# Patient Record
Sex: FEMALE | Race: WHITE | Employment: OTHER | URBAN - NONMETROPOLITAN AREA
[De-identification: names, ages, dates, MRNs, and addresses within clinical notes are randomized per-mention and may not be internally consistent; named-entity substitution may affect disease eponyms.]

---

## 2017-05-20 ENCOUNTER — OFFICE VISIT (OUTPATIENT)
Dept: PRIMARY CARE CLINIC | Age: 82
End: 2017-05-20
Payer: MEDICARE

## 2017-05-20 VITALS
OXYGEN SATURATION: 98 % | TEMPERATURE: 98.2 F | DIASTOLIC BLOOD PRESSURE: 74 MMHG | BODY MASS INDEX: 27.46 KG/M2 | HEART RATE: 74 BPM | SYSTOLIC BLOOD PRESSURE: 126 MMHG | HEIGHT: 65 IN | WEIGHT: 164.8 LBS

## 2017-05-20 DIAGNOSIS — M79.10 MYALGIA: ICD-10-CM

## 2017-05-20 DIAGNOSIS — M54.32 LEFT SIDED SCIATICA: Primary | ICD-10-CM

## 2017-05-20 PROCEDURE — 4040F PNEUMOC VAC/ADMIN/RCVD: CPT | Performed by: FAMILY MEDICINE

## 2017-05-20 PROCEDURE — 1036F TOBACCO NON-USER: CPT | Performed by: FAMILY MEDICINE

## 2017-05-20 PROCEDURE — 1090F PRES/ABSN URINE INCON ASSESS: CPT | Performed by: FAMILY MEDICINE

## 2017-05-20 PROCEDURE — G8427 DOCREV CUR MEDS BY ELIG CLIN: HCPCS | Performed by: FAMILY MEDICINE

## 2017-05-20 PROCEDURE — G8420 CALC BMI NORM PARAMETERS: HCPCS | Performed by: FAMILY MEDICINE

## 2017-05-20 PROCEDURE — 99214 OFFICE O/P EST MOD 30 MIN: CPT | Performed by: FAMILY MEDICINE

## 2017-05-20 PROCEDURE — 1123F ACP DISCUSS/DSCN MKR DOCD: CPT | Performed by: FAMILY MEDICINE

## 2017-05-20 PROCEDURE — 20552 NJX 1/MLT TRIGGER POINT 1/2: CPT | Performed by: FAMILY MEDICINE

## 2017-05-20 PROCEDURE — 96372 THER/PROPH/DIAG INJ SC/IM: CPT | Performed by: FAMILY MEDICINE

## 2017-05-20 PROCEDURE — G8399 PT W/DXA RESULTS DOCUMENT: HCPCS | Performed by: FAMILY MEDICINE

## 2017-05-20 RX ORDER — TRIAMCINOLONE ACETONIDE 40 MG/ML
40 INJECTION, SUSPENSION INTRA-ARTICULAR; INTRAMUSCULAR ONCE
Status: COMPLETED | OUTPATIENT
Start: 2017-05-20 | End: 2017-05-20

## 2017-05-20 RX ORDER — TIZANIDINE 4 MG/1
4 TABLET ORAL EVERY 8 HOURS PRN
Qty: 30 TABLET | Refills: 0 | Status: SHIPPED | OUTPATIENT
Start: 2017-05-20 | End: 2017-08-22 | Stop reason: ALTCHOICE

## 2017-05-20 RX ORDER — TRAMADOL HYDROCHLORIDE 50 MG/1
50 TABLET ORAL EVERY 6 HOURS PRN
Qty: 15 TABLET | Refills: 0 | Status: SHIPPED | OUTPATIENT
Start: 2017-05-20 | End: 2017-06-15 | Stop reason: SDUPTHER

## 2017-05-20 RX ADMIN — TRIAMCINOLONE ACETONIDE 40 MG: 40 INJECTION, SUSPENSION INTRA-ARTICULAR; INTRAMUSCULAR at 16:12

## 2017-05-20 ASSESSMENT — ENCOUNTER SYMPTOMS
VOMITING: 0
ABDOMINAL PAIN: 0
TROUBLE SWALLOWING: 0
EYE DISCHARGE: 0
WHEEZING: 0
CONSTIPATION: 0
COUGH: 0
RHINORRHEA: 0
SINUS PRESSURE: 0
NAUSEA: 0
DIARRHEA: 0
EYE REDNESS: 0
SORE THROAT: 0
SHORTNESS OF BREATH: 0

## 2017-05-22 ENCOUNTER — EVALUATION (OUTPATIENT)
Dept: PHYSICAL THERAPY | Age: 82
End: 2017-05-22
Payer: MEDICARE

## 2017-05-22 DIAGNOSIS — M70.62 TROCHANTERIC BURSITIS OF LEFT HIP: Primary | ICD-10-CM

## 2017-05-22 PROCEDURE — G8979 MOBILITY GOAL STATUS: HCPCS | Performed by: PHYSICAL THERAPIST

## 2017-05-22 PROCEDURE — 97110 THERAPEUTIC EXERCISES: CPT | Performed by: PHYSICAL THERAPIST

## 2017-05-22 PROCEDURE — 97162 PT EVAL MOD COMPLEX 30 MIN: CPT | Performed by: PHYSICAL THERAPIST

## 2017-05-22 PROCEDURE — G8978 MOBILITY CURRENT STATUS: HCPCS | Performed by: PHYSICAL THERAPIST

## 2017-05-22 ASSESSMENT — PAIN DESCRIPTION - PAIN TYPE: TYPE: ACUTE PAIN

## 2017-05-22 ASSESSMENT — PAIN SCALES - GENERAL: PAINLEVEL_OUTOF10: 10

## 2017-05-22 ASSESSMENT — PAIN DESCRIPTION - LOCATION: LOCATION: BUTTOCKS;LEG

## 2017-05-22 ASSESSMENT — PAIN DESCRIPTION - PROGRESSION: CLINICAL_PROGRESSION: GRADUALLY WORSENING

## 2017-05-22 ASSESSMENT — PAIN DESCRIPTION - ONSET: ONSET: PROGRESSIVE

## 2017-05-22 ASSESSMENT — PAIN DESCRIPTION - ORIENTATION: ORIENTATION: LEFT

## 2017-05-22 ASSESSMENT — PAIN DESCRIPTION - DESCRIPTORS: DESCRIPTORS: SHARP

## 2017-05-22 ASSESSMENT — PAIN DESCRIPTION - FREQUENCY: FREQUENCY: INTERMITTENT

## 2017-05-24 ENCOUNTER — TREATMENT (OUTPATIENT)
Dept: PHYSICAL THERAPY | Age: 82
End: 2017-05-24
Payer: MEDICARE

## 2017-05-24 DIAGNOSIS — M70.62 TROCHANTERIC BURSITIS OF LEFT HIP: Primary | ICD-10-CM

## 2017-05-24 PROCEDURE — G0283 ELEC STIM OTHER THAN WOUND: HCPCS | Performed by: PHYSICAL THERAPIST

## 2017-05-24 PROCEDURE — G8979 MOBILITY GOAL STATUS: HCPCS | Performed by: PHYSICAL THERAPIST

## 2017-05-24 PROCEDURE — 97110 THERAPEUTIC EXERCISES: CPT | Performed by: PHYSICAL THERAPIST

## 2017-05-24 PROCEDURE — G8978 MOBILITY CURRENT STATUS: HCPCS | Performed by: PHYSICAL THERAPIST

## 2017-05-26 ENCOUNTER — TREATMENT (OUTPATIENT)
Dept: PHYSICAL THERAPY | Age: 82
End: 2017-05-26

## 2017-05-26 ENCOUNTER — HOSPITAL ENCOUNTER (EMERGENCY)
Age: 82
Discharge: HOME | End: 2017-05-26
Attending: EMERGENCY MEDICINE
Payer: MEDICARE

## 2017-05-26 ENCOUNTER — APPOINTMENT (OUTPATIENT)
Dept: GENERAL RADIOLOGY | Age: 82
End: 2017-05-26
Payer: MEDICARE

## 2017-05-26 VITALS
BODY MASS INDEX: 27.32 KG/M2 | TEMPERATURE: 98.2 F | HEART RATE: 70 BPM | WEIGHT: 164 LBS | HEIGHT: 65 IN | DIASTOLIC BLOOD PRESSURE: 78 MMHG | SYSTOLIC BLOOD PRESSURE: 168 MMHG | OXYGEN SATURATION: 95 % | RESPIRATION RATE: 14 BRPM

## 2017-05-26 DIAGNOSIS — M70.62 TROCHANTERIC BURSITIS OF LEFT HIP: Primary | ICD-10-CM

## 2017-05-26 DIAGNOSIS — M16.12 ARTHRITIS OF LEFT HIP: Primary | ICD-10-CM

## 2017-05-26 PROCEDURE — 73502 X-RAY EXAM HIP UNI 2-3 VIEWS: CPT | Performed by: RADIOLOGY

## 2017-05-26 PROCEDURE — 73502 X-RAY EXAM HIP UNI 2-3 VIEWS: CPT

## 2017-05-26 PROCEDURE — 99283 EMERGENCY DEPT VISIT LOW MDM: CPT

## 2017-05-26 RX ORDER — METHYLPREDNISOLONE 4 MG/1
TABLET ORAL
Qty: 1 KIT | Refills: 0 | Status: SHIPPED | OUTPATIENT
Start: 2017-05-26 | End: 2017-06-15

## 2017-05-26 ASSESSMENT — PAIN DESCRIPTION - ONSET: ONSET: ON-GOING

## 2017-05-26 ASSESSMENT — PAIN DESCRIPTION - PAIN TYPE: TYPE: ACUTE PAIN

## 2017-05-26 ASSESSMENT — PAIN DESCRIPTION - DIRECTION: RADIATING_TOWARDS: KNEE

## 2017-05-26 ASSESSMENT — PAIN DESCRIPTION - LOCATION: LOCATION: HIP

## 2017-05-26 ASSESSMENT — PAIN DESCRIPTION - DESCRIPTORS: DESCRIPTORS: DULL

## 2017-05-26 ASSESSMENT — PAIN DESCRIPTION - FREQUENCY: FREQUENCY: CONTINUOUS

## 2017-05-26 ASSESSMENT — PAIN SCALES - GENERAL: PAINLEVEL_OUTOF10: 6

## 2017-05-26 ASSESSMENT — PAIN DESCRIPTION - PROGRESSION: CLINICAL_PROGRESSION: GRADUALLY WORSENING

## 2017-05-26 ASSESSMENT — PAIN DESCRIPTION - ORIENTATION: ORIENTATION: LEFT

## 2017-05-30 ENCOUNTER — OFFICE VISIT (OUTPATIENT)
Dept: ORTHOPEDIC SURGERY | Age: 82
End: 2017-05-30
Payer: MEDICARE

## 2017-05-30 VITALS
BODY MASS INDEX: 27.32 KG/M2 | WEIGHT: 164 LBS | HEIGHT: 65 IN | HEART RATE: 74 BPM | DIASTOLIC BLOOD PRESSURE: 80 MMHG | SYSTOLIC BLOOD PRESSURE: 144 MMHG

## 2017-05-30 DIAGNOSIS — M54.40 ACUTE BILATERAL LOW BACK PAIN WITH SCIATICA, SCIATICA LATERALITY UNSPECIFIED: Primary | ICD-10-CM

## 2017-05-30 DIAGNOSIS — M70.62 TROCHANTERIC BURSITIS OF LEFT HIP: ICD-10-CM

## 2017-05-30 PROCEDURE — G8399 PT W/DXA RESULTS DOCUMENT: HCPCS | Performed by: PHYSICIAN ASSISTANT

## 2017-05-30 PROCEDURE — 99202 OFFICE O/P NEW SF 15 MIN: CPT | Performed by: PHYSICIAN ASSISTANT

## 2017-05-30 PROCEDURE — G8427 DOCREV CUR MEDS BY ELIG CLIN: HCPCS | Performed by: PHYSICIAN ASSISTANT

## 2017-05-30 PROCEDURE — 1123F ACP DISCUSS/DSCN MKR DOCD: CPT | Performed by: PHYSICIAN ASSISTANT

## 2017-05-30 PROCEDURE — G8420 CALC BMI NORM PARAMETERS: HCPCS | Performed by: PHYSICIAN ASSISTANT

## 2017-05-30 PROCEDURE — 1090F PRES/ABSN URINE INCON ASSESS: CPT | Performed by: PHYSICIAN ASSISTANT

## 2017-05-30 PROCEDURE — 4040F PNEUMOC VAC/ADMIN/RCVD: CPT | Performed by: PHYSICIAN ASSISTANT

## 2017-05-30 PROCEDURE — 1036F TOBACCO NON-USER: CPT | Performed by: PHYSICIAN ASSISTANT

## 2017-05-30 PROCEDURE — 20610 DRAIN/INJ JOINT/BURSA W/O US: CPT | Performed by: PHYSICIAN ASSISTANT

## 2017-05-30 RX ORDER — BUPIVACAINE HYDROCHLORIDE 5 MG/ML
4 INJECTION, SOLUTION PERINEURAL ONCE
Status: COMPLETED | OUTPATIENT
Start: 2017-05-30 | End: 2017-05-30

## 2017-05-30 RX ORDER — TRIAMCINOLONE ACETONIDE 40 MG/ML
80 INJECTION, SUSPENSION INTRA-ARTICULAR; INTRAMUSCULAR ONCE
Status: COMPLETED | OUTPATIENT
Start: 2017-05-30 | End: 2017-05-30

## 2017-05-30 RX ADMIN — TRIAMCINOLONE ACETONIDE 80 MG: 40 INJECTION, SUSPENSION INTRA-ARTICULAR; INTRAMUSCULAR at 12:51

## 2017-05-30 RX ADMIN — BUPIVACAINE HYDROCHLORIDE 20 MG: 5 INJECTION, SOLUTION PERINEURAL at 12:51

## 2017-06-07 ENCOUNTER — OFFICE VISIT (OUTPATIENT)
Dept: FAMILY MEDICINE CLINIC | Age: 82
End: 2017-06-07
Payer: MEDICARE

## 2017-06-07 VITALS
WEIGHT: 160.6 LBS | TEMPERATURE: 99 F | HEIGHT: 65 IN | SYSTOLIC BLOOD PRESSURE: 120 MMHG | OXYGEN SATURATION: 98 % | BODY MASS INDEX: 26.76 KG/M2 | HEART RATE: 76 BPM | DIASTOLIC BLOOD PRESSURE: 78 MMHG

## 2017-06-07 DIAGNOSIS — M81.0 OSTEOPOROSIS: ICD-10-CM

## 2017-06-07 DIAGNOSIS — R73.01 IMPAIRED FASTING GLUCOSE: ICD-10-CM

## 2017-06-07 DIAGNOSIS — M70.62 TROCHANTERIC BURSITIS OF LEFT HIP: Primary | ICD-10-CM

## 2017-06-07 DIAGNOSIS — Z13.6 SCREENING FOR CARDIOVASCULAR CONDITION: ICD-10-CM

## 2017-06-07 DIAGNOSIS — Z13.1 SCREENING FOR DIABETES MELLITUS: ICD-10-CM

## 2017-06-07 PROCEDURE — G8420 CALC BMI NORM PARAMETERS: HCPCS | Performed by: FAMILY MEDICINE

## 2017-06-07 PROCEDURE — 4005F PHARM THX FOR OP RXD: CPT | Performed by: FAMILY MEDICINE

## 2017-06-07 PROCEDURE — G8399 PT W/DXA RESULTS DOCUMENT: HCPCS | Performed by: FAMILY MEDICINE

## 2017-06-07 PROCEDURE — 1090F PRES/ABSN URINE INCON ASSESS: CPT | Performed by: FAMILY MEDICINE

## 2017-06-07 PROCEDURE — G0009 ADMIN PNEUMOCOCCAL VACCINE: HCPCS | Performed by: FAMILY MEDICINE

## 2017-06-07 PROCEDURE — 90670 PCV13 VACCINE IM: CPT | Performed by: FAMILY MEDICINE

## 2017-06-07 PROCEDURE — 1123F ACP DISCUSS/DSCN MKR DOCD: CPT | Performed by: FAMILY MEDICINE

## 2017-06-07 PROCEDURE — G8427 DOCREV CUR MEDS BY ELIG CLIN: HCPCS | Performed by: FAMILY MEDICINE

## 2017-06-07 PROCEDURE — 1036F TOBACCO NON-USER: CPT | Performed by: FAMILY MEDICINE

## 2017-06-07 PROCEDURE — 99214 OFFICE O/P EST MOD 30 MIN: CPT | Performed by: FAMILY MEDICINE

## 2017-06-07 PROCEDURE — 4040F PNEUMOC VAC/ADMIN/RCVD: CPT | Performed by: FAMILY MEDICINE

## 2017-06-07 RX ORDER — VITAMIN E 200 UNIT
1 CAPSULE ORAL DAILY
Status: ON HOLD | COMMUNITY
End: 2022-04-29 | Stop reason: HOSPADM

## 2017-06-07 RX ORDER — M-VIT,TX,IRON,MINS/CALC/FOLIC 27MG-0.4MG
1 TABLET ORAL DAILY
Status: ON HOLD | COMMUNITY
End: 2021-09-08 | Stop reason: HOSPADM

## 2017-06-07 RX ORDER — LORATADINE 10 MG/1
10 TABLET ORAL DAILY
Qty: 30 TABLET | Refills: 3 | Status: ON HOLD
Start: 2017-06-07 | End: 2017-07-20 | Stop reason: ALTCHOICE

## 2017-06-07 RX ORDER — FLUTICASONE PROPIONATE 50 MCG
1 SPRAY, SUSPENSION (ML) NASAL DAILY PRN
Status: ON HOLD | COMMUNITY
End: 2017-09-19

## 2017-06-07 ASSESSMENT — ENCOUNTER SYMPTOMS
BACK PAIN: 1
ROS SKIN COMMENTS: SEES DERM
CHEST TIGHTNESS: 0
DIARRHEA: 0
SINUS PRESSURE: 0
COUGH: 0
ABDOMINAL PAIN: 0
SHORTNESS OF BREATH: 0
WHEEZING: 0
CONSTIPATION: 0

## 2017-06-09 ENCOUNTER — EVALUATION (OUTPATIENT)
Dept: PHYSICAL THERAPY | Age: 82
End: 2017-06-09
Payer: MEDICARE

## 2017-06-09 DIAGNOSIS — M70.62 TROCHANTERIC BURSITIS OF LEFT HIP: Primary | ICD-10-CM

## 2017-06-09 PROCEDURE — G0283 ELEC STIM OTHER THAN WOUND: HCPCS | Performed by: PHYSICAL THERAPIST

## 2017-06-09 PROCEDURE — 97110 THERAPEUTIC EXERCISES: CPT | Performed by: PHYSICAL THERAPIST

## 2017-06-12 ENCOUNTER — TREATMENT (OUTPATIENT)
Dept: PHYSICAL THERAPY | Age: 82
End: 2017-06-12
Payer: MEDICARE

## 2017-06-12 DIAGNOSIS — M70.62 TROCHANTERIC BURSITIS OF LEFT HIP: Primary | ICD-10-CM

## 2017-06-12 PROCEDURE — 97110 THERAPEUTIC EXERCISES: CPT | Performed by: PHYSICAL THERAPIST

## 2017-06-12 PROCEDURE — G0283 ELEC STIM OTHER THAN WOUND: HCPCS | Performed by: PHYSICAL THERAPIST

## 2017-06-14 ENCOUNTER — TREATMENT (OUTPATIENT)
Dept: PHYSICAL THERAPY | Age: 82
End: 2017-06-14
Payer: MEDICARE

## 2017-06-14 DIAGNOSIS — M70.62 TROCHANTERIC BURSITIS OF LEFT HIP: Primary | ICD-10-CM

## 2017-06-14 PROCEDURE — G0283 ELEC STIM OTHER THAN WOUND: HCPCS | Performed by: PHYSICAL THERAPIST

## 2017-06-14 PROCEDURE — 97110 THERAPEUTIC EXERCISES: CPT | Performed by: PHYSICAL THERAPIST

## 2017-06-15 ENCOUNTER — OFFICE VISIT (OUTPATIENT)
Dept: FAMILY MEDICINE CLINIC | Age: 82
End: 2017-06-15
Payer: MEDICARE

## 2017-06-15 VITALS
OXYGEN SATURATION: 97 % | HEART RATE: 70 BPM | TEMPERATURE: 98.5 F | BODY MASS INDEX: 27.09 KG/M2 | WEIGHT: 162.8 LBS | DIASTOLIC BLOOD PRESSURE: 82 MMHG | SYSTOLIC BLOOD PRESSURE: 128 MMHG

## 2017-06-15 DIAGNOSIS — K59.03 DRUG-INDUCED CONSTIPATION: ICD-10-CM

## 2017-06-15 DIAGNOSIS — M70.62 TROCHANTERIC BURSITIS OF LEFT HIP: Primary | ICD-10-CM

## 2017-06-15 PROCEDURE — 99214 OFFICE O/P EST MOD 30 MIN: CPT | Performed by: FAMILY MEDICINE

## 2017-06-15 PROCEDURE — 1090F PRES/ABSN URINE INCON ASSESS: CPT | Performed by: FAMILY MEDICINE

## 2017-06-15 PROCEDURE — 1036F TOBACCO NON-USER: CPT | Performed by: FAMILY MEDICINE

## 2017-06-15 PROCEDURE — 4040F PNEUMOC VAC/ADMIN/RCVD: CPT | Performed by: FAMILY MEDICINE

## 2017-06-15 PROCEDURE — G8399 PT W/DXA RESULTS DOCUMENT: HCPCS | Performed by: FAMILY MEDICINE

## 2017-06-15 PROCEDURE — 1123F ACP DISCUSS/DSCN MKR DOCD: CPT | Performed by: FAMILY MEDICINE

## 2017-06-15 PROCEDURE — G8419 CALC BMI OUT NRM PARAM NOF/U: HCPCS | Performed by: FAMILY MEDICINE

## 2017-06-15 PROCEDURE — G8427 DOCREV CUR MEDS BY ELIG CLIN: HCPCS | Performed by: FAMILY MEDICINE

## 2017-06-15 RX ORDER — SENNA PLUS 8.6 MG/1
1 TABLET ORAL 2 TIMES DAILY PRN
Qty: 30 TABLET | Refills: 0 | Status: SHIPPED | OUTPATIENT
Start: 2017-06-15 | End: 2018-06-15

## 2017-06-15 RX ORDER — TRAMADOL HYDROCHLORIDE 50 MG/1
50 TABLET ORAL EVERY 8 HOURS PRN
Qty: 60 TABLET | Refills: 0 | Status: ON HOLD | OUTPATIENT
Start: 2017-06-15 | End: 2017-07-20 | Stop reason: HOSPADM

## 2017-06-15 RX ORDER — POLYETHYLENE GLYCOL 3350 17 G/17G
17 POWDER, FOR SOLUTION ORAL DAILY
Qty: 510 G | Refills: 0 | Status: ON HOLD
Start: 2017-06-15 | End: 2017-07-20 | Stop reason: HOSPADM

## 2017-06-15 ASSESSMENT — ENCOUNTER SYMPTOMS
CONSTIPATION: 1
ROS SKIN COMMENTS: SEES DERM
CHEST TIGHTNESS: 0
DIARRHEA: 0
COUGH: 0
ABDOMINAL PAIN: 0
SHORTNESS OF BREATH: 0
WHEEZING: 0
BACK PAIN: 1

## 2017-06-16 ENCOUNTER — TREATMENT (OUTPATIENT)
Dept: PHYSICAL THERAPY | Age: 82
End: 2017-06-16
Payer: MEDICARE

## 2017-06-16 DIAGNOSIS — M70.62 TROCHANTERIC BURSITIS OF LEFT HIP: Primary | ICD-10-CM

## 2017-06-16 PROCEDURE — G0283 ELEC STIM OTHER THAN WOUND: HCPCS | Performed by: PHYSICAL THERAPIST

## 2017-06-16 PROCEDURE — 97110 THERAPEUTIC EXERCISES: CPT | Performed by: PHYSICAL THERAPIST

## 2017-06-20 ENCOUNTER — TREATMENT (OUTPATIENT)
Dept: PHYSICAL THERAPY | Age: 82
End: 2017-06-20
Payer: MEDICARE

## 2017-06-20 DIAGNOSIS — M70.62 TROCHANTERIC BURSITIS OF LEFT HIP: Primary | ICD-10-CM

## 2017-06-20 PROCEDURE — G0283 ELEC STIM OTHER THAN WOUND: HCPCS | Performed by: PHYSICAL THERAPIST

## 2017-06-20 PROCEDURE — 97110 THERAPEUTIC EXERCISES: CPT | Performed by: PHYSICAL THERAPIST

## 2017-06-22 ENCOUNTER — TREATMENT (OUTPATIENT)
Dept: PHYSICAL THERAPY | Age: 82
End: 2017-06-22
Payer: MEDICARE

## 2017-06-22 DIAGNOSIS — M70.62 TROCHANTERIC BURSITIS OF LEFT HIP: Primary | ICD-10-CM

## 2017-06-22 PROCEDURE — G0283 ELEC STIM OTHER THAN WOUND: HCPCS | Performed by: PHYSICAL THERAPIST

## 2017-06-22 PROCEDURE — 97110 THERAPEUTIC EXERCISES: CPT | Performed by: PHYSICAL THERAPIST

## 2017-06-27 ENCOUNTER — OFFICE VISIT (OUTPATIENT)
Dept: ORTHOPEDIC SURGERY | Age: 82
End: 2017-06-27
Payer: MEDICARE

## 2017-06-27 VITALS
WEIGHT: 164 LBS | BODY MASS INDEX: 27.32 KG/M2 | SYSTOLIC BLOOD PRESSURE: 142 MMHG | HEART RATE: 72 BPM | HEIGHT: 65 IN | DIASTOLIC BLOOD PRESSURE: 71 MMHG

## 2017-06-27 DIAGNOSIS — M54.10 RADICULAR LOW BACK PAIN: Primary | ICD-10-CM

## 2017-06-27 PROCEDURE — 4040F PNEUMOC VAC/ADMIN/RCVD: CPT | Performed by: PHYSICIAN ASSISTANT

## 2017-06-27 PROCEDURE — 99212 OFFICE O/P EST SF 10 MIN: CPT | Performed by: PHYSICIAN ASSISTANT

## 2017-06-27 PROCEDURE — 1036F TOBACCO NON-USER: CPT | Performed by: PHYSICIAN ASSISTANT

## 2017-06-27 PROCEDURE — G8399 PT W/DXA RESULTS DOCUMENT: HCPCS | Performed by: PHYSICIAN ASSISTANT

## 2017-06-27 PROCEDURE — 1090F PRES/ABSN URINE INCON ASSESS: CPT | Performed by: PHYSICIAN ASSISTANT

## 2017-06-27 PROCEDURE — G8427 DOCREV CUR MEDS BY ELIG CLIN: HCPCS | Performed by: PHYSICIAN ASSISTANT

## 2017-06-27 PROCEDURE — 1123F ACP DISCUSS/DSCN MKR DOCD: CPT | Performed by: PHYSICIAN ASSISTANT

## 2017-06-27 PROCEDURE — G8419 CALC BMI OUT NRM PARAM NOF/U: HCPCS | Performed by: PHYSICIAN ASSISTANT

## 2017-06-27 RX ORDER — TIZANIDINE HYDROCHLORIDE 4 MG/1
4 CAPSULE, GELATIN COATED ORAL EVERY 6 HOURS PRN
Qty: 120 CAPSULE | Refills: 0 | Status: SHIPPED | OUTPATIENT
Start: 2017-06-27 | End: 2017-06-27 | Stop reason: CLARIF

## 2017-06-27 RX ORDER — TIZANIDINE HYDROCHLORIDE 4 MG/1
4 CAPSULE, GELATIN COATED ORAL EVERY 6 HOURS PRN
Qty: 30 CAPSULE | Refills: 0 | Status: ON HOLD | OUTPATIENT
Start: 2017-06-27 | End: 2017-07-20 | Stop reason: HOSPADM

## 2017-07-10 ENCOUNTER — TELEPHONE (OUTPATIENT)
Dept: ORTHOPEDIC SURGERY | Age: 82
End: 2017-07-10

## 2017-07-10 DIAGNOSIS — M70.72 HIP BURSITIS, LEFT: Primary | ICD-10-CM

## 2017-07-11 RX ORDER — TIZANIDINE HYDROCHLORIDE 4 MG/1
4 CAPSULE, GELATIN COATED ORAL EVERY 6 HOURS PRN
Qty: 30 CAPSULE | Refills: 0 | Status: ON HOLD | OUTPATIENT
Start: 2017-07-11 | End: 2017-07-20 | Stop reason: HOSPADM

## 2017-07-15 ENCOUNTER — APPOINTMENT (OUTPATIENT)
Dept: CT IMAGING | Age: 82
End: 2017-07-15
Payer: MEDICARE

## 2017-07-15 ENCOUNTER — APPOINTMENT (OUTPATIENT)
Dept: GENERAL RADIOLOGY | Age: 82
End: 2017-07-15
Payer: MEDICARE

## 2017-07-15 ENCOUNTER — HOSPITAL ENCOUNTER (INPATIENT)
Age: 82
LOS: 5 days | Discharge: HOME | DRG: 443 | End: 2017-07-20
Attending: INTERNAL MEDICINE | Admitting: INTERNAL MEDICINE
Payer: MEDICARE

## 2017-07-15 ENCOUNTER — HOSPITAL ENCOUNTER (EMERGENCY)
Age: 82
Discharge: ANOTHER HOSPITAL | End: 2017-07-15
Attending: EMERGENCY MEDICINE
Payer: MEDICARE

## 2017-07-15 ENCOUNTER — APPOINTMENT (OUTPATIENT)
Dept: ULTRASOUND IMAGING | Age: 82
DRG: 443 | End: 2017-07-15
Attending: INTERNAL MEDICINE
Payer: MEDICARE

## 2017-07-15 VITALS
WEIGHT: 160 LBS | DIASTOLIC BLOOD PRESSURE: 75 MMHG | SYSTOLIC BLOOD PRESSURE: 159 MMHG | TEMPERATURE: 98.8 F | BODY MASS INDEX: 26.63 KG/M2 | OXYGEN SATURATION: 97 % | RESPIRATION RATE: 16 BRPM | HEART RATE: 64 BPM

## 2017-07-15 DIAGNOSIS — R10.84 GENERALIZED ABDOMINAL PAIN: Primary | ICD-10-CM

## 2017-07-15 DIAGNOSIS — I48.91 NEW ONSET ATRIAL FIBRILLATION (HCC): ICD-10-CM

## 2017-07-15 LAB
-: ABNORMAL
ABSOLUTE EOS #: 0.2 K/UL (ref 0–0.4)
ABSOLUTE LYMPH #: 0.9 K/UL (ref 1–4.8)
ABSOLUTE MONO #: 0.8 K/UL (ref 0.1–1.2)
ALBUMIN SERPL-MCNC: 3.8 G/DL (ref 3.5–5.2)
ALBUMIN/GLOBULIN RATIO: 1.2 (ref 1–2.5)
ALP BLD-CCNC: 161 U/L (ref 35–104)
ALT SERPL-CCNC: 1002 U/L (ref 5–33)
AMORPHOUS: ABNORMAL
AMYLASE: 44 U/L (ref 28–100)
ANION GAP SERPL CALCULATED.3IONS-SCNC: 17 MMOL/L (ref 9–17)
AST SERPL-CCNC: 512 U/L
BACTERIA: ABNORMAL
BASOPHILS # BLD: 1 %
BASOPHILS ABSOLUTE: 0.1 K/UL (ref 0–0.2)
BILIRUB SERPL-MCNC: 1.06 MG/DL (ref 0.3–1.2)
BILIRUBIN DIRECT: 0.34 MG/DL
BILIRUBIN URINE: NEGATIVE
BILIRUBIN, INDIRECT: 0.72 MG/DL (ref 0–1)
BUN BLDV-MCNC: 9 MG/DL (ref 8–23)
BUN/CREAT BLD: 14 (ref 9–20)
CALCIUM SERPL-MCNC: 9.6 MG/DL (ref 8.6–10.4)
CASTS UA: ABNORMAL /LPF (ref 0–2)
CHLORIDE BLD-SCNC: 97 MMOL/L (ref 98–107)
CK MB: 2.4 NG/ML
CO2: 25 MMOL/L (ref 20–31)
COLOR: ABNORMAL
COMMENT UA: ABNORMAL
CREAT SERPL-MCNC: 0.64 MG/DL (ref 0.5–0.9)
CRYSTALS, UA: ABNORMAL /HPF
DIFFERENTIAL TYPE: ABNORMAL
EKG ATRIAL RATE: 142 BPM
EKG ATRIAL RATE: 67 BPM
EKG P AXIS: 38 DEGREES
EKG P-R INTERVAL: 158 MS
EKG P-R INTERVAL: 202 MS
EKG Q-T INTERVAL: 316 MS
EKG Q-T INTERVAL: 432 MS
EKG QRS DURATION: 74 MS
EKG QRS DURATION: 74 MS
EKG QTC CALCULATION (BAZETT): 456 MS
EKG QTC CALCULATION (BAZETT): 486 MS
EKG R AXIS: 23 DEGREES
EKG R AXIS: 24 DEGREES
EKG T AXIS: -41 DEGREES
EKG T AXIS: 36 DEGREES
EKG VENTRICULAR RATE: 142 BPM
EKG VENTRICULAR RATE: 67 BPM
EOSINOPHILS RELATIVE PERCENT: 2 %
EPITHELIAL CELLS UA: ABNORMAL /HPF (ref 0–5)
GFR AFRICAN AMERICAN: >60 ML/MIN
GFR NON-AFRICAN AMERICAN: >60 ML/MIN
GFR SERPL CREATININE-BSD FRML MDRD: ABNORMAL ML/MIN/{1.73_M2}
GFR SERPL CREATININE-BSD FRML MDRD: ABNORMAL ML/MIN/{1.73_M2}
GLOBULIN: 3.3 G/DL (ref 1.5–3.8)
GLUCOSE BLD-MCNC: 109 MG/DL (ref 70–99)
GLUCOSE URINE: NEGATIVE
HCT VFR BLD CALC: 43.9 % (ref 36–46)
HEMOGLOBIN: 14.5 G/DL (ref 12–16)
INR BLD: 1.1
KETONES, URINE: ABNORMAL
LEUKOCYTE ESTERASE, URINE: ABNORMAL
LIPASE: 30 U/L (ref 13–60)
LYMPHOCYTES # BLD: 12 %
MCH RBC QN AUTO: 28.6 PG (ref 26–34)
MCHC RBC AUTO-ENTMCNC: 33 G/DL (ref 31–37)
MCV RBC AUTO: 86.8 FL (ref 80–100)
MONOCYTES # BLD: 11 %
MUCUS: ABNORMAL
MYOGLOBIN: 63 NG/ML (ref 25–58)
NITRITE, URINE: NEGATIVE
OTHER OBSERVATIONS UA: ABNORMAL
PARTIAL THROMBOPLASTIN TIME: 29.6 SEC (ref 27–35)
PDW BLD-RTO: 15.3 % (ref 11–14.5)
PH UA: 6.5 (ref 5–6)
PLATELET # BLD: 150 K/UL (ref 140–450)
PLATELET ESTIMATE: ABNORMAL
PMV BLD AUTO: 7.9 FL (ref 6–12)
POTASSIUM SERPL-SCNC: 3.8 MMOL/L (ref 3.7–5.3)
PROTEIN UA: NEGATIVE
PROTHROMBIN TIME: 12 SEC (ref 9.4–11.3)
RBC # BLD: 5.06 M/UL (ref 4–5.2)
RBC # BLD: ABNORMAL 10*6/UL
RBC UA: ABNORMAL /HPF (ref 0–4)
RENAL EPITHELIAL, UA: ABNORMAL /HPF
SEG NEUTROPHILS: 74 %
SEGMENTED NEUTROPHILS ABSOLUTE COUNT: 5.3 K/UL (ref 1.8–7.7)
SODIUM BLD-SCNC: 139 MMOL/L (ref 135–144)
SPECIFIC GRAVITY UA: 1 (ref 1.01–1.02)
TOTAL CK: 62 U/L (ref 26–192)
TOTAL PROTEIN: 7.1 G/DL (ref 6.4–8.3)
TRICHOMONAS: ABNORMAL
TROPONIN INTERP: NORMAL
TROPONIN T: < 0.01 NG/ML
TROPONIN T: <0.03 NG/ML
TURBIDITY: ABNORMAL
URINE HGB: ABNORMAL
UROBILINOGEN, URINE: NORMAL
WBC # BLD: 7.1 K/UL (ref 3.5–11)
WBC # BLD: ABNORMAL 10*3/UL
WBC UA: ABNORMAL /HPF (ref 0–4)
YEAST: ABNORMAL

## 2017-07-15 PROCEDURE — 6360000002 HC RX W HCPCS: Performed by: EMERGENCY MEDICINE

## 2017-07-15 PROCEDURE — 80048 BASIC METABOLIC PNL TOTAL CA: CPT

## 2017-07-15 PROCEDURE — 2500000003 HC RX 250 WO HCPCS: Performed by: EMERGENCY MEDICINE

## 2017-07-15 PROCEDURE — 93005 ELECTROCARDIOGRAM TRACING: CPT

## 2017-07-15 PROCEDURE — 81001 URINALYSIS AUTO W/SCOPE: CPT

## 2017-07-15 PROCEDURE — 85610 PROTHROMBIN TIME: CPT

## 2017-07-15 PROCEDURE — 71020 XR CHEST STANDARD TWO VW: CPT | Performed by: RADIOLOGY

## 2017-07-15 PROCEDURE — 36415 COLL VENOUS BLD VENIPUNCTURE: CPT

## 2017-07-15 PROCEDURE — 99285 EMERGENCY DEPT VISIT HI MDM: CPT

## 2017-07-15 PROCEDURE — 83690 ASSAY OF LIPASE: CPT

## 2017-07-15 PROCEDURE — 96365 THER/PROPH/DIAG IV INF INIT: CPT

## 2017-07-15 PROCEDURE — 82150 ASSAY OF AMYLASE: CPT

## 2017-07-15 PROCEDURE — 74177 CT ABD & PELVIS W/CONTRAST: CPT

## 2017-07-15 PROCEDURE — 84484 ASSAY OF TROPONIN QUANT: CPT

## 2017-07-15 PROCEDURE — 2580000003 HC RX 258: Performed by: INTERNAL MEDICINE

## 2017-07-15 PROCEDURE — 96376 TX/PRO/DX INJ SAME DRUG ADON: CPT

## 2017-07-15 PROCEDURE — 85730 THROMBOPLASTIN TIME PARTIAL: CPT

## 2017-07-15 PROCEDURE — 74177 CT ABD & PELVIS W/CONTRAST: CPT | Performed by: RADIOLOGY

## 2017-07-15 PROCEDURE — 2580000003 HC RX 258: Performed by: EMERGENCY MEDICINE

## 2017-07-15 PROCEDURE — 71020 XR CHEST STANDARD TWO VW: CPT

## 2017-07-15 PROCEDURE — 96366 THER/PROPH/DIAG IV INF ADDON: CPT

## 2017-07-15 PROCEDURE — 82550 ASSAY OF CK (CPK): CPT

## 2017-07-15 PROCEDURE — 6360000002 HC RX W HCPCS

## 2017-07-15 PROCEDURE — 76700 US EXAM ABDOM COMPLETE: CPT

## 2017-07-15 PROCEDURE — 6360000004 HC RX CONTRAST MEDICATION: Performed by: EMERGENCY MEDICINE

## 2017-07-15 PROCEDURE — 85025 COMPLETE CBC W/AUTO DIFF WBC: CPT

## 2017-07-15 PROCEDURE — 1200000003 HC TELEMETRY R&B

## 2017-07-15 PROCEDURE — 82553 CREATINE MB FRACTION: CPT

## 2017-07-15 PROCEDURE — 99223 1ST HOSP IP/OBS HIGH 75: CPT | Performed by: INTERNAL MEDICINE

## 2017-07-15 PROCEDURE — 83874 ASSAY OF MYOGLOBIN: CPT

## 2017-07-15 PROCEDURE — 96375 TX/PRO/DX INJ NEW DRUG ADDON: CPT

## 2017-07-15 PROCEDURE — 80076 HEPATIC FUNCTION PANEL: CPT

## 2017-07-15 PROCEDURE — 6360000002 HC RX W HCPCS: Performed by: INTERNAL MEDICINE

## 2017-07-15 RX ORDER — POTASSIUM CHLORIDE 20 MEQ/1
40 TABLET, EXTENDED RELEASE ORAL PRN
Status: DISCONTINUED | OUTPATIENT
Start: 2017-07-15 | End: 2017-07-20 | Stop reason: HOSPADM

## 2017-07-15 RX ORDER — 0.9 % SODIUM CHLORIDE 0.9 %
1000 INTRAVENOUS SOLUTION INTRAVENOUS ONCE
Status: COMPLETED | OUTPATIENT
Start: 2017-07-15 | End: 2017-07-15

## 2017-07-15 RX ORDER — POTASSIUM CHLORIDE 20MEQ/15ML
40 LIQUID (ML) ORAL PRN
Status: DISCONTINUED | OUTPATIENT
Start: 2017-07-15 | End: 2017-07-20 | Stop reason: HOSPADM

## 2017-07-15 RX ORDER — ONDANSETRON 2 MG/ML
INJECTION INTRAMUSCULAR; INTRAVENOUS
Status: COMPLETED
Start: 2017-07-15 | End: 2017-07-15

## 2017-07-15 RX ORDER — MORPHINE SULFATE 4 MG/ML
4 INJECTION, SOLUTION INTRAMUSCULAR; INTRAVENOUS ONCE
Status: COMPLETED | OUTPATIENT
Start: 2017-07-15 | End: 2017-07-15

## 2017-07-15 RX ORDER — SODIUM CHLORIDE 0.9 % (FLUSH) 0.9 %
10 SYRINGE (ML) INJECTION EVERY 12 HOURS SCHEDULED
Status: DISCONTINUED | OUTPATIENT
Start: 2017-07-15 | End: 2017-07-20 | Stop reason: HOSPADM

## 2017-07-15 RX ORDER — POTASSIUM CHLORIDE 7.45 MG/ML
10 INJECTION INTRAVENOUS PRN
Status: DISCONTINUED | OUTPATIENT
Start: 2017-07-15 | End: 2017-07-20 | Stop reason: HOSPADM

## 2017-07-15 RX ORDER — DILTIAZEM HYDROCHLORIDE 5 MG/ML
10 INJECTION INTRAVENOUS ONCE
Status: COMPLETED | OUTPATIENT
Start: 2017-07-15 | End: 2017-07-15

## 2017-07-15 RX ORDER — ACETAMINOPHEN 325 MG/1
650 TABLET ORAL EVERY 4 HOURS PRN
Status: DISCONTINUED | OUTPATIENT
Start: 2017-07-15 | End: 2017-07-17 | Stop reason: ALTCHOICE

## 2017-07-15 RX ORDER — SODIUM CHLORIDE 0.9 % (FLUSH) 0.9 %
10 SYRINGE (ML) INJECTION PRN
Status: DISCONTINUED | OUTPATIENT
Start: 2017-07-15 | End: 2017-07-20 | Stop reason: HOSPADM

## 2017-07-15 RX ORDER — SODIUM CHLORIDE 9 MG/ML
INJECTION, SOLUTION INTRAVENOUS CONTINUOUS
Status: DISCONTINUED | OUTPATIENT
Start: 2017-07-15 | End: 2017-07-20 | Stop reason: HOSPADM

## 2017-07-15 RX ORDER — PANTOPRAZOLE SODIUM 40 MG/1
40 TABLET, DELAYED RELEASE ORAL
Status: DISCONTINUED | OUTPATIENT
Start: 2017-07-16 | End: 2017-07-20 | Stop reason: HOSPADM

## 2017-07-15 RX ORDER — ONDANSETRON 2 MG/ML
4 INJECTION INTRAMUSCULAR; INTRAVENOUS ONCE
Status: COMPLETED | OUTPATIENT
Start: 2017-07-15 | End: 2017-07-15

## 2017-07-15 RX ORDER — ONDANSETRON 2 MG/ML
4 INJECTION INTRAMUSCULAR; INTRAVENOUS EVERY 6 HOURS PRN
Status: DISCONTINUED | OUTPATIENT
Start: 2017-07-15 | End: 2017-07-20 | Stop reason: HOSPADM

## 2017-07-15 RX ADMIN — HYDROMORPHONE HYDROCHLORIDE 1 MG: 1 INJECTION, SOLUTION INTRAMUSCULAR; INTRAVENOUS; SUBCUTANEOUS at 19:37

## 2017-07-15 RX ADMIN — ONDANSETRON 4 MG: 2 INJECTION INTRAMUSCULAR; INTRAVENOUS at 13:48

## 2017-07-15 RX ADMIN — SODIUM CHLORIDE: 9 INJECTION, SOLUTION INTRAVENOUS at 17:06

## 2017-07-15 RX ADMIN — HYDROMORPHONE HYDROCHLORIDE 0.5 MG: 1 INJECTION, SOLUTION INTRAMUSCULAR; INTRAVENOUS; SUBCUTANEOUS at 11:14

## 2017-07-15 RX ADMIN — HYDROMORPHONE HYDROCHLORIDE 0.5 MG: 1 INJECTION, SOLUTION INTRAMUSCULAR; INTRAVENOUS; SUBCUTANEOUS at 13:49

## 2017-07-15 RX ADMIN — DILTIAZEM HYDROCHLORIDE 10 MG: 5 INJECTION INTRAVENOUS at 12:05

## 2017-07-15 RX ADMIN — Medication 10 ML: at 19:37

## 2017-07-15 RX ADMIN — SODIUM CHLORIDE 1000 ML: 9 INJECTION, SOLUTION INTRAVENOUS at 10:34

## 2017-07-15 RX ADMIN — MORPHINE SULFATE 4 MG: 4 INJECTION, SOLUTION INTRAMUSCULAR; INTRAVENOUS at 17:06

## 2017-07-15 RX ADMIN — HYDROMORPHONE HYDROCHLORIDE 0.5 MG: 1 INJECTION, SOLUTION INTRAMUSCULAR; INTRAVENOUS; SUBCUTANEOUS at 10:34

## 2017-07-15 RX ADMIN — ONDANSETRON 4 MG: 2 INJECTION INTRAMUSCULAR; INTRAVENOUS at 10:33

## 2017-07-15 RX ADMIN — DILTIAZEM HYDROCHLORIDE 5 MG/HR: 5 INJECTION INTRAVENOUS at 12:12

## 2017-07-15 RX ADMIN — ONDANSETRON 4 MG: 2 INJECTION INTRAMUSCULAR; INTRAVENOUS at 18:02

## 2017-07-15 RX ADMIN — IOVERSOL 130 ML: 741 INJECTION INTRA-ARTERIAL; INTRAVENOUS at 12:24

## 2017-07-15 RX ADMIN — ONDANSETRON 4 MG: 2 INJECTION INTRAMUSCULAR; INTRAVENOUS at 12:50

## 2017-07-15 ASSESSMENT — PAIN SCALES - GENERAL
PAINLEVEL_OUTOF10: 10
PAINLEVEL_OUTOF10: 2
PAINLEVEL_OUTOF10: 6
PAINLEVEL_OUTOF10: 10
PAINLEVEL_OUTOF10: 10
PAINLEVEL_OUTOF10: 8
PAINLEVEL_OUTOF10: 7
PAINLEVEL_OUTOF10: 8
PAINLEVEL_OUTOF10: 7
PAINLEVEL_OUTOF10: 10
PAINLEVEL_OUTOF10: 6

## 2017-07-15 ASSESSMENT — PAIN DESCRIPTION - LOCATION
LOCATION: ABDOMEN

## 2017-07-15 ASSESSMENT — PAIN DESCRIPTION - PAIN TYPE: TYPE: ACUTE PAIN

## 2017-07-15 NOTE — ED PROVIDER NOTES
[address] [facility] ED  [place] [address]  Phone: [phone]    Pt Name: August Chowdhury  MRN: [id number]  Rafalgfbarry 1934  Date of evaluation: 7/15/2017      CHIEF COMPLAINT       Chief Complaint   Patient presents with    Abdominal Pain    Emesis         HISTORY OF PRESENT ILLNESS     August Chowdhury is a 80 y.o. female who presents abdominal pain and vomiting. Symptoms worseneds yesterday after supper. There's been some nonspecific abdominal discomfort for the past 2 weeks. She had multiple episodes of vomiting. She has diffuse abdominal pain is an 8 out of a 10 at this time. She's had 2 bowel movements this morning which were normal.  No GI bleed. She has had a previous hysterectomy and cholecystectomy and appendectomy. Her vital signs are normal on admission with the exception of a pressure of 189/82. No chest complaints. Has a history of hypertension hyperlipidemia of MI. She's never been a smoker. No history of diabetes. Brought in by her relative. No medications for her symptoms. REVIEW OF SYSTEMS         REVIEW OF SYSTEMS    Constitutional:  Denies fever, chills, or weakness   Eyes:  Denies vision changes  HEENT:  Denies sore throat or neck pain   Respiratory:  Denies cough or shortness of breath   Cardiovascular:  Denies chest pain  GI:  As above  Musculoskeletal:  Denies back pain   Skin:  No rash on exposed surfaces   Neurologic:  Normal baseline mentation. No new deficits. Lymphatic:   No nodes or infection  Psychiatric:  No psychosis. Alert and interacting normally. Other ROS negative except as noted above. PAST MEDICAL HISTORY    has a past medical history of Actinic keratosis; Cervical prolapse; Diverticulosis; Endocervical polyp; Heel spur; Hip pain; Hyperlipidemia; Hypertension; Nasal septal deviation; Osteoporosis; Stress incontinence; Syncope; and Vaginal prolapse.     SURGICAL HISTORY      has a past surgical history that includes Appendectomy; Colonoscopy (01/26/2000); Cholecystectomy (11/1976); and HYSTERECTOMY VAGINAL (1964). CURRENT MEDICATIONS       Previous Medications    ASPIRIN 81 MG TABLET    Take 81 mg by mouth daily    CALCIUM CARBONATE-VITAMIN D (CALCIUM + D PO)    Take  by mouth daily. DOCUSATE CALCIUM (STOOL SOFTENER PO)    Take by mouth    FLUTICASONE (FLONASE) 50 MCG/ACT NASAL SPRAY    1 spray by Nasal route daily as needed for Rhinitis    HANDICAP PLACARD MISC    by Does not apply route Expires 6/2022    LORATADINE (CLARITIN) 10 MG TABLET    Take 1 tablet by mouth daily    MEGARED OMEGA-3 KRILL  MG CAPS    Take by mouth    MULTIPLE VITAMINS-MINERALS (ICAPS AREDS 2) CAPS    Take 2 tablets by mouth    MULTIPLE VITAMINS-MINERALS (THERAPEUTIC MULTIVITAMIN-MINERALS) TABLET    Take 1 tablet by mouth daily    POLYETHYLENE GLYCOL (MIRALAX) POWDER    Take 17 g by mouth daily    RALOXIFENE (EVISTA) 60 MG TABLET    Take 60 mg by mouth daily. SENNA (SENOKOT) 8.6 MG TABLET    Take 1 tablet by mouth 2 times daily as needed for Constipation (use sparingly)    TIZANIDINE (ZANAFLEX) 4 MG CAPSULE    Take 1 capsule by mouth every 6 hours as needed for Muscle spasms    TIZANIDINE (ZANAFLEX) 4 MG CAPSULE    Take 1 capsule by mouth every 6 hours as needed for Muscle spasms    TIZANIDINE (ZANAFLEX) 4 MG TABLET    Take 1 tablet by mouth every 8 hours as needed (muscle spasms)    TRAMADOL (ULTRAM) 50 MG TABLET    Take 1 tablet by mouth every 8 hours as needed for Pain       ALLERGIES     has No Known Allergies. FAMILY HISTORY     indicated that the status of her mother is unknown. She indicated that the status of her father is unknown.    family history includes Colon Cancer in her father; Osteoporosis in her mother. SOCIAL HISTORY      reports that she has never smoked. She has never used smokeless tobacco. She reports that she does not drink alcohol or use illicit drugs. PHYSICAL EXAM     INITIAL VITALS:  weight is 160 lb (72.6 kg).  Her tympanic temperature is 98.8 °F (37.1 °C). Her blood pressure is 132/72 and her pulse is 134. Her respiration is 12 and oxygen saturation is 93%. Constitutional: The patient is alert and well-developed, vital signs as noted. Psychiatric: Oriented to time, place and person, affect is appropriate for age. Eyes: Pupils equal and reactive to light. EOMI. Ears, nose, and throat: Oropharynx clear  Neck: No masses, trachea midline, and no thyromegaly. Chest: Without deformities, chest wall symmetrical, there is no tenderness with palpation. Respiratory: Clear to auscultation, full aeration throughout all fields. Cardiovascular: No murmurs, heart sounds normal, no thrills. Gastrointestinal: No masses, no hepatosplenomegaly, bowel sounds positive. Diffuse moderate tenderness. Jean's and McBurney's are equivocal.  Back exam is normal.  Skin: No rashes or lesions on exposed surfaces, good skin turgor. Neurological: Deep tendon reflexes 2+, sensation intact bilaterally, no focal neurological findings. Extremities: Good range of motion, no edema. There is dehydrated with dry mucous membranes. DIFFERENTIAL DIAGNOSIS/ MEDICAL DECISION MAKING:     Patient had some pain control with Dilaudid and Zofran. Nausea is resolved. After the patient was in the department for 30-45 minutes she suddenly developed atrial fibrillation in the 120s to 130s. It persisted so she is given Cardizem with control of her heart rate. EKG showed no evidence of ST elevation but no old EKGs to compare with. She returned in normal sinus rhythm on the administration of Cardizem as above    I spoke with the hospitalist and he feels that the patient needs a higher level of care due to liver enzyme elevation following cholecystectomy in addition to development of new onset atrial fibrillation. I spoke with the patient and relatives and they recommend [address] [facility]  [place].     I spoke with the hospitalist and they accepted the (L) 1.0 - 4.8 k/uL    Absolute Mono # 0.80 0.1 - 1.2 k/uL    Absolute Eos # 0.20 0.0 - 0.4 k/uL    Basophils # 0.10 0.0 - 0.2 k/uL   Hepatic Function Panel   Result Value Ref Range    Alb 3.8 3.5 - 5.2 g/dL    Alkaline Phosphatase 161 (H) 35 - 104 U/L    ALT 1002 (H) 5 - 33 U/L     (H) <32 U/L    Total Bilirubin 1.06 0.3 - 1.2 mg/dL    Bilirubin, Direct 0.34 (H) <0.31 mg/dL    Bilirubin, Indirect 0.72 0.00 - 1.00 mg/dL    Total Protein 7.1 6.4 - 8.3 g/dL    Globulin 3.3 1.5 - 3.8 g/dL    Albumin/Globulin Ratio 1.2 1.0 - 2.5   Lipase   Result Value Ref Range    Lipase 30 13 - 60 U/L   CK   Result Value Ref Range    Total CK 62 26 - 192 U/L   CK MB   Result Value Ref Range    CK-MB 2.4 <5.4 ng/mL   Myoglobin, Serum   Result Value Ref Range    Myoglobin 63 (H) 25 - 58 ng/mL   Troponin   Result Value Ref Range    Troponin T <0.03 <0.03 ng/mL    Troponin Interp         Protime-INR   Result Value Ref Range    Protime 12.0 (H) 9.4 - 11.3 sec    INR 1.1    APTT   Result Value Ref Range    PTT 29.6 27.0 - 35.0 sec   EKG 12 Lead   Result Value Ref Range    Ventricular Rate 142 BPM    Atrial Rate 142 BPM    P-R Interval 202 ms    QRS Duration 74 ms    Q-T Interval 316 ms    QTc Calculation (Bazett) 486 ms    P Axis 38 degrees    R Axis 24 degrees    T Axis -41 degrees       ABNORMAL LABS:  Labs Reviewed   BASIC METABOLIC PANEL - Abnormal; Notable for the following:        Result Value    Glucose 109 (*)     Chloride 97 (*)     All other components within normal limits   CBC WITH AUTO DIFFERENTIAL - Abnormal; Notable for the following:     RDW 15.3 (*)     Absolute Lymph # 0.90 (*)     All other components within normal limits   HEPATIC FUNCTION PANEL - Abnormal; Notable for the following:     Alkaline Phosphatase 161 (*)     ALT 1002 (*)      (*)     Bilirubin, Direct 0.34 (*)     All other components within normal limits   MYOGLOBIN, SERUM - Abnormal; Notable for the following:     Myoglobin 63 (*)     All other

## 2017-07-16 LAB
ALBUMIN SERPL-MCNC: 3.4 G/DL (ref 3.5–5.1)
ALP BLD-CCNC: 254 U/L (ref 38–126)
ALT SERPL-CCNC: 1000 U/L (ref 11–66)
ANION GAP SERPL CALCULATED.3IONS-SCNC: 14 MEQ/L (ref 8–16)
AST SERPL-CCNC: 422 U/L (ref 5–40)
BILIRUB SERPL-MCNC: 0.7 MG/DL (ref 0.3–1.2)
BUN BLDV-MCNC: 10 MG/DL (ref 7–22)
CALCIUM SERPL-MCNC: 9 MG/DL (ref 8.5–10.5)
CHLORIDE BLD-SCNC: 106 MEQ/L (ref 98–111)
CO2: 26 MEQ/L (ref 23–33)
CREAT SERPL-MCNC: 0.7 MG/DL (ref 0.4–1.2)
EKG ATRIAL RATE: 58 BPM
EKG P AXIS: 29 DEGREES
EKG P-R INTERVAL: 168 MS
EKG Q-T INTERVAL: 454 MS
EKG QRS DURATION: 82 MS
EKG QTC CALCULATION (BAZETT): 445 MS
EKG R AXIS: 55 DEGREES
EKG T AXIS: 46 DEGREES
EKG VENTRICULAR RATE: 58 BPM
FERRITIN: 211 NG/ML (ref 10–291)
GFR SERPL CREATININE-BSD FRML MDRD: 80 ML/MIN/1.73M2
GLUCOSE BLD-MCNC: 87 MG/DL (ref 70–108)
HAV IGM SER IA-ACNC: NEGATIVE
HCT VFR BLD CALC: 41 % (ref 37–47)
HEMOGLOBIN: 13.8 GM/DL (ref 12–16)
HEPATITIS B CORE IGM ANTIBODY: NEGATIVE
HEPATITIS B SURFACE ANTIGEN: NEGATIVE
HEPATITIS C ANTIBODY: NEGATIVE
MCH RBC QN AUTO: 29.6 PG (ref 27–31)
MCHC RBC AUTO-ENTMCNC: 33.7 GM/DL (ref 33–37)
MCV RBC AUTO: 87.9 FL (ref 81–99)
PDW BLD-RTO: 15.5 % (ref 11.5–14.5)
PLATELET # BLD: 158 THOU/MM3 (ref 130–400)
PMV BLD AUTO: 7.8 MCM (ref 7.4–10.4)
POTASSIUM SERPL-SCNC: 4.2 MEQ/L (ref 3.5–5.2)
RBC # BLD: 4.67 MILL/MM3 (ref 4.2–5.4)
SODIUM BLD-SCNC: 146 MEQ/L (ref 135–145)
TOTAL PROTEIN: 6.3 G/DL (ref 6.1–8)
WBC # BLD: 7.3 THOU/MM3 (ref 4.8–10.8)

## 2017-07-16 PROCEDURE — 6360000002 HC RX W HCPCS

## 2017-07-16 PROCEDURE — 99152 MOD SED SAME PHYS/QHP 5/>YRS: CPT | Performed by: INTERNAL MEDICINE

## 2017-07-16 PROCEDURE — 6360000002 HC RX W HCPCS: Performed by: INTERNAL MEDICINE

## 2017-07-16 PROCEDURE — 36415 COLL VENOUS BLD VENIPUNCTURE: CPT

## 2017-07-16 PROCEDURE — 2580000003 HC RX 258: Performed by: INTERNAL MEDICINE

## 2017-07-16 PROCEDURE — 80074 ACUTE HEPATITIS PANEL: CPT

## 2017-07-16 PROCEDURE — 3609012400 HC EGD TRANSORAL BIOPSY SINGLE/MULTIPLE: Performed by: INTERNAL MEDICINE

## 2017-07-16 PROCEDURE — 99152 MOD SED SAME PHYS/QHP 5/>YRS: CPT

## 2017-07-16 PROCEDURE — 85027 COMPLETE CBC AUTOMATED: CPT

## 2017-07-16 PROCEDURE — 99232 SBSQ HOSP IP/OBS MODERATE 35: CPT | Performed by: INTERNAL MEDICINE

## 2017-07-16 PROCEDURE — 83516 IMMUNOASSAY NONANTIBODY: CPT

## 2017-07-16 PROCEDURE — 6370000000 HC RX 637 (ALT 250 FOR IP): Performed by: INTERNAL MEDICINE

## 2017-07-16 PROCEDURE — 1200000003 HC TELEMETRY R&B

## 2017-07-16 PROCEDURE — 80053 COMPREHEN METABOLIC PANEL: CPT

## 2017-07-16 PROCEDURE — 82728 ASSAY OF FERRITIN: CPT

## 2017-07-16 PROCEDURE — 3609012500 HC EGD DILATION BALLOON: Performed by: INTERNAL MEDICINE

## 2017-07-16 PROCEDURE — 7100000000 HC PACU RECOVERY - FIRST 15 MIN: Performed by: INTERNAL MEDICINE

## 2017-07-16 PROCEDURE — 82105 ALPHA-FETOPROTEIN SERUM: CPT

## 2017-07-16 PROCEDURE — 0DB68ZX EXCISION OF STOMACH, VIA NATURAL OR ARTIFICIAL OPENING ENDOSCOPIC, DIAGNOSTIC: ICD-10-PCS | Performed by: INTERNAL MEDICINE

## 2017-07-16 PROCEDURE — 7100000001 HC PACU RECOVERY - ADDTL 15 MIN: Performed by: INTERNAL MEDICINE

## 2017-07-16 PROCEDURE — 86038 ANTINUCLEAR ANTIBODIES: CPT

## 2017-07-16 PROCEDURE — 0D758ZZ DILATION OF ESOPHAGUS, VIA NATURAL OR ARTIFICIAL OPENING ENDOSCOPIC: ICD-10-PCS | Performed by: INTERNAL MEDICINE

## 2017-07-16 PROCEDURE — 88305 TISSUE EXAM BY PATHOLOGIST: CPT

## 2017-07-16 RX ORDER — MIDAZOLAM HYDROCHLORIDE 1 MG/ML
INJECTION INTRAMUSCULAR; INTRAVENOUS PRN
Status: DISCONTINUED | OUTPATIENT
Start: 2017-07-16 | End: 2017-07-16 | Stop reason: HOSPADM

## 2017-07-16 RX ORDER — FENTANYL CITRATE 50 UG/ML
INJECTION, SOLUTION INTRAMUSCULAR; INTRAVENOUS PRN
Status: DISCONTINUED | OUTPATIENT
Start: 2017-07-16 | End: 2017-07-16 | Stop reason: HOSPADM

## 2017-07-16 RX ADMIN — ENOXAPARIN SODIUM 40 MG: 40 INJECTION SUBCUTANEOUS at 20:07

## 2017-07-16 RX ADMIN — ONDANSETRON 4 MG: 2 INJECTION INTRAMUSCULAR; INTRAVENOUS at 11:24

## 2017-07-16 RX ADMIN — SODIUM CHLORIDE: 9 INJECTION, SOLUTION INTRAVENOUS at 22:16

## 2017-07-16 RX ADMIN — SODIUM CHLORIDE: 9 INJECTION, SOLUTION INTRAVENOUS at 03:12

## 2017-07-16 RX ADMIN — HYDROMORPHONE HYDROCHLORIDE 1 MG: 1 INJECTION, SOLUTION INTRAMUSCULAR; INTRAVENOUS; SUBCUTANEOUS at 08:25

## 2017-07-16 RX ADMIN — ONDANSETRON 4 MG: 2 INJECTION INTRAMUSCULAR; INTRAVENOUS at 03:09

## 2017-07-16 RX ADMIN — PANTOPRAZOLE SODIUM 40 MG: 40 TABLET, DELAYED RELEASE ORAL at 17:33

## 2017-07-16 RX ADMIN — HYDROMORPHONE HYDROCHLORIDE 1 MG: 1 INJECTION, SOLUTION INTRAMUSCULAR; INTRAVENOUS; SUBCUTANEOUS at 03:10

## 2017-07-16 RX ADMIN — SODIUM CHLORIDE: 9 INJECTION, SOLUTION INTRAVENOUS at 13:08

## 2017-07-16 RX ADMIN — HYDROMORPHONE HYDROCHLORIDE 1 MG: 1 INJECTION, SOLUTION INTRAMUSCULAR; INTRAVENOUS; SUBCUTANEOUS at 12:27

## 2017-07-16 ASSESSMENT — PAIN SCALES - GENERAL
PAINLEVEL_OUTOF10: 5
PAINLEVEL_OUTOF10: 0
PAINLEVEL_OUTOF10: 8
PAINLEVEL_OUTOF10: 6
PAINLEVEL_OUTOF10: 8
PAINLEVEL_OUTOF10: 0
PAINLEVEL_OUTOF10: 0
PAINLEVEL_OUTOF10: 10
PAINLEVEL_OUTOF10: 10
PAINLEVEL_OUTOF10: 6
PAINLEVEL_OUTOF10: 2

## 2017-07-16 ASSESSMENT — PAIN DESCRIPTION - PAIN TYPE: TYPE: ACUTE PAIN

## 2017-07-16 ASSESSMENT — PAIN DESCRIPTION - LOCATION: LOCATION: ABDOMEN

## 2017-07-16 ASSESSMENT — PAIN - FUNCTIONAL ASSESSMENT: PAIN_FUNCTIONAL_ASSESSMENT: 0-10

## 2017-07-17 ENCOUNTER — APPOINTMENT (OUTPATIENT)
Dept: NON INVASIVE DIAGNOSTICS | Age: 82
DRG: 443 | End: 2017-07-17
Attending: INTERNAL MEDICINE
Payer: MEDICARE

## 2017-07-17 LAB
AFP-TUMOR MARKER: 4.3 UG/L
AMMONIA: 40 UMOL/L (ref 11–60)
EKG ATRIAL RATE: 136 BPM
EKG ATRIAL RATE: 340 BPM
EKG Q-T INTERVAL: 284 MS
EKG Q-T INTERVAL: 324 MS
EKG QRS DURATION: 82 MS
EKG QRS DURATION: 82 MS
EKG QTC CALCULATION (BAZETT): 422 MS
EKG QTC CALCULATION (BAZETT): 432 MS
EKG R AXIS: 48 DEGREES
EKG R AXIS: 62 DEGREES
EKG T AXIS: -28 DEGREES
EKG T AXIS: 8 DEGREES
EKG VENTRICULAR RATE: 102 BPM
EKG VENTRICULAR RATE: 139 BPM
HEMOCCULT STL QL: NEGATIVE
LV EF: 60 %
LVEF MODALITY: NORMAL

## 2017-07-17 PROCEDURE — 82140 ASSAY OF AMMONIA: CPT

## 2017-07-17 PROCEDURE — 97110 THERAPEUTIC EXERCISES: CPT

## 2017-07-17 PROCEDURE — 93017 CV STRESS TEST TRACING ONLY: CPT | Performed by: INTERNAL MEDICINE

## 2017-07-17 PROCEDURE — 97162 PT EVAL MOD COMPLEX 30 MIN: CPT

## 2017-07-17 PROCEDURE — G8987 SELF CARE CURRENT STATUS: HCPCS

## 2017-07-17 PROCEDURE — 93306 TTE W/DOPPLER COMPLETE: CPT

## 2017-07-17 PROCEDURE — 93005 ELECTROCARDIOGRAM TRACING: CPT | Performed by: INTERNAL MEDICINE

## 2017-07-17 PROCEDURE — 99233 SBSQ HOSP IP/OBS HIGH 50: CPT | Performed by: INTERNAL MEDICINE

## 2017-07-17 PROCEDURE — A9500 TC99M SESTAMIBI: HCPCS | Performed by: INTERNAL MEDICINE

## 2017-07-17 PROCEDURE — 6370000000 HC RX 637 (ALT 250 FOR IP): Performed by: INTERNAL MEDICINE

## 2017-07-17 PROCEDURE — 6360000002 HC RX W HCPCS: Performed by: INTERNAL MEDICINE

## 2017-07-17 PROCEDURE — 36415 COLL VENOUS BLD VENIPUNCTURE: CPT

## 2017-07-17 PROCEDURE — 1200000003 HC TELEMETRY R&B

## 2017-07-17 PROCEDURE — G8988 SELF CARE GOAL STATUS: HCPCS

## 2017-07-17 PROCEDURE — 82272 OCCULT BLD FECES 1-3 TESTS: CPT

## 2017-07-17 PROCEDURE — 6360000002 HC RX W HCPCS

## 2017-07-17 PROCEDURE — 99222 1ST HOSP IP/OBS MODERATE 55: CPT | Performed by: INTERNAL MEDICINE

## 2017-07-17 PROCEDURE — 2580000003 HC RX 258: Performed by: INTERNAL MEDICINE

## 2017-07-17 PROCEDURE — 93017 CV STRESS TEST TRACING ONLY: CPT

## 2017-07-17 PROCEDURE — 2500000003 HC RX 250 WO HCPCS: Performed by: INTERNAL MEDICINE

## 2017-07-17 PROCEDURE — 97166 OT EVAL MOD COMPLEX 45 MIN: CPT

## 2017-07-17 PROCEDURE — 3430000000 HC RX DIAGNOSTIC RADIOPHARMACEUTICAL: Performed by: INTERNAL MEDICINE

## 2017-07-17 PROCEDURE — 78452 HT MUSCLE IMAGE SPECT MULT: CPT

## 2017-07-17 RX ORDER — ACETAMINOPHEN 160 MG/5ML
650 SOLUTION ORAL EVERY 4 HOURS PRN
Status: DISCONTINUED | OUTPATIENT
Start: 2017-07-17 | End: 2017-07-18

## 2017-07-17 RX ORDER — DILTIAZEM HYDROCHLORIDE 5 MG/ML
5 INJECTION INTRAVENOUS ONCE
Status: COMPLETED | OUTPATIENT
Start: 2017-07-17 | End: 2017-07-17

## 2017-07-17 RX ORDER — AMLODIPINE BESYLATE 2.5 MG/1
2.5 TABLET ORAL DAILY
Status: DISCONTINUED | OUTPATIENT
Start: 2017-07-17 | End: 2017-07-18

## 2017-07-17 RX ADMIN — Medication 34.6 MILLICURIE: at 08:50

## 2017-07-17 RX ADMIN — DILTIAZEM HYDROCHLORIDE 5 MG: 5 INJECTION INTRAVENOUS at 02:24

## 2017-07-17 RX ADMIN — PANTOPRAZOLE SODIUM 40 MG: 40 TABLET, DELAYED RELEASE ORAL at 17:10

## 2017-07-17 RX ADMIN — HYDROMORPHONE HYDROCHLORIDE 1 MG: 1 INJECTION, SOLUTION INTRAMUSCULAR; INTRAVENOUS; SUBCUTANEOUS at 16:21

## 2017-07-17 RX ADMIN — ENOXAPARIN SODIUM 70 MG: 80 INJECTION SUBCUTANEOUS at 17:10

## 2017-07-17 RX ADMIN — ONDANSETRON 4 MG: 2 INJECTION INTRAMUSCULAR; INTRAVENOUS at 07:03

## 2017-07-17 RX ADMIN — AMLODIPINE BESYLATE 2.5 MG: 2.5 TABLET ORAL at 17:59

## 2017-07-17 RX ADMIN — ACETAMINOPHEN 650 MG: 325 TABLET ORAL at 02:00

## 2017-07-17 RX ADMIN — Medication 10.5 MILLICURIE: at 07:18

## 2017-07-17 RX ADMIN — MAGNESIUM HYDROXIDE 30 ML: 400 SUSPENSION ORAL at 13:34

## 2017-07-17 RX ADMIN — HYDROMORPHONE HYDROCHLORIDE 1 MG: 1 INJECTION, SOLUTION INTRAMUSCULAR; INTRAVENOUS; SUBCUTANEOUS at 06:50

## 2017-07-17 RX ADMIN — DILTIAZEM HYDROCHLORIDE 5 MG/HR: 5 INJECTION INTRAVENOUS at 02:43

## 2017-07-17 RX ADMIN — ACETAMINOPHEN 650 MG: 650 SOLUTION ORAL at 19:39

## 2017-07-17 RX ADMIN — ONDANSETRON 4 MG: 2 INJECTION INTRAMUSCULAR; INTRAVENOUS at 17:41

## 2017-07-17 ASSESSMENT — PAIN DESCRIPTION - DESCRIPTORS
DESCRIPTORS: HEADACHE
DESCRIPTORS: DULL
DESCRIPTORS: ACHING;DULL
DESCRIPTORS: ACHING
DESCRIPTORS: HEADACHE
DESCRIPTORS: HEADACHE

## 2017-07-17 ASSESSMENT — PAIN DESCRIPTION - PAIN TYPE
TYPE: ACUTE PAIN

## 2017-07-17 ASSESSMENT — PAIN SCALES - GENERAL
PAINLEVEL_OUTOF10: 2
PAINLEVEL_OUTOF10: 9
PAINLEVEL_OUTOF10: 3
PAINLEVEL_OUTOF10: 9
PAINLEVEL_OUTOF10: 6
PAINLEVEL_OUTOF10: 7
PAINLEVEL_OUTOF10: 8
PAINLEVEL_OUTOF10: 5
PAINLEVEL_OUTOF10: 8
PAINLEVEL_OUTOF10: 5

## 2017-07-17 ASSESSMENT — PAIN DESCRIPTION - PROGRESSION
CLINICAL_PROGRESSION: NOT CHANGED
CLINICAL_PROGRESSION: GRADUALLY IMPROVING
CLINICAL_PROGRESSION: NOT CHANGED

## 2017-07-17 ASSESSMENT — PAIN DESCRIPTION - ONSET
ONSET: OTHER (COMMENT)
ONSET: GRADUAL

## 2017-07-17 ASSESSMENT — PAIN DESCRIPTION - ORIENTATION: ORIENTATION: UPPER

## 2017-07-17 ASSESSMENT — PAIN DESCRIPTION - LOCATION
LOCATION: HEAD
LOCATION: ABDOMEN
LOCATION: HEAD
LOCATION: ABDOMEN
LOCATION: HEAD
LOCATION: ABDOMEN

## 2017-07-17 ASSESSMENT — PAIN DESCRIPTION - FREQUENCY
FREQUENCY: CONTINUOUS
FREQUENCY: INTERMITTENT
FREQUENCY: INTERMITTENT
FREQUENCY: CONTINUOUS

## 2017-07-18 ENCOUNTER — APPOINTMENT (OUTPATIENT)
Dept: ULTRASOUND IMAGING | Age: 82
DRG: 443 | End: 2017-07-18
Attending: INTERNAL MEDICINE
Payer: MEDICARE

## 2017-07-18 LAB — ANA SCREEN: NORMAL

## 2017-07-18 PROCEDURE — 2580000003 HC RX 258: Performed by: INTERNAL MEDICINE

## 2017-07-18 PROCEDURE — 6370000000 HC RX 637 (ALT 250 FOR IP): Performed by: INTERNAL MEDICINE

## 2017-07-18 PROCEDURE — 99232 SBSQ HOSP IP/OBS MODERATE 35: CPT | Performed by: INTERNAL MEDICINE

## 2017-07-18 PROCEDURE — 6360000002 HC RX W HCPCS: Performed by: INTERNAL MEDICINE

## 2017-07-18 PROCEDURE — 93017 CV STRESS TEST TRACING ONLY: CPT | Performed by: INTERNAL MEDICINE

## 2017-07-18 PROCEDURE — 6370000000 HC RX 637 (ALT 250 FOR IP): Performed by: NURSE PRACTITIONER

## 2017-07-18 PROCEDURE — 1200000003 HC TELEMETRY R&B

## 2017-07-18 PROCEDURE — 76705 ECHO EXAM OF ABDOMEN: CPT

## 2017-07-18 PROCEDURE — 93005 ELECTROCARDIOGRAM TRACING: CPT

## 2017-07-18 PROCEDURE — 99232 SBSQ HOSP IP/OBS MODERATE 35: CPT | Performed by: NURSE PRACTITIONER

## 2017-07-18 PROCEDURE — 97110 THERAPEUTIC EXERCISES: CPT

## 2017-07-18 RX ORDER — ASPIRIN 81 MG/1
81 TABLET, CHEWABLE ORAL DAILY
Status: DISCONTINUED | OUTPATIENT
Start: 2017-07-18 | End: 2017-07-20 | Stop reason: HOSPADM

## 2017-07-18 RX ORDER — ATORVASTATIN CALCIUM 40 MG/1
40 TABLET, FILM COATED ORAL NIGHTLY
Qty: 30 TABLET | Refills: 3 | Status: SHIPPED | OUTPATIENT
Start: 2017-07-18 | End: 2017-07-20 | Stop reason: HOSPADM

## 2017-07-18 RX ORDER — ATORVASTATIN CALCIUM 40 MG/1
40 TABLET, FILM COATED ORAL NIGHTLY
Status: DISCONTINUED | OUTPATIENT
Start: 2017-07-18 | End: 2017-07-18

## 2017-07-18 RX ADMIN — PANTOPRAZOLE SODIUM 40 MG: 40 TABLET, DELAYED RELEASE ORAL at 18:30

## 2017-07-18 RX ADMIN — SODIUM CHLORIDE: 9 INJECTION, SOLUTION INTRAVENOUS at 20:41

## 2017-07-18 RX ADMIN — ENOXAPARIN SODIUM 70 MG: 80 INJECTION SUBCUTANEOUS at 03:22

## 2017-07-18 RX ADMIN — METOPROLOL TARTRATE 25 MG: 25 TABLET ORAL at 10:27

## 2017-07-18 RX ADMIN — SODIUM CHLORIDE: 9 INJECTION, SOLUTION INTRAVENOUS at 01:14

## 2017-07-18 RX ADMIN — ASPIRIN 81 MG: 81 TABLET, CHEWABLE ORAL at 18:30

## 2017-07-18 RX ADMIN — PANTOPRAZOLE SODIUM 40 MG: 40 TABLET, DELAYED RELEASE ORAL at 03:23

## 2017-07-18 RX ADMIN — ACETAMINOPHEN 650 MG: 650 SOLUTION ORAL at 13:10

## 2017-07-18 RX ADMIN — ENOXAPARIN SODIUM 70 MG: 80 INJECTION SUBCUTANEOUS at 18:30

## 2017-07-18 RX ADMIN — METOPROLOL TARTRATE 25 MG: 25 TABLET ORAL at 20:41

## 2017-07-18 RX ADMIN — ACETAMINOPHEN 650 MG: 650 SOLUTION ORAL at 07:07

## 2017-07-18 ASSESSMENT — PAIN SCALES - GENERAL
PAINLEVEL_OUTOF10: 5
PAINLEVEL_OUTOF10: 5
PAINLEVEL_OUTOF10: 0
PAINLEVEL_OUTOF10: 2

## 2017-07-18 ASSESSMENT — PAIN DESCRIPTION - PAIN TYPE: TYPE: ACUTE PAIN

## 2017-07-18 ASSESSMENT — PAIN DESCRIPTION - LOCATION: LOCATION: ABDOMEN

## 2017-07-19 ENCOUNTER — APPOINTMENT (OUTPATIENT)
Dept: GENERAL RADIOLOGY | Age: 82
DRG: 443 | End: 2017-07-19
Attending: INTERNAL MEDICINE
Payer: MEDICARE

## 2017-07-19 ENCOUNTER — APPOINTMENT (OUTPATIENT)
Dept: MRI IMAGING | Age: 82
DRG: 443 | End: 2017-07-19
Attending: INTERNAL MEDICINE
Payer: MEDICARE

## 2017-07-19 LAB
ALBUMIN SERPL-MCNC: 3.2 G/DL (ref 3.5–5.1)
ALP BLD-CCNC: 203 U/L (ref 38–126)
ALT SERPL-CCNC: 620 U/L (ref 11–66)
AST SERPL-CCNC: 224 U/L (ref 5–40)
BILIRUB SERPL-MCNC: 0.6 MG/DL (ref 0.3–1.2)
BILIRUBIN DIRECT: < 0.2 MG/DL (ref 0–0.3)
EKG ATRIAL RATE: 66 BPM
EKG P AXIS: 50 DEGREES
EKG P-R INTERVAL: 142 MS
EKG Q-T INTERVAL: 448 MS
EKG QRS DURATION: 80 MS
EKG QTC CALCULATION (BAZETT): 469 MS
EKG R AXIS: 55 DEGREES
EKG T AXIS: 53 DEGREES
EKG VENTRICULAR RATE: 66 BPM
F-ACTIN AB IGG: 13 UNITS (ref 0–19)
HCT VFR BLD CALC: 41.3 % (ref 37–47)
HEMOGLOBIN: 13.8 GM/DL (ref 12–16)
LIPASE: 96.4 U/L (ref 5.6–51.3)
MCH RBC QN AUTO: 29.1 PG (ref 27–31)
MCHC RBC AUTO-ENTMCNC: 33.4 GM/DL (ref 33–37)
MCV RBC AUTO: 87.1 FL (ref 81–99)
MITOCHONDRIAL ANTIBODY: 3.1 UNITS (ref 0–20)
PDW BLD-RTO: 15.6 % (ref 11.5–14.5)
PLATELET # BLD: 161 THOU/MM3 (ref 130–400)
PMV BLD AUTO: 8 MCM (ref 7.4–10.4)
RBC # BLD: 4.75 MILL/MM3 (ref 4.2–5.4)
TOTAL PROTEIN: 6 G/DL (ref 6.1–8)
WBC # BLD: 5.6 THOU/MM3 (ref 4.8–10.8)

## 2017-07-19 PROCEDURE — 6370000000 HC RX 637 (ALT 250 FOR IP): Performed by: NURSE PRACTITIONER

## 2017-07-19 PROCEDURE — 6370000000 HC RX 637 (ALT 250 FOR IP): Performed by: INTERNAL MEDICINE

## 2017-07-19 PROCEDURE — 6360000002 HC RX W HCPCS: Performed by: INTERNAL MEDICINE

## 2017-07-19 PROCEDURE — 36415 COLL VENOUS BLD VENIPUNCTURE: CPT

## 2017-07-19 PROCEDURE — 74183 MRI ABD W/O CNTR FLWD CNTR: CPT

## 2017-07-19 PROCEDURE — 74246 X-RAY XM UPR GI TRC 2CNTRST: CPT

## 2017-07-19 PROCEDURE — 97110 THERAPEUTIC EXERCISES: CPT

## 2017-07-19 PROCEDURE — 83690 ASSAY OF LIPASE: CPT

## 2017-07-19 PROCEDURE — 1200000003 HC TELEMETRY R&B

## 2017-07-19 PROCEDURE — 99232 SBSQ HOSP IP/OBS MODERATE 35: CPT | Performed by: INTERNAL MEDICINE

## 2017-07-19 PROCEDURE — 2580000003 HC RX 258: Performed by: INTERNAL MEDICINE

## 2017-07-19 PROCEDURE — 85027 COMPLETE CBC AUTOMATED: CPT

## 2017-07-19 PROCEDURE — 93005 ELECTROCARDIOGRAM TRACING: CPT

## 2017-07-19 PROCEDURE — 80076 HEPATIC FUNCTION PANEL: CPT

## 2017-07-19 PROCEDURE — 97116 GAIT TRAINING THERAPY: CPT

## 2017-07-19 PROCEDURE — 6360000004 HC RX CONTRAST MEDICATION: Performed by: INTERNAL MEDICINE

## 2017-07-19 PROCEDURE — A9579 GAD-BASE MR CONTRAST NOS,1ML: HCPCS | Performed by: INTERNAL MEDICINE

## 2017-07-19 RX ADMIN — Medication 10 ML: at 10:25

## 2017-07-19 RX ADMIN — PANTOPRAZOLE SODIUM 40 MG: 40 TABLET, DELAYED RELEASE ORAL at 16:03

## 2017-07-19 RX ADMIN — SODIUM CHLORIDE: 9 INJECTION, SOLUTION INTRAVENOUS at 06:33

## 2017-07-19 RX ADMIN — ENOXAPARIN SODIUM 70 MG: 80 INJECTION SUBCUTANEOUS at 16:04

## 2017-07-19 RX ADMIN — METOPROLOL TARTRATE 25 MG: 25 TABLET ORAL at 19:57

## 2017-07-19 RX ADMIN — SODIUM CHLORIDE: 9 INJECTION, SOLUTION INTRAVENOUS at 19:57

## 2017-07-19 RX ADMIN — ENOXAPARIN SODIUM 70 MG: 80 INJECTION SUBCUTANEOUS at 04:01

## 2017-07-19 RX ADMIN — Medication 280 ML: at 09:53

## 2017-07-19 RX ADMIN — PANTOPRAZOLE SODIUM 40 MG: 40 TABLET, DELAYED RELEASE ORAL at 04:01

## 2017-07-19 RX ADMIN — METOPROLOL TARTRATE 25 MG: 25 TABLET ORAL at 10:24

## 2017-07-19 RX ADMIN — GADOVERSETAMIDE 15 ML: 0.5 INJECTION, SOLUTION INTRAVENOUS at 13:06

## 2017-07-19 RX ADMIN — ASPIRIN 81 MG: 81 TABLET, CHEWABLE ORAL at 10:25

## 2017-07-19 ASSESSMENT — PAIN SCALES - GENERAL
PAINLEVEL_OUTOF10: 5
PAINLEVEL_OUTOF10: 0

## 2017-07-20 ENCOUNTER — ANESTHESIA EVENT (OUTPATIENT)
Dept: ENDOSCOPY | Age: 82
DRG: 443 | End: 2017-07-20
Payer: MEDICARE

## 2017-07-20 ENCOUNTER — ANESTHESIA (OUTPATIENT)
Dept: ENDOSCOPY | Age: 82
DRG: 443 | End: 2017-07-20
Payer: MEDICARE

## 2017-07-20 VITALS
WEIGHT: 158.2 LBS | DIASTOLIC BLOOD PRESSURE: 69 MMHG | OXYGEN SATURATION: 95 % | HEART RATE: 53 BPM | BODY MASS INDEX: 26.36 KG/M2 | SYSTOLIC BLOOD PRESSURE: 159 MMHG | RESPIRATION RATE: 17 BRPM | HEIGHT: 65 IN | TEMPERATURE: 98.1 F

## 2017-07-20 VITALS — DIASTOLIC BLOOD PRESSURE: 82 MMHG | SYSTOLIC BLOOD PRESSURE: 167 MMHG | OXYGEN SATURATION: 95 %

## 2017-07-20 LAB
EKG ATRIAL RATE: 138 BPM
EKG Q-T INTERVAL: 302 MS
EKG QRS DURATION: 82 MS
EKG QTC CALCULATION (BAZETT): 423 MS
EKG R AXIS: 55 DEGREES
EKG VENTRICULAR RATE: 118 BPM

## 2017-07-20 PROCEDURE — 0DJ08ZZ INSPECTION OF UPPER INTESTINAL TRACT, VIA NATURAL OR ARTIFICIAL OPENING ENDOSCOPIC: ICD-10-PCS | Performed by: INTERNAL MEDICINE

## 2017-07-20 PROCEDURE — 6370000000 HC RX 637 (ALT 250 FOR IP): Performed by: NURSE PRACTITIONER

## 2017-07-20 PROCEDURE — 7100000000 HC PACU RECOVERY - FIRST 15 MIN: Performed by: INTERNAL MEDICINE

## 2017-07-20 PROCEDURE — 2580000003 HC RX 258: Performed by: NURSE ANESTHETIST, CERTIFIED REGISTERED

## 2017-07-20 PROCEDURE — 6360000002 HC RX W HCPCS: Performed by: NURSE ANESTHETIST, CERTIFIED REGISTERED

## 2017-07-20 PROCEDURE — 6370000000 HC RX 637 (ALT 250 FOR IP): Performed by: INTERNAL MEDICINE

## 2017-07-20 PROCEDURE — 2500000003 HC RX 250 WO HCPCS: Performed by: NURSE ANESTHETIST, CERTIFIED REGISTERED

## 2017-07-20 PROCEDURE — 2580000003 HC RX 258: Performed by: INTERNAL MEDICINE

## 2017-07-20 PROCEDURE — 7100000001 HC PACU RECOVERY - ADDTL 15 MIN: Performed by: INTERNAL MEDICINE

## 2017-07-20 PROCEDURE — 3700000000 HC ANESTHESIA ATTENDED CARE: Performed by: INTERNAL MEDICINE

## 2017-07-20 PROCEDURE — 3609018700 HC ENDOSCOPIC ULTRASOUND: Performed by: INTERNAL MEDICINE

## 2017-07-20 PROCEDURE — 99239 HOSP IP/OBS DSCHRG MGMT >30: CPT | Performed by: INTERNAL MEDICINE

## 2017-07-20 PROCEDURE — 3700000001 HC ADD 15 MINUTES (ANESTHESIA): Performed by: INTERNAL MEDICINE

## 2017-07-20 RX ORDER — PROPOFOL 10 MG/ML
INJECTION, EMULSION INTRAVENOUS PRN
Status: DISCONTINUED | OUTPATIENT
Start: 2017-07-20 | End: 2017-07-20 | Stop reason: SDUPTHER

## 2017-07-20 RX ORDER — LIDOCAINE HYDROCHLORIDE 20 MG/ML
INJECTION, SOLUTION EPIDURAL; INFILTRATION; INTRACAUDAL; PERINEURAL PRN
Status: DISCONTINUED | OUTPATIENT
Start: 2017-07-20 | End: 2017-07-20 | Stop reason: SDUPTHER

## 2017-07-20 RX ORDER — PANTOPRAZOLE SODIUM 40 MG/1
40 TABLET, DELAYED RELEASE ORAL
Qty: 30 TABLET | Refills: 3 | Status: SHIPPED | OUTPATIENT
Start: 2017-07-20 | End: 2017-07-20

## 2017-07-20 RX ORDER — PANTOPRAZOLE SODIUM 40 MG/1
40 TABLET, DELAYED RELEASE ORAL
Qty: 30 TABLET | Refills: 3 | Status: SHIPPED | OUTPATIENT
Start: 2017-07-20 | End: 2017-11-13 | Stop reason: SDUPTHER

## 2017-07-20 RX ORDER — SODIUM CHLORIDE 450 MG/100ML
INJECTION, SOLUTION INTRAVENOUS CONTINUOUS PRN
Status: DISCONTINUED | OUTPATIENT
Start: 2017-07-20 | End: 2017-07-20 | Stop reason: SDUPTHER

## 2017-07-20 RX ORDER — LORATADINE 10 MG/1
10 TABLET ORAL DAILY PRN
COMMUNITY
End: 2017-10-19

## 2017-07-20 RX ADMIN — PANTOPRAZOLE SODIUM 40 MG: 40 TABLET, DELAYED RELEASE ORAL at 04:11

## 2017-07-20 RX ADMIN — LIDOCAINE HYDROCHLORIDE 40 MG: 20 INJECTION, SOLUTION EPIDURAL; INFILTRATION; INTRACAUDAL; PERINEURAL at 12:34

## 2017-07-20 RX ADMIN — PROPOFOL 110 MG: 10 INJECTION, EMULSION INTRAVENOUS at 12:35

## 2017-07-20 RX ADMIN — METOPROLOL TARTRATE 25 MG: 25 TABLET ORAL at 09:40

## 2017-07-20 RX ADMIN — SODIUM CHLORIDE: 4.5 INJECTION, SOLUTION INTRAVENOUS at 12:28

## 2017-07-20 RX ADMIN — SODIUM CHLORIDE: 9 INJECTION, SOLUTION INTRAVENOUS at 05:25

## 2017-07-20 ASSESSMENT — PAIN SCALES - GENERAL
PAINLEVEL_OUTOF10: 0

## 2017-07-28 ENCOUNTER — OFFICE VISIT (OUTPATIENT)
Dept: FAMILY MEDICINE CLINIC | Age: 82
End: 2017-07-28
Payer: MEDICARE

## 2017-07-28 ENCOUNTER — OFFICE VISIT (OUTPATIENT)
Dept: ORTHOPEDIC SURGERY | Age: 82
End: 2017-07-28
Payer: MEDICARE

## 2017-07-28 VITALS
TEMPERATURE: 98.1 F | OXYGEN SATURATION: 92 % | HEIGHT: 65 IN | HEART RATE: 62 BPM | BODY MASS INDEX: 25.59 KG/M2 | WEIGHT: 153.6 LBS | DIASTOLIC BLOOD PRESSURE: 68 MMHG | SYSTOLIC BLOOD PRESSURE: 110 MMHG

## 2017-07-28 VITALS
BODY MASS INDEX: 27.32 KG/M2 | HEIGHT: 65 IN | WEIGHT: 164 LBS | HEART RATE: 65 BPM | DIASTOLIC BLOOD PRESSURE: 80 MMHG | SYSTOLIC BLOOD PRESSURE: 103 MMHG

## 2017-07-28 DIAGNOSIS — I48.91 ATRIAL FIBRILLATION, UNSPECIFIED TYPE (HCC): ICD-10-CM

## 2017-07-28 DIAGNOSIS — R10.13 EPIGASTRIC PAIN: Primary | ICD-10-CM

## 2017-07-28 DIAGNOSIS — M54.50 LUMBAR SPINE PAIN: ICD-10-CM

## 2017-07-28 DIAGNOSIS — M70.62 TROCHANTERIC BURSITIS OF LEFT HIP: ICD-10-CM

## 2017-07-28 DIAGNOSIS — M16.12 ARTHRITIS OF LEFT HIP: ICD-10-CM

## 2017-07-28 DIAGNOSIS — M25.552 LEFT HIP PAIN: Primary | ICD-10-CM

## 2017-07-28 DIAGNOSIS — M54.16 LUMBAR RADICULAR PAIN: ICD-10-CM

## 2017-07-28 DIAGNOSIS — M51.36 DDD (DEGENERATIVE DISC DISEASE), LUMBAR: ICD-10-CM

## 2017-07-28 PROCEDURE — G8399 PT W/DXA RESULTS DOCUMENT: HCPCS | Performed by: NURSE PRACTITIONER

## 2017-07-28 PROCEDURE — G8427 DOCREV CUR MEDS BY ELIG CLIN: HCPCS | Performed by: NURSE PRACTITIONER

## 2017-07-28 PROCEDURE — 1036F TOBACCO NON-USER: CPT | Performed by: ORTHOPAEDIC SURGERY

## 2017-07-28 PROCEDURE — 99203 OFFICE O/P NEW LOW 30 MIN: CPT | Performed by: ORTHOPAEDIC SURGERY

## 2017-07-28 PROCEDURE — G8419 CALC BMI OUT NRM PARAM NOF/U: HCPCS | Performed by: ORTHOPAEDIC SURGERY

## 2017-07-28 PROCEDURE — 1036F TOBACCO NON-USER: CPT | Performed by: NURSE PRACTITIONER

## 2017-07-28 PROCEDURE — 1123F ACP DISCUSS/DSCN MKR DOCD: CPT | Performed by: NURSE PRACTITIONER

## 2017-07-28 PROCEDURE — G8399 PT W/DXA RESULTS DOCUMENT: HCPCS | Performed by: ORTHOPAEDIC SURGERY

## 2017-07-28 PROCEDURE — 1090F PRES/ABSN URINE INCON ASSESS: CPT | Performed by: NURSE PRACTITIONER

## 2017-07-28 PROCEDURE — 4040F PNEUMOC VAC/ADMIN/RCVD: CPT | Performed by: ORTHOPAEDIC SURGERY

## 2017-07-28 PROCEDURE — G8427 DOCREV CUR MEDS BY ELIG CLIN: HCPCS | Performed by: ORTHOPAEDIC SURGERY

## 2017-07-28 PROCEDURE — 1090F PRES/ABSN URINE INCON ASSESS: CPT | Performed by: ORTHOPAEDIC SURGERY

## 2017-07-28 PROCEDURE — 1123F ACP DISCUSS/DSCN MKR DOCD: CPT | Performed by: ORTHOPAEDIC SURGERY

## 2017-07-28 PROCEDURE — G8419 CALC BMI OUT NRM PARAM NOF/U: HCPCS | Performed by: NURSE PRACTITIONER

## 2017-07-28 PROCEDURE — 1111F DSCHRG MED/CURRENT MED MERGE: CPT | Performed by: NURSE PRACTITIONER

## 2017-07-28 PROCEDURE — 99213 OFFICE O/P EST LOW 20 MIN: CPT | Performed by: NURSE PRACTITIONER

## 2017-07-28 PROCEDURE — 1111F DSCHRG MED/CURRENT MED MERGE: CPT | Performed by: ORTHOPAEDIC SURGERY

## 2017-07-28 PROCEDURE — 4040F PNEUMOC VAC/ADMIN/RCVD: CPT | Performed by: NURSE PRACTITIONER

## 2017-07-28 ASSESSMENT — ENCOUNTER SYMPTOMS
NAUSEA: 0
SHORTNESS OF BREATH: 0
WHEEZING: 0
ABDOMINAL PAIN: 1
DIARRHEA: 1
BLOOD IN STOOL: 0
VOMITING: 0
CONSTIPATION: 0

## 2017-08-03 PROBLEM — M16.12 ARTHRITIS OF LEFT HIP: Status: ACTIVE | Noted: 2017-08-03

## 2017-08-03 PROBLEM — M54.16 LUMBAR RADICULAR PAIN: Status: ACTIVE | Noted: 2017-08-03

## 2017-08-03 PROBLEM — M54.50 LUMBAR SPINE PAIN: Status: ACTIVE | Noted: 2017-08-03

## 2017-08-03 PROBLEM — M25.552 LEFT HIP PAIN: Status: ACTIVE | Noted: 2017-08-03

## 2017-08-03 ASSESSMENT — ENCOUNTER SYMPTOMS: BACK PAIN: 1

## 2017-08-04 ENCOUNTER — OFFICE VISIT (OUTPATIENT)
Dept: ORTHOPEDIC SURGERY | Age: 82
End: 2017-08-04
Payer: MEDICARE

## 2017-08-04 VITALS
BODY MASS INDEX: 27.32 KG/M2 | SYSTOLIC BLOOD PRESSURE: 112 MMHG | DIASTOLIC BLOOD PRESSURE: 58 MMHG | HEIGHT: 65 IN | WEIGHT: 164 LBS | HEART RATE: 60 BPM

## 2017-08-04 DIAGNOSIS — M54.16 LUMBAR RADICULAR PAIN: ICD-10-CM

## 2017-08-04 DIAGNOSIS — M70.62 TROCHANTERIC BURSITIS OF LEFT HIP: Primary | ICD-10-CM

## 2017-08-04 DIAGNOSIS — M16.12 ARTHRITIS OF LEFT HIP: ICD-10-CM

## 2017-08-04 DIAGNOSIS — M54.50 LUMBAR SPINE PAIN: ICD-10-CM

## 2017-08-04 DIAGNOSIS — M25.552 LEFT HIP PAIN: ICD-10-CM

## 2017-08-04 PROCEDURE — 4040F PNEUMOC VAC/ADMIN/RCVD: CPT | Performed by: ORTHOPAEDIC SURGERY

## 2017-08-04 PROCEDURE — G8427 DOCREV CUR MEDS BY ELIG CLIN: HCPCS | Performed by: ORTHOPAEDIC SURGERY

## 2017-08-04 PROCEDURE — G8399 PT W/DXA RESULTS DOCUMENT: HCPCS | Performed by: ORTHOPAEDIC SURGERY

## 2017-08-04 PROCEDURE — 1111F DSCHRG MED/CURRENT MED MERGE: CPT | Performed by: ORTHOPAEDIC SURGERY

## 2017-08-04 PROCEDURE — 1090F PRES/ABSN URINE INCON ASSESS: CPT | Performed by: ORTHOPAEDIC SURGERY

## 2017-08-04 PROCEDURE — 1123F ACP DISCUSS/DSCN MKR DOCD: CPT | Performed by: ORTHOPAEDIC SURGERY

## 2017-08-04 PROCEDURE — G8419 CALC BMI OUT NRM PARAM NOF/U: HCPCS | Performed by: ORTHOPAEDIC SURGERY

## 2017-08-04 PROCEDURE — 99213 OFFICE O/P EST LOW 20 MIN: CPT | Performed by: ORTHOPAEDIC SURGERY

## 2017-08-04 PROCEDURE — 1036F TOBACCO NON-USER: CPT | Performed by: ORTHOPAEDIC SURGERY

## 2017-08-14 ENCOUNTER — HOSPITAL ENCOUNTER (OUTPATIENT)
Age: 82
Discharge: HOME | End: 2017-08-14
Payer: MEDICARE

## 2017-08-14 LAB
ALP BLD-CCNC: 117 U/L (ref 35–104)
ALT SERPL-CCNC: 45 U/L (ref 5–33)
AST SERPL-CCNC: 33 U/L

## 2017-08-14 PROCEDURE — 84075 ASSAY ALKALINE PHOSPHATASE: CPT

## 2017-08-14 PROCEDURE — 84450 TRANSFERASE (AST) (SGOT): CPT

## 2017-08-14 PROCEDURE — 36415 COLL VENOUS BLD VENIPUNCTURE: CPT

## 2017-08-14 PROCEDURE — 84460 ALANINE AMINO (ALT) (SGPT): CPT

## 2017-08-22 ENCOUNTER — OFFICE VISIT (OUTPATIENT)
Dept: CARDIOLOGY CLINIC | Age: 82
End: 2017-08-22
Payer: MEDICARE

## 2017-08-22 ENCOUNTER — OFFICE VISIT (OUTPATIENT)
Dept: ORTHOPEDIC SURGERY | Age: 82
End: 2017-08-22
Payer: MEDICARE

## 2017-08-22 VITALS
HEART RATE: 72 BPM | DIASTOLIC BLOOD PRESSURE: 60 MMHG | HEIGHT: 65 IN | WEIGHT: 164 LBS | SYSTOLIC BLOOD PRESSURE: 106 MMHG | BODY MASS INDEX: 27.32 KG/M2

## 2017-08-22 VITALS
HEIGHT: 65 IN | SYSTOLIC BLOOD PRESSURE: 124 MMHG | BODY MASS INDEX: 28.49 KG/M2 | WEIGHT: 171 LBS | DIASTOLIC BLOOD PRESSURE: 56 MMHG | HEART RATE: 68 BPM

## 2017-08-22 DIAGNOSIS — R00.2 HEART PALPITATIONS: Primary | ICD-10-CM

## 2017-08-22 DIAGNOSIS — M16.12 ARTHRITIS OF LEFT HIP: Primary | ICD-10-CM

## 2017-08-22 PROCEDURE — G8399 PT W/DXA RESULTS DOCUMENT: HCPCS | Performed by: PHYSICIAN ASSISTANT

## 2017-08-22 PROCEDURE — 1036F TOBACCO NON-USER: CPT | Performed by: INTERNAL MEDICINE

## 2017-08-22 PROCEDURE — G8419 CALC BMI OUT NRM PARAM NOF/U: HCPCS | Performed by: PHYSICIAN ASSISTANT

## 2017-08-22 PROCEDURE — 4040F PNEUMOC VAC/ADMIN/RCVD: CPT | Performed by: PHYSICIAN ASSISTANT

## 2017-08-22 PROCEDURE — 99213 OFFICE O/P EST LOW 20 MIN: CPT | Performed by: PHYSICIAN ASSISTANT

## 2017-08-22 PROCEDURE — G8399 PT W/DXA RESULTS DOCUMENT: HCPCS | Performed by: INTERNAL MEDICINE

## 2017-08-22 PROCEDURE — G8427 DOCREV CUR MEDS BY ELIG CLIN: HCPCS | Performed by: INTERNAL MEDICINE

## 2017-08-22 PROCEDURE — 1036F TOBACCO NON-USER: CPT | Performed by: PHYSICIAN ASSISTANT

## 2017-08-22 PROCEDURE — 1123F ACP DISCUSS/DSCN MKR DOCD: CPT | Performed by: PHYSICIAN ASSISTANT

## 2017-08-22 PROCEDURE — 4040F PNEUMOC VAC/ADMIN/RCVD: CPT | Performed by: INTERNAL MEDICINE

## 2017-08-22 PROCEDURE — G8419 CALC BMI OUT NRM PARAM NOF/U: HCPCS | Performed by: INTERNAL MEDICINE

## 2017-08-22 PROCEDURE — 1090F PRES/ABSN URINE INCON ASSESS: CPT | Performed by: INTERNAL MEDICINE

## 2017-08-22 PROCEDURE — 1123F ACP DISCUSS/DSCN MKR DOCD: CPT | Performed by: INTERNAL MEDICINE

## 2017-08-22 PROCEDURE — 93000 ELECTROCARDIOGRAM COMPLETE: CPT | Performed by: INTERNAL MEDICINE

## 2017-08-22 PROCEDURE — G8427 DOCREV CUR MEDS BY ELIG CLIN: HCPCS | Performed by: PHYSICIAN ASSISTANT

## 2017-08-22 PROCEDURE — 99214 OFFICE O/P EST MOD 30 MIN: CPT | Performed by: INTERNAL MEDICINE

## 2017-08-22 PROCEDURE — 1090F PRES/ABSN URINE INCON ASSESS: CPT | Performed by: PHYSICIAN ASSISTANT

## 2017-08-24 ENCOUNTER — HOSPITAL ENCOUNTER (OUTPATIENT)
Dept: LAB | Age: 82
Discharge: HOME | End: 2017-08-24
Payer: MEDICARE

## 2017-08-24 ENCOUNTER — OFFICE VISIT (OUTPATIENT)
Dept: FAMILY MEDICINE CLINIC | Age: 82
End: 2017-08-24
Payer: MEDICARE

## 2017-08-24 VITALS
TEMPERATURE: 99 F | HEIGHT: 65 IN | HEART RATE: 75 BPM | BODY MASS INDEX: 25.66 KG/M2 | OXYGEN SATURATION: 98 % | DIASTOLIC BLOOD PRESSURE: 58 MMHG | WEIGHT: 154 LBS | SYSTOLIC BLOOD PRESSURE: 100 MMHG

## 2017-08-24 DIAGNOSIS — Z01.818 PREOPERATIVE CLEARANCE: ICD-10-CM

## 2017-08-24 DIAGNOSIS — I48.0 PAROXYSMAL ATRIAL FIBRILLATION (HCC): ICD-10-CM

## 2017-08-24 DIAGNOSIS — M25.552 LEFT HIP PAIN: ICD-10-CM

## 2017-08-24 DIAGNOSIS — R79.89 ELEVATED LFTS: ICD-10-CM

## 2017-08-24 DIAGNOSIS — R79.89 ELEVATED LFTS: Primary | ICD-10-CM

## 2017-08-24 DIAGNOSIS — B37.0 THRUSH: ICD-10-CM

## 2017-08-24 LAB
ABSOLUTE EOS #: 0 K/UL (ref 0–0.4)
ABSOLUTE LYMPH #: 1 K/UL (ref 1–4.8)
ABSOLUTE MONO #: 0.6 K/UL (ref 0.1–1.2)
ALBUMIN SERPL-MCNC: 4.2 G/DL (ref 3.5–5.2)
ALBUMIN/GLOBULIN RATIO: 1.4 (ref 1–2.5)
ALP BLD-CCNC: 130 U/L (ref 35–104)
ALT SERPL-CCNC: 33 U/L (ref 5–33)
ANION GAP SERPL CALCULATED.3IONS-SCNC: 10 MMOL/L (ref 9–17)
AST SERPL-CCNC: 28 U/L
BASOPHILS # BLD: 1 %
BASOPHILS ABSOLUTE: 0 K/UL (ref 0–0.2)
BILIRUB SERPL-MCNC: 0.67 MG/DL (ref 0.3–1.2)
BUN BLDV-MCNC: 16 MG/DL (ref 8–23)
BUN/CREAT BLD: 18 (ref 9–20)
CALCIUM SERPL-MCNC: 9.9 MG/DL (ref 8.6–10.4)
CHLORIDE BLD-SCNC: 101 MMOL/L (ref 98–107)
CO2: 31 MMOL/L (ref 20–31)
CREAT SERPL-MCNC: 0.91 MG/DL (ref 0.5–0.9)
DIFFERENTIAL TYPE: ABNORMAL
EOSINOPHILS RELATIVE PERCENT: 1 %
GFR AFRICAN AMERICAN: >60 ML/MIN
GFR NON-AFRICAN AMERICAN: 59 ML/MIN
GFR SERPL CREATININE-BSD FRML MDRD: ABNORMAL ML/MIN/{1.73_M2}
GFR SERPL CREATININE-BSD FRML MDRD: ABNORMAL ML/MIN/{1.73_M2}
GLUCOSE BLD-MCNC: 116 MG/DL (ref 70–99)
HCT VFR BLD CALC: 42.8 % (ref 36–46)
HEMOGLOBIN: 13.9 G/DL (ref 12–16)
LYMPHOCYTES # BLD: 18 %
MCH RBC QN AUTO: 28.7 PG (ref 26–34)
MCHC RBC AUTO-ENTMCNC: 32.5 G/DL (ref 31–37)
MCV RBC AUTO: 88.3 FL (ref 80–100)
MONOCYTES # BLD: 10 %
PDW BLD-RTO: 15.5 % (ref 11–14.5)
PLATELET # BLD: 163 K/UL (ref 140–450)
PLATELET ESTIMATE: ABNORMAL
PMV BLD AUTO: 7.6 FL (ref 6–12)
POTASSIUM SERPL-SCNC: 4.2 MMOL/L (ref 3.7–5.3)
RBC # BLD: 4.85 M/UL (ref 4–5.2)
RBC # BLD: ABNORMAL 10*6/UL
SEG NEUTROPHILS: 70 %
SEGMENTED NEUTROPHILS ABSOLUTE COUNT: 4.2 K/UL (ref 1.8–7.7)
SODIUM BLD-SCNC: 142 MMOL/L (ref 135–144)
TOTAL PROTEIN: 7.1 G/DL (ref 6.4–8.3)
WBC # BLD: 5.9 K/UL (ref 3.5–11)
WBC # BLD: ABNORMAL 10*3/UL

## 2017-08-24 PROCEDURE — 1036F TOBACCO NON-USER: CPT | Performed by: FAMILY MEDICINE

## 2017-08-24 PROCEDURE — 36415 COLL VENOUS BLD VENIPUNCTURE: CPT

## 2017-08-24 PROCEDURE — 99214 OFFICE O/P EST MOD 30 MIN: CPT | Performed by: FAMILY MEDICINE

## 2017-08-24 PROCEDURE — 4040F PNEUMOC VAC/ADMIN/RCVD: CPT | Performed by: FAMILY MEDICINE

## 2017-08-24 PROCEDURE — 1123F ACP DISCUSS/DSCN MKR DOCD: CPT | Performed by: FAMILY MEDICINE

## 2017-08-24 PROCEDURE — G8399 PT W/DXA RESULTS DOCUMENT: HCPCS | Performed by: FAMILY MEDICINE

## 2017-08-24 PROCEDURE — 1090F PRES/ABSN URINE INCON ASSESS: CPT | Performed by: FAMILY MEDICINE

## 2017-08-24 PROCEDURE — G8427 DOCREV CUR MEDS BY ELIG CLIN: HCPCS | Performed by: FAMILY MEDICINE

## 2017-08-24 PROCEDURE — 85025 COMPLETE CBC W/AUTO DIFF WBC: CPT

## 2017-08-24 PROCEDURE — G8419 CALC BMI OUT NRM PARAM NOF/U: HCPCS | Performed by: FAMILY MEDICINE

## 2017-08-24 PROCEDURE — 80053 COMPREHEN METABOLIC PANEL: CPT

## 2017-08-24 RX ORDER — FLUCONAZOLE 150 MG/1
150 TABLET ORAL DAILY
Qty: 3 TABLET | Refills: 0 | Status: ON HOLD | OUTPATIENT
Start: 2017-08-24 | End: 2017-09-19 | Stop reason: ALTCHOICE

## 2017-08-24 ASSESSMENT — ENCOUNTER SYMPTOMS
CHEST TIGHTNESS: 0
BACK PAIN: 0
SHORTNESS OF BREATH: 0
DIARRHEA: 0
WHEEZING: 0
COUGH: 0
ABDOMINAL PAIN: 0
CONSTIPATION: 0

## 2017-08-28 DIAGNOSIS — Z22.322 MRSA (METHICILLIN RESISTANT STAPHYLOCOCCUS AUREUS) CARRIER: ICD-10-CM

## 2017-08-28 DIAGNOSIS — Z79.01 LONG TERM (CURRENT) USE OF ANTICOAGULANTS: ICD-10-CM

## 2017-08-28 DIAGNOSIS — Z01.818 PRE-OP TESTING: Primary | ICD-10-CM

## 2017-08-31 ENCOUNTER — TELEPHONE (OUTPATIENT)
Dept: ORTHOPEDIC SURGERY | Age: 82
End: 2017-08-31

## 2017-08-31 ENCOUNTER — HOSPITAL ENCOUNTER (OUTPATIENT)
Dept: OCCUPATIONAL THERAPY | Age: 82
Setting detail: THERAPIES SERIES
Discharge: HOME | End: 2017-08-31
Payer: MEDICARE

## 2017-08-31 ENCOUNTER — HOSPITAL ENCOUNTER (OUTPATIENT)
Dept: LAB | Age: 82
Discharge: HOME | End: 2017-08-31
Payer: MEDICARE

## 2017-08-31 ENCOUNTER — NURSE ONLY (OUTPATIENT)
Dept: ORTHOPEDIC SURGERY | Age: 82
End: 2017-08-31

## 2017-08-31 DIAGNOSIS — Z22.322 MRSA (METHICILLIN RESISTANT STAPHYLOCOCCUS AUREUS) CARRIER: ICD-10-CM

## 2017-08-31 DIAGNOSIS — M16.12 PRIMARY OSTEOARTHRITIS OF LEFT HIP: Primary | ICD-10-CM

## 2017-08-31 DIAGNOSIS — Z01.818 PRE-OP TESTING: ICD-10-CM

## 2017-08-31 LAB
-: ABNORMAL
AMORPHOUS: ABNORMAL
BACTERIA: ABNORMAL
BILIRUBIN URINE: NEGATIVE
CASTS UA: ABNORMAL /LPF (ref 0–2)
COLOR: ABNORMAL
COMMENT UA: ABNORMAL
CRYSTALS, UA: ABNORMAL /HPF
EPITHELIAL CELLS UA: ABNORMAL /HPF (ref 0–5)
GLUCOSE URINE: NEGATIVE
KETONES, URINE: ABNORMAL
LEUKOCYTE ESTERASE, URINE: ABNORMAL
MUCUS: ABNORMAL
NITRITE, URINE: NEGATIVE
OTHER OBSERVATIONS UA: ABNORMAL
PH UA: 5 (ref 5–6)
PROTEIN UA: ABNORMAL
RBC UA: ABNORMAL /HPF (ref 0–4)
RENAL EPITHELIAL, UA: ABNORMAL /HPF
SPECIFIC GRAVITY UA: 1.03 (ref 1.01–1.02)
TRICHOMONAS: ABNORMAL
TURBIDITY: ABNORMAL
URINE HGB: NEGATIVE
UROBILINOGEN, URINE: NORMAL
WBC UA: ABNORMAL /HPF (ref 0–4)
YEAST: ABNORMAL

## 2017-08-31 PROCEDURE — 81001 URINALYSIS AUTO W/SCOPE: CPT

## 2017-08-31 PROCEDURE — 87641 MR-STAPH DNA AMP PROBE: CPT

## 2017-09-01 LAB
MRSA, DNA, NASAL: NORMAL
SPECIMEN DESCRIPTION: NORMAL

## 2017-09-18 ENCOUNTER — HOSPITAL ENCOUNTER (OUTPATIENT)
Dept: LAB | Age: 82
Setting detail: SPECIMEN
Discharge: HOME | DRG: 470 | End: 2017-09-18
Payer: MEDICARE

## 2017-09-18 ENCOUNTER — ANESTHESIA EVENT (OUTPATIENT)
Dept: OPERATING ROOM | Age: 82
DRG: 470 | End: 2017-09-18
Payer: MEDICARE

## 2017-09-18 DIAGNOSIS — Z01.818 PRE-OP TESTING: ICD-10-CM

## 2017-09-18 DIAGNOSIS — Z79.01 LONG TERM (CURRENT) USE OF ANTICOAGULANTS: ICD-10-CM

## 2017-09-18 LAB
ABO/RH: NORMAL
ABSOLUTE EOS #: 0.1 K/UL (ref 0–0.4)
ABSOLUTE LYMPH #: 1.2 K/UL (ref 1–4.8)
ABSOLUTE MONO #: 0.6 K/UL (ref 0.1–1.2)
ANTIBODY SCREEN: NEGATIVE
ARM BAND NUMBER: NORMAL
BASOPHILS # BLD: 2 % (ref 0–1)
BASOPHILS ABSOLUTE: 0.1 K/UL (ref 0–0.2)
DIFFERENTIAL TYPE: ABNORMAL
EOSINOPHILS RELATIVE PERCENT: 2 % (ref 1–7)
EXPIRATION DATE: NORMAL
HCT VFR BLD CALC: 42.5 % (ref 36–46)
HEMOGLOBIN: 13.9 G/DL (ref 12–16)
INR BLD: 1
LYMPHOCYTES # BLD: 26 % (ref 16–46)
MCH RBC QN AUTO: 28.7 PG (ref 26–34)
MCHC RBC AUTO-ENTMCNC: 32.6 G/DL (ref 31–37)
MCV RBC AUTO: 88 FL (ref 80–100)
MONOCYTES # BLD: 12 % (ref 4–11)
PARTIAL THROMBOPLASTIN TIME: 26.3 SEC (ref 27–35)
PDW BLD-RTO: 15.2 % (ref 11–14.5)
PLATELET # BLD: 186 K/UL (ref 140–450)
PLATELET ESTIMATE: ABNORMAL
PMV BLD AUTO: 7.1 FL (ref 6–12)
PROTHROMBIN TIME: 10.7 SEC (ref 9.4–11.3)
RBC # BLD: 4.83 M/UL (ref 4–5.2)
RBC # BLD: ABNORMAL 10*6/UL
SEG NEUTROPHILS: 58 % (ref 43–77)
SEGMENTED NEUTROPHILS ABSOLUTE COUNT: 2.9 K/UL (ref 1.8–7.7)
WBC # BLD: 4.9 K/UL (ref 3.5–11)
WBC # BLD: ABNORMAL 10*3/UL

## 2017-09-18 PROCEDURE — 85730 THROMBOPLASTIN TIME PARTIAL: CPT

## 2017-09-18 PROCEDURE — 36415 COLL VENOUS BLD VENIPUNCTURE: CPT

## 2017-09-18 PROCEDURE — 86900 BLOOD TYPING SEROLOGIC ABO: CPT

## 2017-09-18 PROCEDURE — 85610 PROTHROMBIN TIME: CPT

## 2017-09-18 PROCEDURE — 86901 BLOOD TYPING SEROLOGIC RH(D): CPT

## 2017-09-18 PROCEDURE — 85025 COMPLETE CBC W/AUTO DIFF WBC: CPT

## 2017-09-18 PROCEDURE — 86850 RBC ANTIBODY SCREEN: CPT

## 2017-09-19 ENCOUNTER — ANESTHESIA (OUTPATIENT)
Dept: OPERATING ROOM | Age: 82
DRG: 470 | End: 2017-09-19
Payer: MEDICARE

## 2017-09-19 ENCOUNTER — APPOINTMENT (OUTPATIENT)
Dept: GENERAL RADIOLOGY | Age: 82
DRG: 470 | End: 2017-09-19
Attending: ORTHOPAEDIC SURGERY
Payer: MEDICARE

## 2017-09-19 ENCOUNTER — HOSPITAL ENCOUNTER (INPATIENT)
Age: 82
LOS: 3 days | Discharge: NURSING, REHAB OR LONG-TERM CARE | DRG: 470 | End: 2017-09-22
Attending: ORTHOPAEDIC SURGERY | Admitting: ORTHOPAEDIC SURGERY
Payer: MEDICARE

## 2017-09-19 VITALS
DIASTOLIC BLOOD PRESSURE: 87 MMHG | SYSTOLIC BLOOD PRESSURE: 176 MMHG | OXYGEN SATURATION: 98 % | RESPIRATION RATE: 22 BRPM | TEMPERATURE: 83.8 F

## 2017-09-19 PROBLEM — Z91.89 AT HIGH RISK FOR PAIN FROM PROCEDURE: Status: ACTIVE | Noted: 2017-09-19

## 2017-09-19 PROCEDURE — 6360000002 HC RX W HCPCS: Performed by: ORTHOPAEDIC SURGERY

## 2017-09-19 PROCEDURE — 2580000003 HC RX 258: Performed by: NURSE PRACTITIONER

## 2017-09-19 PROCEDURE — A6258 TRANSPARENT FILM >16<=48 IN: HCPCS | Performed by: ORTHOPAEDIC SURGERY

## 2017-09-19 PROCEDURE — C1713 ANCHOR/SCREW BN/BN,TIS/BN: HCPCS | Performed by: ORTHOPAEDIC SURGERY

## 2017-09-19 PROCEDURE — 27130 TOTAL HIP ARTHROPLASTY: CPT | Performed by: PHYSICIAN ASSISTANT

## 2017-09-19 PROCEDURE — 6360000002 HC RX W HCPCS: Performed by: NURSE ANESTHETIST, CERTIFIED REGISTERED

## 2017-09-19 PROCEDURE — A6454 SELF-ADHER BAND W>=3" <5"/YD: HCPCS | Performed by: ORTHOPAEDIC SURGERY

## 2017-09-19 PROCEDURE — 6370000000 HC RX 637 (ALT 250 FOR IP): Performed by: INTERNAL MEDICINE

## 2017-09-19 PROCEDURE — 2500000003 HC RX 250 WO HCPCS: Performed by: ORTHOPAEDIC SURGERY

## 2017-09-19 PROCEDURE — 3700000001 HC ADD 15 MINUTES (ANESTHESIA): Performed by: ORTHOPAEDIC SURGERY

## 2017-09-19 PROCEDURE — 7100000000 HC PACU RECOVERY - FIRST 15 MIN: Performed by: ORTHOPAEDIC SURGERY

## 2017-09-19 PROCEDURE — 94760 N-INVAS EAR/PLS OXIMETRY 1: CPT

## 2017-09-19 PROCEDURE — 3600000004 HC SURGERY LEVEL 4 BASE: Performed by: ORTHOPAEDIC SURGERY

## 2017-09-19 PROCEDURE — 7100000001 HC PACU RECOVERY - ADDTL 15 MIN: Performed by: ORTHOPAEDIC SURGERY

## 2017-09-19 PROCEDURE — 27130 TOTAL HIP ARTHROPLASTY: CPT | Performed by: ORTHOPAEDIC SURGERY

## 2017-09-19 PROCEDURE — 93005 ELECTROCARDIOGRAM TRACING: CPT

## 2017-09-19 PROCEDURE — 6370000000 HC RX 637 (ALT 250 FOR IP): Performed by: ORTHOPAEDIC SURGERY

## 2017-09-19 PROCEDURE — 3600000014 HC SURGERY LEVEL 4 ADDTL 15MIN: Performed by: ORTHOPAEDIC SURGERY

## 2017-09-19 PROCEDURE — 6370000000 HC RX 637 (ALT 250 FOR IP): Performed by: NURSE PRACTITIONER

## 2017-09-19 PROCEDURE — 94660 CPAP INITIATION&MGMT: CPT

## 2017-09-19 PROCEDURE — 3700000000 HC ANESTHESIA ATTENDED CARE: Performed by: ORTHOPAEDIC SURGERY

## 2017-09-19 PROCEDURE — 99223 1ST HOSP IP/OBS HIGH 75: CPT | Performed by: INTERNAL MEDICINE

## 2017-09-19 PROCEDURE — 2500000003 HC RX 250 WO HCPCS: Performed by: NURSE ANESTHETIST, CERTIFIED REGISTERED

## 2017-09-19 PROCEDURE — 2720000010 HC SURG SUPPLY STERILE: Performed by: ORTHOPAEDIC SURGERY

## 2017-09-19 PROCEDURE — 6360000002 HC RX W HCPCS: Performed by: NURSE PRACTITIONER

## 2017-09-19 PROCEDURE — 94640 AIRWAY INHALATION TREATMENT: CPT

## 2017-09-19 PROCEDURE — 0SRB0JA REPLACEMENT OF LEFT HIP JOINT WITH SYNTHETIC SUBSTITUTE, UNCEMENTED, OPEN APPROACH: ICD-10-PCS | Performed by: ORTHOPAEDIC SURGERY

## 2017-09-19 PROCEDURE — C1776 JOINT DEVICE (IMPLANTABLE): HCPCS | Performed by: ORTHOPAEDIC SURGERY

## 2017-09-19 PROCEDURE — 01214 ANES OPEN PX TOT HIP ARTHRP: CPT | Performed by: NURSE ANESTHETIST, CERTIFIED REGISTERED

## 2017-09-19 PROCEDURE — 94150 VITAL CAPACITY TEST: CPT

## 2017-09-19 PROCEDURE — 73502 X-RAY EXAM HIP UNI 2-3 VIEWS: CPT

## 2017-09-19 PROCEDURE — 1200000000 HC SEMI PRIVATE

## 2017-09-19 DEVICE — STEM FEM SZ 13 L146MM 133DEG DST HIP PPS STD OFFSET TYP 1: Type: IMPLANTABLE DEVICE | Site: HIP | Status: FUNCTIONAL

## 2017-09-19 DEVICE — COCR FEM HD 40MM TYPE 1 STD: Type: IMPLANTABLE DEVICE | Site: HIP | Status: FUNCTIONAL

## 2017-09-19 DEVICE — IMPLANTABLE DEVICE
Type: IMPLANTABLE DEVICE | Site: HIP | Status: FUNCTIONAL
Brand: RINGLOC® HIP SYSTEM

## 2017-09-19 DEVICE — IMPLANTABLE DEVICE: Type: IMPLANTABLE DEVICE | Site: HIP | Status: FUNCTIONAL

## 2017-09-19 DEVICE — SCREW BNE L20MM DIA6.5MM CANC ACET TI ALLY ST LO PROF MOD: Type: IMPLANTABLE DEVICE | Site: HIP | Status: FUNCTIONAL

## 2017-09-19 DEVICE — IMPLANTABLE DEVICE: Type: IMPLANTABLE DEVICE | Status: FUNCTIONAL

## 2017-09-19 DEVICE — SHELL ACET OD54MM ID24MM PPS LIMIT H FIN RINGLOC +: Type: IMPLANTABLE DEVICE | Site: HIP | Status: FUNCTIONAL

## 2017-09-19 RX ORDER — CETIRIZINE HYDROCHLORIDE 5 MG/1
5 TABLET ORAL DAILY PRN
Status: DISCONTINUED | OUTPATIENT
Start: 2017-09-19 | End: 2017-09-22 | Stop reason: HOSPADM

## 2017-09-19 RX ORDER — GLYCOPYRROLATE 0.2 MG/ML
INJECTION INTRAMUSCULAR; INTRAVENOUS PRN
Status: DISCONTINUED | OUTPATIENT
Start: 2017-09-19 | End: 2017-09-19 | Stop reason: SDUPTHER

## 2017-09-19 RX ORDER — DOCUSATE SODIUM 100 MG/1
100 CAPSULE, LIQUID FILLED ORAL DAILY PRN
Status: DISCONTINUED | OUTPATIENT
Start: 2017-09-19 | End: 2017-09-22 | Stop reason: HOSPADM

## 2017-09-19 RX ORDER — ONDANSETRON 2 MG/ML
4 INJECTION INTRAMUSCULAR; INTRAVENOUS EVERY 6 HOURS PRN
Status: DISCONTINUED | OUTPATIENT
Start: 2017-09-19 | End: 2017-09-22 | Stop reason: HOSPADM

## 2017-09-19 RX ORDER — FENTANYL CITRATE 50 UG/ML
INJECTION, SOLUTION INTRAMUSCULAR; INTRAVENOUS PRN
Status: DISCONTINUED | OUTPATIENT
Start: 2017-09-19 | End: 2017-09-19 | Stop reason: SDUPTHER

## 2017-09-19 RX ORDER — TRANEXAMIC ACID 100 MG/ML
INJECTION, SOLUTION INTRAVENOUS PRN
Status: DISCONTINUED | OUTPATIENT
Start: 2017-09-19 | End: 2017-09-19 | Stop reason: HOSPADM

## 2017-09-19 RX ORDER — HYDROCODONE BITARTRATE AND ACETAMINOPHEN 5; 325 MG/1; MG/1
1-2 TABLET ORAL EVERY 4 HOURS PRN
Qty: 80 TABLET | Refills: 0 | Status: SHIPPED | OUTPATIENT
Start: 2017-09-19 | End: 2017-09-29

## 2017-09-19 RX ORDER — LIDOCAINE HYDROCHLORIDE 40 MG/ML
INJECTION, SOLUTION RETROBULBAR; TOPICAL PRN
Status: DISCONTINUED | OUTPATIENT
Start: 2017-09-19 | End: 2017-09-19 | Stop reason: SDUPTHER

## 2017-09-19 RX ORDER — OXYCODONE HYDROCHLORIDE 5 MG/1
5 TABLET ORAL PRN
Status: DISCONTINUED | OUTPATIENT
Start: 2017-09-19 | End: 2017-09-19 | Stop reason: HOSPADM

## 2017-09-19 RX ORDER — HYDROCODONE BITARTRATE AND ACETAMINOPHEN 5; 325 MG/1; MG/1
1 TABLET ORAL EVERY 4 HOURS PRN
Status: DISCONTINUED | OUTPATIENT
Start: 2017-09-19 | End: 2017-09-22 | Stop reason: HOSPADM

## 2017-09-19 RX ORDER — FENTANYL CITRATE 50 UG/ML
25 INJECTION, SOLUTION INTRAMUSCULAR; INTRAVENOUS EVERY 5 MIN PRN
Status: COMPLETED | OUTPATIENT
Start: 2017-09-19 | End: 2017-09-19

## 2017-09-19 RX ORDER — DEXAMETHASONE SODIUM PHOSPHATE 4 MG/ML
INJECTION, SOLUTION INTRA-ARTICULAR; INTRALESIONAL; INTRAMUSCULAR; INTRAVENOUS; SOFT TISSUE PRN
Status: DISCONTINUED | OUTPATIENT
Start: 2017-09-19 | End: 2017-09-19 | Stop reason: SDUPTHER

## 2017-09-19 RX ORDER — HYDROCODONE BITARTRATE AND ACETAMINOPHEN 5; 325 MG/1; MG/1
2 TABLET ORAL EVERY 4 HOURS PRN
Status: DISCONTINUED | OUTPATIENT
Start: 2017-09-19 | End: 2017-09-22 | Stop reason: HOSPADM

## 2017-09-19 RX ORDER — TRANEXAMIC ACID 100 MG/ML
INJECTION, SOLUTION INTRAVENOUS PRN
Status: DISCONTINUED | OUTPATIENT
Start: 2017-09-19 | End: 2017-09-19 | Stop reason: SDUPTHER

## 2017-09-19 RX ORDER — LORATADINE 10 MG/1
10 TABLET ORAL DAILY PRN
Status: DISCONTINUED | OUTPATIENT
Start: 2017-09-19 | End: 2017-09-19 | Stop reason: CLARIF

## 2017-09-19 RX ORDER — ONDANSETRON 2 MG/ML
4 INJECTION INTRAMUSCULAR; INTRAVENOUS PRN
Status: DISCONTINUED | OUTPATIENT
Start: 2017-09-19 | End: 2017-09-19 | Stop reason: HOSPADM

## 2017-09-19 RX ORDER — IPRATROPIUM BROMIDE AND ALBUTEROL SULFATE 2.5; .5 MG/3ML; MG/3ML
1 SOLUTION RESPIRATORY (INHALATION) ONCE
Status: COMPLETED | OUTPATIENT
Start: 2017-09-19 | End: 2017-09-19

## 2017-09-19 RX ORDER — NEOSTIGMINE METHYLSULFATE 1 MG/ML
INJECTION, SOLUTION INTRAVENOUS PRN
Status: DISCONTINUED | OUTPATIENT
Start: 2017-09-19 | End: 2017-09-19 | Stop reason: SDUPTHER

## 2017-09-19 RX ORDER — DIPHENHYDRAMINE HYDROCHLORIDE 50 MG/ML
12.5 INJECTION INTRAMUSCULAR; INTRAVENOUS
Status: DISCONTINUED | OUTPATIENT
Start: 2017-09-19 | End: 2017-09-19 | Stop reason: HOSPADM

## 2017-09-19 RX ORDER — CYCLOBENZAPRINE HCL 10 MG
10 TABLET ORAL 3 TIMES DAILY PRN
Status: DISCONTINUED | OUTPATIENT
Start: 2017-09-19 | End: 2017-09-22 | Stop reason: HOSPADM

## 2017-09-19 RX ORDER — LORAZEPAM 2 MG/ML
0.5 INJECTION INTRAMUSCULAR EVERY 10 MIN PRN
Status: DISCONTINUED | OUTPATIENT
Start: 2017-09-19 | End: 2017-09-19 | Stop reason: HOSPADM

## 2017-09-19 RX ORDER — KETOROLAC TROMETHAMINE 30 MG/ML
15 INJECTION, SOLUTION INTRAMUSCULAR; INTRAVENOUS EVERY 6 HOURS
Status: COMPLETED | OUTPATIENT
Start: 2017-09-19 | End: 2017-09-21

## 2017-09-19 RX ORDER — FENTANYL CITRATE 50 UG/ML
50 INJECTION, SOLUTION INTRAMUSCULAR; INTRAVENOUS EVERY 5 MIN PRN
Status: DISCONTINUED | OUTPATIENT
Start: 2017-09-19 | End: 2017-09-19 | Stop reason: HOSPADM

## 2017-09-19 RX ORDER — SODIUM CHLORIDE FOR INHALATION 0.9 %
3 VIAL, NEBULIZER (ML) INHALATION
Status: DISCONTINUED | OUTPATIENT
Start: 2017-09-19 | End: 2017-09-19

## 2017-09-19 RX ORDER — ALBUTEROL SULFATE 2.5 MG/3ML
2.5 SOLUTION RESPIRATORY (INHALATION)
Status: DISCONTINUED | OUTPATIENT
Start: 2017-09-19 | End: 2017-09-21

## 2017-09-19 RX ORDER — ACETAMINOPHEN 10 MG/ML
INJECTION, SOLUTION INTRAVENOUS PRN
Status: DISCONTINUED | OUTPATIENT
Start: 2017-09-19 | End: 2017-09-19 | Stop reason: SDUPTHER

## 2017-09-19 RX ORDER — LACTULOSE 10 G/15ML
20 SOLUTION ORAL 3 TIMES DAILY
Status: DISCONTINUED | OUTPATIENT
Start: 2017-09-19 | End: 2017-09-22 | Stop reason: HOSPADM

## 2017-09-19 RX ORDER — MULTIVITAMIN WITH FOLIC ACID 400 MCG
1 TABLET ORAL DAILY
Status: DISCONTINUED | OUTPATIENT
Start: 2017-09-19 | End: 2017-09-22 | Stop reason: HOSPADM

## 2017-09-19 RX ORDER — OXYCODONE HYDROCHLORIDE 5 MG/1
10 TABLET ORAL PRN
Status: DISCONTINUED | OUTPATIENT
Start: 2017-09-19 | End: 2017-09-19 | Stop reason: HOSPADM

## 2017-09-19 RX ORDER — MORPHINE SULFATE 2 MG/ML
2 INJECTION, SOLUTION INTRAMUSCULAR; INTRAVENOUS
Status: DISCONTINUED | OUTPATIENT
Start: 2017-09-19 | End: 2017-09-19

## 2017-09-19 RX ORDER — SODIUM CHLORIDE 9 MG/ML
INJECTION, SOLUTION INTRAVENOUS CONTINUOUS
Status: DISCONTINUED | OUTPATIENT
Start: 2017-09-19 | End: 2017-09-20

## 2017-09-19 RX ORDER — ROCURONIUM BROMIDE 10 MG/ML
INJECTION, SOLUTION INTRAVENOUS PRN
Status: DISCONTINUED | OUTPATIENT
Start: 2017-09-19 | End: 2017-09-19 | Stop reason: SDUPTHER

## 2017-09-19 RX ORDER — ALBUTEROL SULFATE 2.5 MG/3ML
2.5 SOLUTION RESPIRATORY (INHALATION)
Status: DISCONTINUED | OUTPATIENT
Start: 2017-09-19 | End: 2017-09-19 | Stop reason: SDUPTHER

## 2017-09-19 RX ORDER — MORPHINE SULFATE 2 MG/ML
2 INJECTION, SOLUTION INTRAMUSCULAR; INTRAVENOUS EVERY 5 MIN PRN
Status: DISCONTINUED | OUTPATIENT
Start: 2017-09-19 | End: 2017-09-19 | Stop reason: HOSPADM

## 2017-09-19 RX ORDER — SODIUM CHLORIDE, SODIUM LACTATE, POTASSIUM CHLORIDE, CALCIUM CHLORIDE 600; 310; 30; 20 MG/100ML; MG/100ML; MG/100ML; MG/100ML
INJECTION, SOLUTION INTRAVENOUS CONTINUOUS
Status: DISCONTINUED | OUTPATIENT
Start: 2017-09-19 | End: 2017-09-19

## 2017-09-19 RX ORDER — ONDANSETRON 2 MG/ML
INJECTION INTRAMUSCULAR; INTRAVENOUS PRN
Status: DISCONTINUED | OUTPATIENT
Start: 2017-09-19 | End: 2017-09-19 | Stop reason: SDUPTHER

## 2017-09-19 RX ORDER — PROPOFOL 10 MG/ML
INJECTION, EMULSION INTRAVENOUS PRN
Status: DISCONTINUED | OUTPATIENT
Start: 2017-09-19 | End: 2017-09-19 | Stop reason: SDUPTHER

## 2017-09-19 RX ORDER — PANTOPRAZOLE SODIUM 40 MG/1
40 TABLET, DELAYED RELEASE ORAL
Status: DISCONTINUED | OUTPATIENT
Start: 2017-09-19 | End: 2017-09-20

## 2017-09-19 RX ADMIN — ROCURONIUM BROMIDE 40 MG: 10 INJECTION INTRAVENOUS at 09:26

## 2017-09-19 RX ADMIN — FENTANYL CITRATE 25 MCG: 50 INJECTION INTRAMUSCULAR; INTRAVENOUS at 11:35

## 2017-09-19 RX ADMIN — PROPOFOL 130 MG: 10 INJECTION, EMULSION INTRAVENOUS at 09:26

## 2017-09-19 RX ADMIN — FENTANYL CITRATE 25 MCG: 50 INJECTION INTRAMUSCULAR; INTRAVENOUS at 11:20

## 2017-09-19 RX ADMIN — SODIUM CHLORIDE: 9 INJECTION, SOLUTION INTRAVENOUS at 12:54

## 2017-09-19 RX ADMIN — ACETAMINOPHEN 1000 MG: 10 INJECTION, SOLUTION INTRAVENOUS at 10:26

## 2017-09-19 RX ADMIN — Medication 2 G: at 09:41

## 2017-09-19 RX ADMIN — FENTANYL CITRATE 25 MCG: 50 INJECTION, SOLUTION INTRAMUSCULAR; INTRAVENOUS at 09:54

## 2017-09-19 RX ADMIN — FENTANYL CITRATE 25 MCG: 50 INJECTION INTRAMUSCULAR; INTRAVENOUS at 12:10

## 2017-09-19 RX ADMIN — LIDOCAINE HYDROCHLORIDE 80 MG: 40 INJECTION, SOLUTION RETROBULBAR; TOPICAL at 09:26

## 2017-09-19 RX ADMIN — CHLORASEPTIC 5 SPRAY: 1.5 LIQUID ORAL at 16:15

## 2017-09-19 RX ADMIN — SODIUM CHLORIDE: 9 INJECTION, SOLUTION INTRAVENOUS at 21:53

## 2017-09-19 RX ADMIN — FENTANYL CITRATE 25 MCG: 50 INJECTION INTRAMUSCULAR; INTRAVENOUS at 11:55

## 2017-09-19 RX ADMIN — ONDANSETRON 4 MG: 2 INJECTION INTRAMUSCULAR; INTRAVENOUS at 10:25

## 2017-09-19 RX ADMIN — HYDROCODONE BITARTRATE AND ACETAMINOPHEN 1 TABLET: 5; 325 TABLET ORAL at 14:06

## 2017-09-19 RX ADMIN — TRANEXAMIC ACID 1000 MG: 100 INJECTION, SOLUTION INTRAVENOUS at 09:42

## 2017-09-19 RX ADMIN — NEOSTIGMINE METHYLSULFATE 1 MG: 1 INJECTION, SOLUTION INTRAVENOUS at 10:43

## 2017-09-19 RX ADMIN — FENTANYL CITRATE 50 MCG: 50 INJECTION, SOLUTION INTRAMUSCULAR; INTRAVENOUS at 09:22

## 2017-09-19 RX ADMIN — GLYCOPYRROLATE 0.4 MG: 0.2 INJECTION, SOLUTION INTRAMUSCULAR; INTRAVENOUS at 10:37

## 2017-09-19 RX ADMIN — Medication 2 G: at 18:12

## 2017-09-19 RX ADMIN — LACTULOSE 20 G: 20 SOLUTION ORAL at 21:52

## 2017-09-19 RX ADMIN — IPRATROPIUM BROMIDE AND ALBUTEROL SULFATE 1 AMPULE: .5; 3 SOLUTION RESPIRATORY (INHALATION) at 11:05

## 2017-09-19 RX ADMIN — KETOROLAC TROMETHAMINE 15 MG: 30 INJECTION, SOLUTION INTRAMUSCULAR at 21:47

## 2017-09-19 RX ADMIN — THERA TABS 1 TABLET: TAB at 16:15

## 2017-09-19 RX ADMIN — GLYCOPYRROLATE 0.2 MG: 0.2 INJECTION, SOLUTION INTRAMUSCULAR; INTRAVENOUS at 10:43

## 2017-09-19 RX ADMIN — ROCURONIUM BROMIDE 10 MG: 10 INJECTION INTRAVENOUS at 09:44

## 2017-09-19 RX ADMIN — SODIUM CHLORIDE, POTASSIUM CHLORIDE, SODIUM LACTATE AND CALCIUM CHLORIDE: 600; 310; 30; 20 INJECTION, SOLUTION INTRAVENOUS at 09:09

## 2017-09-19 RX ADMIN — LACTULOSE 20 G: 20 SOLUTION ORAL at 16:15

## 2017-09-19 RX ADMIN — SODIUM CHLORIDE, POTASSIUM CHLORIDE, SODIUM LACTATE AND CALCIUM CHLORIDE: 600; 310; 30; 20 INJECTION, SOLUTION INTRAVENOUS at 11:30

## 2017-09-19 RX ADMIN — NEOSTIGMINE METHYLSULFATE 2 MG: 1 INJECTION, SOLUTION INTRAVENOUS at 10:37

## 2017-09-19 RX ADMIN — KETOROLAC TROMETHAMINE 15 MG: 30 INJECTION, SOLUTION INTRAMUSCULAR at 16:14

## 2017-09-19 RX ADMIN — DEXAMETHASONE SODIUM PHOSPHATE 8 MG: 4 INJECTION, SOLUTION INTRAMUSCULAR; INTRAVENOUS at 10:50

## 2017-09-19 RX ADMIN — FENTANYL CITRATE 25 MCG: 50 INJECTION, SOLUTION INTRAMUSCULAR; INTRAVENOUS at 10:11

## 2017-09-19 ASSESSMENT — PULMONARY FUNCTION TESTS
PIF_VALUE: 14
PIF_VALUE: 3
PIF_VALUE: 15
PIF_VALUE: 2
PIF_VALUE: 18
PIF_VALUE: 16
PIF_VALUE: 3
PIF_VALUE: 14
PIF_VALUE: 15
PIF_VALUE: 14
PIF_VALUE: 20
PIF_VALUE: 16
PIF_VALUE: 13
PIF_VALUE: 19
PIF_VALUE: 13
PIF_VALUE: 3
PIF_VALUE: 2
PIF_VALUE: 20
PIF_VALUE: 14
PIF_VALUE: 20
PIF_VALUE: 19
PIF_VALUE: 20
PIF_VALUE: 1
PIF_VALUE: 14
PIF_VALUE: 21
PIF_VALUE: 20
PIF_VALUE: 20
PIF_VALUE: 14
PIF_VALUE: 15
PIF_VALUE: 14
PIF_VALUE: 14
PIF_VALUE: 19
PIF_VALUE: 0
PIF_VALUE: 20
PIF_VALUE: 14
PIF_VALUE: 20
PIF_VALUE: 17
PIF_VALUE: 20
PIF_VALUE: 11
PIF_VALUE: 19
PIF_VALUE: 19
PIF_VALUE: 13
PIF_VALUE: 13
PIF_VALUE: 19
PIF_VALUE: 19
PIF_VALUE: 14
PIF_VALUE: 19
PIF_VALUE: 20
PIF_VALUE: 18
PIF_VALUE: 15
PIF_VALUE: 19
PIF_VALUE: 18
PIF_VALUE: 19
PIF_VALUE: 14
PIF_VALUE: 20
PIF_VALUE: 18
PIF_VALUE: 14
PIF_VALUE: 20
PIF_VALUE: 14
PIF_VALUE: 20
PIF_VALUE: 2
PIF_VALUE: 1
PIF_VALUE: 23
PIF_VALUE: 18
PIF_VALUE: 18
PIF_VALUE: 19
PIF_VALUE: 14
PIF_VALUE: 14
PIF_VALUE: 3
PIF_VALUE: 20
PIF_VALUE: 14
PIF_VALUE: 19
PIF_VALUE: 3
PIF_VALUE: 20
PIF_VALUE: 15
PIF_VALUE: 20
PIF_VALUE: 3
PIF_VALUE: 21
PIF_VALUE: 20
PIF_VALUE: 19
PIF_VALUE: 19
PIF_VALUE: 6
PIF_VALUE: 20
PIF_VALUE: 16

## 2017-09-19 ASSESSMENT — PAIN DESCRIPTION - ORIENTATION
ORIENTATION: LEFT

## 2017-09-19 ASSESSMENT — PAIN DESCRIPTION - DESCRIPTORS
DESCRIPTORS: ACHING

## 2017-09-19 ASSESSMENT — PAIN SCALES - GENERAL
PAINLEVEL_OUTOF10: 10
PAINLEVEL_OUTOF10: 5
PAINLEVEL_OUTOF10: 4
PAINLEVEL_OUTOF10: 6
PAINLEVEL_OUTOF10: 5
PAINLEVEL_OUTOF10: 5
PAINLEVEL_OUTOF10: 10
PAINLEVEL_OUTOF10: 7
PAINLEVEL_OUTOF10: 0
PAINLEVEL_OUTOF10: 2

## 2017-09-19 ASSESSMENT — PAIN DESCRIPTION - LOCATION
LOCATION: HIP
LOCATION: HIP
LOCATION_2: THROAT
LOCATION_2: THROAT
LOCATION: HIP
LOCATION_2: THROAT
LOCATION: HIP

## 2017-09-19 ASSESSMENT — PAIN DESCRIPTION - PAIN TYPE
TYPE: SURGICAL PAIN

## 2017-09-19 ASSESSMENT — PAIN - FUNCTIONAL ASSESSMENT: PAIN_FUNCTIONAL_ASSESSMENT: 0-10

## 2017-09-19 ASSESSMENT — PAIN DESCRIPTION - FREQUENCY: FREQUENCY: INTERMITTENT

## 2017-09-20 LAB
ABSOLUTE EOS #: 0 K/UL (ref 0–0.4)
ABSOLUTE LYMPH #: 1 K/UL (ref 1–4.8)
ABSOLUTE MONO #: 1 K/UL (ref 0.1–1.2)
ANION GAP SERPL CALCULATED.3IONS-SCNC: 9 MMOL/L (ref 9–17)
BASOPHILS # BLD: 1 % (ref 0–1)
BASOPHILS ABSOLUTE: 0 K/UL (ref 0–0.2)
BUN BLDV-MCNC: 12 MG/DL (ref 8–23)
BUN/CREAT BLD: 13 (ref 9–20)
CALCIUM SERPL-MCNC: 8.8 MG/DL (ref 8.6–10.4)
CHLORIDE BLD-SCNC: 105 MMOL/L (ref 98–107)
CO2: 28 MMOL/L (ref 20–31)
CREAT SERPL-MCNC: 0.89 MG/DL (ref 0.5–0.9)
DIFFERENTIAL TYPE: ABNORMAL
EOSINOPHILS RELATIVE PERCENT: 0 % (ref 1–7)
GFR AFRICAN AMERICAN: >60 ML/MIN
GFR NON-AFRICAN AMERICAN: >60 ML/MIN
GFR SERPL CREATININE-BSD FRML MDRD: ABNORMAL ML/MIN/{1.73_M2}
GFR SERPL CREATININE-BSD FRML MDRD: ABNORMAL ML/MIN/{1.73_M2}
GLUCOSE BLD-MCNC: 116 MG/DL (ref 70–99)
HCT VFR BLD CALC: 35.3 % (ref 36–46)
HEMOGLOBIN: 11.6 G/DL (ref 12–16)
LYMPHOCYTES # BLD: 14 % (ref 16–46)
MCH RBC QN AUTO: 28.6 PG (ref 26–34)
MCHC RBC AUTO-ENTMCNC: 32.7 G/DL (ref 31–37)
MCV RBC AUTO: 87.4 FL (ref 80–100)
MONOCYTES # BLD: 13 % (ref 4–11)
PDW BLD-RTO: 14.9 % (ref 11–14.5)
PLATELET # BLD: 163 K/UL (ref 140–450)
PLATELET ESTIMATE: ABNORMAL
PMV BLD AUTO: 8.1 FL (ref 6–12)
POTASSIUM SERPL-SCNC: 4.4 MMOL/L (ref 3.7–5.3)
RBC # BLD: 4.04 M/UL (ref 4–5.2)
RBC # BLD: ABNORMAL 10*6/UL
SEG NEUTROPHILS: 72 % (ref 43–77)
SEGMENTED NEUTROPHILS ABSOLUTE COUNT: 5.5 K/UL (ref 1.8–7.7)
SODIUM BLD-SCNC: 142 MMOL/L (ref 135–144)
WBC # BLD: 7.6 K/UL (ref 3.5–11)
WBC # BLD: ABNORMAL 10*3/UL

## 2017-09-20 PROCEDURE — 36415 COLL VENOUS BLD VENIPUNCTURE: CPT

## 2017-09-20 PROCEDURE — G8978 MOBILITY CURRENT STATUS: HCPCS | Performed by: PHYSICAL THERAPIST

## 2017-09-20 PROCEDURE — 85025 COMPLETE CBC W/AUTO DIFF WBC: CPT

## 2017-09-20 PROCEDURE — G8988 SELF CARE GOAL STATUS: HCPCS | Performed by: OCCUPATIONAL THERAPIST

## 2017-09-20 PROCEDURE — 6360000002 HC RX W HCPCS: Performed by: NURSE PRACTITIONER

## 2017-09-20 PROCEDURE — 6370000000 HC RX 637 (ALT 250 FOR IP): Performed by: INTERNAL MEDICINE

## 2017-09-20 PROCEDURE — 6370000000 HC RX 637 (ALT 250 FOR IP): Performed by: NURSE PRACTITIONER

## 2017-09-20 PROCEDURE — 80048 BASIC METABOLIC PNL TOTAL CA: CPT

## 2017-09-20 PROCEDURE — G8987 SELF CARE CURRENT STATUS: HCPCS | Performed by: OCCUPATIONAL THERAPIST

## 2017-09-20 PROCEDURE — 99232 SBSQ HOSP IP/OBS MODERATE 35: CPT | Performed by: INTERNAL MEDICINE

## 2017-09-20 PROCEDURE — 1200000000 HC SEMI PRIVATE

## 2017-09-20 PROCEDURE — 94760 N-INVAS EAR/PLS OXIMETRY 1: CPT

## 2017-09-20 PROCEDURE — 97116 GAIT TRAINING THERAPY: CPT | Performed by: PHYSICAL THERAPY ASSISTANT

## 2017-09-20 PROCEDURE — 97161 PT EVAL LOW COMPLEX 20 MIN: CPT | Performed by: PHYSICAL THERAPIST

## 2017-09-20 PROCEDURE — G8979 MOBILITY GOAL STATUS: HCPCS | Performed by: PHYSICAL THERAPIST

## 2017-09-20 PROCEDURE — 97110 THERAPEUTIC EXERCISES: CPT | Performed by: PHYSICAL THERAPY ASSISTANT

## 2017-09-20 PROCEDURE — 97166 OT EVAL MOD COMPLEX 45 MIN: CPT | Performed by: OCCUPATIONAL THERAPIST

## 2017-09-20 PROCEDURE — 97116 GAIT TRAINING THERAPY: CPT | Performed by: PHYSICAL THERAPIST

## 2017-09-20 RX ORDER — PANTOPRAZOLE SODIUM 40 MG/1
40 TABLET, DELAYED RELEASE ORAL
Status: DISCONTINUED | OUTPATIENT
Start: 2017-09-21 | End: 2017-09-22 | Stop reason: HOSPADM

## 2017-09-20 RX ADMIN — METOPROLOL TARTRATE 12.5 MG: 25 TABLET ORAL at 10:06

## 2017-09-20 RX ADMIN — LACTULOSE 20 G: 20 SOLUTION ORAL at 09:18

## 2017-09-20 RX ADMIN — KETOROLAC TROMETHAMINE 15 MG: 30 INJECTION, SOLUTION INTRAMUSCULAR at 20:47

## 2017-09-20 RX ADMIN — THERA TABS 1 TABLET: TAB at 09:18

## 2017-09-20 RX ADMIN — KETOROLAC TROMETHAMINE 15 MG: 30 INJECTION, SOLUTION INTRAMUSCULAR at 09:18

## 2017-09-20 RX ADMIN — Medication 2 G: at 01:03

## 2017-09-20 RX ADMIN — PANTOPRAZOLE SODIUM 40 MG: 40 TABLET, DELAYED RELEASE ORAL at 05:56

## 2017-09-20 RX ADMIN — KETOROLAC TROMETHAMINE 15 MG: 30 INJECTION, SOLUTION INTRAMUSCULAR at 03:19

## 2017-09-20 RX ADMIN — LACTULOSE 20 G: 20 SOLUTION ORAL at 14:20

## 2017-09-20 RX ADMIN — ENOXAPARIN SODIUM 40 MG: 40 INJECTION SUBCUTANEOUS at 03:20

## 2017-09-20 RX ADMIN — HYDROCODONE BITARTRATE AND ACETAMINOPHEN 1 TABLET: 5; 325 TABLET ORAL at 10:10

## 2017-09-20 RX ADMIN — HYDROCODONE BITARTRATE AND ACETAMINOPHEN 1 TABLET: 5; 325 TABLET ORAL at 14:20

## 2017-09-20 RX ADMIN — KETOROLAC TROMETHAMINE 15 MG: 30 INJECTION, SOLUTION INTRAMUSCULAR at 15:15

## 2017-09-20 ASSESSMENT — PAIN DESCRIPTION - PAIN TYPE
TYPE: SURGICAL PAIN

## 2017-09-20 ASSESSMENT — PAIN DESCRIPTION - ORIENTATION
ORIENTATION: LEFT

## 2017-09-20 ASSESSMENT — PAIN SCALES - GENERAL
PAINLEVEL_OUTOF10: 0
PAINLEVEL_OUTOF10: 2
PAINLEVEL_OUTOF10: 5
PAINLEVEL_OUTOF10: 0
PAINLEVEL_OUTOF10: 4
PAINLEVEL_OUTOF10: 6
PAINLEVEL_OUTOF10: 4
PAINLEVEL_OUTOF10: 4
PAINLEVEL_OUTOF10: 0
PAINLEVEL_OUTOF10: 0
PAINLEVEL_OUTOF10: 5
PAINLEVEL_OUTOF10: 0

## 2017-09-20 ASSESSMENT — PAIN DESCRIPTION - DESCRIPTORS
DESCRIPTORS: ACHING

## 2017-09-20 ASSESSMENT — PAIN DESCRIPTION - FREQUENCY
FREQUENCY: CONTINUOUS
FREQUENCY: INTERMITTENT

## 2017-09-20 ASSESSMENT — PAIN DESCRIPTION - LOCATION
LOCATION: HIP

## 2017-09-21 PROBLEM — I48.0 PAROXYSMAL ATRIAL FIBRILLATION WITH RAPID VENTRICULAR RESPONSE (HCC): Status: ACTIVE | Noted: 2017-09-21

## 2017-09-21 LAB
ABSOLUTE EOS #: 0.1 K/UL (ref 0–0.4)
ABSOLUTE LYMPH #: 0.9 K/UL (ref 1–4.8)
ABSOLUTE MONO #: 0.7 K/UL (ref 0.1–1.2)
ANION GAP SERPL CALCULATED.3IONS-SCNC: 11 MMOL/L (ref 9–17)
BASOPHILS # BLD: 1 % (ref 0–1)
BASOPHILS ABSOLUTE: 0.1 K/UL (ref 0–0.2)
BUN BLDV-MCNC: 10 MG/DL (ref 8–23)
BUN/CREAT BLD: 14 (ref 9–20)
CALCIUM SERPL-MCNC: 8.6 MG/DL (ref 8.6–10.4)
CHLORIDE BLD-SCNC: 104 MMOL/L (ref 98–107)
CO2: 28 MMOL/L (ref 20–31)
CREAT SERPL-MCNC: 0.73 MG/DL (ref 0.5–0.9)
DIFFERENTIAL TYPE: ABNORMAL
EOSINOPHILS RELATIVE PERCENT: 2 % (ref 1–7)
GFR AFRICAN AMERICAN: >60 ML/MIN
GFR NON-AFRICAN AMERICAN: >60 ML/MIN
GFR SERPL CREATININE-BSD FRML MDRD: ABNORMAL ML/MIN/{1.73_M2}
GFR SERPL CREATININE-BSD FRML MDRD: ABNORMAL ML/MIN/{1.73_M2}
GLUCOSE BLD-MCNC: 104 MG/DL (ref 70–99)
HCT VFR BLD CALC: 34.1 % (ref 36–46)
HEMOGLOBIN: 11.2 G/DL (ref 12–16)
LYMPHOCYTES # BLD: 15 % (ref 16–46)
MCH RBC QN AUTO: 28.7 PG (ref 26–34)
MCHC RBC AUTO-ENTMCNC: 32.8 G/DL (ref 31–37)
MCV RBC AUTO: 87.4 FL (ref 80–100)
MONOCYTES # BLD: 12 % (ref 4–11)
PDW BLD-RTO: 15.1 % (ref 11–14.5)
PLATELET # BLD: 148 K/UL (ref 140–450)
PLATELET ESTIMATE: ABNORMAL
PMV BLD AUTO: 8.1 FL (ref 6–12)
POTASSIUM SERPL-SCNC: 3.9 MMOL/L (ref 3.7–5.3)
RBC # BLD: 3.9 M/UL (ref 4–5.2)
RBC # BLD: ABNORMAL 10*6/UL
SEG NEUTROPHILS: 70 % (ref 43–77)
SEGMENTED NEUTROPHILS ABSOLUTE COUNT: 4.1 K/UL (ref 1.8–7.7)
SODIUM BLD-SCNC: 143 MMOL/L (ref 135–144)
WBC # BLD: 5.9 K/UL (ref 3.5–11)
WBC # BLD: ABNORMAL 10*3/UL

## 2017-09-21 PROCEDURE — 97116 GAIT TRAINING THERAPY: CPT

## 2017-09-21 PROCEDURE — 97110 THERAPEUTIC EXERCISES: CPT

## 2017-09-21 PROCEDURE — 36415 COLL VENOUS BLD VENIPUNCTURE: CPT

## 2017-09-21 PROCEDURE — 99232 SBSQ HOSP IP/OBS MODERATE 35: CPT | Performed by: INTERNAL MEDICINE

## 2017-09-21 PROCEDURE — 6360000002 HC RX W HCPCS: Performed by: NURSE PRACTITIONER

## 2017-09-21 PROCEDURE — 2500000003 HC RX 250 WO HCPCS

## 2017-09-21 PROCEDURE — 93005 ELECTROCARDIOGRAM TRACING: CPT

## 2017-09-21 PROCEDURE — 85025 COMPLETE CBC W/AUTO DIFF WBC: CPT

## 2017-09-21 PROCEDURE — 97530 THERAPEUTIC ACTIVITIES: CPT | Performed by: OCCUPATIONAL THERAPY ASSISTANT

## 2017-09-21 PROCEDURE — 6370000000 HC RX 637 (ALT 250 FOR IP): Performed by: NURSE PRACTITIONER

## 2017-09-21 PROCEDURE — 80048 BASIC METABOLIC PNL TOTAL CA: CPT

## 2017-09-21 PROCEDURE — 94760 N-INVAS EAR/PLS OXIMETRY 1: CPT

## 2017-09-21 PROCEDURE — 1200000000 HC SEMI PRIVATE

## 2017-09-21 PROCEDURE — 6370000000 HC RX 637 (ALT 250 FOR IP): Performed by: INTERNAL MEDICINE

## 2017-09-21 PROCEDURE — 99222 1ST HOSP IP/OBS MODERATE 55: CPT | Performed by: INTERNAL MEDICINE

## 2017-09-21 RX ORDER — LEVALBUTEROL 1.25 MG/.5ML
1.25 SOLUTION, CONCENTRATE RESPIRATORY (INHALATION) EVERY 4 HOURS PRN
Status: DISCONTINUED | OUTPATIENT
Start: 2017-09-21 | End: 2017-09-22 | Stop reason: HOSPADM

## 2017-09-21 RX ORDER — METOPROLOL TARTRATE 5 MG/5ML
INJECTION INTRAVENOUS
Status: COMPLETED
Start: 2017-09-21 | End: 2017-09-21

## 2017-09-21 RX ORDER — METOPROLOL TARTRATE 5 MG/5ML
5 INJECTION INTRAVENOUS ONCE
Status: COMPLETED | OUTPATIENT
Start: 2017-09-21 | End: 2017-09-21

## 2017-09-21 RX ORDER — DILTIAZEM HYDROCHLORIDE 5 MG/ML
10 INJECTION INTRAVENOUS ONCE
Status: DISCONTINUED | OUTPATIENT
Start: 2017-09-21 | End: 2017-09-21

## 2017-09-21 RX ORDER — SODIUM CHLORIDE FOR INHALATION 0.9 %
3 VIAL, NEBULIZER (ML) INHALATION EVERY 4 HOURS PRN
Status: DISCONTINUED | OUTPATIENT
Start: 2017-09-21 | End: 2017-09-22 | Stop reason: HOSPADM

## 2017-09-21 RX ADMIN — METOPROLOL TARTRATE 12.5 MG: 25 TABLET ORAL at 19:58

## 2017-09-21 RX ADMIN — METOPROLOL TARTRATE 5 MG: 5 INJECTION INTRAVENOUS at 00:50

## 2017-09-21 RX ADMIN — METOPROLOL TARTRATE 12.5 MG: 25 TABLET ORAL at 08:20

## 2017-09-21 RX ADMIN — KETOROLAC TROMETHAMINE 15 MG: 30 INJECTION, SOLUTION INTRAMUSCULAR at 03:26

## 2017-09-21 RX ADMIN — METOPROLOL TARTRATE 5 MG: 5 INJECTION INTRAVENOUS at 08:25

## 2017-09-21 RX ADMIN — ENOXAPARIN SODIUM 40 MG: 40 INJECTION SUBCUTANEOUS at 08:24

## 2017-09-21 RX ADMIN — KETOROLAC TROMETHAMINE 15 MG: 30 INJECTION, SOLUTION INTRAMUSCULAR at 10:25

## 2017-09-21 RX ADMIN — HYDROCODONE BITARTRATE AND ACETAMINOPHEN 1 TABLET: 5; 325 TABLET ORAL at 19:57

## 2017-09-21 RX ADMIN — THERA TABS 1 TABLET: TAB at 08:24

## 2017-09-21 RX ADMIN — LACTULOSE 20 G: 20 SOLUTION ORAL at 19:58

## 2017-09-21 RX ADMIN — HYDROCODONE BITARTRATE AND ACETAMINOPHEN 1 TABLET: 5; 325 TABLET ORAL at 01:01

## 2017-09-21 RX ADMIN — HYDROCODONE BITARTRATE AND ACETAMINOPHEN 2 TABLET: 5; 325 TABLET ORAL at 08:36

## 2017-09-21 RX ADMIN — PANTOPRAZOLE SODIUM 40 MG: 40 TABLET, DELAYED RELEASE ORAL at 06:28

## 2017-09-21 RX ADMIN — LACTULOSE 20 G: 20 SOLUTION ORAL at 08:24

## 2017-09-21 ASSESSMENT — PAIN SCALES - GENERAL
PAINLEVEL_OUTOF10: 7
PAINLEVEL_OUTOF10: 4
PAINLEVEL_OUTOF10: 6
PAINLEVEL_OUTOF10: 4
PAINLEVEL_OUTOF10: 2
PAINLEVEL_OUTOF10: 0
PAINLEVEL_OUTOF10: 2
PAINLEVEL_OUTOF10: 6
PAINLEVEL_OUTOF10: 0
PAINLEVEL_OUTOF10: 0

## 2017-09-21 ASSESSMENT — PAIN DESCRIPTION - ORIENTATION
ORIENTATION: LEFT

## 2017-09-21 ASSESSMENT — PAIN DESCRIPTION - LOCATION
LOCATION: HIP

## 2017-09-21 ASSESSMENT — PAIN DESCRIPTION - PAIN TYPE
TYPE: SURGICAL PAIN
TYPE: CHRONIC PAIN;SURGICAL PAIN
TYPE: CHRONIC PAIN

## 2017-09-21 ASSESSMENT — PAIN DESCRIPTION - DESCRIPTORS
DESCRIPTORS: CONSTANT
DESCRIPTORS: ACHING

## 2017-09-21 ASSESSMENT — PAIN DESCRIPTION - FREQUENCY
FREQUENCY: CONTINUOUS
FREQUENCY: CONTINUOUS

## 2017-09-22 ENCOUNTER — APPOINTMENT (OUTPATIENT)
Dept: GENERAL RADIOLOGY | Age: 82
DRG: 470 | End: 2017-09-22
Attending: ORTHOPAEDIC SURGERY
Payer: MEDICARE

## 2017-09-22 VITALS
SYSTOLIC BLOOD PRESSURE: 107 MMHG | HEART RATE: 55 BPM | RESPIRATION RATE: 16 BRPM | HEIGHT: 65 IN | BODY MASS INDEX: 26.67 KG/M2 | TEMPERATURE: 97.5 F | OXYGEN SATURATION: 96 % | WEIGHT: 160.1 LBS | DIASTOLIC BLOOD PRESSURE: 49 MMHG

## 2017-09-22 LAB
ABSOLUTE EOS #: 0.1 K/UL (ref 0–0.4)
ABSOLUTE LYMPH #: 1.1 K/UL (ref 1–4.8)
ABSOLUTE MONO #: 0.6 K/UL (ref 0.1–1.2)
ALBUMIN SERPL-MCNC: 3.3 G/DL (ref 3.5–5.2)
ALBUMIN/GLOBULIN RATIO: 1.4 (ref 1–2.5)
ALP BLD-CCNC: 116 U/L (ref 35–104)
ALT SERPL-CCNC: 43 U/L (ref 5–33)
ANION GAP SERPL CALCULATED.3IONS-SCNC: 10 MMOL/L (ref 9–17)
AST SERPL-CCNC: 47 U/L
BASOPHILS # BLD: 1 % (ref 0–1)
BASOPHILS ABSOLUTE: 0.1 K/UL (ref 0–0.2)
BILIRUB SERPL-MCNC: 0.62 MG/DL (ref 0.3–1.2)
BILIRUBIN DIRECT: 0.18 MG/DL
BILIRUBIN, INDIRECT: 0.44 MG/DL (ref 0–1)
BUN BLDV-MCNC: 9 MG/DL (ref 8–23)
BUN/CREAT BLD: 13 (ref 9–20)
CALCIUM SERPL-MCNC: 8.7 MG/DL (ref 8.6–10.4)
CHLORIDE BLD-SCNC: 102 MMOL/L (ref 98–107)
CO2: 28 MMOL/L (ref 20–31)
CREAT SERPL-MCNC: 0.71 MG/DL (ref 0.5–0.9)
DIFFERENTIAL TYPE: ABNORMAL
EKG ATRIAL RATE: 326 BPM
EKG ATRIAL RATE: 52 BPM
EKG ATRIAL RATE: 57 BPM
EKG P AXIS: 36 DEGREES
EKG P AXIS: 42 DEGREES
EKG P-R INTERVAL: 160 MS
EKG P-R INTERVAL: 166 MS
EKG Q-T INTERVAL: 318 MS
EKG Q-T INTERVAL: 466 MS
EKG Q-T INTERVAL: 474 MS
EKG QRS DURATION: 74 MS
EKG QRS DURATION: 80 MS
EKG QRS DURATION: 82 MS
EKG QTC CALCULATION (BAZETT): 440 MS
EKG QTC CALCULATION (BAZETT): 451 MS
EKG QTC CALCULATION (BAZETT): 453 MS
EKG R AXIS: 53 DEGREES
EKG R AXIS: 55 DEGREES
EKG R AXIS: 64 DEGREES
EKG T AXIS: -38 DEGREES
EKG T AXIS: 44 DEGREES
EKG T AXIS: 56 DEGREES
EKG VENTRICULAR RATE: 121 BPM
EKG VENTRICULAR RATE: 52 BPM
EKG VENTRICULAR RATE: 57 BPM
EOSINOPHILS RELATIVE PERCENT: 2 % (ref 1–7)
GFR AFRICAN AMERICAN: >60 ML/MIN
GFR NON-AFRICAN AMERICAN: >60 ML/MIN
GFR SERPL CREATININE-BSD FRML MDRD: ABNORMAL ML/MIN/{1.73_M2}
GFR SERPL CREATININE-BSD FRML MDRD: ABNORMAL ML/MIN/{1.73_M2}
GLOBULIN: 2.3 G/DL (ref 1.5–3.8)
GLUCOSE BLD-MCNC: 106 MG/DL (ref 70–99)
HCT VFR BLD CALC: 34.6 % (ref 36–46)
HEMOGLOBIN: 11.2 G/DL (ref 12–16)
LYMPHOCYTES # BLD: 18 % (ref 16–46)
MAGNESIUM: 2.1 MG/DL (ref 1.6–2.6)
MCH RBC QN AUTO: 28.4 PG (ref 26–34)
MCHC RBC AUTO-ENTMCNC: 32.5 G/DL (ref 31–37)
MCV RBC AUTO: 87.4 FL (ref 80–100)
MONOCYTES # BLD: 11 % (ref 4–11)
PDW BLD-RTO: 14.8 % (ref 11–14.5)
PLATELET # BLD: 149 K/UL (ref 140–450)
PLATELET ESTIMATE: ABNORMAL
PMV BLD AUTO: 8 FL (ref 6–12)
POTASSIUM SERPL-SCNC: 3.9 MMOL/L (ref 3.7–5.3)
RBC # BLD: 3.96 M/UL (ref 4–5.2)
RBC # BLD: ABNORMAL 10*6/UL
SEG NEUTROPHILS: 68 % (ref 43–77)
SEGMENTED NEUTROPHILS ABSOLUTE COUNT: 4 K/UL (ref 1.8–7.7)
SODIUM BLD-SCNC: 140 MMOL/L (ref 135–144)
TOTAL PROTEIN: 5.6 G/DL (ref 6.4–8.3)
WBC # BLD: 5.8 K/UL (ref 3.5–11)
WBC # BLD: ABNORMAL 10*3/UL

## 2017-09-22 PROCEDURE — 6370000000 HC RX 637 (ALT 250 FOR IP): Performed by: NURSE PRACTITIONER

## 2017-09-22 PROCEDURE — 85025 COMPLETE CBC W/AUTO DIFF WBC: CPT

## 2017-09-22 PROCEDURE — 6370000000 HC RX 637 (ALT 250 FOR IP): Performed by: INTERNAL MEDICINE

## 2017-09-22 PROCEDURE — 6360000002 HC RX W HCPCS: Performed by: INTERNAL MEDICINE

## 2017-09-22 PROCEDURE — 36415 COLL VENOUS BLD VENIPUNCTURE: CPT

## 2017-09-22 PROCEDURE — 97116 GAIT TRAINING THERAPY: CPT | Performed by: PHYSICAL THERAPY ASSISTANT

## 2017-09-22 PROCEDURE — 97110 THERAPEUTIC EXERCISES: CPT | Performed by: PHYSICAL THERAPY ASSISTANT

## 2017-09-22 PROCEDURE — 2500000003 HC RX 250 WO HCPCS: Performed by: NURSE PRACTITIONER

## 2017-09-22 PROCEDURE — 73552 X-RAY EXAM OF FEMUR 2/>: CPT

## 2017-09-22 PROCEDURE — 80048 BASIC METABOLIC PNL TOTAL CA: CPT

## 2017-09-22 PROCEDURE — 83735 ASSAY OF MAGNESIUM: CPT

## 2017-09-22 PROCEDURE — 99232 SBSQ HOSP IP/OBS MODERATE 35: CPT | Performed by: INTERNAL MEDICINE

## 2017-09-22 PROCEDURE — 80076 HEPATIC FUNCTION PANEL: CPT

## 2017-09-22 RX ORDER — LACTULOSE 10 G/15ML
20 SOLUTION ORAL 3 TIMES DAILY PRN
Qty: 1 BOTTLE | Refills: 0
Start: 2017-09-22 | End: 2017-10-19 | Stop reason: ALTCHOICE

## 2017-09-22 RX ORDER — METOPROLOL TARTRATE 5 MG/5ML
2.5 INJECTION INTRAVENOUS ONCE
Status: COMPLETED | OUTPATIENT
Start: 2017-09-22 | End: 2017-09-22

## 2017-09-22 RX ORDER — WARFARIN SODIUM 5 MG/1
5 TABLET ORAL
Status: DISCONTINUED | OUTPATIENT
Start: 2017-09-22 | End: 2017-09-22 | Stop reason: HOSPADM

## 2017-09-22 RX ORDER — WARFARIN SODIUM 5 MG/1
TABLET ORAL
Qty: 30 TABLET | Refills: 3
Start: 2017-09-22 | End: 2017-10-19

## 2017-09-22 RX ADMIN — ENOXAPARIN SODIUM 40 MG: 40 INJECTION SUBCUTANEOUS at 08:16

## 2017-09-22 RX ADMIN — HYDROCODONE BITARTRATE AND ACETAMINOPHEN 1 TABLET: 5; 325 TABLET ORAL at 15:14

## 2017-09-22 RX ADMIN — HYDROCODONE BITARTRATE AND ACETAMINOPHEN 1 TABLET: 5; 325 TABLET ORAL at 06:15

## 2017-09-22 RX ADMIN — METOPROLOL TARTRATE 2.5 MG: 5 INJECTION INTRAVENOUS at 01:18

## 2017-09-22 RX ADMIN — HYDROCODONE BITARTRATE AND ACETAMINOPHEN 1 TABLET: 5; 325 TABLET ORAL at 10:36

## 2017-09-22 RX ADMIN — METOPROLOL TARTRATE 12.5 MG: 25 TABLET ORAL at 08:16

## 2017-09-22 RX ADMIN — PANTOPRAZOLE SODIUM 40 MG: 40 TABLET, DELAYED RELEASE ORAL at 06:15

## 2017-09-22 RX ADMIN — THERA TABS 1 TABLET: TAB at 08:16

## 2017-09-22 RX ADMIN — LACTULOSE 20 G: 20 SOLUTION ORAL at 08:16

## 2017-09-22 RX ADMIN — CYCLOBENZAPRINE HYDROCHLORIDE 10 MG: 10 TABLET, FILM COATED ORAL at 08:21

## 2017-09-22 ASSESSMENT — PAIN SCALES - GENERAL
PAINLEVEL_OUTOF10: 6
PAINLEVEL_OUTOF10: 5
PAINLEVEL_OUTOF10: 5
PAINLEVEL_OUTOF10: 6
PAINLEVEL_OUTOF10: 0

## 2017-09-22 ASSESSMENT — PAIN DESCRIPTION - PAIN TYPE
TYPE: SURGICAL PAIN

## 2017-09-22 ASSESSMENT — PAIN DESCRIPTION - ORIENTATION
ORIENTATION: LEFT

## 2017-09-22 ASSESSMENT — PAIN DESCRIPTION - LOCATION
LOCATION: HIP

## 2017-09-22 ASSESSMENT — PAIN DESCRIPTION - DESCRIPTORS: DESCRIPTORS: BURNING

## 2017-10-19 ENCOUNTER — HOSPITAL ENCOUNTER (OUTPATIENT)
Dept: PHARMACY | Age: 82
Setting detail: THERAPIES SERIES
Discharge: HOME | End: 2017-10-19
Payer: MEDICARE

## 2017-10-19 ENCOUNTER — OFFICE VISIT (OUTPATIENT)
Dept: FAMILY MEDICINE CLINIC | Age: 82
End: 2017-10-19
Payer: MEDICARE

## 2017-10-19 VITALS
DIASTOLIC BLOOD PRESSURE: 68 MMHG | SYSTOLIC BLOOD PRESSURE: 110 MMHG | HEART RATE: 69 BPM | BODY MASS INDEX: 26.06 KG/M2 | OXYGEN SATURATION: 98 % | HEIGHT: 65 IN | TEMPERATURE: 97.7 F | WEIGHT: 156.4 LBS

## 2017-10-19 DIAGNOSIS — I48.0 PAROXYSMAL ATRIAL FIBRILLATION (HCC): ICD-10-CM

## 2017-10-19 DIAGNOSIS — R53.83 FATIGUE, UNSPECIFIED TYPE: Primary | ICD-10-CM

## 2017-10-19 DIAGNOSIS — Z79.01 ANTICOAGULATION MONITORING BY PHARMACIST: ICD-10-CM

## 2017-10-19 DIAGNOSIS — L65.9 HAIR LOSS: ICD-10-CM

## 2017-10-19 LAB — INR BLD: 1.2

## 2017-10-19 PROCEDURE — 99211 OFF/OP EST MAY X REQ PHY/QHP: CPT

## 2017-10-19 PROCEDURE — 1090F PRES/ABSN URINE INCON ASSESS: CPT | Performed by: FAMILY MEDICINE

## 2017-10-19 PROCEDURE — 4040F PNEUMOC VAC/ADMIN/RCVD: CPT | Performed by: FAMILY MEDICINE

## 2017-10-19 PROCEDURE — 85610 PROTHROMBIN TIME: CPT

## 2017-10-19 PROCEDURE — G8399 PT W/DXA RESULTS DOCUMENT: HCPCS | Performed by: FAMILY MEDICINE

## 2017-10-19 PROCEDURE — G8417 CALC BMI ABV UP PARAM F/U: HCPCS | Performed by: FAMILY MEDICINE

## 2017-10-19 PROCEDURE — 36416 COLLJ CAPILLARY BLOOD SPEC: CPT

## 2017-10-19 PROCEDURE — G8427 DOCREV CUR MEDS BY ELIG CLIN: HCPCS | Performed by: FAMILY MEDICINE

## 2017-10-19 PROCEDURE — 1036F TOBACCO NON-USER: CPT | Performed by: FAMILY MEDICINE

## 2017-10-19 PROCEDURE — 1111F DSCHRG MED/CURRENT MED MERGE: CPT | Performed by: FAMILY MEDICINE

## 2017-10-19 PROCEDURE — G0008 ADMIN INFLUENZA VIRUS VAC: HCPCS | Performed by: FAMILY MEDICINE

## 2017-10-19 PROCEDURE — 99214 OFFICE O/P EST MOD 30 MIN: CPT | Performed by: FAMILY MEDICINE

## 2017-10-19 PROCEDURE — 1123F ACP DISCUSS/DSCN MKR DOCD: CPT | Performed by: FAMILY MEDICINE

## 2017-10-19 PROCEDURE — G8484 FLU IMMUNIZE NO ADMIN: HCPCS | Performed by: FAMILY MEDICINE

## 2017-10-19 PROCEDURE — 90662 IIV NO PRSV INCREASED AG IM: CPT | Performed by: FAMILY MEDICINE

## 2017-10-19 RX ORDER — WARFARIN SODIUM 2.5 MG/1
2.5 TABLET ORAL DAILY
COMMUNITY
End: 2017-10-31 | Stop reason: SDUPTHER

## 2017-10-19 RX ORDER — HYDROCODONE BITARTRATE AND ACETAMINOPHEN 5; 325 MG/1; MG/1
1 TABLET ORAL EVERY 8 HOURS PRN
COMMUNITY
End: 2017-12-08 | Stop reason: SDUPTHER

## 2017-10-19 ASSESSMENT — ENCOUNTER SYMPTOMS
CHEST TIGHTNESS: 0
SHORTNESS OF BREATH: 0
COUGH: 0
WHEEZING: 0
COLOR CHANGE: 0

## 2017-10-19 ASSESSMENT — PATIENT HEALTH QUESTIONNAIRE - PHQ9
SUM OF ALL RESPONSES TO PHQ QUESTIONS 1-9: 0
2. FEELING DOWN, DEPRESSED OR HOPELESS: 0
1. LITTLE INTEREST OR PLEASURE IN DOING THINGS: 0
SUM OF ALL RESPONSES TO PHQ9 QUESTIONS 1 & 2: 0

## 2017-10-19 NOTE — PROGRESS NOTES
Post-Discharge Transitional Care Management Services      Cameron Craven   YOB: 1934    Date of Office Visit:  10/19/2017  Date of Hospital Admission: 9/19/17  Date of Hospital Discharge: 9/22/17  [facility] Risk Score [risk of hospital readmission >=10  medium risk (chance of readmission ~ 12%) >14  high risk (chance of readmission ~18%)]:Risk Score: 12.25    Care management risk score Rising risk (score 2-5) and Complex Care (Scores >=6): No Risk Score On File       Patient Active Problem List   Diagnosis    Trochanteric bursitis of left hip    Drug-induced constipation    Atrial fibrillation (HCC)    Bradycardia    Esophageal stricture    Gastritis without bleeding    Lumbar radicular pain    Lumbar spine pain    Left hip pain    Arthritis of left hip    At high risk for pain from procedure    Paroxysmal atrial fibrillation with rapid ventricular response ([address]    Anticoagulation monitoring by pharmacist       Allergies   Allergen Reactions    Statins Other (See Comments)     Causes severe leg cramps     Tizanidine Hcl Other (See Comments)     Having issues with her liver- elevated her enzyme level    Tramadol        Medications listed as ordered at the time of discharge from hospital   [facility] Medication Instructions SOLIS:    Printed on:10/19/17 1229   Medication Information                      Docusate Calcium (STOOL SOFTENER PO)  Take 1 capsule by mouth daily as needed (constipation)              Handicap Placard MISC  by Does not apply route Expires 6/2022             HYDROcodone-acetaminophen (NORCO) 5-325 MG per tablet  Take 1 tablet by mouth every 8 hours as needed for Pain .              MEGARED OMEGA-3 KRILL  MG CAPS  Take by mouth daily              metoprolol tartrate (LOPRESSOR) 25 MG tablet  Take 0.5 tablets by mouth 2 times daily             Multiple Vitamins-Minerals (ICAPS AREDS 2) CAPS  Take 2 tablets by mouth daily              Multiple Vitamins-Minerals (THERAPEUTIC MULTIVITAMIN-MINERALS) tablet  Take 1 tablet by mouth daily             pantoprazole (PROTONIX) 40 MG tablet  Take 1 tablet by mouth 2 times daily (before meals)             senna (SENOKOT) 8.6 MG tablet  Take 1 tablet by mouth 2 times daily as needed for Constipation (use sparingly)             warfarin (COUMADIN) 2.5 MG tablet  Take 2.5 mg by mouth daily 1.25 mg on Monday and Friday, 2.5 mg all other days. Medications marked \"taking\" at this time  Outpatient Prescriptions Marked as Taking for the 10/19/17 encounter (Office Visit) with Oswaldo Davidson MD   Medication Sig Dispense Refill    HYDROcodone-acetaminophen (NORCO) 5-325 MG per tablet Take 1 tablet by mouth every 8 hours as needed for Pain .  metoprolol tartrate (LOPRESSOR) 25 MG tablet Take 0.5 tablets by mouth 2 times daily 60 tablet 0    [DISCONTINUED] warfarin (COUMADIN) 5 MG tablet Start 5mg daily on 09/25/2017  INR 09/26/2017 30 tablet 3    pantoprazole (PROTONIX) 40 MG tablet Take 1 tablet by mouth 2 times daily (before meals) 30 tablet 3    Handicap Placard MISC by Does not apply route Expires 6/2022 1 each 0    MEGARED OMEGA-3 KRILL  MG CAPS Take by mouth daily       Multiple Vitamins-Minerals (THERAPEUTIC MULTIVITAMIN-MINERALS) tablet Take 1 tablet by mouth daily      Multiple Vitamins-Minerals (ICAPS AREDS 2) CAPS Take 2 tablets by mouth daily       Docusate Calcium (STOOL SOFTENER PO) Take 1 capsule by mouth daily as needed (constipation)           Medications patient taking as of now reconciled against medications ordered at time of hospital discharge     Vitals:    10/19/17 0904   BP: 110/68   Pulse: 69   Temp: 97.7 °F (36.5 °C)   TempSrc: Tympanic   SpO2: 98%   Weight: 156 lb 6.4 oz (70.9 kg)   Height: 5' 5\" (1.651 m)     Body mass index is 26.03 kg/m².      Wt Readings from Last 3 Encounters:   10/19/17 156 lb 6.4 oz (70.9 kg)   09/22/17 160 lb 1.6 oz (72.6 kg)   08/24/17 TSH With Reflex Ft4; Future    Paroxysmal atrial fibrillation (HCC)  Stable; she had afib with RVR while hospitalized, but that has stablized. She is following with the coumadin clinic and she will need to see cardiology. She wants to see someone here (rather than in [facility] [place]) so she was given a referral. I also encouraged her to continue her metoprolol for now to help keep her heart rate controlled. -     Boston Valencia MD, Card[facility] [place]    Hair loss  New; this is likely caused by the stress of being hospitalized but it could be related to her thyroid as well so I ordered a TSH.     Other orders  -     INFLUENZA, HIGH DOSE, 65 YRS +, IM, PF, PREFILL SYR, 0.5ML (FLUZONE HD)          Medical Decision Making: moderate complexity

## 2017-10-20 ENCOUNTER — TELEPHONE (OUTPATIENT)
Dept: ORTHOPEDIC SURGERY | Age: 82
End: 2017-10-20

## 2017-10-20 DIAGNOSIS — M16.12 PRIMARY OSTEOARTHRITIS OF LEFT HIP: Primary | ICD-10-CM

## 2017-10-20 NOTE — TELEPHONE ENCOUNTER
Patient has been getting home health physical therapy and patient would like to start doing therapy outside of the home. Can an order be sent for her to have therapy done here at [facility]. She is being discharged from in home therapy next week.

## 2017-10-24 ENCOUNTER — HOSPITAL ENCOUNTER (OUTPATIENT)
Dept: PHARMACY | Age: 82
Setting detail: THERAPIES SERIES
Discharge: HOME | End: 2017-10-24
Payer: MEDICARE

## 2017-10-24 ENCOUNTER — HOSPITAL ENCOUNTER (OUTPATIENT)
Dept: LAB | Age: 82
Setting detail: SPECIMEN
Discharge: HOME | End: 2017-10-24
Payer: MEDICARE

## 2017-10-24 DIAGNOSIS — I48.91 ATRIAL FIBRILLATION, UNSPECIFIED TYPE (HCC): ICD-10-CM

## 2017-10-24 DIAGNOSIS — Z79.01 ANTICOAGULATION MONITORING BY PHARMACIST: ICD-10-CM

## 2017-10-24 DIAGNOSIS — R53.83 FATIGUE, UNSPECIFIED TYPE: ICD-10-CM

## 2017-10-24 LAB
ABSOLUTE EOS #: 0.2 K/UL (ref 0–0.4)
ABSOLUTE IMMATURE GRANULOCYTE: ABNORMAL K/UL (ref 0–0.3)
ABSOLUTE LYMPH #: 1.6 K/UL (ref 1–4.8)
ABSOLUTE MONO #: 0.6 K/UL (ref 0.1–1.2)
BASOPHILS # BLD: 1 % (ref 0–1)
BASOPHILS ABSOLUTE: 0.1 K/UL (ref 0–0.2)
DIFFERENTIAL TYPE: ABNORMAL
EOSINOPHILS RELATIVE PERCENT: 3 % (ref 1–7)
HCT VFR BLD CALC: 42.9 % (ref 36–46)
HEMOGLOBIN: 14.1 G/DL (ref 12–16)
IMMATURE GRANULOCYTES: ABNORMAL %
INR BLD: 1
LYMPHOCYTES # BLD: 23 % (ref 16–46)
MCH RBC QN AUTO: 27.6 PG (ref 26–34)
MCHC RBC AUTO-ENTMCNC: 32.9 G/DL (ref 31–37)
MCV RBC AUTO: 83.8 FL (ref 80–100)
MONOCYTES # BLD: 9 % (ref 4–11)
PDW BLD-RTO: 15 % (ref 11–14.5)
PLATELET # BLD: 210 K/UL (ref 140–450)
PLATELET ESTIMATE: ABNORMAL
PMV BLD AUTO: 7 FL (ref 6–12)
RBC # BLD: 5.12 M/UL (ref 4–5.2)
RBC # BLD: ABNORMAL 10*6/UL
SEG NEUTROPHILS: 64 % (ref 43–77)
SEGMENTED NEUTROPHILS ABSOLUTE COUNT: 4.5 K/UL (ref 1.8–7.7)
TSH SERPL DL<=0.05 MIU/L-ACNC: 2.52 MIU/L (ref 0.3–5)
WBC # BLD: 7 K/UL (ref 3.5–11)
WBC # BLD: ABNORMAL 10*3/UL

## 2017-10-24 PROCEDURE — 99211 OFF/OP EST MAY X REQ PHY/QHP: CPT

## 2017-10-24 PROCEDURE — 36415 COLL VENOUS BLD VENIPUNCTURE: CPT

## 2017-10-24 PROCEDURE — 85610 PROTHROMBIN TIME: CPT

## 2017-10-24 PROCEDURE — 84443 ASSAY THYROID STIM HORMONE: CPT

## 2017-10-24 PROCEDURE — 85025 COMPLETE CBC W/AUTO DIFF WBC: CPT

## 2017-10-24 PROCEDURE — 36416 COLLJ CAPILLARY BLOOD SPEC: CPT

## 2017-10-30 ENCOUNTER — HOSPITAL ENCOUNTER (OUTPATIENT)
Dept: PHYSICAL THERAPY | Age: 82
Setting detail: THERAPIES SERIES
Discharge: HOME | End: 2017-10-30
Payer: MEDICARE

## 2017-10-30 ENCOUNTER — HOSPITAL ENCOUNTER (OUTPATIENT)
Dept: PHARMACY | Age: 82
Setting detail: THERAPIES SERIES
Discharge: HOME | End: 2017-10-30
Payer: MEDICARE

## 2017-10-30 DIAGNOSIS — I48.91 ATRIAL FIBRILLATION, UNSPECIFIED TYPE (HCC): ICD-10-CM

## 2017-10-30 DIAGNOSIS — Z79.01 ANTICOAGULATION MONITORING BY PHARMACIST: ICD-10-CM

## 2017-10-30 LAB — INR BLD: 1.4

## 2017-10-30 PROCEDURE — 97161 PT EVAL LOW COMPLEX 20 MIN: CPT | Performed by: PHYSICAL THERAPIST

## 2017-10-30 PROCEDURE — G8979 MOBILITY GOAL STATUS: HCPCS | Performed by: PHYSICAL THERAPIST

## 2017-10-30 PROCEDURE — 97110 THERAPEUTIC EXERCISES: CPT | Performed by: PHYSICAL THERAPIST

## 2017-10-30 PROCEDURE — G8978 MOBILITY CURRENT STATUS: HCPCS | Performed by: PHYSICAL THERAPIST

## 2017-10-30 PROCEDURE — 99211 OFF/OP EST MAY X REQ PHY/QHP: CPT

## 2017-10-30 PROCEDURE — 85610 PROTHROMBIN TIME: CPT

## 2017-10-30 PROCEDURE — 36416 COLLJ CAPILLARY BLOOD SPEC: CPT

## 2017-10-30 ASSESSMENT — PAIN DESCRIPTION - FREQUENCY: FREQUENCY: CONTINUOUS

## 2017-10-30 ASSESSMENT — PAIN DESCRIPTION - PAIN TYPE: TYPE: CHRONIC PAIN

## 2017-10-30 ASSESSMENT — PAIN SCALES - GENERAL: PAINLEVEL_OUTOF10: 8

## 2017-10-30 ASSESSMENT — PAIN DESCRIPTION - LOCATION: LOCATION: HIP

## 2017-10-30 ASSESSMENT — PAIN DESCRIPTION - ONSET: ONSET: ON-GOING

## 2017-10-30 ASSESSMENT — PAIN DESCRIPTION - PROGRESSION: CLINICAL_PROGRESSION: GRADUALLY IMPROVING

## 2017-10-30 ASSESSMENT — PAIN DESCRIPTION - DESCRIPTORS: DESCRIPTORS: ACHING

## 2017-10-30 ASSESSMENT — PAIN DESCRIPTION - ORIENTATION: ORIENTATION: LEFT

## 2017-10-30 NOTE — PROGRESS NOTES
ANTICOAGULATION SERVICE    Date of Clinic Visit:  10/30/2017    Ramon Min is a 80 y.o. female who presents to clinic today for anticoagulation monitoring and adjustment. Recent INR Results:  Internal QC passed  Lab Results   Component Value Date    INR 1.4 10/30/2017    INR 1 10/24/2017       Current Warfarin Dosage:  Dosing Plan  As of 10/30/2017    TTR:   0.0 % (1 d)   Full instructions:   10/31: 5 mg; Otherwise 2.5 mg on Tue, Thu, Sat; 5 mg all other days               Assessment/Plan:    Modify warfarin dose as noted above: INR remains low after dose increase last week but has increased and I believe will continue to increase. I am going to increase weekly dose by 20%. Recheck one week. Next Clinic Appointment:  Return date  As of 10/30/2017    TTR:   0.0 % (1 d)   Next INR check:   11/6/2017             Please call [facility] at [phone] with any questions. Thanks!   NIKOLAI Shin [facility]  Anticoagulation Service Pharmacist  10/30/2017 10:51 AM

## 2017-10-30 NOTE — PLAN OF CARE
[facility]    [x] [place]  Phone: [phone]  Fax: [phone]      [] [place]  Phone: [phone]  Fax: [phone]        To: Referring Practitioner: Candace Terrell      Patient: Khadra Uribe  : 1934   MRN: [id number]  Evaluation Date: 10/30/2017      Diagnosis Information:  · Diagnosis: M16.12 OA L hip, s/p L NANETTE   · Treatment Diagnosis: L hip OA, s/p NANETTE     Physical Therapy Certification Form  Dear Ramya Feldman  The following patient has been evaluated for physical therapy services and for therapy to continue, Medicare requires monthly physician review of the treatment plan. Please review the attached evaluation and/or summary of the patient's plan of care, and verify that you agree therapy should continue by signing the attached document and sending it back to our office. Plan of Care/Treatment to date:  [x] Therapeutic Exercise    [] Modalities:  [] Therapeutic Activity     [] Ultrasound  [] Electrical Stimulation  [x] Gait Training      [] Cervical Traction [] Lumbar Traction  [x] Neuromuscular Re-education    [] Cold/hotpack [] Iontophoresis   [x] Instruction in HEP     Other:  [] Manual Therapy      []             [] Aquatic Therapy      []           ? Goals:       Long term goals  Time Frame for Long term goals : 4-6 weeks  Long term goal 1: Pain 2/10 to allow basic ADL  Long term goal 2: Gait with st cane 200 ft, for distance from parking lots  Long term goal 3: Ascend, descend 1 flight of steps in reciprocal fashion  Long term goal 4: Resume driving    SISSYAYBNPTANYA/KAOPMADDISONAT: - 17  # Days per week: [] 1 day # Weeks: [] 1 week [] 5 weeks     [x] 2 days?    [] 2 weeks [] 6 weeks     [] 3 days   [] 3 weeks [] 7 weeks     [] 4 days   [x] 4 weeks [] 8 weeks    Rehab Potential: [] Excellent [x] Good [] Fair  [] Poor     Electronically signed by:  Sandhya Joseph, PT       If you have any questions or concerns, please don't hesitate to call.  Thank you for your referral.      Physician Signature:________________________________Date:__________________  By signing above, therapists plan is approved by physician

## 2017-10-30 NOTE — FLOWSHEET NOTE
Physical Therapy Daily Treatment Note    Date:  10/30/2017    Patient Name:  Sarah Olguin    :  1934  MRN: [id number]  Restrictions/Precautions:     Medical/Treatment Diagnosis Information:   · Diagnosis: M16.12 OA L hip, s/p L NANETTE  · Treatment Diagnosis: L hip OA, s/p NANETTE- by Dr Honey Gill  Insurance/Certification information:  PT Insurance Information: Medicare  Physician Information:  Referring Practitioner: Sally Colin of care signed (Y/N):  n  Visit# / total visits: 1 /10  Pain level: 8/10     G-Code (if applicable):      Date G-Code Applied:  10/30/17  PT G-Codes  Functional Assessment Tool Used: LEFS  Score: 20/80 = 25%, or 75% disability  Functional Limitation: Mobility: Walking and moving around  Mobility: Walking and Moving Around Current Status (): At least 60 percent but less than 80 percent impaired, limited or restricted  Mobility: Walking and Moving Around Goal Status ():  At least 1 percent but less than 20 percent impaired, limited or restricted    Time In:11:00   Time Out:11;50    Progress Note: [x]  Yes  []  No  Next due by: Visit #10  , or 17    Subjective:   See eval    Objective: See eval  Observation:   Test measurements:      Exercises: there ex for strength, balance control  Exercise/Equipment Resistance/Repetitions Other comments   NUSTEP     Counter ex 10x TR/HR, abd, ext, flex, HS, march   Squat Matrix 10x, 3 position    Lunges 10x Front/ lat at bar   Sit to stand  2\" booster        Bridge 10x    B hip abd/ add 10x red                         Gait with cane          [x] Provided verbal/tactile cueing for activities related to strengthening, flexibility, endurance, ROM. (18485)  [] Provided verbal/tactile cueing for activities related to improving balance, coordination, kinesthetic sense, posture, motor skill, proprioception. (19346)    Therapeutic Activities:     [] Therapeutic activities, direct (one-on-one) patient contact (use of dynamic activities to improve functional performance). (90116)    Gait:   [] Provided training and instruction to the patient for ambulation re-education. (09100)    Self-Care/ADL's  [] Self-care/home management training and compensatory training, meal preparation, safety procedures, and instructions in use of assistive technology devices/adaptive equipment, direct one-on-one contact. (38694)    Home Exercise Program:     [] Reviewed/Progressed HEP activities related to strengthening, flexibility, endurance, ROM. (79952)  [] Reviewed/Progressed HEP activities related to improving balance, coordination, kinesthetic sense, posture, motor skill, proprioception.  (55671)    Manual Treatments:    [] Provided manual therapy to mobilize soft tissue/joints for the purpose of modulating pain, promoting relaxation,  increasing ROM, reducing/eliminating soft tissue swelling/inflammation/restriction, improving soft tissue extensibility. (22464)    Service Based Modalities:      Timed Code Treatment Minutes:    There ex 25'    Total Treatment Minutes:   25'    Treatment/Activity Tolerance:  [x] Patient tolerated treatment well [] Patient limited by fatique  [] Patient limited by pain  [] Patient limited by other medical complications  [] Other:     Prognosis: [x] Good [] Fair  [] Poor    Patient Requires Follow-up: [x] Yes  [] No      Goals:       Long term goals  Time Frame for Long term goals : 4-6 weeks  Long term goal 1: Pain 2/10 to allow basic ADL  Long term goal 2: Gait with st cane 200 ft, for distance from parking lots  Long term goal 3: Ascend, descend 1 flight of steps in reciprocal fashion  Long term goal 4: Resume driving          Plan:   [] Continue per plan of care [] Alter current plan (see comments)  [x] Plan of care initiated [] Hold pending MD visit [] Discharge  Plan for Next Session:  Counter ex HEP    Electronically signed by:  Olena Woodruff

## 2017-10-31 ENCOUNTER — TELEPHONE (OUTPATIENT)
Dept: FAMILY MEDICINE CLINIC | Age: 82
End: 2017-10-31

## 2017-10-31 RX ORDER — WARFARIN SODIUM 2.5 MG/1
2.5 TABLET ORAL DAILY
Qty: 30 TABLET | Refills: 5 | Status: SHIPPED | OUTPATIENT
Start: 2017-10-31 | End: 2017-11-21 | Stop reason: SDUPTHER

## 2017-11-02 ENCOUNTER — HOSPITAL ENCOUNTER (OUTPATIENT)
Dept: PHYSICAL THERAPY | Age: 82
Setting detail: THERAPIES SERIES
Discharge: HOME | End: 2017-11-02
Payer: MEDICARE

## 2017-11-02 PROCEDURE — 97110 THERAPEUTIC EXERCISES: CPT | Performed by: PHYSICAL THERAPY ASSISTANT

## 2017-11-02 NOTE — FLOWSHEET NOTE
Physical Therapy Daily Treatment Note    Date:  2017    Patient Name:  August Chowdhury    :  1934  MRN: [id number]    Restrictions/Precautions:       Medical/Treatment Diagnosis Information:   · Diagnosis: M16.12 OA L hip, s/p L NANETTE  · Treatment Diagnosis: L hip OA, s/p NANETTE- by Dr Caleb Gilliam    Insurance/Certification information:  PT Insurance Information: Medicare    Physician Information:  Referring Practitioner: Akhil Vazquez of care signed (Y/N):  n    Visit# / total visits:  2/10    Pain level: 5/10     G-Code (if applicable):      Date G-Code Applied:  10/30/17  PT G-Codes  Functional Assessment Tool Used: LEFS  Score: 20/80 = 25%, or 75% disability  Functional Limitation: Mobility: Walking and moving around  Mobility: Walking and Moving Around Current Status (): At least 60 percent but less than 80 percent impaired, limited or restricted  Mobility: Walking and Moving Around Goal Status (): At least 1 percent but less than 20 percent impaired, limited or restricted    Time In:3:08  Time Out:3:55    Progress Note: []  Yes  [x]  No  Next due by: Visit #10  , or 17    Subjective:   Pt. Relates compliance with current HEP ([facility]). Pt. States pain this date rated 5/10 through LE. Notes increased pain and discomfort since last session. Objective: Reviewed HEP. ROB complete per flow chart to facilitate strength and stability to allow ease with daily activities, ambulation and stability. Initiated, added and progress  Gait trained with single point cane to improve ease of motion and independence. Verbal cuing for progression and technique with exercises. Discussed continuing use of WW as primary AD but will gradually progress to single point cane. Understanding noted.      Observation:   Test measurements:      Exercises:   Exercise/Equipment Resistance/Repetitions Other comments   NUSTEP     *Counter ex 10x TR/HR, abd, ext, flex, HS, march   *Squat Matrix 10x, 3 position    Lunges 10x Front/ lat weeks  Long term goal 1: Pain 2/10 to allow basic ADL  Long term goal 2: Gait with st cane 200 ft, for distance from parking lots  Long term goal 3: Ascend, descend 1 flight of steps in reciprocal fashion  Long term goal 4: Resume driving    Plan:   [x] Continue per plan of care [] Alter current plan (see comments)  [] Plan of care initiated [] Hold pending MD visit [] Discharge    Plan for Next Session:  Monitor tolerance to session and progress as able. Electronically signed by:   Jacob Bryant

## 2017-11-06 ENCOUNTER — HOSPITAL ENCOUNTER (OUTPATIENT)
Dept: PHARMACY | Age: 82
Setting detail: THERAPIES SERIES
Discharge: HOME | End: 2017-11-06
Payer: MEDICARE

## 2017-11-06 ENCOUNTER — TELEPHONE (OUTPATIENT)
Dept: FAMILY MEDICINE CLINIC | Age: 82
End: 2017-11-06
Payer: MEDICARE

## 2017-11-06 ENCOUNTER — HOSPITAL ENCOUNTER (OUTPATIENT)
Dept: PHYSICAL THERAPY | Age: 82
Setting detail: THERAPIES SERIES
Discharge: HOME | End: 2017-11-06
Payer: MEDICARE

## 2017-11-06 DIAGNOSIS — M62.81 MUSCLE WEAKNESS (GENERALIZED): ICD-10-CM

## 2017-11-06 DIAGNOSIS — Z96.642 PRESENCE OF LEFT ARTIFICIAL HIP JOINT: Primary | ICD-10-CM

## 2017-11-06 DIAGNOSIS — Z79.01 ANTICOAGULATION MONITORING BY PHARMACIST: ICD-10-CM

## 2017-11-06 DIAGNOSIS — R26.89 OTHER ABNORMALITIES OF GAIT AND MOBILITY: ICD-10-CM

## 2017-11-06 DIAGNOSIS — I48.91 ATRIAL FIBRILLATION, UNSPECIFIED TYPE (HCC): ICD-10-CM

## 2017-11-06 DIAGNOSIS — G89.18 PAIN ASSOCIATED WITH SURGICAL PROCEDURE: ICD-10-CM

## 2017-11-06 LAB — INR BLD: 1.4

## 2017-11-06 PROCEDURE — 36416 COLLJ CAPILLARY BLOOD SPEC: CPT

## 2017-11-06 PROCEDURE — 97110 THERAPEUTIC EXERCISES: CPT | Performed by: PHYSICAL THERAPY ASSISTANT

## 2017-11-06 PROCEDURE — G0180 MD CERTIFICATION HHA PATIENT: HCPCS | Performed by: FAMILY MEDICINE

## 2017-11-06 PROCEDURE — 85610 PROTHROMBIN TIME: CPT

## 2017-11-06 PROCEDURE — 99211 OFF/OP EST MAY X REQ PHY/QHP: CPT

## 2017-11-06 NOTE — TELEPHONE ENCOUNTER
Home health care plan:  10/12/2017. Certification Period 10/12/17 to 12/10/17. Approximate physician time spent reviewing the following is 15 minutes: Activities to coordinate service, documentation, medical decision-making and review of reports, treatment plans, and test results. Medications, allergies and associating diagnoses also reviewed.

## 2017-11-06 NOTE — FLOWSHEET NOTE
ext, flex, HS, march   *Squat Matrix 10x, 3 position    Lunges 10x Front/ lat at bar   Sit to stand 10x2 4\" / 2'' booster NO UE   Advanced gait 2x ea Initiated F, L, R   NBOS EO,  WBOS EC 3x10'' ea Initiated Foam        Bridge 10x    B hip abd/ add 20x red Seated                        Gait with cane 5'         [x] Provided verbal/tactile cueing for activities related to strengthening, flexibility, endurance, ROM. (39653)  [] Provided verbal/tactile cueing for activities related to improving balance, coordination, kinesthetic sense, posture, motor skill, proprioception. (63475)    Therapeutic Activities:     [] Therapeutic activities, direct (one-on-one) patient contact (use of dynamic activities to improve functional performance). (66206)    Gait:   [] Provided training and instruction to the patient for ambulation re-education. (94417)    Self-Care/ADL's  [] Self-care/home management training and compensatory training, meal preparation, safety procedures, and instructions in use of assistive technology devices/adaptive equipment, direct one-on-one contact. (68469)    Home Exercise Program:   Reviewed this date, understanding noted. [x] Reviewed/Progressed HEP activities related to strengthening, flexibility, endurance, ROM. (19526)  [] Reviewed/Progressed HEP activities related to improving balance, coordination, kinesthetic sense, posture, motor skill, proprioception.  (53452)    Manual Treatments:    [] Provided manual therapy to mobilize soft tissue/joints for the purpose of modulating pain, promoting relaxation,  increasing ROM, reducing/eliminating soft tissue swelling/inflammation/restriction, improving soft tissue extensibility.  (60905)    Service Based Modalities:      Timed Code Treatment Minutes:   45' ROB    Total Treatment Minutes:   45'    Treatment/Activity Tolerance:  [x] Patient tolerated treatment well [] Patient limited by fatique  [] Patient limited by pain  [] Patient limited by other medical complications  [] Other:     Prognosis: [x] Good [] Fair  [] Poor    Patient Requires Follow-up: [x] Yes  [] No      Goals:   Long term goals  Time Frame for Long term goals : 4-6 weeks  Long term goal 1: Pain 2/10 to allow basic ADL (1-2/10 at present and worst)  Long term goal 2: Gait with st cane 200 ft, for distance from parking lots (Initiated and progress)  Long term goal 3: Ascend, descend 1 flight of steps in reciprocal fashion (Initiated steps this date)  Long term goal 4: Resume driving (Ongoing)    Plan:   [x] Continue per plan of care [] Alter current plan (see comments)  [] Plan of care initiated [] Hold pending MD visit [] Discharge    Plan for Next Session:  Monitor tolerance to session and progress as able. Electronically signed by:   Patel Kemp

## 2017-11-08 ENCOUNTER — APPOINTMENT (OUTPATIENT)
Dept: PHYSICAL THERAPY | Age: 82
End: 2017-11-08
Payer: MEDICARE

## 2017-11-09 ENCOUNTER — OFFICE VISIT (OUTPATIENT)
Dept: CARDIOLOGY | Age: 82
End: 2017-11-09
Payer: MEDICARE

## 2017-11-09 ENCOUNTER — APPOINTMENT (OUTPATIENT)
Dept: PHYSICAL THERAPY | Age: 82
End: 2017-11-09
Payer: MEDICARE

## 2017-11-09 ENCOUNTER — HOSPITAL ENCOUNTER (OUTPATIENT)
Dept: PHYSICAL THERAPY | Age: 82
Setting detail: THERAPIES SERIES
Discharge: HOME | End: 2017-11-09
Payer: MEDICARE

## 2017-11-09 VITALS
DIASTOLIC BLOOD PRESSURE: 58 MMHG | BODY MASS INDEX: 25.49 KG/M2 | HEART RATE: 72 BPM | WEIGHT: 153 LBS | SYSTOLIC BLOOD PRESSURE: 106 MMHG | HEIGHT: 65 IN

## 2017-11-09 DIAGNOSIS — I48.91 ATRIAL FIBRILLATION, UNSPECIFIED TYPE (HCC): Primary | ICD-10-CM

## 2017-11-09 PROCEDURE — 97110 THERAPEUTIC EXERCISES: CPT | Performed by: PHYSICAL THERAPIST

## 2017-11-09 PROCEDURE — 93000 ELECTROCARDIOGRAM COMPLETE: CPT | Performed by: INTERNAL MEDICINE

## 2017-11-09 PROCEDURE — 99214 OFFICE O/P EST MOD 30 MIN: CPT | Performed by: INTERNAL MEDICINE

## 2017-11-09 NOTE — PROGRESS NOTES
Today's Date: 11/9/2017  Patient Name: Anna Burgos  Patient's age: 80 y.o., 1934          The patient is a 80 y.o.  female is in the office for cardiac evaluation, he had an episode of atrial fibrillation in August after hip surgery. Past Medical History:   has a past medical history of Actinic keratosis; Arthritis; CAD (coronary artery disease); Cervical prolapse; Diverticulosis; Endocervical polyp; Heel spur; Hip pain; Hyperlipidemia; Hypertension; Nasal septal deviation; Osteoporosis; Stress incontinence; Syncope; and Vaginal prolapse. Past Surgical History:   has a past surgical history that includes Appendectomy; Colonoscopy (01/26/2000); Cholecystectomy (11/1976); HYSTERECTOMY VAGINAL (1964); Upper gastrointestinal endoscopy (07/16/2017); egd transoral biopsy single/multiple (Left, 7/16/2017); Upper gastrointestinal endoscopy (Left, 7/16/2017); Endoscopic ultrasonography, GI (Left, 7/20/2017); eye surgery; and Total hip arthroplasty (Left, 9/19/2017). Home Medications:    Prior to Admission medications    Medication Sig Start Date End Date Taking? Authorizing Provider   warfarin (COUMADIN) 2.5 MG tablet Take 1 tablet by mouth daily 1.25 mg on Monday and Friday, 2.5 mg all other days. 10/31/17   Nika Veras MD   HYDROcodone-acetaminophen (NORCO) 5-325 MG per tablet Take 1 tablet by mouth every 8 hours as needed for Pain .     Historical Provider, MD   metoprolol tartrate (LOPRESSOR) 25 MG tablet Take 0.5 tablets by mouth 2 times daily 9/22/17   [facility]   pantoprazole (PROTONIX) 40 MG tablet Take 1 tablet by mouth 2 times daily (before meals) 7/20/17   Elpidio Gutierrez MD   [facility] Does not apply route Expires 6/2022 6/15/17   Nika Veras MD   senna (SENOKOT) 8.6 MG tablet Take 1 tablet by mouth 2 times daily as needed for Constipation (use sparingly) 6/15/17 6/15/18  Nika Veras MD   MEGARED OMEGA-3 KRILL  MG CAPS Take by mitral regurgitation.  Karla Favorite was mild tricuspid regurgitation. Labs:     CBC: No results for input(s): WBC, HGB, HCT, PLT in the last 72 hours. BMP: No results for input(s): NA, K, CO2, BUN, CREATININE, LABGLOM, GLUCOSE in the last 72 hours. PT/INR:   Recent Labs      11/06/17   1213   INR  1.4     FASTING LIPID PANEL:  Lab Results   Component Value Date    HDL 68 06/07/2017    TRIG 188 06/07/2017     LIVER PROFILE:No results for input(s): AST, ALT, LABALBU in the last 72 hours.     IMPRESSION:    PAF  MILD MR/TR/AI  A.C ON COUMADIN  Patient Active Problem List   Diagnosis    Trochanteric bursitis of left hip    Drug-induced constipation    Atrial fibrillation (HCC)    Bradycardia    Esophageal stricture    Gastritis without bleeding    Lumbar radicular pain    Lumbar spine pain    Left hip pain    Arthritis of left hip    At high risk for pain from procedure    Paroxysmal atrial fibrillation with rapid ventricular response ([address]    Anticoagulation monitoring by pharmacist       RECOMMENDATIONS:  INR GOAL 2-3  CONTINUE METOPROLOL 12.5 BID  REGULAR EXERCISE  CONTINUE CURRENT TREATMENT    F/U 6  MONTHS            Monet Lee 0977 Cardiology Consult           [phone]

## 2017-11-13 ENCOUNTER — HOSPITAL ENCOUNTER (OUTPATIENT)
Dept: PHARMACY | Age: 82
Setting detail: THERAPIES SERIES
Discharge: HOME | End: 2017-11-13
Payer: MEDICARE

## 2017-11-13 ENCOUNTER — HOSPITAL ENCOUNTER (OUTPATIENT)
Dept: PHARMACY | Age: 82
Discharge: HOME | End: 2017-11-13

## 2017-11-13 ENCOUNTER — HOSPITAL ENCOUNTER (OUTPATIENT)
Dept: PHYSICAL THERAPY | Age: 82
Setting detail: THERAPIES SERIES
Discharge: HOME | End: 2017-11-13
Payer: MEDICARE

## 2017-11-13 DIAGNOSIS — Z79.01 ANTICOAGULATION MONITORING BY PHARMACIST: ICD-10-CM

## 2017-11-13 DIAGNOSIS — I48.91 ATRIAL FIBRILLATION, UNSPECIFIED TYPE (HCC): ICD-10-CM

## 2017-11-13 LAB — INR BLD: 2.3

## 2017-11-13 PROCEDURE — 36416 COLLJ CAPILLARY BLOOD SPEC: CPT

## 2017-11-13 PROCEDURE — 97110 THERAPEUTIC EXERCISES: CPT | Performed by: PHYSICAL THERAPIST

## 2017-11-13 PROCEDURE — 85610 PROTHROMBIN TIME: CPT

## 2017-11-13 PROCEDURE — 99211 OFF/OP EST MAY X REQ PHY/QHP: CPT

## 2017-11-13 RX ORDER — PANTOPRAZOLE SODIUM 40 MG/1
TABLET, DELAYED RELEASE ORAL
Qty: 30 TABLET | Refills: 5 | Status: SHIPPED | OUTPATIENT
Start: 2017-11-13 | End: 2018-05-10 | Stop reason: SDUPTHER

## 2017-11-13 NOTE — PROGRESS NOTES
ANTICOAGULATION SERVICE    Date of Clinic Visit:  11/13/2017    Moraima Mantilla is a 80 y.o. female who presents to clinic today for anticoagulation monitoring and adjustment. Recent INR Results:  Internal QC passed  Lab Results   Component Value Date    INR 2.3 11/13/2017    INR 1.4 11/06/2017       Current Warfarin Dosage:  *  ANTICOAGULATION SERVICE    Date of Clinic Visit:  11/13/2017    Moraima Mantilla is a 80 y.o. female who presents to clinic today for anticoagulation monitoring and adjustment. Recent INR Results:  Internal QC passed  Lab Results   Component Value Date    INR 2.3 11/13/2017    INR 1.4 11/06/2017       Current Warfarin Dosage:  Dosing Plan  As of 11/13/2017    TTR:   14.4 % (2.1 wk)   Full instructions:   5 mg every day               Assessment/Plan:     Continue current regimen as INR remains stable. Dose  Change moved patient nicely into range will re-check 1 week. Return date  As of 11/13/2017    TTR:   14.4 % (2.1 wk)   Next INR check:   11/21/2017             Please call [facility] at [phone] with any questions. Thanks! Jemal Velázquez [facility]  Anticoagulation Service Pharmacist  11/13/2017 11:13 AM      Assessment/Plan:    Continue current regimen as INR remains stable. OR   Modify warfarin dose as noted above:     Next Clinic Appointment:  Return date  As of 11/13/2017    TTR:   14.4 % (2.1 wk)   Next INR check:   11/21/2017             Please call [facility] at [phone] with any questions. Thanks!   Jemal Velázquez [facility]  Anticoagulation Service Pharmacist  11/13/2017 11:13 AM

## 2017-11-13 NOTE — FLOWSHEET NOTE
Physical Therapy Daily Treatment Note    Date:  2017    Patient Name:  Kristen Lombardi    :  1934  MRN: [id number]    Restrictions/Precautions:       Medical/Treatment Diagnosis Information:   · Diagnosis: M16.12 OA L hip, s/p L NANETTE  · Treatment Diagnosis: L hip OA, s/p NANETTE- by Dr Ethel Bonilla     Insurance/Certification information:  PT Insurance Information: Medicare    Physician Information:  Referring Practitioner: Danie Olivier of care signed (Y/N):  YES    Visit# / total visits:  5/10    Pain level: 4/10     G-Code (if applicable):      Date G-Code Applied:  10/30/17  PT G-Codes  Functional Assessment Tool Used: LEFS  Score: 20/80 = 25%, or 75% disability  Functional Limitation: Mobility: Walking and moving around  Mobility: Walking and Moving Around Current Status (): At least 60 percent but less than 80 percent impaired, limited or restricted  Mobility: Walking and Moving Around Goal Status (): At least 1 percent but less than 20 percent impaired, limited or restricted    Time In: 10:00  Time Out:10:55    Progress Note: []  Yes  [x]  No  Next due by: Visit #10  , or 17    Subjective: Hard to get up out of a chair without using the arms to assist     Objective:   Observation:   Now 7 weeks post-op  (+) Trendelenburg without AD  Test measurements:    Loss of balance with multi-direction lunge, primarily when L LE stationary.   Weak and painful active L hip abduction  Gait with cane does control the Trandelenberg deviation, and fair balance    Exercises:   Exercise/Equipment Resistance/Repetitions Other comments             NUSTEP 5' L4, S10         *Counter ex 20x TR/HR, abd, ext, flex, HS, march   *Squat Matrix 10x, 9 position Shld, narrow, wide   Lunges 10x Front/ lat , post at bar   Sit to stand 10x2  2'' booster NO UE   Advanced gait 2x ea Initiated F, L, R, retro   NBOS EO,  WBOS EC 3x10'' ea  Foam   Step up 4\" 10x Front/ side/ back   Bridge 10x, x2 Narrow & wide stance   B hip abd/ add 20x red Seated, supine        SLR   10x x2              Gait with cane 7'         [x] Provided verbal/tactile cueing for activities related to strengthening, flexibility, endurance, ROM. (44493)  [] Provided verbal/tactile cueing for activities related to improving balance, coordination, kinesthetic sense, posture, motor skill, proprioception. (65418)    Therapeutic Activities:     [] Therapeutic activities, direct (one-on-one) patient contact (use of dynamic activities to improve functional performance). (87465)    Gait:   [] Provided training and instruction to the patient for ambulation re-education. (62031)    Self-Care/ADL's  [] Self-care/home management training and compensatory training, meal preparation, safety procedures, and instructions in use of assistive technology devices/adaptive equipment, direct one-on-one contact. (01920)    Home Exercise Program:   Reviewed this date, understanding noted. [x] Reviewed/Progressed HEP activities related to strengthening, flexibility, endurance, ROM. (75946)  [] Reviewed/Progressed HEP activities related to improving balance, coordination, kinesthetic sense, posture, motor skill, proprioception.  (19643)    Manual Treatments:    [] Provided manual therapy to mobilize soft tissue/joints for the purpose of modulating pain, promoting relaxation,  increasing ROM, reducing/eliminating soft tissue swelling/inflammation/restriction, improving soft tissue extensibility.  (34821)    Service Based Modalities:      Timed Code Treatment Minutes:   48' ROB    Total Treatment Minutes:   48'    Treatment/Activity Tolerance:  [x] Patient tolerated treatment well [] Patient limited by fatique  [] Patient limited by pain  [] Patient limited by other medical complications  [] Other:     Prognosis: [x] Good [] Fair  [] Poor    Patient Requires Follow-up: [x] Yes  [] No      Goals:   Long term goals  Time Frame for Long term goals : 4-6 weeks  Long term goal 1: Pain 2/10 to allow basic ADL (1-2/10 at present and worst)  Long term goal 2: Gait with st cane 200 ft, for distance from parking lots (Initiated and progress)  Long term goal 3: Ascend, descend 1 flight of steps in reciprocal fashion (Initiated steps this date)  Long term goal 4: Resume driving (Ongoing)    Plan:   [x] Continue per plan of care [] Alter current plan (see comments)  [] Plan of care initiated [] Hold pending MD visit [] Discharge    Plan for Next Session:  Monitor tolerance to session and progress as able.      Electronically signed by:  Rajesh Carmen

## 2017-11-15 ENCOUNTER — HOSPITAL ENCOUNTER (OUTPATIENT)
Dept: PHYSICAL THERAPY | Age: 82
Setting detail: THERAPIES SERIES
Discharge: HOME | End: 2017-11-15
Payer: MEDICARE

## 2017-11-15 PROCEDURE — 97110 THERAPEUTIC EXERCISES: CPT | Performed by: PHYSICAL THERAPY ASSISTANT

## 2017-11-15 NOTE — FLOWSHEET NOTE
Physical Therapy Daily Treatment Note    Date:  11/15/2017    Patient Name:  Yazmin Maddox    :  1934  MRN: [id number]    Restrictions/Precautions:       Medical/Treatment Diagnosis Information:   · Diagnosis: M16.12 OA L hip, s/p L NANETTE  · Treatment Diagnosis: L hip OA, s/p NANETTE- by Dr Kalie Barahona 71    Insurance/Certification information:  PT Insurance Information: Medicare    Physician Information:  Referring Practitioner: Norma Barragan of care signed (Y/N):  YES    Visit# / total visits:  6/10    Pain level: 4/10     G-Code (if applicable):      Date G-Code Applied:  10/30/17  PT G-Codes  Functional Assessment Tool Used: LEFS  Score: 20/80 = 25%, or 75% disability  Functional Limitation: Mobility: Walking and moving around  Mobility: Walking and Moving Around Current Status (): At least 60 percent but less than 80 percent impaired, limited or restricted  Mobility: Walking and Moving Around Goal Status (): At least 1 percent but less than 20 percent impaired, limited or restricted    Time In: 9:32  Time Out:1031    Progress Note: []  Yes  [x]  No  Next due by: Visit #10  , or 17    Subjective: Pt.states pain this date through low back and hip rated 8/10. Notes taking medication prior to session. Compliance with current HEP noted. Notes increased pain since last session. Objective:   Observation:   Now 7 weeks post-op  (+) Trendelenburg without AD  Test measurements:    Loss of balance with multi-direction lunge, primarily when L LE stationary.   Weak and painful active L hip abduction  Gait with cane does control the Trandelenberg deviation, and fair balance    Exercises:   Exercise/Equipment Resistance/Repetitions Other comments             NUSTEP 5' L4, S10         *Counter ex 20x TR/HR, abd, ext, flex, HS, march   *Squat Matrix 5x, 9 position Shld, narrow, wide (NO UE)   Lunges 10x Front/ lat , post at bar   Sit to stand 10x2  2'' booster NO UE   Advanced gait 2x ea  F, L, R, retro NBOS EO,   WBOS EC 3x10'' ea  Foam   Step up 4\" 10x Front/ side/ back   Bridge 10x, x2 Narrow & wide stance   B hip abd/ add 20x red Seated, supine        SLR   10x x2              Gait with cane 7'         [x] Provided verbal/tactile cueing for activities related to strengthening, flexibility, endurance, ROM. (48369)  [] Provided verbal/tactile cueing for activities related to improving balance, coordination, kinesthetic sense, posture, motor skill, proprioception. (21455)    Therapeutic Activities:     [] Therapeutic activities, direct (one-on-one) patient contact (use of dynamic activities to improve functional performance). (39864)    Gait:   [] Provided training and instruction to the patient for ambulation re-education. (36224)    Self-Care/ADL's  [] Self-care/home management training and compensatory training, meal preparation, safety procedures, and instructions in use of assistive technology devices/adaptive equipment, direct one-on-one contact. (94310)    Home Exercise Program:   Reviewed this date, understanding noted. [x] Reviewed/Progressed HEP activities related to strengthening, flexibility, endurance, ROM. (21102)  [] Reviewed/Progressed HEP activities related to improving balance, coordination, kinesthetic sense, posture, motor skill, proprioception.  (87234)    Manual Treatments:    [] Provided manual therapy to mobilize soft tissue/joints for the purpose of modulating pain, promoting relaxation,  increasing ROM, reducing/eliminating soft tissue swelling/inflammation/restriction, improving soft tissue extensibility.  (10810)    Service Based Modalities:      Timed Code Treatment Minutes:   61' ROB    Total Treatment Minutes:   61'    Treatment/Activity Tolerance:  [x] Patient tolerated treatment well [] Patient limited by fatique  [] Patient limited by pain  [] Patient limited by other medical complications  [] Other:     Prognosis: [x] Good [] Fair  [] Poor    Patient Requires Follow-up: [x]

## 2017-11-16 NOTE — PROGRESS NOTES
I have reviewed and agree to the content of the note written by the PTA.   Electronically signed by Debi Barahona PT [id number]

## 2017-11-21 ENCOUNTER — HOSPITAL ENCOUNTER (OUTPATIENT)
Dept: PHYSICAL THERAPY | Age: 82
Setting detail: THERAPIES SERIES
Discharge: HOME | End: 2017-11-21
Payer: MEDICARE

## 2017-11-21 ENCOUNTER — HOSPITAL ENCOUNTER (OUTPATIENT)
Dept: PHARMACY | Age: 82
Setting detail: THERAPIES SERIES
Discharge: HOME | End: 2017-11-21
Payer: MEDICARE

## 2017-11-21 ENCOUNTER — OFFICE VISIT (OUTPATIENT)
Dept: ORTHOPEDIC SURGERY | Age: 82
End: 2017-11-21

## 2017-11-21 VITALS
BODY MASS INDEX: 25.49 KG/M2 | SYSTOLIC BLOOD PRESSURE: 134 MMHG | HEIGHT: 65 IN | DIASTOLIC BLOOD PRESSURE: 62 MMHG | WEIGHT: 153 LBS | HEART RATE: 74 BPM

## 2017-11-21 DIAGNOSIS — M16.12 PRIMARY OSTEOARTHRITIS OF LEFT HIP: Primary | ICD-10-CM

## 2017-11-21 DIAGNOSIS — I48.91 ATRIAL FIBRILLATION, UNSPECIFIED TYPE (HCC): ICD-10-CM

## 2017-11-21 DIAGNOSIS — Z79.01 ANTICOAGULATION MONITORING BY PHARMACIST: ICD-10-CM

## 2017-11-21 LAB — INR BLD: 2.8

## 2017-11-21 PROCEDURE — 36416 COLLJ CAPILLARY BLOOD SPEC: CPT

## 2017-11-21 PROCEDURE — 99211 OFF/OP EST MAY X REQ PHY/QHP: CPT

## 2017-11-21 PROCEDURE — 99024 POSTOP FOLLOW-UP VISIT: CPT | Performed by: PHYSICIAN ASSISTANT

## 2017-11-21 PROCEDURE — 97110 THERAPEUTIC EXERCISES: CPT | Performed by: PHYSICAL THERAPY ASSISTANT

## 2017-11-21 PROCEDURE — 85610 PROTHROMBIN TIME: CPT

## 2017-11-21 RX ORDER — WARFARIN SODIUM 2.5 MG/1
TABLET ORAL
Qty: 60 TABLET | Refills: 3 | Status: SHIPPED | OUTPATIENT
Start: 2017-11-21 | End: 2018-04-03 | Stop reason: SDUPTHER

## 2017-11-21 NOTE — FLOWSHEET NOTE
Physical Therapy Daily Treatment Note    Date:  2017    Patient Name:  Lanny Gowers    :  1934  MRN: [id number]    Restrictions/Precautions:       Medical/Treatment Diagnosis Information:   · Diagnosis: M16.12 OA L hip, s/p L NANETTE  · Treatment Diagnosis: L hip OA, s/p NANETTE- by Dr Milton Lawrence     Insurance/Certification information:  PT Insurance Information: Medicare    Physician Information:  Referring Practitioner: Rebekah Merazs of care signed (Y/N):  YES    Visit# / total visits:  7/10    Pain level: 2/10     G-Code (if applicable):      Date G-Code Applied:  10/30/17  PT G-Codes  Functional Assessment Tool Used: LEFS  Score: 20/80 = 25%, or 75% disability  Functional Limitation: Mobility: Walking and moving around  Mobility: Walking and Moving Around Current Status (): At least 60 percent but less than 80 percent impaired, limited or restricted  Mobility: Walking and Moving Around Goal Status (): At least 1 percent but less than 20 percent impaired, limited or restricted    Time In: 5552  Time Out:1127    Progress Note: []  Yes  [x]  No  Next due by: Visit #10  , or 17    Subjective: Pt. Relates seeing physician earlier in day with no new c/o noted. Pleased with progress. Pain this date rated 2/10, at worst rated 5/10, primarily with HEP. Continues to use 4 WW at home due to convenience vs need. Objective:   Observation:   Now 7 weeks post-op  (+) Trendelenburg without AD  Test measurements:    Loss of balance with multi-direction lunge, primarily when L LE stationary, improved from previously.  .  Weak and painful active L hip abduction  Gait with cane does control the Trandelenberg deviation, and fair balance    Exercises:   Exercise/Equipment Resistance/Repetitions Other comments             NUSTEP 5' L5, S10 Advanced        *Counter ex 20x TR/HR, abd, ext, flex, HS, march   *Squat Matrix 5x, 9 position Shld, narrow, wide (NO UE)   Lunges 10x Front/ lat , post at bar   Sit to other medical complications  [] Other:     Prognosis: [x] Good [] Fair  [] Poor    Patient Requires Follow-up: [x] Yes  [] No      Goals:   Long term goals  Time Frame for Long term goals : 4-6 weeks  Long term goal 1: Pain 2/10 to allow basic ADL (8/10 through hip and back)  Long term goal 2: Gait with st cane 200 ft, for distance from parking lots (Ongoing)  Long term goal 3: Ascend, descend 1 flight of steps in reciprocal fashion (Ongoing)  Long term goal 4: Resume driving (Ongoing)    Plan:   [x] Continue per plan of care [] Alter current plan (see comments)  [] Plan of care initiated [] Hold pending MD visit [] Discharge    Plan for Next Session:  Monitor tolerance to session and progress as able. Electronically signed by:   Iván Castaneda

## 2017-11-28 ENCOUNTER — HOSPITAL ENCOUNTER (OUTPATIENT)
Dept: PHYSICAL THERAPY | Age: 82
Setting detail: THERAPIES SERIES
Discharge: HOME | End: 2017-11-28
Payer: MEDICARE

## 2017-11-28 PROCEDURE — 97110 THERAPEUTIC EXERCISES: CPT | Performed by: PHYSICAL THERAPY ASSISTANT

## 2017-11-28 NOTE — FLOWSHEET NOTE
tissue extensibility. (01378)    Service Based Modalities:      Timed Code Treatment Minutes:  62' ROB    Total Treatment Minutes:   62'    Treatment/Activity Tolerance:  [x] Patient tolerated treatment well [] Patient limited by fatique  [] Patient limited by pain  [] Patient limited by other medical complications  [] Other:     Prognosis: [x] Good [] Fair  [] Poor    Patient Requires Follow-up: [x] Yes  [] No      Goals:   Long term goals  Time Frame for Long term goals : 4-6 weeks  Long term goal 1: Pain 2/10 to allow basic ADL (4/10 through hip and back)  Long term goal 2: Gait with st cane 200 ft, for distance from parking lots (Able to perform but with fatigue.)  Long term goal 3: Ascend, descend 1 flight of steps in reciprocal fashion (Unable to perform at this time, using single step strategy)  Long term goal 4: Resume driving (Has resumed driving without issue)    Plan:   [x] Continue per plan of care [] Alter current plan (see comments)  [] Plan of care initiated [] Hold pending MD visit [] Discharge    Plan for Next Session:  Monitor tolerance to session and progress as able. Electronically signed by:   Yulissa Monroy

## 2017-12-01 ENCOUNTER — HOSPITAL ENCOUNTER (OUTPATIENT)
Dept: PHYSICAL THERAPY | Age: 82
Setting detail: THERAPIES SERIES
Discharge: HOME | End: 2017-12-01
Payer: MEDICARE

## 2017-12-01 PROCEDURE — 97110 THERAPEUTIC EXERCISES: CPT

## 2017-12-01 PROCEDURE — 97150 GROUP THERAPEUTIC PROCEDURES: CPT

## 2017-12-01 NOTE — FLOWSHEET NOTE
Physical Therapy Daily Treatment Note    Date:  2017    Patient Name:  Lanny Gowers    :  1934  MRN: [id number]    Restrictions/Precautions:       Medical/Treatment Diagnosis Information:   · Diagnosis: M16.12 OA L hip, s/p L NANETTE  · Treatment Diagnosis: L hip OA, s/p NANETTE- by Dr Milton Lawrence 59    Insurance/Certification information:  PT Insurance Information: Medicare    Physician Information:  Referring Practitioner: Rebekah Merazs of care signed (Y/N):  YES    Visit# / total visits:  9/10    Pain level: 5/10     G-Code (if applicable):      Date G-Code Applied:  10/30/17  PT G-Codes  Functional Assessment Tool Used: LEFS  Score:  = 25%, or 75% disability  Functional Limitation: Mobility: Walking and moving around  Mobility: Walking and Moving Around Current Status (): At least 60 percent but less than 80 percent impaired, limited or restricted  Mobility: Walking and Moving Around Goal Status (): At least 1 percent but less than 20 percent impaired, limited or restricted    Time In: 11:10 Time Out: 11:58  11:10-11:35 Group   Progress Note: []  Yes  [x]  No  Next due by: Visit #10  , or 17    Subjective: Pt. States pain this date rated 5/10 this date. Notes somedays having pain and other days not. Patient noting using RW primaily in the house. Cane when she goes out to appts. Verbalizes understanding and ongoing use of HEP. Objective: ROB complete per flow chart to facilitate strength, motion and stability to allow ease with daily activities, ambulation. Verbal cuing for progression, technique and order of exercises. Difficulty with weight bearing in left LE remains, trendelenburg gait without Ad. Patient has poor balance this date with EC feet together.       Observation:   (+) Trendelenburg without AD  Test measurements:    LEFS: 36/64 = 56%, 44% disabled (IE:  = 25%, 75% disabled)  Loss of balance with multi-direction lunge, primarily when L LE stationary, improved from previously. .  Weak and painful active L hip abduction  Gait with cane does control the Trandelenberg deviation, and fair balance    Exercises:   Exercise/Equipment Resistance/Repetitions Other comments             NUSTEP 5' L5, S10         *Counter ex 20x TR/HR, abd, ext, flex, HS, march   *Squat Matrix 5x, 9 position Shld, narrow, wide (NO UE)   Lunges 10x Front/ lat , post at bar   Sit to stand 10x2 NO booster NO UE   Advanced gait   F, L, R, retro   NBOS EO,   WBOS EC 3x10''   Foam   Tandem 1x30''         Step up 6\" 10x Front/ side/ back (advanced)   Bridge readd Narrow & wide stance   B hip abd/ add 20x red Seated, supine         SLR   readd With QS             Gait with no AD  X 1 lap         [x] Provided verbal/tactile cueing for activities related to strengthening, flexibility, endurance, ROM. (43172)  [] Provided verbal/tactile cueing for activities related to improving balance, coordination, kinesthetic sense, posture, motor skill, proprioception. (71321)    Therapeutic Activities:     [] Therapeutic activities, direct (one-on-one) patient contact (use of dynamic activities to improve functional performance). (22289)    Gait:   [] Provided training and instruction to the patient for ambulation re-education. (65545)    Self-Care/ADL's  [] Self-care/home management training and compensatory training, meal preparation, safety procedures, and instructions in use of assistive technology devices/adaptive equipment, direct one-on-one contact. (93487)    Home Exercise Program:   Reviewed this date, understanding noted.    [x] Reviewed/Progressed HEP activities related to strengthening, flexibility, endurance, ROM. (54455)  [] Reviewed/Progressed HEP activities related to improving balance, coordination, kinesthetic sense, posture, motor skill, proprioception.  (87783)    Manual Treatments:    [] Provided manual therapy to mobilize soft tissue/joints for the purpose of modulating pain, promoting relaxation, increasing ROM, reducing/eliminating soft tissue swelling/inflammation/restriction, improving soft tissue extensibility. (84644)    Service Based Modalities:      Timed Code Treatment Minutes:  25' Group, 23'Ther-ex    Total Treatment Minutes:   50'    Treatment/Activity Tolerance:  [x] Patient tolerated treatment well [] Patient limited by fatique  [] Patient limited by pain  [] Patient limited by other medical complications  [] Other:     Prognosis: [x] Good [] Fair  [] Poor    Patient Requires Follow-up: [x] Yes  [] No      Goals:   Long term goals  Time Frame for Long term goals : 4-6 weeks  Long term goal 1: Pain 2/10 to allow basic ADL (4/10 through hip and back)  Long term goal 2: Gait with st cane 200 ft, for distance from parking lots (Able to perform but with fatigue.)  Long term goal 3: Ascend, descend 1 flight of steps in reciprocal fashion (Unable to perform at this time, using single step strategy)  Long term goal 4: Resume driving (Has resumed driving without issue)    Plan:   [x] Continue per plan of care [] Alter current plan (see comments)  [] Plan of care initiated [] Hold pending MD visit [] Discharge    Plan for Next Session:  Monitor tolerance to session and progress as able.      Electronically signed by:  Roseann Navarro

## 2017-12-05 ENCOUNTER — HOSPITAL ENCOUNTER (OUTPATIENT)
Dept: PHYSICAL THERAPY | Age: 82
Setting detail: THERAPIES SERIES
Discharge: HOME | End: 2017-12-05
Payer: MEDICARE

## 2017-12-05 PROCEDURE — G8979 MOBILITY GOAL STATUS: HCPCS

## 2017-12-05 PROCEDURE — 97110 THERAPEUTIC EXERCISES: CPT

## 2017-12-05 PROCEDURE — G8978 MOBILITY CURRENT STATUS: HCPCS

## 2017-12-05 NOTE — FLOWSHEET NOTE
39%(IE: 20/80 = 25%, 75% disabled)  Loss of balance with multi-direction lunge, primarily when L LE stationary, improved from previously. .  Weak and painful active L hip abduction and Left hip flexion   Gait with cane does control the Trandelenberg deviation, and fair balance    Exercises:   Exercise/Equipment Resistance/Repetitions Other comments             NUSTEP 5' L5, S10         *Counter ex 20x TR/HR, abd, ext, flex, HS, march   *Squat Matrix 10x, 9 position Shld, narrow, wide (NO UE-Advanced)   Lunges 10x Front/ lat , post at bar   Sit to stand 10x2 NO booster NO UE   Advanced gait   F, L, R, retro   NBOS EO,   WBOS EC 3x10''   Foam   Tandem 1x30''         Step up 6\" 15x Front/ side/ back (advanced)   Bridge readd Narrow & wide stance   B hip abd/ add 20x red Seated, supine         SLR   readd With QS             Gait with no AD           [x] Provided verbal/tactile cueing for activities related to strengthening, flexibility, endurance, ROM. (23813)  [] Provided verbal/tactile cueing for activities related to improving balance, coordination, kinesthetic sense, posture, motor skill, proprioception. (12018)    Therapeutic Activities:     [] Therapeutic activities, direct (one-on-one) patient contact (use of dynamic activities to improve functional performance). (39528)    Gait:   [] Provided training and instruction to the patient for ambulation re-education. (40454)    Self-Care/ADL's  [] Self-care/home management training and compensatory training, meal preparation, safety procedures, and instructions in use of assistive technology devices/adaptive equipment, direct one-on-one contact. (67593)    Home Exercise Program:   Reviewed this date, understanding noted. [x] Reviewed/Progressed HEP activities related to strengthening, flexibility, endurance, ROM. (89708)  [] Reviewed/Progressed HEP activities related to improving balance, coordination, kinesthetic sense, posture, motor skill, proprioception. (56411)    Manual Treatments:    [] Provided manual therapy to mobilize soft tissue/joints for the purpose of modulating pain, promoting relaxation,  increasing ROM, reducing/eliminating soft tissue swelling/inflammation/restriction, improving soft tissue extensibility. (00950)    Service Based Modalities:      Timed Code Treatment Minutes:  48' There-ex   Total Treatment Minutes:   48'    Treatment/Activity Tolerance:  [x] Patient tolerated treatment well [] Patient limited by fatique  [] Patient limited by pain  [] Patient limited by other medical complications  [] Other:     Prognosis: [x] Good [] Fair  [] Poor    Patient Requires Follow-up: [x] Yes  [] No      Goals:   Long term goals  Time Frame for Long term goals : 4-6 weeks  Long term goal 1: Pain 2/10 to allow basic ADL (not met- 4/10 through hip and back)  Long term goal 2: Gait with st cane 200 ft, for distance from parking lots (Able to perform but with fatigue.)  Long term goal 3: Ascend, descend 1 flight of steps in reciprocal fashion (not met Unable to perform at this time, using single step strategy)  Long term goal 4: Resume driving (met-Has resumed driving without issue)  Long Term goal 5 Added 12/5/17--Improve hip strength to 4+/5    Plan:   [x] Continue per plan of care [] Alter current plan (see comments)  [] Plan of care initiated [] Hold pending MD visit [] Discharge    Plan for Next Session:  Monitor tolerance to session and progress hip strength as able.      Electronically signed by:  Chinedu Jon

## 2017-12-05 NOTE — PROGRESS NOTES
Physical Therapy    [facility]  Rehabilitation and Sports Medicine    [x] [place]  Phone: [phone]  Fax: [phone]      [] [place]  Phone: [phone]  Fax: [phone]    Physical Therapy Progress/Discharge Note  Date: 2017        Patient Name:  Logan Tam    :  1934  MRN: [id number]  Restrictions/Precautions:      Medical/Treatment Diagnosis Information:  ·   Diagnosis: M16.12 OA L hip, s/p L NANETTE  · Treatment Diagnosis: L hip OA, s/p NANETTE- by Dr Jayce Gonzalez 17  ·   Insurance/Certification information:    PT Insurance Information: Medicare  Physician Information:     Referring Practitioner: Jessica Last of care signed (Y/N): y  Visit# / total visits:  10/10, 0 2nd POC  Pain level: 4/10     G-Code (if applicable):      Date G-Code Applied:  2017  PT G-Codes  Functional Assessment Tool Used: LEFS  Score: 49/80=39% Disability  Functional Limitation: Mobility: Walking and moving around  Mobility: Walking and Moving Around Current Status (): At least 20 percent but less than 40 percent impaired, limited or restricted  Mobility: Walking and Moving Around Goal Status (): At least 1 percent but less than 20 percent impaired, limited or restricted     Time Period for Report: 10/30/17-17  Cancels/No-shows to date:  0    Plan of Care/Treatment to date:  [x] Therapeutic Exercise    [x] Modalities:  [x] Therapeutic Activity     [] Ultrasound  [] Electrical Stimulation  [x] Gait Training      [] Cervical Traction    [] Lumbar Traction  [x] Neuromuscular Re-education  [] Cold/hotpack [] Iontophoresis  [x] Instruction in HEP      Other:  [] Manual Therapy       []    [] Aquatic Therapy       []                    ? Subjective:       Pt. States pain this date rated 4/10 this date. Notes somedays having pain and other days not. Patient noting using RW occassonal in the house. Cane when she goes out to appts. Verbalizes understanding and ongoing use of HEP. Patient reporting 50-75% overall improvement since starting therapy.          Objective:         ·   (+) Trendelenburg without AD  · Test measurements:  Left hip flexion 4/5, Left hip Abd 4/5, Left hip Add 4+5, Left knee 4+/5  · LEFS: 49/80 = 39%(IE: 20/80 = 25%, 75% disabled)  · Loss of balance with multi-direction lunge, primarily when L LE stationary, improved from previously. .  · Weak and painful active L hip abduction and Left hip flexion   · Gait with cane does control the Trandelenberg deviation, and fair balance                                                     Assessment:  Patient progressing toward all goals, patient noting 50-75% overall improvement. Patient has improved by 29 points on the LEFS demonstrating improved overall function with ADL's. Continues to have moderate hip weakness with groin pain on the left. Plan:  Plan to Continue PT services for hip and LE strengthening with gait and balance activities. Goals:    Long term goals  Time Frame for Long term goals : 4-6 weeks  Long term goal 1: Pain 2/10 to allow basic ADL (not met- 4/10 through hip and back)  Long term goal 2: Gait with st cane 200 ft, for distance from parking lots (Able to perform but with fatigue.)  Long term goal 3: Ascend, descend 1 flight of steps in reciprocal fashion (not met Unable to perform at this time, using single step strategy)  Long term goal 4: Resume driving (met-Has resumed driving without issue)  Long Term goal 5 Added 12/5/17--Improve hip strength to 4+/5              Current Frequency/Duration:  # Days per week: [] 1 day # Weeks: [] 1 week [x] 4 weeks      [x] 2 days?    [] 2 weeks [] 5 weeks      [] 3 days   [] 3 weeks [] 6 weeks     Rehab Potential: [] Excellent [x] Good [] Fair  [] Poor     Goal Status:  [] Achieved [x] Partially Achieved  [] Not Achieved     Patient Status: [] Continue per initial plan of Care     [] Patient now discharged     [x] Additional visits requested, Please re-certify for additional visits:      Requested frequency/duration:  2X/week for 4 weeks    Electronically signed by:  Harsha Cuevas PT    If you have any questions or concerns, please don't hesitate to call.   Thank you for your referral.    Physician Signature:________________________________Date:__________________  By signing above, therapists plan is approved by physician

## 2017-12-08 ENCOUNTER — HOSPITAL ENCOUNTER (OUTPATIENT)
Dept: PHARMACY | Age: 82
Setting detail: THERAPIES SERIES
Discharge: HOME | End: 2017-12-08
Payer: MEDICARE

## 2017-12-08 ENCOUNTER — HOSPITAL ENCOUNTER (OUTPATIENT)
Dept: PHYSICAL THERAPY | Age: 82
Setting detail: THERAPIES SERIES
Discharge: HOME | End: 2017-12-08
Payer: MEDICARE

## 2017-12-08 ENCOUNTER — OFFICE VISIT (OUTPATIENT)
Dept: FAMILY MEDICINE CLINIC | Age: 82
End: 2017-12-08
Payer: MEDICARE

## 2017-12-08 VITALS
TEMPERATURE: 99.1 F | SYSTOLIC BLOOD PRESSURE: 108 MMHG | OXYGEN SATURATION: 96 % | HEART RATE: 86 BPM | WEIGHT: 157.2 LBS | HEIGHT: 65 IN | BODY MASS INDEX: 26.19 KG/M2 | DIASTOLIC BLOOD PRESSURE: 62 MMHG

## 2017-12-08 DIAGNOSIS — Z79.01 ANTICOAGULATION MONITORING BY PHARMACIST: ICD-10-CM

## 2017-12-08 DIAGNOSIS — Z12.39 ENCOUNTER FOR SCREENING BREAST EXAMINATION: ICD-10-CM

## 2017-12-08 DIAGNOSIS — M16.12 ARTHRITIS OF LEFT HIP: Primary | ICD-10-CM

## 2017-12-08 DIAGNOSIS — I87.2 VENOUS STASIS DERMATITIS OF BOTH LOWER EXTREMITIES: ICD-10-CM

## 2017-12-08 DIAGNOSIS — I48.91 ATRIAL FIBRILLATION, UNSPECIFIED TYPE (HCC): ICD-10-CM

## 2017-12-08 LAB — INR BLD: 2.6

## 2017-12-08 PROCEDURE — 97110 THERAPEUTIC EXERCISES: CPT

## 2017-12-08 PROCEDURE — 1090F PRES/ABSN URINE INCON ASSESS: CPT | Performed by: FAMILY MEDICINE

## 2017-12-08 PROCEDURE — G8427 DOCREV CUR MEDS BY ELIG CLIN: HCPCS | Performed by: FAMILY MEDICINE

## 2017-12-08 PROCEDURE — 4040F PNEUMOC VAC/ADMIN/RCVD: CPT | Performed by: FAMILY MEDICINE

## 2017-12-08 PROCEDURE — 99214 OFFICE O/P EST MOD 30 MIN: CPT | Performed by: FAMILY MEDICINE

## 2017-12-08 PROCEDURE — 85610 PROTHROMBIN TIME: CPT

## 2017-12-08 PROCEDURE — 36416 COLLJ CAPILLARY BLOOD SPEC: CPT

## 2017-12-08 PROCEDURE — 1036F TOBACCO NON-USER: CPT | Performed by: FAMILY MEDICINE

## 2017-12-08 PROCEDURE — G8417 CALC BMI ABV UP PARAM F/U: HCPCS | Performed by: FAMILY MEDICINE

## 2017-12-08 PROCEDURE — 99211 OFF/OP EST MAY X REQ PHY/QHP: CPT

## 2017-12-08 PROCEDURE — G8484 FLU IMMUNIZE NO ADMIN: HCPCS | Performed by: FAMILY MEDICINE

## 2017-12-08 PROCEDURE — 1123F ACP DISCUSS/DSCN MKR DOCD: CPT | Performed by: FAMILY MEDICINE

## 2017-12-08 PROCEDURE — G8399 PT W/DXA RESULTS DOCUMENT: HCPCS | Performed by: FAMILY MEDICINE

## 2017-12-08 RX ORDER — HYDROCODONE BITARTRATE AND ACETAMINOPHEN 5; 325 MG/1; MG/1
1 TABLET ORAL EVERY 8 HOURS PRN
Qty: 30 TABLET | Refills: 0 | Status: SHIPPED | OUTPATIENT
Start: 2017-12-08 | End: 2018-06-08

## 2017-12-08 ASSESSMENT — ENCOUNTER SYMPTOMS
CHEST TIGHTNESS: 0
WHEEZING: 0
SHORTNESS OF BREATH: 0
STRIDOR: 0

## 2017-12-08 NOTE — PROGRESS NOTES
ANTICOAGULATION SERVICE    Date of Clinic Visit:  12/8/2017    Ayanna Dumont is a 80 y.o. female who presents to clinic today for anticoagulation monitoring and adjustment. Recent INR Results:  Internal QC passed  Lab Results   Component Value Date    INR 2.6 12/08/2017    INR 2.8 11/21/2017       Current Warfarin Dosage:  Dosing Plan  As of 12/8/2017    TTR:   68.3 % (1.3 mo)   Full instructions:   5 mg every day               Assessment/Plan:    Continue current regimen as INR remains stable. Recheck 3 weeks. Next Clinic Appointment:  Return date  As of 12/8/2017    TTR:   68.3 % (1.3 mo)   Next INR check:   12/27/2017             Please call [facility] at [phone] with any questions. Thanks!   Cyrus Farah [facility]  Anticoagulation Service Pharmacist  12/8/2017 11:11 AM

## 2017-12-08 NOTE — PROGRESS NOTES
[address] [facility]  Dept: [phone]  Dept Fax: [phone]  Loc: [phone]    Beverley Olsen is a 80 y.o. female who presents today for her medical conditions/complaints as noted below. Beverley Olsen is c/o of   Chief Complaint   Patient presents with    Medication Refill     pain medicine    Other     lower legs are red    Other     incision check       HPI:     HPI Here today for a follow up of her hip replacement, and concerns about redness in her legs. Hip pain: the pain is on the left side. She was previously taking 2 norco a day and now she is only taking one occasionally. She is sleeping well. She has not had any more episodes of afib. She is frustrated with how slowly she is recovering but overall she is making progress. Discoloration of lower legs: Her lower legs are red. She has noticed that for the past couple of months. She is not having any pain in her legs, the swelling is about her normal and she is not having any fevers. She feels well overall. She has not been using her compression stockings for the past month. Past Medical History:   Diagnosis Date    Actinic keratosis     history of    Arthritis     CAD (coronary artery disease)     history of afib    Cervical prolapse     history of    Diverticulosis     Endocervical polyp     history of    Heel spur     Hip pain     Hyperlipidemia     Hypertension     Nasal septal deviation     Osteoporosis     Stress incontinence     history of    Syncope     Vaginal prolapse     history of          Social History   Substance Use Topics    Smoking status: Never Smoker    Smokeless tobacco: Never Used    Alcohol use No      Current Outpatient Prescriptions   Medication Sig Dispense Refill    HYDROcodone-acetaminophen (NORCO) 5-325 MG per tablet Take 1 tablet by mouth every 8 hours as needed for Pain .  30 tablet 0    warfarin (COUMADIN) 2.5 MG distress. Eyes: Conjunctivae and EOM are normal. Pupils are equal, round, and reactive to light. Neck: Normal range of motion. Neck supple. No thyromegaly present. Cardiovascular: Normal rate, regular rhythm, normal heart sounds and intact distal pulses. No murmur heard. Pulmonary/Chest: Effort normal and breath sounds normal. No respiratory distress. She has no wheezes. Musculoskeletal: She exhibits no edema. Lymphadenopathy:     She has no cervical adenopathy. Neurological: She is alert and oriented to person, place, and time. Skin: Skin is warm and dry. No rash noted. There is erythema. Psychiatric: She has a normal mood and affect. Her behavior is normal. Judgment and thought content normal.   Nursing note and vitals reviewed. /62   Pulse 86   Temp 99.1 °F (37.3 °C) (Tympanic)   Ht 5' 5\" (1.651 m)   Wt 157 lb 3.2 oz (71.3 kg)   SpO2 96%   BMI 26.16 kg/m²     Assessment:      1. Arthritis of left hip     2. Venous stasis dermatitis of both lower extremities     3. Encounter for screening breast examination  JULIO DIGITAL SCREEN W OR WO CAD BILATERAL             Plan:       Left hip replacement: improving; her ROM is improving and she is happy iwht how her pain is improving, she just wishes she was recovering faster. I refilled her norco for another 30 tabs to help her with her pain during the recovery process. Stasis dermatitis: new; I recommended compression stockings to help prevent worsening of the symptoms but she does not like them. She was reassured that she does not have cellulitis. Health Maintenance reviewed - mammogram ordered, patient to schedule appointment. Return in about 6 months (around 6/8/2018) for [address]     Orders Placed This Encounter   Procedures    JULIO DIGITAL SCREEN W OR WO CAD BILATERAL     Standing Status:   Future     Standing Expiration Date:   2/8/2019     Orders Placed This Encounter   Medications    HYDROcodone-acetaminophen (Karole Dakins)

## 2017-12-08 NOTE — LETTER
[address] [place] [address]  Phone: [phone]  Fax: [phone]    Juan Alberto Corrigan MD        December 8, 2017    [place] [address]      To whom it may concern:   Jeny Arriaza had a total hip replacement on the left side so she has an artificial hip. If you have any questions or concerns, please don't hesitate to call.     Sincerely,        Juan Alberto Corrigan MD

## 2017-12-08 NOTE — FLOWSHEET NOTE
control the Trandelenberg deviation, and fair balance    Exercises:   Exercise/Equipment Resistance/Repetitions Other comments             NUSTEP *8' L5, S10         *Counter ex *20x TR/HR, abd, ext, flex, HS, march   *Squat Matrix 10x, 9 position Shld, narrow, wide (NO UE-Advanced)   Lunges 12x Front/ lat , post at bar   Sit to stand 10x2 NO booster NO UE   Advanced gait   F, L, R, retro   NBOS EO,   WBOS EC 3x10''   Foam   Tandem Walking  x4 at bar          Step up 6\" 15x (4\"Left Backward) Front/ side/ back (advanced)   Bridge 15x Narrow & wide stance   B hip abd/ add *20x red Seated, supine    Sideyling hip Abd  x10 B     SLR   readd With QS             Gait with no AD           [x] Provided verbal/tactile cueing for activities related to strengthening, flexibility, endurance, ROM. (12734)  [] Provided verbal/tactile cueing for activities related to improving balance, coordination, kinesthetic sense, posture, motor skill, proprioception. (91081)    Therapeutic Activities:     [] Therapeutic activities, direct (one-on-one) patient contact (use of dynamic activities to improve functional performance). (27037)    Gait:   [] Provided training and instruction to the patient for ambulation re-education. (11229)    Self-Care/ADL's  [] Self-care/home management training and compensatory training, meal preparation, safety procedures, and instructions in use of assistive technology devices/adaptive equipment, direct one-on-one contact. (13265)    Home Exercise Program:   Reviewed this date, understanding noted.    [x] Reviewed/Progressed HEP activities related to strengthening, flexibility, endurance, ROM. (26954)  [] Reviewed/Progressed HEP activities related to improving balance, coordination, kinesthetic sense, posture, motor skill, proprioception.  (26127)    Manual Treatments:    [] Provided manual therapy to mobilize soft tissue/joints for the purpose of modulating pain, promoting relaxation,  increasing ROM,

## 2017-12-13 ENCOUNTER — HOSPITAL ENCOUNTER (OUTPATIENT)
Dept: PHYSICAL THERAPY | Age: 82
Setting detail: THERAPIES SERIES
Discharge: HOME | End: 2017-12-13
Payer: MEDICARE

## 2017-12-13 PROCEDURE — 97110 THERAPEUTIC EXERCISES: CPT | Performed by: PHYSICAL THERAPY ASSISTANT

## 2017-12-13 NOTE — FLOWSHEET NOTE
Physical Therapy Daily Treatment Note    Date:  2017    Patient Name:  August Chowdhury    :  1934  MRN: [id number]    Restrictions/Precautions:       Medical/Treatment Diagnosis Information:   · Diagnosis: M16.12 OA L hip, s/p L NANETTE  · Treatment Diagnosis: L hip OA, s/p NANETTE- by Dr Caleb Gilliam 2/66/10    Insurance/Certification information:  PT Insurance Information: Medicare    Physician Information:  Referring Practitioner: Akhil Vazquez of care signed (Y/N):  YES    Visit# / total visits:  2nd POC (12 total)    Pain level: 4/10     G-Code (if applicable):      Date G-Code Applied:  07  PT G-Codes  Functional Assessment Tool Used: LEFS  Score: 49/80 = 39% Disabilty  Functional Limitation: Mobility: Walking and moving around  Mobility: Walking and Moving Around Current Status (): At least 20 percent but less than 40 percent impaired, limited or restricted  Mobility: Walking and Moving Around Goal Status (): At least 1 percent but less than 20 percent impaired, limited or restricted    Time In: 2:00  Time Out: 3:00    Progress Note: []  Yes  [x]  No  Next due by: Visit #10  , or 18    Subjective: Pt. States pain this date rated 4/10 this date attending with SC. Compliance with current HEP. Objective: ROB complete per flow chart to facilitate strength, motion and stability to allow ease with daily activities, ambulation. Verbal cuing for progression, technique and order of exercises. Difficulty with weight bearing in left LE remains, trendelenburg gait without Ad. Difficulty with dynamic balance and gait remains.      Observation:   (+) Trendelenburg without AD  Test measurements:     Exercises:   Exercise/Equipment Resistance/Repetitions Other comments             NUSTEP *8' L5, S10         *Counter ex 15x2# TR/HR, abd, ext, flex, HS, march   *Squat Matrix 10x, 9 position Shld, narrow, wide (NO UE)   Lunges 12x Front/ lat , post at bar   Sit to stand 10x2 NO booster NO UE   Advanced gait   F, L, R, retro   NBOS EO,   WBOS EC 3x10''   Foam   Tandem Walking  x4 at bar     Tandem stance 3x15'' Initiated        Step up 6\" 15x  Front/ side/ back (Advanced)   Bridge 15x Narrow & wide stance   B hip abd/ add 20x GR Seated, supine    Sideyling hip Abd  x10 B     SLR   15x With QS             Gait with no AD           [x] Provided verbal/tactile cueing for activities related to strengthening, flexibility, endurance, ROM. (66624)  [] Provided verbal/tactile cueing for activities related to improving balance, coordination, kinesthetic sense, posture, motor skill, proprioception. (17773)    Therapeutic Activities:     [] Therapeutic activities, direct (one-on-one) patient contact (use of dynamic activities to improve functional performance). (58369)    Gait:   [] Provided training and instruction to the patient for ambulation re-education. (73569)    Self-Care/ADL's  [] Self-care/home management training and compensatory training, meal preparation, safety procedures, and instructions in use of assistive technology devices/adaptive equipment, direct one-on-one contact. (52683)    Home Exercise Program:   Reviewed this date, understanding noted. [x] Reviewed/Progressed HEP activities related to strengthening, flexibility, endurance, ROM. (71062)  [] Reviewed/Progressed HEP activities related to improving balance, coordination, kinesthetic sense, posture, motor skill, proprioception.  (70169)    Manual Treatments:    [] Provided manual therapy to mobilize soft tissue/joints for the purpose of modulating pain, promoting relaxation,  increasing ROM, reducing/eliminating soft tissue swelling/inflammation/restriction, improving soft tissue extensibility.  (44715)    Service Based Modalities:      Timed Code Treatment Minutes: 61' There-ex     Total Treatment Minutes:   61'    Treatment/Activity Tolerance:  [x] Patient tolerated treatment well [] Patient limited by fatique  [] Patient limited by pain  [] Patient

## 2017-12-19 ENCOUNTER — APPOINTMENT (OUTPATIENT)
Dept: PHYSICAL THERAPY | Age: 82
End: 2017-12-19
Payer: MEDICARE

## 2017-12-21 ENCOUNTER — HOSPITAL ENCOUNTER (OUTPATIENT)
Dept: MAMMOGRAPHY | Age: 82
Discharge: HOME | End: 2017-12-21
Payer: MEDICARE

## 2017-12-21 ENCOUNTER — HOSPITAL ENCOUNTER (OUTPATIENT)
Dept: PHYSICAL THERAPY | Age: 82
Setting detail: THERAPIES SERIES
Discharge: HOME | End: 2017-12-21
Payer: MEDICARE

## 2017-12-21 DIAGNOSIS — Z12.39 ENCOUNTER FOR SCREENING BREAST EXAMINATION: ICD-10-CM

## 2017-12-21 PROCEDURE — 97110 THERAPEUTIC EXERCISES: CPT

## 2017-12-21 PROCEDURE — 77063 BREAST TOMOSYNTHESIS BI: CPT

## 2017-12-21 NOTE — FLOWSHEET NOTE
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, s/p L NANETTE  · Treatment Diagnosis: L hip OA, s/p NANETTE- by Dr Shubham Perez     Insurance/Certification information:  PT Insurance Information: Medicare    Physician Information:  Referring Practitioner: Emily Welsh of [facility] signed (Y/N):  YES    Visit# / total visits: 3/8 2nd POC (13 total)    Pain level: 5/10     G-Code (if applicable):      Date G-Code Applied:  07  PT G-Codes  Functional Assessment Tool Used: LEFS  Score: 49/80 = 39% Disabilty  Functional Limitation: Mobility: Walking and moving around  Mobility: Walking and Moving Around Current Status (): At least 20 percent but less than 40 percent impaired, limited or restricted  Mobility: Walking and Moving Around Goal Status (): At least 1 percent but less than 20 percent impaired, limited or restricted    Time In: 328  Time Out: 415    Progress Note: []  Yes  [x]  No  Next due by: Visit #10  , or 18    Subjective: Pt. States pain this date rated 5/10 this date attending a  luncheon. Objective: ROB complete per flow chart to facilitate strength, motion and stability to allow ease with daily activities, ambulation. Verbal cuing for progression, technique and order of exercises. Difficulty with weight bearing in left LE remains, trendelenburg gait without Ad. Difficulty with dynamic balance and gait remains. Moderate difficulty with EC on Foam with feet together.        Observation:   (+) Trendelenburg without AD  Test measurements:     Exercises:   Exercise/Equipment Resistance/Repetitions Other comments             NUSTEP *8' L5, S10         *Counter ex 15x2# TR/HR, abd, ext, flex, HS, march   *Squat Matrix 10x, 9 position Shld, narrow, wide (NO UE)   Lunges 12x Front/ lat , post at bar   Sit to

## 2017-12-27 ENCOUNTER — HOSPITAL ENCOUNTER (OUTPATIENT)
Dept: PHARMACY | Age: 82
Setting detail: THERAPIES SERIES
Discharge: HOME | End: 2017-12-27
Payer: MEDICARE

## 2017-12-27 ENCOUNTER — HOSPITAL ENCOUNTER (OUTPATIENT)
Dept: PHYSICAL THERAPY | Age: 82
Setting detail: THERAPIES SERIES
Discharge: HOME | End: 2017-12-27
Payer: MEDICARE

## 2017-12-27 DIAGNOSIS — Z79.01 ANTICOAGULATION MONITORING BY PHARMACIST: ICD-10-CM

## 2017-12-27 DIAGNOSIS — I48.91 ATRIAL FIBRILLATION, UNSPECIFIED TYPE (HCC): ICD-10-CM

## 2017-12-27 LAB — INR BLD: 2.8

## 2017-12-27 PROCEDURE — 97110 THERAPEUTIC EXERCISES: CPT | Performed by: PHYSICAL THERAPY ASSISTANT

## 2017-12-27 PROCEDURE — 99211 OFF/OP EST MAY X REQ PHY/QHP: CPT

## 2017-12-27 PROCEDURE — 85610 PROTHROMBIN TIME: CPT

## 2017-12-27 PROCEDURE — 36416 COLLJ CAPILLARY BLOOD SPEC: CPT

## 2017-12-27 NOTE — PROGRESS NOTES
ANTICOAGULATION SERVICE    Date of Clinic Visit:  12/27/2017    Logan Tam is a 80 y.o. female who presents to clinic today for anticoagulation monitoring and adjustment. Recent INR Results:  Internal QC passed  Lab Results   Component Value Date    INR 2.8 12/27/2017    INR 2.6 12/08/2017       Current Warfarin Dosage:  Dosing Plan  As of 12/27/2017    TTR:   78.2 % (2 mo)   Full instructions:   5 mg every day               Assessment/Plan:    Continue current regimen as INR remains stable. Next Clinic Appointment:  Return date  As of 12/27/2017    TTR:   78.2 % (2 mo)   Next INR check:   1/24/2018             Please call [facility] at [phone] with any questions. Thanks!   Charly Ratliff, [address] [facility]  Anticoagulation Service Pharmacist  12/27/2017 10:58 AM

## 2018-01-03 ENCOUNTER — HOSPITAL ENCOUNTER (OUTPATIENT)
Dept: PHYSICAL THERAPY | Age: 83
Setting detail: THERAPIES SERIES
Discharge: HOME | End: 2018-01-03
Payer: MEDICARE

## 2018-01-03 PROCEDURE — 97110 THERAPEUTIC EXERCISES: CPT

## 2018-01-03 PROCEDURE — G8980 MOBILITY D/C STATUS: HCPCS

## 2018-01-03 PROCEDURE — G8979 MOBILITY GOAL STATUS: HCPCS

## 2018-01-03 NOTE — FLOWSHEET NOTE
*Counter ex 15x2# TR/HR, abd, ext, flex, HS, march   *Squat Matrix 10x, 9 position Shld, narrow, wide (NO UE)   Lunges  Front/ lat , post at bar   Sit to stand  NO booster NO UE   Advanced gait   F, L, R, retro   NBOS EO,   WBOS EC   Foam   Tandem Walking  x4 at bar     Tandem stance     Side Stepping  x4 at bar     Step up  Front/ side/ back    Bridge  Narrow & wide stance   B hip abd/ add 20x GR ball 5\" x 20  Seated, supine    Sideyling hip Abd      SLR    With QS                  Hip hike 6''x10 ea Initiated   Bowling  initiated   Gait with no AD           [x] Provided verbal/tactile cueing for activities related to strengthening, flexibility, endurance, ROM. (85994)  [] Provided verbal/tactile cueing for activities related to improving balance, coordination, kinesthetic sense, posture, motor skill, proprioception. (27855)    Therapeutic Activities:     [] Therapeutic activities, direct (one-on-one) patient contact (use of dynamic activities to improve functional performance). (65577)    Gait:   [] Provided training and instruction to the patient for ambulation re-education. (37987)    Self-Care/ADL's  [] Self-care/home management training and compensatory training, meal preparation, safety procedures, and instructions in use of assistive technology devices/adaptive equipment, direct one-on-one contact. (22183)    Home Exercise Program:   Reviewed this date, understanding noted. [x] Reviewed/Progressed HEP activities related to strengthening, flexibility, endurance, ROM. (33293)  [] Reviewed/Progressed HEP activities related to improving balance, coordination, kinesthetic sense, posture, motor skill, proprioception.  (40380)    Manual Treatments:    [] Provided manual therapy to mobilize soft tissue/joints for the purpose of modulating pain, promoting relaxation,  increasing ROM, reducing/eliminating soft tissue swelling/inflammation/restriction, improving soft tissue extensibility.  (11749)    Service Based

## 2018-01-03 NOTE — DISCHARGE SUMMARY
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, s/p L NANETTE  · Treatment Diagnosis: L hip OA, s/p NANETTE- by Dr Bishop Sanders      Insurance/Certification information:  PT Insurance Information: Medicare     Physician Information:  Referring Practitioner: [address] [place] [address] signed (Y/N):  YES     Visit# / total visits:  2nd POC (15 total)     Pain level:      5/10       G-Code (if applicable):      Date G-Code Applied:  1/3/2018  PT G-Codes  Functional Assessment Tool Used: LEFS   Score: 56/80=30%  Functional Limitation: Mobility: Walking and moving around  Mobility: Walking and Moving Around Goal Status (): At least 1 percent but less than 20 percent impaired, limited or restricted  Mobility: Walking and Moving Around Discharge Status (): At least 20 percent but less than 40 percent impaired, limited or restricted     Time Period for Report: 10/30/17-1/3/18  Cancels/No-shows to date:  1    Plan of Care/Treatment to date:  [x] Therapeutic Exercise    [x] Modalities:  [x] Therapeutic Activity     [] Ultrasound  [] Electrical Stimulation  [x] Gait Training      [] Cervical Traction    [] Lumbar Traction  [x] Neuromuscular Re-education  [] Cold/hotpack [] Iontophoresis  [x] Instruction in HEP      Other:  [x] Manual Therapy       []    [] Aquatic Therapy       []                    ? Subjective:    Pt. States pain this date rated 5/10 through knee and leg. Presents with min trendelenburg gait this date and no Ad.   Patient noting 50% overall improved since starting therapy.             Objective:     ·   (+) Trendelenburg without Ad, weakness

## 2018-01-05 ENCOUNTER — APPOINTMENT (OUTPATIENT)
Dept: PHYSICAL THERAPY | Age: 83
End: 2018-01-05
Payer: MEDICARE

## 2018-01-24 ENCOUNTER — HOSPITAL ENCOUNTER (OUTPATIENT)
Dept: PHARMACY | Age: 83
Setting detail: THERAPIES SERIES
Discharge: HOME | End: 2018-01-24
Payer: MEDICARE

## 2018-01-24 DIAGNOSIS — I48.91 ATRIAL FIBRILLATION, UNSPECIFIED TYPE (HCC): ICD-10-CM

## 2018-01-24 DIAGNOSIS — Z79.01 ANTICOAGULATION MONITORING BY PHARMACIST: ICD-10-CM

## 2018-01-24 LAB — INR BLD: 2.9

## 2018-01-24 PROCEDURE — 85610 PROTHROMBIN TIME: CPT

## 2018-01-24 PROCEDURE — 99211 OFF/OP EST MAY X REQ PHY/QHP: CPT

## 2018-01-24 PROCEDURE — 36416 COLLJ CAPILLARY BLOOD SPEC: CPT

## 2018-01-30 ENCOUNTER — OFFICE VISIT (OUTPATIENT)
Dept: OBGYN | Age: 83
End: 2018-01-30
Payer: MEDICARE

## 2018-01-30 VITALS
HEIGHT: 65 IN | WEIGHT: 160.6 LBS | DIASTOLIC BLOOD PRESSURE: 88 MMHG | SYSTOLIC BLOOD PRESSURE: 146 MMHG | HEART RATE: 82 BPM | BODY MASS INDEX: 26.76 KG/M2

## 2018-01-30 DIAGNOSIS — N89.8 VAGINAL EROSION SECONDARY TO PESSARY USE, INITIAL ENCOUNTER (HCC): Primary | ICD-10-CM

## 2018-01-30 DIAGNOSIS — T83.89XA VAGINAL EROSION SECONDARY TO PESSARY USE, INITIAL ENCOUNTER (HCC): Primary | ICD-10-CM

## 2018-01-30 PROCEDURE — 1036F TOBACCO NON-USER: CPT | Performed by: OBSTETRICS & GYNECOLOGY

## 2018-01-30 PROCEDURE — G8427 DOCREV CUR MEDS BY ELIG CLIN: HCPCS | Performed by: OBSTETRICS & GYNECOLOGY

## 2018-01-30 PROCEDURE — 99213 OFFICE O/P EST LOW 20 MIN: CPT | Performed by: OBSTETRICS & GYNECOLOGY

## 2018-01-30 PROCEDURE — 4040F PNEUMOC VAC/ADMIN/RCVD: CPT | Performed by: OBSTETRICS & GYNECOLOGY

## 2018-01-30 PROCEDURE — 1123F ACP DISCUSS/DSCN MKR DOCD: CPT | Performed by: OBSTETRICS & GYNECOLOGY

## 2018-01-30 PROCEDURE — G8484 FLU IMMUNIZE NO ADMIN: HCPCS | Performed by: OBSTETRICS & GYNECOLOGY

## 2018-01-30 PROCEDURE — G8417 CALC BMI ABV UP PARAM F/U: HCPCS | Performed by: OBSTETRICS & GYNECOLOGY

## 2018-01-30 PROCEDURE — 1090F PRES/ABSN URINE INCON ASSESS: CPT | Performed by: OBSTETRICS & GYNECOLOGY

## 2018-01-30 PROCEDURE — G8399 PT W/DXA RESULTS DOCUMENT: HCPCS | Performed by: OBSTETRICS & GYNECOLOGY

## 2018-02-02 ENCOUNTER — OFFICE VISIT (OUTPATIENT)
Dept: OBGYN | Age: 83
End: 2018-02-02
Payer: MEDICARE

## 2018-02-02 VITALS
RESPIRATION RATE: 16 BRPM | DIASTOLIC BLOOD PRESSURE: 74 MMHG | BODY MASS INDEX: 26.66 KG/M2 | HEART RATE: 80 BPM | WEIGHT: 160 LBS | SYSTOLIC BLOOD PRESSURE: 120 MMHG | HEIGHT: 65 IN

## 2018-02-02 DIAGNOSIS — N89.8 VAGINAL EROSION SECONDARY TO PESSARY USE, SUBSEQUENT ENCOUNTER: Primary | ICD-10-CM

## 2018-02-02 DIAGNOSIS — T83.89XD VAGINAL EROSION SECONDARY TO PESSARY USE, SUBSEQUENT ENCOUNTER: Primary | ICD-10-CM

## 2018-02-02 PROCEDURE — G8417 CALC BMI ABV UP PARAM F/U: HCPCS | Performed by: OBSTETRICS & GYNECOLOGY

## 2018-02-02 PROCEDURE — G8427 DOCREV CUR MEDS BY ELIG CLIN: HCPCS | Performed by: OBSTETRICS & GYNECOLOGY

## 2018-02-02 PROCEDURE — 99211 OFF/OP EST MAY X REQ PHY/QHP: CPT | Performed by: OBSTETRICS & GYNECOLOGY

## 2018-02-02 PROCEDURE — 4040F PNEUMOC VAC/ADMIN/RCVD: CPT | Performed by: OBSTETRICS & GYNECOLOGY

## 2018-02-02 NOTE — PROGRESS NOTES
Subjective:      Patient ID: Babita Denton is a 80 y.o. female. HPI  Here in F/U to vaginal mucosa erosion due to pessary. Denies problems or further bleeding. Has tolerated the pessary being out but stats urine is harder to control  Review of Systems    Objective:   Physical Exam    Assessment:    Erosion beginning to heal.      Plan:      Would leave pessary out another week. F/U in a few weeks or sooner for more bleeding.

## 2018-02-21 ENCOUNTER — HOSPITAL ENCOUNTER (OUTPATIENT)
Dept: PHARMACY | Age: 83
Setting detail: THERAPIES SERIES
Discharge: HOME | End: 2018-02-21
Payer: MEDICARE

## 2018-02-21 DIAGNOSIS — I48.91 ATRIAL FIBRILLATION, UNSPECIFIED TYPE (HCC): ICD-10-CM

## 2018-02-21 DIAGNOSIS — Z79.01 ANTICOAGULATION MONITORING BY PHARMACIST: ICD-10-CM

## 2018-02-21 LAB — INR BLD: 2.4

## 2018-02-21 PROCEDURE — 85610 PROTHROMBIN TIME: CPT

## 2018-02-21 PROCEDURE — 99211 OFF/OP EST MAY X REQ PHY/QHP: CPT

## 2018-02-21 PROCEDURE — 36416 COLLJ CAPILLARY BLOOD SPEC: CPT

## 2018-02-28 ENCOUNTER — OFFICE VISIT (OUTPATIENT)
Dept: OBGYN | Age: 83
End: 2018-02-28
Payer: MEDICARE

## 2018-02-28 VITALS
BODY MASS INDEX: 26.49 KG/M2 | DIASTOLIC BLOOD PRESSURE: 74 MMHG | HEIGHT: 65 IN | HEART RATE: 64 BPM | SYSTOLIC BLOOD PRESSURE: 122 MMHG | RESPIRATION RATE: 16 BRPM | WEIGHT: 159 LBS

## 2018-02-28 DIAGNOSIS — N81.89 PELVIC RELAXATION DISORDER: Primary | ICD-10-CM

## 2018-02-28 PROCEDURE — G8484 FLU IMMUNIZE NO ADMIN: HCPCS | Performed by: OBSTETRICS & GYNECOLOGY

## 2018-02-28 PROCEDURE — 1090F PRES/ABSN URINE INCON ASSESS: CPT | Performed by: OBSTETRICS & GYNECOLOGY

## 2018-02-28 PROCEDURE — G8399 PT W/DXA RESULTS DOCUMENT: HCPCS | Performed by: OBSTETRICS & GYNECOLOGY

## 2018-02-28 PROCEDURE — G8427 DOCREV CUR MEDS BY ELIG CLIN: HCPCS | Performed by: OBSTETRICS & GYNECOLOGY

## 2018-02-28 PROCEDURE — 1036F TOBACCO NON-USER: CPT | Performed by: OBSTETRICS & GYNECOLOGY

## 2018-02-28 PROCEDURE — 4040F PNEUMOC VAC/ADMIN/RCVD: CPT | Performed by: OBSTETRICS & GYNECOLOGY

## 2018-02-28 PROCEDURE — 99213 OFFICE O/P EST LOW 20 MIN: CPT | Performed by: OBSTETRICS & GYNECOLOGY

## 2018-02-28 PROCEDURE — G8417 CALC BMI ABV UP PARAM F/U: HCPCS | Performed by: OBSTETRICS & GYNECOLOGY

## 2018-02-28 PROCEDURE — 1123F ACP DISCUSS/DSCN MKR DOCD: CPT | Performed by: OBSTETRICS & GYNECOLOGY

## 2018-03-21 ENCOUNTER — HOSPITAL ENCOUNTER (OUTPATIENT)
Dept: PHARMACY | Age: 83
Setting detail: THERAPIES SERIES
Discharge: HOME | End: 2018-03-21
Payer: MEDICARE

## 2018-03-21 DIAGNOSIS — I48.91 ATRIAL FIBRILLATION, UNSPECIFIED TYPE (HCC): ICD-10-CM

## 2018-03-21 DIAGNOSIS — Z79.01 ANTICOAGULATION MONITORING BY PHARMACIST: ICD-10-CM

## 2018-03-21 LAB — INR BLD: 1.9

## 2018-03-21 PROCEDURE — 85610 PROTHROMBIN TIME: CPT

## 2018-03-21 PROCEDURE — 99211 OFF/OP EST MAY X REQ PHY/QHP: CPT

## 2018-03-21 PROCEDURE — 36416 COLLJ CAPILLARY BLOOD SPEC: CPT

## 2018-04-03 RX ORDER — WARFARIN SODIUM 2.5 MG/1
TABLET ORAL
Qty: 60 TABLET | Refills: 3 | Status: SHIPPED | OUTPATIENT
Start: 2018-04-03 | End: 2019-07-29 | Stop reason: SDUPTHER

## 2018-04-18 ENCOUNTER — HOSPITAL ENCOUNTER (OUTPATIENT)
Dept: PHARMACY | Age: 83
Setting detail: THERAPIES SERIES
Discharge: HOME | End: 2018-04-18
Payer: MEDICARE

## 2018-04-18 DIAGNOSIS — I48.91 ATRIAL FIBRILLATION, UNSPECIFIED TYPE (HCC): ICD-10-CM

## 2018-04-18 DIAGNOSIS — Z79.01 ANTICOAGULATION MONITORING BY PHARMACIST: ICD-10-CM

## 2018-04-18 LAB — INR BLD: 2.1

## 2018-04-18 PROCEDURE — 99211 OFF/OP EST MAY X REQ PHY/QHP: CPT

## 2018-04-18 PROCEDURE — 36416 COLLJ CAPILLARY BLOOD SPEC: CPT

## 2018-04-18 PROCEDURE — 85610 PROTHROMBIN TIME: CPT

## 2018-05-03 ENCOUNTER — OFFICE VISIT (OUTPATIENT)
Dept: CARDIOLOGY | Age: 83
End: 2018-05-03
Payer: MEDICARE

## 2018-05-03 VITALS
WEIGHT: 163.6 LBS | HEART RATE: 62 BPM | HEIGHT: 65 IN | BODY MASS INDEX: 27.26 KG/M2 | DIASTOLIC BLOOD PRESSURE: 68 MMHG | SYSTOLIC BLOOD PRESSURE: 130 MMHG

## 2018-05-03 DIAGNOSIS — I48.0 PAF (PAROXYSMAL ATRIAL FIBRILLATION) (HCC): Primary | ICD-10-CM

## 2018-05-03 PROCEDURE — 99213 OFFICE O/P EST LOW 20 MIN: CPT | Performed by: INTERNAL MEDICINE

## 2018-05-03 PROCEDURE — 93000 ELECTROCARDIOGRAM COMPLETE: CPT | Performed by: INTERNAL MEDICINE

## 2018-05-03 ASSESSMENT — ENCOUNTER SYMPTOMS
ABDOMINAL DISTENTION: 0
SHORTNESS OF BREATH: 0
ABDOMINAL PAIN: 0
CHEST TIGHTNESS: 0

## 2018-05-10 RX ORDER — PANTOPRAZOLE SODIUM 40 MG/1
TABLET, DELAYED RELEASE ORAL
Qty: 90 TABLET | Refills: 3 | Status: SHIPPED | OUTPATIENT
Start: 2018-05-10 | End: 2018-06-08

## 2018-05-16 ENCOUNTER — HOSPITAL ENCOUNTER (OUTPATIENT)
Dept: PHARMACY | Age: 83
Setting detail: THERAPIES SERIES
Discharge: HOME | End: 2018-05-16
Payer: MEDICARE

## 2018-05-16 ENCOUNTER — HOSPITAL ENCOUNTER (OUTPATIENT)
Dept: LAB | Age: 83
Setting detail: SPECIMEN
Discharge: HOME | End: 2018-05-16
Payer: MEDICARE

## 2018-05-16 DIAGNOSIS — I48.91 ATRIAL FIBRILLATION, UNSPECIFIED TYPE (HCC): ICD-10-CM

## 2018-05-16 DIAGNOSIS — Z79.01 ANTICOAGULATION MONITORING BY PHARMACIST: ICD-10-CM

## 2018-05-16 LAB
ANION GAP SERPL CALCULATED.3IONS-SCNC: 12 MMOL/L (ref 9–17)
BUN BLDV-MCNC: 17 MG/DL (ref 8–23)
BUN/CREAT BLD: 15 (ref 9–20)
CALCIUM SERPL-MCNC: 9.3 MG/DL (ref 8.6–10.4)
CHLORIDE BLD-SCNC: 98 MMOL/L (ref 98–107)
CO2: 29 MMOL/L (ref 20–31)
CREAT SERPL-MCNC: 1.17 MG/DL (ref 0.5–0.9)
GFR AFRICAN AMERICAN: 54 ML/MIN
GFR NON-AFRICAN AMERICAN: 44 ML/MIN
GFR SERPL CREATININE-BSD FRML MDRD: ABNORMAL ML/MIN/{1.73_M2}
GFR SERPL CREATININE-BSD FRML MDRD: ABNORMAL ML/MIN/{1.73_M2}
GLUCOSE BLD-MCNC: 93 MG/DL (ref 70–99)
HCT VFR BLD CALC: 44.1 % (ref 36–46)
HEMOGLOBIN: 14.7 G/DL (ref 12–16)
INR BLD: 2.6
INR BLD: 2.8
MCH RBC QN AUTO: 27.6 PG (ref 26–34)
MCHC RBC AUTO-ENTMCNC: 33.3 G/DL (ref 31–37)
MCV RBC AUTO: 82.7 FL (ref 80–100)
NRBC AUTOMATED: ABNORMAL PER 100 WBC
PARTIAL THROMBOPLASTIN TIME: 39.2 SEC (ref 27–35)
PDW BLD-RTO: 15.8 % (ref 11–14.5)
PLATELET # BLD: 154 K/UL (ref 140–450)
PMV BLD AUTO: 7.6 FL (ref 6–12)
POTASSIUM SERPL-SCNC: 4.3 MMOL/L (ref 3.7–5.3)
PROTHROMBIN TIME: 27.5 SEC (ref 9.4–11.3)
RBC # BLD: 5.34 M/UL (ref 4–5.2)
SODIUM BLD-SCNC: 139 MMOL/L (ref 135–144)
WBC # BLD: 5.6 K/UL (ref 3.5–11)

## 2018-05-16 PROCEDURE — 36416 COLLJ CAPILLARY BLOOD SPEC: CPT

## 2018-05-16 PROCEDURE — 85610 PROTHROMBIN TIME: CPT

## 2018-05-16 PROCEDURE — 85027 COMPLETE CBC AUTOMATED: CPT

## 2018-05-16 PROCEDURE — 36415 COLL VENOUS BLD VENIPUNCTURE: CPT

## 2018-05-16 PROCEDURE — 99211 OFF/OP EST MAY X REQ PHY/QHP: CPT

## 2018-05-16 PROCEDURE — 80048 BASIC METABOLIC PNL TOTAL CA: CPT

## 2018-05-16 PROCEDURE — 85730 THROMBOPLASTIN TIME PARTIAL: CPT

## 2018-06-01 ENCOUNTER — ANESTHESIA (OUTPATIENT)
Dept: OPERATING ROOM | Age: 83
End: 2018-06-01
Payer: MEDICARE

## 2018-06-01 ENCOUNTER — ANESTHESIA EVENT (OUTPATIENT)
Dept: OPERATING ROOM | Age: 83
End: 2018-06-01
Payer: MEDICARE

## 2018-06-01 ENCOUNTER — HOSPITAL ENCOUNTER (OUTPATIENT)
Age: 83
Setting detail: OUTPATIENT SURGERY
Discharge: HOME | End: 2018-06-01
Attending: SPECIALIST | Admitting: SPECIALIST
Payer: MEDICARE

## 2018-06-01 VITALS
WEIGHT: 158.2 LBS | RESPIRATION RATE: 16 BRPM | DIASTOLIC BLOOD PRESSURE: 71 MMHG | TEMPERATURE: 98 F | SYSTOLIC BLOOD PRESSURE: 112 MMHG | HEIGHT: 65 IN | BODY MASS INDEX: 26.36 KG/M2 | HEART RATE: 99 BPM | OXYGEN SATURATION: 96 %

## 2018-06-01 VITALS
RESPIRATION RATE: 13 BRPM | OXYGEN SATURATION: 92 % | SYSTOLIC BLOOD PRESSURE: 122 MMHG | DIASTOLIC BLOOD PRESSURE: 55 MMHG

## 2018-06-01 PROCEDURE — 3700000000 HC ANESTHESIA ATTENDED CARE: Performed by: SPECIALIST

## 2018-06-01 PROCEDURE — 2500000003 HC RX 250 WO HCPCS: Performed by: SPECIALIST

## 2018-06-01 PROCEDURE — 3600000012 HC SURGERY LEVEL 2 ADDTL 15MIN: Performed by: SPECIALIST

## 2018-06-01 PROCEDURE — 7100000010 HC PHASE II RECOVERY - FIRST 15 MIN: Performed by: SPECIALIST

## 2018-06-01 PROCEDURE — 6360000002 HC RX W HCPCS: Performed by: NURSE ANESTHETIST, CERTIFIED REGISTERED

## 2018-06-01 PROCEDURE — 2580000003 HC RX 258: Performed by: SPECIALIST

## 2018-06-01 PROCEDURE — 7100000011 HC PHASE II RECOVERY - ADDTL 15 MIN: Performed by: SPECIALIST

## 2018-06-01 PROCEDURE — 3600000002 HC SURGERY LEVEL 2 BASE: Performed by: SPECIALIST

## 2018-06-01 PROCEDURE — 3700000001 HC ADD 15 MINUTES (ANESTHESIA): Performed by: SPECIALIST

## 2018-06-01 PROCEDURE — 2500000003 HC RX 250 WO HCPCS: Performed by: NURSE ANESTHETIST, CERTIFIED REGISTERED

## 2018-06-01 RX ORDER — CEFAZOLIN SODIUM 1 G/3ML
INJECTION, POWDER, FOR SOLUTION INTRAMUSCULAR; INTRAVENOUS PRN
Status: DISCONTINUED | OUTPATIENT
Start: 2018-06-01 | End: 2018-06-01 | Stop reason: SDUPTHER

## 2018-06-01 RX ORDER — METOPROLOL TARTRATE 5 MG/5ML
INJECTION INTRAVENOUS PRN
Status: DISCONTINUED | OUTPATIENT
Start: 2018-06-01 | End: 2018-06-01 | Stop reason: SDUPTHER

## 2018-06-01 RX ORDER — LIDOCAINE HYDROCHLORIDE AND EPINEPHRINE 10; 10 MG/ML; UG/ML
INJECTION, SOLUTION INFILTRATION; PERINEURAL PRN
Status: DISCONTINUED | OUTPATIENT
Start: 2018-06-01 | End: 2018-06-01 | Stop reason: HOSPADM

## 2018-06-01 RX ORDER — ESMOLOL HYDROCHLORIDE 10 MG/ML
INJECTION INTRAVENOUS PRN
Status: DISCONTINUED | OUTPATIENT
Start: 2018-06-01 | End: 2018-06-01 | Stop reason: SDUPTHER

## 2018-06-01 RX ORDER — FENTANYL CITRATE 50 UG/ML
INJECTION, SOLUTION INTRAMUSCULAR; INTRAVENOUS PRN
Status: DISCONTINUED | OUTPATIENT
Start: 2018-06-01 | End: 2018-06-01 | Stop reason: SDUPTHER

## 2018-06-01 RX ORDER — SODIUM CHLORIDE 9 MG/ML
INJECTION, SOLUTION INTRAVENOUS CONTINUOUS
Status: DISCONTINUED | OUTPATIENT
Start: 2018-06-01 | End: 2018-06-01 | Stop reason: HOSPADM

## 2018-06-01 RX ORDER — CEFAZOLIN SODIUM 1 G/50ML
1 INJECTION, SOLUTION INTRAVENOUS
Status: DISCONTINUED | OUTPATIENT
Start: 2018-06-01 | End: 2018-06-01 | Stop reason: HOSPADM

## 2018-06-01 RX ORDER — CEFADROXIL 500 MG/1
500 CAPSULE ORAL 2 TIMES DAILY
COMMUNITY
End: 2018-06-08 | Stop reason: ALTCHOICE

## 2018-06-01 RX ORDER — PROPOFOL 10 MG/ML
INJECTION, EMULSION INTRAVENOUS PRN
Status: DISCONTINUED | OUTPATIENT
Start: 2018-06-01 | End: 2018-06-01 | Stop reason: SDUPTHER

## 2018-06-01 RX ADMIN — METOPROLOL TARTRATE 2.5 MG: 5 INJECTION, SOLUTION INTRAVENOUS at 10:48

## 2018-06-01 RX ADMIN — CEFAZOLIN 1000 MG: 1 INJECTION, POWDER, FOR SOLUTION INTRAMUSCULAR; INTRAVENOUS; PARENTERAL at 10:18

## 2018-06-01 RX ADMIN — PROPOFOL 10 MG: 10 INJECTION, EMULSION INTRAVENOUS at 10:46

## 2018-06-01 RX ADMIN — FENTANYL CITRATE 25 MCG: 50 INJECTION INTRAMUSCULAR; INTRAVENOUS at 10:40

## 2018-06-01 RX ADMIN — ESMOLOL HYDROCHLORIDE 20 MG: 10 INJECTION, SOLUTION INTRAVENOUS at 10:41

## 2018-06-01 RX ADMIN — PROPOFOL 20 MG: 10 INJECTION, EMULSION INTRAVENOUS at 10:40

## 2018-06-01 RX ADMIN — FENTANYL CITRATE 25 MCG: 50 INJECTION INTRAMUSCULAR; INTRAVENOUS at 10:46

## 2018-06-01 RX ADMIN — ESMOLOL HYDROCHLORIDE 20 MG: 10 INJECTION, SOLUTION INTRAVENOUS at 10:36

## 2018-06-01 RX ADMIN — METOPROLOL TARTRATE 2.5 MG: 5 INJECTION, SOLUTION INTRAVENOUS at 10:44

## 2018-06-01 RX ADMIN — PROPOFOL 40 MG: 10 INJECTION, EMULSION INTRAVENOUS at 10:16

## 2018-06-01 RX ADMIN — PROPOFOL 10 MG: 10 INJECTION, EMULSION INTRAVENOUS at 10:27

## 2018-06-01 RX ADMIN — PROPOFOL 40 MG: 10 INJECTION, EMULSION INTRAVENOUS at 10:21

## 2018-06-01 RX ADMIN — FENTANYL CITRATE 50 MCG: 50 INJECTION INTRAMUSCULAR; INTRAVENOUS at 10:14

## 2018-06-01 RX ADMIN — PROPOFOL 10 MG: 10 INJECTION, EMULSION INTRAVENOUS at 10:32

## 2018-06-01 RX ADMIN — SODIUM CHLORIDE: 9 INJECTION, SOLUTION INTRAVENOUS at 10:13

## 2018-06-01 ASSESSMENT — PULMONARY FUNCTION TESTS
PIF_VALUE: 0

## 2018-06-01 ASSESSMENT — PAIN DESCRIPTION - DESCRIPTORS: DESCRIPTORS: SORE

## 2018-06-01 ASSESSMENT — PAIN SCALES - GENERAL: PAINLEVEL_OUTOF10: 0

## 2018-06-01 ASSESSMENT — PAIN - FUNCTIONAL ASSESSMENT: PAIN_FUNCTIONAL_ASSESSMENT: 0-10

## 2018-06-08 ENCOUNTER — OFFICE VISIT (OUTPATIENT)
Dept: FAMILY MEDICINE CLINIC | Age: 83
End: 2018-06-08
Payer: MEDICARE

## 2018-06-08 VITALS
SYSTOLIC BLOOD PRESSURE: 138 MMHG | TEMPERATURE: 97.3 F | BODY MASS INDEX: 26.99 KG/M2 | WEIGHT: 162 LBS | DIASTOLIC BLOOD PRESSURE: 72 MMHG | HEART RATE: 57 BPM | HEIGHT: 65 IN | OXYGEN SATURATION: 95 %

## 2018-06-08 DIAGNOSIS — Z00.00 ROUTINE GENERAL MEDICAL EXAMINATION AT A HEALTH CARE FACILITY: Primary | ICD-10-CM

## 2018-06-08 PROBLEM — Z91.89 AT HIGH RISK FOR PAIN FROM PROCEDURE: Status: RESOLVED | Noted: 2017-09-19 | Resolved: 2018-06-08

## 2018-06-08 PROCEDURE — 4040F PNEUMOC VAC/ADMIN/RCVD: CPT | Performed by: FAMILY MEDICINE

## 2018-06-08 PROCEDURE — G0438 PPPS, INITIAL VISIT: HCPCS | Performed by: FAMILY MEDICINE

## 2018-06-08 PROCEDURE — G0009 ADMIN PNEUMOCOCCAL VACCINE: HCPCS | Performed by: FAMILY MEDICINE

## 2018-06-08 PROCEDURE — 90732 PPSV23 VACC 2 YRS+ SUBQ/IM: CPT | Performed by: FAMILY MEDICINE

## 2018-06-08 ASSESSMENT — ENCOUNTER SYMPTOMS
WHEEZING: 0
BLURRED VISION: 0
CONSTIPATION: 0
ABDOMINAL PAIN: 0
SPUTUM PRODUCTION: 0
SHORTNESS OF BREATH: 0
BACK PAIN: 0
COUGH: 0

## 2018-06-08 ASSESSMENT — ANXIETY QUESTIONNAIRES: GAD7 TOTAL SCORE: 0

## 2018-06-08 ASSESSMENT — PATIENT HEALTH QUESTIONNAIRE - PHQ9: SUM OF ALL RESPONSES TO PHQ QUESTIONS 1-9: 0

## 2018-06-08 ASSESSMENT — LIFESTYLE VARIABLES: HOW OFTEN DO YOU HAVE A DRINK CONTAINING ALCOHOL: 0

## 2018-06-13 ENCOUNTER — HOSPITAL ENCOUNTER (OUTPATIENT)
Dept: PHARMACY | Age: 83
Setting detail: THERAPIES SERIES
Discharge: HOME | End: 2018-06-13
Payer: MEDICARE

## 2018-06-13 DIAGNOSIS — Z79.01 ANTICOAGULATION MONITORING BY PHARMACIST: ICD-10-CM

## 2018-06-13 LAB — INR BLD: 2.3

## 2018-06-13 PROCEDURE — 99211 OFF/OP EST MAY X REQ PHY/QHP: CPT

## 2018-06-13 PROCEDURE — 36416 COLLJ CAPILLARY BLOOD SPEC: CPT

## 2018-06-13 PROCEDURE — 85610 PROTHROMBIN TIME: CPT

## 2018-07-05 DIAGNOSIS — M79.652 LEFT THIGH PAIN: Primary | ICD-10-CM

## 2018-07-10 ENCOUNTER — HOSPITAL ENCOUNTER (OUTPATIENT)
Dept: GENERAL RADIOLOGY | Age: 83
Discharge: HOME | End: 2018-07-12
Payer: MEDICARE

## 2018-07-10 ENCOUNTER — OFFICE VISIT (OUTPATIENT)
Dept: ORTHOPEDIC SURGERY | Age: 83
End: 2018-07-10
Payer: MEDICARE

## 2018-07-10 ENCOUNTER — HOSPITAL ENCOUNTER (OUTPATIENT)
Dept: PHARMACY | Age: 83
Setting detail: THERAPIES SERIES
Discharge: HOME | End: 2018-07-10
Payer: MEDICARE

## 2018-07-10 VITALS
WEIGHT: 162 LBS | SYSTOLIC BLOOD PRESSURE: 140 MMHG | BODY MASS INDEX: 26.99 KG/M2 | HEIGHT: 65 IN | HEART RATE: 58 BPM | DIASTOLIC BLOOD PRESSURE: 76 MMHG

## 2018-07-10 DIAGNOSIS — Z79.01 ANTICOAGULATION MONITORING BY PHARMACIST: ICD-10-CM

## 2018-07-10 DIAGNOSIS — M70.62 TROCHANTERIC BURSITIS OF LEFT HIP: ICD-10-CM

## 2018-07-10 DIAGNOSIS — M25.552 LEFT HIP PAIN: Primary | ICD-10-CM

## 2018-07-10 DIAGNOSIS — M79.652 LEFT THIGH PAIN: ICD-10-CM

## 2018-07-10 LAB — INR BLD: 2.2

## 2018-07-10 PROCEDURE — 1090F PRES/ABSN URINE INCON ASSESS: CPT | Performed by: PHYSICIAN ASSISTANT

## 2018-07-10 PROCEDURE — 85610 PROTHROMBIN TIME: CPT

## 2018-07-10 PROCEDURE — G8427 DOCREV CUR MEDS BY ELIG CLIN: HCPCS | Performed by: PHYSICIAN ASSISTANT

## 2018-07-10 PROCEDURE — 36416 COLLJ CAPILLARY BLOOD SPEC: CPT

## 2018-07-10 PROCEDURE — G8399 PT W/DXA RESULTS DOCUMENT: HCPCS | Performed by: PHYSICIAN ASSISTANT

## 2018-07-10 PROCEDURE — 4040F PNEUMOC VAC/ADMIN/RCVD: CPT | Performed by: PHYSICIAN ASSISTANT

## 2018-07-10 PROCEDURE — 1036F TOBACCO NON-USER: CPT | Performed by: PHYSICIAN ASSISTANT

## 2018-07-10 PROCEDURE — 99212 OFFICE O/P EST SF 10 MIN: CPT | Performed by: PHYSICIAN ASSISTANT

## 2018-07-10 PROCEDURE — 1123F ACP DISCUSS/DSCN MKR DOCD: CPT | Performed by: PHYSICIAN ASSISTANT

## 2018-07-10 PROCEDURE — 99211 OFF/OP EST MAY X REQ PHY/QHP: CPT

## 2018-07-10 PROCEDURE — 73552 X-RAY EXAM OF FEMUR 2/>: CPT

## 2018-07-10 PROCEDURE — G8417 CALC BMI ABV UP PARAM F/U: HCPCS | Performed by: PHYSICIAN ASSISTANT

## 2018-07-19 ENCOUNTER — HOSPITAL ENCOUNTER (OUTPATIENT)
Dept: CT IMAGING | Age: 83
Discharge: HOME | End: 2018-07-21
Payer: MEDICARE

## 2018-07-19 DIAGNOSIS — M70.62 TROCHANTERIC BURSITIS OF LEFT HIP: ICD-10-CM

## 2018-07-19 DIAGNOSIS — M25.552 LEFT HIP PAIN: ICD-10-CM

## 2018-07-19 PROCEDURE — 73700 CT LOWER EXTREMITY W/O DYE: CPT

## 2018-07-24 ENCOUNTER — TELEPHONE (OUTPATIENT)
Dept: ORTHOPEDIC SURGERY | Age: 83
End: 2018-07-24

## 2018-07-24 NOTE — TELEPHONE ENCOUNTER
Called patient with CT results of left hip. Dr Rabia Macias recommends a exostectomy of the left hip. Patient sated that hip wasn't bothering her as bad at this time. She will consider and call back if wanting surgery scheduled.

## 2018-08-07 ENCOUNTER — HOSPITAL ENCOUNTER (OUTPATIENT)
Dept: PHARMACY | Age: 83
Setting detail: THERAPIES SERIES
Discharge: HOME | End: 2018-08-07
Payer: MEDICARE

## 2018-08-07 ENCOUNTER — OFFICE VISIT (OUTPATIENT)
Dept: ORTHOPEDIC SURGERY | Age: 83
End: 2018-08-07
Payer: MEDICARE

## 2018-08-07 VITALS
HEIGHT: 65 IN | BODY MASS INDEX: 26.99 KG/M2 | WEIGHT: 162 LBS | HEART RATE: 80 BPM | SYSTOLIC BLOOD PRESSURE: 128 MMHG | DIASTOLIC BLOOD PRESSURE: 64 MMHG

## 2018-08-07 DIAGNOSIS — I48.0 PAROXYSMAL ATRIAL FIBRILLATION WITH RAPID VENTRICULAR RESPONSE (HCC): ICD-10-CM

## 2018-08-07 DIAGNOSIS — M16.12 ARTHRITIS OF LEFT HIP: Primary | ICD-10-CM

## 2018-08-07 DIAGNOSIS — Z79.01 ANTICOAGULATION MONITORING BY PHARMACIST: ICD-10-CM

## 2018-08-07 LAB — INR BLD: 2.6

## 2018-08-07 PROCEDURE — G8417 CALC BMI ABV UP PARAM F/U: HCPCS | Performed by: PHYSICIAN ASSISTANT

## 2018-08-07 PROCEDURE — 1101F PT FALLS ASSESS-DOCD LE1/YR: CPT | Performed by: PHYSICIAN ASSISTANT

## 2018-08-07 PROCEDURE — G8399 PT W/DXA RESULTS DOCUMENT: HCPCS | Performed by: PHYSICIAN ASSISTANT

## 2018-08-07 PROCEDURE — 99212 OFFICE O/P EST SF 10 MIN: CPT | Performed by: PHYSICIAN ASSISTANT

## 2018-08-07 PROCEDURE — 4040F PNEUMOC VAC/ADMIN/RCVD: CPT | Performed by: PHYSICIAN ASSISTANT

## 2018-08-07 PROCEDURE — 99211 OFF/OP EST MAY X REQ PHY/QHP: CPT

## 2018-08-07 PROCEDURE — 1090F PRES/ABSN URINE INCON ASSESS: CPT | Performed by: PHYSICIAN ASSISTANT

## 2018-08-07 PROCEDURE — 1036F TOBACCO NON-USER: CPT | Performed by: PHYSICIAN ASSISTANT

## 2018-08-07 PROCEDURE — 36416 COLLJ CAPILLARY BLOOD SPEC: CPT

## 2018-08-07 PROCEDURE — 85610 PROTHROMBIN TIME: CPT

## 2018-08-07 PROCEDURE — G8427 DOCREV CUR MEDS BY ELIG CLIN: HCPCS | Performed by: PHYSICIAN ASSISTANT

## 2018-08-07 PROCEDURE — 1123F ACP DISCUSS/DSCN MKR DOCD: CPT | Performed by: PHYSICIAN ASSISTANT

## 2018-08-14 ENCOUNTER — HOSPITAL ENCOUNTER (OUTPATIENT)
Dept: PHYSICAL THERAPY | Age: 83
Setting detail: THERAPIES SERIES
Discharge: HOME | End: 2018-08-14
Payer: MEDICARE

## 2018-08-14 PROCEDURE — 97110 THERAPEUTIC EXERCISES: CPT | Performed by: PHYSICAL THERAPIST

## 2018-08-14 PROCEDURE — G8978 MOBILITY CURRENT STATUS: HCPCS | Performed by: PHYSICAL THERAPIST

## 2018-08-14 PROCEDURE — G8979 MOBILITY GOAL STATUS: HCPCS | Performed by: PHYSICAL THERAPIST

## 2018-08-14 PROCEDURE — 97161 PT EVAL LOW COMPLEX 20 MIN: CPT | Performed by: PHYSICAL THERAPIST

## 2018-08-14 ASSESSMENT — PAIN DESCRIPTION - ORIENTATION: ORIENTATION: LEFT

## 2018-08-14 ASSESSMENT — PAIN DESCRIPTION - FREQUENCY: FREQUENCY: INTERMITTENT

## 2018-08-14 ASSESSMENT — PAIN DESCRIPTION - PROGRESSION: CLINICAL_PROGRESSION: NOT CHANGED

## 2018-08-14 ASSESSMENT — PAIN DESCRIPTION - PAIN TYPE: TYPE: CHRONIC PAIN

## 2018-08-14 ASSESSMENT — PAIN DESCRIPTION - ONSET: ONSET: GRADUAL

## 2018-08-14 ASSESSMENT — PAIN SCALES - GENERAL: PAINLEVEL_OUTOF10: 8

## 2018-08-14 ASSESSMENT — PAIN DESCRIPTION - LOCATION: LOCATION: HIP;LEG

## 2018-08-14 ASSESSMENT — PAIN DESCRIPTION - DESCRIPTORS: DESCRIPTORS: DISCOMFORT

## 2018-08-14 NOTE — PROGRESS NOTES
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 Tends to move around themedial, ant, lat, post thigh. Never past the knee  Pain Screening  Patient Currently in Pain: Yes  Pain Assessment  Pain Assessment: 0-10  Pain Level: 8  Pain Type: Chronic pain  Pain Location: Hip;Leg  Pain Orientation: Left  Pain Radiating Towards: L thigh, buttock  Pain Descriptors: Discomfort  Pain Frequency: Intermittent  Pain Onset: Gradual  Clinical Progression: Not changed  Effect of Pain on Daily Activities: Pain primarily when walking.   Patient's Stated Pain Goal: 2  Vital Signs  Patient Currently in Pain: Yes    Orientation  Orientation  Overall Orientation Status: Within Normal Limits    Social/Functional History  Social/Functional History  Lives With: Spouse  Type of Home: House  Home Layout: Multi-level  Home Access: Stairs to enter with rails  Entrance Stairs - Number of Steps: 2  Bathroom Accessibility: Accessible  Home Equipment: [address] Help From: Family  ADL Assistance: Independent  Homemaking Assistance: Independent  Ambulation Assistance: Independent  Transfer Assistance: Independent  Active : Yes  Mode of Transportation: Car  Occupation: Retired  Objective     Observation/Palpation  Posture: Fair  Palpation: Tissue defect/ atrophy of gluts along incision  Observation: Favors L hip with gait    PROM LLE (degrees)  L Hip Flexion 0-125: 90  L Hip Extension 0-10: 0  L Hip ABduction 0-45: 40  L Hip ADduction 0-10: 0    Strength LLE  L Hip Flexion: 4-/5  L Hip Extension: 3+/5 (+pain)  L Hip ABduction: 3+/5 (+pain)  L Hip ADduction: 4+/5  L Hip Internal Rotation: 4-/5  L Hip External Rotation: 3+/5 (+pain)     Additional Measures  Special Tests: L hip + Trandelenberg and painful           Ambulation  Ambulation?: Yes  Ambulation 1  Surface: level tile  Device: Forearm Crutches  Assistance: Modified Independent  Quality of Gait: Severe L glut medius lurch and painful. Comments: Gait deviation and pain reduced when correctly using a cane  Balance  Single Leg Stance L Le (+pain)           Assessment   Conditions Requiring Skilled Therapeutic Intervention  Body structures, Functions, Activity limitations: Decreased ROM; Decreased strength;Decreased balance  Assessment: L glut max/ medius/ minimus pain and weakness s/p NANETTE  Treatment Diagnosis: Pain left hip  Prognosis: Good  Decision Making: Low Complexity  REQUIRES PT FOLLOW UP: Yes  Activity Tolerance  Activity Tolerance: Patient Tolerated treatment well         Plan   Plan  Times per week: 2  Current Treatment Recommendations: Strengthening, ROM, Patient/Caregiver Education & Training, Modalities    G-Code  PT G-Codes  Functional Assessment Tool Used: LEFS- modified  Score: 42/64 = 66%, or 34% disability  Functional Limitation: Mobility: Walking and moving around  Mobility: Walking and Moving Around Current Status (): At least 20 percent but less than 40 percent impaired, limited or restricted  Mobility: Walking and Moving Around Goal Status ():  At least 1 percent but less than 20 percent impaired, limited or restricted              Goals  Short term goals  Time Frame for Short term goals: 1 week  Short term goal 1: Revise HEP  Long term goals  Time Frame for Long term goals : 4 week  Long term goal 1: Gait with a cane at all times to reduce tensile force on grt troch anchored nuscle  Long term goal 2: Pain 2-3/10 when walking  Long term goal 3: Walk with a cane 30-40 min for shopping  Long term goal 4: LEFS - modified at 52/64       Therapy Time   Individual Concurrent Group Co-treatment   Time In 1100         Time Out 1150         Minutes                  Walker Wilder, [place]

## 2018-08-14 NOTE — PLAN OF CARE
[facility]    [x] [place]  Phone: [phone]  Fax: [phone]      [] [place]  Phone: [phone]  Fax: [phone]        To: Referring Practitioner: Durand Curling      Patient: Heidi Turner  : 1934   MRN: [id number]  Evaluation Date: 2018      Diagnosis Information:  · Diagnosis: M16.12 arthritis L hip   · Treatment Diagnosis: Pain left hip     Physical Therapy Certification Form  Dear Fabricio Ibarra  The following patient has been evaluated for physical therapy services and for therapy to continue, Medicare requires monthly physician review of the treatment plan. Please review the attached evaluation and/or summary of the patient's plan of care, and verify that you agree therapy should continue by signing the attached document and sending it back to our office. Plan of Care/Treatment to date:  [x] Therapeutic Exercise    [] Modalities:  [] Therapeutic Activity     [x] Ultrasound  [x] Electrical Stimulation  [x] Gait Training      [] Cervical Traction [] Lumbar Traction  [] Neuromuscular Re-education    [] Cold/hotpack [] Iontophoresis   [x] Instruction in HEP     Other:  [] Manual Therapy      []             [] Aquatic Therapy      []           ? Goals:  Short term goals  Time Frame for Short term goals: 1 week  Short term goal 1: Revise HEP    Long term goals  Time Frame for Long term goals : 4 week  Long term goal 1: Gait with a cane at all times to reduce tensile force on grt troch anchored nuscle  Long term goal 2: Pain 2-3/10 when walking  Long term goal 3: Walk with a cane 30-40 min for shopping  Long term goal 4: LEFS - modified at 52/64    Frequency/Duration:18 - 18  # Days per week: [] 1 day # Weeks: [] 1 week [] 5 weeks     [x] 2 days?    [] 2 weeks [] 6 weeks     [] 3 days   [] 3 weeks [] 7 weeks     [] 4 days   [x] 4 weeks [] 8 weeks    Rehab Potential: [] Excellent [x] Good [] Fair  [] Poor     Electronically signed by: Henok Giron PT       If you have any questions or concerns, please don't hesitate to call.   Thank you for your referral.      Physician Signature:________________________________Date:__________________  By signing above, therapists plan is approved by physician

## 2018-08-14 NOTE — FLOWSHEET NOTE
Physical Therapy Daily Treatment Note    Date:  2018    Patient Name:  Monica Quevedo    :  1934  MRN: [id number]  Restrictions/Precautions:   L NANETTE 2017  Medical/Treatment Diagnosis Information:   · Diagnosis: M16.12 arthritis L hip  · Treatment Diagnosis: Pain left hip  Insurance/Certification information:  PT Insurance Information: Medicare  Physician Information:  Referring Practitioner: Moses Ureña of care signed (Y/N):  n  Visit# / total visits:   Pain level: 7/10     G-Code (if applicable):      Date G-Code Applied:  18  PT G-Codes  Functional Assessment Tool Used: LEFS- modified  Score: 42/64 = 66%, or 34% disability  Functional Limitation: Mobility: Walking and moving around  Mobility: Walking and Moving Around Current Status (): At least 20 percent but less than 40 percent impaired, limited or restricted  Mobility: Walking and Moving Around Goal Status ():  At least 1 percent but less than 20 percent impaired, limited or restricted    Time In: 11:00  Time Out:11;50    Progress Note: [x]  Yes  []  No  Next due by: Visit #8, or 18      Subjective:   See eval    Objective: See eval  Observation:   Test measurements:      Exercises: there ex for L hip strength, balance  Exercise/Equipment Resistance/Repetitions Other comments   NUSTEP     Counter ex     Squat matrix 10x , 3 position Progress to 9 position   Hip hike  L foot on 2\" step, lift R   4-way t-band     Lunges     B hip abd/ add  red   L 1 leg bridge                          Cane at all times          [x] Provided verbal/tactile cueing for activities related to strengthening, flexibility, endurance, ROM. (90212)  [] Provided verbal/tactile cueing for activities related to improving balance, coordination, kinesthetic sense, posture, motor skill, proprioception. (40433)    Therapeutic Activities:     [] Therapeutic activities, direct (one-on-one) patient contact (use of dynamic activities to improve functional

## 2018-08-20 ENCOUNTER — HOSPITAL ENCOUNTER (OUTPATIENT)
Dept: PHYSICAL THERAPY | Age: 83
Setting detail: THERAPIES SERIES
Discharge: HOME | End: 2018-08-20
Payer: MEDICARE

## 2018-08-20 PROCEDURE — 97110 THERAPEUTIC EXERCISES: CPT | Performed by: PHYSICAL THERAPY ASSISTANT

## 2018-08-22 ENCOUNTER — APPOINTMENT (OUTPATIENT)
Dept: PHYSICAL THERAPY | Age: 83
End: 2018-08-22
Payer: MEDICARE

## 2018-08-24 ENCOUNTER — HOSPITAL ENCOUNTER (OUTPATIENT)
Dept: PHYSICAL THERAPY | Age: 83
Setting detail: THERAPIES SERIES
Discharge: HOME | End: 2018-08-24
Payer: MEDICARE

## 2018-08-24 PROCEDURE — 97110 THERAPEUTIC EXERCISES: CPT | Performed by: PHYSICAL THERAPY ASSISTANT

## 2018-08-24 NOTE — FLOWSHEET NOTE
Physical Therapy Daily Treatment Note    Date:  2018    Patient Name:  Car Noble    :  1934  MRN: [id number]  Restrictions/Precautions:   L NANETTE 2017  Medical/Treatment Diagnosis Information:   · Diagnosis: M16.12 arthritis L hip  · Treatment Diagnosis: Pain left hip  Insurance/Certification information:  PT Insurance Information: Medicare  Physician Information:  Referring Practitioner: Vito Castro of care signed (Y/N):  Y  Visit# / total visits: 3/8  Pain level: 0/10 in hip    8/10 knee     G-Code (if applicable):      Date G-Code Applied:  18  PT G-Codes  Functional Assessment Tool Used: LEFS- modified  Score: 42/64 = 66%, or 34% disability  Functional Limitation: Mobility: Walking and moving around  Mobility: Walking and Moving Around Current Status (): At least 20 percent but less than 40 percent impaired, limited or restricted  Mobility: Walking and Moving Around Goal Status (): At least 1 percent but less than 20 percent impaired, limited or restricted    Time In: 1005  Time Out: 1035  Progress Note: []  Yes  [x]  No  Next due by: Visit #8, or 18      Subjective:   Pain this date rated 8/10 in medial left knee. Can increase with prolonged weight bearing. Objective: ROB complete per flow chart to facilitate strength, motion, stability to allow ease with daily activities and ambulation. Held [id number] exercises this date. Pt. Instructed to perform HEP with left LE only to avoid pain with weight bearing, understanding voiced.      Observation:     Kinesiotape with medial pull on left knee to improve alignment; hoping to decrease medial knee pain  Test measurements:      Exercises:   Exercise/Equipment Resistance/Repetitions Other comments   NUSTEP 6' S9, L4   Counter ex 15x Left only HR,TR,HS cur, march   3-way hip 10x Left only    Squat matrix 10x , 3 position Progress to 9 position   Hip hike 10x L foot on 2\" step, lift R   4-way t-band     Lunges 10x  Bilateral F,L   B

## 2018-08-27 ENCOUNTER — HOSPITAL ENCOUNTER (OUTPATIENT)
Dept: PHYSICAL THERAPY | Age: 83
Setting detail: THERAPIES SERIES
Discharge: HOME | End: 2018-08-27
Payer: MEDICARE

## 2018-08-27 PROCEDURE — 97110 THERAPEUTIC EXERCISES: CPT

## 2018-08-27 NOTE — FLOWSHEET NOTE
by pain  [] Patient limited by other medical complications  [] Other:     Prognosis: [x] Good [] Fair  [] Poor    Patient Requires Follow-up: [x] Yes  [] No      Goals:  Short term goals  Time Frame for Short term goals: 1 week  Short term goal 1: Revise HEP    Long term goals  Time Frame for Long term goals : 4 week  Long term goal 1: Gait with a cane at all times to reduce tensile force on grt troch anchored nuscle  Long term goal 2: Pain 2-3/10 when walking  Long term goal 3: Walk with a cane 30-40 min for shopping  Long term goal 4: LEFS - modified at 52/64      Plan:   [x] Continue per plan of care [] Alter current plan (see comments)  [] Plan of care initiated [] Hold pending MD visit [] Discharge    Plan for Next Session: Kinesio taping to L knee.      Electronically signed by:  Beatriz Chavez PTA

## 2018-08-29 NOTE — PROGRESS NOTES
I have reviewed and agree to the content of the note written by the PTA.   Electronically signed by Katlyn Bell PT [id number]

## 2018-08-31 ENCOUNTER — HOSPITAL ENCOUNTER (OUTPATIENT)
Dept: PHYSICAL THERAPY | Age: 83
Setting detail: THERAPIES SERIES
Discharge: HOME | End: 2018-08-31
Payer: MEDICARE

## 2018-08-31 PROCEDURE — 97110 THERAPEUTIC EXERCISES: CPT | Performed by: PHYSICAL THERAPY ASSISTANT

## 2018-08-31 NOTE — FLOWSHEET NOTE
B hip abd/ add 20x ea GR   1 leg bridge 15x ea Bilateral    Step ups x10 F,L 4\"    SLR x10 ea    Heel slides x15         Cane at all times          [x] Provided verbal/tactile cueing for activities related to strengthening, flexibility, endurance, ROM. (57420)  [] Provided verbal/tactile cueing for activities related to improving balance, coordination, kinesthetic sense, posture, motor skill, proprioception. (90859)    Therapeutic Activities:     [] Therapeutic activities, direct (one-on-one) patient contact (use of dynamic activities to improve functional performance). (74355)    Gait:   [] Provided training and instruction to the patient for ambulation re-education. (07268)    Self-Care/ADL's  [] Self-care/home management training and compensatory training, meal preparation, safety procedures, and instructions in use of assistive technology devices/adaptive equipment, direct one-on-one contact. (63041)    Home Exercise Program: Continue current HEP. [x] Reviewed/Progressed HEP activities related to strengthening, flexibility, endurance, ROM. (47518)  [] Reviewed/Progressed HEP activities related to improving balance, coordination, kinesthetic sense, posture, motor skill, proprioception.  (29761)    Manual Treatments:    [] Provided manual therapy to mobilize soft tissue/joints for the purpose of modulating pain, promoting relaxation,  increasing ROM, reducing/eliminating soft tissue swelling/inflammation/restriction, improving soft tissue extensibility.  (58370)    Service Based Modalities:      Timed Code Treatment Minutes:  45' ROB    Total Treatment Minutes:   45'    Treatment/Activity Tolerance:  [x] Patient tolerated treatment well [] Patient limited by fatique  [] Patient limited by pain  [] Patient limited by other medical complications  [] Other:     Prognosis: [x] Good [] Fair  [] Poor    Patient Requires Follow-up: [x] Yes  [] No      Goals:  Short term goals  Time Frame for Short term goals: 1

## 2018-09-04 ENCOUNTER — APPOINTMENT (OUTPATIENT)
Dept: PHYSICAL THERAPY | Age: 83
End: 2018-09-04
Payer: MEDICARE

## 2018-09-04 NOTE — PROGRESS NOTES
I have reviewed and agree to the content of the note written by the PTA.   Electronically signed by Towanda Cooks PT [id number]

## 2018-09-05 ENCOUNTER — HOSPITAL ENCOUNTER (OUTPATIENT)
Dept: PHARMACY | Age: 83
Setting detail: THERAPIES SERIES
Discharge: HOME | End: 2018-09-05
Payer: MEDICARE

## 2018-09-05 ENCOUNTER — HOSPITAL ENCOUNTER (OUTPATIENT)
Dept: PHYSICAL THERAPY | Age: 83
Setting detail: THERAPIES SERIES
Discharge: HOME | End: 2018-09-05
Payer: MEDICARE

## 2018-09-05 DIAGNOSIS — I48.0 PAROXYSMAL ATRIAL FIBRILLATION WITH RAPID VENTRICULAR RESPONSE (HCC): ICD-10-CM

## 2018-09-05 DIAGNOSIS — Z79.01 ANTICOAGULATION MONITORING BY PHARMACIST: ICD-10-CM

## 2018-09-05 LAB — INR BLD: 2.4

## 2018-09-05 PROCEDURE — 99211 OFF/OP EST MAY X REQ PHY/QHP: CPT

## 2018-09-05 PROCEDURE — 97110 THERAPEUTIC EXERCISES: CPT | Performed by: PHYSICAL THERAPY ASSISTANT

## 2018-09-05 PROCEDURE — 85610 PROTHROMBIN TIME: CPT

## 2018-09-05 PROCEDURE — 36416 COLLJ CAPILLARY BLOOD SPEC: CPT

## 2018-09-05 NOTE — FLOWSHEET NOTE
10x Bilateral Bilateral F,L   B hip abd/ add 20x ea GR   1 leg bridge 15x ea Bilateral    Step ups x10 F,L 4\"    SLR x20ea Advanced   Heel slides          Cane at all times          [x] Provided verbal/tactile cueing for activities related to strengthening, flexibility, endurance, ROM. (79668)  [] Provided verbal/tactile cueing for activities related to improving balance, coordination, kinesthetic sense, posture, motor skill, proprioception. (89322)    Therapeutic Activities:     [] Therapeutic activities, direct (one-on-one) patient contact (use of dynamic activities to improve functional performance). (05154)    Gait:   [] Provided training and instruction to the patient for ambulation re-education. (70234)    Self-Care/ADL's  [] Self-care/home management training and compensatory training, meal preparation, safety procedures, and instructions in use of assistive technology devices/adaptive equipment, direct one-on-one contact. (95560)    Home Exercise Program: Continue current HEP. [x] Reviewed/Progressed HEP activities related to strengthening, flexibility, endurance, ROM. (67302)  [] Reviewed/Progressed HEP activities related to improving balance, coordination, kinesthetic sense, posture, motor skill, proprioception.  (50418)    Manual Treatments:    [] Provided manual therapy to mobilize soft tissue/joints for the purpose of modulating pain, promoting relaxation,  increasing ROM, reducing/eliminating soft tissue swelling/inflammation/restriction, improving soft tissue extensibility.  (79752)    Service Based Modalities:      Timed Code Treatment Minutes:46 ' ROB    Total Treatment Minutes:  55'    Treatment/Activity Tolerance:  [x] Patient tolerated treatment well [] Patient limited by fatique  [] Patient limited by pain  [] Patient limited by other medical complications  [] Other:     Prognosis: [x] Good [] Fair  [] Poor    Patient Requires Follow-up: [x] Yes  [] No      Goals:  Short term goals  Time Frame

## 2018-09-05 NOTE — PROGRESS NOTES
ANTICOAGULATION SERVICE    Date of Clinic Visit:  9/5/2018    Keri Bone is a 80 y.o. female who presents to clinic today for anticoagulation monitoring and adjustment. Recent INR Results:  Internal QC passed  Lab Results   Component Value Date    INR 2.4 09/05/2018    INR 2.6 08/07/2018       Current Warfarin Dosage:  Dosing Plan  As of 9/5/2018    TTR:   89.5 % (10.4 mo)   Full warfarin instructions:   5 mg every day                 Assessment/Plan:    Continue current regimen as INR remains stable. Next Clinic Appointment:  Return date  As of 9/5/2018    TTR:   89.5 % (10.4 mo)   Next INR check:   10/3/2018             Please call [facility] at [phone] with any questions. Thanks!   Callum Gant, [address] [facility]  Anticoagulation Service Pharmacist  9/5/2018 11:27 AM

## 2018-09-06 NOTE — PROGRESS NOTES
I have reviewed and agree to the content of the note written by the PTA.   Electronically signed by Owen Asencio PT [id number]

## 2018-09-07 ENCOUNTER — HOSPITAL ENCOUNTER (OUTPATIENT)
Dept: PHYSICAL THERAPY | Age: 83
Setting detail: THERAPIES SERIES
Discharge: HOME | End: 2018-09-07
Payer: MEDICARE

## 2018-09-07 PROCEDURE — 97110 THERAPEUTIC EXERCISES: CPT | Performed by: PHYSICAL THERAPIST

## 2018-09-07 NOTE — FLOWSHEET NOTE
Physical Therapy Daily Treatment Note    Date:  2018    Patient Name:  Kelli Stapleton    :  1934  MRN: [id number]  Restrictions/Precautions:   L NANETTE 2017  Medical/Treatment Diagnosis Information:   · Diagnosis: M16.12 arthritis L hip  · Treatment Diagnosis: Pain left hip  Insurance/Certification information:  PT Insurance Information: Medicare  Physician Information:  Referring Practitioner: Chana Lundberg of care signed (Y/N):  Y  Visit# / total visits:   Pain level: 2/10 in quad     G-Code (if applicable):      Date G-Code Applied:  18  PT G-Codes  Functional Assessment Tool Used: LEFS- modified  Score: 48/64 = 75%, or 25% disability  Functional Limitation: Mobility: Walking and moving around  Mobility: Walking and Moving Around Current Status (): At least 20 percent but less than 40 percent impaired, limited or restricted  Mobility: Walking and Moving Around Goal Status (): At least 1 percent but less than 20 percent impaired, limited or restricted    Time In:8:59  Time Out:9:50     Progress Note: [x]  Yes  []  No  Next due by: Visit #8, or 10/6/18      Subjective:Walking is good with the cane, as it controls the hip/thigh pain  Objective: ROB complete per flow chart to facilitate strength, motion and stability to allow normalized gait, and ease with daily activities. Observation: Rpts with SP cane. Pt knows to use cane at all times. Continues to have difficulty with trendelenberg gait without AD   Test measurements:    Vertical wt bearing thru the L hip , causes the L thigh pain   L hip flex 4/5, ext 4+/5, abd 4/5 , IR 4/5 no pain with resistance.   L hip ext rot 3+/5 +pain with resistance  L quad 5/5 no pain with resistance    Exercises:   Exercise/Equipment Resistance/Repetitions Other comments   NUSTEP 8' S8, L4   Counter ex 20x Bilateral HR,TR,HS cur, march   3-way hip 20x Bilateral    Squat matrix 10x , 9 position    Hip hike 20x, x2 L foot on 2\" step, lift R   4-way

## 2018-09-07 NOTE — PROGRESS NOTES
Physical Therapy  [facility]  Rehabilitation and Sports Medicine    [x] [place]  Phone: [phone]  Fax: [phone]      [] [place]  Phone: [phone]  Fax: [phone]    Physical Therapy Progress Note  Date: 2018        Patient Name:  Dayne Vogel    :  1934  MRN: [id number]  Restrictions/Precautions: L NANETTE 2017      Medical/Treatment Diagnosis Information:  ·   Diagnosis: M16.12 arthritis L hip  · Treatment Diagnosis: Pain left hip  ·    Insurance/Certification information:     Medicare  Physician Information:    Olga Cody of care signed (Y/N): y  Visit# / total visits:7  Pain level: 2/10     G-Code (if applicable):      Date G-Code Applied:  2018    PT G-Codes  Functional Assessment Tool Used: LEFS- modified  Score: 48/64 = 75%, or 25% disability  Functional Limitation: Mobility: Walking and moving around  Mobility: Walking and Moving Around Current Status (): At least 20 percent but less than 40 percent impaired, limited or restricted  Mobility: Walking and Moving Around Goal Status (): At least 1 percent but less than 20 percent impaired, limited or restricted    Time Period for Report:   Cancels/No-shows to date:      Plan of Care/Treatment to date:  [x] Therapeutic Exercise    [] Modalities:  [] Therapeutic Activity     [] Ultrasound  [x] Electrical Stimulation  [x] Gait Training      [] Cervical Traction    [] Lumbar Traction  [] Neuromuscular Re-education  [] Cold/hotpack [] Iontophoresis  [x] Instruction in HEP      Other:  [x] Manual Therapy       []    [] Aquatic Therapy       []                    ? Subjective:    :Walking is good with the cane, as it controls the hip/thigh pain         Objective:  ·   Observation: Rpts with SP cane. Pt knows to use cane at all times.       · Continues to have difficulty with trendelenberg gait without AD   · Test measurements:    · Vertical wt bearing thru the L hip , causes the L thigh pain   · L hip flex

## 2018-09-10 ENCOUNTER — HOSPITAL ENCOUNTER (OUTPATIENT)
Dept: PHYSICAL THERAPY | Age: 83
Setting detail: THERAPIES SERIES
Discharge: HOME | End: 2018-09-10
Payer: MEDICARE

## 2018-09-10 PROCEDURE — 97110 THERAPEUTIC EXERCISES: CPT | Performed by: PHYSICAL THERAPY ASSISTANT

## 2018-09-10 NOTE — FLOWSHEET NOTE
[x] Provided verbal/tactile cueing for activities related to strengthening, flexibility, endurance, ROM. (45595)  [] Provided verbal/tactile cueing for activities related to improving balance, coordination, kinesthetic sense, posture, motor skill, proprioception. (09373)    Therapeutic Activities:     [] Therapeutic activities, direct (one-on-one) patient contact (use of dynamic activities to improve functional performance). (18279)    Gait:   [] Provided training and instruction to the patient for ambulation re-education. (81366)    Self-Care/ADL's  [] Self-care/home management training and compensatory training, meal preparation, safety procedures, and instructions in use of assistive technology devices/adaptive equipment, direct one-on-one contact. (10113)    Home Exercise Program: Continue current HEP. [x] Reviewed/Progressed HEP activities related to strengthening, flexibility, endurance, ROM. (37224)  [] Reviewed/Progressed HEP activities related to improving balance, coordination, kinesthetic sense, posture, motor skill, proprioception.  (08849)    Manual Treatments:    [] Provided manual therapy to mobilize soft tissue/joints for the purpose of modulating pain, promoting relaxation,  increasing ROM, reducing/eliminating soft tissue swelling/inflammation/restriction, improving soft tissue extensibility.  (97789)    Service Based Modalities:      Timed Code Treatment Minutes:48 ' ROB    Total Treatment Minutes:  50'    Treatment/Activity Tolerance:  [x] Patient tolerated treatment well [] Patient limited by fatique  [] Patient limited by pain  [] Patient limited by other medical complications  [] Other:     Prognosis: [x] Good [] Fair  [] Poor    Patient Requires Follow-up: [x] Yes  [] No    Goals:  Short term goals  Time Frame for Short term goals: 1 week  Short term goal 1: Revise HEP-met    Long term goals  Time Frame for Long term goals : 4 week  Long term goal 1: Gait with a cane at all times to

## 2018-09-14 ENCOUNTER — HOSPITAL ENCOUNTER (OUTPATIENT)
Dept: PHYSICAL THERAPY | Age: 83
Setting detail: THERAPIES SERIES
Discharge: HOME | End: 2018-09-14
Payer: MEDICARE

## 2018-09-14 PROCEDURE — 97110 THERAPEUTIC EXERCISES: CPT | Performed by: PHYSICAL THERAPY ASSISTANT

## 2018-09-19 ENCOUNTER — HOSPITAL ENCOUNTER (OUTPATIENT)
Dept: PHYSICAL THERAPY | Age: 83
Setting detail: THERAPIES SERIES
Discharge: HOME | End: 2018-09-19
Payer: MEDICARE

## 2018-09-19 PROCEDURE — 97110 THERAPEUTIC EXERCISES: CPT | Performed by: PHYSICAL THERAPY ASSISTANT

## 2018-09-19 NOTE — FLOWSHEET NOTE
step, lift R   4-way t-band     Lunges 10x Bilateral Bilateral F,L   B hip abd/ add 20x ea GR   1 leg bridge 15x ea Bilateral    Step ups x15 F,L 4\" (ADV)    SLR x10ea 2# Advanced   Sl hip abd, clams 10x ea    Heel slides          Sit to stand 5x/5x With  2'' step under left LE. Cane at all times          [x] Provided verbal/tactile cueing for activities related to strengthening, flexibility, endurance, ROM. (90003)  [] Provided verbal/tactile cueing for activities related to improving balance, coordination, kinesthetic sense, posture, motor skill, proprioception. (43256)    Therapeutic Activities:     [] Therapeutic activities, direct (one-on-one) patient contact (use of dynamic activities to improve functional performance). (19267)    Gait:   [] Provided training and instruction to the patient for ambulation re-education. (97559)    Self-Care/ADL's  [] Self-care/home management training and compensatory training, meal preparation, safety procedures, and instructions in use of assistive technology devices/adaptive equipment, direct one-on-one contact. (88304)    Home Exercise Program: Continue current HEP. [x] Reviewed/Progressed HEP activities related to strengthening, flexibility, endurance, ROM. (79086)  [] Reviewed/Progressed HEP activities related to improving balance, coordination, kinesthetic sense, posture, motor skill, proprioception.  (04678)    Manual Treatments:    [] Provided manual therapy to mobilize soft tissue/joints for the purpose of modulating pain, promoting relaxation,  increasing ROM, reducing/eliminating soft tissue swelling/inflammation/restriction, improving soft tissue extensibility.  (59723)    Service Based Modalities:      Timed Code Treatment Minutes:  37' ROB    Total Treatment Minutes:  37'    Treatment/Activity Tolerance:  [x] Patient tolerated treatment well [] Patient limited by fatique  [] Patient limited by pain  [] Patient limited by other medical complications  []

## 2018-09-20 NOTE — PROGRESS NOTES
I have reviewed and agree to the content of the note written by the PTA.   Electronically signed by Owen Asencio PT [id number]

## 2018-09-21 ENCOUNTER — HOSPITAL ENCOUNTER (OUTPATIENT)
Dept: PHYSICAL THERAPY | Age: 83
Setting detail: THERAPIES SERIES
Discharge: HOME | End: 2018-09-21
Payer: MEDICARE

## 2018-09-21 PROCEDURE — 97110 THERAPEUTIC EXERCISES: CPT | Performed by: PHYSICAL THERAPY ASSISTANT

## 2018-09-24 ENCOUNTER — HOSPITAL ENCOUNTER (OUTPATIENT)
Dept: PHYSICAL THERAPY | Age: 83
Setting detail: THERAPIES SERIES
Discharge: HOME | End: 2018-09-24
Payer: MEDICARE

## 2018-09-24 PROCEDURE — 97110 THERAPEUTIC EXERCISES: CPT | Performed by: PHYSICAL THERAPIST

## 2018-09-24 NOTE — PROGRESS NOTES
I have reviewed and agree to the content of the note written by the PTA.   Electronically signed by Yusra London PT [id number]

## 2018-09-26 ENCOUNTER — APPOINTMENT (OUTPATIENT)
Dept: PHYSICAL THERAPY | Age: 83
End: 2018-09-26
Payer: MEDICARE

## 2018-09-28 ENCOUNTER — HOSPITAL ENCOUNTER (OUTPATIENT)
Dept: PHYSICAL THERAPY | Age: 83
Setting detail: THERAPIES SERIES
Discharge: HOME | End: 2018-09-28
Payer: MEDICARE

## 2018-09-28 PROCEDURE — 97110 THERAPEUTIC EXERCISES: CPT | Performed by: PHYSICAL THERAPY ASSISTANT

## 2018-09-28 NOTE — FLOWSHEET NOTE
Short term goals: 1 week  Short term goal 1: Revise HEP-met    Long term goals  Time Frame for Long term goals : 4 week  Long term goal 1: Gait with a cane at all times to reduce tensile force on grt troch anchored muscle  Long term goal 2: Pain 2-3/10 when walking  Long term goal 3: Walk with a cane 30-40 min for shopping   Long term goal 4: LEFS     Plan:   [x] Continue per plan of care [] Alter current plan (see comments)  [] Plan of care initiated [] Hold pending MD visit [] Discharge    Plan for Next Session:Continue to advance hip strength, stability. Progress as able. Electronically signed by:   Mitchel Lennon PTA

## 2018-10-01 ENCOUNTER — HOSPITAL ENCOUNTER (OUTPATIENT)
Dept: PHYSICAL THERAPY | Age: 83
Setting detail: THERAPIES SERIES
Discharge: HOME | End: 2018-10-01
Payer: MEDICARE

## 2018-10-01 PROCEDURE — 97110 THERAPEUTIC EXERCISES: CPT | Performed by: PHYSICAL THERAPY ASSISTANT

## 2018-10-03 ENCOUNTER — HOSPITAL ENCOUNTER (OUTPATIENT)
Dept: PHYSICAL THERAPY | Age: 83
Setting detail: THERAPIES SERIES
Discharge: HOME | End: 2018-10-03
Payer: MEDICARE

## 2018-10-03 ENCOUNTER — HOSPITAL ENCOUNTER (OUTPATIENT)
Dept: PHARMACY | Age: 83
Setting detail: THERAPIES SERIES
Discharge: HOME | End: 2018-10-03
Payer: MEDICARE

## 2018-10-03 DIAGNOSIS — Z79.01 ANTICOAGULATION MONITORING BY PHARMACIST: ICD-10-CM

## 2018-10-03 DIAGNOSIS — I48.0 PAROXYSMAL ATRIAL FIBRILLATION WITH RAPID VENTRICULAR RESPONSE (HCC): ICD-10-CM

## 2018-10-03 LAB — INR BLD: 3.1

## 2018-10-03 PROCEDURE — 36416 COLLJ CAPILLARY BLOOD SPEC: CPT

## 2018-10-03 PROCEDURE — 97110 THERAPEUTIC EXERCISES: CPT | Performed by: PHYSICAL THERAPY ASSISTANT

## 2018-10-03 PROCEDURE — 99211 OFF/OP EST MAY X REQ PHY/QHP: CPT

## 2018-10-03 PROCEDURE — 85610 PROTHROMBIN TIME: CPT

## 2018-10-03 NOTE — FLOWSHEET NOTE
Physical Therapy Daily Treatment Note    Date:  10/3/2018    Patient Name:  Tommy Fischer    :  1934  MRN: [id number]  Restrictions/Precautions:   L NANETTE 2017  Medical/Treatment Diagnosis Information:   · Diagnosis: M16.12 arthritis L hip  · Treatment Diagnosis: Pain left hip  Insurance/Certification information:  PT Insurance Information: Medicare  Physician Information:  Referring Practitioner: Richard Stringer of care signed (Y/N):  Y  Visit# / total visits:  2nd POC 13 total  Pain level: 3/10 globally. G-Code (if applicable):      Date G-Code Applied:  18  PT G-Codes  Functional Assessment Tool Used: LEFS- modified  Score: 48/64 = 75%, or 25% disability  Functional Limitation: Mobility: Walking and moving around  Mobility: Walking and Moving Around Current Status (): At least 20 percent but less than 40 percent impaired, limited or restricted  Mobility: Walking and Moving Around Goal Status (): At least 1 percent but less than 20 percent impaired, limited or restricted    Time In: 2:00  Time Out:2:46     Progress Note: []  Yes  [x]  No  Next due by: Visit #8, or 10/6/18      Subjective: Pain increased this date. Rated 4/10 through quad, increased walking earlier noted. Quad pain continues to be 1° c/o. Objective: ROB complete per flow chart to facilitate strength, motion and stability to allow normalized gait, and ease with daily activities. Reviewed HEP and encouraged patient to continue regular use. Verbal cuing for progression and technique with exercises. Difficulty with hip weakness remains, trendelenburg gait. Difficulty with dynamic balance remains. Observation:  Encouraged patient to return to surgeon to discuss ongoing quad pain.      Test measurements:    LEFS: 58/64 =90% or 10% disabled  (IE:48/64 = 75% or 25% disabled)    Exercises:   Exercise/Equipment Resistance/Repetitions Other comments   NUSTEP 8' S8, L4   Counter ex 3#x10 HR,TR,HS  (ADV)   3-way hip 3#x10    Squat matrix 10x , 9 position    Hip hike 20x, L foot on 2\" step, lift R   4-way t-band     Lunges 10x Bilateral Bilateral F,L   B hip abd/ add 20x ea, sit & supine GR   1 leg bridge 10x x2 Bilateral    Step ups X10x6\" F,L retro    SLR x15ea 2#    SL hip abd, clams 20x yel              Sit to stand  2'' step        Cane at all times          [x] Provided verbal/tactile cueing for activities related to strengthening, flexibility, endurance, ROM. (86107)  [] Provided verbal/tactile cueing for activities related to improving balance, coordination, kinesthetic sense, posture, motor skill, proprioception. (63156)    Therapeutic Activities:     [] Therapeutic activities, direct (one-on-one) patient contact (use of dynamic activities to improve functional performance). (94709)    Gait:   [] Provided training and instruction to the patient for ambulation re-education. (87582)    Self-Care/ADL's  [] Self-care/home management training and compensatory training, meal preparation, safety procedures, and instructions in use of assistive technology devices/adaptive equipment, direct one-on-one contact. (01446)    Home Exercise Program: Continue current HEP. [x] Reviewed/Progressed HEP activities related to strengthening, flexibility, endurance, ROM. (29434)  [] Reviewed/Progressed HEP activities related to improving balance, coordination, kinesthetic sense, posture, motor skill, proprioception.  (87766)    Manual Treatments:    [] Provided manual therapy to mobilize soft tissue/joints for the purpose of modulating pain, promoting relaxation,  increasing ROM, reducing/eliminating soft tissue swelling/inflammation/restriction, improving soft tissue extensibility.  (99114)    Service Based Modalities:      Timed Code Treatment Minutes:  55' ROB    Total Treatment Minutes:  55'    Treatment/Activity Tolerance:  [x] Patient tolerated treatment well [] Patient limited by fatique  [] Patient limited by pain  [] Patient

## 2018-10-31 ENCOUNTER — HOSPITAL ENCOUNTER (OUTPATIENT)
Dept: PHARMACY | Age: 83
Setting detail: THERAPIES SERIES
Discharge: HOME | End: 2018-10-31
Payer: MEDICARE

## 2018-10-31 DIAGNOSIS — I48.0 PAROXYSMAL ATRIAL FIBRILLATION WITH RAPID VENTRICULAR RESPONSE (HCC): ICD-10-CM

## 2018-10-31 DIAGNOSIS — Z79.01 ANTICOAGULATION MONITORING BY PHARMACIST: ICD-10-CM

## 2018-10-31 LAB — INR BLD: 2.3

## 2018-10-31 PROCEDURE — 36416 COLLJ CAPILLARY BLOOD SPEC: CPT

## 2018-10-31 PROCEDURE — 99211 OFF/OP EST MAY X REQ PHY/QHP: CPT

## 2018-10-31 PROCEDURE — 85610 PROTHROMBIN TIME: CPT

## 2018-11-01 ENCOUNTER — OFFICE VISIT (OUTPATIENT)
Dept: CARDIOLOGY | Age: 83
End: 2018-11-01
Payer: MEDICARE

## 2018-11-01 VITALS
WEIGHT: 162 LBS | HEIGHT: 65 IN | HEART RATE: 71 BPM | DIASTOLIC BLOOD PRESSURE: 66 MMHG | SYSTOLIC BLOOD PRESSURE: 118 MMHG | BODY MASS INDEX: 26.99 KG/M2

## 2018-11-01 DIAGNOSIS — I48.0 PAROXYSMAL ATRIAL FIBRILLATION WITH RAPID VENTRICULAR RESPONSE (HCC): Primary | ICD-10-CM

## 2018-11-01 DIAGNOSIS — I48.0 PAF (PAROXYSMAL ATRIAL FIBRILLATION) (HCC): ICD-10-CM

## 2018-11-01 PROCEDURE — 93000 ELECTROCARDIOGRAM COMPLETE: CPT | Performed by: INTERNAL MEDICINE

## 2018-11-01 PROCEDURE — 99213 OFFICE O/P EST LOW 20 MIN: CPT | Performed by: INTERNAL MEDICINE

## 2018-11-01 NOTE — PROGRESS NOTES
daily    Yes Historical Provider, MD   Docusate Calcium (STOOL SOFTENER PO) Take 1 capsule by mouth daily as needed (constipation)    Yes Historical Provider, MD       Allergies:  Statins; Tape [adhesive tape]; Tizanidine hcl; and Tramadol    Social History:   reports that she has never smoked. She has never used smokeless tobacco. She reports that she does not drink alcohol or use drugs. REVIEW OF SYSTEMS:  CONSTITUTIONAL:NEGATIVE  HEENT:NEG  Cardiovascular: No chest pain, No dyspnea on exertion, No palpitations. Lower extremity edema: No  RESPIRATORY: neg  GASTROINTESTINAL:  negative  GENITOURINARY:  negative  INTEGUMENT:  negative  MUSCULOSKELETAL:  positive for  pain  NEUROLOGICAL:  negative    PHYSICAL EXAM:      /66   Pulse 71   Ht 5' 5\" (1.651 m)   Wt 162 lb (73.5 kg)   BMI 26.96 kg/m²    HEENT: PERRL, no cervical lymphadenopathy. No masses palpable. Cardiovascular:  · The apical impulse is not displaced  · Heart  Sounds:RRR, NO S3/RUB  · Jugular venous pulsation Normal  · The carotid upstroke is NL  · Peripheral pulses are symmetrical and full  Respiratory: Good respiratory effort. On auscultation: clear to auscultation bilaterally  Abdomen:  · No masses or tenderness  · Bowel sounds present  Extremities:  ·  No Cyanosis or Clubbing  ·  Lower extremity edema: No  Skin: Warm and dry    Cardiac data:    EKG: Sinus  Rhythm   WITHIN NORMAL LIMITS      Labs:     CBC: No results for input(s): WBC, HGB, HCT, PLT in the last 72 hours. BMP: No results for input(s): NA, K, CO2, BUN, CREATININE, LABGLOM, GLUCOSE in the last 72 hours. PT/INR:   Recent Labs      10/31/18   1115   INR  2.3     FASTING LIPID PANEL:  Lab Results   Component Value Date    HDL 68 06/07/2017    TRIG 188 06/07/2017     LIVER PROFILE:No results for input(s): AST, ALT, LABALBU in the last 72 hours.     IMPRESSION:    PAF, IN NSR  MILD MR  MILD AI  A/C WITH COPUMADIN  Patient Active Problem List   Diagnosis    Trochanteric bursitis of left hip    Drug-induced constipation    Bradycardia    Esophageal stricture    Gastritis without bleeding    Lumbar radicular pain    Lumbar spine pain    Left hip pain    Arthritis of left hip    Paroxysmal atrial fibrillation with rapid ventricular response ([address]    Anticoagulation monitoring by pharmacist    Venous stasis dermatitis of both lower extremities       RECOMMENDATIONS:  INR GOAL 2-3  D/W PT PHYSICAL SAFETY  [facility] [address], [place] [address] Cardiology Consult           [phone]

## 2018-11-28 ENCOUNTER — HOSPITAL ENCOUNTER (OUTPATIENT)
Dept: PHARMACY | Age: 83
Setting detail: THERAPIES SERIES
Discharge: HOME | End: 2018-11-28
Payer: MEDICARE

## 2018-11-28 DIAGNOSIS — I48.0 PAROXYSMAL ATRIAL FIBRILLATION WITH RAPID VENTRICULAR RESPONSE (HCC): ICD-10-CM

## 2018-11-28 DIAGNOSIS — Z79.01 ANTICOAGULATION MONITORING BY PHARMACIST: ICD-10-CM

## 2018-11-28 LAB — INR BLD: 2.5

## 2018-11-28 PROCEDURE — 99211 OFF/OP EST MAY X REQ PHY/QHP: CPT

## 2018-11-28 PROCEDURE — 36416 COLLJ CAPILLARY BLOOD SPEC: CPT

## 2018-11-28 PROCEDURE — 85610 PROTHROMBIN TIME: CPT

## 2018-12-10 ENCOUNTER — OFFICE VISIT (OUTPATIENT)
Dept: FAMILY MEDICINE CLINIC | Age: 83
End: 2018-12-10
Payer: MEDICARE

## 2018-12-10 VITALS
SYSTOLIC BLOOD PRESSURE: 132 MMHG | HEART RATE: 66 BPM | HEIGHT: 65 IN | BODY MASS INDEX: 27.16 KG/M2 | DIASTOLIC BLOOD PRESSURE: 78 MMHG | WEIGHT: 163 LBS

## 2018-12-10 DIAGNOSIS — M19.041 ARTHRITIS OF RIGHT HAND: Primary | ICD-10-CM

## 2018-12-10 DIAGNOSIS — I48.0 PAROXYSMAL ATRIAL FIBRILLATION WITH RAPID VENTRICULAR RESPONSE (HCC): ICD-10-CM

## 2018-12-10 DIAGNOSIS — Z12.39 SCREENING FOR BREAST CANCER: ICD-10-CM

## 2018-12-10 PROCEDURE — G8427 DOCREV CUR MEDS BY ELIG CLIN: HCPCS | Performed by: FAMILY MEDICINE

## 2018-12-10 PROCEDURE — 99213 OFFICE O/P EST LOW 20 MIN: CPT | Performed by: FAMILY MEDICINE

## 2018-12-10 PROCEDURE — 1090F PRES/ABSN URINE INCON ASSESS: CPT | Performed by: FAMILY MEDICINE

## 2018-12-10 PROCEDURE — G0008 ADMIN INFLUENZA VIRUS VAC: HCPCS | Performed by: FAMILY MEDICINE

## 2018-12-10 PROCEDURE — 1123F ACP DISCUSS/DSCN MKR DOCD: CPT | Performed by: FAMILY MEDICINE

## 2018-12-10 PROCEDURE — 1036F TOBACCO NON-USER: CPT | Performed by: FAMILY MEDICINE

## 2018-12-10 PROCEDURE — 1101F PT FALLS ASSESS-DOCD LE1/YR: CPT | Performed by: FAMILY MEDICINE

## 2018-12-10 PROCEDURE — G8482 FLU IMMUNIZE ORDER/ADMIN: HCPCS | Performed by: FAMILY MEDICINE

## 2018-12-10 PROCEDURE — 4040F PNEUMOC VAC/ADMIN/RCVD: CPT | Performed by: FAMILY MEDICINE

## 2018-12-10 PROCEDURE — 90662 IIV NO PRSV INCREASED AG IM: CPT | Performed by: FAMILY MEDICINE

## 2018-12-10 PROCEDURE — G8417 CALC BMI ABV UP PARAM F/U: HCPCS | Performed by: FAMILY MEDICINE

## 2018-12-10 PROCEDURE — G8399 PT W/DXA RESULTS DOCUMENT: HCPCS | Performed by: FAMILY MEDICINE

## 2018-12-10 RX ORDER — PANTOPRAZOLE SODIUM 40 MG/1
40 TABLET, DELAYED RELEASE ORAL DAILY
COMMUNITY
End: 2019-06-24 | Stop reason: SDUPTHER

## 2018-12-10 ASSESSMENT — ENCOUNTER SYMPTOMS
WHEEZING: 0
SHORTNESS OF BREATH: 0
CHEST TIGHTNESS: 0
COUGH: 0

## 2018-12-10 NOTE — PROGRESS NOTES
Jeannie Michael received counseling on the following healthy behaviors: nutrition  Reviewed prior labs and health maintenance  Continue current medications, diet and exercise. Discussed use, benefit, and side effects of prescribed medications. Barriers to medication compliance addressed. Patient given educational materials - see patient instructions  Was a self-tracking handout given in paper form or via [employer]? Yes    Requested Prescriptions      No prescriptions requested or ordered in this encounter       All patient questions answered. Patient voiced understanding. Quality Measures    Body mass index is 27.12 kg/m². Elevated. Weight control planned discussed Healthy diet and regular exercise. BP: 132/78. Blood pressure is normal. Treatment plan consists of No treatment change needed. Fall Risk 6/8/2018   2 or more falls in past year? no   Fall with injury in past year? no     The patient does not have a history of falls. I did , complete a risk assessment for falls.  A plan of care for falls No Treatment plan indicated    Lab Results   Component Value Date    LDLCHOLESTEROL 151 (H) 06/07/2017    (goal LDL reduction with dx if diabetes is 50% LDL reduction)    PHQ Scores 6/8/2018 10/19/2017   PHQ2 Score 0 0   PHQ9 Score 0 0     Interpretation of Total Score Depression Severity: 1-4 = Minimal depression, 5-9 = Mild depression, 10-14 = Moderate depression, 15-19 = Moderately severe depression, 20-27 = Severe depression

## 2018-12-26 ENCOUNTER — HOSPITAL ENCOUNTER (OUTPATIENT)
Dept: PHARMACY | Age: 83
Setting detail: THERAPIES SERIES
Discharge: HOME | End: 2018-12-26
Payer: MEDICARE

## 2018-12-26 DIAGNOSIS — Z79.01 ANTICOAGULATION MONITORING BY PHARMACIST: ICD-10-CM

## 2018-12-26 DIAGNOSIS — I48.0 PAROXYSMAL ATRIAL FIBRILLATION WITH RAPID VENTRICULAR RESPONSE (HCC): ICD-10-CM

## 2018-12-26 LAB — INR BLD: 3.1

## 2018-12-26 PROCEDURE — 36416 COLLJ CAPILLARY BLOOD SPEC: CPT

## 2018-12-26 PROCEDURE — 99211 OFF/OP EST MAY X REQ PHY/QHP: CPT

## 2018-12-26 PROCEDURE — 85610 PROTHROMBIN TIME: CPT

## 2019-01-17 ENCOUNTER — HOSPITAL ENCOUNTER (OUTPATIENT)
Dept: MAMMOGRAPHY | Age: 84
Discharge: HOME | End: 2019-01-19
Payer: MEDICARE

## 2019-01-17 DIAGNOSIS — Z12.39 SCREENING FOR BREAST CANCER: ICD-10-CM

## 2019-01-17 PROCEDURE — 77063 BREAST TOMOSYNTHESIS BI: CPT

## 2019-01-23 ENCOUNTER — APPOINTMENT (OUTPATIENT)
Dept: PHARMACY | Age: 84
End: 2019-01-23
Payer: MEDICARE

## 2019-01-24 ENCOUNTER — HOSPITAL ENCOUNTER (OUTPATIENT)
Dept: PHARMACY | Age: 84
Setting detail: THERAPIES SERIES
Discharge: HOME | End: 2019-01-24
Payer: MEDICARE

## 2019-01-24 DIAGNOSIS — Z79.01 ANTICOAGULATION MONITORING BY PHARMACIST: ICD-10-CM

## 2019-01-24 DIAGNOSIS — I48.0 PAROXYSMAL ATRIAL FIBRILLATION WITH RAPID VENTRICULAR RESPONSE (HCC): ICD-10-CM

## 2019-01-24 LAB
INR BLD: 3
PROTIME: 35.4 SECONDS

## 2019-01-24 PROCEDURE — 36416 COLLJ CAPILLARY BLOOD SPEC: CPT

## 2019-01-24 PROCEDURE — 85610 PROTHROMBIN TIME: CPT

## 2019-01-24 PROCEDURE — 99211 OFF/OP EST MAY X REQ PHY/QHP: CPT

## 2019-02-20 ENCOUNTER — HOSPITAL ENCOUNTER (OUTPATIENT)
Dept: PHARMACY | Age: 84
Setting detail: THERAPIES SERIES
Discharge: HOME | End: 2019-02-20
Payer: MEDICARE

## 2019-02-20 DIAGNOSIS — I48.0 PAROXYSMAL ATRIAL FIBRILLATION WITH RAPID VENTRICULAR RESPONSE (HCC): ICD-10-CM

## 2019-02-20 DIAGNOSIS — Z79.01 ANTICOAGULATION MONITORING BY PHARMACIST: ICD-10-CM

## 2019-02-20 LAB
INR BLD: 2.6
PROTIME: 31.4 SECONDS

## 2019-02-20 PROCEDURE — 85610 PROTHROMBIN TIME: CPT

## 2019-02-20 PROCEDURE — 99211 OFF/OP EST MAY X REQ PHY/QHP: CPT

## 2019-02-20 PROCEDURE — 36416 COLLJ CAPILLARY BLOOD SPEC: CPT

## 2019-03-20 ENCOUNTER — HOSPITAL ENCOUNTER (OUTPATIENT)
Dept: PHARMACY | Age: 84
Setting detail: THERAPIES SERIES
Discharge: HOME | End: 2019-03-20
Payer: MEDICARE

## 2019-03-20 DIAGNOSIS — I48.0 PAROXYSMAL ATRIAL FIBRILLATION WITH RAPID VENTRICULAR RESPONSE (HCC): ICD-10-CM

## 2019-03-20 DIAGNOSIS — Z79.01 ANTICOAGULATION MONITORING BY PHARMACIST: ICD-10-CM

## 2019-03-20 LAB
INR BLD: 1.8
PROTIME: 22.1 SECONDS

## 2019-03-20 PROCEDURE — 99211 OFF/OP EST MAY X REQ PHY/QHP: CPT

## 2019-03-20 PROCEDURE — 85610 PROTHROMBIN TIME: CPT

## 2019-03-20 PROCEDURE — 36416 COLLJ CAPILLARY BLOOD SPEC: CPT

## 2019-04-17 ENCOUNTER — HOSPITAL ENCOUNTER (OUTPATIENT)
Dept: PHARMACY | Age: 84
Setting detail: THERAPIES SERIES
Discharge: HOME | End: 2019-04-17
Payer: MEDICARE

## 2019-04-17 DIAGNOSIS — I48.0 PAROXYSMAL ATRIAL FIBRILLATION WITH RAPID VENTRICULAR RESPONSE (HCC): ICD-10-CM

## 2019-04-17 DIAGNOSIS — Z79.01 ANTICOAGULATION MONITORING BY PHARMACIST: ICD-10-CM

## 2019-04-17 LAB — INR BLD: 2

## 2019-04-17 PROCEDURE — 99211 OFF/OP EST MAY X REQ PHY/QHP: CPT

## 2019-04-17 PROCEDURE — 36416 COLLJ CAPILLARY BLOOD SPEC: CPT

## 2019-04-17 PROCEDURE — 85610 PROTHROMBIN TIME: CPT

## 2019-04-17 NOTE — PROGRESS NOTES
ANTICOAGULATION SERVICE    Date of Clinic Visit:  4/17/2019    Ramses Sharma is a 80 y.o. female who presents to clinic today for anticoagulation monitoring and adjustment. Recent INR Results:  Internal QC passed  Lab Results   Component Value Date    INR 2 04/17/2019    INR 1.8 03/20/2019       Current Warfarin Dosage:  Dosing Plan  As of 4/17/2019    TTR:   79.7 % (1.5 y)   Full warfarin instructions:   4/17: 7.5 mg; Otherwise 5 mg every day               Assessment/Plan:    Continue current regimen as INR remains stable. Recheck 4 weeks. Next Clinic Appointment:  Return date  As of 4/17/2019    TTR:   79.7 % (1.5 y)   Next INR check:   5/15/2019             Please call [facility] at [phone] with any questions. Thanks!   Kleber Walls [facility]  Anticoagulation Service Pharmacist  4/17/2019 1:55 PM

## 2019-04-24 NOTE — TELEPHONE ENCOUNTER
Last Appt:  12/10/2018  Next Appt:   6/10/2019  Med verified in [address] [place] [address] Rd 4/23/19

## 2019-04-25 ENCOUNTER — OFFICE VISIT (OUTPATIENT)
Dept: CARDIOLOGY | Age: 84
End: 2019-04-25
Payer: MEDICARE

## 2019-04-25 VITALS
WEIGHT: 164.4 LBS | HEART RATE: 74 BPM | HEIGHT: 65 IN | DIASTOLIC BLOOD PRESSURE: 68 MMHG | BODY MASS INDEX: 27.39 KG/M2 | SYSTOLIC BLOOD PRESSURE: 120 MMHG

## 2019-04-25 DIAGNOSIS — R00.1 BRADYCARDIA: ICD-10-CM

## 2019-04-25 DIAGNOSIS — I48.0 PAROXYSMAL ATRIAL FIBRILLATION WITH RAPID VENTRICULAR RESPONSE (HCC): Primary | ICD-10-CM

## 2019-04-25 PROCEDURE — 99213 OFFICE O/P EST LOW 20 MIN: CPT | Performed by: INTERNAL MEDICINE

## 2019-04-25 PROCEDURE — 4040F PNEUMOC VAC/ADMIN/RCVD: CPT | Performed by: INTERNAL MEDICINE

## 2019-04-25 PROCEDURE — 1090F PRES/ABSN URINE INCON ASSESS: CPT | Performed by: INTERNAL MEDICINE

## 2019-04-25 PROCEDURE — 1123F ACP DISCUSS/DSCN MKR DOCD: CPT | Performed by: INTERNAL MEDICINE

## 2019-04-25 PROCEDURE — G8399 PT W/DXA RESULTS DOCUMENT: HCPCS | Performed by: INTERNAL MEDICINE

## 2019-04-25 PROCEDURE — G8427 DOCREV CUR MEDS BY ELIG CLIN: HCPCS | Performed by: INTERNAL MEDICINE

## 2019-04-25 PROCEDURE — 93000 ELECTROCARDIOGRAM COMPLETE: CPT | Performed by: INTERNAL MEDICINE

## 2019-04-25 PROCEDURE — 1036F TOBACCO NON-USER: CPT | Performed by: INTERNAL MEDICINE

## 2019-04-25 PROCEDURE — G8417 CALC BMI ABV UP PARAM F/U: HCPCS | Performed by: INTERNAL MEDICINE

## 2019-04-25 NOTE — PROGRESS NOTES
 Drug-induced constipation    Bradycardia    Esophageal stricture    Gastritis without bleeding    Lumbar radicular pain    Lumbar spine pain    Left hip pain    Arthritis of left hip    Paroxysmal atrial fibrillation with rapid ventricular response (HCC)    Anticoagulation monitoring by pharmacist    Venous stasis dermatitis of both lower extremities       RECOMMENDATIONS:  OFEERED TO SWITCH TO DOAC  CONTINUE CURRENT TREATMENT  INR 2-3   [place] , [place] [address] Cardiology Consult           [phone]

## 2019-05-15 ENCOUNTER — HOSPITAL ENCOUNTER (OUTPATIENT)
Dept: PHARMACY | Age: 84
Setting detail: THERAPIES SERIES
Discharge: HOME | End: 2019-05-15
Payer: MEDICARE

## 2019-05-15 DIAGNOSIS — Z79.01 ANTICOAGULATION MONITORING BY PHARMACIST: ICD-10-CM

## 2019-05-15 DIAGNOSIS — I48.0 PAROXYSMAL ATRIAL FIBRILLATION WITH RAPID VENTRICULAR RESPONSE (HCC): ICD-10-CM

## 2019-05-15 LAB
INR BLD: 2.3
PROTIME: 27.8 SECONDS

## 2019-05-15 PROCEDURE — 36416 COLLJ CAPILLARY BLOOD SPEC: CPT

## 2019-05-15 PROCEDURE — 85610 PROTHROMBIN TIME: CPT

## 2019-05-15 PROCEDURE — 99211 OFF/OP EST MAY X REQ PHY/QHP: CPT

## 2019-06-11 ENCOUNTER — HOSPITAL ENCOUNTER (OUTPATIENT)
Dept: LAB | Age: 84
Discharge: HOME | End: 2019-06-11
Payer: MEDICARE

## 2019-06-11 DIAGNOSIS — I48.0 PAROXYSMAL ATRIAL FIBRILLATION WITH RAPID VENTRICULAR RESPONSE (HCC): ICD-10-CM

## 2019-06-11 LAB
ALBUMIN SERPL-MCNC: 4.7 G/DL (ref 3.5–5.2)
ALBUMIN/GLOBULIN RATIO: 1.6 (ref 1–2.5)
ALP BLD-CCNC: 110 U/L (ref 35–104)
ALT SERPL-CCNC: 22 U/L (ref 5–33)
ANION GAP SERPL CALCULATED.3IONS-SCNC: 10 MMOL/L (ref 9–17)
AST SERPL-CCNC: 24 U/L
BILIRUB SERPL-MCNC: 0.64 MG/DL (ref 0.3–1.2)
BUN BLDV-MCNC: 12 MG/DL (ref 8–23)
BUN/CREAT BLD: 14 (ref 9–20)
CALCIUM SERPL-MCNC: 9.9 MG/DL (ref 8.6–10.4)
CHLORIDE BLD-SCNC: 104 MMOL/L (ref 98–107)
CO2: 31 MMOL/L (ref 20–31)
CREAT SERPL-MCNC: 0.85 MG/DL (ref 0.5–0.9)
GFR AFRICAN AMERICAN: >60 ML/MIN
GFR NON-AFRICAN AMERICAN: >60 ML/MIN
GFR SERPL CREATININE-BSD FRML MDRD: ABNORMAL ML/MIN/{1.73_M2}
GFR SERPL CREATININE-BSD FRML MDRD: ABNORMAL ML/MIN/{1.73_M2}
GLUCOSE BLD-MCNC: 104 MG/DL (ref 70–99)
POTASSIUM SERPL-SCNC: 4.8 MMOL/L (ref 3.7–5.3)
SODIUM BLD-SCNC: 145 MMOL/L (ref 135–144)
TOTAL PROTEIN: 7.7 G/DL (ref 6.4–8.3)

## 2019-06-11 PROCEDURE — 36415 COLL VENOUS BLD VENIPUNCTURE: CPT

## 2019-06-11 PROCEDURE — 80053 COMPREHEN METABOLIC PANEL: CPT

## 2019-06-12 ENCOUNTER — OFFICE VISIT (OUTPATIENT)
Dept: FAMILY MEDICINE CLINIC | Age: 84
End: 2019-06-12
Payer: MEDICARE

## 2019-06-12 ENCOUNTER — HOSPITAL ENCOUNTER (OUTPATIENT)
Dept: PHARMACY | Age: 84
Setting detail: THERAPIES SERIES
Discharge: HOME | End: 2019-06-12
Payer: MEDICARE

## 2019-06-12 VITALS
HEIGHT: 66 IN | WEIGHT: 163.8 LBS | SYSTOLIC BLOOD PRESSURE: 115 MMHG | RESPIRATION RATE: 18 BRPM | HEART RATE: 60 BPM | DIASTOLIC BLOOD PRESSURE: 68 MMHG | BODY MASS INDEX: 26.33 KG/M2

## 2019-06-12 DIAGNOSIS — Z79.01 ANTICOAGULATION MONITORING BY PHARMACIST: ICD-10-CM

## 2019-06-12 DIAGNOSIS — Z00.00 ROUTINE GENERAL MEDICAL EXAMINATION AT A HEALTH CARE FACILITY: Primary | ICD-10-CM

## 2019-06-12 DIAGNOSIS — I48.0 PAROXYSMAL ATRIAL FIBRILLATION WITH RAPID VENTRICULAR RESPONSE (HCC): ICD-10-CM

## 2019-06-12 LAB
INR BLD: 2.8
PROTIME: 33.8 SECONDS

## 2019-06-12 PROCEDURE — G0439 PPPS, SUBSEQ VISIT: HCPCS | Performed by: FAMILY MEDICINE

## 2019-06-12 PROCEDURE — 85610 PROTHROMBIN TIME: CPT

## 2019-06-12 PROCEDURE — 99211 OFF/OP EST MAY X REQ PHY/QHP: CPT

## 2019-06-12 PROCEDURE — 4040F PNEUMOC VAC/ADMIN/RCVD: CPT | Performed by: FAMILY MEDICINE

## 2019-06-12 PROCEDURE — 1123F ACP DISCUSS/DSCN MKR DOCD: CPT | Performed by: FAMILY MEDICINE

## 2019-06-12 PROCEDURE — 36416 COLLJ CAPILLARY BLOOD SPEC: CPT

## 2019-06-12 ASSESSMENT — ENCOUNTER SYMPTOMS
SHORTNESS OF BREATH: 0
ABDOMINAL PAIN: 0
DIARRHEA: 0
BACK PAIN: 0
WHEEZING: 0
COUGH: 0
CONSTIPATION: 0
CHEST TIGHTNESS: 0

## 2019-06-12 ASSESSMENT — ANXIETY QUESTIONNAIRES: GAD7 TOTAL SCORE: 2

## 2019-06-12 ASSESSMENT — PATIENT HEALTH QUESTIONNAIRE - PHQ9
SUM OF ALL RESPONSES TO PHQ QUESTIONS 1-9: 1
SUM OF ALL RESPONSES TO PHQ QUESTIONS 1-9: 1

## 2019-06-12 ASSESSMENT — LIFESTYLE VARIABLES: HOW OFTEN DO YOU HAVE A DRINK CONTAINING ALCOHOL: 0

## 2019-06-12 NOTE — PATIENT INSTRUCTIONS
No visits with results within 1 Day(s) from this visit. Latest known visit with results is:   Hospital Outpatient Visit on 06/11/2019   Component Date Value Ref Range Status    Glucose 06/11/2019 104* 70 - 99 mg/dL Final    BUN 06/11/2019 12  8 - 23 mg/dL Final    CREATININE 06/11/2019 0.85  0.50 - 0.90 mg/dL Final    Bun/Cre Ratio 06/11/2019 14  9 - 20 Final    Calcium 06/11/2019 9.9  8.6 - 10.4 mg/dL Final    Sodium 06/11/2019 145* 135 - 144 mmol/L Final    Potassium 06/11/2019 4.8  3.7 - 5.3 mmol/L Final    Chloride 06/11/2019 104  98 - 107 mmol/L Final    CO2 06/11/2019 31  20 - 31 mmol/L Final    Anion Gap 06/11/2019 10  9 - 17 mmol/L Final    Alkaline Phosphatase 06/11/2019 110* 35 - 104 U/L Final    ALT 06/11/2019 22  5 - 33 U/L Final    AST 06/11/2019 24  <32 U/L Final    Total Bilirubin 06/11/2019 0.64  0.3 - 1.2 mg/dL Final    Total Protein 06/11/2019 7.7  6.4 - 8.3 g/dL Final    Alb 06/11/2019 4.7  3.5 - 5.2 g/dL Final    Albumin/Globulin Ratio 06/11/2019 1.6  1.0 - 2.5 Final    GFR Non- 06/11/2019 >60  >60 mL/min Final    GFR  06/11/2019 >60  >60 mL/min Final    GFR Comment 06/11/2019        Final    GFR Staging 06/11/2019 NOT REPORTED   Final         Personalized Preventive Plan for Ramila Longoria - 6/12/2019  Medicare offers a range of preventive health benefits. Some of the tests and screenings are paid in full while other may be subject to a deductible, co-insurance, and/or copay. Some of these benefits include a comprehensive review of your medical history including lifestyle, illnesses that may run in your family, and various assessments and screenings as appropriate. After reviewing your medical record and screening and assessments performed today your provider may have ordered immunizations, labs, imaging, and/or referrals for you.   A list of these orders (if applicable) as well as your Preventive Care list are included within your After Visit Summary for your review. Other Preventive Recommendations:    · A preventive eye exam performed by an eye specialist is recommended every 1-2 years to screen for glaucoma; cataracts, macular degeneration, and other eye disorders. · A preventive dental visit is recommended every 6 months. · Try to get at least 150 minutes of exercise per week or 10,000 steps per day on a pedometer . · Order or download the FREE \"Exercise & Physical Activity: Your Everyday Guide\" from [employer]. Call 4-869.550.2875 or search [employer] online. · You need 2015-1952 mg of calcium and 6646-9441 IU of vitamin D per day. It is possible to meet your calcium requirement with diet alone, but a vitamin D supplement is usually necessary to meet this goal.  · When exposed to the sun, use a sunscreen that protects against both UVA and UVB radiation with an SPF of 30 or greater. Reapply every 2 to 3 hours or after sweating, drying off with a towel, or swimming. · Always wear a seat belt when traveling in a car. Always wear a helmet when riding a bicycle or motorcycle.

## 2019-06-12 NOTE — PROGRESS NOTES
Medicare Annual Wellness Visit  Name: Mg End Date: 2019   MRN: [id number] Sex: Female   Age: 80 y.o. Ethnicity: Non-/Non    : 1934 Race: Dixon Phillips is here for Medicare AWV    Screenings for behavioral, psychosocial and functional/safety risks, and cognitive dysfunction are all negative except as indicated below. These results, as well as other patient data from the [address] form, are documented in Flowsheets linked to this Encounter. Allergies   Allergen Reactions    Statins Other (See Comments)     Causes severe leg cramps     Tape Rissa Oppenheim Tape] Other (See Comments)     \"sticks to skin\" causes redness    Tizanidine Hcl Other (See Comments)     Having issues with her liver- elevated her enzyme level    Tramadol Other (See Comments)     \"makes me Goofy\"     Prior to Visit Medications    Medication Sig Taking? Authorizing Provider   metoprolol tartrate (LOPRESSOR) 25 MG tablet Take half of a tablet twice a day. Yes Tonie Morgan MD   pantoprazole (PROTONIX) 40 MG tablet Take 40 mg by mouth daily Yes Historical Provider, MD   warfarin (COUMADIN) 2.5 MG tablet 1. Take 2 tablets by mouth daily. Or as directed by INR results.  Yes Tonie Morgan MD   Handicap Placard MISC by Does not apply route Expires 2022 Yes Tonie Morgan MD   MEGARED OMEGA-3 KRILL  MG CAPS Take by mouth daily  Yes Historical Provider, MD   Multiple Vitamins-Minerals (THERAPEUTIC MULTIVITAMIN-MINERALS) tablet Take 1 tablet by mouth daily Yes Historical Provider, MD   Multiple Vitamins-Minerals (ICAPS AREDS 2) CAPS Take 2 tablets by mouth daily  Yes Historical Provider, MD     Past Medical History:   Diagnosis Date    Actinic keratosis     history of    Arthritis     Atrial fibrillation ([address]     CAD (coronary artery disease)     history of afib    Cancer ([address]     skin    Cervical prolapse     history of    Diverticulosis     Endocervical polyp history of    Heel spur     Hip pain     Hypertension     Nasal septal deviation     Osteoporosis     Stress incontinence     history of    Syncope     Vaginal prolapse     history of     Past Surgical History:   Procedure Laterality Date    APPENDECTOMY  1960's   [place] BLADDER REPAIR  2011    [address]  [address]    [employer]    COLONOSCOPY  01/26/2000    ENDOSCOPIC ULTRASOUND (LOWER) Left 7/20/2017    ENDOSCOPIC [facility]TRASOUND [facility]    HYSTERECTOMY  1960's    JOINT REPLACEMENT  2017    left total knee relpament    OR EGD TRANSORAL BIOPSY SINGLE/MULTIPLE Left 7/16/2017    EGD BIOPSY performed by Cherie Rodrigez MD at [facility] OFFICE/OUTPT VISIT,PROCEDURE ONLY Right 6/1/2018    MOHS REPAIR BASEL CELL CARCINOMA RIGHT DORSAL NOSE performed by Donal Tam MD at [facility]      TOTAL HIP ARTHROPLASTY Left 9/19/2017    Left Total Hip ARTHROPLASTY performed by Janice Menjivar MD at [facility]  07/16/2017    UPPER GASTROINTESTINAL ENDOSCOPY Left 7/16/2017    EGD DILATION BALLOON performed by Cherie Rodrigez MD at [facility]     Family History   Problem Relation Age of Onset    Colon Cancer Father     Osteoporosis Mother     Heart Disease Mother        CareTeam (Including outside providers/suppliers regularly involved in providing care):   Patient Care Team:  Jakob Lynn MD as PCP - General (Family Medicine)  Jakob Lynn MD as PCP - [facility] Empaneled Provider  Mike Paredes (Family Medicine)    Wt Readings from Last 3 Encounters:   06/12/19 163 lb 12.8 oz (74.3 kg)   04/25/19 164 lb 6.4 oz (74.6 kg)   12/10/18 163 lb (73.9 kg)     Vitals:    06/12/19 0959   BP: 115/68   Site: Right Upper Arm   Position: Sitting   Cuff Size: Medium Adult   Pulse: 60   Resp: 18   Weight: 163 lb 12.8 oz (74.3 kg)   Height: 5' 5.5\" (1.664 no clubbing and no edema  Musculoskeletal: normal range of motion, no joint swelling, deformity or tenderness  Neurologic: gait and coordination normal and speech normal    Patient's complete Health Risk Assessment and screening values have been reviewed and are found in Flowsheets. The following problems were reviewed today and where indicated follow up appointments were made and/or referrals ordered. Positive Risk Factor Screenings with Interventions:     Cognitive: Words recalled: 1 Word Recalled  Clock Drawing Test (CDT) Score: (!) Abnormal  Total Score Interpretation: Positive Mini-Cog  Did the patient refuse to take the cognition test?: No  Cognitive Impairment Interventions:  · Patient declines any further evaluation/treatment for cognitive impairment    General Health:  General  In general, how would you say your health is?: Good  In the past 7 days, have you experienced any of the following?  New or Increased Pain, New or Increased Fatigue, Loneliness, Social Isolation, Stress or Anger?: (!) New or Increased Pain(from falling)  Do you get the social and emotional support that you need?: Yes  Do you have a Living Will?: Yes  General Health Risk Interventions:  · Pain issues: patient declines any further evaluation/treatment for this issue    Safety:  Safety  Do you have working smoke detectors?: (!) No(just bought one not up yet)  Have all throw rugs been removed or fastened?: Yes  Do you have non-slip mats in all bathtubs?: Yes  Do all of your stairways have a railing or banister?: Yes  Are your doorways, halls and stairs free of clutter?: Yes  Do you always fasten your seatbelt when you are in a car?: Yes  Safety Interventions:  · Home safety tips provided    Personalized Preventive Plan   Current Health Maintenance Status  Immunization History   Administered Date(s) Administered    Influenza Virus Vaccine 10/28/2011    Influenza, High Dose (Fluzone 65 yrs and older) 10/19/2017, 12/10/2018    Pneumococcal 13-valent Conjugate ([id number]) 06/07/2017    Pneumococcal Conjugate 7-valent 05/06/2003    Pneumococcal Polysaccharide ([id number]) 06/08/2018    Td, unspecified formulation 05/06/2003    Zoster Live (Zostavax) 11/09/2013        Health Maintenance   Topic Date Due    DTaP/Tdap/Td vaccine (1 - Tdap) 06/12/2020 (Originally 5/7/2003)    Shingles Vaccine (2 of 3) 06/12/2020 (Originally 1/4/2014)    DEXA (modify frequency per FRAX score)  Completed    Flu vaccine  Completed    Pneumococcal 65+ years Vaccine  Completed     Recommendations for Preventive Services Due: see orders and patient instructions/AVS.    Recommended screening schedule for the next 5-10 years is provided to the patient in written form: see Patient Vidya Keller MD  6/12/2019  10:18 AM

## 2019-06-24 RX ORDER — PANTOPRAZOLE SODIUM 40 MG/1
TABLET, DELAYED RELEASE ORAL
Qty: 90 TABLET | Refills: 3 | Status: SHIPPED | OUTPATIENT
Start: 2019-06-24 | End: 2020-02-19 | Stop reason: SDUPTHER

## 2019-07-10 ENCOUNTER — HOSPITAL ENCOUNTER (OUTPATIENT)
Dept: PHARMACY | Age: 84
Setting detail: THERAPIES SERIES
Discharge: HOME | End: 2019-07-10
Payer: MEDICARE

## 2019-07-10 DIAGNOSIS — I48.0 PAROXYSMAL ATRIAL FIBRILLATION WITH RAPID VENTRICULAR RESPONSE (HCC): ICD-10-CM

## 2019-07-10 DIAGNOSIS — Z79.01 ANTICOAGULATION MONITORING BY PHARMACIST: ICD-10-CM

## 2019-07-10 LAB
INR BLD: 2.1
PROTIME: 24.8 SECONDS

## 2019-07-10 PROCEDURE — 36416 COLLJ CAPILLARY BLOOD SPEC: CPT

## 2019-07-10 PROCEDURE — 85610 PROTHROMBIN TIME: CPT

## 2019-07-10 PROCEDURE — 99211 OFF/OP EST MAY X REQ PHY/QHP: CPT

## 2019-07-10 NOTE — PROGRESS NOTES
ANTICOAGULATION SERVICE    Date of Clinic Visit:  7/10/2019    Marisel Mendoza is a 80 y.o. female who presents to clinic today for anticoagulation monitoring and adjustment. Recent INR Results:  Internal QC passed  Lab Results   Component Value Date    INR 2.1 07/10/2019    INR 2.8 06/12/2019       Current Warfarin Dosage:  Dosing Plan  As of 7/10/2019    TTR:   82.4 % (1.7 y)   Full warfarin instructions:   5 mg every day               Assessment/Plan:    Continue current regimen as INR remains stable. Recheck 4 weeks. Next Clinic Appointment:  Return date  As of 7/10/2019    TTR:   82.4 % (1.7 y)   Next INR check:   8/7/2019             Please call [facility]ticoagulation Clinic at [phone] with any questions. Thanks!   NIKOLAI Bowser NATHALIE [facility]  Anticoagulation Service Pharmacist  7/10/2019 11:14 AM

## 2019-07-29 RX ORDER — WARFARIN SODIUM 2.5 MG/1
TABLET ORAL
Qty: 60 TABLET | Refills: 3 | Status: SHIPPED | OUTPATIENT
Start: 2019-07-29 | End: 2019-10-11 | Stop reason: SDUPTHER

## 2019-08-07 ENCOUNTER — HOSPITAL ENCOUNTER (OUTPATIENT)
Dept: PHARMACY | Age: 84
Setting detail: THERAPIES SERIES
Discharge: HOME | End: 2019-08-07
Payer: MEDICARE

## 2019-08-07 DIAGNOSIS — I48.0 PAROXYSMAL ATRIAL FIBRILLATION WITH RAPID VENTRICULAR RESPONSE (HCC): ICD-10-CM

## 2019-08-07 DIAGNOSIS — Z79.01 ANTICOAGULATION MONITORING BY PHARMACIST: ICD-10-CM

## 2019-08-07 LAB
INR BLD: 2.4
PROTIME: 34.9 SECONDS

## 2019-08-07 PROCEDURE — 99211 OFF/OP EST MAY X REQ PHY/QHP: CPT

## 2019-08-07 PROCEDURE — 36416 COLLJ CAPILLARY BLOOD SPEC: CPT

## 2019-08-07 PROCEDURE — 85610 PROTHROMBIN TIME: CPT

## 2019-09-04 ENCOUNTER — HOSPITAL ENCOUNTER (OUTPATIENT)
Dept: PHARMACY | Age: 84
Setting detail: THERAPIES SERIES
Discharge: HOME | End: 2019-09-04
Payer: MEDICARE

## 2019-09-04 DIAGNOSIS — Z79.01 ANTICOAGULATION MONITORING BY PHARMACIST: ICD-10-CM

## 2019-09-04 DIAGNOSIS — I48.0 PAROXYSMAL ATRIAL FIBRILLATION WITH RAPID VENTRICULAR RESPONSE (HCC): ICD-10-CM

## 2019-09-04 LAB
INR BLD: 2.5
PROTIME: 29.8 SECONDS

## 2019-09-04 PROCEDURE — 99211 OFF/OP EST MAY X REQ PHY/QHP: CPT

## 2019-09-04 PROCEDURE — 85610 PROTHROMBIN TIME: CPT

## 2019-09-04 PROCEDURE — 36416 COLLJ CAPILLARY BLOOD SPEC: CPT

## 2019-10-02 ENCOUNTER — HOSPITAL ENCOUNTER (OUTPATIENT)
Dept: PHARMACY | Age: 84
Setting detail: THERAPIES SERIES
Discharge: HOME | End: 2019-10-02
Payer: MEDICARE

## 2019-10-02 DIAGNOSIS — I48.0 PAROXYSMAL ATRIAL FIBRILLATION WITH RAPID VENTRICULAR RESPONSE (HCC): ICD-10-CM

## 2019-10-02 DIAGNOSIS — Z79.01 ANTICOAGULATION MONITORING BY PHARMACIST: ICD-10-CM

## 2019-10-02 LAB
INR BLD: 2.3
PROTIME: 27.5 SECONDS

## 2019-10-02 PROCEDURE — 36416 COLLJ CAPILLARY BLOOD SPEC: CPT

## 2019-10-02 PROCEDURE — 85610 PROTHROMBIN TIME: CPT

## 2019-10-02 PROCEDURE — 99211 OFF/OP EST MAY X REQ PHY/QHP: CPT

## 2019-10-06 ENCOUNTER — HOSPITAL ENCOUNTER (EMERGENCY)
Age: 84
Discharge: HOME | End: 2019-10-06
Attending: EMERGENCY MEDICINE
Payer: MEDICARE

## 2019-10-06 ENCOUNTER — APPOINTMENT (OUTPATIENT)
Dept: CT IMAGING | Age: 84
End: 2019-10-06
Payer: MEDICARE

## 2019-10-06 VITALS
TEMPERATURE: 97.4 F | SYSTOLIC BLOOD PRESSURE: 106 MMHG | HEART RATE: 69 BPM | RESPIRATION RATE: 14 BRPM | BODY MASS INDEX: 27.32 KG/M2 | OXYGEN SATURATION: 95 % | DIASTOLIC BLOOD PRESSURE: 56 MMHG | HEIGHT: 65 IN | WEIGHT: 164 LBS

## 2019-10-06 DIAGNOSIS — R00.2 PALPITATIONS: Primary | ICD-10-CM

## 2019-10-06 LAB
ABSOLUTE EOS #: 0.1 K/UL (ref 0–0.4)
ABSOLUTE IMMATURE GRANULOCYTE: ABNORMAL K/UL (ref 0–0.3)
ABSOLUTE LYMPH #: 1.2 K/UL (ref 1–4.8)
ABSOLUTE MONO #: 0.5 K/UL (ref 0.1–1.2)
ANION GAP SERPL CALCULATED.3IONS-SCNC: 13 MMOL/L (ref 9–17)
BASOPHILS # BLD: 1 % (ref 0–1)
BASOPHILS ABSOLUTE: 0.1 K/UL (ref 0–0.2)
BUN BLDV-MCNC: 16 MG/DL (ref 8–23)
BUN/CREAT BLD: 15 (ref 9–20)
CALCIUM SERPL-MCNC: 9.5 MG/DL (ref 8.6–10.4)
CHLORIDE BLD-SCNC: 99 MMOL/L (ref 98–107)
CO2: 27 MMOL/L (ref 20–31)
CREAT SERPL-MCNC: 1.04 MG/DL (ref 0.5–0.9)
DIFFERENTIAL TYPE: ABNORMAL
EKG ATRIAL RATE: 64 BPM
EKG ATRIAL RATE: 70 BPM
EKG P AXIS: 28 DEGREES
EKG P AXIS: 43 DEGREES
EKG P-R INTERVAL: 152 MS
EKG P-R INTERVAL: 166 MS
EKG Q-T INTERVAL: 414 MS
EKG Q-T INTERVAL: 418 MS
EKG QRS DURATION: 76 MS
EKG QRS DURATION: 82 MS
EKG QTC CALCULATION (BAZETT): 427 MS
EKG QTC CALCULATION (BAZETT): 451 MS
EKG R AXIS: 47 DEGREES
EKG R AXIS: 77 DEGREES
EKG T AXIS: 38 DEGREES
EKG T AXIS: 57 DEGREES
EKG VENTRICULAR RATE: 64 BPM
EKG VENTRICULAR RATE: 70 BPM
EOSINOPHILS RELATIVE PERCENT: 2 % (ref 1–7)
GFR AFRICAN AMERICAN: >60 ML/MIN
GFR NON-AFRICAN AMERICAN: 50 ML/MIN
GFR SERPL CREATININE-BSD FRML MDRD: ABNORMAL ML/MIN/{1.73_M2}
GFR SERPL CREATININE-BSD FRML MDRD: ABNORMAL ML/MIN/{1.73_M2}
GLUCOSE BLD-MCNC: 107 MG/DL (ref 70–99)
HCT VFR BLD CALC: 46.2 % (ref 36–46)
HEMOGLOBIN: 15.4 G/DL (ref 12–16)
IMMATURE GRANULOCYTES: ABNORMAL %
INR BLD: 2.6
LYMPHOCYTES # BLD: 23 % (ref 16–46)
MCH RBC QN AUTO: 28.3 PG (ref 26–34)
MCHC RBC AUTO-ENTMCNC: 33.4 G/DL (ref 31–37)
MCV RBC AUTO: 84.7 FL (ref 80–100)
MONOCYTES # BLD: 10 % (ref 4–11)
NRBC AUTOMATED: ABNORMAL PER 100 WBC
PARTIAL THROMBOPLASTIN TIME: 43.6 SEC (ref 27–35)
PDW BLD-RTO: 16 % (ref 11–14.5)
PLATELET # BLD: 143 K/UL (ref 140–450)
PLATELET ESTIMATE: ABNORMAL
PMV BLD AUTO: 7.4 FL (ref 6–12)
POTASSIUM SERPL-SCNC: 4.3 MMOL/L (ref 3.7–5.3)
PROTHROMBIN TIME: 25.8 SEC (ref 9.4–11.3)
RBC # BLD: 5.46 M/UL (ref 4–5.2)
RBC # BLD: ABNORMAL 10*6/UL
SEG NEUTROPHILS: 64 % (ref 43–77)
SEGMENTED NEUTROPHILS ABSOLUTE COUNT: 3.5 K/UL (ref 1.8–7.7)
SODIUM BLD-SCNC: 139 MMOL/L (ref 135–144)
TROPONIN INTERP: ABNORMAL
TROPONIN INTERP: NORMAL
TROPONIN T: ABNORMAL NG/ML
TROPONIN T: NORMAL NG/ML
TROPONIN, HIGH SENSITIVITY: 12 NG/L (ref 0–14)
TROPONIN, HIGH SENSITIVITY: 15 NG/L (ref 0–14)
WBC # BLD: 5.3 K/UL (ref 3.5–11)
WBC # BLD: ABNORMAL 10*3/UL

## 2019-10-06 PROCEDURE — 36415 COLL VENOUS BLD VENIPUNCTURE: CPT

## 2019-10-06 PROCEDURE — 70450 CT HEAD/BRAIN W/O DYE: CPT

## 2019-10-06 PROCEDURE — 93005 ELECTROCARDIOGRAM TRACING: CPT | Performed by: EMERGENCY MEDICINE

## 2019-10-06 PROCEDURE — 84484 ASSAY OF TROPONIN QUANT: CPT

## 2019-10-06 PROCEDURE — 80048 BASIC METABOLIC PNL TOTAL CA: CPT

## 2019-10-06 PROCEDURE — 99284 EMERGENCY DEPT VISIT MOD MDM: CPT

## 2019-10-06 PROCEDURE — 85730 THROMBOPLASTIN TIME PARTIAL: CPT

## 2019-10-06 PROCEDURE — 85025 COMPLETE CBC W/AUTO DIFF WBC: CPT

## 2019-10-06 PROCEDURE — 85610 PROTHROMBIN TIME: CPT

## 2019-10-11 RX ORDER — WARFARIN SODIUM 2.5 MG/1
TABLET ORAL
Qty: 60 TABLET | Refills: 3 | Status: SHIPPED | OUTPATIENT
Start: 2019-10-11 | End: 2019-12-11 | Stop reason: SDUPTHER

## 2019-10-31 ENCOUNTER — HOSPITAL ENCOUNTER (OUTPATIENT)
Dept: PHARMACY | Age: 84
Setting detail: THERAPIES SERIES
Discharge: HOME | End: 2019-10-31
Payer: MEDICARE

## 2019-10-31 ENCOUNTER — OFFICE VISIT (OUTPATIENT)
Dept: CARDIOLOGY | Age: 84
End: 2019-10-31
Payer: MEDICARE

## 2019-10-31 VITALS
DIASTOLIC BLOOD PRESSURE: 68 MMHG | HEIGHT: 65 IN | WEIGHT: 168 LBS | SYSTOLIC BLOOD PRESSURE: 116 MMHG | BODY MASS INDEX: 27.99 KG/M2 | HEART RATE: 72 BPM

## 2019-10-31 DIAGNOSIS — Z79.01 ANTICOAGULATION MONITORING BY PHARMACIST: ICD-10-CM

## 2019-10-31 DIAGNOSIS — I48.0 PAROXYSMAL ATRIAL FIBRILLATION WITH RAPID VENTRICULAR RESPONSE (HCC): ICD-10-CM

## 2019-10-31 DIAGNOSIS — I48.0 PAROXYSMAL ATRIAL FIBRILLATION WITH RAPID VENTRICULAR RESPONSE (HCC): Primary | ICD-10-CM

## 2019-10-31 LAB
INR BLD: 2.5
PROTHROMBIN TIME: 30.3

## 2019-10-31 PROCEDURE — 99214 OFFICE O/P EST MOD 30 MIN: CPT | Performed by: INTERNAL MEDICINE

## 2019-10-31 PROCEDURE — 85610 PROTHROMBIN TIME: CPT

## 2019-10-31 PROCEDURE — G8399 PT W/DXA RESULTS DOCUMENT: HCPCS | Performed by: INTERNAL MEDICINE

## 2019-10-31 PROCEDURE — 1123F ACP DISCUSS/DSCN MKR DOCD: CPT | Performed by: INTERNAL MEDICINE

## 2019-10-31 PROCEDURE — G8484 FLU IMMUNIZE NO ADMIN: HCPCS | Performed by: INTERNAL MEDICINE

## 2019-10-31 PROCEDURE — 99211 OFF/OP EST MAY X REQ PHY/QHP: CPT

## 2019-10-31 PROCEDURE — 36416 COLLJ CAPILLARY BLOOD SPEC: CPT

## 2019-10-31 PROCEDURE — 1090F PRES/ABSN URINE INCON ASSESS: CPT | Performed by: INTERNAL MEDICINE

## 2019-10-31 PROCEDURE — G8427 DOCREV CUR MEDS BY ELIG CLIN: HCPCS | Performed by: INTERNAL MEDICINE

## 2019-10-31 PROCEDURE — 1036F TOBACCO NON-USER: CPT | Performed by: INTERNAL MEDICINE

## 2019-10-31 PROCEDURE — G8417 CALC BMI ABV UP PARAM F/U: HCPCS | Performed by: INTERNAL MEDICINE

## 2019-10-31 PROCEDURE — 4040F PNEUMOC VAC/ADMIN/RCVD: CPT | Performed by: INTERNAL MEDICINE

## 2019-11-27 ENCOUNTER — HOSPITAL ENCOUNTER (OUTPATIENT)
Dept: PHARMACY | Age: 84
Setting detail: THERAPIES SERIES
Discharge: HOME | End: 2019-11-27
Payer: MEDICARE

## 2019-11-27 DIAGNOSIS — I48.0 PAROXYSMAL ATRIAL FIBRILLATION WITH RAPID VENTRICULAR RESPONSE (HCC): ICD-10-CM

## 2019-11-27 DIAGNOSIS — Z79.01 ANTICOAGULATION MONITORING BY PHARMACIST: ICD-10-CM

## 2019-11-27 LAB
INR BLD: 3
PROTIME: 36 SECONDS

## 2019-11-27 PROCEDURE — 85610 PROTHROMBIN TIME: CPT

## 2019-11-27 PROCEDURE — 99211 OFF/OP EST MAY X REQ PHY/QHP: CPT

## 2019-11-27 PROCEDURE — 36416 COLLJ CAPILLARY BLOOD SPEC: CPT

## 2019-12-11 RX ORDER — WARFARIN SODIUM 2.5 MG/1
TABLET ORAL
Qty: 60 TABLET | Refills: 3 | Status: SHIPPED | OUTPATIENT
Start: 2019-12-11 | End: 2020-02-20 | Stop reason: SDUPTHER

## 2019-12-27 ENCOUNTER — HOSPITAL ENCOUNTER (OUTPATIENT)
Dept: PHARMACY | Age: 84
Setting detail: THERAPIES SERIES
Discharge: HOME | End: 2019-12-27
Payer: MEDICARE

## 2019-12-27 DIAGNOSIS — Z79.01 ANTICOAGULATION MONITORING BY PHARMACIST: ICD-10-CM

## 2019-12-27 DIAGNOSIS — I48.0 PAROXYSMAL ATRIAL FIBRILLATION WITH RAPID VENTRICULAR RESPONSE (HCC): ICD-10-CM

## 2019-12-27 LAB
INR BLD: 2.6
PROTIME: 31.7 SECONDS

## 2019-12-27 PROCEDURE — 99211 OFF/OP EST MAY X REQ PHY/QHP: CPT

## 2019-12-27 PROCEDURE — 85610 PROTHROMBIN TIME: CPT

## 2019-12-27 PROCEDURE — 36416 COLLJ CAPILLARY BLOOD SPEC: CPT

## 2020-01-13 ENCOUNTER — OFFICE VISIT (OUTPATIENT)
Dept: FAMILY MEDICINE CLINIC | Age: 85
End: 2020-01-13
Payer: MEDICARE

## 2020-01-13 ENCOUNTER — HOSPITAL ENCOUNTER (OUTPATIENT)
Dept: LAB | Age: 85
Discharge: HOME | End: 2020-01-13
Payer: MEDICARE

## 2020-01-13 VITALS
HEIGHT: 65 IN | SYSTOLIC BLOOD PRESSURE: 136 MMHG | BODY MASS INDEX: 28.16 KG/M2 | DIASTOLIC BLOOD PRESSURE: 82 MMHG | WEIGHT: 169 LBS | OXYGEN SATURATION: 95 % | HEART RATE: 62 BPM

## 2020-01-13 PROBLEM — M19.041 ARTHRITIS OF RIGHT HAND: Status: ACTIVE | Noted: 2020-01-13

## 2020-01-13 LAB
ALBUMIN SERPL-MCNC: 4.5 G/DL (ref 3.5–5.2)
ALBUMIN/GLOBULIN RATIO: 1.7 (ref 1–2.5)
ALP BLD-CCNC: 85 U/L (ref 35–104)
ALT SERPL-CCNC: 21 U/L (ref 5–33)
ANION GAP SERPL CALCULATED.3IONS-SCNC: 12 MMOL/L (ref 9–17)
AST SERPL-CCNC: 26 U/L
BILIRUB SERPL-MCNC: 0.55 MG/DL (ref 0.3–1.2)
BUN BLDV-MCNC: 13 MG/DL (ref 8–23)
BUN/CREAT BLD: 16 (ref 9–20)
CALCIUM SERPL-MCNC: 9.5 MG/DL (ref 8.6–10.4)
CHLORIDE BLD-SCNC: 97 MMOL/L (ref 98–107)
CHOLESTEROL/HDL RATIO: 4.2
CHOLESTEROL: 262 MG/DL
CO2: 27 MMOL/L (ref 20–31)
CREAT SERPL-MCNC: 0.83 MG/DL (ref 0.5–0.9)
GFR AFRICAN AMERICAN: >60 ML/MIN
GFR NON-AFRICAN AMERICAN: >60 ML/MIN
GFR SERPL CREATININE-BSD FRML MDRD: ABNORMAL ML/MIN/{1.73_M2}
GFR SERPL CREATININE-BSD FRML MDRD: ABNORMAL ML/MIN/{1.73_M2}
GLUCOSE BLD-MCNC: 94 MG/DL (ref 70–99)
HDLC SERPL-MCNC: 62 MG/DL
LDL CHOLESTEROL: 169 MG/DL (ref 0–130)
POTASSIUM SERPL-SCNC: 4 MMOL/L (ref 3.7–5.3)
SODIUM BLD-SCNC: 136 MMOL/L (ref 135–144)
TOTAL PROTEIN: 7.2 G/DL (ref 6.4–8.3)
TRIGL SERPL-MCNC: 154 MG/DL
VLDLC SERPL CALC-MCNC: ABNORMAL MG/DL (ref 1–30)

## 2020-01-13 PROCEDURE — G8427 DOCREV CUR MEDS BY ELIG CLIN: HCPCS | Performed by: FAMILY MEDICINE

## 2020-01-13 PROCEDURE — 80053 COMPREHEN METABOLIC PANEL: CPT

## 2020-01-13 PROCEDURE — 80061 LIPID PANEL: CPT

## 2020-01-13 PROCEDURE — G8417 CALC BMI ABV UP PARAM F/U: HCPCS | Performed by: FAMILY MEDICINE

## 2020-01-13 PROCEDURE — 99213 OFFICE O/P EST LOW 20 MIN: CPT | Performed by: FAMILY MEDICINE

## 2020-01-13 PROCEDURE — G8399 PT W/DXA RESULTS DOCUMENT: HCPCS | Performed by: FAMILY MEDICINE

## 2020-01-13 PROCEDURE — 1090F PRES/ABSN URINE INCON ASSESS: CPT | Performed by: FAMILY MEDICINE

## 2020-01-13 PROCEDURE — 1036F TOBACCO NON-USER: CPT | Performed by: FAMILY MEDICINE

## 2020-01-13 PROCEDURE — 36415 COLL VENOUS BLD VENIPUNCTURE: CPT

## 2020-01-13 PROCEDURE — G8482 FLU IMMUNIZE ORDER/ADMIN: HCPCS | Performed by: FAMILY MEDICINE

## 2020-01-13 PROCEDURE — 90653 IIV ADJUVANT VACCINE IM: CPT | Performed by: FAMILY MEDICINE

## 2020-01-13 PROCEDURE — 4040F PNEUMOC VAC/ADMIN/RCVD: CPT | Performed by: FAMILY MEDICINE

## 2020-01-13 PROCEDURE — 1123F ACP DISCUSS/DSCN MKR DOCD: CPT | Performed by: FAMILY MEDICINE

## 2020-01-13 ASSESSMENT — ENCOUNTER SYMPTOMS
CHEST TIGHTNESS: 0
SHORTNESS OF BREATH: 0
WHEEZING: 0
COUGH: 0

## 2020-01-13 NOTE — PROGRESS NOTES
[address]  [place] [address]  Dept: [phone]  Dept Fax: [phone]  Loc: [phone]    Viridiana Flores is a 80 y.o. female who presents today for her medical conditions/complaints as noted below. Viridiana Flores is c/o of   Chief Complaint   Patient presents with    Arthritis     6m f/u- still trying to bowl-  just affects the right hand       HPI:     HPI Here today for a follow up of her arthritis. Arthritis: stable; she still gets pain in her right hand worsening in the right middle PIP joint. She finds it hard to bowl because of the pain. She also is feeling unsteady when walking. She occasionally gets pain in her knees but generally she does not have pain. She feels like the left leg is worse with balance since having her hip surgery. She has not fallen. She is using her cane all of the time recently. No recent falls. She has been doing okay with regards to her afib. She occasionally gets palpitations but they go away with a little bit of rest.       Past Medical History:   Diagnosis Date    Actinic keratosis     history of    Arthritis     Atrial fibrillation (HCC)     CAD (coronary artery disease)     history of afib    Cancer (HCC)     skin    Cervical prolapse     history of    Diverticulosis     Endocervical polyp     history of    Heel spur     Hip pain     Hypertension     Nasal septal deviation     Osteoporosis     Stress incontinence     history of    Syncope     Vaginal prolapse     history of          Social History     Tobacco Use    Smoking status: Never Smoker    Smokeless tobacco: Never Used   Substance Use Topics    Alcohol use: No     Current Outpatient Medications   Medication Sig Dispense Refill    zoster recombinant adjuvanted vaccine (SHINGRIX) 50 MCG/0.5ML SUSR injection Inject 0.5 mLs into the muscle once for 1 dose 0.5 mL 1    warfarin (COUMADIN) 2.5 MG tablet 1.  Take 2 tablets by mouth well-developed. Eyes:      Conjunctiva/sclera: Conjunctivae normal.   Neck:      Musculoskeletal: Normal range of motion and neck supple. Thyroid: No thyromegaly. Cardiovascular:      Rate and Rhythm: Normal rate and regular rhythm. Heart sounds: Normal heart sounds. No murmur. Pulmonary:      Effort: Pulmonary effort is normal. No respiratory distress. Breath sounds: Normal breath sounds. No wheezing. Musculoskeletal:        Hands:    Lymphadenopathy:      Cervical: No cervical adenopathy. Skin:     General: Skin is warm and dry. Findings: No erythema or rash. Neurological:      Mental Status: She is alert and oriented to person, place, and time. Gait: Gait abnormal (she is unsteady). /82 (Site: Right Upper Arm, Position: Sitting, Cuff Size: Medium Adult)   Pulse 62   Ht 5' 5\" (1.651 m)   Wt 169 lb (76.7 kg)   SpO2 95%   BMI 28.12 kg/m²     Assessment:       Diagnosis Orders   1. Arthritis of right hand     2. Gait instability     3. At high risk for falls     4. Screening cholesterol level  Lipid Panel    Comprehensive Metabolic Panel   5. Routine general medical examination at a health care facility  Comprehensive Metabolic Panel   6.  Encounter for screening mammogram for malignant neoplasm of breast  [facility]  [address] [facility] Outpatient Visit on 01/13/2020   Component Date Value Ref Range Status    Cholesterol 01/13/2020 262* <200 mg/dL Final    HDL 01/13/2020 62  >40 mg/dL Final    LDL Cholesterol 01/13/2020 169* 0 - 130 mg/dL Final    Chol/HDL Ratio 01/13/2020 4.2  <5 Final    Triglycerides 01/13/2020 154* <150 mg/dL Final    VLDL 01/13/2020 NOT REPORTED* 1 - 30 mg/dL Final    Glucose 01/13/2020 94  70 - 99 mg/dL Final    BUN 01/13/2020 13  8 - 23 mg/dL Final    CREATININE 01/13/2020 0.83  0.50 - 0.90 mg/dL Final    Bun/Cre Ratio 01/13/2020 16  9 - 20 Final    Calcium 01/13/2020 9.5  8.6 - 10.4 mg/dL Final    Sodium 01/13/2020 136  135 - 144 mmol/L Final    Potassium 01/13/2020 4.0  3.7 - 5.3 mmol/L Final    Chloride 01/13/2020 97* 98 - 107 mmol/L Final    CO2 01/13/2020 27  20 - 31 mmol/L Final    Anion Gap 01/13/2020 12  9 - 17 mmol/L Final    Alkaline Phosphatase 01/13/2020 85  35 - 104 U/L Final    ALT 01/13/2020 21  5 - 33 U/L Final    AST 01/13/2020 26  <32 U/L Final    Total Bilirubin 01/13/2020 0.55  0.3 - 1.2 mg/dL Final    Total Protein 01/13/2020 7.2  6.4 - 8.3 g/dL Final    Alb 01/13/2020 4.5  3.5 - 5.2 g/dL Final    Albumin/Globulin Ratio 01/13/2020 1.7  1.0 - 2.5 Final    GFR Non- 01/13/2020 >60  >60 mL/min Final    GFR  01/13/2020 >60  >60 mL/min Final    GFR Comment 01/13/2020        Final    GFR Staging 01/13/2020 NOT REPORTED   Final              Plan:        Arthritis of right hand: stable; she continues to have pain. I offered her ortho for injections but for now she is not interested. Gait abnormality: worsening; she is requiring her cane all of the time now. I recommended that she consider doing some PT to help with balance training. She is going to think about it. On the basis of positive falls risk screening, assessment and plan is as follows: home safety tips provided. Health Maintenance reviewed - mammogram ordered, patient to schedule appointment, Flu shot given, labs ordered for next year and shingrix script given. Return in about 5 months (around 6/13/2020) for [address]     Orders Placed This Encounter   Procedures    JULIO DIGITAL SCREEN W OR WO CAD BILATERAL     Standing Status:   Future     Standing Expiration Date:   3/13/2021    INFLUENZA, TRIV, INACTIVATED, SUBUNIT, ADJUVANTED, 65 YRS AND OLDER, IM, PREFILL SYR, 0.5ML (FLUAD TRIV)    Lipid Panel     Standing Status:   Future     Standing Expiration Date:   1/13/2022     Order Specific Question:   Is Patient Fasting?/# of Hours     Answer:   non fasting per Dr Quesada Pall Metabolic Panel     Standing Status:   Future     Standing Expiration Date:   1/13/2022     Orders Placed This Encounter   Medications    zoster recombinant adjuvanted vaccine [facility]) 50 MCG/0.5ML SUSR injection     Sig: Inject 0.5 mLs into the muscle once for 1 dose     Dispense:  0.5 mL     Refill:  1       Patientgiven educational materials - see patient instructions. Discussed use, benefit,and side effects of prescribed medications. All patient questions answered. Ptvoiced understanding. Reviewed health maintenance. Instructed to continue currentmedications, diet and exercise. Patient agreed with treatment plan. Follow up asdirected.      Electronically signed by Kirill Reeder MD on 1/13/2020 at 5:40 PM

## 2020-01-27 ENCOUNTER — HOSPITAL ENCOUNTER (OUTPATIENT)
Dept: PHARMACY | Age: 85
Setting detail: THERAPIES SERIES
Discharge: HOME | End: 2020-01-27
Payer: MEDICARE

## 2020-01-27 LAB
INR BLD: 2.6
PROTIME: 31.3 SECONDS

## 2020-01-27 PROCEDURE — 99211 OFF/OP EST MAY X REQ PHY/QHP: CPT

## 2020-01-27 PROCEDURE — 85610 PROTHROMBIN TIME: CPT

## 2020-01-27 PROCEDURE — 36416 COLLJ CAPILLARY BLOOD SPEC: CPT

## 2020-01-27 NOTE — PROGRESS NOTES
ANTICOAGULATION SERVICE    Date of Clinic Visit:  1/27/2020    Elsi Loyola is a 80 y.o. female who presents to clinic today for anticoagulation monitoring and adjustment. Recent INR Results:  Internal QC passed  Lab Results   Component Value Date    INR 2.6 01/27/2020    INR 2.6 12/27/2019       Current Warfarin Dosage:  Dosing Plan  As of 1/27/2020    TTR:   86.7 % (2.2 y)   Full warfarin instructions:   5 mg every day               Assessment/Plan:    Continue current regimen as INR remains stable. Next Clinic Appointment:  Return date  As of 1/27/2020    TTR:   86.7 % (2.2 y)   Next INR check:   2/20/2020             Please call [facility] Anticoagulation Clinic at [phone] with any questions. Thanks!   Jason Reid [facility]  Anticoagulation Service Pharmacist  1/27/2020 2:06 PM

## 2020-02-20 ENCOUNTER — HOSPITAL ENCOUNTER (OUTPATIENT)
Dept: PHARMACY | Age: 85
Setting detail: THERAPIES SERIES
Discharge: HOME | End: 2020-02-20
Payer: MEDICARE

## 2020-02-20 LAB
INR BLD: 2.7
PROTIME: 31.9 SECONDS

## 2020-02-20 PROCEDURE — 85610 PROTHROMBIN TIME: CPT

## 2020-02-20 PROCEDURE — 36416 COLLJ CAPILLARY BLOOD SPEC: CPT

## 2020-02-20 PROCEDURE — 99211 OFF/OP EST MAY X REQ PHY/QHP: CPT

## 2020-02-20 RX ORDER — WARFARIN SODIUM 2.5 MG/1
TABLET ORAL
Qty: 60 TABLET | Refills: 3 | Status: SHIPPED | OUTPATIENT
Start: 2020-02-20 | End: 2020-06-17 | Stop reason: SDUPTHER

## 2020-02-20 RX ORDER — PANTOPRAZOLE SODIUM 40 MG/1
TABLET, DELAYED RELEASE ORAL
Qty: 90 TABLET | Refills: 3 | Status: SHIPPED | OUTPATIENT
Start: 2020-02-20 | End: 2021-02-26

## 2020-02-20 NOTE — PROGRESS NOTES
ANTICOAGULATION SERVICE    Date of Clinic Visit:  2/20/2020    Elsi Loyola is a 80 y.o. female who presents to clinic today for anticoagulation monitoring and adjustment. Recent INR Results:  Internal QC passed  Lab Results   Component Value Date    INR 2.7 02/20/2020    INR 2.6 01/27/2020       Current Warfarin Dosage:  Dosing Plan  As of 2/20/2020    TTR:   87.1 % (2.3 y)   Full warfarin instructions:   5 mg every day               Assessment/Plan:    Continue current regimen as INR remains stable. Recheck 4 weeks. Next Clinic Appointment:  Return date  As of 2/20/2020    TTR:   87.1 % (2.3 y)   Next INR check:   3/18/2020             Please call [facility] Anticoagulation Clinic at [phone] with any questions. Thanks!   Melanie Serrano [facility]  Anticoagulation Service Pharmacist  2/20/2020 1:53 PM

## 2020-04-15 ENCOUNTER — HOSPITAL ENCOUNTER (OUTPATIENT)
Dept: LAB | Age: 85
Discharge: HOME | End: 2020-04-15
Payer: MEDICARE

## 2020-04-15 ENCOUNTER — HOSPITAL ENCOUNTER (OUTPATIENT)
Dept: PHARMACY | Age: 85
Setting detail: THERAPIES SERIES
Discharge: HOME | End: 2020-04-15
Payer: MEDICARE

## 2020-04-15 LAB
INR BLD: 1.9
PROTHROMBIN TIME: 21.6 SEC (ref 11.5–14.2)

## 2020-04-15 PROCEDURE — 36415 COLL VENOUS BLD VENIPUNCTURE: CPT

## 2020-04-15 PROCEDURE — 85610 PROTHROMBIN TIME: CPT

## 2020-04-15 PROCEDURE — 99211 OFF/OP EST MAY X REQ PHY/QHP: CPT

## 2020-04-15 NOTE — PROGRESS NOTES
ANTICOAGULATION SERVICE    Date of Clinic Visit:  4/15/2020    Sherlyn Hoang is a 80 y.o. female who presents to clinic today for anticoagulation monitoring and adjustment. Recent INR Results:  Internal QC passed  Lab Results   Component Value Date    INR 1.9 04/15/2020    INR 2.7 02/20/2020       Current Warfarin Dosage:  Dosing Plan  As of 4/15/2020    TTR:   87.1 % (2.5 y)   Full warfarin instructions:   5 mg every day               Assessment/Plan:    Continue current regimen as INR remains stable. Next Clinic Appointment:  Return date  As of 4/15/2020    TTR:   87.1 % (2.5 y)   Next INR check:   5/27/2020             Please call [facility] Anticoagulation Clinic at [phone] with any questions. Thanks!   NIKOLAI Lewis [facility]  Anticoagulation Service Pharmacist  4/15/2020 12:08 PM  CLINICAL PHARMACY CONSULT: MED RECONCILIATION/REVIEW ADDENDUM    For Pharmacy Admin Tracking Only    PHSO: Yes  Total # of Interventions Recommended: 0  Total Interventions Accepted: 0  Time Spent (min): [address] 56, Michael 219

## 2020-05-27 ENCOUNTER — HOSPITAL ENCOUNTER (OUTPATIENT)
Dept: PHARMACY | Age: 85
Setting detail: THERAPIES SERIES
Discharge: HOME | End: 2020-05-27
Payer: MEDICARE

## 2020-05-27 LAB
INR BLD: 2.7
PROTIME: 32 SECONDS

## 2020-05-27 PROCEDURE — 36416 COLLJ CAPILLARY BLOOD SPEC: CPT

## 2020-05-27 PROCEDURE — 85610 PROTHROMBIN TIME: CPT

## 2020-05-27 PROCEDURE — 99211 OFF/OP EST MAY X REQ PHY/QHP: CPT

## 2020-06-17 ENCOUNTER — OFFICE VISIT (OUTPATIENT)
Dept: FAMILY MEDICINE CLINIC | Age: 85
End: 2020-06-17
Payer: MEDICARE

## 2020-06-17 VITALS
TEMPERATURE: 97.4 F | WEIGHT: 169 LBS | RESPIRATION RATE: 18 BRPM | HEIGHT: 65 IN | SYSTOLIC BLOOD PRESSURE: 115 MMHG | HEART RATE: 71 BPM | OXYGEN SATURATION: 96 % | BODY MASS INDEX: 28.16 KG/M2 | DIASTOLIC BLOOD PRESSURE: 68 MMHG

## 2020-06-17 PROCEDURE — 1123F ACP DISCUSS/DSCN MKR DOCD: CPT | Performed by: FAMILY MEDICINE

## 2020-06-17 PROCEDURE — G0439 PPPS, SUBSEQ VISIT: HCPCS | Performed by: FAMILY MEDICINE

## 2020-06-17 PROCEDURE — 4040F PNEUMOC VAC/ADMIN/RCVD: CPT | Performed by: FAMILY MEDICINE

## 2020-06-17 RX ORDER — WARFARIN SODIUM 2.5 MG/1
TABLET ORAL
Qty: 60 TABLET | Refills: 3 | Status: SHIPPED | OUTPATIENT
Start: 2020-06-17 | End: 2020-08-21 | Stop reason: SDUPTHER

## 2020-06-17 SDOH — ECONOMIC STABILITY: TRANSPORTATION INSECURITY
IN THE PAST 12 MONTHS, HAS LACK OF TRANSPORTATION KEPT YOU FROM MEETINGS, WORK, OR FROM GETTING THINGS NEEDED FOR DAILY LIVING?: NO

## 2020-06-17 SDOH — ECONOMIC STABILITY: INCOME INSECURITY: HOW HARD IS IT FOR YOU TO PAY FOR THE VERY BASICS LIKE FOOD, HOUSING, MEDICAL CARE, AND HEATING?: NOT HARD AT ALL

## 2020-06-17 SDOH — ECONOMIC STABILITY: FOOD INSECURITY: WITHIN THE PAST 12 MONTHS, YOU WORRIED THAT YOUR FOOD WOULD RUN OUT BEFORE YOU GOT MONEY TO BUY MORE.: NEVER TRUE

## 2020-06-17 SDOH — ECONOMIC STABILITY: TRANSPORTATION INSECURITY
IN THE PAST 12 MONTHS, HAS THE LACK OF TRANSPORTATION KEPT YOU FROM MEDICAL APPOINTMENTS OR FROM GETTING MEDICATIONS?: NO

## 2020-06-17 SDOH — ECONOMIC STABILITY: FOOD INSECURITY: WITHIN THE PAST 12 MONTHS, THE FOOD YOU BOUGHT JUST DIDN'T LAST AND YOU DIDN'T HAVE MONEY TO GET MORE.: NEVER TRUE

## 2020-06-17 ASSESSMENT — PATIENT HEALTH QUESTIONNAIRE - PHQ9
SUM OF ALL RESPONSES TO PHQ QUESTIONS 1-9: 1
SUM OF ALL RESPONSES TO PHQ9 QUESTIONS 1 & 2: 1
SUM OF ALL RESPONSES TO PHQ QUESTIONS 1-9: 1
1. LITTLE INTEREST OR PLEASURE IN DOING THINGS: 0
2. FEELING DOWN, DEPRESSED OR HOPELESS: 1

## 2020-06-17 ASSESSMENT — ENCOUNTER SYMPTOMS
TROUBLE SWALLOWING: 1
DIARRHEA: 0
ABDOMINAL PAIN: 0
CHEST TIGHTNESS: 0
COLOR CHANGE: 0
NAUSEA: 0
SHORTNESS OF BREATH: 0
WHEEZING: 0
CONSTIPATION: 0
COUGH: 0

## 2020-06-17 NOTE — PROGRESS NOTES
and osteoarthritic changes noted in both hands  Neurologic: gait and coordination normal and speech normal    Patient's complete Health Risk Assessment and screening values have been reviewed and are found in Flowsheets. The following problems were reviewed today and where indicated follow up appointments were made and/or referrals ordered. Positive Risk Factor Screenings with Interventions:     General Health:  General  In general, how would you say your health is?: Very Good  In the past 7 days, have you experienced any of the following?  New or Increased Pain, New or Increased Fatigue, Loneliness, Social Isolation, Stress or Anger?: (!) New or Increased Fatigue  Do you get the social and emotional support that you need?: Yes  Do you have a Living Will?: Yes  General Health Risk Interventions:  · Stress: patient declines any further evaluation/treatment for this issue, just found out that she has to put her cat to sleep    Safety:  Safety  Do you have working smoke detectors?: (!) No  Have all throw rugs been removed or fastened?: Yes  Do you have non-slip mats or surfaces in all bathtubs/showers?: Yes  Do all of your stairways have a railing or banister?: Yes  Are your doorways, halls and stairs free of clutter?: Yes  Do you always fasten your seatbelt when you are in a car?: Yes  Safety Interventions:  · Home safety tips provided    Personalized Preventive Plan   Current Health Maintenance Status  Immunization History   Administered Date(s) Administered    Influenza Virus Vaccine 10/28/2011    Influenza, High Dose (Fluzone 65 yrs and older) 10/19/2017, 12/10/2018    Influenza, Triv, inactivated, subunit, adjuvanted, IM (Fluad 65 yrs and older) 01/13/2020    Pneumococcal Conjugate 13-valent ([id number]) 06/07/2017    Pneumococcal Conjugate 7-valent (Prevnar7) 05/06/2003    Pneumococcal Polysaccharide ([id number]) 06/08/2018    Td, unspecified formulation 05/06/2003    Zoster Live (Zostavax) 11/09/2013 Health Maintenance   Topic Date Due    DTaP/Tdap/Td vaccine (1 - Tdap) 09/01/1953    Shingles Vaccine (2 of 3) 01/04/2014    Annual Wellness Visit (AWV)  06/12/2019    DEXA (modify frequency per FRAX score)  Completed    Flu vaccine  Completed    Pneumococcal 65+ years Vaccine  Completed    Hepatitis A vaccine  Aged Out    Hepatitis B vaccine  Aged Out    Hib vaccine  Aged Out    Meningococcal (ACWY) vaccine  Aged Out     Recommendations for [employer] Due: see orders and patient instructions/AVS.    Recommended screening schedule for the next 5-10 years is provided to the patient in written form: see Patient Yves Foss was seen today for medicare awv. Diagnoses and all orders for this visit:    Routine general medical examination at a health care facility    Paroxysmal atrial fibrillation with rapid ventricular response ([address]  Stable; she occasionally gets short runs of rvr that only last for a minute. When they start she typically rests for about 20 minutes and they resolve. Other orders  -     warfarin (COUMADIN) 2.5 MG tablet; 1. Take 2 tablets by mouth daily. Or as directed by INR results. Return for Medicare Annual Wellness Visit in 1 year.     Anupama Queevdo MD  6/17/2020  10:02 AM

## 2020-07-09 ENCOUNTER — OFFICE VISIT (OUTPATIENT)
Dept: CARDIOLOGY | Age: 85
End: 2020-07-09
Payer: MEDICARE

## 2020-07-09 ENCOUNTER — HOSPITAL ENCOUNTER (OUTPATIENT)
Dept: PHARMACY | Age: 85
Setting detail: THERAPIES SERIES
Discharge: HOME | End: 2020-07-09
Payer: MEDICARE

## 2020-07-09 VITALS
WEIGHT: 165 LBS | SYSTOLIC BLOOD PRESSURE: 118 MMHG | BODY MASS INDEX: 27.49 KG/M2 | HEIGHT: 65 IN | DIASTOLIC BLOOD PRESSURE: 64 MMHG | HEART RATE: 75 BPM

## 2020-07-09 LAB
INR BLD: 2
PROTIME: 24.2 SECONDS

## 2020-07-09 PROCEDURE — 93010 ELECTROCARDIOGRAM REPORT: CPT | Performed by: INTERNAL MEDICINE

## 2020-07-09 PROCEDURE — G8427 DOCREV CUR MEDS BY ELIG CLIN: HCPCS | Performed by: INTERNAL MEDICINE

## 2020-07-09 PROCEDURE — G8399 PT W/DXA RESULTS DOCUMENT: HCPCS | Performed by: INTERNAL MEDICINE

## 2020-07-09 PROCEDURE — 4040F PNEUMOC VAC/ADMIN/RCVD: CPT | Performed by: INTERNAL MEDICINE

## 2020-07-09 PROCEDURE — G8417 CALC BMI ABV UP PARAM F/U: HCPCS | Performed by: INTERNAL MEDICINE

## 2020-07-09 PROCEDURE — 99213 OFFICE O/P EST LOW 20 MIN: CPT | Performed by: INTERNAL MEDICINE

## 2020-07-09 PROCEDURE — 1123F ACP DISCUSS/DSCN MKR DOCD: CPT | Performed by: INTERNAL MEDICINE

## 2020-07-09 PROCEDURE — 93005 ELECTROCARDIOGRAM TRACING: CPT | Performed by: INTERNAL MEDICINE

## 2020-07-09 PROCEDURE — 85610 PROTHROMBIN TIME: CPT

## 2020-07-09 PROCEDURE — 99211 OFF/OP EST MAY X REQ PHY/QHP: CPT

## 2020-07-09 PROCEDURE — 36416 COLLJ CAPILLARY BLOOD SPEC: CPT

## 2020-07-09 PROCEDURE — 1036F TOBACCO NON-USER: CPT | Performed by: INTERNAL MEDICINE

## 2020-07-09 PROCEDURE — 1090F PRES/ABSN URINE INCON ASSESS: CPT | Performed by: INTERNAL MEDICINE

## 2020-07-09 NOTE — PROGRESS NOTES
ANTICOAGULATION SERVICE    Date of Clinic Visit:  7/9/2020    Adiel Love is a 80 y.o. female who presents to clinic today for anticoagulation monitoring and adjustment. Temp = 97.6    Recent INR Results:  Internal QC passed  Lab Results   Component Value Date    INR 2 07/09/2020    INR 2.7 05/27/2020       Current Warfarin Dosage:  Dosing Plan  As of 7/9/2020    TTR:   87.7 % (2.7 y)   Full warfarin instructions:   5 mg every day               Assessment/Plan:    Continue current regimen as INR remains stable. Recheck 6 weeks. Next Clinic Appointment:  Return date  As of 7/9/2020    TTR:   87.7 % (2.7 y)   Next INR check:   8/20/2020             Please call [facility] Anticoagulation Clinic at [phone] with any questions. Thanks! NIKOLAI Lagos [facility]  Anticoagulation Service Pharmacist  7/9/2020 1:46 PM     CLINICAL PHARMACY CONSULT: MED RECONCILIATION/REVIEW ADDENDUM    For Pharmacy Admin Tracking Only    PHSO: Yes  Total # of Interventions Recommended: 0    - Maintenance Safety Lab Monitoring #: 1  Recommended intervention potential cost savings:   Accepted intervention potential cost savings:    Total Interventions Accepted: 0  Time Spent (min): 15    Natalya Blanchard, [address] [place] [address]

## 2020-07-09 NOTE — PROGRESS NOTES
Today's Date: 7/9/2020  Patient Name: Niyah Greenberg  Patient's age: 80 y.o., 1934          The patient is a 80 y.o.  female is in the office for f/u. She is doing quite well from cardiac standpoint. She still gets occasional atrial fibrillation episodes that occurs about once per month and last for few hours and usually resolve by itself. Never lasted a day or so. There is no associated chest pain dizziness near syncope or syncope. Patient has been doing well cardiac wise, no new cardiac complaints. She denies angina, PND/Orthopnea. Past Medical History:   has a past medical history of Actinic keratosis, Arthritis, Atrial fibrillation ([facility] CAD (coronary artery disease), Cancer ([facility] Cervical prolapse, Diverticulosis, Endocervical polyp, Heel spur, Hip pain, Hypertension, Nasal septal deviation, Osteoporosis, Stress incontinence, Syncope, and Vaginal prolapse. Past Surgical History:   has a past surgical history that includes Colonoscopy (01/26/2000); Cholecystectomy (11/1976); Upper gastrointestinal endoscopy (07/16/2017); pr egd transoral biopsy single/multiple (Left, 7/16/2017); Upper gastrointestinal endoscopy (Left, 7/16/2017); Endoscopic ultrasonography, GI (Left, 7/20/2017); eye surgery; Total hip arthroplasty (Left, 9/19/2017); bladder repair (2011); Hysterectomy (1960's); Appendectomy (1960's); joint replacement (2017); skin biopsy; and pr office/outpt visit,procedure only (Right, 6/1/2018). Home Medications:    Prior to Admission medications    Medication Sig Start Date End Date Taking? Authorizing Provider   warfarin (COUMADIN) 2.5 MG tablet 1. Take 2 tablets by mouth daily. Or as directed by INR results.  6/17/20  Yes Jolly Salgado MD   pantoprazole (PROTONIX) 40 MG tablet TAKE 1 TABLET EVERY DAY 2/20/20  Yes Jolly Salgado MD   metoprolol tartrate (LOPRESSOR) 25 MG tablet TAKE HALF OF A TABLET TWICE A DAY. 2/20/20  Yes Jolly Salgado MD   Handicap Placard MISC by Does not apply route Expires 6/2022 6/15/17  Yes Jordy Reynolds MD   MEGARED OMEGA-3 KRILL  MG CAPS Take by mouth daily    Yes Historical Provider, MD   Multiple Vitamins-Minerals (THERAPEUTIC MULTIVITAMIN-MINERALS) tablet Take 1 tablet by mouth daily   Yes Historical Provider, MD   Multiple Vitamins-Minerals (ICAPS AREDS 2) CAPS Take 2 tablets by mouth daily    Yes Historical Provider, MD       Allergies:  Statins; Tape [adhesive tape]; Tizanidine hcl; and Tramadol    Social History:   reports that she has never smoked. She has never used smokeless tobacco. She reports that she does not drink alcohol or use drugs. REVIEW OF SYSTEMS:  CONSTITUTIONAL:NEGATIVE  HEENT:NEG  Cardiovascular: No chest pain, No dyspnea on exertion, No palpitations. Lower extremity edema: No  RESPIRATORY: neg  GASTROINTESTINAL:  negative  GENITOURINARY:  negative  INTEGUMENT:  negative  MUSCULOSKELETAL:  positive for  pain  NEUROLOGICAL:  negative    PHYSICAL EXAM:      /64   Pulse 75   Ht 5' 5\" (1.651 m)   Wt 165 lb (74.8 kg)   BMI 27.46 kg/m²    HEENT: PERRL, no cervical lymphadenopathy. No masses palpable. Cardiovascular:  · The apical impulse is not displaced  · Heart  Sounds:RRR, NO S3/RUB  · Jugular venous pulsation Normal  · The carotid upstroke is normal  · Peripheral pulses are symmetrical and full  Respiratory: Good respiratory effort. On auscultation: clear to auscultation bilaterally  Abdomen:  · No masses or tenderness  · Bowel sounds present  Extremities:  ·  No Cyanosis or Clubbing  ·  Lower extremity edema: No  Skin: Warm and dry    Cardiac data:      Labs:   PT/INR:   Recent Labs     07/09/20  1343   PROTIME 24.2   INR 2     FASTING LIPID PANEL:  Lab Results   Component Value Date    HDL 62 01/13/2020    TRIG 154 01/13/2020     Echocardiogram 7/2017:  Normal left ventricle size and systolic function. Ejection fraction was   estimated at 60%.  There were no regional left ventricular wall motion   abnormalities and wall thickness was within normal limits. There was mild aortic regurgitation. There was mild mitral regurgitation. There was mild tricuspid regurgitation. Nuclear stress test 7/2017:   [place] EKG stress test is not suggestive for ischemia. This Nuclear Medicine study was negative for ischemia. Assessment:    PAF remains in normal sinus rhythm  MILD MR  MILD AI  A/C WITH COPUMADIN  Preserved LV systolic function  No ischemia or infarction on nuclear stress test 2017. Patient Active Problem List   Diagnosis    Trochanteric bursitis of left hip    Drug-induced constipation    Bradycardia    Esophageal stricture    Gastritis without bleeding    Lumbar radicular pain    Lumbar spine pain    Left hip pain    Arthritis of left hip    Paroxysmal atrial fibrillation with rapid ventricular response (HCC)    Anticoagulation monitoring by pharmacist    Venous stasis dermatitis of both lower extremities    Arthritis of right hand       Recommendations:  1-Continue current medical treatment with BB and anticoagulation  2-INR has been therapeutic today is 2.0  3-Diet, exercise and moderate physical activity  4-F/U in 6 months.       Electronically signed by Macario Barajas MD on 7/9/2020 at 2:46 PM      [facility]  [phone]

## 2020-08-19 ENCOUNTER — HOSPITAL ENCOUNTER (OUTPATIENT)
Dept: PHARMACY | Age: 85
Setting detail: THERAPIES SERIES
Discharge: HOME | End: 2020-08-19
Payer: MEDICARE

## 2020-08-19 LAB
INR BLD: 2
PROTIME: 23.6 SECONDS

## 2020-08-19 PROCEDURE — 85610 PROTHROMBIN TIME: CPT

## 2020-08-19 PROCEDURE — 36416 COLLJ CAPILLARY BLOOD SPEC: CPT

## 2020-08-19 PROCEDURE — 99211 OFF/OP EST MAY X REQ PHY/QHP: CPT

## 2020-08-19 NOTE — PROGRESS NOTES
ANTICOAGULATION SERVICE    Date of Clinic Visit:  8/19/2020    Beatriz Nickerson is a 80 y.o. female who presents to clinic today for anticoagulation monitoring and adjustment. Temp = 97.8    Recent INR Results:  Internal QC passed  Lab Results   Component Value Date    INR 2 08/19/2020    INR 2 07/09/2020       Current Warfarin Dosage:  Dosing Plan  As of 8/19/2020    TTR:   88.2 % (2.8 y)   Full warfarin instructions:   8/19: 7.5 mg; Otherwise 5 mg every day               Assessment/Plan:    Continue current regimen as INR remains stable. Recheck 4 weeks. Next Clinic Appointment:  Return date  As of 8/19/2020    TTR:   88.2 % (2.8 y)   Next INR check:   9/16/2020             Please call [facility] Anticoagulation Clinic at [phone] with any questions. Thanks! NIKOLAI Iglesias [facility]  Anticoagulation Service Pharmacist  8/19/2020 9:24 AM     CLINICAL PHARMACY CONSULT: MED RECONCILIATION/REVIEW ADDENDUM    For Pharmacy Admin Tracking Only    PHSO: Yes  Total # of Interventions Recommended: 0    - Maintenance Safety Lab Monitoring #: 1  Recommended intervention potential cost savings:   Accepted intervention potential cost savings:    Total Interventions Accepted: 0  Time Spent (min): 15    Dipesh Medina, [address] [place] [address]

## 2020-08-21 RX ORDER — WARFARIN SODIUM 2.5 MG/1
TABLET ORAL
Qty: 180 TABLET | Refills: 3 | Status: SHIPPED | OUTPATIENT
Start: 2020-08-21 | End: 2021-08-26 | Stop reason: SDUPTHER

## 2020-08-21 NOTE — TELEPHONE ENCOUNTER
Wants coumadin refilled and wants 90 days supply. I changed quantity. Last Appt:  6/17/2020  Next Appt:   12/17/2020  Med verified in Epic

## 2020-09-16 ENCOUNTER — HOSPITAL ENCOUNTER (OUTPATIENT)
Dept: PHARMACY | Age: 85
Setting detail: THERAPIES SERIES
Discharge: HOME | End: 2020-09-16
Payer: MEDICARE

## 2020-09-16 LAB
INR BLD: 2.9
PROTIME: 34.9 SECONDS

## 2020-09-16 PROCEDURE — 36416 COLLJ CAPILLARY BLOOD SPEC: CPT

## 2020-09-16 PROCEDURE — 85610 PROTHROMBIN TIME: CPT

## 2020-09-16 PROCEDURE — 99211 OFF/OP EST MAY X REQ PHY/QHP: CPT

## 2020-09-16 NOTE — PROGRESS NOTES
ANTICOAGULATION SERVICE    Date of Clinic Visit:  9/16/2020    Jigar Hermosillo is a 80 y.o. female who presents to clinic today for anticoagulation monitoring and adjustment. Temp = 95.8    Recent INR Results:  Internal QC passed  Lab Results   Component Value Date    INR 2.9 09/16/2020    INR 2 08/19/2020       Current Warfarin Dosage:  Dosing Plan  As of 9/16/2020    TTR:   88.5 % (2.9 y)   Full warfarin instructions:   5 mg every day               Assessment/Plan:    Continue current regimen as INR remains stable. Next Clinic Appointment:  Return date  As of 9/16/2020    TTR:   88.5 % (2.9 y)   Next INR check:   10/28/2020             Please call [facility] Anticoagulation Clinic at [phone] with any questions. Thanks! Cata Lyman [facility]  Anticoagulation Service Pharmacist  9/16/2020 9:48 AM     CLINICAL PHARMACY CONSULT: MED RECONCILIATION/REVIEW ADDENDUM    For Pharmacy Admin Tracking Only    PHSO: Yes  Total # of Interventions Recommended: 0    - Maintenance Safety Lab Monitoring #: 1  Recommended intervention potential cost savings:   Accepted intervention potential cost savings:    Total Interventions Accepted: 0  Time Spent (min): 15    Cata Lyman, [address] [place] [address]

## 2020-10-01 ENCOUNTER — HOSPITAL ENCOUNTER (OUTPATIENT)
Dept: MAMMOGRAPHY | Age: 85
Discharge: HOME | End: 2020-10-03
Payer: MEDICARE

## 2020-10-01 PROCEDURE — 77063 BREAST TOMOSYNTHESIS BI: CPT

## 2020-10-02 ENCOUNTER — TELEPHONE (OUTPATIENT)
Dept: FAMILY MEDICINE CLINIC | Age: 85
End: 2020-10-02

## 2020-10-02 NOTE — TELEPHONE ENCOUNTER
----- Message from Marquis Maru MD sent at 10/2/2020  8:25 AM EDT -----  Please let her know that she needs more views of her right breast

## 2020-10-07 ENCOUNTER — TELEPHONE (OUTPATIENT)
Dept: FAMILY MEDICINE CLINIC | Age: 85
End: 2020-10-07

## 2020-10-07 ENCOUNTER — HOSPITAL ENCOUNTER (OUTPATIENT)
Dept: MAMMOGRAPHY | Age: 85
Discharge: HOME | End: 2020-10-09
Payer: MEDICARE

## 2020-10-07 ENCOUNTER — HOSPITAL ENCOUNTER (OUTPATIENT)
Dept: ULTRASOUND IMAGING | Age: 85
Discharge: HOME | End: 2020-10-09
Payer: MEDICARE

## 2020-10-07 PROCEDURE — 76642 ULTRASOUND BREAST LIMITED: CPT

## 2020-10-07 PROCEDURE — G0279 TOMOSYNTHESIS, MAMMO: HCPCS

## 2020-10-07 NOTE — TELEPHONE ENCOUNTER
----- Message from Jeffy Conte MD sent at 10/7/2020 11:51 AM EDT -----  Please let her know that her mammogram and ultrasound showed a small mass, but it looks like it is benign.  The radiologist just recommended a follow up ultrasound in 6 months

## 2020-10-15 ENCOUNTER — OFFICE VISIT (OUTPATIENT)
Dept: CARDIOLOGY | Age: 85
End: 2020-10-15
Payer: MEDICARE

## 2020-10-15 VITALS
BODY MASS INDEX: 27.49 KG/M2 | SYSTOLIC BLOOD PRESSURE: 106 MMHG | HEIGHT: 65 IN | WEIGHT: 165 LBS | HEART RATE: 77 BPM | DIASTOLIC BLOOD PRESSURE: 67 MMHG

## 2020-10-15 PROCEDURE — 1036F TOBACCO NON-USER: CPT | Performed by: INTERNAL MEDICINE

## 2020-10-15 PROCEDURE — G8427 DOCREV CUR MEDS BY ELIG CLIN: HCPCS | Performed by: INTERNAL MEDICINE

## 2020-10-15 PROCEDURE — 99213 OFFICE O/P EST LOW 20 MIN: CPT | Performed by: INTERNAL MEDICINE

## 2020-10-15 PROCEDURE — G8417 CALC BMI ABV UP PARAM F/U: HCPCS | Performed by: INTERNAL MEDICINE

## 2020-10-15 PROCEDURE — 99214 OFFICE O/P EST MOD 30 MIN: CPT | Performed by: INTERNAL MEDICINE

## 2020-10-15 PROCEDURE — 93005 ELECTROCARDIOGRAM TRACING: CPT | Performed by: INTERNAL MEDICINE

## 2020-10-15 PROCEDURE — 93010 ELECTROCARDIOGRAM REPORT: CPT | Performed by: INTERNAL MEDICINE

## 2020-10-15 PROCEDURE — 1090F PRES/ABSN URINE INCON ASSESS: CPT | Performed by: INTERNAL MEDICINE

## 2020-10-15 PROCEDURE — 4040F PNEUMOC VAC/ADMIN/RCVD: CPT | Performed by: INTERNAL MEDICINE

## 2020-10-15 PROCEDURE — G8484 FLU IMMUNIZE NO ADMIN: HCPCS | Performed by: INTERNAL MEDICINE

## 2020-10-15 PROCEDURE — 1123F ACP DISCUSS/DSCN MKR DOCD: CPT | Performed by: INTERNAL MEDICINE

## 2020-10-15 RX ORDER — PROPAFENONE HYDROCHLORIDE 150 MG/1
150 TABLET, FILM COATED ORAL 2 TIMES DAILY
Qty: 90 TABLET | Refills: 3 | Status: SHIPPED | OUTPATIENT
Start: 2020-10-15 | End: 2021-04-06 | Stop reason: SDUPTHER

## 2020-10-15 NOTE — PATIENT INSTRUCTIONS
1. Take 300 mg of propafenone with 25 mg of metoprolol once, then  2.  Start 150 mg propafenone every 12 hours from next day

## 2020-10-15 NOTE — PROGRESS NOTES
Today's Date: 10/15/2020  Patient Name: Arthea Ahumada  Patient's age: 80 y.o., 1934      HPI:   The patient is a 80 y.o.  female is in the office for work in appointment. She is back in atrial fibrillation. She reports that she had last episode 1 week ago. Yesterday morning she noticed that her heart was irregular. She is feeling fatigued and tired. EKG confirmed that she is in atrial fibrillation. Heart rate is ranging 70-90 on examination today. No chest pain or discomfort. Past Medical History:   has a past medical history of Actinic keratosis, Arthritis, Atrial fibrillation ([address] CAD (coronary artery disease), Cancer ([address] Cervical prolapse, Diverticulosis, Endocervical polyp, Heel spur, Hip pain, Hypertension, Nasal septal deviation, Osteoporosis, Stress incontinence, Syncope, and Vaginal prolapse. Past Surgical History:   has a past surgical history that includes Colonoscopy (01/26/2000); Cholecystectomy (11/1976); Upper gastrointestinal endoscopy (07/16/2017); pr egd transoral biopsy single/multiple (Left, 7/16/2017); Upper gastrointestinal endoscopy (Left, 7/16/2017); Endoscopic ultrasonography, GI (Left, 7/20/2017); eye surgery; Total hip arthroplasty (Left, 9/19/2017); bladder repair (2011); Hysterectomy (1960's); Appendectomy (1960's); joint replacement (2017); skin biopsy; and pr office/outpt visit,procedure only (Right, 6/1/2018). Home Medications:    Prior to Admission medications    Medication Sig Start Date End Date Taking? Authorizing Provider   warfarin (COUMADIN) 2.5 MG tablet 1. Take 2 tablets by mouth daily. Or as directed by INR results.  8/21/20  Yes Aditi Faustin MD   pantoprazole (PROTONIX) 40 MG tablet TAKE 1 TABLET EVERY DAY 2/20/20  Yes Aditi Faustin MD   metoprolol tartrate (LOPRESSOR) 25 MG tablet TAKE HALF OF A TABLET TWICE A DAY. 2/20/20  Yes Aditi Faustin MD   Handicap Placard MISC by Does not apply route Expires 6/2022 6/15/17  Yes MD MARNIE Pierson OMEGA-3 KRILL  MG CAPS Take by mouth daily    Yes Historical Provider, MD   Multiple Vitamins-Minerals (THERAPEUTIC MULTIVITAMIN-MINERALS) tablet Take 1 tablet by mouth daily   Yes Historical Provider, MD   Multiple Vitamins-Minerals (ICAPS AREDS 2) CAPS Take 2 tablets by mouth daily    Yes Historical Provider, MD       Allergies:  Statins; Tape [adhesive tape]; Tizanidine hcl; and Tramadol    Social History:   reports that she has never smoked. She has never used smokeless tobacco. She reports that she does not drink alcohol or use drugs. REVIEW OF SYSTEMS:  CONSTITUTIONAL:NEGATIVE  HEENT:NEG  Cardiovascular: No chest pain, No dyspnea on exertion, No palpitations. Lower extremity edema: No  RESPIRATORY: neg  GASTROINTESTINAL:  negative  GENITOURINARY:  negative  INTEGUMENT:  negative  MUSCULOSKELETAL:  positive for  pain  NEUROLOGICAL:  negative    PHYSICAL EXAM:      /67   Pulse 77   Ht 5' 5\" (1.651 m)   Wt 165 lb (74.8 kg)   BMI 27.46 kg/m²    HEENT: PERRL, no cervical lymphadenopathy. No masses palpable. Cardiovascular:  · The apical impulse is not displaced  · Heart  Sounds: Irregularly irregular. No murmur or gallop  · Jugular venous pulsation Normal  · The carotid upstroke is normal  · Peripheral pulses are symmetrical and full  Respiratory: Good respiratory effort. On auscultation: clear to auscultation bilaterally  Abdomen:  · No masses or tenderness  · Bowel sounds present  Extremities:  ·  No Cyanosis or Clubbing  ·  Lower extremity edema: No  Skin: Warm and dry    Cardiac data:      ECG 10/15/2020 atrial fibrillation with RVR, nonspecific ST-T abnormality    Labs:   PT/INR:   No results for input(s): PROTIME, INR in the last 72 hours. FASTING LIPID PANEL:  Lab Results   Component Value Date    HDL 62 01/13/2020    TRIG 154 01/13/2020     Echocardiogram 7/2017:  Normal left ventricle size and systolic function.  Ejection fraction was

## 2020-10-16 ENCOUNTER — TELEPHONE (OUTPATIENT)
Dept: CARDIOLOGY | Age: 85
End: 2020-10-16

## 2020-10-19 NOTE — TELEPHONE ENCOUNTER
LM notifying pt of orders per Dr Donaldson Drafts to continue Rhythmol 150mg BID and to call office with any questions.

## 2020-10-24 ENCOUNTER — OFFICE VISIT (OUTPATIENT)
Dept: PRIMARY CARE CLINIC | Age: 85
End: 2020-10-24
Payer: MEDICARE

## 2020-10-24 VITALS
TEMPERATURE: 97.1 F | SYSTOLIC BLOOD PRESSURE: 132 MMHG | WEIGHT: 165 LBS | HEIGHT: 65 IN | BODY MASS INDEX: 27.49 KG/M2 | HEART RATE: 63 BPM | DIASTOLIC BLOOD PRESSURE: 78 MMHG | OXYGEN SATURATION: 97 % | RESPIRATION RATE: 16 BRPM

## 2020-10-24 PROCEDURE — 1123F ACP DISCUSS/DSCN MKR DOCD: CPT | Performed by: FAMILY MEDICINE

## 2020-10-24 PROCEDURE — 99214 OFFICE O/P EST MOD 30 MIN: CPT | Performed by: FAMILY MEDICINE

## 2020-10-24 PROCEDURE — G8484 FLU IMMUNIZE NO ADMIN: HCPCS | Performed by: FAMILY MEDICINE

## 2020-10-24 PROCEDURE — G8427 DOCREV CUR MEDS BY ELIG CLIN: HCPCS | Performed by: FAMILY MEDICINE

## 2020-10-24 PROCEDURE — 1090F PRES/ABSN URINE INCON ASSESS: CPT | Performed by: FAMILY MEDICINE

## 2020-10-24 PROCEDURE — 1036F TOBACCO NON-USER: CPT | Performed by: FAMILY MEDICINE

## 2020-10-24 PROCEDURE — 4040F PNEUMOC VAC/ADMIN/RCVD: CPT | Performed by: FAMILY MEDICINE

## 2020-10-24 PROCEDURE — G8417 CALC BMI ABV UP PARAM F/U: HCPCS | Performed by: FAMILY MEDICINE

## 2020-10-24 PROCEDURE — 99212 OFFICE O/P EST SF 10 MIN: CPT

## 2020-10-24 ASSESSMENT — ENCOUNTER SYMPTOMS
CHEST TIGHTNESS: 0
COUGH: 0
SHORTNESS OF BREATH: 0
WHEEZING: 0

## 2020-10-24 NOTE — PROGRESS NOTES
[address] [place] [address]  Dept: [phone]  Dept Fax: [phone]  Loc: [phone]    Kinza Portillo is a 80 y.o. female who presents today for her medical conditions/complaints as noted below. Kinza Portillo is c/o of   Chief Complaint   Patient presents with    Breast Pain     Left breast pain & edema s/p falling on it Monday, also hit head -LOC, & left knee. HPI:     HPI Here today for breast pain. She fell on 10/19 and her foot just stopped moving and she fell forward and hit the floor. She has pain in her left wrist and she hit her head. No LOC. She also hit her knee and her her left breast. She scrapped her knee and that bled a lot, but it is healing. Her left breast has been hurting really bad because it is very bruised and it is swollen. She has not had any fevers. She has not had any dizziness or chest pain. She has not had any palpitations. To help the breast she has been icing it and she is trying to keep it supported. She can't lay in bed due to the pain so I recommended she try laying with a pillow beside her.        Past Medical History:   Diagnosis Date    Actinic keratosis     history of    Arthritis     Atrial fibrillation ([address]     CAD (coronary artery disease)     history of afib    Cancer (HCC)     skin    Cervical prolapse     history of    Diverticulosis     Endocervical polyp     history of    Heel spur     Hip pain     Hypertension     Nasal septal deviation     Osteoporosis     Stress incontinence     history of    Syncope     Vaginal prolapse     history of          Social History     Tobacco Use    Smoking status: Never Smoker    Smokeless tobacco: Never Used   Substance Use Topics    Alcohol use: No     Current Outpatient Medications   Medication Sig Dispense Refill    propafenone (RYTHMOL) 150 MG tablet Take 1 tablet by mouth 2 times daily Take 300 mg of propafenone with 25 mg of metoprolol once and start 150 mg propafenone q 12 hours from next day 90 tablet 3    warfarin (COUMADIN) 2.5 MG tablet 1. Take 2 tablets by mouth daily. Or as directed by INR results. 180 tablet 3    pantoprazole (PROTONIX) 40 MG tablet TAKE 1 TABLET EVERY DAY 90 tablet 3    metoprolol tartrate (LOPRESSOR) 25 MG tablet TAKE HALF OF A TABLET TWICE A DAY. 90 tablet 2    Handicap Placard MISC by Does not apply route Expires 6/2022 1 each 0    MEGARED OMEGA-3 KRILL  MG CAPS Take by mouth daily       Multiple Vitamins-Minerals (THERAPEUTIC MULTIVITAMIN-MINERALS) tablet Take 1 tablet by mouth daily      Multiple Vitamins-Minerals (ICAPS AREDS 2) CAPS Take 2 tablets by mouth daily        No current facility-administered medications for this visit. Facility-Administered Medications Ordered in Other Visits   Medication Dose Route Frequency Provider Last Rate Last Dose    betamethasone acetate-betamethasone sodium phosphate (CELESTONE) injection 12 mg  12 mg Intramuscular Once Juan Luu MD              Allergies   Allergen Reactions    Statins Other (See Comments)     Causes severe leg cramps     Tape [employer] Tape] Other (See Comments)     \"sticks to skin\" causes redness    Tizanidine Hcl Other (See Comments)     Having issues with her liver- elevated her enzyme level    Tramadol Other (See Comments)     \"makes me Goofy\"       Subjective:     Review of Systems   Constitutional: Negative for activity change, appetite change, chills, fatigue and fever. Eyes: Negative for visual disturbance. Respiratory: Negative for cough, chest tightness, shortness of breath and wheezing. Cardiovascular: Negative for chest pain, palpitations and leg swelling. Genitourinary: Negative for difficulty urinating. Left breast pain and swelling   Musculoskeletal: Positive for arthralgias (left knee) and gait problem.    Neurological: Negative for dizziness, syncope, weakness and light-headedness. Objective:      Physical Exam  Vitals signs and nursing note reviewed. Constitutional:       General: She is not in acute distress. Appearance: She is well-developed. Eyes:      Conjunctiva/sclera: Conjunctivae normal.   Neck:      Musculoskeletal: Normal range of motion and neck supple. Thyroid: No thyromegaly. Cardiovascular:      Rate and Rhythm: Normal rate and regular rhythm. Heart sounds: Normal heart sounds. No murmur. Pulmonary:      Effort: Pulmonary effort is normal. No respiratory distress. Breath sounds: Normal breath sounds. No wheezing. Chest:      Breasts:         Right: Normal.         Left: Swelling, mass (from hematoma), skin change (significant bruising) and tenderness present. No bleeding, inverted nipple or nipple discharge. Lymphadenopathy:      Cervical: No cervical adenopathy. Skin:     General: Skin is warm and dry. Findings: No erythema or rash. Neurological:      Mental Status: She is alert and oriented to person, place, and time. /78 (Site: Right Upper Arm, Position: Sitting, Cuff Size: Medium Adult)   Pulse 63   Temp 97.1 °F (36.2 °C) (Tympanic)   Resp 16   Ht 5' 5\" (1.651 m)   Wt 165 lb (74.8 kg)   SpO2 97%   BMI 27.46 kg/m²     Assessment:       Diagnosis Orders   1. Posttraumatic hematoma of left breast, initial encounter  [address] LEFT   2. Unspecified lump in the left breast, lower inner quadrant   US BREAST COMPLETE LEFT             Plan:        Hematoma of breast: new; I ordered an ultrasound to evaluate to see if her hematoma needs drained or if it looks like it is getting infected. If it needs drained I will refer her to surgery asap. Return if symptoms worsen or fail to improve.     Orders Placed This Encounter   Procedures    US BREAST COMPLETE LEFT     Standing Status:   Future     Standing Expiration Date:   10/24/2021     Scheduling Instructions:      I want

## 2020-10-26 ENCOUNTER — HOSPITAL ENCOUNTER (OUTPATIENT)
Dept: PHARMACY | Age: 85
Setting detail: THERAPIES SERIES
Discharge: HOME | End: 2020-10-26
Payer: MEDICARE

## 2020-10-26 ENCOUNTER — HOSPITAL ENCOUNTER (OUTPATIENT)
Dept: ULTRASOUND IMAGING | Age: 85
Discharge: HOME | End: 2020-10-28
Payer: MEDICARE

## 2020-10-26 LAB
INR BLD: 2.6
PROTIME: 31.4 SECONDS

## 2020-10-26 PROCEDURE — 99211 OFF/OP EST MAY X REQ PHY/QHP: CPT

## 2020-10-26 PROCEDURE — 76642 ULTRASOUND BREAST LIMITED: CPT

## 2020-10-26 PROCEDURE — 85610 PROTHROMBIN TIME: CPT

## 2020-10-26 PROCEDURE — 36416 COLLJ CAPILLARY BLOOD SPEC: CPT

## 2020-10-26 NOTE — PROGRESS NOTES
ANTICOAGULATION SERVICE    Date of Clinic Visit:  10/26/2020    Isaias Lopez is a 80 y.o. female who presents to clinic today for anticoagulation monitoring and adjustment. Temp 94.2  Recent INR Results:  Internal QC passed  Lab Results   Component Value Date    INR 2.6 10/26/2020    INR 2.9 09/16/2020       Current Warfarin Dosage:  Dosing Plan  As of 10/26/2020    TTR:   88.9 % (3 y)   Full warfarin instructions:   5 mg every day               Assessment/Plan:    Continue current regimen as INR remains stable. Next Clinic Appointment:  Return date  As of 10/26/2020    TTR:   88.9 % (3 y)   Next INR check:   12/9/2020             Please call [facility] Anticoagulation Clinic at [phone] with any questions. Thanks!   Geetha Christy, [facility]  Anticoagulation Service Pharmacist  10/26/2020 3:47 PM   CLINICAL PHARMACY CONSULT: MED RECONCILIATION/REVIEW ADDENDUM    For Pharmacy Admin Tracking Only    PHSO: Yes  Total # of Interventions Recommended: 0  Total Interventions Accepted: 0  Time Spent (min): Michael Zamarripa 219

## 2020-11-21 ENCOUNTER — OFFICE VISIT (OUTPATIENT)
Dept: PRIMARY CARE CLINIC | Age: 85
End: 2020-11-21
Payer: MEDICARE

## 2020-11-21 VITALS
HEART RATE: 65 BPM | WEIGHT: 168 LBS | DIASTOLIC BLOOD PRESSURE: 74 MMHG | OXYGEN SATURATION: 98 % | TEMPERATURE: 98.4 F | SYSTOLIC BLOOD PRESSURE: 132 MMHG | BODY MASS INDEX: 27.96 KG/M2

## 2020-11-21 PROCEDURE — G8484 FLU IMMUNIZE NO ADMIN: HCPCS | Performed by: FAMILY MEDICINE

## 2020-11-21 PROCEDURE — 4040F PNEUMOC VAC/ADMIN/RCVD: CPT | Performed by: FAMILY MEDICINE

## 2020-11-21 PROCEDURE — G8417 CALC BMI ABV UP PARAM F/U: HCPCS | Performed by: FAMILY MEDICINE

## 2020-11-21 PROCEDURE — 1090F PRES/ABSN URINE INCON ASSESS: CPT | Performed by: FAMILY MEDICINE

## 2020-11-21 PROCEDURE — G8427 DOCREV CUR MEDS BY ELIG CLIN: HCPCS | Performed by: FAMILY MEDICINE

## 2020-11-21 PROCEDURE — 99212 OFFICE O/P EST SF 10 MIN: CPT

## 2020-11-21 PROCEDURE — 99213 OFFICE O/P EST LOW 20 MIN: CPT | Performed by: FAMILY MEDICINE

## 2020-11-21 PROCEDURE — 1123F ACP DISCUSS/DSCN MKR DOCD: CPT | Performed by: FAMILY MEDICINE

## 2020-11-21 PROCEDURE — 1036F TOBACCO NON-USER: CPT | Performed by: FAMILY MEDICINE

## 2020-11-21 ASSESSMENT — ENCOUNTER SYMPTOMS
EYES NEGATIVE: 1
RESPIRATORY NEGATIVE: 1
GASTROINTESTINAL NEGATIVE: 1
ALLERGIC/IMMUNOLOGIC NEGATIVE: 1

## 2020-11-21 NOTE — PROGRESS NOTES
2020     Cherylene Loveless (:  1934) is a 80 y.o. female, here for evaluation of the following medical concerns:    HPI   Acute urgent care visit for right shoulder pain coming on since Monday (6 days). No recalled initiating event or activity. Right hand dominant. No recalled issues with the right shoulder in the past.     Pain between the shoulder and neck by description. Nothing perceived beyond the arm. No numbness/tingling or weakness to report. No pain with neck movement. Neck doesn't seem painful. No cough or sob to report.       Past Medical History:   Diagnosis Date    Actinic keratosis     history of    Arthritis     Atrial fibrillation ([address]     CAD (coronary artery disease)     history of afib    Cancer ([address]     skin    Cervical prolapse     history of    Diverticulosis     Endocervical polyp     history of    Heel spur     Hip pain     Hypertension     Nasal septal deviation     Osteoporosis     Stress incontinence     history of    Syncope     Vaginal prolapse     history of     Past Surgical History:   Procedure Laterality Date    APPENDECTOMY     Donnel Boothe BLADDER REPAIR      [address]    [facility]    COLONOSCOPY  2000    ENDOSCOPIC ULTRASOUND (LOWER) Left 2017    ENDOSCOPIC ULTRASOUND performed by Sarah Quijano MD at [facility] [address] cataract    HYSTERECTOMY      JOINT REPLACEMENT  2017    left total knee relpament    [place] EGD TRANSORAL BIOPSY SINGLE/MULTIPLE Left 2017    EGD BIOPSY performed by Sarah Quijano MD at [address] [place] [address] OFFICE/OUTPT VISIT,PROCEDURE ONLY Right 2018    MOHS REPAIR BASEL CELL CARCINOMA RIGHT DORSAL NOSE performed by Marla Marcus MD at [address] [place] [address] HIP ARTHROPLASTY Left 2017    Left Total Hip ARTHROPLASTY performed by Jeremiah Grande MD at [place] [address] 07/16/2017    UPPER GASTROINTESTINAL ENDOSCOPY Left 7/16/2017    EGD DILATION BALLOON performed by Niki Nguyen MD at [facility]       Review of Systems   Constitutional: Negative. HENT: Negative. Eyes: Negative. Respiratory: Negative. Cardiovascular: Negative. Gastrointestinal: Negative. Endocrine: Negative. Genitourinary: Negative. Musculoskeletal: Positive for arthralgias (right shoulder) and myalgias. Skin: Negative. Allergic/Immunologic: Negative. Neurological: Negative. Hematological: Negative. Psychiatric/Behavioral: Negative. Prior to Visit Medications    Medication Sig Taking? Authorizing Provider   propafenone (RYTHMOL) 150 MG tablet Take 1 tablet by mouth 2 times daily Take 300 mg of propafenone with 25 mg of metoprolol once and start 150 mg propafenone q 12 hours from next day Yes Lita Chi MD   warfarin (COUMADIN) 2.5 MG tablet 1. Take 2 tablets by mouth daily. Or as directed by INR results. Yes Brenda Qureshi MD   pantoprazole (PROTONIX) 40 MG tablet TAKE 1 TABLET EVERY DAY Yes Brenda Qureshi MD   metoprolol tartrate (LOPRESSOR) 25 MG tablet TAKE HALF OF A TABLET TWICE A DAY.  Yes Brenda Qureshi MD   Handicap Placard MISC by Does not apply route Expires 6/2022 Yes Brenda Qureshi MD   MEGARED OMEGA-3 KRILL  MG CAPS Take by mouth daily  Yes Historical Provider, MD   Multiple Vitamins-Minerals (THERAPEUTIC MULTIVITAMIN-MINERALS) tablet Take 1 tablet by mouth daily Yes Clau Moyer MD   Multiple Vitamins-Minerals (ICAPS AREDS 2) CAPS Take 2 tablets by mouth daily  Yes Historical Provider, MD        Social History     Tobacco Use    Smoking status: Never Smoker    Smokeless tobacco: Never Used   Substance Use Topics    Alcohol use: No        Vitals:    11/21/20 1416   BP: 132/74   Site: Right Upper Arm   Position: Sitting   Cuff Size: Large Adult   Pulse: 65   Temp: 98.4 °F (36.9 °C)   TempSrc: Tympanic   SpO2: 98%   Weight:

## 2020-12-09 ENCOUNTER — HOSPITAL ENCOUNTER (OUTPATIENT)
Dept: PHARMACY | Age: 85
Setting detail: THERAPIES SERIES
Discharge: HOME | End: 2020-12-09
Payer: MEDICARE

## 2020-12-09 LAB
INR BLD: 2.5
PROTIME: 30.5 SECONDS

## 2020-12-09 PROCEDURE — 85610 PROTHROMBIN TIME: CPT

## 2020-12-09 PROCEDURE — 99211 OFF/OP EST MAY X REQ PHY/QHP: CPT

## 2020-12-09 PROCEDURE — 36416 COLLJ CAPILLARY BLOOD SPEC: CPT

## 2020-12-17 ENCOUNTER — HOSPITAL ENCOUNTER (OUTPATIENT)
Dept: NON INVASIVE DIAGNOSTICS | Age: 85
Discharge: HOME | End: 2020-12-17
Payer: MEDICARE

## 2020-12-17 ENCOUNTER — HOSPITAL ENCOUNTER (OUTPATIENT)
Age: 85
Setting detail: SPECIMEN
Discharge: HOME | End: 2020-12-17
Payer: MEDICARE

## 2020-12-17 ENCOUNTER — HOSPITAL ENCOUNTER (OUTPATIENT)
Dept: LAB | Age: 85
Discharge: HOME | End: 2020-12-17
Payer: MEDICARE

## 2020-12-17 ENCOUNTER — OFFICE VISIT (OUTPATIENT)
Dept: FAMILY MEDICINE CLINIC | Age: 85
End: 2020-12-17
Payer: MEDICARE

## 2020-12-17 VITALS
DIASTOLIC BLOOD PRESSURE: 68 MMHG | HEART RATE: 61 BPM | TEMPERATURE: 97.8 F | SYSTOLIC BLOOD PRESSURE: 130 MMHG | HEIGHT: 65 IN | WEIGHT: 164 LBS | BODY MASS INDEX: 27.32 KG/M2 | OXYGEN SATURATION: 98 %

## 2020-12-17 LAB
-: ABNORMAL
AMORPHOUS: ABNORMAL
ANION GAP SERPL CALCULATED.3IONS-SCNC: 7 MMOL/L (ref 9–17)
BACTERIA: ABNORMAL
BILIRUBIN URINE: NEGATIVE
BUN BLDV-MCNC: 17 MG/DL (ref 8–23)
BUN/CREAT BLD: 18 (ref 9–20)
CALCIUM SERPL-MCNC: 9.7 MG/DL (ref 8.6–10.4)
CASTS UA: ABNORMAL /LPF (ref 0–2)
CHLORIDE BLD-SCNC: 100 MMOL/L (ref 98–107)
CO2: 31 MMOL/L (ref 20–31)
COLOR: ABNORMAL
COMMENT UA: ABNORMAL
CREAT SERPL-MCNC: 0.95 MG/DL (ref 0.5–0.9)
CRYSTALS, UA: ABNORMAL /HPF
EKG ATRIAL RATE: 64 BPM
EKG P AXIS: 52 DEGREES
EKG P-R INTERVAL: 182 MS
EKG Q-T INTERVAL: 440 MS
EKG QRS DURATION: 86 MS
EKG QTC CALCULATION (BAZETT): 453 MS
EKG R AXIS: 72 DEGREES
EKG T AXIS: 40 DEGREES
EKG VENTRICULAR RATE: 64 BPM
EPITHELIAL CELLS UA: ABNORMAL /HPF (ref 0–5)
GFR AFRICAN AMERICAN: >60 ML/MIN
GFR NON-AFRICAN AMERICAN: 56 ML/MIN
GFR SERPL CREATININE-BSD FRML MDRD: ABNORMAL ML/MIN/{1.73_M2}
GFR SERPL CREATININE-BSD FRML MDRD: ABNORMAL ML/MIN/{1.73_M2}
GLUCOSE BLD-MCNC: 101 MG/DL (ref 70–99)
GLUCOSE URINE: NEGATIVE
HCT VFR BLD CALC: 44.3 % (ref 36.3–47.1)
HEMOGLOBIN: 13.8 G/DL (ref 11.9–15.1)
INR BLD: 2.1
KETONES, URINE: NEGATIVE
LEUKOCYTE ESTERASE, URINE: ABNORMAL
MCH RBC QN AUTO: 27.1 PG (ref 25.2–33.5)
MCHC RBC AUTO-ENTMCNC: 31.2 G/DL (ref 25.2–33.5)
MCV RBC AUTO: 87 FL (ref 82.6–102.9)
MUCUS: ABNORMAL
NITRITE, URINE: NEGATIVE
NRBC AUTOMATED: 0 PER 100 WBC
OTHER OBSERVATIONS UA: ABNORMAL
PARTIAL THROMBOPLASTIN TIME: 37.8 SEC (ref 23.9–33.8)
PDW BLD-RTO: 15.1 % (ref 11.8–14.4)
PH UA: 6.5 (ref 5–6)
PLATELET # BLD: ABNORMAL K/UL (ref 138–453)
PLATELET, FLUORESCENCE: 122 K/UL (ref 138–453)
PLATELET, IMMATURE FRACTION: 3.2 % (ref 1.1–10.3)
PMV BLD AUTO: ABNORMAL FL (ref 8.1–13.5)
POTASSIUM SERPL-SCNC: 5.2 MMOL/L (ref 3.7–5.3)
PROTEIN UA: NEGATIVE
PROTHROMBIN TIME: 22.7 SEC (ref 11.5–14.2)
RBC # BLD: 5.09 M/UL (ref 3.95–5.11)
RBC UA: ABNORMAL /HPF (ref 0–4)
RENAL EPITHELIAL, UA: ABNORMAL /HPF
SODIUM BLD-SCNC: 138 MMOL/L (ref 135–144)
SPECIFIC GRAVITY UA: 1.01 (ref 1.01–1.02)
TRICHOMONAS: ABNORMAL
TURBIDITY: ABNORMAL
URINE HGB: ABNORMAL
UROBILINOGEN, URINE: NORMAL
WBC # BLD: 5.6 K/UL (ref 3.5–11.3)
WBC UA: ABNORMAL /HPF (ref 0–4)
YEAST: ABNORMAL

## 2020-12-17 PROCEDURE — 85610 PROTHROMBIN TIME: CPT

## 2020-12-17 PROCEDURE — G0008 ADMIN INFLUENZA VIRUS VAC: HCPCS | Performed by: FAMILY MEDICINE

## 2020-12-17 PROCEDURE — 81001 URINALYSIS AUTO W/SCOPE: CPT

## 2020-12-17 PROCEDURE — 85055 RETICULATED PLATELET ASSAY: CPT

## 2020-12-17 PROCEDURE — 4040F PNEUMOC VAC/ADMIN/RCVD: CPT | Performed by: FAMILY MEDICINE

## 2020-12-17 PROCEDURE — 99212 OFFICE O/P EST SF 10 MIN: CPT | Performed by: FAMILY MEDICINE

## 2020-12-17 PROCEDURE — G8484 FLU IMMUNIZE NO ADMIN: HCPCS | Performed by: FAMILY MEDICINE

## 2020-12-17 PROCEDURE — 1090F PRES/ABSN URINE INCON ASSESS: CPT | Performed by: FAMILY MEDICINE

## 2020-12-17 PROCEDURE — G8427 DOCREV CUR MEDS BY ELIG CLIN: HCPCS | Performed by: FAMILY MEDICINE

## 2020-12-17 PROCEDURE — 85730 THROMBOPLASTIN TIME PARTIAL: CPT

## 2020-12-17 PROCEDURE — 80048 BASIC METABOLIC PNL TOTAL CA: CPT

## 2020-12-17 PROCEDURE — G8417 CALC BMI ABV UP PARAM F/U: HCPCS | Performed by: FAMILY MEDICINE

## 2020-12-17 PROCEDURE — 1036F TOBACCO NON-USER: CPT | Performed by: FAMILY MEDICINE

## 2020-12-17 PROCEDURE — 85027 COMPLETE CBC AUTOMATED: CPT

## 2020-12-17 PROCEDURE — 99214 OFFICE O/P EST MOD 30 MIN: CPT | Performed by: FAMILY MEDICINE

## 2020-12-17 PROCEDURE — 87086 URINE CULTURE/COLONY COUNT: CPT

## 2020-12-17 PROCEDURE — 93005 ELECTROCARDIOGRAM TRACING: CPT | Performed by: PHYSICIAN ASSISTANT

## 2020-12-17 PROCEDURE — 36415 COLL VENOUS BLD VENIPUNCTURE: CPT

## 2020-12-17 PROCEDURE — 1123F ACP DISCUSS/DSCN MKR DOCD: CPT | Performed by: FAMILY MEDICINE

## 2020-12-17 ASSESSMENT — ENCOUNTER SYMPTOMS
CHEST TIGHTNESS: 0
ABDOMINAL PAIN: 0
SHORTNESS OF BREATH: 0
BACK PAIN: 0
COUGH: 0
CONSTIPATION: 0
DIARRHEA: 0
WHEEZING: 0

## 2020-12-17 NOTE — PROGRESS NOTES
Have you had an allergic reaction to the flu (influenza) shot? no  Are you allergic to eggs or any component of the flu vaccine? no  Do you have a history of Guillain-Hancock Syndrome (GBS), which is paralysis after receiving the flu vaccine? no  Are you feeling well today? yes  Flu vaccine given as ordered. Patient tolerated it well. No questions re: VIS information.

## 2020-12-17 NOTE — PROGRESS NOTES
[address]  [address] [address]  Dept: [phone]  Dept Fax: [phone]  Loc: [phone]    Eduardo Reeder is a 80 y.o. female who presents today for her medical conditions/complaints as noted below. Eduardo Reeder is c/o of   Chief Complaint   Patient presents with    6 Month Follow-Up     hand pain    Vaginal Bleeding     noticed this week; light bleeding, no pain       HPI:     Here today for a follow up of her hand pain and vaginal bleeding    Hand pain: stable; she has been doing well recently. Her arthritis is not bothering her much. Her right shoulder is better as well. Vaginal Bleeding  The patient's primary symptoms include vaginal bleeding. This is a new problem. The current episode started in the past 7 days (1 week). The problem occurs intermittently. The problem has been unchanged. The patient is experiencing no pain. She is not pregnant. Pertinent negatives include no abdominal pain, anorexia, back pain, chills, constipation, diarrhea, discolored urine, dysuria, fever, flank pain, frequency, hematuria or painful intercourse. The vaginal discharge was normal. The vaginal bleeding is spotting. She has not been passing clots. She has not been passing tissue. Nothing aggravates the symptoms. She has tried nothing for the symptoms. The treatment provided no relief. She is not sexually active.  She is postmenopausal. (Hysterectomy, pessary in place)         Past Medical History:   Diagnosis Date    Actinic keratosis     history of    Arthritis     Atrial fibrillation ([facility] [address]     CAD (coronary artery disease)     history of afib    Cancer (HCC)     skin    Cervical prolapse     history of    Diverticulosis     Endocervical polyp     history of    Heel spur     Hip pain     Hypertension     Nasal septal deviation     Osteoporosis     Stress incontinence     history of  Syncope     Vaginal prolapse     history of      Past Surgical History:   Procedure Laterality Date    APPENDECTOMY  1960's   Connye Sole BLADDER REPAIR  2011    [address]  29/2248    [facility]    COLONOSCOPY  01/26/2000    ENDOSCOPIC ULTRASOUND (LOWER) Left 07/20/2017    [facility]    JOINT REPLACEMENT  2017    left total knee relpament    PARTIAL HYSTERECTOMY  1960's    [place] EGD TRANSORAL BIOPSY SINGLE/MULTIPLE Left 07/16/2017    EGD BIOPSY performed by Bernardo Muñiz MD at [facility] OFFICE/OUTPT VISIT,PROCEDURE ONLY Right 06/01/2018    MOHS REPAIR BASEL CELL CARCINOMA RIGHT DORSAL NOSE performed by Katy Patel MD at [address] Left 09/19/2017    Left Total Hip ARTHROPLASTY performed by Sonia Diamond MD at [address]  07/16/2017    UPPER GASTROINTESTINAL ENDOSCOPY Left 07/16/2017    EGD DILATION BALLOON performed by Bernardo Muñiz MD at [facility]         Social History     Tobacco Use    Smoking status: Never Smoker    Smokeless tobacco: Never Used   Substance Use Topics    Alcohol use: No     Current Outpatient Medications   Medication Sig Dispense Refill    propafenone (RYTHMOL) 150 MG tablet Take 1 tablet by mouth 2 times daily Take 300 mg of propafenone with 25 mg of metoprolol once and start 150 mg propafenone q 12 hours from next day 90 tablet 3    warfarin (COUMADIN) 2.5 MG tablet 1. Take 2 tablets by mouth daily. Or as directed by INR results. 180 tablet 3    pantoprazole (PROTONIX) 40 MG tablet TAKE 1 TABLET EVERY DAY 90 tablet 3    metoprolol tartrate (LOPRESSOR) 25 MG tablet TAKE HALF OF A TABLET TWICE A DAY.  90 tablet 2    Handicap Placard MISC by Does not apply route Expires 6/2022 1 each 0    MEGARED OMEGA-3 KRILL  MG CAPS Take by mouth daily  Multiple Vitamins-Minerals (THERAPEUTIC MULTIVITAMIN-MINERALS) tablet Take 1 tablet by mouth daily      Multiple Vitamins-Minerals (ICAPS AREDS 2) CAPS Take 2 tablets by mouth daily        No current facility-administered medications for this visit. Facility-Administered Medications Ordered in Other Visits   Medication Dose Route Frequency Provider Last Rate Last Admin    betamethasone acetate-betamethasone sodium phosphate (CELESTONE) injection 12 mg  12 mg Intramuscular Once Adolfo Cox MD              Allergies   Allergen Reactions    Statins Other (See Comments)     Causes severe leg cramps     Tape Carl Lord Tape] Other (See Comments)     \"sticks to skin\" causes redness    Tizanidine Hcl Other (See Comments)     Having issues with her liver- elevated her enzyme level    Tramadol Other (See Comments)     \"makes me Goofy\"       Subjective:     Review of Systems   Constitutional: Negative for activity change, appetite change, chills, fatigue and fever. Eyes: Negative for visual disturbance. Respiratory: Negative for cough, chest tightness, shortness of breath and wheezing. Cardiovascular: Negative for chest pain, palpitations and leg swelling. Gastrointestinal: Negative for abdominal pain, anorexia, constipation and diarrhea. Genitourinary: Positive for vaginal bleeding. Negative for difficulty urinating, dysuria, flank pain, frequency and hematuria. Musculoskeletal: Negative for back pain. Neurological: Negative for dizziness, syncope, weakness and light-headedness. Objective:      Physical Exam  Vitals signs and nursing note reviewed. Constitutional:       General: She is not in acute distress. Appearance: She is well-developed. Eyes:      Conjunctiva/sclera: Conjunctivae normal.   Neck:      Musculoskeletal: Normal range of motion and neck supple. Thyroid: No thyromegaly. Cardiovascular:      Rate and Rhythm: Normal rate and regular rhythm.

## 2020-12-19 LAB
CULTURE: NORMAL
Lab: NORMAL
SPECIMEN DESCRIPTION: NORMAL

## 2020-12-21 ENCOUNTER — HOSPITAL ENCOUNTER (OUTPATIENT)
Dept: ULTRASOUND IMAGING | Age: 85
Discharge: HOME | End: 2020-12-23
Payer: MEDICARE

## 2020-12-21 PROCEDURE — 76642 ULTRASOUND BREAST LIMITED: CPT

## 2020-12-30 NOTE — PROGRESS NOTES
NPO after midnight except sip of water with heart/BP meds  Follow all instructions given by surgeon including medications to hold. Pt states she was instructed by her physician to hold Coumadin 5 days preop and resume per Dr. Alden Carranza instructions. Bring insurance card and photo ID  Shower the night before or morning of procedure with liquid antibacterial soap  Wear comfortable clothing  Do not bring jewelry or valuables  Bring list of medications with dosage and how often taken if not reviewed   needed at discharge at least 25years old  Call PAT at [phone] for questions    Instructed to call surgery center at [phone] upon arrival to speak with  before entering building. Covid screen due  at [address] [place] [address] 6 to 7 days before procedure. Pt plans to have completed on 1/4/2020 at [address] [place] [address] outpatient lab. In preparation for their surgical procedure above patient was screened for Obstructive Sleep Apnea (DOMI) using the STOP-Bang Questionnaire by the Pre-Admission Testing department. This is a pre-surgical screening tool for patient safety and serves as a recommendation, this WILL NOT cause cancellation of surgery. STOP-Bang Questionnaire  * Do you currently see a pulmonologist?  No         1. Do you snore loudly (able to be heard in the next room)? No    2. Do you often feel tired or sleepy during the daytime? No       3. Has anyone ever told you that you stop breathing during your sleep? No    4. Do you have or are you being treated for high blood pressure? Yes      5. BMI more than 35? BMI (Calculated): 27.3        No    6. Age over 48 years? 80 y.o. Yes    7. Neck Circumference greater than 17 inches for male or 16 inches for female? Measured           (visits only)            Not Applicable    8. Gender Male?                  No      TOTAL SCORE: 2    DOMI - Low Risk : Yes to 0 - 2 questions DOMI - Intermediate Risk : Yes to 3 - 4 questions  DOMI - High Risk : Yes to 5 - 8 questions    Adapted from:   STOP Questionnaire: A Tool to Screen Patients for Obstructive Sleep Apnea   Mose Sandifer, F.R.C.PKaiserC., RACIEL Knox.B.B.S., Boby Wilson M.D., Keron Emanuel. Michael Lisa, Ph.D., RACIEL Llanos.B.B.S., Marianna Guerra M.Sc., Lexi Taylor M.D., Sathish Walden. WILFREDO Perez.C.    Anesthesiology 2008; 497:059-35 Copyright 2008, the [facility] [address] of Anesthesiologists, [facility].   ----------------------------------------------------------------------------------------------------------------

## 2021-01-04 ENCOUNTER — HOSPITAL ENCOUNTER (OUTPATIENT)
Age: 86
Discharge: HOME | End: 2021-01-04
Payer: MEDICARE

## 2021-01-04 PROCEDURE — U0003 INFECTIOUS AGENT DETECTION BY NUCLEIC ACID (DNA OR RNA); SEVERE ACUTE RESPIRATORY SYNDROME CORONAVIRUS 2 (SARS-COV-2) (CORONAVIRUS DISEASE [COVID-19]), AMPLIFIED PROBE TECHNIQUE, MAKING USE OF HIGH THROUGHPUT TECHNOLOGIES AS DESCRIBED BY CMS-2020-01-R: HCPCS

## 2021-01-05 LAB
PERFORMING LAB: NORMAL
REPORT: NORMAL
SARS-COV-2: NOT DETECTED

## 2021-01-08 ENCOUNTER — ANESTHESIA (OUTPATIENT)
Dept: OPERATING ROOM | Age: 86
End: 2021-01-08
Payer: MEDICARE

## 2021-01-08 ENCOUNTER — ANESTHESIA EVENT (OUTPATIENT)
Dept: OPERATING ROOM | Age: 86
End: 2021-01-08
Payer: MEDICARE

## 2021-01-08 ENCOUNTER — HOSPITAL ENCOUNTER (OUTPATIENT)
Age: 86
Setting detail: OUTPATIENT SURGERY
Discharge: HOME | End: 2021-01-08
Attending: SPECIALIST | Admitting: SPECIALIST
Payer: MEDICARE

## 2021-01-08 VITALS
RESPIRATION RATE: 21 BRPM | SYSTOLIC BLOOD PRESSURE: 146 MMHG | DIASTOLIC BLOOD PRESSURE: 83 MMHG | OXYGEN SATURATION: 94 %

## 2021-01-08 VITALS
SYSTOLIC BLOOD PRESSURE: 128 MMHG | HEART RATE: 92 BPM | DIASTOLIC BLOOD PRESSURE: 82 MMHG | WEIGHT: 160.8 LBS | RESPIRATION RATE: 15 BRPM | HEIGHT: 65 IN | OXYGEN SATURATION: 94 % | TEMPERATURE: 96.9 F | BODY MASS INDEX: 26.79 KG/M2

## 2021-01-08 LAB — POC INR: 1 (ref 0.8–1.2)

## 2021-01-08 PROCEDURE — 2500000003 HC RX 250 WO HCPCS: Performed by: NURSE ANESTHETIST, CERTIFIED REGISTERED

## 2021-01-08 PROCEDURE — 2580000003 HC RX 258: Performed by: NURSE ANESTHETIST, CERTIFIED REGISTERED

## 2021-01-08 PROCEDURE — 6360000002 HC RX W HCPCS: Performed by: NURSE ANESTHETIST, CERTIFIED REGISTERED

## 2021-01-08 PROCEDURE — 7100000010 HC PHASE II RECOVERY - FIRST 15 MIN: Performed by: SPECIALIST

## 2021-01-08 PROCEDURE — 3700000000 HC ANESTHESIA ATTENDED CARE: Performed by: SPECIALIST

## 2021-01-08 PROCEDURE — 2500000003 HC RX 250 WO HCPCS: Performed by: SPECIALIST

## 2021-01-08 PROCEDURE — 3600000002 HC SURGERY LEVEL 2 BASE: Performed by: SPECIALIST

## 2021-01-08 PROCEDURE — 6370000000 HC RX 637 (ALT 250 FOR IP): Performed by: SPECIALIST

## 2021-01-08 PROCEDURE — 2709999900 HC NON-CHARGEABLE SUPPLY: Performed by: SPECIALIST

## 2021-01-08 PROCEDURE — 3700000001 HC ADD 15 MINUTES (ANESTHESIA): Performed by: SPECIALIST

## 2021-01-08 PROCEDURE — 7100000011 HC PHASE II RECOVERY - ADDTL 15 MIN: Performed by: SPECIALIST

## 2021-01-08 PROCEDURE — 3600000012 HC SURGERY LEVEL 2 ADDTL 15MIN: Performed by: SPECIALIST

## 2021-01-08 RX ORDER — SODIUM CHLORIDE 9 MG/ML
INJECTION, SOLUTION INTRAVENOUS
Status: DISCONTINUED | OUTPATIENT
Start: 2021-01-08 | End: 2021-01-08 | Stop reason: HOSPADM

## 2021-01-08 RX ORDER — CEFAZOLIN SODIUM 1 G/3ML
INJECTION, POWDER, FOR SOLUTION INTRAMUSCULAR; INTRAVENOUS PRN
Status: DISCONTINUED | OUTPATIENT
Start: 2021-01-08 | End: 2021-01-08 | Stop reason: SDUPTHER

## 2021-01-08 RX ORDER — SODIUM CHLORIDE 9 MG/ML
INJECTION, SOLUTION INTRAVENOUS CONTINUOUS PRN
Status: DISCONTINUED | OUTPATIENT
Start: 2021-01-08 | End: 2021-01-08 | Stop reason: SDUPTHER

## 2021-01-08 RX ORDER — LIDOCAINE HYDROCHLORIDE 20 MG/ML
INJECTION, SOLUTION EPIDURAL; INFILTRATION; INTRACAUDAL; PERINEURAL PRN
Status: DISCONTINUED | OUTPATIENT
Start: 2021-01-08 | End: 2021-01-08 | Stop reason: SDUPTHER

## 2021-01-08 RX ORDER — HYDROCODONE BITARTRATE AND ACETAMINOPHEN 5; 325 MG/1; MG/1
1 TABLET ORAL ONCE
Status: COMPLETED | OUTPATIENT
Start: 2021-01-08 | End: 2021-01-08

## 2021-01-08 RX ORDER — METOPROLOL TARTRATE 5 MG/5ML
INJECTION INTRAVENOUS PRN
Status: DISCONTINUED | OUTPATIENT
Start: 2021-01-08 | End: 2021-01-08 | Stop reason: SDUPTHER

## 2021-01-08 RX ORDER — CEFADROXIL 500 MG/1
500 CAPSULE ORAL 2 TIMES DAILY
COMMUNITY
End: 2021-04-20

## 2021-01-08 RX ORDER — FENTANYL CITRATE 50 UG/ML
INJECTION, SOLUTION INTRAMUSCULAR; INTRAVENOUS PRN
Status: DISCONTINUED | OUTPATIENT
Start: 2021-01-08 | End: 2021-01-08 | Stop reason: SDUPTHER

## 2021-01-08 RX ORDER — PROPOFOL 10 MG/ML
INJECTION, EMULSION INTRAVENOUS PRN
Status: DISCONTINUED | OUTPATIENT
Start: 2021-01-08 | End: 2021-01-08 | Stop reason: SDUPTHER

## 2021-01-08 RX ORDER — ONDANSETRON 2 MG/ML
INJECTION INTRAMUSCULAR; INTRAVENOUS PRN
Status: DISCONTINUED | OUTPATIENT
Start: 2021-01-08 | End: 2021-01-08 | Stop reason: SDUPTHER

## 2021-01-08 RX ORDER — PROPOFOL 10 MG/ML
INJECTION, EMULSION INTRAVENOUS CONTINUOUS PRN
Status: DISCONTINUED | OUTPATIENT
Start: 2021-01-08 | End: 2021-01-08 | Stop reason: SDUPTHER

## 2021-01-08 RX ORDER — LIDOCAINE HYDROCHLORIDE AND EPINEPHRINE BITARTRATE 20; .01 MG/ML; MG/ML
INJECTION, SOLUTION SUBCUTANEOUS PRN
Status: DISCONTINUED | OUTPATIENT
Start: 2021-01-08 | End: 2021-01-08 | Stop reason: ALTCHOICE

## 2021-01-08 RX ADMIN — ONDANSETRON HYDROCHLORIDE 4 MG: 4 INJECTION, SOLUTION INTRAMUSCULAR; INTRAVENOUS at 10:21

## 2021-01-08 RX ADMIN — CEFAZOLIN 2000 MG: 1 INJECTION, POWDER, FOR SOLUTION INTRAMUSCULAR; INTRAVENOUS; PARENTERAL at 09:31

## 2021-01-08 RX ADMIN — METOPROLOL TARTRATE 2 MG: 5 INJECTION, SOLUTION INTRAVENOUS at 09:53

## 2021-01-08 RX ADMIN — PROPOFOL 30 MG: 10 INJECTION, EMULSION INTRAVENOUS at 09:28

## 2021-01-08 RX ADMIN — PROPOFOL 50 MCG/KG/MIN: 10 INJECTION, EMULSION INTRAVENOUS at 09:31

## 2021-01-08 RX ADMIN — SODIUM CHLORIDE: 9 INJECTION, SOLUTION INTRAVENOUS at 09:21

## 2021-01-08 RX ADMIN — HYDROCODONE BITARTRATE AND ACETAMINOPHEN 1 TABLET: 5; 325 TABLET ORAL at 11:06

## 2021-01-08 RX ADMIN — LIDOCAINE HYDROCHLORIDE 40 MG: 20 INJECTION, SOLUTION EPIDURAL; INFILTRATION; INTRACAUDAL; PERINEURAL at 09:26

## 2021-01-08 RX ADMIN — PROPOFOL 40 MG: 10 INJECTION, EMULSION INTRAVENOUS at 09:26

## 2021-01-08 RX ADMIN — FENTANYL CITRATE 25 MCG: 50 INJECTION, SOLUTION INTRAMUSCULAR; INTRAVENOUS at 09:26

## 2021-01-08 ASSESSMENT — PULMONARY FUNCTION TESTS
PIF_VALUE: 0

## 2021-01-08 ASSESSMENT — PAIN DESCRIPTION - LOCATION: LOCATION: NOSE

## 2021-01-08 ASSESSMENT — PAIN SCALES - GENERAL
PAINLEVEL_OUTOF10: 6
PAINLEVEL_OUTOF10: 7

## 2021-01-08 ASSESSMENT — PAIN - FUNCTIONAL ASSESSMENT: PAIN_FUNCTIONAL_ASSESSMENT: 0-10

## 2021-01-08 NOTE — ANESTHESIA PRE PROCEDURE
Department of Anesthesiology  Preprocedure Note       Name:  Justin Levi   Age:  80 y.o.  :  1934                                          MRN:  [id number]         Date:  2021      Surgeon: Jesus Swann):  Chon Lopez MD    Procedure: Procedure(s):  MOHS REPAIR BCC NASAL TIP    Medications prior to admission:   Prior to Admission medications    Medication Sig Start Date End Date Taking? Authorizing Provider   cefadroxil (DURICEF) 500 MG capsule Take 500 mg by mouth 2 times daily   Yes Historical Provider, MD   propafenone (RYTHMOL) 150 MG tablet Take 1 tablet by mouth 2 times daily Take 300 mg of propafenone with 25 mg of metoprolol once and start 150 mg propafenone q 12 hours from next day 10/15/20  Yes Barbara Reid MD   warfarin (COUMADIN) 2.5 MG tablet 1. Take 2 tablets by mouth daily. Or as directed by INR results.  20  Yes Herlinda Roy MD   pantoprazole (PROTONIX) 40 MG tablet TAKE 1 TABLET EVERY DAY 20  Yes Herlinda Roy MD   metoprolol tartrate (LOPRESSOR) 25 MG tablet TAKE HALF OF A TABLET TWICE A DAY. 20  Yes Herlinda Roy MD   MEGARED OMEGA-3 KRILL  MG CAPS Take by mouth daily    Yes Historical Provider, MD   Handicap Placard [facility] by Does not apply route Expires 2022 6/15/17   Herlinda Roy MD   Multiple Vitamins-Minerals (THERAPEUTIC MULTIVITAMIN-MINERALS) tablet Take 1 tablet by mouth daily    Historical Provider, MD   Multiple Vitamins-Minerals (ICAPS AREDS 2) CAPS Take 2 tablets by mouth daily     Historical Provider, MD       Current medications:    Current Facility-Administered Medications   Medication Dose Route Frequency Provider Last Rate Last Admin    0.9 % sodium chloride infusion   Intravenous On Call to Portia Alex MD        ceFAZolin (ANCEF) 2 g in dextrose 5 % 50 mL IVPB  2 g Intravenous On Call to Portia Alex MD         Facility-Administered Medications Ordered in Other Encounters Medication Dose Route Frequency Provider Last Rate Last Admin    betamethasone acetate-betamethasone sodium phosphate (CELESTONE) injection 12 mg  12 mg Intramuscular Once Augustin Moritz, MD           Allergies:     Allergies   Allergen Reactions    Statins Other (See Comments)     Causes severe leg cramps     Tape Virl Poplin Tape] Other (See Comments)     \"sticks to skin\" causes redness    Tizanidine Hcl Other (See Comments)     Having issues with her liver- elevated her enzyme level    Tramadol Other (See Comments)     \"makes me Goofy\"       Problem List:    Patient Active Problem List   Diagnosis Code    Trochanteric bursitis of left hip M70.62    Drug-induced constipation K59.03    Bradycardia R00.1    Esophageal stricture K22.2    Gastritis without bleeding K29.70    Lumbar radicular pain M54.16    Lumbar spine pain M54.5    Left hip pain M25.552    Arthritis of left hip M16.12    Paroxysmal atrial fibrillation with rapid ventricular response (HCC) I48.0    Anticoagulation monitoring by pharmacist Z79.01    Venous stasis dermatitis of both lower extremities I87.2    Arthritis of right hand M19.041       Past Medical History:        Diagnosis Date    Actinic keratosis     history of    Arthritis     Atrial fibrillation ([address]     CAD (coronary artery disease)     history of afib    Cancer ([address]     skin    Cervical prolapse     history of    Diverticulosis     Endocervical polyp     history of    Heel spur     Hip pain     Hypertension     Nasal septal deviation     Osteoporosis     Stress incontinence     history of    Syncope     Vaginal prolapse     history of       Past Surgical History:        Procedure Laterality Date    APPENDECTOMY  1960's   Ja Real BLADDER REPAIR  2011    [address]  87/6135    [facility]    COLONOSCOPY  01/26/2000    ENDOSCOPIC ULTRASOUND (LOWER) Left 07/20/2017 ENDOSCOPIC ULTRASOUND performed by Yuly Yadav MD at [facility] cataract    JOINT REPLACEMENT  2017    left total hip relpament    PARTIAL HYSTERECTOMY  1960's    [place] EGD TRANSORAL BIOPSY SINGLE/MULTIPLE Left 07/16/2017    EGD BIOPSY performed [facility]av [facility]    [place] OFFICE/OUTPT VISIT,PROCEDURE ONLY Right 06/01/2018    MOHS REPAIR BASEL CELL CARCINOMA RIGHT DORSAL NOSE performed by Puneet Malik MD at [address] Left 09/19/2017    Left Total Hip ARTHROPLASTY performed by Alton Toledo MD at [address]  07/16/2017    UPPER GASTROINTESTINAL ENDOSCOPY Left 07/16/2017    EGD DILATION BALLOON performed by Yuly Yadav MD at [facility]       Social History:    Social History     Tobacco Use    Smoking status: Never Smoker    Smokeless tobacco: Never Used   Substance Use Topics    Alcohol use: No                                Counseling given: Not Answered      Vital Signs (Current):   Vitals:    12/30/20 1053 01/08/21 0742   BP:  123/80   Pulse:  106   Resp:  16   Temp:  97.2 °F (36.2 °C)   TempSrc:  Temporal   SpO2:  93%   Weight: 164 lb (74.4 kg) 160 lb 12.8 oz (72.9 kg)   Height: 5' 5\" (1.651 m) 5' 5\" (1.651 m)                                              BP Readings from Last 3 Encounters:   01/08/21 123/80   12/17/20 130/68   11/21/20 132/74       NPO Status: Time of last liquid consumption: 0630(sip of water 630)                        Time of last solid consumption: 2200                        Date of last liquid consumption: 01/08/21                        Date of last solid food consumption: 01/07/21    BMI:   Wt Readings from Last 3 Encounters:   01/08/21 160 lb 12.8 oz (72.9 kg)   12/17/20 164 lb (74.4 kg)   11/21/20 168 lb (76.2 kg)     Body mass index is 26.76 kg/m².     CBC:   Lab Results   Component Value Date    WBC 5.6 12/17/2020 RBC 5.09 12/17/2020    RBC 3.84 07/29/2011    HGB 13.8 12/17/2020    HCT 44.3 12/17/2020    MCV 87.0 12/17/2020    RDW 15.1 12/17/2020    PLT See Reflexed IPF Result 12/17/2020       CMP:   Lab Results   Component Value Date     12/17/2020    K 5.2 12/17/2020     12/17/2020    CO2 31 12/17/2020    BUN 17 12/17/2020    CREATININE 0.95 12/17/2020    GFRAA >60 12/17/2020    LABGLOM 56 12/17/2020    LABGLOM 80 07/16/2017    GLUCOSE 101 12/17/2020    PROT 7.2 01/13/2020    CALCIUM 9.7 12/17/2020    BILITOT 0.55 01/13/2020    ALKPHOS 85 01/13/2020    AST 26 01/13/2020    ALT 21 01/13/2020       POC Tests: No results for input(s): POCGLU, POCNA, POCK, POCCL, POCBUN, POCHEMO, POCHCT in the last 72 hours. Coags:   Lab Results   Component Value Date    PROTIME 22.7 12/17/2020    PROTIME 30.5 12/09/2020    INR 1.00 01/08/2021    INR 2.1 12/17/2020    APTT 37.8 12/17/2020       HCG (If Applicable): No results found for: PREGTESTUR, PREGSERUM, HCG, HCGQUANT     ABGs: No results found for: PHART, PO2ART, EJF1BTI, ZWE7YVL, BEART, M3EMLPAX     Type & Screen (If Applicable):  Lab Results   Component Value Date    LABRH NEG 07/27/2011       Drug/Infectious Status (If Applicable):  Lab Results   Component Value Date    HEPCAB Negative 07/16/2017       COVID-19 Screening (If Applicable):   Lab Results   Component Value Date    COVID19 NOT DETECTED 01/04/2021         Anesthesia Evaluation    Airway: Mallampati: II  TM distance: >3 FB   Neck ROM: full  Mouth opening: > = 3 FB Dental:          Pulmonary: breath sounds clear to auscultation                             Cardiovascular:    (+) hypertension:, CAD:,         Rhythm: regular                      Neuro/Psych:               GI/Hepatic/Renal:             Endo/Other:                     Abdominal:           Vascular:                                        Anesthesia Plan      MAC     ASA 3       Induction: intravenous.

## 2021-01-08 NOTE — OP NOTE
Operative Note    Patient name: Madison Nichols             Medical Record Number: [id number]    Primary Care Physician: Michael Freeman MD     1934    Date of Procedure: 2021    Pre-operative Diagnosis: 3cm2 complex defect of nasal tip with loss of right soft triangle    Post-operative Diagnosis: Same    Procedure Performed: Full thickness skin graft (3 cm2) and first stage formation with transfer of nasolabial pedicle flap to reconstruct the right nasal soft triangle (CPT 44645 & 55437)    Surgeons/Assistants: Dr. Sydni Fitzpatrick PA-C    Estimated Blood Loss: 5ml     Complications: none immediately appreciated    Procedure: With the patient lying in the supine position and under adequate anesthesia per the anesthesia team.   Local anesthesia consisting of 19 ml of 2% Lidocaine with epinephrine solution of the donor site of the right infraclavicular area as well as the nose and nasal labial fold. The area was prepped and draped in the standard surgical fashion. The patient has very thin skin and a large defect which could not be closed primarily, therefore a full thickness skin graft was taken. It was defatted and secured in position with a 4-0 silk suture tie and then a 5-0 fast absorbable suture was placed in a simple running fashion. A Bacitracin Xeroform and moist cotton ball tie over bolster was secured using the tails of the silk sutures. The donor site was closed with a 3-0 Monocryl suture placed in interrupted buried fashion and then Histoacryl respectively. To reconstruct the right soft triangle a right sided nasolabial fold pedicled flap was designed, elevated and rotated to the right soft triangle to support the overlying skin graft and fill the defect. This was attached with with 4-0 nylons with the donor site being closed with 4-0 Monocryl. The patient tolerated the procedure well and remained hemodynamically stable throughout the procedure and was quite comfortable throughout the operative course.     Clinical staging for cancer cases:  Ct  Cn  Cm    Nick Frost  Electronically signed by me on 1/8/2021 at 10:45 AM  Operative Note      Patient: Toña Taylor  YOB: 1934  MRN: [id number]    Date of Procedure: 1/8/2021    Pre-Op Diagnosis: BCC NASAL TIP    Post-Op Diagnosis: Same       Procedure(s):  MOHS REPAIR BCC NASAL TIP WITH FLAP AND GRAFT    Surgeon(s):  Nick Frost MD    Assistant:   Physician Assistant: Sujata Marc PA-C    Anesthesia: Monitor Anesthesia Care    Estimated Blood Loss (mL): Minimal    Complications: None    Specimens:   * No specimens in log *    Implants:

## 2021-01-08 NOTE — H&P
[address] [address]  History and Physical Update    Pt Name: Shruti Deleon  MRN: [id number]  YOB: 1934  Date of evaluation: 1/8/2021    I have examined the patient and reviewed the H&P/Consult and there are no changes to the patient or plans.       Kayli Mejia  Electronically signed 1/8/2021 at 6:44 AM

## 2021-01-19 ENCOUNTER — HOSPITAL ENCOUNTER (OUTPATIENT)
Dept: LAB | Age: 86
Discharge: HOME | End: 2021-01-19
Payer: MEDICARE

## 2021-01-19 DIAGNOSIS — Z13.220 SCREENING CHOLESTEROL LEVEL: ICD-10-CM

## 2021-01-19 DIAGNOSIS — Z13.6 SCREENING FOR ISCHEMIC HEART DISEASE: ICD-10-CM

## 2021-01-19 DIAGNOSIS — I48.0 PAROXYSMAL ATRIAL FIBRILLATION WITH RAPID VENTRICULAR RESPONSE (HCC): ICD-10-CM

## 2021-01-19 DIAGNOSIS — Z00.00 ROUTINE GENERAL MEDICAL EXAMINATION AT A HEALTH CARE FACILITY: ICD-10-CM

## 2021-01-19 LAB
ALBUMIN SERPL-MCNC: 4.4 G/DL (ref 3.5–5.2)
ALBUMIN/GLOBULIN RATIO: 1.5 (ref 1–2.5)
ALP BLD-CCNC: 104 U/L (ref 35–104)
ALT SERPL-CCNC: 31 U/L (ref 5–33)
ALT SERPL-CCNC: 31 U/L (ref 5–33)
ANION GAP SERPL CALCULATED.3IONS-SCNC: 10 MMOL/L (ref 9–17)
AST SERPL-CCNC: 28 U/L
AST SERPL-CCNC: 28 U/L
BILIRUB SERPL-MCNC: 0.63 MG/DL (ref 0.3–1.2)
BUN BLDV-MCNC: 17 MG/DL (ref 8–23)
BUN/CREAT BLD: 20 (ref 9–20)
CALCIUM SERPL-MCNC: 9.8 MG/DL (ref 8.6–10.4)
CHLORIDE BLD-SCNC: 100 MMOL/L (ref 98–107)
CHOLESTEROL/HDL RATIO: 3.7
CHOLESTEROL: 212 MG/DL
CO2: 29 MMOL/L (ref 20–31)
CREAT SERPL-MCNC: 0.87 MG/DL (ref 0.5–0.9)
GFR AFRICAN AMERICAN: >60 ML/MIN
GFR NON-AFRICAN AMERICAN: >60 ML/MIN
GFR SERPL CREATININE-BSD FRML MDRD: ABNORMAL ML/MIN/{1.73_M2}
GFR SERPL CREATININE-BSD FRML MDRD: ABNORMAL ML/MIN/{1.73_M2}
GLUCOSE BLD-MCNC: 101 MG/DL (ref 70–99)
HDLC SERPL-MCNC: 57 MG/DL
LDL CHOLESTEROL: 128 MG/DL (ref 0–130)
POTASSIUM SERPL-SCNC: 4.3 MMOL/L (ref 3.7–5.3)
SODIUM BLD-SCNC: 139 MMOL/L (ref 135–144)
TOTAL PROTEIN: 7.3 G/DL (ref 6.4–8.3)
TRIGL SERPL-MCNC: 135 MG/DL
VLDLC SERPL CALC-MCNC: ABNORMAL MG/DL (ref 1–30)

## 2021-01-19 PROCEDURE — 80053 COMPREHEN METABOLIC PANEL: CPT

## 2021-01-19 PROCEDURE — 84460 ALANINE AMINO (ALT) (SGPT): CPT

## 2021-01-19 PROCEDURE — 80061 LIPID PANEL: CPT

## 2021-01-19 PROCEDURE — 84450 TRANSFERASE (AST) (SGOT): CPT

## 2021-01-19 PROCEDURE — 36415 COLL VENOUS BLD VENIPUNCTURE: CPT

## 2021-01-21 ENCOUNTER — HOSPITAL ENCOUNTER (OUTPATIENT)
Age: 86
Discharge: HOME | End: 2021-01-21
Payer: MEDICARE

## 2021-01-21 ENCOUNTER — OFFICE VISIT (OUTPATIENT)
Dept: CARDIOLOGY | Age: 86
End: 2021-01-21
Payer: MEDICARE

## 2021-01-21 ENCOUNTER — OFFICE VISIT (OUTPATIENT)
Dept: OBGYN | Age: 86
End: 2021-01-21
Payer: MEDICARE

## 2021-01-21 ENCOUNTER — HOSPITAL ENCOUNTER (OUTPATIENT)
Dept: PHARMACY | Age: 86
Setting detail: THERAPIES SERIES
Discharge: HOME | End: 2021-01-21
Payer: MEDICARE

## 2021-01-21 VITALS
DIASTOLIC BLOOD PRESSURE: 67 MMHG | BODY MASS INDEX: 26.99 KG/M2 | HEIGHT: 65 IN | HEART RATE: 111 BPM | WEIGHT: 162 LBS | SYSTOLIC BLOOD PRESSURE: 124 MMHG

## 2021-01-21 VITALS
HEART RATE: 84 BPM | HEIGHT: 65 IN | DIASTOLIC BLOOD PRESSURE: 80 MMHG | SYSTOLIC BLOOD PRESSURE: 128 MMHG | RESPIRATION RATE: 20 BRPM | BODY MASS INDEX: 26.66 KG/M2 | WEIGHT: 160 LBS

## 2021-01-21 DIAGNOSIS — T83.89XA VAGINAL EROSION SECONDARY TO PESSARY USE, INITIAL ENCOUNTER (HCC): Primary | ICD-10-CM

## 2021-01-21 DIAGNOSIS — N95.0 POSTMENOPAUSAL BLEEDING: ICD-10-CM

## 2021-01-21 DIAGNOSIS — N81.4 PELVIC RELAXATION DUE TO UTEROVAGINAL PROLAPSE: ICD-10-CM

## 2021-01-21 DIAGNOSIS — I48.0 PAROXYSMAL ATRIAL FIBRILLATION WITH RAPID VENTRICULAR RESPONSE (HCC): ICD-10-CM

## 2021-01-21 DIAGNOSIS — I48.0 PAROXYSMAL ATRIAL FIBRILLATION WITH RAPID VENTRICULAR RESPONSE (HCC): Primary | ICD-10-CM

## 2021-01-21 DIAGNOSIS — Z79.01 ANTICOAGULATION MONITORING BY PHARMACIST: ICD-10-CM

## 2021-01-21 DIAGNOSIS — Z90.710 HISTORY OF HYSTERECTOMY: ICD-10-CM

## 2021-01-21 DIAGNOSIS — N89.8 VAGINAL EROSION SECONDARY TO PESSARY USE, INITIAL ENCOUNTER (HCC): Primary | ICD-10-CM

## 2021-01-21 LAB
INR BLD: 2.4
PROTIME: 28.6 SECONDS

## 2021-01-21 PROCEDURE — G8427 DOCREV CUR MEDS BY ELIG CLIN: HCPCS | Performed by: INTERNAL MEDICINE

## 2021-01-21 PROCEDURE — 93010 ELECTROCARDIOGRAM REPORT: CPT | Performed by: INTERNAL MEDICINE

## 2021-01-21 PROCEDURE — G8417 CALC BMI ABV UP PARAM F/U: HCPCS | Performed by: OBSTETRICS & GYNECOLOGY

## 2021-01-21 PROCEDURE — 1123F ACP DISCUSS/DSCN MKR DOCD: CPT | Performed by: INTERNAL MEDICINE

## 2021-01-21 PROCEDURE — 99213 OFFICE O/P EST LOW 20 MIN: CPT | Performed by: OBSTETRICS & GYNECOLOGY

## 2021-01-21 PROCEDURE — 4040F PNEUMOC VAC/ADMIN/RCVD: CPT | Performed by: INTERNAL MEDICINE

## 2021-01-21 PROCEDURE — G8484 FLU IMMUNIZE NO ADMIN: HCPCS | Performed by: OBSTETRICS & GYNECOLOGY

## 2021-01-21 PROCEDURE — 4040F PNEUMOC VAC/ADMIN/RCVD: CPT | Performed by: OBSTETRICS & GYNECOLOGY

## 2021-01-21 PROCEDURE — 1090F PRES/ABSN URINE INCON ASSESS: CPT | Performed by: OBSTETRICS & GYNECOLOGY

## 2021-01-21 PROCEDURE — 93005 ELECTROCARDIOGRAM TRACING: CPT | Performed by: INTERNAL MEDICINE

## 2021-01-21 PROCEDURE — 99214 OFFICE O/P EST MOD 30 MIN: CPT | Performed by: INTERNAL MEDICINE

## 2021-01-21 PROCEDURE — 85610 PROTHROMBIN TIME: CPT

## 2021-01-21 PROCEDURE — U0003 INFECTIOUS AGENT DETECTION BY NUCLEIC ACID (DNA OR RNA); SEVERE ACUTE RESPIRATORY SYNDROME CORONAVIRUS 2 (SARS-COV-2) (CORONAVIRUS DISEASE [COVID-19]), AMPLIFIED PROBE TECHNIQUE, MAKING USE OF HIGH THROUGHPUT TECHNOLOGIES AS DESCRIBED BY CMS-2020-01-R: HCPCS

## 2021-01-21 PROCEDURE — 1123F ACP DISCUSS/DSCN MKR DOCD: CPT | Performed by: OBSTETRICS & GYNECOLOGY

## 2021-01-21 PROCEDURE — 99214 OFFICE O/P EST MOD 30 MIN: CPT

## 2021-01-21 PROCEDURE — G8427 DOCREV CUR MEDS BY ELIG CLIN: HCPCS | Performed by: OBSTETRICS & GYNECOLOGY

## 2021-01-21 PROCEDURE — G8484 FLU IMMUNIZE NO ADMIN: HCPCS | Performed by: INTERNAL MEDICINE

## 2021-01-21 PROCEDURE — G8417 CALC BMI ABV UP PARAM F/U: HCPCS | Performed by: INTERNAL MEDICINE

## 2021-01-21 PROCEDURE — 99211 OFF/OP EST MAY X REQ PHY/QHP: CPT

## 2021-01-21 PROCEDURE — 1036F TOBACCO NON-USER: CPT | Performed by: INTERNAL MEDICINE

## 2021-01-21 PROCEDURE — 1036F TOBACCO NON-USER: CPT | Performed by: OBSTETRICS & GYNECOLOGY

## 2021-01-21 PROCEDURE — 1090F PRES/ABSN URINE INCON ASSESS: CPT | Performed by: INTERNAL MEDICINE

## 2021-01-21 PROCEDURE — 36416 COLLJ CAPILLARY BLOOD SPEC: CPT

## 2021-01-21 RX ORDER — CLOTRIMAZOLE AND BETAMETHASONE DIPROPIONATE 10; .64 MG/G; MG/G
CREAM TOPICAL
Qty: 1 TUBE | Refills: 0 | Status: SHIPPED | OUTPATIENT
Start: 2021-01-21 | End: 2021-08-27

## 2021-01-21 RX ORDER — OXYQUINOLINE/BORIC ACID 0.025 %
1 JELLY WITH APPLICATOR (GRAM) VAGINAL
Qty: 1 TUBE | Refills: 3 | Status: SHIPPED | OUTPATIENT
Start: 2021-01-21 | End: 2021-04-21

## 2021-01-21 NOTE — PROGRESS NOTES
Patient instructed not to eat or drink anything after midnight the day before surgery. Take heart & blood pressure medications in the morning with a small sip of water. Please bring list of medications with the dosages & when you take them. If you do not  have a list bring the medications bottles with you. If having a MAC or general anesthetic you MUST have a . Bring photo ID & insurance information. Leave jewelry (watch ,rings, peircings) & other valuables, including extra cash, at home. Wear comfortable clean clothing. Instructed to call surgery center at [phone] upon arrival to speak with  before entering building.   Covid screen 0121/2021    Covid screening questionnaire complete and negative for symptoms or exposure see chart for documentation

## 2021-01-21 NOTE — PROGRESS NOTES
Today's Date: 1/21/2021  Patient Name: Jaylon Salgado  Patient's age: 80 y.o., 1934      HPI:   The patient is a 80 y.o.  female is in the office for work in appointment. She is feeling fine and denies any cp, dyspnea, decline in her functional capacity  Last office visit she was in a-fib and converted back to sinus rhythm with pill in the pocket dose of Rythmol. She was in nsr most of time but today she is in Atrial flutter     Past Medical History:   has a past medical history of Actinic keratosis, Arthritis, Atrial fibrillation ([facility] [address] CAD (coronary artery disease), Cancer ([facility] [address] Cervical prolapse, Diverticulosis, Endocervical polyp, Heel spur, Hip pain, Hypertension, Nasal septal deviation, Osteoporosis, Stress incontinence, Syncope, and Vaginal prolapse. Past Surgical History:   has a past surgical history that includes Colonoscopy (01/26/2000); Cholecystectomy (11/1976); Upper gastrointestinal endoscopy (07/16/2017); pr egd transoral biopsy single/multiple (Left, 07/16/2017); Upper gastrointestinal endoscopy (Left, 07/16/2017); Endoscopic ultrasonography, GI (Left, 07/20/2017); eye surgery; Total hip arthroplasty (Left, 09/19/2017); bladder repair (2011); partial hysterectomy (cervix not removed) (1960's); Appendectomy (1960's); skin biopsy; pr office/outpt visit,procedure only (Right, 06/01/2018); joint replacement (2017); and Mohs surgery (N/A, 1/8/2021). Home Medications:    Prior to Admission medications    Medication Sig Start Date End Date Taking? Authorizing Provider   oxyquinolone (TRIMO-COLLAZO) 0.025 % GEL Place 1 applicator vaginally Twice a Week 1/21/21 4/21/21 Yes Radha Quezada MD   clotrimazole-betamethasone (LOTRISONE) 1-0.05 % cream Apply a thin film to the affected area 2 times daily.  1/21/21  Yes Radha Quezada MD   cefadroxil (DURICEF) 500 MG capsule Take 500 mg by mouth 2 times daily   Yes Historical Provider, MD   propafenone (RYTHMOL) 150 MG tablet Take 1 tablet by mouth 2 times daily Take 300 mg of propafenone with 25 mg of metoprolol once and start 150 mg propafenone q 12 hours from next day 10/15/20  Yes Rodrigo Doty MD   warfarin (COUMADIN) 2.5 MG tablet 1. Take 2 tablets by mouth daily. Or as directed by INR results. 8/21/20  Yes Michael Freeman MD   pantoprazole (PROTONIX) 40 MG tablet TAKE 1 TABLET EVERY DAY 2/20/20  Yes Michael Freeman MD   metoprolol tartrate (LOPRESSOR) 25 MG tablet TAKE HALF OF A TABLET TWICE A DAY. 2/20/20  Yes Michael Freeman MD   Handicap Placard MISC by Does not apply route Expires 6/2022 6/15/17  Yes Michael Freeman MD   MEGARED OMEGA-3 KRILL  MG CAPS Take by mouth daily    Yes Historical Provider, MD   Multiple Vitamins-Minerals (THERAPEUTIC MULTIVITAMIN-MINERALS) tablet Take 1 tablet by mouth daily   Yes Historical Provider, MD   Multiple Vitamins-Minerals (ICAPS AREDS 2) CAPS Take 2 tablets by mouth daily    Yes Historical Provider, MD       Allergies:  Statins, Tape [adhesive tape], Tizanidine hcl, and Tramadol    Social History:   reports that she has never smoked. She has never used smokeless tobacco. She reports that she does not drink alcohol or use drugs. REVIEW OF SYSTEMS:  CONSTITUTIONAL:NEGATIVE  HEENT:NEG  Cardiovascular: No chest pain, No dyspnea on exertion, No palpitations. Lower extremity edema: No  RESPIRATORY: neg  GASTROINTESTINAL:  negative  GENITOURINARY:  negative  INTEGUMENT:  negative  MUSCULOSKELETAL:  positive for  pain  NEUROLOGICAL:  negative    PHYSICAL EXAM:      BP (!) 179/82   Pulse 111   Ht 5' 5\" (1.651 m)   Wt 162 lb (73.5 kg)   BMI 26.96 kg/m²    HEENT: PERRL, no cervical lymphadenopathy. No masses palpable. Cardiovascular:  · The apical impulse is not displaced  · Heart  Sounds: Irregularly irregular.   No murmur or gallop  · Jugular venous pulsation Normal  · The carotid upstroke is normal  · Peripheral pulses are symmetrical and full  Respiratory: Good respiratory effort. On auscultation: clear to auscultation bilaterally  Abdomen:  · No masses or tenderness  · Bowel sounds present  Extremities:  ·  No Cyanosis or Clubbing  ·  Lower extremity edema: No  Skin: Warm and dry    Cardiac data:      ECG 1/21/21: Atrial flutter with variable av conduction, nonspecific ST-T abnormality      Labs:   PT/INR:   Recent Labs     01/21/21  1410   PROTIME 28.6   INR 2.4     FASTING LIPID PANEL:  Lab Results   Component Value Date    HDL 57 01/19/2021    TRIG 135 01/19/2021     Echocardiogram 7/2017:  Normal left ventricle size and systolic function. Ejection fraction was estimated at 60%. There were no regional left ventricular wall motion abnormalities and wall thickness was within normal limits. There was mild aortic regurgitation. There was mild mitral regurgitation. There was mild tricuspid regurgitation. Nuclear stress test 7/2017:   [facility] EKG stress test is not suggestive for ischemia. This Nuclear Medicine study was negative for ischemia. Assessment:    · PAF, in atrial flutter today, rate controlled  · MILD MR  · MILD AI  · A/C WITH COUMADIN  · Preserved LV systolic function  · No ischemia or infarction on nuclear stress test 2017.       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

## 2021-01-21 NOTE — PROGRESS NOTES
ANTICOAGULATION SERVICE    Date of Clinic Visit:  1/21/2021    Estephanie Monzon is a 80 y.o. female who presents to clinic today for anticoagulation monitoring and adjustment. Recent INR Results:  Internal QC passed  Lab Results   Component Value Date    INR 2.4 01/21/2021    INR 1.00 01/08/2021       Current Warfarin Dosage:  Dosing Plan  As of 1/21/2021    TTR:  87.3 % (3.2 y)   Full warfarin instructions:  1/22: Hold; 1/23: Hold; 1/24: Hold; 1/25: Hold; 1/26: Hold; 1/27: Hold; 1/28: 7.5 mg; 1/29: 7.5 mg; 1/30: 7.5 mg; Otherwise 5 mg every day               Assessment/Plan:    Continue current regimen as INR remains stable. Ivelisse Quiroz is having another procedure on 1/27/21. Will hold x 5 days prior. Recheck 10-14 days after restarting    Next Clinic Appointment:  Return date  As of 1/21/2021    TTR:  87.3 % (3.2 y)   Next INR check:  2/10/2021             Please call [facility] at [phone] with any questions. Thanks! Lisa Arriaza, [facility]  Anticoagulation Service Pharmacist  1/21/2021 2:13 PM    CLINICAL PHARMACY CONSULT: MED RECONCILIATION/REVIEW ADDENDUM    For Pharmacy Admin Tracking Only    PHSO: Yes  Total # of Interventions Recommended: 0    - Maintenance Safety Lab Monitoring #: 1  Recommended intervention potential cost savings:   Accepted intervention potential cost savings:    Total Interventions Accepted: 0  Time Spent (min): 15    Lisa Arriaza, [address] [place] [address]

## 2021-01-21 NOTE — PROGRESS NOTES
Pessary Cleaning    Chance Licona  1/21/2021  1:29 PM  Chief Complaint   Patient presents with    Procedure     having bleeding with the pessary     Allergies   Allergen Reactions    Statins Other (See Comments)     Causes severe leg cramps     Tape Glenice Dinesh Tape] Other (See Comments)     \"sticks to skin\" causes redness    Tizanidine Hcl Other (See Comments)     Having issues with her liver- elevated her enzyme level    Tramadol Other (See Comments)     \"makes me Goofy\"     Past Medical History:   Diagnosis Date    Actinic keratosis     history of    Arthritis     Atrial fibrillation ([address]     CAD (coronary artery disease)     history of afib    Cancer ([address]     skin    Cervical prolapse     history of    Diverticulosis     Endocervical polyp     history of    Heel spur     Hip pain     Hypertension     Nasal septal deviation     Osteoporosis     Stress incontinence     history of    Syncope     Vaginal prolapse     history of     Past Surgical History:   Procedure Laterality Date    APPENDECTOMY  1960's   [place] [employer] BLADDER REPAIR  2011    [address]  79/6429    [facility]    COLONOSCOPY  01/26/2000    ENDOSCOPIC ULTRASOUND (LOWER) Left 07/20/2017    ENDOSCOPIC ULTRASOUND performed by Kym Coley MD at /[facility] cataract    JOINT REPLACEMENT  2017    left total hip relpament    MOHS SURGERY N/A 1/8/2021    MOHS REPAIR BCC NASAL TIP WITH FLAP AND GRAFT performed by Vikas Radford MD at [address]  1960's    NH EGD TRANSORAL BIOPSY SINGLE/MULTIPLE Left 07/16/2017    EGD BIOPSY performed by Kym Coley MD at [facility]    NH OFFICE/OUTPT [address] Right 06/01/2018    MOHS REPAIR BASEL CELL CARCINOMA RIGHT DORSAL NOSE performed by Vikas Radford MD at [address] [facility] Left 09/19/2017 Left Total Hip ARTHROPLASTY performed by Brigida Isaacs MD at [address] [place] [address]  07/16/2017    UPPER GASTROINTESTINAL ENDOSCOPY Left 07/16/2017    EGD DILATION BALLOON performed by Bibi Torres MD at [facility]     Family History   Problem Relation Age of Onset    Colon Cancer Father     Osteoporosis Mother     Heart Disease Mother       reports that she has never smoked. She has never used smokeless tobacco. She reports that she does not drink alcohol or use drugs. Current Outpatient Medications:     oxyquinolone (TRIMO-COLLAZO) 0.025 % GEL, Place 1 applicator vaginally Twice a Week, Disp: 1 Tube, Rfl: 3    clotrimazole-betamethasone (LOTRISONE) 1-0.05 % cream, Apply a thin film to the affected area 2 times daily. , Disp: 1 Tube, Rfl: 0    cefadroxil (DURICEF) 500 MG capsule, Take 500 mg by mouth 2 times daily, Disp: , Rfl:     propafenone (RYTHMOL) 150 MG tablet, Take 1 tablet by mouth 2 times daily Take 300 mg of propafenone with 25 mg of metoprolol once and start 150 mg propafenone q 12 hours from next day, Disp: 90 tablet, Rfl: 3    warfarin (COUMADIN) 2.5 MG tablet, 1. Take 2 tablets by mouth daily. Or as directed by INR results. , Disp: 180 tablet, Rfl: 3    pantoprazole (PROTONIX) 40 MG tablet, TAKE 1 TABLET EVERY DAY, Disp: 90 tablet, Rfl: 3    metoprolol tartrate (LOPRESSOR) 25 MG tablet, TAKE HALF OF A TABLET TWICE A DAY., Disp: 90 tablet, Rfl: 2    Handicap Placard MISC, by Does not apply route Expires 6/2022, Disp: 1 each, Rfl: 0    MEGARED OMEGA-3 KRILL  MG CAPS, Take by mouth daily , Disp: , Rfl:     Multiple Vitamins-Minerals (THERAPEUTIC MULTIVITAMIN-MINERALS) tablet, Take 1 tablet by mouth daily, Disp: , Rfl:     Multiple Vitamins-Minerals (ICAPS AREDS 2) CAPS, Take 2 tablets by mouth daily , Disp: , Rfl:           Review of Systems   Genitourinary: Positive for vaginal bleeding. All other systems reviewed and are negative.       Physical Exam Vitals signs and nursing note reviewed. Exam conducted with a chaperone present. Constitutional:       General: She is not in acute distress. Appearance: Normal appearance. She is not ill-appearing. HENT:      Head: Normocephalic. Eyes:      Conjunctiva/sclera: Conjunctivae normal.      Pupils: Pupils are equal, round, and reactive to light. Neck:      Musculoskeletal: Normal range of motion and neck supple. Pulmonary:      Effort: Pulmonary effort is normal. No respiratory distress. Abdominal:      Palpations: Abdomen is soft. Tenderness: There is no abdominal tenderness. Genitourinary:     Vagina: No vaginal discharge. Musculoskeletal: Normal range of motion. General: No deformity. Skin:     General: Skin is warm. Neurological:      General: No focal deficit present. Mental Status: She is alert and oriented to person, place, and time. Psychiatric:         Mood and Affect: Mood normal.         Behavior: Behavior normal.       Tyler Lutz is a 80 y.o. female       The patient is being seen today for pessary cleaning/maintenance. She has been fitted for a # 4; Ring type pessary. Checkup on that ring pessary was 3 years ago since then she never had any checkups on it she been having some bleeding from there. Examination today shows a slight reddish vulva on the outside especially on the right side up till the groin and vaginally the pessary was removed and cleaned and replaced while the vaginal walls looks healthy with slight redness but no lesions. /80 (Site: Right Upper Arm, Position: Sitting, Cuff Size: Medium Adult)   Pulse 84   Resp 20   Ht 5' 5\" (1.651 m)   Wt 160 lb (72.6 kg)   BMI 26.63 kg/m²       Abdomen: Soft and non-tender; good bowel sounds; no guarding, rebound or rigidity; no CVA tenderness bilaterally. Extremities: No calf tenderness bilaterally. DTR 2/4 bilaterally. No edema. Perineum/Speculum: There is any signs of infection; The vaginal vault is without any signs of erythema or erosion. There is not vaginal discharge or odor appreciated. The pessary was cleansed and replaced without any problems and the patient tolerated the procedure well. Lidocaine 2% gel was used to help with patient discomfort during removal and placement. Trimosan gel was placed with the pessary to decrease discharge/odor. Assessment:   Diagnosis Orders   1. Vaginal erosion secondary to pessary use, initial encounter ([facility] [address]     2. Pelvic relaxation due to uterovaginal prolapse     3. History of hysterectomy     4. 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 01/21/2021    Postmenopausal bleeding 01/21/2021    History of hysterectomy 01/21/2021    Arthritis of right hand 01/13/2020    Venous stasis dermatitis of both lower extremities 12/08/2017    Anticoagulation monitoring by pharmacist 10/19/2017    Paroxysmal atrial fibrillation with rapid ventricular response ([facility] [address] 09/21/2017    Lumbar radicular pain 08/03/2017    Lumbar spine pain 08/03/2017    Left hip pain 08/03/2017    Arthritis of left hip 08/03/2017    Esophageal stricture     Gastritis without bleeding     Bradycardia     Drug-induced constipation 06/15/2017    Trochanteric bursitis of left hip 05/22/2017         Plan:  1. Return to the office 12-16 weeks  2. Report any vaginal bleeding or discharge  3. Treat the patient for yeast infection with external irritation and to use the Trimo-Gregory cream twice weekly. Patient was seen with total face to face time of 20 minutes.  More than 50% of this visit was on counseling and education regarding her    Diagnosis Orders 1. Vaginal erosion secondary to pessary use, initial encounter ([facility] [address]     2. Pelvic relaxation due to uterovaginal prolapse     3. History of hysterectomy     4. Postmenopausal bleeding      and her options. She was also counseled on her preventative health maintenance recommendations and follow-up.

## 2021-01-21 NOTE — PROGRESS NOTES
In preparation for their surgical procedure above patient was screened for Obstructive Sleep Apnea (DOMI) using the STOP-Bang Questionnaire by the Pre-Admission Testing department. This is a pre-surgical screening tool for patient safety and serves as a recommendation, this WILL NOT cause cancellation of surgery. STOP-Bang Questionnaire  * Do you currently see a pulmonologist?  No      1. Do you snore loudly (able to be heard in the next room)? No    2. Do you often feel tired or sleepy during the daytime? No       3. Has anyone ever told you that you stop breathing during your sleep? No    4. Do you have or are you being treated for high blood pressure? Yes      5. BMI more than 35? No    6. Age over 48 years? 80 y.o. Yes    7. Neck Circumference greater than 17 inches for male or 16 inches for female? Measured           (visits only)            Not Applicable    8. Gender Male? No      TOTAL SCORE: 2    DOMI - Low Risk : Yes to 0 - 2 questions  DOMI - Intermediate Risk : Yes to 3 - 4 questions  DOMI - High Risk : Yes to 5 - 8 questions    Adapted from:   STOP Questionnaire: A Tool to Screen Patients for Obstructive Sleep Apnea   MINA Fleming.C.P.C., Teressa Lees M.B.B.S., Yoni Montes M.D., Eliana Lopez. Angeline Carlisle, Ph.D., Robert Mendez M.B.B.S., RACIEL Green.Sc., Susan Bonilla M.D., [facility]. ALANA Marie.P.C.    Anesthesiology 2008; [id number] Copyright 2008, the [address] [employer], [place] [address].   ----------------------------------------------------------------------------------------------------------------

## 2021-01-23 LAB — SARS-COV-2: NOT DETECTED

## 2021-01-27 ENCOUNTER — ANESTHESIA EVENT (OUTPATIENT)
Dept: OPERATING ROOM | Age: 86
End: 2021-01-27
Payer: MEDICARE

## 2021-01-27 ENCOUNTER — ANESTHESIA (OUTPATIENT)
Dept: OPERATING ROOM | Age: 86
End: 2021-01-27
Payer: MEDICARE

## 2021-01-27 ENCOUNTER — HOSPITAL ENCOUNTER (OUTPATIENT)
Age: 86
Setting detail: OUTPATIENT SURGERY
Discharge: HOME | End: 2021-01-27
Attending: SPECIALIST | Admitting: SPECIALIST
Payer: MEDICARE

## 2021-01-27 VITALS
BODY MASS INDEX: 26.96 KG/M2 | SYSTOLIC BLOOD PRESSURE: 140 MMHG | RESPIRATION RATE: 12 BRPM | HEART RATE: 65 BPM | WEIGHT: 161.8 LBS | OXYGEN SATURATION: 96 % | HEIGHT: 65 IN | TEMPERATURE: 97.4 F | DIASTOLIC BLOOD PRESSURE: 66 MMHG

## 2021-01-27 VITALS
DIASTOLIC BLOOD PRESSURE: 52 MMHG | RESPIRATION RATE: 9 BRPM | SYSTOLIC BLOOD PRESSURE: 93 MMHG | OXYGEN SATURATION: 99 %

## 2021-01-27 PROCEDURE — 2500000003 HC RX 250 WO HCPCS: Performed by: SPECIALIST

## 2021-01-27 PROCEDURE — 3700000001 HC ADD 15 MINUTES (ANESTHESIA): Performed by: SPECIALIST

## 2021-01-27 PROCEDURE — 7100000011 HC PHASE II RECOVERY - ADDTL 15 MIN: Performed by: SPECIALIST

## 2021-01-27 PROCEDURE — 3700000000 HC ANESTHESIA ATTENDED CARE: Performed by: SPECIALIST

## 2021-01-27 PROCEDURE — 6360000002 HC RX W HCPCS: Performed by: NURSE ANESTHETIST, CERTIFIED REGISTERED

## 2021-01-27 PROCEDURE — 7100000001 HC PACU RECOVERY - ADDTL 15 MIN: Performed by: SPECIALIST

## 2021-01-27 PROCEDURE — 2709999900 HC NON-CHARGEABLE SUPPLY: Performed by: SPECIALIST

## 2021-01-27 PROCEDURE — 2500000003 HC RX 250 WO HCPCS: Performed by: NURSE ANESTHETIST, CERTIFIED REGISTERED

## 2021-01-27 PROCEDURE — 6360000002 HC RX W HCPCS: Performed by: SPECIALIST

## 2021-01-27 PROCEDURE — 7100000000 HC PACU RECOVERY - FIRST 15 MIN: Performed by: SPECIALIST

## 2021-01-27 PROCEDURE — 3600000002 HC SURGERY LEVEL 2 BASE: Performed by: SPECIALIST

## 2021-01-27 PROCEDURE — 2580000003 HC RX 258: Performed by: NURSE ANESTHETIST, CERTIFIED REGISTERED

## 2021-01-27 PROCEDURE — 3600000012 HC SURGERY LEVEL 2 ADDTL 15MIN: Performed by: SPECIALIST

## 2021-01-27 PROCEDURE — 7100000010 HC PHASE II RECOVERY - FIRST 15 MIN: Performed by: SPECIALIST

## 2021-01-27 RX ORDER — SODIUM CHLORIDE 9 MG/ML
INJECTION, SOLUTION INTRAVENOUS CONTINUOUS PRN
Status: DISCONTINUED | OUTPATIENT
Start: 2021-01-27 | End: 2021-01-27 | Stop reason: SDUPTHER

## 2021-01-27 RX ORDER — FENTANYL CITRATE 50 UG/ML
INJECTION, SOLUTION INTRAMUSCULAR; INTRAVENOUS PRN
Status: DISCONTINUED | OUTPATIENT
Start: 2021-01-27 | End: 2021-01-27 | Stop reason: SDUPTHER

## 2021-01-27 RX ORDER — DIPHENHYDRAMINE HYDROCHLORIDE 50 MG/ML
12.5 INJECTION INTRAMUSCULAR; INTRAVENOUS
Status: DISCONTINUED | OUTPATIENT
Start: 2021-01-27 | End: 2021-01-27 | Stop reason: HOSPADM

## 2021-01-27 RX ORDER — MEPERIDINE HYDROCHLORIDE 25 MG/ML
12.5 INJECTION INTRAMUSCULAR; INTRAVENOUS; SUBCUTANEOUS EVERY 5 MIN PRN
Status: DISCONTINUED | OUTPATIENT
Start: 2021-01-27 | End: 2021-01-27 | Stop reason: HOSPADM

## 2021-01-27 RX ORDER — SODIUM CHLORIDE 9 MG/ML
INJECTION, SOLUTION INTRAVENOUS CONTINUOUS
Status: DISCONTINUED | OUTPATIENT
Start: 2021-01-27 | End: 2021-01-27 | Stop reason: HOSPADM

## 2021-01-27 RX ORDER — LABETALOL 20 MG/4 ML (5 MG/ML) INTRAVENOUS SYRINGE
5 4 TIMES DAILY PRN
Status: DISCONTINUED | OUTPATIENT
Start: 2021-01-27 | End: 2021-01-27 | Stop reason: HOSPADM

## 2021-01-27 RX ORDER — HYDRALAZINE HYDROCHLORIDE 20 MG/ML
5 INJECTION INTRAMUSCULAR; INTRAVENOUS EVERY 10 MIN PRN
Status: DISCONTINUED | OUTPATIENT
Start: 2021-01-27 | End: 2021-01-27 | Stop reason: HOSPADM

## 2021-01-27 RX ORDER — PHENYLEPHRINE HYDROCHLORIDE 10 MG/ML
INJECTION INTRAVENOUS PRN
Status: DISCONTINUED | OUTPATIENT
Start: 2021-01-27 | End: 2021-01-27 | Stop reason: SDUPTHER

## 2021-01-27 RX ORDER — LIDOCAINE HYDROCHLORIDE AND EPINEPHRINE BITARTRATE 20; .01 MG/ML; MG/ML
INJECTION, SOLUTION SUBCUTANEOUS PRN
Status: DISCONTINUED | OUTPATIENT
Start: 2021-01-27 | End: 2021-01-27 | Stop reason: ALTCHOICE

## 2021-01-27 RX ORDER — GLYCOPYRROLATE 1 MG/5 ML
SYRINGE (ML) INTRAVENOUS PRN
Status: DISCONTINUED | OUTPATIENT
Start: 2021-01-27 | End: 2021-01-27 | Stop reason: SDUPTHER

## 2021-01-27 RX ORDER — PROPOFOL 10 MG/ML
INJECTION, EMULSION INTRAVENOUS PRN
Status: DISCONTINUED | OUTPATIENT
Start: 2021-01-27 | End: 2021-01-27 | Stop reason: SDUPTHER

## 2021-01-27 RX ORDER — ONDANSETRON 2 MG/ML
4 INJECTION INTRAMUSCULAR; INTRAVENOUS
Status: DISCONTINUED | OUTPATIENT
Start: 2021-01-27 | End: 2021-01-27 | Stop reason: HOSPADM

## 2021-01-27 RX ORDER — FENTANYL CITRATE 50 UG/ML
50 INJECTION, SOLUTION INTRAMUSCULAR; INTRAVENOUS EVERY 5 MIN PRN
Status: DISCONTINUED | OUTPATIENT
Start: 2021-01-27 | End: 2021-01-27 | Stop reason: HOSPADM

## 2021-01-27 RX ORDER — ONDANSETRON 2 MG/ML
INJECTION INTRAMUSCULAR; INTRAVENOUS PRN
Status: DISCONTINUED | OUTPATIENT
Start: 2021-01-27 | End: 2021-01-27 | Stop reason: SDUPTHER

## 2021-01-27 RX ORDER — LIDOCAINE HYDROCHLORIDE 20 MG/ML
INJECTION, SOLUTION EPIDURAL; INFILTRATION; INTRACAUDAL; PERINEURAL PRN
Status: DISCONTINUED | OUTPATIENT
Start: 2021-01-27 | End: 2021-01-27 | Stop reason: SDUPTHER

## 2021-01-27 RX ADMIN — CEFAZOLIN 2 G: 10 INJECTION, POWDER, FOR SOLUTION INTRAVENOUS at 09:37

## 2021-01-27 RX ADMIN — SODIUM CHLORIDE: 9 INJECTION, SOLUTION INTRAVENOUS at 09:35

## 2021-01-27 RX ADMIN — PROPOFOL 200 MG: 10 INJECTION, EMULSION INTRAVENOUS at 09:35

## 2021-01-27 RX ADMIN — SODIUM CHLORIDE: 9 INJECTION, SOLUTION INTRAVENOUS at 10:08

## 2021-01-27 RX ADMIN — FENTANYL CITRATE 50 MCG: 50 INJECTION, SOLUTION INTRAMUSCULAR; INTRAVENOUS at 10:08

## 2021-01-27 RX ADMIN — PHENYLEPHRINE HYDROCHLORIDE 100 MCG: 10 INJECTION INTRAVENOUS at 09:51

## 2021-01-27 RX ADMIN — Medication 0.4 MG: at 09:50

## 2021-01-27 RX ADMIN — ONDANSETRON HYDROCHLORIDE 4 MG: 4 INJECTION, SOLUTION INTRAMUSCULAR; INTRAVENOUS at 10:18

## 2021-01-27 RX ADMIN — LIDOCAINE HYDROCHLORIDE 100 MG: 20 INJECTION, SOLUTION EPIDURAL; INFILTRATION; INTRACAUDAL; PERINEURAL at 09:35

## 2021-01-27 ASSESSMENT — PULMONARY FUNCTION TESTS
PIF_VALUE: 9
PIF_VALUE: 10
PIF_VALUE: 2
PIF_VALUE: 9
PIF_VALUE: 6
PIF_VALUE: 2
PIF_VALUE: 6
PIF_VALUE: 25
PIF_VALUE: 5
PIF_VALUE: 30
PIF_VALUE: 12
PIF_VALUE: 9
PIF_VALUE: 0
PIF_VALUE: 21
PIF_VALUE: 6
PIF_VALUE: 5
PIF_VALUE: 9
PIF_VALUE: 5
PIF_VALUE: 9
PIF_VALUE: 5
PIF_VALUE: 1
PIF_VALUE: 5
PIF_VALUE: 9
PIF_VALUE: 14
PIF_VALUE: 2
PIF_VALUE: 5
PIF_VALUE: 0
PIF_VALUE: 5
PIF_VALUE: 5
PIF_VALUE: 1
PIF_VALUE: 9

## 2021-01-27 ASSESSMENT — PAIN SCALES - GENERAL: PAINLEVEL_OUTOF10: 2

## 2021-01-27 ASSESSMENT — PAIN DESCRIPTION - ORIENTATION: ORIENTATION: RIGHT

## 2021-01-27 ASSESSMENT — PAIN DESCRIPTION - PAIN TYPE: TYPE: ACUTE PAIN

## 2021-01-27 ASSESSMENT — PAIN DESCRIPTION - LOCATION: LOCATION: FACE

## 2021-01-27 NOTE — H&P
[address] [address]  History and Physical Update    Pt Name: Kristina Espinoza  MRN: [id number]  YOB: 1934  Date of evaluation: 1/27/2021    I have examined the patient and reviewed the H&P/Consult and there are no changes to the patient or plans.       James Mccurdy  Electronically signed 1/27/2021 at 7:02 AM

## 2021-01-27 NOTE — ANESTHESIA POSTPROCEDURE EVALUATION
Department of Anesthesiology  Postprocedure Note    Patient: Rashida Blankenship  MRN: [id number]  YOB: 1934  Date of evaluation: 1/27/2021  Time:  12:59 PM     Procedure Summary     Date: 01/27/21 Room / Location: [address] [place] [address]    Anesthesia Start: 3266 Anesthesia Stop: 5123    Procedure: DIVISION AND INSET/CHEEK (N/A ) Diagnosis: (BCC NASAL TIP)    Surgeons: Donald Bazzi MD Responsible Provider: Vik Pride MD    Anesthesia Type: general ASA Status: 4          Anesthesia Type: general    Koko Phase I: Koko Score: 9    Koko Phase II: Koko Score: 9    Last vitals: Reviewed and per EMR flowsheets.        Anesthesia Post Evaluation

## 2021-01-27 NOTE — OP NOTE
Operative Note    Patient name: Matilde Vigil             Medical Record Number: [id number]    Primary Care Physician: Carlene Gillespie MD     1934    Date of Procedure: 2021    Pre-operative Diagnosis: Staged nasal reconstruction for basal cell carcinoma    Post-operative Diagnosis: Same    Procedure Performed: Division and inset of right nasal tip/soft triangle flap (CPT 14779)    Surgeons/Assistants: Dr. Adin Mariscal PA-C    Estimated Blood Loss: 3ml     Complications: none immediately appreciated    Procedure: With the patient lying in the supine position and under adequate anesthesia per the anesthesia team, the area was then prepped and draped in the standard surgical fashion. The distal portion of the flap that supported the grafting of the soft triangle/tip was divided and noted to have excellent back bleeding. This was contoured to the shape of the nose and inset with 4-0 chromic sutures. The nasolabial base flap was anesthetized with a total of 7 ml of 1% Lidocaine 1:100,000 with epinephrine solution, this was then excised to allow closure along the nasolabial fold with 4-0 Monocryl suture placed in interrupted buried fashion after the excess was excised/disguarded. Final closure was completed using 5-0 fast absorbing suture with Bacitracin. The patient tolerated the procedure quite well and remained hemodynamically stable throughout the procedure and was quite comfortable throughout the operative course.     Clinical staging for cancer cases:  Ct  Cn  Cm    Tuan Day  Electronically signed by me on 2021 at 10:14 AM  Operative Note      Patient: Matilde Vigil  YOB: 1934  MRN: [id number]    Date of Procedure: 2021    Pre-Op Diagnosis: BCC NASAL TIP    Post-Op Diagnosis: Same       Procedure(s):  DIVISION AND INSET/CHEEK    Surgeon(s):  Tuan Day MD    Assistant:   Physician Assistant: Fred Najera PA-C    Anesthesia: General Estimated Blood Loss (mL): Minimal    Complications: None    Specimens:   * No specimens in log *    Implants:  * No implants in log *      Drains: * No LDAs found *    Findings: Staged nasal reconstruction for basal cell carcinoma    Detailed Description of Procedure:   Division and inset of right nasal tip/soft triangle flap (CPT 43331)    Electronically signed by Ramírez Poon MD on 1/27/2021 at 10:14 AM

## 2021-01-27 NOTE — ANESTHESIA PRE PROCEDURE
Department of Anesthesiology  Preprocedure Note       Name:  Toña Taylor   Age:  80 y.o.  :  1934                                          MRN:  [id number]         Date:  2021      Surgeon: Natan Gonsalez):  Nick Frost MD    Procedure: Procedure(s):  DIVISION AND INSET/CHEEK    Medications prior to admission:   Prior to Admission medications    Medication Sig Start Date End Date Taking? Authorizing Provider   oxyquinolone (TRIMO-COLLAZO) 0.025 % GEL Place 1 applicator vaginally Twice a Week 21 Yes Radha Quezada MD   propafenone (RYTHMOL) 150 MG tablet Take 1 tablet by mouth 2 times daily Take 300 mg of propafenone with 25 mg of metoprolol once and start 150 mg propafenone q 12 hours from next day 10/15/20  Yes Di Blount MD   pantoprazole (PROTONIX) 40 MG tablet TAKE 1 TABLET EVERY DAY 20  Yes Margarette Bal MD   metoprolol tartrate (LOPRESSOR) 25 MG tablet TAKE HALF OF A TABLET TWICE A DAY. 20  Yes Margarette Bal MD   clotrimazole-betamethasone (LOTRISONE) 1-0.05 % cream Apply a thin film to the affected area 2 times daily. 21   Ryanne French MD   cefadroxil (DURICEF) 500 MG capsule Take 500 mg by mouth 2 times daily    Historical Provider, MD   warfarin (COUMADIN) 2.5 MG tablet 1. Take 2 tablets by mouth daily. Or as directed by INR results.  20   Margarette Bal MD   Handicap Placard MISC by Does not apply route Expires 2022 6/15/17   Margarette Bal MD   MEGARED OMEGA-3 KRILL  MG CAPS Take by mouth daily     Historical Provider, MD   Multiple Vitamins-Minerals (THERAPEUTIC MULTIVITAMIN-MINERALS) tablet Take 1 tablet by mouth daily    Historical Provider, MD   Multiple Vitamins-Minerals (ICAPS AREDS 2) CAPS Take 2 tablets by mouth daily     Historical Provider, MD       Current medications:    Current Facility-Administered Medications   Medication Dose Route Frequency Provider Last Rate Last Admin  0.9 % sodium chloride infusion   Intravenous Continuous Yamil Santoyo MD        ceFAZolin (ANCEF) 2000 mg in dextrose 5 % 50 mL IVPB  2,000 mg Intravenous On Call to Norwood Bamberger, MD         Facility-Administered Medications Ordered in Other Encounters   Medication Dose Route Frequency Provider Last Rate Last Admin    betamethasone acetate-betamethasone sodium phosphate (CELESTONE) injection 12 mg  12 mg Intramuscular Once Elmira Horn MD           Allergies:     Allergies   Allergen Reactions    Statins Other (See Comments)     Causes severe leg cramps     Tape Tameka Balsam Tape] Other (See Comments)     \"sticks to skin\" causes redness    Tizanidine Hcl Other (See Comments)     Having issues with her liver- elevated her enzyme level    Tramadol Other (See Comments)     \"makes me Goofy\"       Problem List:    Patient Active Problem List   Diagnosis Code    Trochanteric bursitis of left hip M70.62    Drug-induced constipation K59.03    Bradycardia R00.1    Esophageal stricture K22.2    Gastritis without bleeding K29.70    Lumbar radicular pain M54.16    Lumbar spine pain M54.5    Left hip pain M25.552    Arthritis of left hip M16.12    Paroxysmal atrial fibrillation with rapid ventricular response (HCC) I48.0    Anticoagulation monitoring by pharmacist Z79.01    Venous stasis dermatitis of both lower extremities I87.2    Arthritis of right hand M19.041    Vaginal erosion secondary to pessary use (HCC) T83.89XA, N89.8    Pelvic relaxation due to uterovaginal prolapse N81.4    Postmenopausal bleeding N95.0    History of hysterectomy Z90.710       Past Medical History:        Diagnosis Date    Actinic keratosis     history of    Arthritis     Atrial fibrillation (HCC)     CAD (coronary artery disease)     history of afib    Cancer (HCC)     skin    Cervical prolapse     history of    Diverticulosis     Endocervical polyp     history of    Heel spur     Hip pain NPO Status: Time of last liquid consumption: 0730(sip of water with medication)                        Time of last solid consumption: 2000                        Date of last liquid consumption: 01/27/21                        Date of last solid food consumption: 01/26/21    BMI:   Wt Readings from Last 3 Encounters:   01/27/21 161 lb 12.8 oz (73.4 kg)   01/21/21 162 lb (73.5 kg)   01/21/21 160 lb (72.6 kg)     Body mass index is 26.92 kg/m². CBC:   Lab Results   Component Value Date    WBC 5.6 12/17/2020    RBC 5.09 12/17/2020    RBC 3.84 07/29/2011    HGB 13.8 12/17/2020    HCT 44.3 12/17/2020    MCV 87.0 12/17/2020    RDW 15.1 12/17/2020    PLT See Reflexed IPF Result 12/17/2020       CMP:   Lab Results   Component Value Date     01/19/2021    K 4.3 01/19/2021     01/19/2021    CO2 29 01/19/2021    BUN 17 01/19/2021    CREATININE 0.87 01/19/2021    GFRAA >60 01/19/2021    LABGLOM >60 01/19/2021    LABGLOM 80 07/16/2017    GLUCOSE 101 01/19/2021    PROT 7.3 01/19/2021    CALCIUM 9.8 01/19/2021    BILITOT 0.63 01/19/2021    ALKPHOS 104 01/19/2021    AST 28 01/19/2021    AST 28 01/19/2021    ALT 31 01/19/2021    ALT 31 01/19/2021       POC Tests: No results for input(s): POCGLU, POCNA, POCK, POCCL, POCBUN, POCHEMO, POCHCT in the last 72 hours.     Coags:   Lab Results   Component Value Date    PROTIME 28.6 01/21/2021    INR 2.4 01/21/2021    APTT 37.8 12/17/2020       HCG (If Applicable): No results found for: PREGTESTUR, PREGSERUM, HCG, HCGQUANT     ABGs: No results found for: PHART, PO2ART, JZJ5SIJ, BMZ4EDT, BEART, W5WDJIVD     Type & Screen (If Applicable):  Lab Results   Component Value Date    LABRH NEG 07/27/2011       Drug/Infectious Status (If Applicable):  Lab Results   Component Value Date    HEPCAB Negative 07/16/2017       COVID-19 Screening (If Applicable):   Lab Results   Component Value Date    COVID19 Not Detected 01/21/2021         Anesthesia Evaluation    Airway: Mallampati: II Mouth opening: > = 3 FB Dental:          Pulmonary:                              Cardiovascular:    (+) hypertension:, CAD:,                   Neuro/Psych:   (+) neuromuscular disease:,             GI/Hepatic/Renal:             Endo/Other:                     Abdominal:           Vascular:                                        Anesthesia Plan      general     ASA 4       Induction: intravenous. Anesthetic plan and risks discussed with patient. Plan discussed with CRNA.                   Vik Pride MD   1/27/2021

## 2021-01-27 NOTE — PROGRESS NOTES
1025: Patient to phase 1 recovery room via cart. Patient is very drowsy but arouses to name. Patient placed on monitor and vitals obtained, see charting. Report received from surgical RN, Leonid Woody and Artem Cabrales CRNA. Danilo tis on room air at this time. 1027: IV is infusing into her right arm. SCD's attached and warmer applied. Incision site is clean, dry and intact- see LDA. 1030: Patient's pulse ox is dropping to 90% on room air- oxygen applied at 2 liters and patient instructed to take deep breaths, pulse ox increases to 95%. 1033: Patient is denying pain and nausea at this time- will continue to monitor. 1036: Patient's pulse ox is 95% on 2 liters- oxygen decreased to 1 liter. 1041: Patient is resting in bed with eyes closed, respirations are even and unlabored. Vital signs remain stable at this time. 1044: Patient's pulse ox is 94% on 1 liter- oxygen removed at this time. 1049: Patient is resting in bed with eyes closed, respirations are even and unlabored. 1053: Patient is continuing to deny pain and nausea at this time. 1057: Patient meets criteria to be discharged from phase 1 to phase 2. Patient moved to phase 2 at this time. 1059: Offered drink given. Bed is in the lowest position, call light is within reach and patient's  is at the bedside. 1114: Patient is resting in bed quietly and drinking more of her gingerale. 1141: Patient is resting in bed quietly with eyes closed. Patient is continuing to deny any needs- just stating she is sleepy. Patient's  remains at the bedside and call light is within reach. 1156: Patient is more awake at this time and wanting something to eat- muffin given. 1232: IV removed at this time- no complications and dressing applied. 1234: Discharge instructions given and explained to the patient and the patient's , both verbalized understanding. 1238: Patient wheeled to the car and discharged home in stable condition with her .

## 2021-02-10 ENCOUNTER — HOSPITAL ENCOUNTER (OUTPATIENT)
Dept: PHARMACY | Age: 86
Setting detail: THERAPIES SERIES
Discharge: HOME | End: 2021-02-10
Payer: MEDICARE

## 2021-02-10 DIAGNOSIS — I48.0 PAROXYSMAL ATRIAL FIBRILLATION WITH RAPID VENTRICULAR RESPONSE (HCC): ICD-10-CM

## 2021-02-10 DIAGNOSIS — Z79.01 ANTICOAGULATION MONITORING BY PHARMACIST: ICD-10-CM

## 2021-02-10 LAB
INR BLD: 2.6
PROTIME: 30.7 SECONDS

## 2021-02-10 PROCEDURE — 85610 PROTHROMBIN TIME: CPT

## 2021-02-10 PROCEDURE — 99211 OFF/OP EST MAY X REQ PHY/QHP: CPT

## 2021-02-10 PROCEDURE — 36416 COLLJ CAPILLARY BLOOD SPEC: CPT

## 2021-02-26 RX ORDER — PANTOPRAZOLE SODIUM 40 MG/1
TABLET, DELAYED RELEASE ORAL
Qty: 90 TABLET | Refills: 1 | Status: SHIPPED | OUTPATIENT
Start: 2021-02-26 | End: 2021-08-26 | Stop reason: SDUPTHER

## 2021-02-26 NOTE — TELEPHONE ENCOUNTER
Catalino Gonzalez called requesting a refill of the below medication which has been pended for you:     Requested Prescriptions     Pending Prescriptions Disp Refills    metoprolol tartrate (LOPRESSOR) 25 MG tablet 90 tablet 1     Sig: TAKE HALF OF A TABLET TWICE A DAY.        Last Appointment Date: 12/17/2020  Next Appointment Date: 6/21/2021    Allergies   Allergen Reactions    Statins Other (See Comments)     Causes severe leg cramps     Tape Trenia Sterlington Tape] Other (See Comments)     \"sticks to skin\" causes redness    Tizanidine Hcl Other (See Comments)     Having issues with her liver- elevated her enzyme level    Tramadol Other (See Comments)     \"makes me Goofy\"

## 2021-02-26 NOTE — TELEPHONE ENCOUNTER
Jasiel Hinson called requesting a refill of the below medication which has been pended for you:     Requested Prescriptions     Pending Prescriptions Disp Refills    pantoprazole (PROTONIX) 40 MG tablet [Pharmacy Med Name: Pantoprazole Sodium 40 MG Oral Tablet Delayed Release] 90 tablet 1     Sig: Take 1 tablet by mouth once daily       Last Appointment Date: 12/17/2020  Next Appointment Date: 6/21/2021    Allergies   Allergen Reactions    Statins Other (See Comments)     Causes severe leg cramps     Tape [place] Tape] Other (See Comments)     \"sticks to skin\" causes redness    Tizanidine Hcl Other (See Comments)     Having issues with her liver- elevated her enzyme level    Tramadol Other (See Comments)     \"makes me Goofy\"

## 2021-03-10 ENCOUNTER — HOSPITAL ENCOUNTER (OUTPATIENT)
Dept: PHARMACY | Age: 86
Setting detail: THERAPIES SERIES
Discharge: HOME | End: 2021-03-10
Payer: MEDICARE

## 2021-03-10 DIAGNOSIS — Z79.01 ANTICOAGULATION MONITORING BY PHARMACIST: ICD-10-CM

## 2021-03-10 DIAGNOSIS — I48.0 PAROXYSMAL ATRIAL FIBRILLATION WITH RAPID VENTRICULAR RESPONSE (HCC): ICD-10-CM

## 2021-03-10 LAB
INR BLD: 2.8
PROTIME: 33 SECONDS

## 2021-03-10 PROCEDURE — 36416 COLLJ CAPILLARY BLOOD SPEC: CPT

## 2021-03-10 PROCEDURE — 85610 PROTHROMBIN TIME: CPT

## 2021-03-10 PROCEDURE — 99211 OFF/OP EST MAY X REQ PHY/QHP: CPT

## 2021-04-02 RX ORDER — PROPAFENONE HYDROCHLORIDE 150 MG/1
150 TABLET, FILM COATED ORAL 2 TIMES DAILY
Qty: 90 TABLET | Refills: 3 | OUTPATIENT
Start: 2021-04-02

## 2021-04-02 NOTE — TELEPHONE ENCOUNTER
Soledad Westbrook called requesting a refill of the below medication which has been pended for you:     Requested Prescriptions     Pending Prescriptions Disp Refills    propafenone (RYTHMOL) 150 MG tablet 90 tablet 3     Sig: Take 1 tablet by mouth 2 times daily Take 300 mg of propafenone with 25 mg of metoprolol once and start 150 mg propafenone q 12 hours from next day       Last Appointment Date: 12/17/2020  Next Appointment Date: 6/21/2021    Allergies   Allergen Reactions    Statins Other (See Comments)     Causes severe leg cramps     Tape Ali Kevin Tape] Other (See Comments)     \"sticks to skin\" causes redness    Tizanidine Hcl Other (See Comments)     Having issues with her liver- elevated her enzyme level    Tramadol Other (See Comments)     \"makes me Goofy\"

## 2021-04-05 NOTE — TELEPHONE ENCOUNTER
Pt has about 8 days left. Please send to [facility].  Dr Katrin Zamora not in office until 4/15/21    [phone]    Last Appt:  1/21/2021  Next Appt:   4/22/2021  Med verified in [facility] [address]

## 2021-04-06 ENCOUNTER — TELEPHONE (OUTPATIENT)
Dept: CARDIOLOGY | Age: 86
End: 2021-04-06

## 2021-04-06 RX ORDER — PROPAFENONE HYDROCHLORIDE 150 MG/1
150 TABLET, FILM COATED ORAL 2 TIMES DAILY
Qty: 90 TABLET | Refills: 3 | Status: SHIPPED | OUTPATIENT
Start: 2021-04-06 | End: 2021-10-29 | Stop reason: SDUPTHER

## 2021-04-06 NOTE — TELEPHONE ENCOUNTER
[address] called needing clarfiication on the patient's propafenone prescription sent. Please call them back with clarified directions.      Last Appt:  1/21/2021  Next Appt:   4/22/2021  Med verified in [address] [place] [address] Rd

## 2021-04-06 NOTE — TELEPHONE ENCOUNTER
Please clarify sig:    propafenone (RYTHMOL) 150 MG tablet [[id number]]     Order Details  Dose: 150 mg Route: Oral Frequency: 2 TIMES DAILY   Dispense Quantity: 90 tablet Refills: 3          Sig: Take 1 tablet by mouth 2 times daily Take 300 mg of propafenone with 25 mg of metoprolol once and start 150 mg propafenone q 12 hours from next day

## 2021-04-07 ENCOUNTER — HOSPITAL ENCOUNTER (OUTPATIENT)
Dept: ULTRASOUND IMAGING | Age: 86
Discharge: HOME | End: 2021-04-09
Payer: MEDICARE

## 2021-04-07 ENCOUNTER — TELEPHONE (OUTPATIENT)
Dept: MAMMOGRAPHY | Age: 86
End: 2021-04-07

## 2021-04-07 ENCOUNTER — APPOINTMENT (OUTPATIENT)
Dept: MAMMOGRAPHY | Age: 86
End: 2021-04-07
Payer: MEDICARE

## 2021-04-07 DIAGNOSIS — R92.8 ABNORMAL FINDING ON BREAST IMAGING: Primary | ICD-10-CM

## 2021-04-07 DIAGNOSIS — N63.23 UNSPECIFIED LUMP IN THE LEFT BREAST, LOWER OUTER QUADRANT: ICD-10-CM

## 2021-04-07 DIAGNOSIS — R92.8 ABNORMAL FINDING ON BREAST IMAGING: ICD-10-CM

## 2021-04-07 PROCEDURE — 76642 ULTRASOUND BREAST LIMITED: CPT

## 2021-04-07 NOTE — TELEPHONE ENCOUNTER
Niecy Kumar is going to be having a breast biopsy next week can you advise if she can stop her Coumadin before her biopsy?  Thank

## 2021-04-20 ENCOUNTER — INITIAL CONSULT (OUTPATIENT)
Dept: SURGERY | Age: 86
End: 2021-04-20
Payer: MEDICARE

## 2021-04-20 VITALS
WEIGHT: 161 LBS | SYSTOLIC BLOOD PRESSURE: 110 MMHG | BODY MASS INDEX: 26.82 KG/M2 | OXYGEN SATURATION: 97 % | RESPIRATION RATE: 16 BRPM | HEIGHT: 65 IN | DIASTOLIC BLOOD PRESSURE: 60 MMHG | HEART RATE: 61 BPM

## 2021-04-20 DIAGNOSIS — R92.8 ABNORMAL FINDINGS ON DIAGNOSTIC IMAGING OF BREAST: Primary | ICD-10-CM

## 2021-04-20 DIAGNOSIS — B37.2 CANDIDAL SKIN INFECTION: ICD-10-CM

## 2021-04-20 DIAGNOSIS — R92.8 ABNORMAL MAMMOGRAM OF RIGHT BREAST: ICD-10-CM

## 2021-04-20 PROCEDURE — G8417 CALC BMI ABV UP PARAM F/U: HCPCS | Performed by: SURGERY

## 2021-04-20 PROCEDURE — 99203 OFFICE O/P NEW LOW 30 MIN: CPT | Performed by: SURGERY

## 2021-04-20 PROCEDURE — 4040F PNEUMOC VAC/ADMIN/RCVD: CPT | Performed by: SURGERY

## 2021-04-20 PROCEDURE — 1123F ACP DISCUSS/DSCN MKR DOCD: CPT | Performed by: SURGERY

## 2021-04-20 PROCEDURE — 99214 OFFICE O/P EST MOD 30 MIN: CPT | Performed by: SURGERY

## 2021-04-20 PROCEDURE — 1036F TOBACCO NON-USER: CPT | Performed by: SURGERY

## 2021-04-20 PROCEDURE — 1090F PRES/ABSN URINE INCON ASSESS: CPT | Performed by: SURGERY

## 2021-04-20 PROCEDURE — G8427 DOCREV CUR MEDS BY ELIG CLIN: HCPCS | Performed by: SURGERY

## 2021-04-20 RX ORDER — NYSTATIN 100000 U/G
CREAM TOPICAL
Qty: 1 TUBE | Refills: 1 | Status: SHIPPED | OUTPATIENT
Start: 2021-04-20 | End: 2021-08-19

## 2021-04-20 NOTE — PROGRESS NOTES
Breast Evaluation Work Sheet    4/20/2021    Patient:Jina Barone               KZO:[birth year]    Race:     Age of Menarche: 6    Age of 1st live Birth (zero if no live births):21    Number of Pregnancies: 1  Number of live births: 1    LMP: Hysterectomy at the age of 34    Number of previous breast biopsies: 0          Personal History of other Cancers: Yes   Type:     Family History Breast Cancer and Age of onset:     Mother No               Sister(s) No    Maternal GM No      Daughter(s) No    Paternal GM No       Other(s) No    History of other breast problems:    Skin Changes: bruising and rash within the week    No hormone replacement therapy    No birth control

## 2021-04-20 NOTE — PROGRESS NOTES
Apryl Norwood is a 80 y.o. female who presents today for evaluation of abnormal imaging of bilateral breast.  Patient approximately 6 months ago had abnormal mammogram of the right breast and this was read out as a BI-RADS 3 lesion. She elected to have repeat imaging and underwent a subsequent mammogram and ultrasound 6 to 8 weeks later which continued to show the lesion. In the interim the patient also had a fall and had hematoma in the left breast.  Imaging was done which showed a couple lesions in the left breast and follow-up imaging confirm these after resolution of her hematoma. She was referred to general surgery for further evaluation and for follow-up after biopsies. Patient denies any other changes in the breast.  She does report that after her fall she had quite a bit of bruising in the left breast but that has now resolved. Denies any pain. No skin changes, nipple changes or nipple discharge. Denies any family history of breast cancer. Over the past 1 week patient states that she has developed a rash in the inframammary crease bilaterally.   Has been self treating with over-the-counter medications for her athlete's foot and did get some sort of antibiotic cream but states that despite these treatments has not had any significant improvement    Past Medical History:   Diagnosis Date    Actinic keratosis     history of    Arthritis     Atrial fibrillation ([address]     CAD (coronary artery disease)     history of afib    Cancer (HCC)     skin    Cervical prolapse     history of    Diverticulosis     Endocervical polyp     history of    Heel spur     Hip pain     Hypertension     Nasal septal deviation     Osteoporosis     Stress incontinence     history of    Syncope     Vaginal prolapse     history of       Past Surgical History:   Procedure Laterality Date    APPENDECTOMY  1960's   Elodia Mons BLADDER REPAIR  2011    [address]  11/1976    [facility]    COLONOSCOPY  01/26/2000    ENDOSCOPIC ULTRASOUND (LOWER) Left 07/20/2017    ENDOSCOPIC ULTRASOUND performed by Celina Mcmillan MD at [address] cataract    FACIAL SURGERY N/A 1/27/2021    DIVISION AND INSET/CHEEK performed by Georgia Santana MD at [address]  2017    left total hip relpament    MOHS SURGERY N/A 1/8/2021    MOHS REPAIR BCC NASAL TIP WITH FLAP AND GRAFT performed by Georgia Santana MD at [address]  1960's    OR EGD TRANSORAL BIOPSY SINGLE/MULTIPLE Left 07/16/2017    EGD BIOPSY performed by Celina Mcmillan MD at [facility]    OR OFFICE/OUTPT VISIT,PROCEDURE ONLY Right 06/01/2018    MOHS REPAIR BASEL CELL CARCINOMA RIGHT DORSAL NOSE performed by Georgia Santana MD at [address] Left 09/19/2017    Left Total Hip ARTHROPLASTY performed by Graham Quintero MD at [address]  07/16/2017    UPPER GASTROINTESTINAL ENDOSCOPY Left 07/16/2017    EGD DILATION BALLOON performed by Celina Mcmillan MD at [facility]       Current Outpatient Medications   Medication Sig Dispense Refill    nystatin (MYCOSTATIN) 153819 UNIT/GM cream Apply topically 2 times daily. 1 Tube 1    propafenone (RYTHMOL) 150 MG tablet Take 1 tablet by mouth 2 times daily Take 300 mg of propafenone with 25 mg of metoprolol once and start 150 mg propafenone q 12 hours from next day 90 tablet 3    metoprolol tartrate (LOPRESSOR) 25 MG tablet TAKE HALF OF A TABLET TWICE A DAY. 90 tablet 1    pantoprazole (PROTONIX) 40 MG tablet Take 1 tablet by mouth once daily 90 tablet 1    oxyquinolone (TRIMO-COLLAZO) 0.025 % GEL Place 1 applicator vaginally Twice a Week 1 Tube 3    clotrimazole-betamethasone (LOTRISONE) 1-0.05 % cream Apply a thin film to the affected area 2 times daily. 1 Tube 0    warfarin (COUMADIN) 2.5 MG tablet 1.  Take 2 tablets by mouth daily. Or as directed by INR results. 180 tablet 3    Handicap Placard MISC by Does not apply route Expires 6/2022 1 each 0    MEGARED OMEGA-3 KRILL  MG CAPS Take by mouth daily       Multiple Vitamins-Minerals (THERAPEUTIC MULTIVITAMIN-MINERALS) tablet Take 1 tablet by mouth daily      Multiple Vitamins-Minerals (ICAPS AREDS 2) CAPS Take 2 tablets by mouth daily        No current facility-administered medications for this visit.       Facility-Administered Medications Ordered in Other Visits   Medication Dose Route Frequency Provider Last Rate Last Admin    betamethasone acetate-betamethasone sodium phosphate (CELESTONE) injection 12 mg  12 mg Intramuscular Once Juan Gaston MD           Allergies   Allergen Reactions    Statins Other (See Comments)     Causes severe leg cramps     Tape Bernell Colla Tape] Other (See Comments)     \"sticks to skin\" causes redness    Tizanidine Hcl Other (See Comments)     Having issues with her liver- elevated her enzyme level    Tramadol Other (See Comments)     \"makes me Goofy\"       Family History   Problem Relation Age of Onset    Colon Cancer Father     Osteoporosis Mother     Heart Disease Mother        Social History     Socioeconomic History    Marital status:      Spouse name: Not on file    Number of children: Not on file    Years of education: Not on file    Highest education level: Not on file   Occupational History    Not on file   Social Needs    Financial resource strain: Not hard at all   [employer] insecurity     Worry: Never true     Inability: Never true   [employer] needs     Medical: No     Non-medical: No   Tobacco Use    Smoking status: Never Smoker    Smokeless tobacco: Never Used   Substance and Sexual Activity    Alcohol use: No    Drug use: No    Sexual activity: Not Currently     Partners: Male     Comment:    Lifestyle    Physical activity     Days per week: Not on file     Minutes per session: cystic finding in the central breast tissue again seems consistent with benign breast cysts. No other masses palpated. No axillary or supraclavicular adenopathy noted. Abdomen: soft, nontender, no HSM, no guarding, no rebound, no masses  Extremity: negative  Neuro: CN II-XII grossly intact      Assessment     3  80year-old female with abnormal imaging findings of bilateral breast.  Patient has 2 abnormal findings in left breast and 1 in right read as BI-RADS 4.  2.  Seems patient has candidal infection of the skin in bilateral inframammary creases. Plan     1. Patient is scheduled for image guided biopsies of all 3 lesions tomorrow. Will await pathology results and make further recommendations on those results. I will plan to see the patient back in 1 week for further discussion. 2.  I am going to start the patient on topical treatment nystatin cream for the suspected candidal infection. We will plan to reevaluate at follow-up.     Electronically signed by Zeynep Byrne DO on 4/20/2021 at 3:55 PM      (Please note that portions of this note were completed with a voice recognition program.  Efforts were made to edit the dictations but occasionally words are mis-transcribed.)

## 2021-04-21 ENCOUNTER — HOSPITAL ENCOUNTER (OUTPATIENT)
Dept: ULTRASOUND IMAGING | Age: 86
Discharge: HOME | End: 2021-04-23
Payer: MEDICARE

## 2021-04-21 ENCOUNTER — HOSPITAL ENCOUNTER (OUTPATIENT)
Dept: MAMMOGRAPHY | Age: 86
Discharge: HOME | End: 2021-04-23
Payer: MEDICARE

## 2021-04-21 ENCOUNTER — HOSPITAL ENCOUNTER (OUTPATIENT)
Age: 86
Setting detail: SPECIMEN
Discharge: HOME | End: 2021-04-21
Payer: MEDICARE

## 2021-04-21 DIAGNOSIS — R92.8 ABNORMAL MAMMOGRAM OF RIGHT BREAST: ICD-10-CM

## 2021-04-21 DIAGNOSIS — R92.8 ABNORMAL FINDINGS ON DIAGNOSTIC IMAGING OF BREAST: ICD-10-CM

## 2021-04-21 DIAGNOSIS — R92.8 ABNORMAL MAMMOGRAM OF BOTH BREASTS: Primary | ICD-10-CM

## 2021-04-21 DIAGNOSIS — R92.8 ABNORMAL MAMMOGRAM OF BOTH BREASTS: ICD-10-CM

## 2021-04-21 PROCEDURE — 88342 IMHCHEM/IMCYTCHM 1ST ANTB: CPT

## 2021-04-21 PROCEDURE — A4648 IMPLANTABLE TISSUE MARKER: HCPCS

## 2021-04-21 PROCEDURE — 77065 DX MAMMO INCL CAD UNI: CPT

## 2021-04-21 PROCEDURE — 88305 TISSUE EXAM BY PATHOLOGIST: CPT

## 2021-04-21 PROCEDURE — 2500000003 HC RX 250 WO HCPCS

## 2021-04-21 PROCEDURE — 19285 PERQ DEV BREAST 1ST US IMAG: CPT

## 2021-04-23 LAB — SURGICAL PATHOLOGY REPORT: NORMAL

## 2021-05-06 ENCOUNTER — OFFICE VISIT (OUTPATIENT)
Dept: CARDIOLOGY | Age: 86
End: 2021-05-06
Payer: MEDICARE

## 2021-05-06 ENCOUNTER — HOSPITAL ENCOUNTER (OUTPATIENT)
Dept: PHARMACY | Age: 86
Setting detail: THERAPIES SERIES
Discharge: HOME | End: 2021-05-06
Payer: MEDICARE

## 2021-05-06 VITALS
SYSTOLIC BLOOD PRESSURE: 156 MMHG | WEIGHT: 161 LBS | DIASTOLIC BLOOD PRESSURE: 79 MMHG | BODY MASS INDEX: 26.82 KG/M2 | HEIGHT: 65 IN | HEART RATE: 65 BPM

## 2021-05-06 DIAGNOSIS — I48.0 PAROXYSMAL ATRIAL FIBRILLATION WITH RAPID VENTRICULAR RESPONSE (HCC): ICD-10-CM

## 2021-05-06 DIAGNOSIS — Z79.01 ANTICOAGULATION MONITORING BY PHARMACIST: ICD-10-CM

## 2021-05-06 DIAGNOSIS — I48.0 PAROXYSMAL ATRIAL FIBRILLATION WITH RAPID VENTRICULAR RESPONSE (HCC): Primary | ICD-10-CM

## 2021-05-06 LAB
INR BLD: 2.5
PROTIME: 29.5 SECONDS

## 2021-05-06 PROCEDURE — 1123F ACP DISCUSS/DSCN MKR DOCD: CPT | Performed by: INTERNAL MEDICINE

## 2021-05-06 PROCEDURE — 99213 OFFICE O/P EST LOW 20 MIN: CPT | Performed by: INTERNAL MEDICINE

## 2021-05-06 PROCEDURE — 4040F PNEUMOC VAC/ADMIN/RCVD: CPT | Performed by: INTERNAL MEDICINE

## 2021-05-06 PROCEDURE — G8417 CALC BMI ABV UP PARAM F/U: HCPCS | Performed by: INTERNAL MEDICINE

## 2021-05-06 PROCEDURE — 99214 OFFICE O/P EST MOD 30 MIN: CPT | Performed by: INTERNAL MEDICINE

## 2021-05-06 PROCEDURE — 99211 OFF/OP EST MAY X REQ PHY/QHP: CPT

## 2021-05-06 PROCEDURE — 93005 ELECTROCARDIOGRAM TRACING: CPT | Performed by: INTERNAL MEDICINE

## 2021-05-06 PROCEDURE — G8427 DOCREV CUR MEDS BY ELIG CLIN: HCPCS | Performed by: INTERNAL MEDICINE

## 2021-05-06 PROCEDURE — 93010 ELECTROCARDIOGRAM REPORT: CPT | Performed by: INTERNAL MEDICINE

## 2021-05-06 PROCEDURE — 1090F PRES/ABSN URINE INCON ASSESS: CPT | Performed by: INTERNAL MEDICINE

## 2021-05-06 PROCEDURE — 85610 PROTHROMBIN TIME: CPT

## 2021-05-06 PROCEDURE — 99212 OFFICE O/P EST SF 10 MIN: CPT

## 2021-05-06 PROCEDURE — 1036F TOBACCO NON-USER: CPT | Performed by: INTERNAL MEDICINE

## 2021-05-06 NOTE — PATIENT INSTRUCTIONS
Patient Education        Home Blood Pressure Test: About This Test  What is it? A home blood pressure test allows you to keep track of your blood pressure at home. Blood pressure is a measure of the force of blood against the walls of your arteries. Blood pressure readings include two numbers, such as 130/80 (say \"130 over 80\"). The first number is the systolic pressure. The second number is the diastolic pressure. Why is this test done? You may do this test at home to:  · Find out if you have high blood pressure. · Track your blood pressure if you have high blood pressure. · Track how well medicine is working to reduce high blood pressure. · Check how lifestyle changes, such as weight loss and exercise, are affecting blood pressure. How do you prepare for the test?  For at least 30 minutes before you take your blood pressure, don't exercise, drink caffeine, or smoke. Empty your bladder before the test. Sit quietly with your back straight and both feet on the floor for at least 5 minutes. This helps you take your blood pressure while you feel comfortable and relaxed. How is the test done? · If your doctor recommends it, take your blood pressure twice a day. Take it in the morning and evening. · Sit with your arm slightly bent and resting on a table so that your upper arm is at the same level as your heart. · Use the same arm each time you take your blood pressure. · Place the blood pressure cuff on the bare skin of your upper arm. You may have to roll up your sleeve, remove your arm from the sleeve, or take your shirt off. · Wrap the blood pressure cuff around your upper arm so that the lower edge of the cuff is about 1 inch above the bend of your elbow. · Do not move, talk, or text while you take your blood pressure. Follow the instructions that came with your blood pressure monitor. They might be different from the following. · Press the on/off button on the automatic monitor.  Then you may need to wait until the screen says the monitor is ready. · Press the start button. The cuff will inflate and deflate by itself. · Your blood pressure numbers will appear on the screen. · Wait one minute and take your blood pressure again. · If your monitor does not automatically save your numbers, write them in your log book, along with the date and time. Follow-up care is a key part of your treatment and safety. Be sure to make and go to all appointments, and call your doctor if you are having problems. It's also a good idea to keep a list of the medicines you take. Where can you learn more? Go to https://[email or web address][employer]. org and sign in to your [employer] account. Enter C427 in the "[employer] box to learn more about \"Home Blood Pressure Test: About This Test.\"     If you do not have an account, please click on the \"Sign Up Now\" link. Current as of: August 31, 2020               Content Version: 12.8  © 2006-2021 Healthwise, Incorporated. Care instructions adapted under license by [facility] ([facility]). If you have questions about a medical condition or this instruction, always ask your healthcare professional. [place] [address] any warranty or liability for your use of this information.

## 2021-05-06 NOTE — PROGRESS NOTES
ANTICOAGULATION SERVICE    Date of Clinic Visit:  5/6/2021    Kash Esteban is a 80 y.o. female who presents to clinic today for anticoagulation monitoring and adjustment. Recent INR Results:  Internal QC passed  Lab Results   Component Value Date    INR 2.5 05/06/2021    INR 2.8 03/10/2021       Current Warfarin Dosage:  Dosing Plan  As of 5/6/2021    TTR:  88.3 % (3.5 y)   Full warfarin instructions:  5 mg every day               Assessment/Plan:    Continue current regimen as INR remains stable. Next Clinic Appointment:  Return date  As of 5/6/2021    TTR:  88.3 % (3.5 y)   Next INR check:  6/10/2021             Please call [facility] at [phone] with any questions. Thanks!   NIKOLAI Alonzo NATHALIE [facility]  Anticoagulation Service Pharmacist  5/6/2021 11:04 AM  For Pharmacy Admin Tracking Only     Time Spent (min): 15

## 2021-05-06 NOTE — PATIENT INSTRUCTIONS
\"On day of next appointment, please screen for temperature and COVID-19 symptoms prior to you clinic appointment. If any symptoms present, please call [phone] to reschedule. \"

## 2021-05-06 NOTE — PROGRESS NOTES
Today's Date: 5/6/2021  Patient Name: Donita Miller  Patient's age: 80 y.o., 1934      HPI:   The patient is a 80 y.o.  female is in the office for 3 months follow up   She is maintaining NSR   She is feeling fine and denies any cp, dyspnea, decline in her functional capacity      Past Medical History:   has a past medical history of Actinic keratosis, Arthritis, Atrial fibrillation ([address] CAD (coronary artery disease), Cancer ([facility] [address] Cervical prolapse, Diverticulosis, Endocervical polyp, Heel spur, Hip pain, Hypertension, Nasal septal deviation, Osteoporosis, Stress incontinence, Syncope, and Vaginal prolapse. Past Surgical History:   has a past surgical history that includes Colonoscopy (01/26/2000); Cholecystectomy (11/1976); Upper gastrointestinal endoscopy (07/16/2017); pr egd transoral biopsy single/multiple (Left, 07/16/2017); Upper gastrointestinal endoscopy (Left, 07/16/2017); Endoscopic ultrasonography, GI (Left, 07/20/2017); eye surgery; Total hip arthroplasty (Left, 09/19/2017); bladder repair (2011); partial hysterectomy (cervix not removed) (1960's); Appendectomy (1960's); skin biopsy; pr office/outpt visit,procedure only (Right, 06/01/2018); joint replacement (2017); Mohs surgery (N/A, 1/8/2021); Facial Surgery (N/A, 1/27/2021); US BREAST BIOPSY W LOC DEVICE 1ST LESION LEFT (Left, 4/21/2021); US PLACE BREAST LOC DEVICE EACH ADDL LEFT (Left, 4/21/2021); and US BREAST BIOPSY W LOC DEVICE 1ST LESION RIGHT (Right, 4/21/2021). Home Medications:    Prior to Admission medications    Medication Sig Start Date End Date Taking? Authorizing Provider   nystatin (MYCOSTATIN) 433975 UNIT/GM cream Apply topically 2 times daily.  4/20/21  Yes Mariano Salgado, DO   propafenone (RYTHMOL) 150 MG tablet Take 1 tablet by mouth 2 times daily Take 300 mg of propafenone with 25 mg of metoprolol once and start 150 mg propafenone q 12 hours from next day 4/6/21  Yes Aristeo Fisher, auscultation bilaterally  Abdomen:  · No masses or tenderness  · Bowel sounds present  Extremities:  ·  No Cyanosis or Clubbing  ·  Lower extremity edema: No  Skin: Warm and dry    Cardiac data:      ECG 5/6/21: NSR, NL ECG    Labs:   PT/INR:   Recent Labs     05/06/21   PROTIME 29.5   INR 2.5     FASTING LIPID PANEL:  Lab Results   Component Value Date    HDL 57 01/19/2021    TRIG 135 01/19/2021     Echocardiogram 7/2017:  Normal left ventricle size and systolic function. Ejection fraction was estimated at 60%. There were no regional left ventricular wall motion abnormalities and wall thickness was within normal limits. There was mild aortic regurgitation. There was mild mitral regurgitation. There was mild tricuspid regurgitation. Nuclear stress test 7/2017:   Alfredia Nick EKG stress test is not suggestive for ischemia. This Nuclear Medicine study was negative for ischemia. Assessment:    · PAF, in atrial flutter today, rate controlled  · MILD MR  · MILD AI  · A/C WITH COUMADIN  · Preserved LV systolic function  · No ischemia or infarction on nuclear stress test 2017. Patient Active Problem List   Diagnosis    Trochanteric bursitis of left hip    Drug-induced constipation    Bradycardia    Esophageal stricture    Gastritis without bleeding    Lumbar radicular pain    Lumbar spine pain    Left hip pain    Arthritis of left hip    Paroxysmal atrial fibrillation with rapid ventricular response (HCC)    Anticoagulation monitoring by pharmacist    Venous stasis dermatitis of both lower extremities    Arthritis of right hand    Vaginal erosion secondary to pessary use (HCC)    Pelvic relaxation due to uterovaginal prolapse    Postmenopausal bleeding    History of hysterectomy       Recommendations:  1-Continue Rythmol 150 mg twice daily from tomorrow if converted to normal sinus rhythm.   If remains in atrial fibrillation in next 1 to 2 days, recommend cardioversion  2- Pill in the pocket dose of Rythmol + Lopressor for PAF   3-INR has been therapeutic ranging 2-3  4-Diet, exercise and moderate physical activity  5-F/U in 6 months.       Electronically signed by Raffi Copeland MD on 5/6/2021 at 2:19 PM      [facility]  [phone]

## 2021-06-10 ENCOUNTER — HOSPITAL ENCOUNTER (OUTPATIENT)
Dept: PHARMACY | Age: 86
Setting detail: THERAPIES SERIES
Discharge: HOME | End: 2021-06-10
Payer: MEDICARE

## 2021-06-10 DIAGNOSIS — Z79.01 ANTICOAGULATION MONITORING BY PHARMACIST: Primary | ICD-10-CM

## 2021-06-10 DIAGNOSIS — I48.0 PAROXYSMAL ATRIAL FIBRILLATION WITH RAPID VENTRICULAR RESPONSE (HCC): ICD-10-CM

## 2021-06-10 LAB
INR BLD: 2.2
PROTIME: 27 SECONDS

## 2021-06-10 PROCEDURE — 99211 OFF/OP EST MAY X REQ PHY/QHP: CPT

## 2021-06-10 PROCEDURE — 36416 COLLJ CAPILLARY BLOOD SPEC: CPT

## 2021-06-10 PROCEDURE — 85610 PROTHROMBIN TIME: CPT

## 2021-06-10 NOTE — PROGRESS NOTES
ANTICOAGULATION SERVICE    Date of Clinic Visit:  6/10/2021    Jorge Luis Miles is a 80 y.o. female who presents to clinic today for anticoagulation monitoring and adjustment. Recent INR Results:  Internal QC passed  Lab Results   Component Value Date    INR 2.2 06/10/2021    INR 2.5 2021       Current Warfarin Dosage:  Dosing Plan  As of 6/10/2021    TTR:  88.6 % (3.6 y)   Full warfarin instructions:  5 mg every day               Assessment/Plan:    Continue current regimen as INR remains stable. Recheck 5 weeks. Next Clinic Appointment:  Return date  As of 6/10/2021    TTR:  88.6 % (3.6 y)   Next INR check:  7/15/2021             Please call [address] [facility] at [phone] with any questions. Thanks!   Mya Child [facility]  Anticoagulation Service Pharmacist  6/10/2021 10:56 AM     For Pharmacy Admin Tracking Only     Intervention Detail: Adherence Monitorin   Total # of Interventions Recommended: 0   Total # of Interventions Accepted: 0   Time Spent (min): 15

## 2021-06-21 ENCOUNTER — OFFICE VISIT (OUTPATIENT)
Dept: FAMILY MEDICINE CLINIC | Age: 86
End: 2021-06-21
Payer: MEDICARE

## 2021-06-21 VITALS
OXYGEN SATURATION: 98 % | BODY MASS INDEX: 26.99 KG/M2 | SYSTOLIC BLOOD PRESSURE: 130 MMHG | TEMPERATURE: 98 F | HEART RATE: 60 BPM | HEIGHT: 65 IN | WEIGHT: 162 LBS | DIASTOLIC BLOOD PRESSURE: 62 MMHG

## 2021-06-21 DIAGNOSIS — K21.9 GASTROESOPHAGEAL REFLUX DISEASE, UNSPECIFIED WHETHER ESOPHAGITIS PRESENT: ICD-10-CM

## 2021-06-21 DIAGNOSIS — Z00.00 ROUTINE GENERAL MEDICAL EXAMINATION AT A HEALTH CARE FACILITY: Primary | ICD-10-CM

## 2021-06-21 DIAGNOSIS — R26.81 GAIT INSTABILITY: ICD-10-CM

## 2021-06-21 PROCEDURE — 4040F PNEUMOC VAC/ADMIN/RCVD: CPT | Performed by: FAMILY MEDICINE

## 2021-06-21 PROCEDURE — 1036F TOBACCO NON-USER: CPT | Performed by: FAMILY MEDICINE

## 2021-06-21 PROCEDURE — 99213 OFFICE O/P EST LOW 20 MIN: CPT | Performed by: FAMILY MEDICINE

## 2021-06-21 PROCEDURE — G8427 DOCREV CUR MEDS BY ELIG CLIN: HCPCS | Performed by: FAMILY MEDICINE

## 2021-06-21 PROCEDURE — G0439 PPPS, SUBSEQ VISIT: HCPCS | Performed by: FAMILY MEDICINE

## 2021-06-21 PROCEDURE — 1090F PRES/ABSN URINE INCON ASSESS: CPT | Performed by: FAMILY MEDICINE

## 2021-06-21 PROCEDURE — 99212 OFFICE O/P EST SF 10 MIN: CPT | Performed by: FAMILY MEDICINE

## 2021-06-21 PROCEDURE — 1123F ACP DISCUSS/DSCN MKR DOCD: CPT | Performed by: FAMILY MEDICINE

## 2021-06-21 PROCEDURE — G8417 CALC BMI ABV UP PARAM F/U: HCPCS | Performed by: FAMILY MEDICINE

## 2021-06-21 RX ORDER — FAMOTIDINE 20 MG/1
20 TABLET, FILM COATED ORAL 2 TIMES DAILY
Qty: 60 TABLET | Refills: 0 | Status: SHIPPED | OUTPATIENT
Start: 2021-06-21 | End: 2021-08-27

## 2021-06-21 SDOH — ECONOMIC STABILITY: FOOD INSECURITY: WITHIN THE PAST 12 MONTHS, THE FOOD YOU BOUGHT JUST DIDN'T LAST AND YOU DIDN'T HAVE MONEY TO GET MORE.: PATIENT DECLINED

## 2021-06-21 SDOH — ECONOMIC STABILITY: FOOD INSECURITY: WITHIN THE PAST 12 MONTHS, YOU WORRIED THAT YOUR FOOD WOULD RUN OUT BEFORE YOU GOT MONEY TO BUY MORE.: PATIENT DECLINED

## 2021-06-21 ASSESSMENT — ENCOUNTER SYMPTOMS
TROUBLE SWALLOWING: 1
CHEST TIGHTNESS: 0
COUGH: 0
SHORTNESS OF BREATH: 0
WHEEZING: 0

## 2021-06-21 ASSESSMENT — PATIENT HEALTH QUESTIONNAIRE - PHQ9
SUM OF ALL RESPONSES TO PHQ QUESTIONS 1-9: 0
SUM OF ALL RESPONSES TO PHQ9 QUESTIONS 1 & 2: 0
2. FEELING DOWN, DEPRESSED OR HOPELESS: 0
1. LITTLE INTEREST OR PLEASURE IN DOING THINGS: 0

## 2021-06-21 ASSESSMENT — SOCIAL DETERMINANTS OF HEALTH (SDOH): HOW HARD IS IT FOR YOU TO PAY FOR THE VERY BASICS LIKE FOOD, HOUSING, MEDICAL CARE, AND HEATING?: PATIENT DECLINED

## 2021-06-21 ASSESSMENT — LIFESTYLE VARIABLES: HOW OFTEN DO YOU HAVE A DRINK CONTAINING ALCOHOL: 0

## 2021-06-21 NOTE — PROGRESS NOTES
Medicare Annual Wellness Visit  Name: Bessy Hernández Date: 2021   MRN: [id number] Sex: Female   Age: 80 y.o. Ethnicity: Non-/Non    : 1934 Race: Michelle Luciano is here for Medicare AWV and 6 Month Follow-Up    Screenings for behavioral, psychosocial and functional/safety risks, and cognitive dysfunction are all negative except as indicated below. These results, as well as other patient data from the [address] form, are documented in Flowsheets linked to this Encounter. Allergies   Allergen Reactions    Statins Other (See Comments)     Causes severe leg cramps     Tape Bernell Colla Tape] Other (See Comments)     \"sticks to skin\" causes redness    Tizanidine Hcl Other (See Comments)     Having issues with her liver- elevated her enzyme level    Tramadol Other (See Comments)     \"makes me Goofy\"         Prior to Visit Medications    Medication Sig Taking? Authorizing Provider   famotidine (PEPCID) 20 MG tablet Take 1 tablet by mouth 2 times daily Yes Thomas Dumont MD   nystatin (MYCOSTATIN) 147014 UNIT/GM cream Apply topically 2 times daily. Yes Chantale Hampton, DO   propafenone (RYTHMOL) 150 MG tablet Take 1 tablet by mouth 2 times daily Take 300 mg of propafenone with 25 mg of metoprolol once and start 150 mg propafenone q 12 hours from next day Yes Aristeo Fisher, DO   metoprolol tartrate (LOPRESSOR) 25 MG tablet TAKE HALF OF A TABLET TWICE A DAY. Yes Thomas Dumont MD   pantoprazole (PROTONIX) 40 MG tablet Take 1 tablet by mouth once daily Yes Thomas Dumont MD   clotrimazole-betamethasone (LOTRISONE) 1-0.05 % cream Apply a thin film to the affected area 2 times daily. Yes Cecile Chavez MD   warfarin (COUMADIN) 2.5 MG tablet 1. Take 2 tablets by mouth daily. Or as directed by INR results.  Yes Thomas Dumont MD   Handicap Placard MISC by Does not apply route Expires 2022 Yes Thomas Dumont MD   MEGARED OMEGA-3 KRILL  MG CAPS Take by mouth daily  Yes Historical Provider, MD   Multiple Vitamins-Minerals (THERAPEUTIC MULTIVITAMIN-MINERALS) tablet Take 1 tablet by mouth daily Yes Historical Provider, MD   Multiple Vitamins-Minerals (ICAPS AREDS 2) CAPS Take 2 tablets by mouth daily  Yes Historical Provider, MD         Past Medical History:   Diagnosis Date    Actinic keratosis     history of    Arthritis     Atrial fibrillation ([facility] [address]     CAD (coronary artery disease)     history of afib    Cancer ([address]     skin    Cervical prolapse     history of    Diverticulosis     Endocervical polyp     history of    Heel spur     Hip pain     Hypertension     Nasal septal deviation     Osteoporosis     Stress incontinence     history of    Syncope     Vaginal prolapse     history of       Past Surgical History:   Procedure Laterality Date    APPENDECTOMY  1960's   Murrel Neither BLADDER REPAIR  2011    [address]  10/[address]00    [facility]    COLONOSCOPY  01/26/2000    ENDOSCOPIC ULTRASOUND (LOWER) Left 07/20/2017    ENDOSCOPIC ULTRASOUND performed by Madelyn Chavarria MD at [facility] cataract    FACIAL SURGERY N/A 1/27/2021    DIVISION AND INSET/CHEEK performed by Pipo Lim MD at [address] [facility]  2017    left total hip relpament    MOHS SURGERY N/A 1/8/2021    MOHS REPAIR BCC NASAL TIP WITH FLAP AND GRAFT performed by Pipo Lim MD at [address]  1960's    [place] EGD TRANSORAL BIOPSY SINGLE/MULTIPLE Left 07/16/2017    EGD BIOPSY performed by Madelyn Chavarria MD at [address] [facility]    [place] OFFICE/OUTPT VISIT,PROCEDURE ONLY Right 06/01/2018    MOHS REPAIR BASEL CELL CARCINOMA RIGHT DORSAL NOSE performed by Pipo Lim MD at [address] [facility] Left 09/19/2017    Left Total Hip ARTHROPLASTY performed by Rosie Gomes MD at [address]  07/16/2017    Power of  Living Will ACP-Advance Directive ACP-Power of     Not on File Not on File Not on File Not on File      General Health Risk Interventions:  · Stress: patient declines any further evaluation/treatment for this issue  · No Living Will: ACP documents already completed- patient asked to provide copy to the office        Personalized Preventive Plan   Current Health Maintenance Status  Immunization History   Administered Date(s) Administered    Influenza Virus Vaccine 10/28/2011    Influenza, High Dose (Fluzone 65 yrs and older) 10/19/2017, 12/10/2018    Influenza, Quadv, adjuvanted, 65 yrs +, IM, PF (Fluad) 12/17/2020    Influenza, Triv, inactivated, subunit, adjuvanted, IM (Fluad 65 yrs and older) 01/13/2020    Pneumococcal Conjugate 13-valent ([id number]) 06/07/2017    Pneumococcal Conjugate 7-valent (Prevnar7) 05/06/2003    Pneumococcal Polysaccharide ([id number]) 06/08/2018    Td, unspecified formulation 05/06/2003    Zoster Live (Zostavax) 11/09/2013        Health Maintenance   Topic Date Due    Shingles Vaccine (2 of 3) 01/04/2014    Annual Wellness Visit (AWV)  Never done    DTaP/Tdap/Td vaccine (1 - Tdap) 12/17/2021 (Originally 9/1/1953)    Flu vaccine  Completed    Pneumococcal 65+ years Vaccine  Completed    COVID-19 Vaccine  Completed    Hepatitis A vaccine  Aged Out    Hepatitis B vaccine  Aged Out    Hib vaccine  Aged Out    Meningococcal (ACWY) vaccine  Aged Out     Recommendations for [place] Due: see orders and patient instructions/AVS.    Recommended screening schedule for the next 5-10 years is provided to the patient in written form: see Patient Instructions/AVS.    Africa Barragan was seen today for medicare awv and 6 month follow-up. Diagnoses and all orders for this visit:    Routine general medical examination at a health care facility  -     Basic Metabolic Panel;  Future  -     Hemoglobin A1C; Future    Gastroesophageal reflux disease, unspecified whether esophagitis present    Gait instability    Other orders  -     famotidine (PEPCID) 20 MG tablet; Take 1 tablet by mouth 2 times daily             Return in 6 months (on 12/21/2021) for GERD, balance issues.     Julio Correa MD  6/21/2021  1:42 PM

## 2021-06-21 NOTE — PATIENT INSTRUCTIONS
Personalized Preventive Plan for Vikram Nazario - 6/21/2021  Medicare offers a range of preventive health benefits. Some of the tests and screenings are paid in full while other may be subject to a deductible, co-insurance, and/or copay. Some of these benefits include a comprehensive review of your medical history including lifestyle, illnesses that may run in your family, and various assessments and screenings as appropriate. After reviewing your medical record and screening and assessments performed today your provider may have ordered immunizations, labs, imaging, and/or referrals for you. A list of these orders (if applicable) as well as your Preventive Care list are included within your After Visit Summary for your review. Other Preventive Recommendations:    · A preventive eye exam performed by an eye specialist is recommended every 1-2 years to screen for glaucoma; cataracts, macular degeneration, and other eye disorders. · A preventive dental visit is recommended every 6 months. · Try to get at least 150 minutes of exercise per week or 10,000 steps per day on a pedometer . · Order or download the FREE \"Exercise & Physical Activity: Your Everyday Guide\" from [employer]. Call 1-169.774.8882 or search [employer] online. · You need 7492-7308 mg of calcium and 3369-2948 IU of vitamin D per day. It is possible to meet your calcium requirement with diet alone, but a vitamin D supplement is usually necessary to meet this goal.  · When exposed to the sun, use a sunscreen that protects against both UVA and UVB radiation with an SPF of 30 or greater. Reapply every 2 to 3 hours or after sweating, drying off with a towel, or swimming. · Always wear a seat belt when traveling in a car. Always wear a helmet when riding a bicycle or motorcycle.

## 2021-06-21 NOTE — PROGRESS NOTES
[address]  [address] [address]  Dept: [phone]  Dept Fax: [phone]  Loc: [phone]    Tameka Yo a 80 y.o. female who presents today for her medical conditions/complaints as notedbelow. Ana Lowell is c/o of   Chief Complaint   Patient presents with    Medicare AWV    6 Month Follow-Up       HPI:     HPI Here today for her [address] She is also having trouble swallowing and she has some balance issues in the evening. She has some trouble swallowing. She gets this occasionally. She typically has to cough a little and then she can swallow. No chocking on solid foods. She has a sore throat every morning. She is taking meds for acid reflux and they seem to be helping. She did have her esophagus dilated about 3 years ago. She has been having problems walking in the evening. Her legs just feel wobbly. Her left leg seems to give her the most trouble. She walks very well with a shopping cart. She has a walker at home but she does not use it. She has a few more basal cell cancers on her face that she had scheduled to have removed but she canceled the surgery because she was very distraught about her initial appearance after her last surgery. I explained the risk of untreated cancer to her and strongly advised her to have the procedure done.      Past Medical History:   Diagnosis Date    Actinic keratosis     history of    Arthritis     Atrial fibrillation ([address]     CAD (coronary artery disease)     history of afib    Cancer (HCC)     skin    Cervical prolapse     history of    Diverticulosis     Endocervical polyp     history of    Heel spur     Hip pain     Hypertension     Nasal septal deviation     Osteoporosis     Stress incontinence     history of    Syncope     Vaginal prolapse     history of          Social History     Tobacco Use    Smoking status: Never Smoker    Smokeless tobacco: Never Used   Substance Use Topics    Alcohol use: No     Current Outpatient Medications   Medication Sig Dispense Refill    famotidine (PEPCID) 20 MG tablet Take 1 tablet by mouth 2 times daily 60 tablet 0    nystatin (MYCOSTATIN) 712441 UNIT/GM cream Apply topically 2 times daily. 1 Tube 1    propafenone (RYTHMOL) 150 MG tablet Take 1 tablet by mouth 2 times daily Take 300 mg of propafenone with 25 mg of metoprolol once and start 150 mg propafenone q 12 hours from next day 90 tablet 3    metoprolol tartrate (LOPRESSOR) 25 MG tablet TAKE HALF OF A TABLET TWICE A DAY. 90 tablet 1    pantoprazole (PROTONIX) 40 MG tablet Take 1 tablet by mouth once daily 90 tablet 1    clotrimazole-betamethasone (LOTRISONE) 1-0.05 % cream Apply a thin film to the affected area 2 times daily. 1 Tube 0    warfarin (COUMADIN) 2.5 MG tablet 1. Take 2 tablets by mouth daily. Or as directed by INR results. 180 tablet 3    Handicap Placard MISC by Does not apply route Expires 6/2022 1 each 0    MEGARED OMEGA-3 KRILL  MG CAPS Take by mouth daily       Multiple Vitamins-Minerals (THERAPEUTIC MULTIVITAMIN-MINERALS) tablet Take 1 tablet by mouth daily      Multiple Vitamins-Minerals (ICAPS AREDS 2) CAPS Take 2 tablets by mouth daily        No current facility-administered medications for this visit.      Facility-Administered Medications Ordered in Other Visits   Medication Dose Route Frequency Provider Last Rate Last Admin    betamethasone acetate-betamethasone sodium phosphate (CELESTONE) injection 12 mg  12 mg Intramuscular Once Cal Smith MD              Allergies   Allergen Reactions    Statins Other (See Comments)     Causes severe leg cramps     Tape Ramirez Yeboah Tape] Other (See Comments)     \"sticks to skin\" causes redness    Tizanidine Hcl Other (See Comments)     Having issues with her liver- elevated her enzyme level    Tramadol Other (See Comments)     \"makes me Goofy\"       Subjective: Review of Systems   Constitutional: Negative for activity change, appetite change, chills, fatigue and fever. HENT: Positive for trouble swallowing. Eyes: Negative for visual disturbance. Respiratory: Negative for cough, chest tightness, shortness of breath and wheezing. Cardiovascular: Negative for chest pain, palpitations and leg swelling. Genitourinary: Negative for difficulty urinating. Musculoskeletal: Positive for gait problem (especially at night). Negative for joint swelling. Neurological: Negative for dizziness, syncope, weakness, light-headedness and headaches. Objective:      Physical Exam  Vitals and nursing note reviewed. Constitutional:       General: She is not in acute distress. Appearance: She is well-developed. HENT:      Right Ear: Tympanic membrane, ear canal and external ear normal.      Left Ear: Tympanic membrane, ear canal and external ear normal.   Eyes:      Conjunctiva/sclera: Conjunctivae normal.   Neck:      Thyroid: No thyromegaly. Cardiovascular:      Rate and Rhythm: Normal rate and regular rhythm. Heart sounds: Normal heart sounds. No murmur heard. Pulmonary:      Effort: Pulmonary effort is normal. No respiratory distress. Breath sounds: Normal breath sounds. No wheezing. Musculoskeletal:      Cervical back: Normal range of motion and neck supple. Lymphadenopathy:      Cervical: No cervical adenopathy. Skin:     General: Skin is warm and dry. Findings: No erythema or rash. Neurological:      Mental Status: She is alert and oriented to person, place, and time. Psychiatric:         Mood and Affect: Mood normal.         Behavior: Behavior normal.         Thought Content: Thought content normal.         Judgment: Judgment normal.       /62   Pulse 60   Temp 98 °F (36.7 °C)   Ht 5' 5\" (1.651 m)   Wt 162 lb (73.5 kg)   SpO2 98%   BMI 26.96 kg/m²     Assessment:       Diagnosis Orders   1.  Routine general medical

## 2021-07-15 ENCOUNTER — HOSPITAL ENCOUNTER (OUTPATIENT)
Dept: LAB | Age: 86
Discharge: HOME | End: 2021-07-15
Payer: MEDICARE

## 2021-07-15 ENCOUNTER — HOSPITAL ENCOUNTER (OUTPATIENT)
Dept: PHARMACY | Age: 86
Setting detail: THERAPIES SERIES
Discharge: HOME | End: 2021-07-15
Payer: MEDICARE

## 2021-07-15 ENCOUNTER — HOSPITAL ENCOUNTER (OUTPATIENT)
Dept: NON INVASIVE DIAGNOSTICS | Age: 86
Discharge: HOME | End: 2021-07-15
Payer: MEDICARE

## 2021-07-15 DIAGNOSIS — Z79.01 ANTICOAGULATION MONITORING BY PHARMACIST: Primary | ICD-10-CM

## 2021-07-15 DIAGNOSIS — I48.0 PAROXYSMAL ATRIAL FIBRILLATION WITH RAPID VENTRICULAR RESPONSE (HCC): ICD-10-CM

## 2021-07-15 LAB
ANION GAP SERPL CALCULATED.3IONS-SCNC: 8 MMOL/L (ref 9–17)
BUN BLDV-MCNC: 12 MG/DL (ref 8–23)
BUN/CREAT BLD: 13 (ref 9–20)
CALCIUM SERPL-MCNC: 9.1 MG/DL (ref 8.6–10.4)
CHLORIDE BLD-SCNC: 101 MMOL/L (ref 98–107)
CO2: 31 MMOL/L (ref 20–31)
CREAT SERPL-MCNC: 0.89 MG/DL (ref 0.5–0.9)
EKG ATRIAL RATE: 69 BPM
EKG P AXIS: 15 DEGREES
EKG P-R INTERVAL: 182 MS
EKG Q-T INTERVAL: 410 MS
EKG QRS DURATION: 84 MS
EKG QTC CALCULATION (BAZETT): 439 MS
EKG R AXIS: 55 DEGREES
EKG T AXIS: 21 DEGREES
EKG VENTRICULAR RATE: 69 BPM
GFR AFRICAN AMERICAN: >60 ML/MIN
GFR NON-AFRICAN AMERICAN: >60 ML/MIN
GFR SERPL CREATININE-BSD FRML MDRD: ABNORMAL ML/MIN/{1.73_M2}
GFR SERPL CREATININE-BSD FRML MDRD: ABNORMAL ML/MIN/{1.73_M2}
GLUCOSE BLD-MCNC: 85 MG/DL (ref 70–99)
HCT VFR BLD CALC: 42.5 % (ref 36.3–47.1)
HEMOGLOBIN: 13.4 G/DL (ref 11.9–15.1)
INR BLD: 2.5
INR BLD: 2.7
MCH RBC QN AUTO: 26.5 PG (ref 25.2–33.5)
MCHC RBC AUTO-ENTMCNC: 31.5 G/DL (ref 25.2–33.5)
MCV RBC AUTO: 84.2 FL (ref 82.6–102.9)
NRBC AUTOMATED: 0 PER 100 WBC
PARTIAL THROMBOPLASTIN TIME: 41.3 SEC (ref 23.9–33.8)
PDW BLD-RTO: 16 % (ref 11.8–14.4)
PLATELET # BLD: 139 K/UL (ref 138–453)
PMV BLD AUTO: 9.1 FL (ref 8.1–13.5)
POTASSIUM SERPL-SCNC: 4.5 MMOL/L (ref 3.7–5.3)
PROTHROMBIN TIME: 25.7 SEC (ref 11.5–14.2)
PROTIME: 32.2 SECONDS
RBC # BLD: 5.05 M/UL (ref 3.95–5.11)
SODIUM BLD-SCNC: 140 MMOL/L (ref 135–144)
WBC # BLD: 6.1 K/UL (ref 3.5–11.3)

## 2021-07-15 PROCEDURE — 85730 THROMBOPLASTIN TIME PARTIAL: CPT

## 2021-07-15 PROCEDURE — 36415 COLL VENOUS BLD VENIPUNCTURE: CPT

## 2021-07-15 PROCEDURE — 85610 PROTHROMBIN TIME: CPT

## 2021-07-15 PROCEDURE — 80048 BASIC METABOLIC PNL TOTAL CA: CPT

## 2021-07-15 PROCEDURE — 99211 OFF/OP EST MAY X REQ PHY/QHP: CPT

## 2021-07-15 PROCEDURE — 36416 COLLJ CAPILLARY BLOOD SPEC: CPT

## 2021-07-15 PROCEDURE — 93005 ELECTROCARDIOGRAM TRACING: CPT | Performed by: SPECIALIST

## 2021-07-15 PROCEDURE — 85027 COMPLETE CBC AUTOMATED: CPT

## 2021-07-15 NOTE — PROGRESS NOTES
ANTICOAGULATION SERVICE    Date of Clinic Visit:  7/15/2021    Marco A Chang is a 80 y.o. female who presents to clinic today for anticoagulation monitoring and adjustment. Recent INR Results:  Internal QC passed  Lab Results   Component Value Date    INR 2.5 07/15/2021    INR 2.7 07/15/2021       Current Warfarin Dosage:  Dosing Plan  As of 7/15/2021    TTR:  88.9 % (3.7 y)   Full warfarin instructions:  7/31: Voncile Mate; 8/1: Hold; 8/2: Hold; 8/3: Hold; 8/4: Hold; 8/5: Hold; 8/6: 10 mg; 8/7: 10 mg; Otherwise 5 mg every day               Assessment/Plan:    Continue current regimen as INR remains stable. Patient has upcoming procedure so we set up the calendar to reflect dosing changes. Next appt is set up to reflect re-start time. Patient was given instructions as to how to re-start warfarin after procedure as well. Pt verbalized understanding. Contact information available for questions. Next Clinic Appointment:  Return date  As of 7/15/2021    TTR:  88.9 % (3.7 y)   Next INR check:  8/18/2021             Please call [facility] at [phone] with any questions. Thanks!   NIKOLAI Peña NATHALIE [facility]  Anticoagulation Service Pharmacist  7/15/2021 1:08 PM  For Pharmacy Admin Tracking Only     Intervention Detail: Dose Adjustment: 1, reason: Therapy Optimization   Total # of Interventions Recommended: 1   Total # of Interventions Accepted: 1   Time Spent (min): 20

## 2021-07-22 ENCOUNTER — TELEPHONE (OUTPATIENT)
Dept: FAMILY MEDICINE CLINIC | Age: 86
End: 2021-07-22

## 2021-07-22 DIAGNOSIS — M16.12 ARTHRITIS OF LEFT HIP: Primary | ICD-10-CM

## 2021-07-22 NOTE — TELEPHONE ENCOUNTER
Patient requesting for an order for physical therapy for her left leg. She states this was discussed previously.   She would like complete here at the clinic

## 2021-07-29 NOTE — PROGRESS NOTES
NPO after midnight except for sip of water with heart/BP meds  Follow instructions given by surgeon including medications to hold   Bring insurance card and photo ID  Shower morning of surgery with liquid antibacterial soap  Wear loose comfortable clothing  Remove jewelry and do not bring valuables  Bring list of medications with dosages and how often taken if not reviewed with PAT    needed at discharge at [facility] 25years old  Call PAT at [phone] for questions

## 2021-08-03 ENCOUNTER — HOSPITAL ENCOUNTER (OUTPATIENT)
Dept: PHYSICAL THERAPY | Age: 86
Setting detail: THERAPIES SERIES
Discharge: HOME | End: 2021-08-03
Payer: MEDICARE

## 2021-08-03 PROCEDURE — 97161 PT EVAL LOW COMPLEX 20 MIN: CPT

## 2021-08-03 PROCEDURE — 97110 THERAPEUTIC EXERCISES: CPT

## 2021-08-03 ASSESSMENT — PAIN DESCRIPTION - PROGRESSION: CLINICAL_PROGRESSION: NOT CHANGED

## 2021-08-03 ASSESSMENT — PAIN DESCRIPTION - ORIENTATION: ORIENTATION: LEFT;RIGHT

## 2021-08-03 ASSESSMENT — PAIN SCALES - GENERAL: PAINLEVEL_OUTOF10: 1

## 2021-08-03 ASSESSMENT — PAIN DESCRIPTION - ONSET: ONSET: ON-GOING

## 2021-08-03 ASSESSMENT — PAIN DESCRIPTION - FREQUENCY: FREQUENCY: INTERMITTENT

## 2021-08-03 ASSESSMENT — PAIN DESCRIPTION - DESCRIPTORS: DESCRIPTORS: DULL

## 2021-08-03 ASSESSMENT — PAIN DESCRIPTION - PAIN TYPE: TYPE: CHRONIC PAIN

## 2021-08-03 ASSESSMENT — PAIN DESCRIPTION - LOCATION: LOCATION: LEG;HIP

## 2021-08-03 NOTE — FLOWSHEET NOTE
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  · Treatment Diagnosis: L LE weakness, L hip pain, decreased balance  Insurance/Certification information:  PT Insurance Information: Medicare  Physician Information:  Referring Practitioner: Russ Ziegler of care signed (Y/N):  n  Visit# / total visits:  1/10  Pain level: 1/10       Time In: 1:05 pm   Time Out: 2:05 pm    Progress Note: [x]  Yes  []  No  Next due by: Visit #10 or by 21    Subjective:   See eval    Objective: see eval  Observation:   Test measurements:      Exercises:   Exercise/Equipment Resistance/Repetitions Other comments        Nustep          Prone prop 5'    Prone hip ext 10x    Prone knee ext unilaterally  10x         sidelying clamshell 10x    Supine bridge           Standing 3 way hip     Counter exercises     Modified lumbar extension 10x         Repeated sit to stand                              Gait with cane vs walker                    [x] Provided verbal/tactile cueing for activities related to strengthening, flexibility, endurance, ROM. (01992)  [] Provided verbal/tactile cueing for activities related to improving balance, coordination, kinesthetic sense, posture, motor skill, proprioception. (99954)    Therapeutic Activities:     [] Therapeutic activities, direct (one-on-one) patient contact (use of dynamic activities to improve functional performance). (03524)    Gait:   [] Provided training and instruction to the patient for ambulation re-education. (12469)    Self-Care/ADL's  [] Self-care/home management training and compensatory training, meal preparation, safety procedures, and instructions in use of assistive technology devices/adaptive equipment, direct one-on-one contact.  (03162)    Home Exercise Program:     [x] Reviewed/Progressed HEP activities related to strengthening, flexibility, endurance, ROM. (96943)  [] Reviewed/Progressed HEP activities related to improving balance, coordination, kinesthetic sense, posture, motor skill, proprioception.  (05090)    Manual Treatments:    [] Provided manual therapy to mobilize soft tissue/joints for the purpose of modulating pain, promoting relaxation,  increasing ROM, reducing/eliminating soft tissue swelling/inflammation/restriction, improving soft tissue extensibility. (54292)    Service Based Modalities:  39' eval    Timed Code Treatment Minutes:    15' there-ex    Total Treatment Minutes:   61'    Treatment/Activity Tolerance:  [x] Patient tolerated treatment well [] Patient limited by fatique  [] Patient limited by pain  [] Patient limited by other medical complications  [] Other:     Prognosis: [x] Good [] Fair  [] Poor    Patient Requires Follow-up: [x] Yes  [] No      Goals:  Short term goals  Time Frame for Short term goals: 1 week  Short term goal 1: Initiate HEP  Short term goal 2: Assess balance to determine fall risk. Long term goals  Time Frame for Long term goals : 4 weeks  Long term goal 1: Improve L hip abduction and ER strength to 3/5 to facilitate transfers and gait. Long term goal 2: Decrease TUG to 20'' with least restrictive assistive device for more efficient gait. Long term goal 3: Improve modified LEFS to at least 38/64 = 60% functional for improved participation in daily activity.   Long term goal 4: Decrease hip pain to not more than 4/10 with amb to improve tolerance to walking at home and in the community          Plan:   [] Continue per plan of care [] Alter current plan (see comments)  [x] Plan of care initiated [] Hold pending MD visit [] Discharge    Plan for Next Session:  Progress exercises for LE strength focus on L hip abd and ER; balance exercises    Electronically signed by:  Magan Snow, OCTAVIANO, PTA

## 2021-08-03 NOTE — PROGRESS NOTES
Physical Therapy  Initial Assessment  Date: 8/3/2021  Patient Name: Bar Guido  MRN: [id number]  : 1934     Treatment Diagnosis: L LE weakness, L hip pain, decreased balance    Restrictions: none       Subjective   General  Chart Reviewed: Yes  Patient assessed for rehabilitation services?: Yes  Response To Previous Treatment: Not applicable  Family / Caregiver Present: No  Referring Practitioner: Lamont Weiss  Referral Date : 21  Diagnosis: M16.12 OA L hip  Follows Commands: Within Functional Limits  PT Visit Information  PT Insurance Information: Medicare  Subjective  Subjective: Pt reports she has leg pains, not all the time. She has balance problems, \"my balance is not good. \" States she fell in her house 2-3 months ago, my left stopped didn't have to go the ER after. Had L hip replaced . Pain sometimes in both legs, mostly in L, also pain in L lateral hip. Pain is worse with walking, goes away when she sits and elevates the legs.   Pain Screening  Patient Currently in Pain: Yes  Pain Assessment  Pain Assessment: 0-10  Pain Level: 1 (L leg; at worst pain is 8/10 with walking)  Patient's Stated Pain Goal: No pain  Pain Type: Chronic pain  Pain Location: Leg;Hip  Pain Orientation: Left;Right  Pain Descriptors: Dull  Pain Frequency: Intermittent  Pain Onset: On-going  Clinical Progression: Not changed  Vital Signs  Patient Currently in Pain: Yes    Vision/Hearing  Vision  Vision: Within Functional Limits  Hearing  Hearing: Within functional limits    Orientation  Orientation  Overall Orientation Status: Within Normal Limits    Social/Functional History  Social/Functional History  Lives With: Spouse  Type of Home: House  Home Layout: Multi-level  Home Access: Stairs to enter with rails  Entrance Stairs - Number of Steps: 1 - garage  Entrance Stairs - Rails: Right  Bathroom Shower/Tub: Walk-in shower  Bathroom Equipment: Shower chair;Grab bars around toilet  Bathroom Accessibility: Walker accessible  ADL Assistance: Independent  Homemaking Assistance: Independent  Ambulation Assistance: Independent  Transfer Assistance: Independent  Active : Yes  Mode of Transportation: Car  Occupation: Other(comment) (housewife)  Leisure & Hobbies: used to bowl before COVID    Objective     Observation/Palpation  Posture: Fair  Observation: flexed deformity, forward head    AROM RLE (degrees)  RLE AROM: WFL  RLE General AROM: except limited hip ext to approx neutral  AROM LLE (degrees)  LLE AROM : WFL  LLE General AROM: except limited hip ext lacking approx 10 deg from neutral  Spine  Lumbar: flexion WNL, extension major limitation 50%, sideglide moderate restriction bilat  Special Tests: RFIS no change (increased dizziness); KAREN no change; REIL no change    Strength RLE  R Hip Flexion: 4+/5  R Hip Extension: 4-/5  R Hip External Rotation: 4-/5  R Knee Flexion: 4+/5  R Knee Extension: 5/5  R Ankle Dorsiflexion: 5/5  Strength LLE  Strength LLE: Exception  L Hip Flexion: 4/5  L Hip Extension: 3/5  L Hip ABduction: 2+/5 (with pain)  L Hip External Rotation: 2+/5  L Knee Flexion: 4-/5  L Knee Extension: 4-/5  L Ankle Dorsiflexion: 4+/5     Additional Measures  Special Tests: + L slump; + femoral nerve B; +Ely's bilat for tight rectus femoris; + Trendelenburg bilat, worse on L than R  Other: TUG = 28'' without AD        Transfers  Comment: independent but unsteady with sit to stand, requires several seconds before able to take steps away from chair; dizzy with supine to sit, recovers after approx 1-2 minutes       Ambulation  Ambulation?: Yes  Ambulation 1  Surface: level tile  Device: No Device  Assistance: Independent  Gait Deviations: Slow Demetra; Increased BANDAR; Decreased step length;Decreased step height;Decreased arm swing  Distance: Decreased demetra, bilat toe-out, head forward to watch feet/floor, arms slightly wide in guard position, decreased heel strike and foot clearance bilat, + Trendelenburg during gait bilat, worse on L LE than right        Assessment   Conditions Requiring Skilled Therapeutic Intervention  Body structures, Functions, Activity limitations: Decreased functional mobility ; Decreased ADL status; Decreased strength;Decreased balance; Increased pain  Treatment Diagnosis: L LE weakness, L hip pain, decreased balance  Prognosis: Fair  Decision Making: Low Complexity  REQUIRES PT FOLLOW UP: Yes  Activity Tolerance  Activity Tolerance: Patient Tolerated treatment well         Plan   Plan  Times per week: 2  Plan weeks: 4  Current Treatment Recommendations: Strengthening, ROM, Balance Training, Stair training, Gait Training, Home Exercise Program, Safety Education & Training, Patient/Caregiver Education & Training    OutComes Score   modified LEFS = 28/64 = 40% functional                  Goals  Short term goals  Time Frame for Short term goals: 1 week  Short term goal 1: Initiate HEP  Short term goal 2: Assess balance to determine fall risk. Long term goals  Time Frame for Long term goals : 4 weeks  Long term goal 1: Improve L hip abduction and ER strength to 3/5 to facilitate transfers and gait. Long term goal 2: Decrease TUG to 20'' with least restrictive assistive device for more efficient gait. Long term goal 3: Improve modified LEFS to at least 38/64 = 60% functional for improved participation in daily activity.   Long term goal 4: Decrease hip pain to not more than 4/10 with amb to improve tolerance to walking at home and in the community  Patient Goals   Patient goals : Better balance and no leg pain       Therapy Time   Individual Concurrent Group Co-treatment   Time In 1305         Time Out 1405         Minutes 60         Timed Code Treatment Minutes: [address] [facility], [facility], PTA

## 2021-08-03 NOTE — PLAN OF CARE
[facility]    [x] [place]  Phone: [phone]  Fax: [phone]      [] [place]  Phone: [phone]  Fax: [phone]        To: Referring Practitioner: Maribell Love      Patient: Gay Turcios  : 1934   MRN: [id number]  Evaluation Date: 8/3/2021      Diagnosis Information:  · Diagnosis: M16.12 OA L hip   · Treatment Diagnosis: L LE weakness, L hip pain, decreased balance     Physical Therapy Certification/Re-Certification Form  Dear Cata Abbasi,   The following patient has been evaluated for physical therapy services and for therapy to continue, Medicare requires monthly physician review of the treatment plan. Please review the attached evaluation and/or summary of the patient's plan of care, and verify that you agree therapy should continue by signing the attached document and sending it back to our office. Plan of Care/Treatment to date:  [x] Therapeutic Exercise    [] Modalities:  [] Therapeutic Activity     [] Ultrasound  [] Electrical Stimulation  [x] Gait Training      [] Cervical Traction [] Lumbar Traction  [] Neuromuscular Re-education    [] Cold/hotpack [] Iontophoresis   [x] Instruction in HEP     Other:  [] Manual Therapy      []             [] Aquatic Therapy      []           ? Goals:  Short term goals  Time Frame for Short term goals: 1 week  Short term goal 1: Initiate HEP  Short term goal 2: Assess balance to determine fall risk. Long term goals  Time Frame for Long term goals : 4 weeks  Long term goal 1: Improve L hip abduction and ER strength to 3/5 to facilitate transfers and gait. Long term goal 2: Decrease TUG to 20'' with least restrictive assistive device for more efficient gait. Long term goal 3: Improve modified LEFS to at least 38/64 = 60% functional for improved participation in daily activity.   Long term goal 4: Decrease hip pain to not more than 4/10 with amb to improve tolerance to walking at home and in the community    Frequency/Duration: 8/3/21 - 8/31/21  # Days per week: [] 1 day # Weeks: [] 1 week [] 5 weeks     [x] 2 days? [] 2 weeks [] 6 weeks     [] 3 days   [] 3 weeks [] 7 weeks     [] 4 days   [x] 4 weeks [] 8 weeks    Rehab Potential: [] Excellent [] Good [x] Fair  [] Poor     Electronically signed by:  Delaney Bryant, SPT, WOODY Bosch PT    If you have any questions or concerns, please don't hesitate to call.   Thank you for your referral.      Physician Signature:________________________________Date:__________________  By signing above, therapists plan is approved by physician

## 2021-08-06 ENCOUNTER — ANESTHESIA (OUTPATIENT)
Dept: OPERATING ROOM | Age: 86
End: 2021-08-06
Payer: MEDICARE

## 2021-08-06 ENCOUNTER — ANESTHESIA EVENT (OUTPATIENT)
Dept: OPERATING ROOM | Age: 86
End: 2021-08-06
Payer: MEDICARE

## 2021-08-06 ENCOUNTER — HOSPITAL ENCOUNTER (OUTPATIENT)
Age: 86
Setting detail: OUTPATIENT SURGERY
Discharge: HOME | End: 2021-08-06
Attending: SPECIALIST | Admitting: SPECIALIST
Payer: MEDICARE

## 2021-08-06 VITALS
TEMPERATURE: 97.5 F | SYSTOLIC BLOOD PRESSURE: 116 MMHG | DIASTOLIC BLOOD PRESSURE: 76 MMHG | HEIGHT: 65 IN | OXYGEN SATURATION: 94 % | HEART RATE: 88 BPM | WEIGHT: 159 LBS | BODY MASS INDEX: 26.49 KG/M2 | RESPIRATION RATE: 18 BRPM

## 2021-08-06 VITALS
TEMPERATURE: 96.8 F | RESPIRATION RATE: 17 BRPM | DIASTOLIC BLOOD PRESSURE: 79 MMHG | OXYGEN SATURATION: 96 % | SYSTOLIC BLOOD PRESSURE: 129 MMHG

## 2021-08-06 PROCEDURE — 3600000002 HC SURGERY LEVEL 2 BASE: Performed by: SPECIALIST

## 2021-08-06 PROCEDURE — 6370000000 HC RX 637 (ALT 250 FOR IP): Performed by: SPECIALIST

## 2021-08-06 PROCEDURE — 2580000003 HC RX 258: Performed by: NURSE ANESTHETIST, CERTIFIED REGISTERED

## 2021-08-06 PROCEDURE — 3700000000 HC ANESTHESIA ATTENDED CARE: Performed by: SPECIALIST

## 2021-08-06 PROCEDURE — 2500000003 HC RX 250 WO HCPCS: Performed by: NURSE ANESTHETIST, CERTIFIED REGISTERED

## 2021-08-06 PROCEDURE — 3600000012 HC SURGERY LEVEL 2 ADDTL 15MIN: Performed by: SPECIALIST

## 2021-08-06 PROCEDURE — 7100000010 HC PHASE II RECOVERY - FIRST 15 MIN: Performed by: SPECIALIST

## 2021-08-06 PROCEDURE — 2500000003 HC RX 250 WO HCPCS: Performed by: SPECIALIST

## 2021-08-06 PROCEDURE — 3700000001 HC ADD 15 MINUTES (ANESTHESIA): Performed by: SPECIALIST

## 2021-08-06 PROCEDURE — 6370000000 HC RX 637 (ALT 250 FOR IP)

## 2021-08-06 PROCEDURE — 2709999900 HC NON-CHARGEABLE SUPPLY: Performed by: SPECIALIST

## 2021-08-06 PROCEDURE — 7100000011 HC PHASE II RECOVERY - ADDTL 15 MIN: Performed by: SPECIALIST

## 2021-08-06 PROCEDURE — 6360000002 HC RX W HCPCS: Performed by: SPECIALIST

## 2021-08-06 RX ORDER — HYDROCODONE BITARTRATE AND ACETAMINOPHEN 5; 325 MG/1; MG/1
TABLET ORAL
Status: COMPLETED
Start: 2021-08-06 | End: 2021-08-06

## 2021-08-06 RX ORDER — PROPOFOL 10 MG/ML
INJECTION, EMULSION INTRAVENOUS PRN
Status: DISCONTINUED | OUTPATIENT
Start: 2021-08-06 | End: 2021-08-06 | Stop reason: SDUPTHER

## 2021-08-06 RX ORDER — SODIUM CHLORIDE 9 MG/ML
INJECTION, SOLUTION INTRAVENOUS CONTINUOUS
Status: DISCONTINUED | OUTPATIENT
Start: 2021-08-06 | End: 2021-08-06 | Stop reason: HOSPADM

## 2021-08-06 RX ORDER — GINSENG 100 MG
CAPSULE ORAL PRN
Status: DISCONTINUED | OUTPATIENT
Start: 2021-08-06 | End: 2021-08-06 | Stop reason: ALTCHOICE

## 2021-08-06 RX ORDER — HYDROCODONE BITARTRATE AND ACETAMINOPHEN 5; 325 MG/1; MG/1
1 TABLET ORAL ONCE
Status: COMPLETED | OUTPATIENT
Start: 2021-08-06 | End: 2021-08-06

## 2021-08-06 RX ORDER — SODIUM CHLORIDE 9 MG/ML
INJECTION, SOLUTION INTRAVENOUS CONTINUOUS PRN
Status: DISCONTINUED | OUTPATIENT
Start: 2021-08-06 | End: 2021-08-06 | Stop reason: SDUPTHER

## 2021-08-06 RX ORDER — PROPOFOL 10 MG/ML
INJECTION, EMULSION INTRAVENOUS CONTINUOUS PRN
Status: DISCONTINUED | OUTPATIENT
Start: 2021-08-06 | End: 2021-08-06 | Stop reason: SDUPTHER

## 2021-08-06 RX ORDER — LIDOCAINE HYDROCHLORIDE AND EPINEPHRINE 10; 10 MG/ML; UG/ML
INJECTION, SOLUTION INFILTRATION; PERINEURAL PRN
Status: DISCONTINUED | OUTPATIENT
Start: 2021-08-06 | End: 2021-08-06 | Stop reason: ALTCHOICE

## 2021-08-06 RX ORDER — LIDOCAINE HYDROCHLORIDE 10 MG/ML
INJECTION, SOLUTION INFILTRATION; PERINEURAL PRN
Status: DISCONTINUED | OUTPATIENT
Start: 2021-08-06 | End: 2021-08-06 | Stop reason: SDUPTHER

## 2021-08-06 RX ADMIN — SODIUM CHLORIDE: 9 INJECTION, SOLUTION INTRAVENOUS at 10:44

## 2021-08-06 RX ADMIN — PROPOFOL 30 MG: 10 INJECTION, EMULSION INTRAVENOUS at 11:00

## 2021-08-06 RX ADMIN — PROPOFOL 40 MG: 10 INJECTION, EMULSION INTRAVENOUS at 10:49

## 2021-08-06 RX ADMIN — LIDOCAINE HYDROCHLORIDE 60 MG: 10 INJECTION, SOLUTION INFILTRATION; PERINEURAL at 10:49

## 2021-08-06 RX ADMIN — CEFAZOLIN 2000 MG: 10 INJECTION, POWDER, FOR SOLUTION INTRAVENOUS at 10:50

## 2021-08-06 RX ADMIN — HYDROCODONE BITARTRATE AND ACETAMINOPHEN 1 TABLET: 5; 325 TABLET ORAL at 12:19

## 2021-08-06 RX ADMIN — PROPOFOL 20 MG: 10 INJECTION, EMULSION INTRAVENOUS at 10:50

## 2021-08-06 RX ADMIN — PROPOFOL 60 MCG/KG/MIN: 10 INJECTION, EMULSION INTRAVENOUS at 10:52

## 2021-08-06 ASSESSMENT — PAIN - FUNCTIONAL ASSESSMENT: PAIN_FUNCTIONAL_ASSESSMENT: 0-10

## 2021-08-06 ASSESSMENT — PAIN SCALES - GENERAL: PAINLEVEL_OUTOF10: 10

## 2021-08-06 ASSESSMENT — PAIN DESCRIPTION - DESCRIPTORS: DESCRIPTORS: CONSTANT;ACHING

## 2021-08-06 NOTE — H&P
[address] [address]  History and Physical Update    Pt Name: Nette English  MRN: [id number]  YOB: 1934  Date of evaluation: 8/6/2021    I have examined the patient and reviewed the H&P/Consult and there are no changes to the patient or plans.       Tonia Thomas MD  Electronically signed 8/6/2021 at 6:56 AM

## 2021-08-06 NOTE — OP NOTE
Operative Note    Patient name: Arthea Ahumada             Medical Record Number: [id number]    Primary Care Physician: Aditi Faustin MD     1934    Date of Procedure: 2021    Pre-operative Diagnosis:  1.  1.5cm2 defect of right dorsal nose s/p MOHS for squamous cell carcinoma       2.  4cm2 defect of left forehead s/p MOHS for squamous cell carcinoma    Post-operative Diagnosis: Same    Procedure Performed:  1. Full thickness skin graft (1.5 cm2) repair of right dorsal nose defect (CPT 34224)       2. Repair of left forehead defect with an adjacent tissue transfer (12cm2) (CPT 81547)    Surgeons/Assistants: MD Juan Carlos Win PA-C    Estimated Blood Loss: 5ml     Complications: none immediately appreciated    Procedure: With the patient lying in the supine position and under adequate anesthesia per the anesthesia team.   Local anesthesia consisting of 19 ml of 1% Lidocaine 1:100,000 with epinephrine solution of the donor site of the right preauricular sulcus as well as the nasal and left forehead defects. The areas were prepped and draped in the standard surgical fashion. The patient has very thin skin and a large defect of right dorsal nose defect which could not be closed primarily, therefore a full thickness skin graft was taken. It was defatted and secured in position with a 3-0 silk suture tie and then a 5-0 fast absorbable suture was placed in a simple running fashion. A Bacitracin Xeroform and moist cotton ball tie over bolster was secured using the tails of the silk sutures. The donor site was closed with a 4-0 Monocryl suture placed in interrupted buried fashion and then Histoacryl respectively. Regarding the left forehead defect this was 4cm2 and required a 12cm2 sum of defect/adjacent tissue transfer (rotation flap) was designed, elevated and inset with 4-0 Monocryl suture to prevent unacceptable elevation of left brow and/or lagophthalmus.   Final closure was completed using 5-0 fast absorbing suture with Histoacryl after the Burrow's triangles were removed. The patient tolerated the procedure well and remained hemodynamically stable throughout the procedure and was quite comfortable throughout the operative course. 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  1.5cm2 defect of right dorsal nose s/p MOHS for squamous cell carcinoma       2.  4cm2 defect of left forehead s/p MOHS for squamous cell carcinoma      Detailed Description of Procedure:   1. Full thickness skin graft (1.5 cm2) repair of right dorsal nose defect (CPT 56946)       2.   Repair of left forehead defect with an adjacent tissue transfer (12cm2) (CPT [id number])    Electronically signed by Dao Pozo MD on 8/6/2021 at 11:41 AM

## 2021-08-06 NOTE — PROGRESS NOTES
1139- Pt to PACU phase 2 Richie Jackson Rn at bedside for report, pt sleepy on arrival didn't want anything to eat or drink wanted to wake up a bit. Pt has glue to right ear dry and intact, pt has yellow bolster to nose dressing dry and intact, pt has glue to left forehead, dry and intact as well. .  6473- Pt daughter brought back to room. 1- Dr Hank Joy in room  1155- IV capped off pt assisted to bathroom  [id number]- Pt back to room snack given more awake now  1214- Called back to OR, Kina GAVIRIA will write her something. 1219- Pt given 1 norco she states her pain is a 10 in her head.   1251- Pt states she is feeling better, IV removed pt getting dressed  46- PT and daughter given discharge instructions verbalized understanding, prescription paper clipped to AVS.  1305- Pt discharged walked out to car with this RN

## 2021-08-06 NOTE — ANESTHESIA POSTPROCEDURE EVALUATION
Department of Anesthesiology  Postprocedure Note    Patient: Saint Norfolk  MRN: [id number]  YOB: 1934  Date of evaluation: 8/6/2021  Time:  2:18 PM     Procedure Summary     Date: 08/06/21 Room / Location: [facility] 04 / [address] [facility] [place]    Anesthesia Start: 0709 Anesthesia Stop: 1635    Procedure: MOHS DEFECT REPAIR SCC DORSAL NOSE AND LEFT LOWER FOREHEAD WITH SKIN GRAFT FROM RIGHT CHEEK/EAR (PLACED ON NOSE) (Left Face) Diagnosis: (SCC DORSAL NOSE AND LEFT LOWER FOREHEAD)    Surgeons: Karoline Walker MD Responsible Provider: Mora Tenorio DO    Anesthesia Type: MAC ASA Status: 3          Anesthesia Type: MAC    [place] Phase I: Koko Score: 10    Koko Phase II: Koko Score: 9    Last vitals: Reviewed and per EMR flowsheets.        Anesthesia Post Evaluation    Patient location during evaluation: bedside  Patient participation: complete - patient participated  Level of consciousness: awake and alert  Pain score: 0  Airway patency: patent  Nausea & Vomiting: no nausea and no vomiting  Complications: no  Cardiovascular status: hemodynamically stable and blood pressure returned to baseline  Respiratory status: spontaneous ventilation, room air and acceptable  Hydration status: stable

## 2021-08-06 NOTE — ANESTHESIA PRE PROCEDURE
Department of Anesthesiology  Preprocedure Note       Name:  Mana Floyd   Age:  80 y.o.  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 Authorizing Provider   famotidine (PEPCID) 20 MG tablet Take 1 tablet by mouth 2 times daily 21   Jeffy Conte MD   nystatin (MYCOSTATIN) 656982 UNIT/GM cream Apply topically 2 times daily. 21   Gerardo Valdez,    propafenone (RYTHMOL) 150 MG tablet Take 1 tablet by mouth 2 times daily Take 300 mg of propafenone with 25 mg of metoprolol once and start 150 mg propafenone q 12 hours from next day 21   Aristeo Fisher DO   metoprolol tartrate (LOPRESSOR) 25 MG tablet TAKE HALF OF A TABLET TWICE A DAY. 21   Jeffy Conte MD   pantoprazole (PROTONIX) 40 MG tablet Take 1 tablet by mouth once daily 21   Jeffy Conte MD   clotrimazole-betamethasone (LOTRISONE) 1-0.05 % cream Apply a thin film to the affected area 2 times daily. 21   Radha Quezada MD   warfarin (COUMADIN) 2.5 MG tablet 1. Take 2 tablets by mouth daily. Or as directed by INR results.  20   Jeffy Conte MD   Handicap Placard MISC by Does not apply route Expires 2022 6/15/17   MD MARNIE Garcia OMEGA-3 KRILL  MG CAPS Take by mouth daily     Historical Provider, MD   Multiple Vitamins-Minerals (THERAPEUTIC MULTIVITAMIN-MINERALS) tablet Take 1 tablet by mouth daily    Historical Provider, MD   Multiple Vitamins-Minerals (ICAPS AREDS 2) CAPS Take 2 tablets by mouth daily     Historical Provider, MD       Current medications:    Current Facility-Administered Medications   Medication Dose Route Frequency Provider Last Rate Last Admin    0.9 % sodium chloride infusion Intravenous Continuous Yenifer Castro MD        ceFAZolin (ANCEF) 2000 mg in dextrose 5 % 50 mL IVPB  2,000 mg Intravenous Axsonkenzie Dowd MD         Facility-Administered Medications Ordered in Other Encounters   Medication Dose Route Frequency Provider Last Rate Last Admin    betamethasone acetate-betamethasone sodium phosphate (CELESTONE) injection 12 mg  12 mg Intramuscular Once Tj Best MD           Allergies:     Allergies   Allergen Reactions    Statins Other (See Comments)     Causes severe leg cramps     Tape [place] Gates Tape] Other (See Comments)     \"sticks to skin\" causes redness    Tizanidine Hcl Other (See Comments)     Having issues with her liver- elevated her enzyme level    Tramadol Other (See Comments)     \"makes me Goofy\"       Problem List:    Patient Active Problem List   Diagnosis Code    Trochanteric bursitis of left hip M70.62    Drug-induced constipation K59.03    Bradycardia R00.1    Esophageal stricture K22.2    Gastritis without bleeding K29.70    Lumbar radicular pain M54.16    Lumbar spine pain M54.5    Left hip pain M25.552    Arthritis of left hip M16.12    Paroxysmal atrial fibrillation with rapid ventricular response (HCC) I48.0    Anticoagulation monitoring by pharmacist Z79.01    Venous stasis dermatitis of both lower extremities I87.2    Arthritis of right hand M19.041    Vaginal erosion secondary to pessary use (HCC) T83.89XA, N89.8    Pelvic relaxation due to uterovaginal prolapse N81.4    Postmenopausal bleeding N95.0    History of hysterectomy Z90.710       Past Medical History:        Diagnosis Date    Actinic keratosis     history of    Arthritis     Atrial fibrillation (HCC)     CAD (coronary artery disease)     history of afib    Cancer (HCC)     skin    Cervical prolapse     history of    Diverticulosis     Endocervical polyp     history of    Heel spur     Hip pain     Hypertension     Nasal septal deviation     Counseling given: Not Answered      Vital Signs (Current):   Vitals:    07/29/21 1247   Weight: 162 lb (73.5 kg)   Height: 5' 5\" (1.651 m)                                              BP Readings from Last 3 Encounters:   06/21/21 130/62   05/06/21 (!) 156/79   04/20/21 110/60       NPO Status:                                                                                 BMI:   Wt Readings from Last 3 Encounters:   07/29/21 162 lb (73.5 kg)   06/21/21 162 lb (73.5 kg)   05/06/21 161 lb (73 kg)     Body mass index is 26.96 kg/m². CBC:   Lab Results   Component Value Date    WBC 6.1 07/15/2021    RBC 5.05 07/15/2021    RBC 3.84 07/29/2011    HGB 13.4 07/15/2021    HCT 42.5 07/15/2021    MCV 84.2 07/15/2021    RDW 16.0 07/15/2021     07/15/2021       CMP:   Lab Results   Component Value Date     07/15/2021    K 4.5 07/15/2021     07/15/2021    CO2 31 07/15/2021    BUN 12 07/15/2021    CREATININE 0.89 07/15/2021    GFRAA >60 07/15/2021    LABGLOM >60 07/15/2021    LABGLOM 80 07/16/2017    GLUCOSE 85 07/15/2021    PROT 7.3 01/19/2021    CALCIUM 9.1 07/15/2021    BILITOT 0.63 01/19/2021    ALKPHOS 104 01/19/2021    AST 28 01/19/2021    AST 28 01/19/2021    ALT 31 01/19/2021    ALT 31 01/19/2021       POC Tests: No results for input(s): POCGLU, POCNA, POCK, POCCL, POCBUN, POCHEMO, POCHCT in the last 72 hours.     Coags:   Lab Results   Component Value Date    PROTIME 25.7 07/15/2021    PROTIME 32.2 07/15/2021    INR 2.5 07/15/2021    APTT 41.3 07/15/2021       HCG (If Applicable): No results found for: PREGTESTUR, PREGSERUM, HCG, HCGQUANT     ABGs: No results found for: PHART, PO2ART, NZS1SPI, PTW4MEC, BEART, V3RKDSJI     Type & Screen (If Applicable):  Lab Results   Component Value Date    LABRH NEG 07/27/2011       Drug/Infectious Status (If Applicable):  Lab Results   Component Value Date    HEPCAB Negative 07/16/2017       COVID-19 Screening (If Applicable):   Lab Results Component Value Date    COVID19 Not Detected 01/21/2021           Anesthesia Evaluation  Patient summary reviewed and Nursing notes reviewed no history of anesthetic complications:   Airway: Mallampati: II  TM distance: >3 FB   Neck ROM: limited  Mouth opening: > = 3 FB Dental:          Pulmonary:Negative Pulmonary ROS and normal exam  breath sounds clear to auscultation                             Cardiovascular:  Exercise tolerance: good (>4 METS),   (+) hypertension:, CAD:,       ECG reviewed      Echocardiogram reviewed                  Neuro/Psych:   (+) neuromuscular disease:,             GI/Hepatic/Renal: Neg GI/Hepatic/Renal ROS            Endo/Other: Negative Endo/Other ROS             Pt had no PAT visit       Abdominal:             Vascular: negative vascular ROS. Other Findings:             Anesthesia Plan      MAC     ASA 3       Induction: intravenous. Anesthetic plan and risks discussed with patient. Plan discussed with CRNA.                   [address],    8/6/2021

## 2021-08-10 ENCOUNTER — APPOINTMENT (OUTPATIENT)
Dept: PHYSICAL THERAPY | Age: 86
End: 2021-08-10
Payer: MEDICARE

## 2021-08-12 ENCOUNTER — APPOINTMENT (OUTPATIENT)
Dept: PHYSICAL THERAPY | Age: 86
End: 2021-08-12
Payer: MEDICARE

## 2021-08-17 ENCOUNTER — HOSPITAL ENCOUNTER (OUTPATIENT)
Dept: PHYSICAL THERAPY | Age: 86
Setting detail: THERAPIES SERIES
Discharge: HOME | End: 2021-08-17
Payer: MEDICARE

## 2021-08-17 PROCEDURE — 97110 THERAPEUTIC EXERCISES: CPT

## 2021-08-17 NOTE — FLOWSHEET NOTE
Physical Therapy Daily Treatment Note    Date:  2021    Patient Name:  Billy Alas    :  1934  MRN: [id number]  Restrictions/Precautions:     Medical/Treatment Diagnosis Information:   · Diagnosis: M16.12 OA L hip  · Treatment Diagnosis: L LE weakness, L hip pain, decreased balance  Insurance/Certification information:  PT Insurance Information: Medicare  Physician Information:  Referring Practitioner: Zoila Garrett of care signed (Y/N):  n  Visit# / total visits:  2/10  Pain level: 6/10       Time In: 2:02 pm   Time Out: 2:40 pm    Progress Note: [x]  Yes  []  No  Next due by: Visit #10 or by 21    Subjective:  Patient states \"it is killing me its in my hip\". Last night the pain was in both hips and she had to sit down to relieve the pain. Objective: Initiated several exercises on this date with good patient tolerance. Most challenged b y positional changes while performing exercises on the mat table. Demonstration provided for all exercises. Observation: Ambulates with straight cane. Test measurements:      Exercises:   Exercise/Equipment Resistance/Repetitions Other comments        Nustep 6' level 3        Prone prop 5'    Prone hip ext 15x    Prone knee ext unilaterally  15x         sidelying clamshell 15x    Supine bridge 15x          Standing 3 way hip 10x    Counter exercises 10x    Modified lumbar extension 10x    Mini squats 10x    Repeated sit to stand 10x No UE support                            Gait with cane vs walker                    [x] Provided verbal/tactile cueing for activities related to strengthening, flexibility, endurance, ROM. (97654)  [] Provided verbal/tactile cueing for activities related to improving balance, coordination, kinesthetic sense, posture, motor skill, proprioception. (20541)    Therapeutic Activities:     [] Therapeutic activities, direct (one-on-one) patient contact (use of dynamic activities to improve functional performance).  (90233)    Gait: [] Provided training and instruction to the patient for ambulation re-education. (45722)    Self-Care/ADL's  [] Self-care/home management training and compensatory training, meal preparation, safety procedures, and instructions in use of assistive technology devices/adaptive equipment, direct one-on-one contact. (28932)    Home Exercise Program:     [x] Reviewed/Progressed HEP activities related to strengthening, flexibility, endurance, ROM. (49849)  [] Reviewed/Progressed HEP activities related to improving balance, coordination, kinesthetic sense, posture, motor skill, proprioception.  (68223)    Manual Treatments:    [] Provided manual therapy to mobilize soft tissue/joints for the purpose of modulating pain, promoting relaxation,  increasing ROM, reducing/eliminating soft tissue swelling/inflammation/restriction, improving soft tissue extensibility. (32781)    Service Based Modalities:      Timed Code Treatment Minutes:    45' there-ex    Total Treatment Minutes:   45'    Treatment/Activity Tolerance:  [x] Patient tolerated treatment well [] Patient limited by fatique  [] Patient limited by pain  [] Patient limited by other medical complications  [] Other:     Prognosis: [x] Good [] Fair  [] Poor    Patient Requires Follow-up: [x] Yes  [] No      Goals:  Short term goals  Time Frame for Short term goals: 1 week  Short term goal 1: Initiate HEP (compliant)  Short term goal 2: Assess balance to determine fall risk. Long term goals  Time Frame for Long term goals : 4 weeks  Long term goal 1: Improve L hip abduction and ER strength to 3/5 to facilitate transfers and gait. Long term goal 2: Decrease TUG to 20'' with least restrictive assistive device for more efficient gait. Long term goal 3: Improve modified LEFS to at least 38/64 = 60% functional for improved participation in daily activity.   Long term goal 4: Decrease hip pain to not more than 4/10 with amb to improve tolerance to walking at home and in

## 2021-08-18 NOTE — PROGRESS NOTES
I have reviewed and agree to the content of the note written by the PTA.   Electronically signed by Kami De La Garza PT [id number]

## 2021-08-19 ENCOUNTER — HOSPITAL ENCOUNTER (OUTPATIENT)
Dept: PHYSICAL THERAPY | Age: 86
Setting detail: THERAPIES SERIES
Discharge: HOME | End: 2021-08-19
Payer: MEDICARE

## 2021-08-19 ENCOUNTER — HOSPITAL ENCOUNTER (OUTPATIENT)
Dept: PHARMACY | Age: 86
Setting detail: THERAPIES SERIES
Discharge: HOME | End: 2021-08-19
Payer: MEDICARE

## 2021-08-19 DIAGNOSIS — Z79.01 ANTICOAGULATION MONITORING BY PHARMACIST: Primary | ICD-10-CM

## 2021-08-19 DIAGNOSIS — I48.0 PAROXYSMAL ATRIAL FIBRILLATION WITH RAPID VENTRICULAR RESPONSE (HCC): ICD-10-CM

## 2021-08-19 LAB
INR BLD: 2.9
PROTIME: 35.3 SECONDS

## 2021-08-19 PROCEDURE — 85610 PROTHROMBIN TIME: CPT

## 2021-08-19 PROCEDURE — 97110 THERAPEUTIC EXERCISES: CPT

## 2021-08-19 PROCEDURE — 36416 COLLJ CAPILLARY BLOOD SPEC: CPT

## 2021-08-19 PROCEDURE — 99211 OFF/OP EST MAY X REQ PHY/QHP: CPT

## 2021-08-19 NOTE — FLOWSHEET NOTE
dynamic activities to improve functional performance). (49894)    Gait:   [] Provided training and instruction to the patient for ambulation re-education. (27947)    Self-Care/ADL's  [] Self-care/home management training and compensatory training, meal preparation, safety procedures, and instructions in use of assistive technology devices/adaptive equipment, direct one-on-one contact. (54079)    Home Exercise Program:     [x] Reviewed/Progressed HEP activities related to strengthening, flexibility, endurance, ROM. (67335)  [] Reviewed/Progressed HEP activities related to improving balance, coordination, kinesthetic sense, posture, motor skill, proprioception.  (63326)    Manual Treatments:    [] Provided manual therapy to mobilize soft tissue/joints for the purpose of modulating pain, promoting relaxation,  increasing ROM, reducing/eliminating soft tissue swelling/inflammation/restriction, improving soft tissue extensibility. (21129)    Service Based Modalities:      Timed Code Treatment Minutes:  45' there-ex    Total Treatment Minutes:  45 '    Treatment/Activity Tolerance:  [x] Patient tolerated treatment well [] Patient limited by fatique  [] Patient limited by pain  [] Patient limited by other medical complications  [] Other:     Prognosis: [x] Good [] Fair  [] Poor    Patient Requires Follow-up: [x] Yes  [] No      Goals:  Short term goals  Time Frame for Short term goals: 1 week  Short term goal 1: Initiate HEP (compliant)  Short term goal 2: Assess balance to determine fall risk. Long term goals  Time Frame for Long term goals : 4 weeks  Long term goal 1: Improve L hip abduction and ER strength to 3/5 to facilitate transfers and gait. Long term goal 2: Decrease TUG to 20'' with least restrictive assistive device for more efficient gait. Long term goal 3: Improve modified LEFS to at least 38/64 = 60% functional for improved participation in daily activity.   Long term goal 4: Decrease hip pain to not

## 2021-08-19 NOTE — PROGRESS NOTES
ANTICOAGULATION SERVICE    Date of Clinic Visit:  2021    Eduardo Munoz is a 80 y.o. female who presents to clinic today for anticoagulation monitoring and adjustment. Recent INR Results:  Internal QC passed  Lab Results   Component Value Date    INR 2.9 2021    INR 2.5 07/15/2021       Current Warfarin Dosage:  Dosing Plan  As of 2021    TTR:  89.2 % (3.8 y)   Full warfarin instructions:  5 mg every day               Assessment/Plan:    Continue current regimen as INR remains stable. Recheck 5 weeks. Next Clinic Appointment:  Return date  As of 2021    TTR:  89.2 % (3.8 y)   Next INR check:  2021             Please call [facility] at [phone] with any questions. Thanks!   Mook Jerry, [address] [facility]  Anticoagulation Service Pharmacist  2021 1:29 PM    For Pharmacy Admin Tracking Only     Intervention Detail: Adherence Monitorin   Total # of Interventions Recommended: 0   Total # of Interventions Accepted: 0   Time Spent (min): 15

## 2021-08-20 NOTE — PROGRESS NOTES
I have reviewed and agree to the content of the note written by the PTA.   Electronically signed by Thuy Sepulveda PT [id number]

## 2021-08-23 ENCOUNTER — OFFICE VISIT (OUTPATIENT)
Dept: PRIMARY CARE CLINIC | Age: 86
End: 2021-08-23
Payer: MEDICARE

## 2021-08-23 ENCOUNTER — HOSPITAL ENCOUNTER (EMERGENCY)
Age: 86
Discharge: HOME | End: 2021-08-23
Attending: EMERGENCY MEDICINE
Payer: MEDICARE

## 2021-08-23 ENCOUNTER — APPOINTMENT (OUTPATIENT)
Dept: CT IMAGING | Age: 86
End: 2021-08-23
Payer: MEDICARE

## 2021-08-23 ENCOUNTER — HOSPITAL ENCOUNTER (OUTPATIENT)
Dept: PHYSICAL THERAPY | Age: 86
Setting detail: THERAPIES SERIES
Discharge: HOME | End: 2021-08-23
Payer: MEDICARE

## 2021-08-23 VITALS
WEIGHT: 159 LBS | RESPIRATION RATE: 16 BRPM | DIASTOLIC BLOOD PRESSURE: 75 MMHG | HEART RATE: 105 BPM | HEIGHT: 65 IN | SYSTOLIC BLOOD PRESSURE: 95 MMHG | BODY MASS INDEX: 26.49 KG/M2 | OXYGEN SATURATION: 96 % | TEMPERATURE: 97.1 F

## 2021-08-23 VITALS
DIASTOLIC BLOOD PRESSURE: 80 MMHG | HEART RATE: 90 BPM | RESPIRATION RATE: 16 BRPM | BODY MASS INDEX: 26.99 KG/M2 | WEIGHT: 162 LBS | TEMPERATURE: 97.8 F | SYSTOLIC BLOOD PRESSURE: 107 MMHG | HEIGHT: 65 IN | OXYGEN SATURATION: 93 %

## 2021-08-23 DIAGNOSIS — R10.13 ABDOMINAL PAIN, EPIGASTRIC: Primary | ICD-10-CM

## 2021-08-23 DIAGNOSIS — R10.10 PAIN OF UPPER ABDOMEN: Primary | ICD-10-CM

## 2021-08-23 LAB
ABSOLUTE EOS #: 0.07 K/UL (ref 0–0.44)
ABSOLUTE IMMATURE GRANULOCYTE: 0.03 K/UL (ref 0–0.3)
ABSOLUTE LYMPH #: 0.88 K/UL (ref 1.1–3.7)
ABSOLUTE MONO #: 0.76 K/UL (ref 0.1–1.2)
ALBUMIN SERPL-MCNC: 4.1 G/DL (ref 3.5–5.2)
ALBUMIN/GLOBULIN RATIO: 1.5 (ref 1–2.5)
ALP BLD-CCNC: 115 U/L (ref 35–104)
ALT SERPL-CCNC: 21 U/L (ref 5–33)
ANION GAP SERPL CALCULATED.3IONS-SCNC: 10 MMOL/L (ref 9–17)
AST SERPL-CCNC: 26 U/L
BASOPHILS # BLD: 1 % (ref 0–2)
BASOPHILS ABSOLUTE: 0.03 K/UL (ref 0–0.2)
BILIRUB SERPL-MCNC: 0.92 MG/DL (ref 0.3–1.2)
BUN BLDV-MCNC: 13 MG/DL (ref 8–23)
BUN/CREAT BLD: 13 (ref 9–20)
CALCIUM SERPL-MCNC: 9.3 MG/DL (ref 8.6–10.4)
CHLORIDE BLD-SCNC: 92 MMOL/L (ref 98–107)
CO2: 25 MMOL/L (ref 20–31)
CREAT SERPL-MCNC: 0.98 MG/DL (ref 0.5–0.9)
DIFFERENTIAL TYPE: ABNORMAL
EOSINOPHILS RELATIVE PERCENT: 2 % (ref 1–4)
GFR AFRICAN AMERICAN: >60 ML/MIN
GFR NON-AFRICAN AMERICAN: 54 ML/MIN
GFR SERPL CREATININE-BSD FRML MDRD: ABNORMAL ML/MIN/{1.73_M2}
GFR SERPL CREATININE-BSD FRML MDRD: ABNORMAL ML/MIN/{1.73_M2}
GLUCOSE BLD-MCNC: 125 MG/DL (ref 70–99)
HCT VFR BLD CALC: 43.7 % (ref 36.3–47.1)
HEMOGLOBIN: 14.3 G/DL (ref 11.9–15.1)
IMMATURE GRANULOCYTES: 1 %
INR BLD: 3.2
LACTIC ACID: 0.9 MMOL/L (ref 0.5–2.2)
LIPASE: 35 U/L (ref 13–60)
LYMPHOCYTES # BLD: 19 % (ref 24–43)
MCH RBC QN AUTO: 26.7 PG (ref 25.2–33.5)
MCHC RBC AUTO-ENTMCNC: 32.7 G/DL (ref 25.2–33.5)
MCV RBC AUTO: 81.5 FL (ref 82.6–102.9)
MONOCYTES # BLD: 16 % (ref 3–12)
NRBC AUTOMATED: 0 PER 100 WBC
PDW BLD-RTO: 15.8 % (ref 11.8–14.4)
PLATELET # BLD: ABNORMAL K/UL (ref 138–453)
PLATELET ESTIMATE: ABNORMAL
PLATELET, FLUORESCENCE: 106 K/UL (ref 138–453)
PLATELET, IMMATURE FRACTION: 2 % (ref 1.1–10.3)
PMV BLD AUTO: ABNORMAL FL (ref 8.1–13.5)
POTASSIUM SERPL-SCNC: 4.8 MMOL/L (ref 3.7–5.3)
PROTHROMBIN TIME: 31.1 SEC (ref 11.5–14.2)
RBC # BLD: 5.36 M/UL (ref 3.95–5.11)
RBC # BLD: ABNORMAL 10*6/UL
SEG NEUTROPHILS: 62 % (ref 36–65)
SEGMENTED NEUTROPHILS ABSOLUTE COUNT: 2.9 K/UL (ref 1.5–8.1)
SODIUM BLD-SCNC: 127 MMOL/L (ref 135–144)
TOTAL PROTEIN: 6.9 G/DL (ref 6.4–8.3)
TROPONIN INTERP: ABNORMAL
TROPONIN T: ABNORMAL NG/ML
TROPONIN, HIGH SENSITIVITY: 18 NG/L (ref 0–14)
WBC # BLD: 4.7 K/UL (ref 3.5–11.3)
WBC # BLD: ABNORMAL 10*3/UL

## 2021-08-23 PROCEDURE — 6360000002 HC RX W HCPCS: Performed by: EMERGENCY MEDICINE

## 2021-08-23 PROCEDURE — 99284 EMERGENCY DEPT VISIT MOD MDM: CPT

## 2021-08-23 PROCEDURE — 84484 ASSAY OF TROPONIN QUANT: CPT

## 2021-08-23 PROCEDURE — 80053 COMPREHEN METABOLIC PANEL: CPT

## 2021-08-23 PROCEDURE — 2580000003 HC RX 258: Performed by: EMERGENCY MEDICINE

## 2021-08-23 PROCEDURE — 99999 PR OFFICE/OUTPT VISIT,PROCEDURE ONLY: CPT | Performed by: NURSE PRACTITIONER

## 2021-08-23 PROCEDURE — 83605 ASSAY OF LACTIC ACID: CPT

## 2021-08-23 PROCEDURE — 6360000004 HC RX CONTRAST MEDICATION: Performed by: EMERGENCY MEDICINE

## 2021-08-23 PROCEDURE — 99212 OFFICE O/P EST SF 10 MIN: CPT | Performed by: NURSE PRACTITIONER

## 2021-08-23 PROCEDURE — 83690 ASSAY OF LIPASE: CPT

## 2021-08-23 PROCEDURE — 85025 COMPLETE CBC W/AUTO DIFF WBC: CPT

## 2021-08-23 PROCEDURE — 93005 ELECTROCARDIOGRAM TRACING: CPT | Performed by: EMERGENCY MEDICINE

## 2021-08-23 PROCEDURE — 96374 THER/PROPH/DIAG INJ IV PUSH: CPT

## 2021-08-23 PROCEDURE — 36415 COLL VENOUS BLD VENIPUNCTURE: CPT

## 2021-08-23 PROCEDURE — 2709999900 CT ABDOMEN PELVIS W IV CONTRAST

## 2021-08-23 PROCEDURE — 85055 RETICULATED PLATELET ASSAY: CPT

## 2021-08-23 PROCEDURE — 85610 PROTHROMBIN TIME: CPT

## 2021-08-23 RX ORDER — ONDANSETRON 2 MG/ML
4 INJECTION INTRAMUSCULAR; INTRAVENOUS ONCE
Status: COMPLETED | OUTPATIENT
Start: 2021-08-23 | End: 2021-08-23

## 2021-08-23 RX ORDER — 0.9 % SODIUM CHLORIDE 0.9 %
500 INTRAVENOUS SOLUTION INTRAVENOUS ONCE
Status: COMPLETED | OUTPATIENT
Start: 2021-08-23 | End: 2021-08-23

## 2021-08-23 RX ORDER — ONDANSETRON 4 MG/1
4 TABLET, ORALLY DISINTEGRATING ORAL EVERY 8 HOURS PRN
Qty: 20 TABLET | Refills: 0 | Status: ON HOLD | OUTPATIENT
Start: 2021-08-23 | End: 2022-04-29 | Stop reason: SDUPTHER

## 2021-08-23 RX ADMIN — ONDANSETRON 4 MG: 2 INJECTION INTRAMUSCULAR; INTRAVENOUS at 14:27

## 2021-08-23 RX ADMIN — SODIUM CHLORIDE 500 ML: 9 INJECTION, SOLUTION INTRAVENOUS at 13:53

## 2021-08-23 RX ADMIN — IOPAMIDOL 100 ML: 755 INJECTION, SOLUTION INTRAVENOUS at 14:34

## 2021-08-23 ASSESSMENT — ENCOUNTER SYMPTOMS
ABDOMINAL PAIN: 1
VOMITING: 1
BACK PAIN: 0
NAUSEA: 1
CONSTIPATION: 0
ABDOMINAL PAIN: 1
DIARRHEA: 0
EYE PAIN: 0
ABDOMINAL DISTENTION: 1
NAUSEA: 1
SHORTNESS OF BREATH: 0
BLOOD IN STOOL: 0
VOMITING: 1
COUGH: 0
RESPIRATORY NEGATIVE: 1
CONSTIPATION: 0
DIARRHEA: 0

## 2021-08-23 ASSESSMENT — PAIN DESCRIPTION - ORIENTATION: ORIENTATION: UPPER;MID

## 2021-08-23 ASSESSMENT — PAIN DESCRIPTION - PAIN TYPE: TYPE: ACUTE PAIN

## 2021-08-23 ASSESSMENT — PATIENT HEALTH QUESTIONNAIRE - PHQ9
SUM OF ALL RESPONSES TO PHQ9 QUESTIONS 1 & 2: 0
SUM OF ALL RESPONSES TO PHQ QUESTIONS 1-9: 0
1. LITTLE INTEREST OR PLEASURE IN DOING THINGS: 0
SUM OF ALL RESPONSES TO PHQ QUESTIONS 1-9: 0
SUM OF ALL RESPONSES TO PHQ QUESTIONS 1-9: 0
2. FEELING DOWN, DEPRESSED OR HOPELESS: 0

## 2021-08-23 ASSESSMENT — PAIN SCALES - GENERAL: PAINLEVEL_OUTOF10: 10

## 2021-08-23 ASSESSMENT — PAIN DESCRIPTION - LOCATION: LOCATION: ABDOMEN

## 2021-08-23 ASSESSMENT — PAIN DESCRIPTION - DESCRIPTORS: DESCRIPTORS: DULL

## 2021-08-23 NOTE — PROGRESS NOTES
[facility]             [address], [address]                        Telephone [phone]             Fax [phone]     Cynthia Rockwell  1934  BJX:[id number]   Date of visit:  8/23/2021    Subjective:    Cynthia Rockwell is a 80 y.o.  female who presents to [facility] today (8/23/2021) for evaluation of:    Chief Complaint   Patient presents with    Emesis     Sx started 8.22.2021. Is able hold water down. Belives it may be a stricture in her espohagus like she has had in the past. Does have GERD. Last emesis 8.23.2021. Told to come to  by Dr. Sirena Dickerson. Abdominal Pain  This is a new problem. The current episode started yesterday. The onset quality is gradual. The problem occurs constantly. The problem has been gradually worsening. The pain is located in the epigastric region. The pain is at a severity of 10/10. The quality of the pain is cramping (\"knot\"). The abdominal pain does not radiate. Associated symptoms include nausea and vomiting (began today). Pertinent negatives include no constipation, diarrhea, dysuria, fever, headaches or hematuria. Associated symptoms comments: Weakness, dizziness, unable to keep liquids down; normal bowel movement today. The pain is aggravated by movement and palpation. The pain is relieved by nothing. She has tried nothing for the symptoms. The treatment provided no relief.        She has the following problem list:  Patient Active Problem List   Diagnosis    Trochanteric bursitis of left hip    Drug-induced constipation    Bradycardia    Esophageal stricture    Gastritis without bleeding    Lumbar radicular pain    Lumbar spine pain    Left hip pain    Arthritis of left hip    Paroxysmal atrial fibrillation with rapid ventricular response (HCC)    Anticoagulation monitoring by pharmacist    Venous stasis dermatitis of both lower extremities    Arthritis of right hand    Vaginal erosion secondary to pessary use (HCC)    Pelvic relaxation due to uterovaginal prolapse    Postmenopausal bleeding    History of hysterectomy        Current medications are:  Current Outpatient Medications   Medication Sig Dispense Refill    famotidine (PEPCID) 20 MG tablet Take 1 tablet by mouth 2 times daily 60 tablet 0    propafenone (RYTHMOL) 150 MG tablet Take 1 tablet by mouth 2 times daily Take 300 mg of propafenone with 25 mg of metoprolol once and start 150 mg propafenone q 12 hours from next day 90 tablet 3    metoprolol tartrate (LOPRESSOR) 25 MG tablet TAKE HALF OF A TABLET TWICE A DAY. 90 tablet 1    pantoprazole (PROTONIX) 40 MG tablet Take 1 tablet by mouth once daily 90 tablet 1    clotrimazole-betamethasone (LOTRISONE) 1-0.05 % cream Apply a thin film to the affected area 2 times daily. 1 Tube 0    warfarin (COUMADIN) 2.5 MG tablet 1. Take 2 tablets by mouth daily. Or as directed by INR results. 180 tablet 3    Handicap Placard MISC by Does not apply route Expires 6/2022 1 each 0    MEGARED OMEGA-3 KRILL  MG CAPS Take by mouth daily       Multiple Vitamins-Minerals (THERAPEUTIC MULTIVITAMIN-MINERALS) tablet Take 1 tablet by mouth daily      Multiple Vitamins-Minerals (ICAPS AREDS 2) CAPS Take 2 tablets by mouth daily        No current facility-administered medications for this visit. Facility-Administered Medications Ordered in Other Visits   Medication Dose Route Frequency Provider Last Rate Last Admin    betamethasone acetate-betamethasone sodium phosphate (CELESTONE) injection 12 mg  12 mg Intramuscular Once Kristen Vogel MD            She is allergic to statins, tape [adhesive tape], tizanidine hcl, and tramadol. .    She  reports that she has never smoked.  She has never used smokeless tobacco.      Objective:    Vitals:    08/23/21 1251   BP: 95/75   Site: Right Upper Arm   Position: Sitting   Cuff Size: Medium Adult   Pulse: 105 Resp: 16   Temp: 97.1 °F (36.2 °C)   TempSrc: Temporal   SpO2: 96%   Weight: 159 lb (72.1 kg)   Height: 5' 5\" (1.651 m)     Body mass index is 26.46 kg/m². Review of Systems   Constitutional: Positive for appetite change. Negative for fever. HENT: Negative. Respiratory: Negative. Cardiovascular: Negative. Gastrointestinal: Positive for abdominal pain, nausea and vomiting (began today). Negative for constipation and diarrhea. Genitourinary: Negative for dysuria and hematuria. Neurological: Negative for headaches. Physical Exam  Vitals and nursing note reviewed. Constitutional:       Appearance: She is well-developed. HENT:      Head: Normocephalic. Jaw: There is normal jaw occlusion. Mouth/Throat:      Lips: Pink. Mouth: Mucous membranes are moist.      Pharynx: Oropharynx is clear. Uvula midline. Eyes:      Pupils: Pupils are equal, round, and reactive to light. Cardiovascular:      Rate and Rhythm: Normal rate and regular rhythm. Heart sounds: Normal heart sounds. Pulmonary:      Effort: Pulmonary effort is normal.      Breath sounds: Normal breath sounds and air entry. Abdominal:      General: Bowel sounds are decreased. There is distension. Palpations: Abdomen is soft. Tenderness: There is abdominal tenderness in the right upper quadrant, epigastric area and left upper quadrant. Musculoskeletal:      Cervical back: Normal range of motion and neck supple. Skin:     General: Skin is warm and dry. Neurological:      General: No focal deficit present. Mental Status: She is alert and oriented to person, place, and time. Psychiatric:         Behavior: Behavior normal.         Thought Content: Thought content normal.       Assessment and Plan:    No results found for this visit on 08/23/21. Diagnosis Orders   1.  Pain of upper abdomen         I discussed with Jina that due to severe abdominal pain with weakness and dizziness and

## 2021-08-23 NOTE — ED TRIAGE NOTES
Pt reports waking up today and \"as soon as\" she \"got up this morning,\" at 0700am, she began having upper mid abd pain. She continued having this pain however, did take her morning meds. Pt states she began having vomitting around 9am. Pt daughter reports the vomit just looked like \"saliva not stomach content\" and also reports the pain reminds her of the time she had an esophageal stricture. Pt denies that anything makes the pain better or worse. Pt reported drinking water today and tasted bitter. Denies any diarrhea.

## 2021-08-23 NOTE — PROGRESS NOTES
Outpatient Physical Therapy    [x] [place]  Phone: [phone]  Fax: [phone]      [] [place]  Phone: [phone]  Fax: [phone]    Physical Therapy  Cancellation/No-show Note  Patient Name:  Jacob Roberts  :  1934   Date:  2021  Cancelled visits to date: 1  No-shows to date: 0    For today's appointment patient:  [x]  Cancelled  []  Rescheduled appointment  []  No-show     Reason given by patient:  []  Patient ill  []  Conflicting appointment  []  No transportation    []  Conflict with work  []  No reason given  [x]  Other:  Being seen in urgent care   Comments:       Electronically signed by: Jose D Petty PTA

## 2021-08-23 NOTE — ED PROVIDER NOTES
[facility]  eMERGENCY dEPARTMENT eNCOUnter      Pt Name: Kate Looney  MRN: [id number]  [place] 1934  Date of evaluation: 8/23/2021      CHIEF COMPLAINT       Chief Complaint   Patient presents with    Abdominal Pain     \"feels similar to esophageal stricture\" emesis started today. upper abd pain. HISTORY OF PRESENT ILLNESS    Kate Looney is a 80 y.o. female who presents with chief complaint of abdominal pain began this morning around 7:00 in the morning associate with nausea vomiting a distention feeling as upper portion of her abdomen she said years ago she had an esophageal stricture that felt similar to this been no fevers chills no cough she had a Covid vaccine. There is been no diarrhea she had a previous cholecystectomy and hysterectomy in the past.  There is been no fevers or chills nothing seems to make it better  She has a history of A. fib and is on Coumadin    REVIEW OF SYSTEMS         Review of Systems   Constitutional: Negative for chills and fever. HENT: Negative for congestion and ear pain. Eyes: Negative for pain and visual disturbance. Respiratory: Negative for cough and shortness of breath. Cardiovascular: Negative for chest pain, palpitations and leg swelling. Gastrointestinal: Positive for abdominal distention, abdominal pain, nausea and vomiting. Negative for blood in stool, constipation and diarrhea. Endocrine: Negative for polydipsia and polyuria. Genitourinary: Negative for difficulty urinating, dysuria and frequency. Musculoskeletal: Negative for back pain, joint swelling, myalgias, neck pain and neck stiffness. Skin: Negative for rash. Neurological: Negative for dizziness, weakness and headaches. Hematological: Negative for adenopathy. Does not bruise/bleed easily. Psychiatric/Behavioral: Negative for confusion, self-injury and suicidal ideas.          PAST MEDICAL HISTORY    has a past medical history of Actinic keratosis, Arthritis, Atrial fibrillation ([facility] [address] CAD (coronary artery disease), Cancer ([facility] [address] Cervical prolapse, Diverticulosis, Endocervical polyp, Heel spur, Hip pain, Hypertension, Nasal septal deviation, Osteoporosis, Stress incontinence, Syncope, and Vaginal prolapse. SURGICAL HISTORY      has a past surgical history that includes Colonoscopy (01/26/2000); Cholecystectomy (11/1976); Upper gastrointestinal endoscopy (07/16/2017); pr egd transoral biopsy single/multiple (Left, 07/16/2017); Upper gastrointestinal endoscopy (Left, 07/16/2017); Endoscopic ultrasonography, GI (Left, 07/20/2017); eye surgery; Total hip arthroplasty (Left, 09/19/2017); bladder repair (2011); partial hysterectomy (cervix not removed) (1960's); Appendectomy (1960's); skin biopsy; pr office/outpt visit,procedure only (Right, 06/01/2018); joint replacement (2017); Mohs surgery (N/A, 1/8/2021); Facial Surgery (N/A, 1/27/2021); US BREAST BIOPSY W LOC DEVICE 1ST LESION LEFT (Left, 4/21/2021); US PLACE BREAST LOC DEVICE EACH ADDL LEFT (Left, 4/21/2021); US BREAST BIOPSY W LOC DEVICE 1ST LESION RIGHT (Right, 4/21/2021); Hysterectomy; and Mohs surgery (Left, 8/6/2021). CURRENT MEDICATIONS       Previous Medications    CLOTRIMAZOLE-BETAMETHASONE (LOTRISONE) 1-0.05 % CREAM    Apply a thin film to the affected area 2 times daily. FAMOTIDINE (PEPCID) 20 MG TABLET    Take 1 tablet by mouth 2 times daily    HANDICAP PLACARD MISC    by Does not apply route Expires 6/2022    MEGARED OMEGA-3 KRILL  MG CAPS    Take by mouth daily     METOPROLOL TARTRATE (LOPRESSOR) 25 MG TABLET    TAKE HALF OF A TABLET TWICE A DAY.     MULTIPLE VITAMINS-MINERALS (ICAPS AREDS 2) CAPS    Take 2 tablets by mouth daily     MULTIPLE VITAMINS-MINERALS (THERAPEUTIC MULTIVITAMIN-MINERALS) TABLET    Take 1 tablet by mouth daily    PANTOPRAZOLE (PROTONIX) 40 MG TABLET    Take 1 tablet by mouth once daily    PROPAFENONE (RYTHMOL) 150 MG TABLET    Take 1 tablet by mouth 2 times daily Take 300 mg of propafenone with 25 mg of metoprolol once and start 150 mg propafenone q 12 hours from next day    WARFARIN (COUMADIN) 2.5 MG TABLET    1. Take 2 tablets by mouth daily. Or as directed by INR results. ALLERGIES     is allergic to statins, tape [adhesive tape], tizanidine hcl, and tramadol. FAMILY HISTORY     She indicated that her mother is . She indicated that her father is . family history includes Colon Cancer in her father; Heart Disease in her mother; Osteoporosis in her mother. SOCIAL HISTORY      reports that she has never smoked. She has never used smokeless tobacco. She reports that she does not drink alcohol and does not use drugs. PHYSICAL EXAM     INITIAL VITALS:  height is 5' 5\" (1.651 m) and weight is 162 lb (73.5 kg). Her temperature is 97.8 °F (36.6 °C). Her blood pressure is 109/63 and her pulse is 93. Her respiration is 16 and oxygen saturation is 92%. Physical Exam  Constitutional:       Appearance: She is well-developed. HENT:      Head: Normocephalic and atraumatic. Right Ear: External ear normal.      Left Ear: External ear normal.   Eyes:      Conjunctiva/sclera: Conjunctivae normal.      Pupils: Pupils are equal, round, and reactive to light. Cardiovascular:      Rate and Rhythm: Normal rate and regular rhythm. Pulmonary:      Effort: Pulmonary effort is normal.      Breath sounds: Normal breath sounds. Abdominal:      General: Bowel sounds are increased. There is distension. Palpations: Abdomen is soft. Tenderness: There is abdominal tenderness in the epigastric area. Musculoskeletal:         General: No tenderness. Cervical back: Normal range of motion. Skin:     General: Skin is warm and dry. Neurological:      Mental Status: She is alert and oriented to person, place, and time.    Psychiatric:         Behavior: Behavior normal.           DIFFERENTIAL DIAGNOSIS/ MDM:   Abdominal pain will do a Phosphatase 115 (*)     GFR Non- 54 (*)     All other components within normal limits   TROPONIN - Abnormal; Notable for the following components:    Troponin, High Sensitivity 18 (*)     All other components within normal limits   PROTIME-INR - Abnormal; Notable for the following components:    Protime 31.1 (*)     All other components within normal limits   IMMATURE PLATELET FRACTION - Abnormal; Notable for the following components:    Platelet, Fluorescence 106 (*)     All other components within normal limits   LACTIC ACID   LIPASE           EMERGENCY DEPARTMENT COURSE:   Vitals:    Vitals:    08/23/21 1331 08/23/21 1530 08/23/21 1543   BP: 120/77 109/63    Pulse: 93     Resp: 16     Temp:   97.8 °F (36.6 °C)   SpO2: 93% 92%    Weight: 162 lb (73.5 kg)     Height: 5' 5\" (1.651 m)       -------------------------  BP: 109/63, Temp: 97.8 °F (36.6 °C), Pulse: 93, Resp: 16        Re-evaluation Notes    Exam patient is resting comfortably has no pain at all she would like to follow-up with her surgeon which is appropriate to be written a prescription for Zofran return for any problems    CRITICAL CARE:   None        CONSULTS:      PROCEDURES:  None    FINAL IMPRESSION      1. Abdominal pain, epigastric          DISPOSITION/PLAN   DISPOSITION Decision To Discharge    Condition on Disposition    Stable    PATIENT REFERRED TO:  James Reed MD  [address] [facility]  [phone]            DISCHARGE MEDICATIONS:  New Prescriptions    ONDANSETRON (ZOFRAN ODT) 4 MG DISINTEGRATING TABLET    Take 1 tablet by mouth every 8 hours as needed for Nausea       (Please note that portions of this note were completed with a voice recognition program.  Efforts were made to edit the dictations but occasionally words are mis-transcribed.)    Susan López MD,, MD, F.A.A.E.M.   Attending Emergency Physician                          Susan López MD  08/23/21 3165

## 2021-08-25 LAB
EKG ATRIAL RATE: 208 BPM
EKG Q-T INTERVAL: 390 MS
EKG QRS DURATION: 80 MS
EKG QTC CALCULATION (BAZETT): 464 MS
EKG R AXIS: 69 DEGREES
EKG T AXIS: 16 DEGREES
EKG VENTRICULAR RATE: 85 BPM

## 2021-08-25 NOTE — TELEPHONE ENCOUNTER
Allen Speaker called requesting a refill of the below medication which has been pended for you:     Requested Prescriptions     Pending Prescriptions Disp Refills    metoprolol tartrate (LOPRESSOR) 25 MG tablet 90 tablet 1     Sig: TAKE HALF OF A TABLET TWICE A DAY.  warfarin (COUMADIN) 2.5 MG tablet 180 tablet 3     Si. Take 2 tablets by mouth daily. Or as directed by INR results.     pantoprazole (PROTONIX) 40 MG tablet 90 tablet 1     Sig: Take 1 tablet by mouth once daily       Last Appointment Date: 2021  Next Appointment Date: 2021    Allergies   Allergen Reactions    Statins Other (See Comments)     Causes severe leg cramps     Tape [place] [employer] Tape] Other (See Comments)     \"sticks to skin\" causes redness    Tizanidine Hcl Other (See Comments)     Having issues with her liver- elevated her enzyme level    Tramadol Other (See Comments)     \"makes me Goofy\"

## 2021-08-26 RX ORDER — PANTOPRAZOLE SODIUM 40 MG/1
TABLET, DELAYED RELEASE ORAL
Qty: 90 TABLET | Refills: 1 | Status: SHIPPED | OUTPATIENT
Start: 2021-08-26 | End: 2021-12-11

## 2021-08-26 RX ORDER — WARFARIN SODIUM 2.5 MG/1
TABLET ORAL
Qty: 180 TABLET | Refills: 3 | Status: ON HOLD | OUTPATIENT
Start: 2021-08-26 | End: 2022-04-29 | Stop reason: HOSPADM

## 2021-08-27 ENCOUNTER — APPOINTMENT (OUTPATIENT)
Dept: GENERAL RADIOLOGY | Age: 86
DRG: 369 | End: 2021-08-27
Payer: MEDICARE

## 2021-08-27 ENCOUNTER — HOSPITAL ENCOUNTER (INPATIENT)
Age: 86
LOS: 4 days | Discharge: HOME | DRG: 369 | End: 2021-08-31
Attending: EMERGENCY MEDICINE | Admitting: INTERNAL MEDICINE
Payer: MEDICARE

## 2021-08-27 DIAGNOSIS — E87.1 HYPONATREMIA: ICD-10-CM

## 2021-08-27 DIAGNOSIS — E86.0 DEHYDRATION: ICD-10-CM

## 2021-08-27 DIAGNOSIS — R10.13 ABDOMINAL PAIN, EPIGASTRIC: ICD-10-CM

## 2021-08-27 DIAGNOSIS — I48.0 PAROXYSMAL ATRIAL FIBRILLATION WITH RAPID VENTRICULAR RESPONSE (HCC): Primary | ICD-10-CM

## 2021-08-27 PROBLEM — R13.10 DYSPHAGIA: Status: ACTIVE | Noted: 2021-08-27

## 2021-08-27 LAB
ALBUMIN SERPL-MCNC: 4 G/DL (ref 3.5–5.1)
ALP BLD-CCNC: 94 U/L (ref 38–126)
ALT SERPL-CCNC: 14 U/L (ref 11–66)
ANION GAP SERPL CALCULATED.3IONS-SCNC: 10 MEQ/L (ref 8–16)
AST SERPL-CCNC: 20 U/L (ref 5–40)
BACTERIA: ABNORMAL /HPF
BASOPHILS # BLD: 0.6 %
BASOPHILS ABSOLUTE: 0 THOU/MM3 (ref 0–0.1)
BILIRUB SERPL-MCNC: 0.5 MG/DL (ref 0.3–1.2)
BILIRUBIN URINE: NEGATIVE
BLOOD, URINE: ABNORMAL
BUN BLDV-MCNC: 9 MG/DL (ref 7–22)
CALCIUM SERPL-MCNC: 9 MG/DL (ref 8.5–10.5)
CASTS 2: ABNORMAL /LPF
CASTS UA: ABNORMAL /LPF
CHARACTER, URINE: CLEAR
CHLORIDE BLD-SCNC: 90 MEQ/L (ref 98–111)
CO2: 25 MEQ/L (ref 23–33)
COLOR: YELLOW
CREAT SERPL-MCNC: 0.8 MG/DL (ref 0.4–1.2)
CRYSTALS, UA: ABNORMAL
EKG ATRIAL RATE: 234 BPM
EKG Q-T INTERVAL: 394 MS
EKG QRS DURATION: 86 MS
EKG QTC CALCULATION (BAZETT): 497 MS
EKG R AXIS: 88 DEGREES
EKG T AXIS: 60 DEGREES
EKG VENTRICULAR RATE: 96 BPM
EOSINOPHIL # BLD: 2.5 %
EOSINOPHILS ABSOLUTE: 0.1 THOU/MM3 (ref 0–0.4)
EPITHELIAL CELLS, UA: ABNORMAL /HPF
ERYTHROCYTE [DISTWIDTH] IN BLOOD BY AUTOMATED COUNT: 15.7 % (ref 11.5–14.5)
ERYTHROCYTE [DISTWIDTH] IN BLOOD BY AUTOMATED COUNT: 44.9 FL (ref 35–45)
GFR SERPL CREATININE-BSD FRML MDRD: 68 ML/MIN/1.73M2
GLUCOSE BLD-MCNC: 87 MG/DL (ref 70–108)
GLUCOSE URINE: NEGATIVE MG/DL
HCT VFR BLD CALC: 40.7 % (ref 37–47)
HEMOGLOBIN: 13.7 GM/DL (ref 12–16)
IMMATURE GRANS (ABS): 0.04 THOU/MM3 (ref 0–0.07)
IMMATURE GRANULOCYTES: 1.1 %
INR BLD: 2.8 (ref 0.85–1.13)
KETONES, URINE: 15
LEUKOCYTE ESTERASE, URINE: ABNORMAL
LIPASE: 31.7 U/L (ref 5.6–51.3)
LYMPHOCYTES # BLD: 26.2 %
LYMPHOCYTES ABSOLUTE: 0.9 THOU/MM3 (ref 1–4.8)
MCH RBC QN AUTO: 27.1 PG (ref 26–33)
MCHC RBC AUTO-ENTMCNC: 33.7 GM/DL (ref 32.2–35.5)
MCV RBC AUTO: 80.4 FL (ref 81–99)
MISCELLANEOUS 2: ABNORMAL
MONOCYTES # BLD: 16.6 %
MONOCYTES ABSOLUTE: 0.6 THOU/MM3 (ref 0.4–1.3)
NITRITE, URINE: NEGATIVE
NUCLEATED RED BLOOD CELLS: 0 /100 WBC
PH UA: 6.5 (ref 5–9)
PLATELET # BLD: 95 THOU/MM3 (ref 130–400)
PLATELET ESTIMATE: ABNORMAL
PMV BLD AUTO: 9.5 FL (ref 9.4–12.4)
POTASSIUM REFLEX MAGNESIUM: 4.8 MEQ/L (ref 3.5–5.2)
PROTEIN UA: NEGATIVE
RBC # BLD: 5.06 MILL/MM3 (ref 4.2–5.4)
RBC URINE: ABNORMAL /HPF
RENAL EPITHELIAL, UA: ABNORMAL
SCAN OF BLOOD SMEAR: NORMAL
SEG NEUTROPHILS: 53 %
SEGMENTED NEUTROPHILS ABSOLUTE COUNT: 1.9 THOU/MM3 (ref 1.8–7.7)
SODIUM BLD-SCNC: 125 MEQ/L (ref 135–145)
SODIUM BLD-SCNC: 130 MEQ/L (ref 135–145)
SPECIFIC GRAVITY, URINE: 1.01 (ref 1–1.03)
TOTAL PROTEIN: 6.1 G/DL (ref 6.1–8)
TROPONIN T: < 0.01 NG/ML
UROBILINOGEN, URINE: 0.2 EU/DL (ref 0–1)
WBC # BLD: 3.6 THOU/MM3 (ref 4.8–10.8)
WBC UA: > 100 /HPF
YEAST: ABNORMAL

## 2021-08-27 PROCEDURE — 71046 X-RAY EXAM CHEST 2 VIEWS: CPT

## 2021-08-27 PROCEDURE — 85025 COMPLETE CBC W/AUTO DIFF WBC: CPT

## 2021-08-27 PROCEDURE — 6360000002 HC RX W HCPCS: Performed by: STUDENT IN AN ORGANIZED HEALTH CARE EDUCATION/TRAINING PROGRAM

## 2021-08-27 PROCEDURE — 6370000000 HC RX 637 (ALT 250 FOR IP): Performed by: STUDENT IN AN ORGANIZED HEALTH CARE EDUCATION/TRAINING PROGRAM

## 2021-08-27 PROCEDURE — 2500000003 HC RX 250 WO HCPCS: Performed by: STUDENT IN AN ORGANIZED HEALTH CARE EDUCATION/TRAINING PROGRAM

## 2021-08-27 PROCEDURE — 2580000003 HC RX 258: Performed by: REGISTERED NURSE

## 2021-08-27 PROCEDURE — 83690 ASSAY OF LIPASE: CPT

## 2021-08-27 PROCEDURE — 96375 TX/PRO/DX INJ NEW DRUG ADDON: CPT

## 2021-08-27 PROCEDURE — 6360000002 HC RX W HCPCS: Performed by: REGISTERED NURSE

## 2021-08-27 PROCEDURE — 87077 CULTURE AEROBIC IDENTIFY: CPT

## 2021-08-27 PROCEDURE — 85610 PROTHROMBIN TIME: CPT

## 2021-08-27 PROCEDURE — 93010 ELECTROCARDIOGRAM REPORT: CPT | Performed by: INTERNAL MEDICINE

## 2021-08-27 PROCEDURE — 96374 THER/PROPH/DIAG INJ IV PUSH: CPT

## 2021-08-27 PROCEDURE — 80053 COMPREHEN METABOLIC PANEL: CPT

## 2021-08-27 PROCEDURE — 99284 EMERGENCY DEPT VISIT MOD MDM: CPT

## 2021-08-27 PROCEDURE — 93005 ELECTROCARDIOGRAM TRACING: CPT | Performed by: STUDENT IN AN ORGANIZED HEALTH CARE EDUCATION/TRAINING PROGRAM

## 2021-08-27 PROCEDURE — 87186 SC STD MICRODIL/AGAR DIL: CPT

## 2021-08-27 PROCEDURE — 36415 COLL VENOUS BLD VENIPUNCTURE: CPT

## 2021-08-27 PROCEDURE — 2580000003 HC RX 258: Performed by: STUDENT IN AN ORGANIZED HEALTH CARE EDUCATION/TRAINING PROGRAM

## 2021-08-27 PROCEDURE — 6370000000 HC RX 637 (ALT 250 FOR IP): Performed by: INTERNAL MEDICINE

## 2021-08-27 PROCEDURE — 84484 ASSAY OF TROPONIN QUANT: CPT

## 2021-08-27 PROCEDURE — 81001 URINALYSIS AUTO W/SCOPE: CPT

## 2021-08-27 PROCEDURE — 1200000003 HC TELEMETRY R&B

## 2021-08-27 PROCEDURE — 84295 ASSAY OF SERUM SODIUM: CPT

## 2021-08-27 PROCEDURE — 87086 URINE CULTURE/COLONY COUNT: CPT

## 2021-08-27 RX ORDER — ONDANSETRON 2 MG/ML
4 INJECTION INTRAMUSCULAR; INTRAVENOUS ONCE
Status: COMPLETED | OUTPATIENT
Start: 2021-08-27 | End: 2021-08-27

## 2021-08-27 RX ORDER — SUCRALFATE 1 G/1
1 TABLET ORAL ONCE
Status: COMPLETED | OUTPATIENT
Start: 2021-08-27 | End: 2021-08-27

## 2021-08-27 RX ORDER — ACETAMINOPHEN 325 MG/1
650 TABLET ORAL EVERY 6 HOURS PRN
Status: DISCONTINUED | OUTPATIENT
Start: 2021-08-27 | End: 2021-08-31 | Stop reason: HOSPADM

## 2021-08-27 RX ORDER — 0.9 % SODIUM CHLORIDE 0.9 %
1000 INTRAVENOUS SOLUTION INTRAVENOUS ONCE
Status: COMPLETED | OUTPATIENT
Start: 2021-08-27 | End: 2021-08-27

## 2021-08-27 RX ORDER — SODIUM CHLORIDE 0.9 % (FLUSH) 0.9 %
5-40 SYRINGE (ML) INJECTION EVERY 12 HOURS SCHEDULED
Status: DISCONTINUED | OUTPATIENT
Start: 2021-08-27 | End: 2021-08-28 | Stop reason: SDUPTHER

## 2021-08-27 RX ORDER — ACETAMINOPHEN 650 MG/1
650 SUPPOSITORY RECTAL EVERY 6 HOURS PRN
Status: DISCONTINUED | OUTPATIENT
Start: 2021-08-27 | End: 2021-08-28 | Stop reason: SDUPTHER

## 2021-08-27 RX ORDER — WARFARIN SODIUM 5 MG/1
5 TABLET ORAL
Status: DISCONTINUED | OUTPATIENT
Start: 2021-08-27 | End: 2021-08-27

## 2021-08-27 RX ORDER — SODIUM CHLORIDE 9 MG/ML
INJECTION, SOLUTION INTRAVENOUS CONTINUOUS
Status: DISCONTINUED | OUTPATIENT
Start: 2021-08-27 | End: 2021-08-29

## 2021-08-27 RX ORDER — PROMETHAZINE HYDROCHLORIDE 25 MG/1
12.5 TABLET ORAL EVERY 6 HOURS PRN
Status: DISCONTINUED | OUTPATIENT
Start: 2021-08-27 | End: 2021-08-28

## 2021-08-27 RX ORDER — PROPAFENONE HYDROCHLORIDE 150 MG/1
150 TABLET, FILM COATED ORAL 2 TIMES DAILY
Status: DISCONTINUED | OUTPATIENT
Start: 2021-08-27 | End: 2021-08-31 | Stop reason: HOSPADM

## 2021-08-27 RX ORDER — PANTOPRAZOLE SODIUM 40 MG/1
40 TABLET, DELAYED RELEASE ORAL
Status: DISCONTINUED | OUTPATIENT
Start: 2021-08-28 | End: 2021-08-28

## 2021-08-27 RX ORDER — ONDANSETRON 2 MG/ML
4 INJECTION INTRAMUSCULAR; INTRAVENOUS EVERY 6 HOURS PRN
Status: DISCONTINUED | OUTPATIENT
Start: 2021-08-27 | End: 2021-08-31 | Stop reason: HOSPADM

## 2021-08-27 RX ORDER — SODIUM CHLORIDE 9 MG/ML
25 INJECTION, SOLUTION INTRAVENOUS PRN
Status: DISCONTINUED | OUTPATIENT
Start: 2021-08-27 | End: 2021-08-31 | Stop reason: HOSPADM

## 2021-08-27 RX ORDER — SODIUM CHLORIDE 0.9 % (FLUSH) 0.9 %
5-40 SYRINGE (ML) INJECTION PRN
Status: DISCONTINUED | OUTPATIENT
Start: 2021-08-27 | End: 2021-08-28 | Stop reason: SDUPTHER

## 2021-08-27 RX ADMIN — PHYTONADIONE 2 MG: 10 INJECTION, EMULSION INTRAMUSCULAR; INTRAVENOUS; SUBCUTANEOUS at 18:21

## 2021-08-27 RX ADMIN — SODIUM CHLORIDE 1000 ML: 9 INJECTION, SOLUTION INTRAVENOUS at 12:24

## 2021-08-27 RX ADMIN — FAMOTIDINE 20 MG: 10 INJECTION, SOLUTION INTRAVENOUS at 12:24

## 2021-08-27 RX ADMIN — PROPAFENONE HYDROCHLORIDE 150 MG: 150 TABLET, FILM COATED ORAL at 23:11

## 2021-08-27 RX ADMIN — ONDANSETRON 4 MG: 2 INJECTION INTRAMUSCULAR; INTRAVENOUS at 12:25

## 2021-08-27 RX ADMIN — SODIUM CHLORIDE: 9 INJECTION, SOLUTION INTRAVENOUS at 21:55

## 2021-08-27 RX ADMIN — SUCRALFATE 1 G: 1 TABLET ORAL at 13:40

## 2021-08-27 RX ADMIN — CEFTRIAXONE SODIUM 1000 MG: 1 INJECTION, POWDER, FOR SOLUTION INTRAMUSCULAR; INTRAVENOUS at 23:10

## 2021-08-27 ASSESSMENT — PAIN DESCRIPTION - PROGRESSION
CLINICAL_PROGRESSION: NOT CHANGED

## 2021-08-27 ASSESSMENT — PAIN DESCRIPTION - DESCRIPTORS: DESCRIPTORS: ACHING

## 2021-08-27 ASSESSMENT — PAIN DESCRIPTION - FREQUENCY: FREQUENCY: CONTINUOUS

## 2021-08-27 ASSESSMENT — PAIN SCALES - GENERAL
PAINLEVEL_OUTOF10: 0
PAINLEVEL_OUTOF10: 0
PAINLEVEL_OUTOF10: 8

## 2021-08-27 ASSESSMENT — ENCOUNTER SYMPTOMS
GASTROINTESTINAL NEGATIVE: 1
EYES NEGATIVE: 1
RESPIRATORY NEGATIVE: 1

## 2021-08-27 ASSESSMENT — PAIN DESCRIPTION - PAIN TYPE: TYPE: ACUTE PAIN

## 2021-08-27 ASSESSMENT — PAIN DESCRIPTION - LOCATION: LOCATION: ABDOMEN;HEAD

## 2021-08-27 NOTE — PROGRESS NOTES
Clinical Pharmacy Note    Cynthia Rockwell is a 80 y.o. female for whom pharmacy has been asked to manage warfarin therapy. Reason for Admission: HA/N/abdominal pain    Referring physician: Mike Blank  Warfarin dose PTA: 5 mg daily  Indication: a. fib  Goal INR: 2-3  Warfarin followed by: [facility]    Past Medical History:   Diagnosis Date    Actinic keratosis     history of    Arthritis     Atrial fibrillation ([facility] [address]     CAD (coronary artery disease)     history of afib    Cancer ([facility] [address]     skin    Cervical prolapse     history of    Diverticulosis     Endocervical polyp     history of    Heel spur     Hip pain     Hypertension     Nasal septal deviation     Osteoporosis     Stress incontinence     history of    Syncope     Vaginal prolapse     history of              Recent Labs     08/27/21  1229   INR 2.80*     Recent Labs     08/27/21  1229   HGB 13.7   HCT 40.7   PLT 95*     Current warfarin drug-drug interactions: none    Date INR Warfarin Dose   08/27/21 2.80 5 mg                                   Daily PT/INR until stable within therapeutic range. Thank you for the consult.       Radha Connell, PharmD  8/27/2021  4:22 PM

## 2021-08-27 NOTE — H&P
Internal Medicine Specialties  H&P  8/27/2021  3:54 PM    Patient:  Wilfred Fritz  YOB: 1934    MRN: [id number]   Acct:  [de-identified]   09/009A  Primary Care Physician: Elis Moyer MD    Chief Complaint:  Chief Complaint   Patient presents with    Headache    Abdominal Pain    Nausea       History of Present Illness: The patient is a 80 y.o. 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 Pt denies any SOB or chest pain. She has had decreased intake in oral diet due to epigastric pain and emesis. In the emergency department Na 125, CXR negative. Patient admitted for dysphagia and hyponatremia. Pt remains a full code.        Past Medical History:        Diagnosis Date    Actinic keratosis     history of    Arthritis     Atrial fibrillation ([address]     CAD (coronary artery disease)     history of afib    Cancer ([address]     skin    Cervical prolapse     history of    Diverticulosis     Endocervical polyp     history of    Heel spur     Hip pain     Hypertension     Nasal septal deviation     Osteoporosis     Stress incontinence     history of    Syncope     Vaginal prolapse     history of       Past Surgical History:        Procedure Laterality Date    APPENDECTOMY  1960's   [place] BLADDER REPAIR  2011    [address]  07/8475    [facility]    COLONOSCOPY  01/26/2000    ENDOSCOPIC ULTRASOUND (LOWER) Left 07/20/2017    ENDOSCOPIC ULTRASOUND performed by Shareen Hodgkin, MD at [facility] [address] cataract    FACIAL SURGERY N/A 1/27/2021    DIVISION AND INSET/CHEEK performed by Roxana Gardner MD at [facility]  2017    left total hip relpament    MOHS SURGERY N/A 1/8/2021    MOHS REPAIR BCC NASAL TIP WITH FLAP AND GRAFT performed by Cassius Pierson MD at [address] Left 8/6/2021    MOHS DEFECT REPAIR SCC DORSAL NOSE AND LEFT LOWER FOREHEAD WITH SKIN GRAFT FROM RIGHT CHEEK/EAR (PLACED ON NOSE) performed by Cassius Pierson MD at [address]  1960's    [place] TRANSORAL BIOPSY SINGLE/MULTIPLE Left 07/16/2017    EGD BIOPSY performed by Andres Simon MD at [facility]    [place] OFFICE/OUTPT VISIT,PROCEDURE ONLY Right 06/01/2018    MOHS REPAIR BASEL CELL CARCINOMA RIGHT DORSAL NOSE performed by Cassius Pierson MD at [address] HIP ARTHROPLASTY Left 09/19/2017    Left Total Hip ARTHROPLASTY performed by Roxann Parker MD at [address]  07/16/2017    UPPER GASTROINTESTINAL ENDOSCOPY Left 07/16/2017    EGD DILATION BALLOON performed by Andres Simon MD at [facility] [address] LEFT Left 4/21/2021    US BREAST NEEDLE BIOPSY LEFT 4/21/2021 [address] [facility] ULTRASOUND    US BREAST NEEDLE BIOPSY RIGHT Right 4/21/2021    US BREAST NEEDLE BIOPSY RIGHT 4/21/2021 [address] [facility] ULTRASOUND    US GUIDED NEEDLE LOC BREAST ADDL LEFT Left 4/21/2021    US GUIDED NEEDLE LOC BREAST ADDL LEFT 4/21/2021 [address] [facility] ULTRASOUND       Home Medications: Not in a hospital admission. Allergies:  Statins, Tape [adhesive tape], Tizanidine hcl, and Tramadol    Social History:    reports that she has never smoked. She has never used smokeless tobacco. She reports that she does not drink alcohol and does not use drugs.         Family History:       Problem Relation Age of Onset    Colon Cancer Father     Osteoporosis Mother     Heart Disease Mother        Review of Systems:    General ROS: negative  Psychological ROS: negative  Hematological and Lymphatic ROS: negative  Endocrine ROS: negative  Vision:negative  ENT ROS: Denies  Respiratory ROS: no cough, shortness of breath, or wheezing  Cardiovascular ROS: no chest pain or dyspnea on exertion  Gastrointestinal ROS: mid epigastric abdominal pain, multiple episodes of emesis and nausea  Genito-Urinary ROS: no dysuria, trouble voiding, or hematuria  Musculoskeletal ROS: negative  Neurological ROS: no TIA or stroke symptoms  Dermatological ROS: negative  Weight:no change in weight  Appetite: decreased      Physical Exam:    Vitals:  Patient Vitals for the past 24 hrs:   BP Temp Temp src Pulse Resp SpO2 Weight   08/27/21 1525 117/77 -- -- 104 15 97 % --   08/27/21 1339 118/88 -- -- 105 19 95 % --   08/27/21 1315 130/75 -- -- -- -- -- --   08/27/21 1206 -- -- -- -- -- 95 % --   08/27/21 1158 127/73 -- -- 110 14 96 % 162 lb (73.5 kg)   08/27/21 1048 123/86 97.3 °F (36.3 °C) Oral 97 14 93 % --     Weight: Weight: 162 lb (73.5 kg)     24 hour intake/output:    Intake/Output Summary (Last 24 hours) at 8/27/2021 1554  Last data filed at 8/27/2021 1324  Gross per 24 hour   Intake 1000 ml   Output --   Net 1000 ml       General appearance - alert, well appearing, and in no distress  Eyes - pupils equal and reactive, extraocular eye movements intact  Ears - bilateral TM's and external ear canals normal  Nose - normal and patent, no erythema, discharge or polyps  Mouth - mucous membranes moist, pharynx normal without lesions  Neck - supple, no significant adenopathy  Lymphatics - no palpable lymphadenopathy, no hepatosplenomegaly  Chest - clear to auscultation, no wheezes, rales or rhonchi, symmetric air entry  Distant Breath Sounds: No  Heart - normal rate, regular rhythm, normal S1, S2, no murmurs, rubs, clicks or gallops  Abdomen - soft, slightly tender to palpation, bowel sounds normoactive  Obese: No; Protuberant: Yes  Neurological - alert, oriented, normal speech, no focal findings or movement disorder noted  Musculoskeletal - no joint tenderness, deformity or swelling  Extremities - peripheral pulses normal, no pedal edema, no clubbing or cyanosis  Skin - scar mid nose d/t removal of skin cancer    Review of Labs and Diagnostic Testing:    CBC:   Recent Labs     08/27/21  1229   WBC 3.6*   HGB 13.7   HCT 40.7   MCV 80.4*   PLT 95*     BMP:   Recent Labs     08/27/21  1229   *   K 4.8   CL 90*   CO2 25   BUN 9   CREATININE 0.8   CALCIUM 9.0   GLUCOSE 87     PT/INR:   Recent Labs     08/27/21  1229   INR 2.80*     APTT: No results for input(s): APTT in the last 72 hours. Lipids:   Recent Labs     08/27/21  1229   ALKPHOS 94   ALT 14   AST 20   BILITOT 0.5   LABALBU 4.0   LIPASE 31.7     Troponin:   Recent Labs     08/27/21  1229   TROPONINT < 0.010        Imaging:  XR CHEST (2 VW)    Result Date: 8/27/2021  PROCEDURE: XR CHEST (2 VW) CLINICAL INFORMATION: Chest pain TECHNIQUE: AP and lateral chest radiographs COMPARISON: None. FINDINGS: Cardiac silhouette is at the upper limits of normal in size. Atherosclerotic calcifications are present in the thoracic aorta. There are no lung consolidations. Degenerative changes in the thoracic spine are poorly visualized. There are surgical clips in the right upper quadrant of the abdomen. No acute intrathoracic process. **This report has been created using voice recognition software. It may contain minor errors which are inherent in voice recognition technology. ** Final report electronically signed by Dr. Cheko Pollock on 8/27/2021 2:14 PM    CT ABDOMEN PELVIS W IV CONTRAST Additional Contrast? None    Result Date: 8/23/2021  EXAMINATION: CT OF THE ABDOMEN AND PELVIS WITH CONTRAST 8/23/2021 11:35 am TECHNIQUE: CT of the abdomen and pelvis was performed with the administration of intravenous contrast. Multiplanar reformatted images are provided for review. Dose modulation, iterative reconstruction, and/or weight based adjustment of the mA/kV was utilized to reduce the radiation dose to as low as reasonably achievable.  COMPARISON: 07/15/2017 HISTORY: ORDERING SYSTEM PROVIDED HISTORY: Abdominal pain TECHNOLOGIST PROVIDED HISTORY: Abdominal pain Decision Support Exception - unselect if not a suspected or confirmed emergency medical condition->Emergency Medical Condition (MA) Reason for Exam: Upper abd pain, hx appendectomy, cholecystectomy, hysterectomy Acuity: Acute Type of Exam: Initial FINDINGS: Lower Chest: Multiple solid noncalcified pulmonary nodules in the partially imaged lung bases ranging in size from 0.5-1.5 cm, either unchanged in size or decreased in caliber relative to comparison imaging from 2017. Two of the right lower lobe nodules appear to contain internal fat attenuation compatible hamartomas. Minimal dependent changes in the lung bases. Cardiomegaly. No pericardial effusion. Organs: Cholecystectomy. Liver is without focal abnormality. Mild splenomegaly. Diffusely atrophic pancreas. Adrenal glands are normal caliber. 1.9 cm water attenuation cyst superior pole right kidney. No hydronephrosis or perinephric stranding. GI/Bowel: No bowel obstruction. Colonic diverticulosis without diverticulitis. Pelvis: Pelvic floor descent with a pessary in place. Evaluation is limited by streak artifact from hip arthroplasty hardware but there appears to be urinary bladder wall thickening with perivesicular stranding. Hysterectomy. Peritoneum/Retroperitoneum: Small volume simple free fluid in the dependent pelvis. No free air or lymphadenopathy. Heavy aortic and iliofemoral atherosclerosis without aneurysm. Bones/Soft Tissues: Mild levoconvex lumbar spinal curvature. Grade 1 degenerative anterolisthesis L4 on L5. Partially imaged left hip arthroplasty hardware appears grossly appropriate in alignment without surrounding lucency or fracture. Remote postoperative changes along the right upper quadrant anterior abdominal wall. Mild splenomegaly, new from remote prior imaging. Urinary bladder wall thickening with perivesicular inflammatory stranding and small volume simple free fluid in the pelvis. Correlate for cystitis.  Background of pelvic floor descent with a pessary in place. Colonic diverticulosis without diverticulitis. Multiple basilar pulmonary nodules which are either unchanged in size or decreased in caliber relative to remote comparison from 2017. Assessment/Plan:    1. Epigastric pain with dysphagia with hx of esophageal stricture  a. Consulted GI--Discussed with Dr Catrachita Paez and he will see pt  b. NPO for now   c. Cont PPI   2. Hyponatremia  a. Start IVF at 75./hr  b. Monitor Na q6h; no more than 8-10 increase in 24 hours  3. A flutter/A fib on Coumadin  a. Cont Coumadin, BB, Rhythmol  4. N/V   a. Most likely due to stricture  b. Check urine to r/o infectious process  5. Hx squamous cell carcinoma s/p removal  a. Noted   6. PT/OT  7. DVT prophylaxis   a. Pt is on Coumadin--pharmacy to dose     Assessment and plan of care discussed with supervising physician, Dr Lynn Rahman. Electronically signed by VASU Botello CNP on 8/27/2021 at 3:54 PM     Addendum/attestation by Zach Beasley MD:  I have seen and examined the patient independently. Face to face evaluation and examination was performed. The above evaluation and note has been reviewed. Laboratory and radiological data were reviewed. I Have discussed with Jolie Meza CNP about this patient in detail. The above assessment and plan has been reviewed. Please see my modifications mentioned below. My modifications: Limit serum sodium rise to no more than 8 mEq over a period of 24 hours.     Electronically signed by Loren Johnson MD on 8/27/2021 at 6:06 PM             Copy: Primary Care Physician: Aniyah Miranda MD

## 2021-08-27 NOTE — ED PROVIDER NOTES
Heel spur     Hip pain     Hypertension     Nasal septal deviation     Osteoporosis     Stress incontinence     history of    Syncope     Vaginal prolapse     history of     Past Surgical History:   Procedure Laterality Date    APPENDECTOMY  1960's   [place] BLADDER REPAIR  2011    [address]  [address]    [facility]    COLONOSCOPY  01/26/2000    ENDOSCOPIC ULTRASOUND (LOWER) Left 07/20/2017    ENDOSCOPIC ULTRASOUND performed by Shareen Hodgkin, MD at /[facility] 1106 cataract    FACIAL SURGERY N/A 1/27/2021    DIVISION AND INSET/CHEEK performed by Roxana Gardner MD at [facility]  2017    left total hip relpament    MOHS SURGERY N/A 1/8/2021    MOHS REPAIR BCC NASAL TIP WITH FLAP AND GRAFT performed by Roxana Gardner MD at [facility] 146 Left 8/6/2021    MOHS DEFECT REPAIR SCC DORSAL NOSE AND LEFT LOWER FOREHEAD WITH SKIN GRAFT FROM RIGHT CHEEK/EAR (PLACED ON NOSE) performed by Roxana Gardner MD at [address]  1960's    MT EGD TRANSORAL BIOPSY SINGLE/MULTIPLE Left 07/16/2017    EGD BIOPSY performed by Shareen Hodgkin, MD at [facility]    MT OFFICE/OUTPT VISIT,PROCEDURE ONLY Right 06/01/2018    MOHS REPAIR BASEL CELL CARCINOMA RIGHT DORSAL NOSE performed by Roxana Gardner MD at [address] Left 09/19/2017    Left Total Hip ARTHROPLASTY performed by Leobardo Essex, MD at [address]  07/16/2017    UPPER GASTROINTESTINAL ENDOSCOPY Left 07/16/2017    EGD DILATION BALLOON performed by Shareen Hodgkin, MD at [place] [address] LEFT Left 4/21/2021    US BREAST NEEDLE BIOPSY LEFT 4/21/2021 COLLEEN ULTRASOUND    US BREAST NEEDLE BIOPSY RIGHT Right 4/21/2021    US BREAST NEEDLE BIOPSY RIGHT 4/21/2021 COLLEEN ULTRASOUND    US GUIDED NEEDLE LOC BREAST ADDL LEFT Left 4/21/2021    US GUIDED NEEDLE LOC BREAST ADDL LEFT 4/21/2021 COLLEEN ULTRASOUND         MEDICATIONS   No current facility-administered medications for this encounter. Current Outpatient Medications:     metoprolol tartrate (LOPRESSOR) 25 MG tablet, TAKE HALF OF A TABLET TWICE A DAY., Disp: 90 tablet, Rfl: 1    warfarin (COUMADIN) 2.5 MG tablet, 1. Take 2 tablets by mouth daily. Or as directed by INR results. , Disp: 180 tablet, Rfl: 3    pantoprazole (PROTONIX) 40 MG tablet, Take 1 tablet by mouth once daily, Disp: 90 tablet, Rfl: 1    ondansetron (ZOFRAN ODT) 4 MG disintegrating tablet, Take 1 tablet by mouth every 8 hours as needed for Nausea, Disp: 20 tablet, Rfl: 0    famotidine (PEPCID) 20 MG tablet, Take 1 tablet by mouth 2 times daily, Disp: 60 tablet, Rfl: 0    propafenone (RYTHMOL) 150 MG tablet, Take 1 tablet by mouth 2 times daily Take 300 mg of propafenone with 25 mg of metoprolol once and start 150 mg propafenone q 12 hours from next day, Disp: 90 tablet, Rfl: 3    clotrimazole-betamethasone (LOTRISONE) 1-0.05 % cream, Apply a thin film to the affected area 2 times daily. , Disp: 1 Tube, Rfl: 0    Handicap Placard MISC, by Does not apply route Expires 6/2022, Disp: 1 each, Rfl: 0    MEGARED OMEGA-3 KRILL  MG CAPS, Take by mouth daily , Disp: , Rfl:     Multiple Vitamins-Minerals (THERAPEUTIC MULTIVITAMIN-MINERALS) tablet, Take 1 tablet by mouth daily, Disp: , Rfl:     Multiple Vitamins-Minerals (ICAPS AREDS 2) CAPS, Take 2 tablets by mouth daily , Disp: , Rfl:     Facility-Administered Medications Ordered in Other Encounters:     betamethasone acetate-betamethasone sodium phosphate (CELESTONE) injection 12 mg, 12 mg, IntraMUSCular, Once, Maria Rao MD      SOCIAL HISTORY     Social History     Social History Narrative    Not on file     Social History     Tobacco Use    Smoking status: Never Smoker    Smokeless tobacco: Never Used   Vaping Use    Vaping Use: Never used   Substance Use Topics    Alcohol use: No    Drug use: No         ALLERGIES     Allergies   Allergen Reactions    Statins Other (See Comments)     Causes severe leg cramps     Tape Evern Ti Tape] Other (See Comments)     \"sticks to skin\" causes redness    Tizanidine Hcl Other (See Comments)     Having issues with her liver- elevated her enzyme level    Tramadol Other (See Comments)     \"makes me Goofy\"         FAMILY HISTORY     Family History   Problem Relation Age of Onset    Colon Cancer Father     Osteoporosis Mother     Heart Disease Mother          PREVIOUS RECORDS   Previous records reviewed: His medical history of A. fib/CAD/hypertension. GERD/esophageal stricture which required dilation. Current medications metoprolol, Coumadin, propafenone, famotidine. No new allergies         PHYSICAL EXAM     ED Triage Vitals   BP Temp Temp Source Pulse Resp SpO2 Height Weight   08/27/21 1048 08/27/21 1048 08/27/21 1048 08/27/21 1048 08/27/21 1048 08/27/21 1048 -- 08/27/21 1158   123/86 97.3 °F (36.3 °C) Oral 97 14 93 %  162 lb (73.5 kg)     Initial vital signs and nursing assessment reviewed and normal. Body mass index is 26.96 kg/m². Pulsoximetry is normal per my interpretation. Additional Vital Signs:  Vitals:    08/27/21 1339   BP: 118/88   Pulse: 105   Resp: 19   Temp:    SpO2: 95%       Physical Exam  HENT:      Head: Normocephalic and atraumatic. Mouth/Throat:      Mouth: Mucous membranes are moist.      Pharynx: Oropharynx is clear. Eyes:      Extraocular Movements: Extraocular movements intact. Pupils: Pupils are equal, round, and reactive to light. Cardiovascular:      Rate and Rhythm: Bradycardia present. Rhythm irregular. Heart sounds: Normal heart sounds. No murmur heard. No friction rub. No gallop. Pulmonary:      Effort: Pulmonary effort is normal. No respiratory distress. Breath sounds: No stridor. Rales present. No rhonchi.    Abdominal: General: Abdomen is flat. Bowel sounds are normal. There is no distension or abdominal bruit. There are no signs of injury. Palpations: Abdomen is soft. There is no shifting dullness, fluid wave or hepatomegaly. Tenderness: There is abdominal tenderness in the epigastric area and left upper quadrant. There is no guarding or rebound. Negative signs include Jean's sign, Rovsing's sign, McBurney's sign and psoas sign. Skin:     General: Skin is warm. Capillary Refill: Capillary refill takes less than 2 seconds. Neurological:      General: No focal deficit present. Mental Status: She is alert and oriented to person, place, and time. Psychiatric:         Mood and Affect: Mood normal.             MEDICAL DECISION MAKING   Initial Assessment:   80year-old patient with epigastric pain. With previous history of esophagitis structure which required dilation. Today in the morning suffered presyncopal episode probable due to dehydration because she was not able to tolerate oral intake during the last days. We will start IV hydration, symptom control with Zofran/famotidine we ask for labs in order to rule out pancreatitis, acute coronary syndrome, among others. Clinical impression   -Dehydration   -Abdominal pain/esophagitis   -Esophageal stricture    Plan:    SSN 0.9% 800 cc IV once   Zofran 4 mg IV   Famotidine 20 mg IV   Lipase/CBC/CMP/EKG/troponin/chest x-ray.         ED RESULTS   Laboratory results:  Labs Reviewed   CBC WITH AUTO DIFFERENTIAL - Abnormal; Notable for the following components:       Result Value    WBC 3.6 (*)     MCV 80.4 (*)     RDW-CV 15.7 (*)     Platelets 95 (*)     Lymphocytes Absolute 0.9 (*)     All other components within normal limits   COMPREHENSIVE METABOLIC PANEL W/ REFLEX TO MG FOR LOW K - Abnormal; Notable for the following components:    Sodium 125 (*)     Chloride 90 (*)     All other components within normal limits   PROTIME-INR - Abnormal; Notable for the following components:    INR 2.80 (*)     All other components within normal limits   GLOMERULAR FILTRATION RATE, ESTIMATED - Abnormal; Notable for the following components:    Est, Glom Filt Rate 68 (*)     All other components within normal limits   LIPASE   TROPONIN   ANION GAP   SCAN OF BLOOD SMEAR       Radiologic studies results:  XR CHEST (2 VW)   Final Result      No acute intrathoracic process. **This report has been created using voice recognition software. It may contain minor errors which are inherent in voice recognition technology. **      Final report electronically signed by Dr. Briana Wise on 8/27/2021 2:14 PM          ED Medications administered this visit:   Medications   0.9 % sodium chloride bolus (0 mLs IntraVENous Stopped 8/27/21 1324)   ondansetron (ZOFRAN) injection 4 mg (4 mg IntraVENous Given 8/27/21 1225)   famotidine (PEPCID) injection 20 mg (20 mg IntraVENous Given 8/27/21 1224)   sucralfate (CARAFATE) tablet 1 g (1 g Oral Given 8/27/21 1340)         ED COURSE     ED Course as of Aug 27 1505   Fri Aug 27, 2021   1347 Negative   Troponin:    Troponin T < 0.010 [JR]   1347 Normal   Lipase:    Lipase 31.7 [JR]   1348 Thrombocytopenia   CBC Auto Differential(!):    WBC 3.6(!)   RBC 5.06   Hemoglobin Quant 13.7   Hematocrit 40.7   MCV 80.4(!)   MCH 27.1   MCHC 33.7   RDW-CV 15.7(!)   RDW-SD 44.9   Platelet Count 95(!)   MPV 9.5   Seg Neutrophils 53.0   Lymphocytes 26.2   Monocytes 16.6   Eosinophils 2.5   Basophils 0.6   Immature Granulocytes 1.1   Platelet Estimate DECREASED   Segs Absolute 1.9   Lymphocytes Absolute 0.9(!)   Monocytes Absolute 0.6   Eosinophils Absolute 0.1   Basophils Absolute 0.0   Immature Grans (Abs) 0.04   N... [JR]   1348 Hyponatremia   Comprehensive Metabolic Panel w/ Reflex to MG(!):    Glucose 87   Creatinine 0.8   BUN 9   Sodium 125(!)   Potassium 4.8   Chloride 90(!)   CO2 25   Calcium 9.0   AST 20   Alk Phos 94   Total Protein 6.1   Albumin 4.0

## 2021-08-27 NOTE — ED NOTES
Upon first contact with patient this RN receives bedside shift report from [employer] Patient resting and watching tv. No needs at this time. Patient remains in A flutter.      Gauri Chavez RN  08/27/21 1927

## 2021-08-27 NOTE — ED TRIAGE NOTES
Pt presents to ED cc HA, abdominal pain, nausea since Monday. Family states they took pt to E in [place] and was told to follow up with general sx pt has hx of esophogeal stricture and pt has same symptoms as then. Family states pt had appt to see Dr. Alaina Sanders today in regards to the possible stricture but pt was not feeling well enough to go.

## 2021-08-28 ENCOUNTER — APPOINTMENT (OUTPATIENT)
Dept: CT IMAGING | Age: 86
DRG: 369 | End: 2021-08-28
Payer: MEDICARE

## 2021-08-28 ENCOUNTER — ANESTHESIA (OUTPATIENT)
Dept: ENDOSCOPY | Age: 86
DRG: 369 | End: 2021-08-28
Payer: MEDICARE

## 2021-08-28 ENCOUNTER — ANESTHESIA EVENT (OUTPATIENT)
Dept: ENDOSCOPY | Age: 86
DRG: 369 | End: 2021-08-28
Payer: MEDICARE

## 2021-08-28 VITALS
RESPIRATION RATE: 16 BRPM | DIASTOLIC BLOOD PRESSURE: 71 MMHG | OXYGEN SATURATION: 87 % | SYSTOLIC BLOOD PRESSURE: 152 MMHG

## 2021-08-28 LAB
ANION GAP SERPL CALCULATED.3IONS-SCNC: 9 MEQ/L (ref 8–16)
BASOPHILS # BLD: 0.3 %
BASOPHILS ABSOLUTE: 0 THOU/MM3 (ref 0–0.1)
BUN BLDV-MCNC: 7 MG/DL (ref 7–22)
CALCIUM SERPL-MCNC: 8.4 MG/DL (ref 8.5–10.5)
CHLORIDE BLD-SCNC: 95 MEQ/L (ref 98–111)
CO2: 23 MEQ/L (ref 23–33)
CREAT SERPL-MCNC: 0.8 MG/DL (ref 0.4–1.2)
EOSINOPHIL # BLD: 3.8 %
EOSINOPHILS ABSOLUTE: 0.1 THOU/MM3 (ref 0–0.4)
ERYTHROCYTE [DISTWIDTH] IN BLOOD BY AUTOMATED COUNT: 15.8 % (ref 11.5–14.5)
ERYTHROCYTE [DISTWIDTH] IN BLOOD BY AUTOMATED COUNT: 47 FL (ref 35–45)
GFR SERPL CREATININE-BSD FRML MDRD: 68 ML/MIN/1.73M2
GLUCOSE BLD-MCNC: 73 MG/DL (ref 70–108)
HCT VFR BLD CALC: 37.1 % (ref 37–47)
HEMOGLOBIN: 12.3 GM/DL (ref 12–16)
IMMATURE GRANS (ABS): 0.03 THOU/MM3 (ref 0–0.07)
IMMATURE GRANULOCYTES: 1 %
INR BLD: 1.76 (ref 0.85–1.13)
LYMPHOCYTES # BLD: 26.7 %
LYMPHOCYTES ABSOLUTE: 0.8 THOU/MM3 (ref 1–4.8)
MCH RBC QN AUTO: 27.2 PG (ref 26–33)
MCHC RBC AUTO-ENTMCNC: 33.2 GM/DL (ref 32.2–35.5)
MCV RBC AUTO: 82.1 FL (ref 81–99)
MONOCYTES # BLD: 17.1 %
MONOCYTES ABSOLUTE: 0.5 THOU/MM3 (ref 0.4–1.3)
NUCLEATED RED BLOOD CELLS: 0 /100 WBC
OSMOLALITY CALCULATION: 251.8 MOSMOL/KG (ref 275–300)
PLATELET # BLD: 96 THOU/MM3 (ref 130–400)
PMV BLD AUTO: 10.3 FL (ref 9.4–12.4)
POTASSIUM REFLEX MAGNESIUM: 4 MEQ/L (ref 3.5–5.2)
RBC # BLD: 4.52 MILL/MM3 (ref 4.2–5.4)
SEG NEUTROPHILS: 51.1 %
SEGMENTED NEUTROPHILS ABSOLUTE COUNT: 1.5 THOU/MM3 (ref 1.8–7.7)
SODIUM BLD-SCNC: 127 MEQ/L (ref 135–145)
SODIUM BLD-SCNC: 128 MEQ/L (ref 135–145)
SODIUM BLD-SCNC: 130 MEQ/L (ref 135–145)
SODIUM BLD-SCNC: 131 MEQ/L (ref 135–145)
WBC # BLD: 2.9 THOU/MM3 (ref 4.8–10.8)

## 2021-08-28 PROCEDURE — C9113 INJ PANTOPRAZOLE SODIUM, VIA: HCPCS | Performed by: INTERNAL MEDICINE

## 2021-08-28 PROCEDURE — 84295 ASSAY OF SERUM SODIUM: CPT

## 2021-08-28 PROCEDURE — 3700000001 HC ADD 15 MINUTES (ANESTHESIA): Performed by: INTERNAL MEDICINE

## 2021-08-28 PROCEDURE — 1200000003 HC TELEMETRY R&B

## 2021-08-28 PROCEDURE — 2580000003 HC RX 258: Performed by: NURSE ANESTHETIST, CERTIFIED REGISTERED

## 2021-08-28 PROCEDURE — 6370000000 HC RX 637 (ALT 250 FOR IP): Performed by: INTERNAL MEDICINE

## 2021-08-28 PROCEDURE — 6360000002 HC RX W HCPCS: Performed by: NURSE ANESTHETIST, CERTIFIED REGISTERED

## 2021-08-28 PROCEDURE — 87106 FUNGI IDENTIFICATION YEAST: CPT

## 2021-08-28 PROCEDURE — 85025 COMPLETE CBC W/AUTO DIFF WBC: CPT

## 2021-08-28 PROCEDURE — 3700000000 HC ANESTHESIA ATTENDED CARE: Performed by: INTERNAL MEDICINE

## 2021-08-28 PROCEDURE — 2580000003 HC RX 258: Performed by: REGISTERED NURSE

## 2021-08-28 PROCEDURE — 87102 FUNGUS ISOLATION CULTURE: CPT

## 2021-08-28 PROCEDURE — 2720000010 HC SURG SUPPLY STERILE: Performed by: INTERNAL MEDICINE

## 2021-08-28 PROCEDURE — 2500000003 HC RX 250 WO HCPCS: Performed by: NURSE ANESTHETIST, CERTIFIED REGISTERED

## 2021-08-28 PROCEDURE — 6360000002 HC RX W HCPCS: Performed by: INTERNAL MEDICINE

## 2021-08-28 PROCEDURE — 87205 SMEAR GRAM STAIN: CPT

## 2021-08-28 PROCEDURE — 6360000002 HC RX W HCPCS: Performed by: REGISTERED NURSE

## 2021-08-28 PROCEDURE — 80048 BASIC METABOLIC PNL TOTAL CA: CPT

## 2021-08-28 PROCEDURE — 85610 PROTHROMBIN TIME: CPT

## 2021-08-28 PROCEDURE — 3609012400 HC EGD TRANSORAL BIOPSY SINGLE/MULTIPLE: Performed by: INTERNAL MEDICINE

## 2021-08-28 PROCEDURE — 0DJ08ZZ INSPECTION OF UPPER INTESTINAL TRACT, VIA NATURAL OR ARTIFICIAL OPENING ENDOSCOPIC: ICD-10-PCS | Performed by: INTERNAL MEDICINE

## 2021-08-28 PROCEDURE — 6370000000 HC RX 637 (ALT 250 FOR IP): Performed by: REGISTERED NURSE

## 2021-08-28 PROCEDURE — 36415 COLL VENOUS BLD VENIPUNCTURE: CPT

## 2021-08-28 PROCEDURE — 70450 CT HEAD/BRAIN W/O DYE: CPT

## 2021-08-28 PROCEDURE — 2709999900 HC NON-CHARGEABLE SUPPLY: Performed by: INTERNAL MEDICINE

## 2021-08-28 PROCEDURE — 7100000010 HC PHASE II RECOVERY - FIRST 15 MIN: Performed by: INTERNAL MEDICINE

## 2021-08-28 RX ORDER — SODIUM CHLORIDE, SODIUM LACTATE, POTASSIUM CHLORIDE, CALCIUM CHLORIDE 600; 310; 30; 20 MG/100ML; MG/100ML; MG/100ML; MG/100ML
INJECTION, SOLUTION INTRAVENOUS CONTINUOUS PRN
Status: DISCONTINUED | OUTPATIENT
Start: 2021-08-28 | End: 2021-08-28 | Stop reason: SDUPTHER

## 2021-08-28 RX ORDER — SODIUM CHLORIDE 0.9 % (FLUSH) 0.9 %
5-40 SYRINGE (ML) INJECTION PRN
Status: DISCONTINUED | OUTPATIENT
Start: 2021-08-28 | End: 2021-08-30 | Stop reason: SDUPTHER

## 2021-08-28 RX ORDER — SODIUM CHLORIDE 0.9 % (FLUSH) 0.9 %
5-40 SYRINGE (ML) INJECTION EVERY 12 HOURS SCHEDULED
Status: DISCONTINUED | OUTPATIENT
Start: 2021-08-28 | End: 2021-08-30 | Stop reason: SDUPTHER

## 2021-08-28 RX ORDER — SODIUM CHLORIDE 9 MG/ML
25 INJECTION, SOLUTION INTRAVENOUS PRN
Status: DISCONTINUED | OUTPATIENT
Start: 2021-08-28 | End: 2021-08-31 | Stop reason: HOSPADM

## 2021-08-28 RX ORDER — LIDOCAINE HYDROCHLORIDE 20 MG/ML
INJECTION, SOLUTION INFILTRATION; PERINEURAL PRN
Status: DISCONTINUED | OUTPATIENT
Start: 2021-08-28 | End: 2021-08-28 | Stop reason: SDUPTHER

## 2021-08-28 RX ORDER — PROMETHAZINE HYDROCHLORIDE 25 MG/ML
6.25 INJECTION, SOLUTION INTRAMUSCULAR; INTRAVENOUS EVERY 6 HOURS PRN
Status: DISCONTINUED | OUTPATIENT
Start: 2021-08-28 | End: 2021-08-31 | Stop reason: HOSPADM

## 2021-08-28 RX ORDER — ACETAMINOPHEN 650 MG/1
650 SUPPOSITORY RECTAL EVERY 4 HOURS PRN
Status: DISCONTINUED | OUTPATIENT
Start: 2021-08-28 | End: 2021-08-31 | Stop reason: HOSPADM

## 2021-08-28 RX ORDER — PROPOFOL 10 MG/ML
INJECTION, EMULSION INTRAVENOUS PRN
Status: DISCONTINUED | OUTPATIENT
Start: 2021-08-28 | End: 2021-08-28 | Stop reason: SDUPTHER

## 2021-08-28 RX ORDER — PROMETHAZINE HYDROCHLORIDE 25 MG/ML
6.25 INJECTION, SOLUTION INTRAMUSCULAR; INTRAVENOUS EVERY 6 HOURS PRN
Status: DISCONTINUED | OUTPATIENT
Start: 2021-08-28 | End: 2021-08-28

## 2021-08-28 RX ORDER — PANTOPRAZOLE SODIUM 40 MG/10ML
40 INJECTION, POWDER, LYOPHILIZED, FOR SOLUTION INTRAVENOUS 2 TIMES DAILY
Status: DISCONTINUED | OUTPATIENT
Start: 2021-08-28 | End: 2021-08-31 | Stop reason: HOSPADM

## 2021-08-28 RX ORDER — PROMETHAZINE HYDROCHLORIDE 25 MG/ML
12.5 INJECTION, SOLUTION INTRAMUSCULAR; INTRAVENOUS EVERY 6 HOURS PRN
Status: DISCONTINUED | OUTPATIENT
Start: 2021-08-28 | End: 2021-08-31 | Stop reason: HOSPADM

## 2021-08-28 RX ADMIN — METOPROLOL TARTRATE 12.5 MG: 25 TABLET, FILM COATED ORAL at 21:05

## 2021-08-28 RX ADMIN — HYDROMORPHONE HYDROCHLORIDE 0.25 MG: 1 INJECTION, SOLUTION INTRAMUSCULAR; INTRAVENOUS; SUBCUTANEOUS at 21:04

## 2021-08-28 RX ADMIN — HYDROMORPHONE HYDROCHLORIDE 0.25 MG: 1 INJECTION, SOLUTION INTRAMUSCULAR; INTRAVENOUS; SUBCUTANEOUS at 11:06

## 2021-08-28 RX ADMIN — PROPAFENONE HYDROCHLORIDE 150 MG: 150 TABLET, FILM COATED ORAL at 21:05

## 2021-08-28 RX ADMIN — LIDOCAINE HYDROCHLORIDE 100 MG: 20 INJECTION, SOLUTION INFILTRATION; PERINEURAL at 14:29

## 2021-08-28 RX ADMIN — ACETAMINOPHEN 650 MG: 650 SUPPOSITORY RECTAL at 08:22

## 2021-08-28 RX ADMIN — PANTOPRAZOLE SODIUM 40 MG: 40 INJECTION, POWDER, FOR SOLUTION INTRAVENOUS at 21:04

## 2021-08-28 RX ADMIN — PROMETHAZINE HYDROCHLORIDE 12.5 MG: 25 INJECTION INTRAMUSCULAR; INTRAVENOUS at 12:11

## 2021-08-28 RX ADMIN — NYSTATIN 500000 UNITS: 100000 SUSPENSION ORAL at 21:04

## 2021-08-28 RX ADMIN — NYSTATIN 500000 UNITS: 100000 SUSPENSION ORAL at 18:04

## 2021-08-28 RX ADMIN — ACETAMINOPHEN 650 MG: 325 TABLET ORAL at 18:56

## 2021-08-28 RX ADMIN — ONDANSETRON 4 MG: 2 INJECTION INTRAMUSCULAR; INTRAVENOUS at 08:22

## 2021-08-28 RX ADMIN — HYDROMORPHONE HYDROCHLORIDE 0.25 MG: 1 INJECTION, SOLUTION INTRAMUSCULAR; INTRAVENOUS; SUBCUTANEOUS at 12:45

## 2021-08-28 RX ADMIN — SODIUM CHLORIDE, POTASSIUM CHLORIDE, SODIUM LACTATE AND CALCIUM CHLORIDE: 600; 310; 30; 20 INJECTION, SOLUTION INTRAVENOUS at 14:24

## 2021-08-28 RX ADMIN — PROPOFOL 10 MG: 10 INJECTION, EMULSION INTRAVENOUS at 14:33

## 2021-08-28 RX ADMIN — ONDANSETRON 4 MG: 2 INJECTION INTRAMUSCULAR; INTRAVENOUS at 04:50

## 2021-08-28 RX ADMIN — SODIUM CHLORIDE: 9 INJECTION, SOLUTION INTRAVENOUS at 11:08

## 2021-08-28 RX ADMIN — PROPOFOL 40 MG: 10 INJECTION, EMULSION INTRAVENOUS at 14:29

## 2021-08-28 RX ADMIN — CEFTRIAXONE SODIUM 1000 MG: 1 INJECTION, POWDER, FOR SOLUTION INTRAMUSCULAR; INTRAVENOUS at 22:55

## 2021-08-28 RX ADMIN — PANTOPRAZOLE SODIUM 40 MG: 40 INJECTION, POWDER, FOR SOLUTION INTRAVENOUS at 12:45

## 2021-08-28 ASSESSMENT — PAIN SCALES - GENERAL
PAINLEVEL_OUTOF10: 0
PAINLEVEL_OUTOF10: 5
PAINLEVEL_OUTOF10: 10
PAINLEVEL_OUTOF10: 8
PAINLEVEL_OUTOF10: 3
PAINLEVEL_OUTOF10: 5
PAINLEVEL_OUTOF10: 7
PAINLEVEL_OUTOF10: 4

## 2021-08-28 ASSESSMENT — PAIN DESCRIPTION - PROGRESSION

## 2021-08-28 ASSESSMENT — PAIN DESCRIPTION - LOCATION: LOCATION: HEAD

## 2021-08-28 ASSESSMENT — PAIN - FUNCTIONAL ASSESSMENT: PAIN_FUNCTIONAL_ASSESSMENT: 0-10

## 2021-08-28 ASSESSMENT — PAIN DESCRIPTION - ONSET: ONSET: ON-GOING

## 2021-08-28 ASSESSMENT — PAIN DESCRIPTION - FREQUENCY: FREQUENCY: CONTINUOUS

## 2021-08-28 ASSESSMENT — PAIN DESCRIPTION - DESCRIPTORS: DESCRIPTORS: ACHING

## 2021-08-28 ASSESSMENT — PAIN DESCRIPTION - PAIN TYPE: TYPE: ACUTE PAIN

## 2021-08-28 NOTE — FLOWSHEET NOTE
Paged Dr. Christine Fake about pt's consistent nausea and severe 10/10 pain. Orders received.   Nuris Bridges RN

## 2021-08-28 NOTE — PROGRESS NOTES
Pt in phase 2 of endoscopy. Pt fully awake, daughter in room. Dr Young Olivier in to discuss results.  Report called to nurse

## 2021-08-28 NOTE — PRE SEDATION
Sedation/Analgesia History & Physical    Patient: Igor Mccain : 1934  Med Rec#: [id number] Acc#: [id number]   Provider Performing Procedure: Linda Graves MD  Primary Care Physician: Kandace Loyola MD    PRE-PROCEDURE   Full CODE [x]Yes  DNR-CCA/DNR-CC []Yes   Brief History/Pre-Procedure Diagnosis: GERD and dysphagia           MEDICAL HISTORY    []Additional information:       has a past medical history of Actinic keratosis, Arthritis, Atrial fibrillation ([facility] [address] CAD (coronary artery disease), Cancer ([facility] [address] Cervical prolapse, Diverticulosis, Endocervical polyp, Heel spur, Hip pain, Hypertension, Nasal septal deviation, Osteoporosis, Stress incontinence, Syncope, and Vaginal prolapse. SOCIAL HISTORY  Social History     Tobacco History     Smoking Status  Never Smoker    Smokeless Tobacco Use  Never Used          Alcohol History     Alcohol Use Status  No          Drug Use     Drug Use Status  No          Sexual Activity     Sexually Active  Not Currently Partners  Male Comment                  FAMILY HISTORY     Family History   Problem Relation Age of Onset    Colon Cancer Father     Osteoporosis Mother     Heart Disease Mother        SURGICAL HISTORY   has a past surgical history that includes Colonoscopy (2000); Cholecystectomy (1976); Upper gastrointestinal endoscopy (2017); pr egd transoral biopsy single/multiple (Left, 2017); Upper gastrointestinal endoscopy (Left, 2017); Endoscopic ultrasonography, GI (Left, 2017); eye surgery; Total hip arthroplasty (Left, 2017); bladder repair (); partial hysterectomy (cervix not removed) ('s); Appendectomy (s); skin biopsy; pr office/outpt visit,procedure only (Right, 2018); joint replacement (); Mohs surgery (N/A, 2021);  Facial Surgery (N/A, 2021); US BREAST BIOPSY W LOC DEVICE 1ST LESION LEFT (Left, 2021); US PLACE BREAST LOC DEVICE EACH ADDL LEFT (Left, 2021); US BREAST BIOPSY W LOC DEVICE 1ST LESION RIGHT (Right, 4/21/2021); Hysterectomy; and Mohs surgery (Left, 8/6/2021).   Additional information:       ALLERGIES   Allergies as of 08/27/2021 - Fully Reviewed 08/27/2021   Allergen Reaction Noted    Statins Other (See Comments) 07/18/2017    Tape Delories Mas tape] Other (See Comments) 06/01/2018    Tizanidine hcl Other (See Comments) 08/24/2017    Tramadol Other (See Comments) 08/22/2017     Additional information:       MEDICATIONS   Coumadin Use Last 7 Days [x]No []Yes  Antiplatelet drug therapy use last 7 days  [x]No []Yes  Other anticoagulant use last 7 days  [x]No []Yes    Current Facility-Administered Medications:     acetaminophen (TYLENOL) suppository 650 mg, 650 mg, Rectal, Q4H PRN, Onel Marques MD    promethazine (PHENERGAN) injection 6.25 mg, 6.25 mg, IntraMUSCular, Q6H PRN, Onel Marques MD    promethazine (PHENERGAN) injection 12.5 mg, 12.5 mg, IntraMUSCular, Q6H PRN, Onel Marques MD, 12.5 mg at 08/28/21 1211    pantoprazole (PROTONIX) injection 40 mg, 40 mg, IntraVENous, BID, Onel Marques MD, 40 mg at 08/28/21 1245    HYDROmorphone (DILAUDID) injection 0.25 mg, 0.25 mg, IntraVENous, Q3H PRN, Onel Marques MD, 0.25 mg at 08/28/21 1245    metoprolol tartrate (LOPRESSOR) tablet 12.5 mg, 12.5 mg, Oral, BID, Onel Marques MD    propafenone (RYTHMOL) tablet 150 mg, 150 mg, Oral, BID, Onel Marques MD, 150 mg at 08/27/21 2311    sodium chloride flush 0.9 % injection 5-40 mL, 5-40 mL, IntraVENous, 2 times per day, VASU Ann - CNP    sodium chloride flush 0.9 % injection 5-40 mL, 5-40 mL, IntraVENous, PRN, VASU Ann - CNP    0.9 % sodium chloride infusion, 25 mL, IntraVENous, PRN, Corinda Fellers, APRN - CNP    acetaminophen (TYLENOL) tablet 650 mg, 650 mg, Oral, Q6H PRN **OR** [DISCONTINUED] acetaminophen (TYLENOL) suppository 650 mg, 650 mg, Rectal, Q6H PRN, Raffaele Solano, VASU - CNP, 650 mg at 08/28/21 4711    [DISCONTINUED] promethazine (PHENERGAN) tablet 12.5 mg, 12.5 mg, Oral, Q6H PRN **OR** ondansetron (ZOFRAN) injection 4 mg, 4 mg, IntraVENous, Q6H PRN, VASU Hensley - CNP, 4 mg at 08/28/21 0822    0.9 % sodium chloride infusion, , IntraVENous, Continuous, VASU Hensley CNP, Last Rate: 75 mL/hr at 08/28/21 1108, New Bag at 08/28/21 1108    cefTRIAXone (ROCEPHIN) 1000 mg IVPB in 50 mL D5W minibag, 1,000 mg, IntraVENous, Q24H, VASU Hensley CNP, Stopped at 08/28/21 0029    Facility-Administered Medications Ordered in Other Encounters:     betamethasone acetate-betamethasone sodium phosphate (CELESTONE) injection 12 mg, 12 mg, IntraMUSCular, Once, Sunday Freeman MD  Prior to Admission medications    Medication Sig Start Date End Date Taking? Authorizing Provider   metoprolol tartrate (LOPRESSOR) 25 MG tablet TAKE HALF OF A TABLET TWICE A DAY. 8/26/21  Yes Marquis Maru MD   warfarin (COUMADIN) 2.5 MG tablet 1. Take 2 tablets by mouth daily. Or as directed by INR results.  8/26/21  Yes Marquis Maru MD   pantoprazole (PROTONIX) 40 MG tablet Take 1 tablet by mouth once daily 8/26/21  Yes Marquis Maru MD   ondansetron (ZOFRAN ODT) 4 MG disintegrating tablet Take 1 tablet by mouth every 8 hours as needed for Nausea 8/23/21  Yes Joslyn Melgar MD   propafenone (RYTHMOL) 150 MG tablet Take 1 tablet by mouth 2 times daily Take 300 mg of propafenone with 25 mg of metoprolol once and start 150 mg propafenone q 12 hours from next day 4/6/21  Yes DO MARNIE Burroughs OMEGA-3 KRILL  MG CAPS Take by mouth daily    Yes Historical Provider, MD   Multiple Vitamins-Minerals (THERAPEUTIC MULTIVITAMIN-MINERALS) tablet Take 1 tablet by mouth daily   Yes Historical Provider, MD   Multiple Vitamins-Minerals (ICAPS AREDS 2) CAPS Take 2 tablets by mouth daily    Yes Historical Provider, MD   Handicap Placard MISC by Does not apply route Expires 6/2022 6/15/17   Brenda Qureshi MD     Additional information:       PHYSICAL:   Heart:  [x]Regular rate and rhythm  []Other:    Lungs:  [x]Clear    []Other:    Abdomen: [x]Soft    []Other:    Mental Status: [x]Alert & Oriented  []Other:        PLANNED PROCEDURE   [x]EGD  []Colonoscopy []Flex Sigmoid     Consent: I have discussed with the patient and/or the patient representative the indication, alternatives, and the possible risks and/or complications of the planned procedure and the anesthesia methods. The patient and/or patient representative appear to understand and agree to proceed. SEDATION  Please see anesthesia note. The medication Planned :  Planned agent:[x]Midazolam []Meperidine [x]Sublimaze []Morphine  []Diazepam  [x]Propofol     Airway Assessment:   See anesthesia no please     Monitoring and Safety: The patient will be placed on a cardiac monitor and vital signs, pulse oximetry and level of consciousness will be continuously evaluated throughout the procedure. The patient will be closely monitored until recovery from the medications is complete and the patient has returned to baseline status. Respiratory therapy will be on standby during the procedure. [x]Pre-procedure diagnostic studies complete and results available. Comment:    [x]Previous sedation/anesthesia experiences assessed. Comment:    [x]The patient is an appropriate candidate to undergo the planned procedure sedation and anesthesia. (Refer to nursing sedation/analgesia documentation record)  [x]Formulation and discussion of sedation/procedure plan, risks, and expectations with patient and/or responsible adult completed. [x]Patient examined immediately prior to the procedure.  (Refer to nursing sedation/analgesia documentation record)    Zeny Vega MD, MD   Electronically signed

## 2021-08-28 NOTE — FLOWSHEET NOTE
Paged Dr. Sandra Ogden about pt states Dilaudid helped for a little bit but her pain is back up again 8/10 in head and stomach ache 6/10. Orders received.   Mansi Felix RN

## 2021-08-28 NOTE — PROGRESS NOTES
INTERNAL MEDICINE SPECIALTIES  Progress Note For Dr Savanah Coles       Patient:  Marco A Chang  YOB: 1934  Date of Service: 8/28/2021  MRN: [id number]   Acct:  [de-identified]   Primary Care Physician: Trell Pringle MD    SUBJECTIVE: Patient complains of epigastric pain nausea and also a headache. Received Tylenol suppositories Zofran and also has as needed Phenergan available. Home Medications:   Current Facility-Administered Medications on File Prior to Encounter   Medication Dose Route Frequency Provider Last Rate Last Admin    betamethasone acetate-betamethasone sodium phosphate (CELESTONE) injection 12 mg  12 mg IntraMUSCular Once Micky Ward MD         Current Outpatient Medications on File Prior to Encounter   Medication Sig Dispense Refill    metoprolol tartrate (LOPRESSOR) 25 MG tablet TAKE HALF OF A TABLET TWICE A DAY. 90 tablet 1    warfarin (COUMADIN) 2.5 MG tablet 1. Take 2 tablets by mouth daily. Or as directed by INR results.  180 tablet 3    pantoprazole (PROTONIX) 40 MG tablet Take 1 tablet by mouth once daily 90 tablet 1    ondansetron (ZOFRAN ODT) 4 MG disintegrating tablet Take 1 tablet by mouth every 8 hours as needed for Nausea 20 tablet 0    propafenone (RYTHMOL) 150 MG tablet Take 1 tablet by mouth 2 times daily Take 300 mg of propafenone with 25 mg of metoprolol once and start 150 mg propafenone q 12 hours from next day 90 tablet 3    MEGARED OMEGA-3 KRILL  MG CAPS Take by mouth daily       Multiple Vitamins-Minerals (THERAPEUTIC MULTIVITAMIN-MINERALS) tablet Take 1 tablet by mouth daily      Multiple Vitamins-Minerals (ICAPS AREDS 2) CAPS Take 2 tablets by mouth daily       Handicap Placard MISC by Does not apply route Expires 6/2022 1 each 0         Scheduled Meds:   metoprolol tartrate  12.5 mg Oral BID    pantoprazole  40 mg Oral QAM AC    propafenone  150 mg Oral BID    sodium chloride flush  5-40 mL IntraVENous 2 times per day    cefTRIAXone (ROCEPHIN) IV  1,000 mg IntraVENous Q24H     Continuous Infusions:   sodium chloride      sodium chloride 75 mL/hr at 08/27/21 2155     PRN Meds:promethazine, acetaminophen, sodium chloride flush, sodium chloride, acetaminophen **OR** acetaminophen, promethazine **OR** ondansetron        Allergies:  Statins, Tape [adhesive tape], Tizanidine hcl, and Tramadol    OBJECTIVE:    Vitals:   Vitals:    08/28/21 0745   BP: (!) 109/55   Pulse: 82   Resp: 22   Temp:    SpO2:       BMI: Body mass index is 25.65 kg/m². PHYSICAL EXAMINATION:            General appearance:  No apparent distress, appears stated age and cooperative. HEENT:  Normal cephalic, atraumatic without obvious deformity. Pupils equal, round, and reactive to light. Extra ocular muscles intact. Conjunctivae/corneas clear. Healing wound over nose and left forehead  Neck: Supple  Respiratory:  clinically clear bilaterally  Cardiovascular:  Regular rhythm with normal S1/S2 without rubs or gallops. Abdomen: Soft,mildly tender epigastrium, non-distended with normal bowel sounds. Musculoskeletal:  No clubbing, cyanosis  or edema bilaterally .    Neurologic: Alert and oriented x3 no gross motor deficits          Review of Labs and Diagnostic Testing:    Recent Results (from the past 24 hour(s))   EKG 12 Lead    Collection Time: 08/27/21 12:28 PM   Result Value Ref Range    Ventricular Rate 96 BPM    Atrial Rate 234 BPM    QRS Duration 86 ms    Q-T Interval 394 ms    QTc Calculation (Bazett) 497 ms    R Axis 88 degrees    T Axis 60 degrees   CBC Auto Differential    Collection Time: 08/27/21 12:29 PM   Result Value Ref Range    WBC 3.6 (L) 4.8 - 10.8 thou/mm3    RBC 5.06 4.20 - 5.40 mill/mm3    Hemoglobin 13.7 12.0 - 16.0 gm/dl    Hematocrit 40.7 37.0 - 47.0 %    MCV 80.4 (L) 81.0 - 99.0 fL    MCH 27.1 26.0 - 33.0 pg    MCHC 33.7 32.2 - 35.5 gm/dl    RDW-CV 15.7 (H) 11.5 - 14.5 %    RDW-SD 44.9 35.0 - 45.0 fL    Platelets 95 (L) 730 - 400 thou/mm3    MPV 9.5 9.4 - 12.4 fL    Seg Neutrophils 53.0 %    Lymphocytes 26.2 %    Monocytes 16.6 %    Eosinophils 2.5 %    Basophils 0.6 %    Immature Granulocytes 1.1 %    Platelet Estimate DECREASED Adequate    Segs Absolute 1.9 1 - 7 thou/mm3    Lymphocytes Absolute 0.9 (L) 1.0 - 4.8 thou/mm3    Monocytes Absolute 0.6 0.4 - 1.3 thou/mm3    Eosinophils Absolute 0.1 0.0 - 0.4 thou/mm3    Basophils Absolute 0.0 0.0 - 0.1 thou/mm3    Immature Grans (Abs) 0.04 0.00 - 0.07 thou/mm3    nRBC 0 /100 wbc   Comprehensive Metabolic Panel w/ Reflex to MG    Collection Time: 08/27/21 12:29 PM   Result Value Ref Range    Glucose 87 70 - 108 mg/dL    CREATININE 0.8 0.4 - 1.2 mg/dL    BUN 9 7 - 22 mg/dL    Sodium 125 (L) 135 - 145 meq/L    Potassium reflex Magnesium 4.8 3.5 - 5.2 meq/L    Chloride 90 (L) 98 - 111 meq/L    CO2 25 23 - 33 meq/L    Calcium 9.0 8.5 - 10.5 mg/dL    AST 20 5 - 40 U/L    Alkaline Phosphatase 94 38 - 126 U/L    Total Protein 6.1 6.1 - 8.0 g/dL    Albumin 4.0 3.5 - 5.1 g/dL    Total Bilirubin 0.5 0.3 - 1.2 mg/dL    ALT 14 11 - 66 U/L   Lipase    Collection Time: 08/27/21 12:29 PM   Result Value Ref Range    Lipase 31.7 5.6 - 51.3 U/L   Troponin    Collection Time: 08/27/21 12:29 PM   Result Value Ref Range    Troponin T < 0.010 ng/ml   Protime-INR    Collection Time: 08/27/21 12:29 PM   Result Value Ref Range    INR 2.80 (H) 0.85 - 1.13   Anion Gap    Collection Time: 08/27/21 12:29 PM   Result Value Ref Range    Anion Gap 10.0 8.0 - 16.0 meq/L   Glomerular Filtration Rate, Estimated    Collection Time: 08/27/21 12:29 PM   Result Value Ref Range    Est, Glom Filt Rate 68 (A) ml/min/1.73m2   Scan of Blood Smear    Collection Time: 08/27/21 12:29 PM   Result Value Ref Range    SCAN OF BLOOD SMEAR see below    Urine with Reflexed Micro    Collection Time: 08/27/21  7:54 PM   Result Value Ref Range    Glucose, Ur NEGATIVE NEGATIVE mg/dl    Bilirubin Urine NEGATIVE NEGATIVE    Ketones, Urine 15 (A) NEGATIVE    Specific Gravity, Urine 1.007 1.002 - 1.030    Blood, Urine TRACE (A) NEGATIVE    pH, UA 6.5 5.0 - 9.0    Protein, UA NEGATIVE NEGATIVE    Urobilinogen, Urine 0.2 0.0 - 1.0 eu/dl    Nitrite, Urine NEGATIVE NEGATIVE    Leukocyte Esterase, Urine LARGE (A) NEGATIVE    Color, UA YELLOW STRAW-YELLOW    Character, Urine CLEAR CLEAR-SL CLOUD    RBC, UA 0-2 0-2/hpf /hpf    WBC, UA > 100 0-4/hpf /hpf    Epithelial Cells, UA NONE SEEN 3-5/hpf /hpf    Bacteria, UA NONE SEEN FEW/NONE SEEN /hpf    Casts UA NONE SEEN NONE SEEN /lpf    Crystals, UA NONE SEEN NONE SEEN    Renal Epithelial, UA NONE SEEN NONE SEEN    Yeast, UA NONE SEEN NONE SEEN    CASTS 2 NONE SEEN NONE SEEN /lpf    MISCELLANEOUS 2 NONE SEEN    Culture, Reflexed, Urine    Collection Time: 08/27/21  7:54 PM    Specimen: Urine, catheter   Result Value Ref Range    Urine Culture Reflex No growth-preliminary     Sodium    Collection Time: 08/27/21 10:58 PM   Result Value Ref Range    Sodium 130 (L) 135 - 145 meq/L   Basic Metabolic Panel w/ Reflex to MG    Collection Time: 08/28/21  5:20 AM   Result Value Ref Range    Sodium 127 (L) 135 - 145 meq/L    Potassium reflex Magnesium 4.0 3.5 - 5.2 meq/L    Chloride 95 (L) 98 - 111 meq/L    CO2 23 23 - 33 meq/L    Glucose 73 70 - 108 mg/dL    BUN 7 7 - 22 mg/dL    CREATININE 0.8 0.4 - 1.2 mg/dL    Calcium 8.4 (L) 8.5 - 10.5 mg/dL   CBC auto differential    Collection Time: 08/28/21  5:20 AM   Result Value Ref Range    WBC 2.9 (L) 4.8 - 10.8 thou/mm3    RBC 4.52 4.20 - 5.40 mill/mm3    Hemoglobin 12.3 12.0 - 16.0 gm/dl    Hematocrit 37.1 37.0 - 47.0 %    MCV 82.1 81.0 - 99.0 fL    MCH 27.2 26.0 - 33.0 pg    MCHC 33.2 32.2 - 35.5 gm/dl    RDW-CV 15.8 (H) 11.5 - 14.5 %    RDW-SD 47.0 (H) 35.0 - 45.0 fL    Platelets 96 (L) 676 - 400 thou/mm3    MPV 10.3 9.4 - 12.4 fL    Seg Neutrophils 51.1 %    Lymphocytes 26.7 %    Monocytes 17.1 %    Eosinophils 3.8 %    Basophils 0.3 %    Immature Granulocytes 1.0 %    Segs Absolute 1.5 (L) 1 - 7 thou/mm3    Lymphocytes Absolute 0.8 (L) 1.0 - 4.8 thou/mm3    Monocytes Absolute 0.5 0.4 - 1.3 thou/mm3    Eosinophils Absolute 0.1 0.0 - 0.4 thou/mm3    Basophils Absolute 0.0 0.0 - 0.1 thou/mm3    Immature Grans (Abs) 0.03 0.00 - 0.07 thou/mm3    nRBC 0 /100 wbc   Protime-INR    Collection Time: 08/28/21  5:20 AM   Result Value Ref Range    INR 1.76 (H) 0.85 - 1.13   Anion Gap    Collection Time: 08/28/21  5:20 AM   Result Value Ref Range    Anion Gap 9.0 8.0 - 16.0 meq/L   Osmolality    Collection Time: 08/28/21  5:20 AM   Result Value Ref Range    Osmolality Calc 251.8 (L) 275.0 - 300.0 mOsmol/kg   Glomerular Filtration Rate, Estimated    Collection Time: 08/28/21  5:20 AM   Result Value Ref Range    Est, Glom Filt Rate 68 (A) ml/min/1.73m2       Radiology:     XR CHEST (2 VW)    Result Date: 8/27/2021  PROCEDURE: XR CHEST (2 VW) CLINICAL INFORMATION: Chest pain TECHNIQUE: AP and lateral chest radiographs COMPARISON: None. FINDINGS: Cardiac silhouette is at the upper limits of normal in size. Atherosclerotic calcifications are present in the thoracic aorta. There are no lung consolidations. Degenerative changes in the thoracic spine are poorly visualized. There are surgical clips in the right upper quadrant of the abdomen. No acute intrathoracic process. **This report has been created using voice recognition software. It may contain minor errors which are inherent in voice recognition technology. ** Final report electronically signed by Dr. Candice Booker on 8/27/2021 2:14 PM        ASSESMENT:      Active Problems:    Dysphagia  Resolved Problems:    * No resolved hospital problems. *  Assessment/Plan:     1. Epigastric pain with dysphagia with hx of esophageal stricture  a. EGD planned per Dr Robert Del Cid for now   c. Parenteral PPI   2. Hyponatremia  a. Continue NS at 75./hr  b. Monitor Na q6h; no more than 8-10 increase in 24 hours  a. A flutter/A fib on   b. BB, Rhythmol, Coumadin on hold  3.  N/V a. Add prn IM phenergan  4. Hx squamous cell carcinoma s/p removal  a. Noted   5. PT/OT  6. UTI - F/U culture ,start Rocephin. 7. DVT prophylaxis   1.  Pt is on Coumadin-- currently on hold       DVT prophylaxis: [] Lovenox                                 [] SCDs                                 [] SQ Heparin                                 [] Encourage ambulation, low risk for DVT, no chemical or mechanical prophylaxis necessary              [] Already on Anticoagulation                Anticipated Disposition upon discharge: [] Home                                                                         [] Home with Home Health                                                                         [] [facility]                                                                         [] [address] [place] [address],[address]          Electronically signed by Vanesa Pierre MD on 8/28/2021 at 8:51 AM

## 2021-08-28 NOTE — PROGRESS NOTES
Scope #   EGD completed  Specimens labeled and sent to lab  Pictures taken  Patient tolerated procedure well

## 2021-08-28 NOTE — ANESTHESIA POSTPROCEDURE EVALUATION
Department of Anesthesiology  Postprocedure Note    Patient: Billy Alas  MRN: [id number]  YOB: 1934  Date of evaluation: 8/28/2021  Time:  2:39 PM     Procedure Summary     Date: 08/28/21 Room / Location: [address] [place] [address]    Anesthesia Start: 1639 Anesthesia Stop: 9376    Procedure: EGD BIOPSY (Left Esophagus) Diagnosis: (DYSPHAGIA)    Surgeons: Reji Gerard MD Responsible Provider: Irish Troy DO    Anesthesia Type: MAC ASA Status: 3          Anesthesia Type: MAC    Koko Phase I: Koko Score: 10    Koko Phase II:      Last vitals: Reviewed and per EMR flowsheets.        Anesthesia Post Evaluation    Patient location during evaluation: bedside  Patient participation: complete - patient participated  Level of consciousness: awake and alert  Airway patency: patent  Nausea & Vomiting: no nausea and no vomiting  Complications: no  Cardiovascular status: hemodynamically stable  Respiratory status: spontaneous ventilation, acceptable and nasal cannula (2)  Hydration status: euvolemic

## 2021-08-28 NOTE — CONSULTS
[address] [id number]                                  CONSULTATION    PATIENT NAME: Carlos SCHRADER                     :        1934  MED REC NO:   [id number]                           ROOM:       0002  ACCOUNT NO:   [de-identified]                           ADMIT DATE: 2021  PROVIDER:     Afia Stallworth M.D.    Joseluis Chiuon:  2021    HISTORY OF PRESENT ILLNESS:  The patient was seen in GI consult for  evaluation of history of dysphagia, had an upper endoscopy done in 2016  with dilation. She is having difficulty to swallow over the last two  weeks. Appetite is not very good. She does not eat a lot. She has not  been eating any solids over the last four days; pain epigastric area  while eating. She can drink water and liquids with no difficulty at  this time. She does not feel that food has stuck in the esophagus. Sometime in the past, she had regurgitation of undigested food. When  she eats, she has to stop eating, but she has to vomit undigested food. She said she lost weight. She is not able to tell me how much. She has  epigastric discomfort, postprandial.  Her bowel movements, she said are  okay. She said she has diarrhea daily for a few times, not a lot. She  has blood with the stool on wiping, but not a lot. She melena, but the  GI bleed is not a concern for her at this time. She had upper endoscopy with dilation in the past.  She has esophageal  stricture. Currently, on Coumadin because of AFib and atrial flutter. She had squamous cell carcinoma of the nose. She is feeling not the  best at this time. She has no hematemesis. The vomit is present, but  not coffee-ground material.  She is not short of breath  Now . She  Roya Lama she is doing fine.     PAST MEDICAL HISTORY:  Significant for basal cell carcinoma of the nose,  AFib/flutter, coronary artery disease, diverticulosis, cervical  prolapse, history of endocervical polyp, hypertension, nasal septal  deviation, osteoporosis, syncope. PAST SURGICAL HISTORY:  Significant for appendectomy, bladder repair,  cholecystectomy, colonoscopy, endoscopic ultrasound done in the past,  eye surgery, facial surgery, hysterectomy, joint replacement, left total  hip replacement, did have arthroplasty. HOME MEDICATIONS:  She is on Zofran as needed, Protonix, Rythmol,  multivitamin. FAMILY HISTORY:  Cancer, age 77, father. Mother with osteoporosis. PHYSICAL EXAMINATION:  GENERAL:  Healthy, pleasant, comfortable, appears younger than stated  age, not short of breath, not using accessory muscles. VITAL SIGNS:  Weight 162 pounds, blood pressure 111/68. HEENT:  Her head is atraumatic. Sclerae anicteric. Oral cavity normal.  CHEST:  Normal.  CARDIOVASCULAR:  Normal.  ABDOMEN:  Soft. No tenderness. No rebound. No guarding. No  organomegaly. No stigmata of chronic liver disease. LABORATORY DATA:  Sodium 125, potassium 4.8. Troponin is normal.  Her H  and H of 13.7/40.7. MCV is 80.4. RDW is 15.7, high. Platelets 69,597. IMPRESSION:  1. Dysphagia, esophageal stricture in the past, benefitted from upper  endoscopy with dilation in the past.  2.  History of basal cell carcinoma with multiple surgeries in the past.    PLAN:  1. Start PPI. 2.  Reverse anticoagulation if possible. 3.  Upper endoscopy to be done tomorrow to evaluate with possible  dilation. Thank you for allowing me to participate in this patient's care.         Nico Welch M.D.    D: 08/27/2021 18:41:38       T: 08/27/2021 22:42:59     [email or web address]  Job#: [id number]     Doc#: [id number]    CC:  Lulla Lesch, Md

## 2021-08-28 NOTE — ED NOTES
Pt assisted to [facility].  ZULLY collected and sent     Robyn Canales RN  08/27/21 2003

## 2021-08-28 NOTE — BRIEF OP NOTE
Brief Postoperative Note      Patient: Marco A Chang  YOB: 1934  MRN: [id number]    Date of Procedure: 8/28/2021    Pre-Op Diagnosis: DYSPHAGIA    Post-Op Diagnosis: gastritis and esophageal candidiasis        Procedure(s):  EGD BIOPSY    Surgeon(s):  Lou Beaver MD    Assistant:  * No surgical staff found *    Anesthesia: Monitor Anesthesia Care    Estimated Blood Loss (mL): none    Complications: None    Specimens:   * No specimens in log *    Implants:  * No implants in log *      Drains:   [REMOVED] External Urinary Catheter (Removed)       Findings: gastritis and esophageal candidiasis     Electronically signed by Lou Beaver MD on 8/28/2021 at 2:41 PM

## 2021-08-28 NOTE — ANESTHESIA PRE PROCEDURE
Department of Anesthesiology  Preprocedure Note       Name:  Bar Guido   Age:  80 y.o.  :  1934                                          MRN:  [id number]         Date:  2021      Surgeon: Katina Durant):  Kesha Smart MD    Procedure: Procedure(s):  EGD ESOPHAGOGASTRODUODENOSCOPY    Medications prior to admission:   Prior to Admission medications    Medication Sig Start Date End Date Taking? Authorizing Provider   metoprolol tartrate (LOPRESSOR) 25 MG tablet TAKE HALF OF A TABLET TWICE A DAY. 21   Lavern Bacon MD   warfarin (COUMADIN) 2.5 MG tablet 1. Take 2 tablets by mouth daily. Or as directed by INR results. 21   Lavern Bacon MD   pantoprazole (PROTONIX) 40 MG tablet Take 1 tablet by mouth once daily 21   Lavern Bacon MD   ondansetron (ZOFRAN ODT) 4 MG disintegrating tablet Take 1 tablet by mouth every 8 hours as needed for Nausea 21   Manohar Bansal MD   propafenone (RYTHMOL) 150 MG tablet Take 1 tablet by mouth 2 times daily Take 300 mg of propafenone with 25 mg of metoprolol once and start 150 mg propafenone q 12 hours from next day 21   Gilberto Fisher, DO   Handicap Placard MISC by Does not apply route Expires 2022 6/15/17   Lavern Bacon MD   MEGARED OMEGA-3 KRILL  MG CAPS Take by mouth daily     Historical Provider, MD   Multiple Vitamins-Minerals (THERAPEUTIC MULTIVITAMIN-MINERALS) tablet Take 1 tablet by mouth daily    Historical MD João   Multiple Vitamins-Minerals (ICAPS AREDS 2) CAPS Take 2 tablets by mouth daily     Historical Provider, MD       Current medications:    No current facility-administered medications for this visit. No current outpatient medications on file.      Facility-Administered Medications Ordered in Other Visits   Medication Dose Route Frequency Provider Last Rate Last Admin    acetaminophen (TYLENOL) suppository 650 mg  650 mg Rectal Q4H PRN Marie Richardson MD        promethazine (PHENERGAN) injection 6.25 mg  6.25 mg IntraMUSCular Q6H PRN Teresa Calle MD        promethazine (PHENERGAN) injection 12.5 mg  12.5 mg IntraMUSCular Q6H PRN Teresa Calle MD   12.5 mg at 08/28/21 1211    pantoprazole (PROTONIX) injection 40 mg  40 mg IntraVENous BID Teresa Calle MD   40 mg at 08/28/21 1245    HYDROmorphone (DILAUDID) injection 0.25 mg  0.25 mg IntraVENous Q3H PRN Teresa Calle MD   0.25 mg at 08/28/21 1245    metoprolol tartrate (LOPRESSOR) tablet 12.5 mg  12.5 mg Oral BID Teresa Calle MD        propafenone (RYTHMOL) tablet 150 mg  150 mg Oral BID Teresa Calle MD   150 mg at 08/27/21 2311    sodium chloride flush 0.9 % injection 5-40 mL  5-40 mL IntraVENous 2 times per day VASU Lehman CNP        sodium chloride flush 0.9 % injection 5-40 mL  5-40 mL IntraVENous PRN VASU Lehman CNP        0.9 % sodium chloride infusion  25 mL IntraVENous PRN VASU Lehman CNP        acetaminophen (TYLENOL) tablet 650 mg  650 mg Oral Q6H PRN VASU Lehman CNP        ondansetron (ZOFRAN) injection 4 mg  4 mg IntraVENous Q6H PRN VASU Lehman CNP   4 mg at 08/28/21 0822    0.9 % sodium chloride infusion   IntraVENous Continuous VASU Lehman CNP 75 mL/hr at 08/28/21 1108 New Bag at 08/28/21 1108    cefTRIAXone (ROCEPHIN) 1000 mg IVPB in 50 mL D5W minibag  1,000 mg IntraVENous Q24H VASU Lehman CNP   Stopped at 08/28/21 0029    betamethasone acetate-betamethasone sodium phosphate (CELESTONE) injection 12 mg  12 mg IntraMUSCular Once Kristen Vogel MD           Allergies:     Allergies   Allergen Reactions    Statins Other (See Comments)     Causes severe leg cramps     Tape Yovani Konig Tape] Other (See Comments)     \"sticks to skin\" causes redness    Tizanidine Hcl Other (See Comments)     Having issues with her liver- elevated her enzyme level    Tramadol Other (See Comments)     \"makes me Goofy\" Problem List:    Patient Active Problem List   Diagnosis Code    Trochanteric bursitis of left hip M70.62    Drug-induced constipation K59.03    Bradycardia R00.1    Esophageal stricture K22.2    Gastritis without bleeding K29.70    Lumbar radicular pain M54.16    Lumbar spine pain M54.5    Left hip pain M25.552    Arthritis of left hip M16.12    Paroxysmal atrial fibrillation with rapid ventricular response (HCC) I48.0    Anticoagulation monitoring by pharmacist Z79.01    Venous stasis dermatitis of both lower extremities I87.2    Arthritis of right hand M19.041    Vaginal erosion secondary to pessary use (HCC) T83.89XA, N89.8    Pelvic relaxation due to uterovaginal prolapse N81.4    Postmenopausal bleeding N95.0    History of hysterectomy Z90.710    Dysphagia R13.10       Past Medical History:        Diagnosis Date    Actinic keratosis     history of    Arthritis     Atrial fibrillation ([address]     CAD (coronary artery disease)     history of afib    Cancer ([address]     skin    Cervical prolapse     history of    Diverticulosis     Endocervical polyp     history of    Heel spur     Hip pain     Hypertension     Nasal septal deviation     Osteoporosis     Stress incontinence     history of    Syncope     Vaginal prolapse     history of       Past Surgical History:        Procedure Laterality Date    APPENDECTOMY  1960's   Bertell Halim BLADDER REPAIR  2011    [address]  [address]    [facility]    COLONOSCOPY  01/26/2000    ENDOSCOPIC ULTRASOUND (LOWER) Left 07/20/2017    ENDOSCOPIC ULTRASOUND performed by Guanaco Dupree MD at [facility]   [facility] cataract    FACIAL SURGERY N/A 1/27/2021    DIVISION AND INSET/CHEEK performed by Beatrice Hudson MD at [facility]  2017    left total hip relpament    MOHS SURGERY N/A 1/8/2021    MOHS REPAIR BCC NASAL TIP WITH FLAP AND GRAFT performed by Lucy Galvan MD at [address] Left 8/6/2021    MOHS DEFECT REPAIR SCC DORSAL NOSE AND LEFT LOWER FOREHEAD WITH SKIN GRAFT FROM RIGHT CHEEK/EAR (PLACED ON NOSE) performed by Lucy Galvan MD at [address]  1960's    MA EGD TRANSORAL BIOPSY SINGLE/MULTIPLE Left 07/16/2017    EGD BIOPSY performed by Todd Walker MD at [facility]    MA OFFICE/OUTPT VISIT,PROCEDURE ONLY Right 06/01/2018    MOHS REPAIR BASEL CELL CARCINOMA RIGHT DORSAL NOSE performed by Lucy Galvan MD at [address] Left 09/19/2017    Left Total Hip ARTHROPLASTY performed by Kartik Simmons MD at [facility]  07/16/2017    UPPER GASTROINTESTINAL ENDOSCOPY Left 07/16/2017    EGD DILATION BALLOON performed by Todd Walker MD at [facility] [address] LEFT Left 4/21/2021    US BREAST NEEDLE BIOPSY LEFT 4/21/2021 [facility] ULTRASOUND    US BREAST NEEDLE BIOPSY RIGHT Right 4/21/2021    US BREAST NEEDLE BIOPSY RIGHT 4/21/2021 [facility] ULTRASOUND    US GUIDED NEEDLE LOC BREAST ADDL LEFT Left 4/21/2021    US GUIDED NEEDLE LOC BREAST ADDL LEFT 4/21/2021 [facility] ULTRASOUND       Social History:    Social History     Tobacco Use    Smoking status: Never Smoker    Smokeless tobacco: Never Used   Substance Use Topics    Alcohol use: No                                Counseling given: Not Answered      Vital Signs (Current): There were no vitals filed for this visit.                                            BP Readings from Last 3 Encounters:   08/28/21 (!) 152/70   08/23/21 107/80   08/23/21 95/75       NPO Status:                                                                                 BMI:   Wt Readings from Last 3 Encounters:   08/27/21 154 lb 2 oz (69.9 kg)   08/23/21 162 lb (73.5 kg)   08/23/21 159 lb (72.1 kg)     There is no height or weight on file to calculate BMI.    CBC:   Lab Results   Component Value Date    WBC 2.9 08/28/2021    RBC 4.52 08/28/2021    RBC 3.84 07/29/2011    HGB 12.3 08/28/2021    HCT 37.1 08/28/2021    MCV 82.1 08/28/2021    RDW 15.8 08/23/2021    PLT 96 08/28/2021       CMP:   Lab Results   Component Value Date     08/28/2021    K 4.0 08/28/2021    CL 95 08/28/2021    CO2 23 08/28/2021    BUN 7 08/28/2021    CREATININE 0.8 08/28/2021    GFRAA >60 08/23/2021    LABGLOM 68 08/28/2021    GLUCOSE 73 08/28/2021    PROT 6.1 08/27/2021    CALCIUM 8.4 08/28/2021    BILITOT 0.5 08/27/2021    ALKPHOS 94 08/27/2021    AST 20 08/27/2021    ALT 14 08/27/2021       POC Tests: No results for input(s): POCGLU, POCNA, POCK, POCCL, POCBUN, POCHEMO, POCHCT in the last 72 hours.     Coags:   Lab Results   Component Value Date    PROTIME 31.1 08/23/2021    PROTIME 35.3 08/19/2021    INR 1.76 08/28/2021    APTT 41.3 07/15/2021       HCG (If Applicable): No results found for: PREGTESTUR, PREGSERUM, HCG, HCGQUANT     ABGs: No results found for: PHART, PO2ART, BAZ2OXH, AMC8LNS, BEART, E4ZFIBQZ     Type & Screen (If Applicable):  Lab Results   Component Value Date    LABRH NEG 07/27/2011       Drug/Infectious Status (If Applicable):  Lab Results   Component Value Date    HEPCAB Negative 07/16/2017       COVID-19 Screening (If Applicable):   Lab Results   Component Value Date    COVID19 Not Detected 01/21/2021           Anesthesia Evaluation  Patient summary reviewed and Nursing notes reviewed no history of anesthetic complications:   Airway: Mallampati: II  TM distance: >3 FB   Neck ROM: limited  Mouth opening: > = 3 FB Dental:          Pulmonary:Negative Pulmonary ROS and normal exam  breath sounds clear to auscultation                             Cardiovascular:  Exercise tolerance: poor (<4 METS),   (+) hypertension:, CAD:, dysrhythmias: atrial flutter,       ECG reviewed      Echocardiogram reviewed                  Neuro/Psych:   (+) neuromuscular disease:, GI/Hepatic/Renal: Neg GI/Hepatic/Renal ROS           ROS comment: Epigastric pain with dysphagia with hx of esophageal stricture    Hyponatremia. Endo/Other: Negative Endo/Other ROS             Pt had no PAT visit       Abdominal:             Vascular: negative vascular ROS. Other Findings:               Anesthesia Plan      MAC     ASA 3       Induction: intravenous. Anesthetic plan and risks discussed with patient. Plan discussed with CRNA.                   Layo Liu DO   8/28/2021

## 2021-08-29 LAB
INR BLD: 1.31 (ref 0.85–1.13)
SODIUM BLD-SCNC: 137 MEQ/L (ref 135–145)

## 2021-08-29 PROCEDURE — 6370000000 HC RX 637 (ALT 250 FOR IP): Performed by: INTERNAL MEDICINE

## 2021-08-29 PROCEDURE — C9113 INJ PANTOPRAZOLE SODIUM, VIA: HCPCS | Performed by: INTERNAL MEDICINE

## 2021-08-29 PROCEDURE — 2580000003 HC RX 258: Performed by: REGISTERED NURSE

## 2021-08-29 PROCEDURE — 36415 COLL VENOUS BLD VENIPUNCTURE: CPT

## 2021-08-29 PROCEDURE — 2580000003 HC RX 258: Performed by: INTERNAL MEDICINE

## 2021-08-29 PROCEDURE — 6360000002 HC RX W HCPCS: Performed by: INTERNAL MEDICINE

## 2021-08-29 PROCEDURE — 1200000003 HC TELEMETRY R&B

## 2021-08-29 PROCEDURE — 6360000002 HC RX W HCPCS: Performed by: REGISTERED NURSE

## 2021-08-29 PROCEDURE — 84295 ASSAY OF SERUM SODIUM: CPT

## 2021-08-29 PROCEDURE — 85610 PROTHROMBIN TIME: CPT

## 2021-08-29 RX ADMIN — PROPAFENONE HYDROCHLORIDE 150 MG: 150 TABLET, FILM COATED ORAL at 20:14

## 2021-08-29 RX ADMIN — METOPROLOL TARTRATE 12.5 MG: 25 TABLET, FILM COATED ORAL at 20:14

## 2021-08-29 RX ADMIN — METOPROLOL TARTRATE 12.5 MG: 25 TABLET, FILM COATED ORAL at 08:15

## 2021-08-29 RX ADMIN — HYDROMORPHONE HYDROCHLORIDE 0.25 MG: 1 INJECTION, SOLUTION INTRAMUSCULAR; INTRAVENOUS; SUBCUTANEOUS at 08:15

## 2021-08-29 RX ADMIN — NYSTATIN 500000 UNITS: 100000 SUSPENSION ORAL at 17:02

## 2021-08-29 RX ADMIN — PANTOPRAZOLE SODIUM 40 MG: 40 INJECTION, POWDER, FOR SOLUTION INTRAVENOUS at 08:15

## 2021-08-29 RX ADMIN — SODIUM CHLORIDE: 9 INJECTION, SOLUTION INTRAVENOUS at 03:05

## 2021-08-29 RX ADMIN — NYSTATIN 500000 UNITS: 100000 SUSPENSION ORAL at 08:15

## 2021-08-29 RX ADMIN — SODIUM CHLORIDE, PRESERVATIVE FREE 10 ML: 5 INJECTION INTRAVENOUS at 20:14

## 2021-08-29 RX ADMIN — PROPAFENONE HYDROCHLORIDE 150 MG: 150 TABLET, FILM COATED ORAL at 08:15

## 2021-08-29 RX ADMIN — PANTOPRAZOLE SODIUM 40 MG: 40 INJECTION, POWDER, FOR SOLUTION INTRAVENOUS at 20:14

## 2021-08-29 RX ADMIN — CEFTRIAXONE SODIUM 1000 MG: 1 INJECTION, POWDER, FOR SOLUTION INTRAMUSCULAR; INTRAVENOUS at 22:32

## 2021-08-29 RX ADMIN — NYSTATIN 500000 UNITS: 100000 SUSPENSION ORAL at 12:25

## 2021-08-29 ASSESSMENT — PAIN DESCRIPTION - PROGRESSION
CLINICAL_PROGRESSION: GRADUALLY IMPROVING
CLINICAL_PROGRESSION: NOT CHANGED

## 2021-08-29 ASSESSMENT — PAIN SCALES - GENERAL
PAINLEVEL_OUTOF10: 0
PAINLEVEL_OUTOF10: 0
PAINLEVEL_OUTOF10: 8
PAINLEVEL_OUTOF10: 3
PAINLEVEL_OUTOF10: 0

## 2021-08-29 ASSESSMENT — PAIN DESCRIPTION - FREQUENCY: FREQUENCY: CONTINUOUS

## 2021-08-29 ASSESSMENT — PAIN DESCRIPTION - LOCATION: LOCATION: HEAD

## 2021-08-29 ASSESSMENT — PAIN DESCRIPTION - DESCRIPTORS: DESCRIPTORS: ACHING

## 2021-08-29 ASSESSMENT — PAIN DESCRIPTION - ONSET: ONSET: ON-GOING

## 2021-08-29 ASSESSMENT — PAIN - FUNCTIONAL ASSESSMENT: PAIN_FUNCTIONAL_ASSESSMENT: ACTIVITIES ARE NOT PREVENTED

## 2021-08-29 ASSESSMENT — PAIN DESCRIPTION - PAIN TYPE: TYPE: ACUTE PAIN

## 2021-08-29 NOTE — PROGRESS NOTES
Gastroenterology  Progress Note    8/29/2021 10:22 AM  Subjective:   Admit Date: 8/27/2021    Interval History: Discussed the finding with the EGD with the patient mild candidiasis is a possibility start to be treated slight improvement in swallowing INR is down likely will be acceptable for dilations tomorrow patient would like to ponder with dilation to be done as an inpatient  Diet: ADULT DIET; Dysphagia - Minced and Moist  Diet NPO Exceptions are: Sips of Water with Meds    Medications:   Scheduled Meds:    pantoprazole  40 mg IntraVENous BID    sodium chloride flush  5-40 mL IntraVENous 2 times per day    nystatin  5 mL Oral 4x Daily    metoprolol tartrate  12.5 mg Oral BID    propafenone  150 mg Oral BID    cefTRIAXone (ROCEPHIN) IV  1,000 mg IntraVENous Q24H     Continuous Infusions:    sodium chloride      sodium chloride         CBC:   Recent Labs     08/27/21 1229 08/28/21  0520   WBC 3.6* 2.9*   HGB 13.7 12.3   PLT 95* 96*     BMP:    Recent Labs     08/27/21  1229 08/27/21  2258 08/28/21  0520 08/28/21  1032 08/28/21  1537 08/28/21  2156 08/29/21  0501   *   < > 127*   < > 131* 130* 137   K 4.8  --  4.0  --   --   --   --    CL 90*  --  95*  --   --   --   --    CO2 25  --  23  --   --   --   --    BUN 9  --  7  --   --   --   --    CREATININE 0.8  --  0.8  --   --   --   --    GLUCOSE 87  --  73  --   --   --   --     < > = values in this interval not displayed. Hepatic:   Recent Labs     08/27/21  1229   AST 20   ALT 14   BILITOT 0.5   ALKPHOS 94     INR:   Recent Labs     08/27/21  1229 08/28/21  0520 08/29/21  0501   INR 2.80* 1.76* 1.31*       Imaging:  No results found for this or any previous visit.     Results for orders placed during the hospital encounter of 08/23/21    CT ABDOMEN PELVIS W IV CONTRAST Additional Contrast? None    Narrative  EXAMINATION:  CT OF THE ABDOMEN AND PELVIS WITH CONTRAST 8/23/2021 11:35 am    TECHNIQUE:  CT of the abdomen and pelvis was performed with the administration of  intravenous contrast. Multiplanar reformatted images are provided for review. Dose modulation, iterative reconstruction, and/or weight based adjustment of  the mA/kV was utilized to reduce the radiation dose to as low as reasonably  achievable. COMPARISON:  07/15/2017    HISTORY:  ORDERING SYSTEM PROVIDED HISTORY: Abdominal pain  TECHNOLOGIST PROVIDED HISTORY:    Abdominal pain  Decision Support Exception - unselect if not a suspected or confirmed  emergency medical condition->Emergency Medical Condition (MA)  Reason for Exam: Upper abd pain, hx appendectomy, cholecystectomy,  hysterectomy  Acuity: Acute  Type of Exam: Initial    FINDINGS:  Lower Chest: Multiple solid noncalcified pulmonary nodules in the partially  imaged lung bases ranging in size from 0.5-1.5 cm, either unchanged in size  or decreased in caliber relative to comparison imaging from 2017. Two of the  right lower lobe nodules appear to contain internal fat attenuation  compatible hamartomas. Minimal dependent changes in the lung bases. Cardiomegaly. No pericardial effusion. Organs: Cholecystectomy. Liver is without focal abnormality. Mild  splenomegaly. Diffusely atrophic pancreas. Adrenal glands are normal  caliber. 1.9 cm water attenuation cyst superior pole right kidney. No  hydronephrosis or perinephric stranding. GI/Bowel: No bowel obstruction. Colonic diverticulosis without  diverticulitis. Pelvis: Pelvic floor descent with a pessary in place. Evaluation is limited  by streak artifact from hip arthroplasty hardware but there appears to be  urinary bladder wall thickening with perivesicular stranding. Hysterectomy. Peritoneum/Retroperitoneum: Small volume simple free fluid in the dependent  pelvis. No free air or lymphadenopathy. Heavy aortic and iliofemoral  atherosclerosis without aneurysm. Bones/Soft Tissues: Mild levoconvex lumbar spinal curvature.   Grade 1  degenerative anterolisthesis L4 on L5. Partially imaged left hip  arthroplasty hardware appears grossly appropriate in alignment without  surrounding lucency or fracture. Remote postoperative changes along the  right upper quadrant anterior abdominal wall. Impression  Mild splenomegaly, new from remote prior imaging. Urinary bladder wall thickening with perivesicular inflammatory stranding and  small volume simple free fluid in the pelvis. Correlate for cystitis. Background of pelvic floor descent with a pessary in place. Colonic diverticulosis without diverticulitis. Multiple basilar pulmonary nodules which are either unchanged in size or  decreased in caliber relative to remote comparison from 2017. No results found for this or any previous visit. Results for orders placed during the hospital encounter of 07/15/17    MRI ABDOMEN W WO CONTRAST MRCP    Narrative  PROCEDURE: MRI ABDOMEN W WO CONTRAST MRCP    CLINICAL INFORMATION: Elevated lipase; technologist notes state abdominal pain, nausea and vomiting. COMPARISON: Liver ultrasound dated 7/18/2017 and ultrasound abdomen complete dated 7/15/2017. TECHNIQUE: Routine MRI abdomen without and with IV contrast.  Routine MRCP was also performed. CONTRAST: 15 mL OptiMARK intravenously. FINDINGS:  LIVER:  1. The liver is normal size. 2. The hepatic parenchymal signal is normal.  3. There is no hepatic mass. 4. There is no intrahepatic biliary dilatation. 5. There is normal enhancement of the portal and the hepatic veins. 6. There is normal enhancement of the portal venous confluence, SMV, and splenic vein. GALLBLADDER:  1. Cholecystectomy. BILIARY TREE:  1. The common duct is normal size measuring 0.5 cm in transverse dimension. 2. There is no choledocholithiasis. PANCREAS:  1. The pancreas is normal size and signal characteristics. 2. There is normal pancreatic enhancement. 3. There is no pancreatic mass.   4. The pancreatic duct is normal size measuring 0.2 cm in transverse dimension. 5. There is no pancreatic divisum. 6. There is no peripancreatic fluid or inflammation. SPLEEN/ADRENAL GLANDS/KIDNEYS:  1. The spleen is borderline enlarged measuring 9.5 x 4.4 x 13.5 cm in longitudinal, transverse and AP dimensions. 2. There is a 1.0 cm Bosniak 1 cyst projecting off the lateral margin of the upper pole the right kidney. BOWEL:  1. Nonobstructive bowel gas pattern. 2. The imaged portions of the distal esophagus and stomach are unremarkable. 3. Imaged portions of the small bowel and colon are unremarkable. FREE AIR/FREE FLUID/INFLAMMATION: None. LYMPHADENOPATHY:  1. There are no pathologically enlarged lymph nodes. ABDOMINAL AORTA/IVC: Unremarkable. LUNG BASES:  1. There are 2 cysts at the right lung base measuring 1.8 and 0.9 cm. MUSCULOSKELETAL:  1. Hematopoietic marrow signal throughout the imaged osseous structures. 2. Mild levoscoliosis of the lumbar spine. 3. Discogenic degenerative changes L5-S1.    OTHER: None. Impression  1. Cholecystectomy. There is no biliary dilatation. There is no choledocholithiasis. 2. The pancreas is unremarkable. There are no MRI findings of acute pancreatitis. 3. The spleen is borderline enlarged measuring 9.5 x 4.4 x 13.5 cm in longitudinal, transverse and AP dimensions. 4. There is a 1 cm Bosniak 1 cyst projecting off the lateral margin of the upper pole the right kidney. 5. There is no inflammatory process. **This report has been created using voice recognition software. It may contain minor errors which are inherent in voice recognition technology. **    Final report electronically signed by Dr. Janet Hendrix on 2017 2:28 PM      [place] [address]                                 OPERATIVE REPORT     PATIENT NAME: Carlos SCHRADER                     :        1934  MED REC NO:   [id number]                           ROOM:       0002  ACCOUNT NO:   [de-identified]                           ADMIT DATE: 08/27/2021  PROVIDER:     Kishan Bledsoe M.D.     DATE OF PROCEDURE:  08/28/2021     INDICATIONS:  The patient with dysphagia, gastric reflux, benefitted  from upper endoscopy and dilation in the past.  Her INR is high. She is  not stable for dilation at this time. She was given 2 mg of Vit K IV   infusion yesterday. Plan today for EGD to evaluate.     For ASA classification, please see anesthesia note.     SURGEON:  Kishan Bledsoe MD     ESTIMATED BLOOD LOSS:  None.     DESCRIPTION OF PROCEDURE:  The patient was brought to the GI lab. Consent was obtained. Risks involved with the procedure were explained  to the patient. Informed consent was obtained. The patient was  monitored during the procedure with pulse oximetry, blood pressure  monitoring, and oxygen by nasal cannula. Sedation by incremental doses  of IV propofol given by the anesthesia service to achieve total IV  anesthesia. For ASA classification and medications given during the  procedure, please see Anesthesia note.     PROCEDURE PERFORMED:  EGD.     Standard video upper scope was advanced under direct vision from the  oral cavity up to duodenum. The esophagus featured in the mid and  proximal esophagus or feature suggests mild esophageal candidiasis. Brushings were obtained. No ulcerations, no erosions seen. No  well-defined stricture seen. Scope was advanced to the stomach. Retroflex examination of the cardia revealed normal cardia. Antrum and  the body showed significant gastritis; however, it was patchy in  distribution. I elected not to take a biopsy as the INR is high. Duodenum appeared normal.  Scope withdrawn with no immediate  complication. No biopsy obtained as the patient's INR is high.     IMPRESSION:  1. Mild esophageal candidiasis, mid and upper esophagus.   2.  Gastritis localized, focal area.     PLAN:  1. Continue PPI. 2.  Avoid NSAIDs. 3.  Nystatin swish and swallow for five days will be prescribed. 4.  Future plan for upper endoscopy with dilation especially if the  patient does not clinically improve with medication therapy.           Alfred Vicente M.D. Objective:   Vitals: BP (!) 153/63   Pulse 65   Temp 97.8 °F (36.6 °C) (Oral)   Resp 16   Wt 154 lb 2 oz (69.9 kg) Comment: 154.2  SpO2 93%   BMI 25.65 kg/m²     Intake/Output Summary (Last 24 hours) at 8/29/2021 1022  Last data filed at 8/29/2021 0007  Gross per 24 hour   Intake 2075.85 ml   Output 0 ml   Net 2075.85 ml     General appearance: alert and cooperative with exam  Lungs: clear to auscultation bilaterally  Heart: regular rate and rhythm, S1, S2 normal, no murmur, click, rub or gallop  Abdomen: soft, non-tender; bowel sounds normal; no masses,  no organomegaly  Extremities: extremities normal, atraumatic, no cyanosis or edema    Assessment and Plan:   1. Dysphagia due to stricture as well as esophageal candidiasis start on nystatin  2.  High INR being corrected should be ready for upper endoscopy dilation tomorrow with the patient like the patient done as an inpatient      Follow up in GI Clinic after discharge in 2 week(s)    Patient Active Problem List:     Trochanteric bursitis of left hip     Drug-induced constipation     Bradycardia     Esophageal stricture     Gastritis without bleeding     Lumbar radicular pain     Lumbar spine pain     Left hip pain     Arthritis of left hip     Paroxysmal atrial fibrillation with rapid ventricular response ([address]     Anticoagulation monitoring by pharmacist     Venous stasis dermatitis of both lower extremities     Arthritis of right hand     Vaginal erosion secondary to pessary use (HCC)     Pelvic relaxation due to uterovaginal prolapse     Postmenopausal bleeding     History of hysterectomy     Dysphagia      Electronically signed by Lorenzo Sims MD on 8/29/2021 at 10:22 AM

## 2021-08-29 NOTE — OP NOTE
[address] [place] [address]                                OPERATIVE REPORT    PATIENT NAME: Oneida Mata                     :        1934  MED REC NO:   [id number]                           ROOM:       0002  ACCOUNT NO:   [de-identified]                           ADMIT DATE: 2021  PROVIDER:     GLENN Jane Mor OF PROCEDURE:  2021    INDICATIONS:  The patient with dysphagia, gastric reflux, benefitted  from upper endoscopy and dilation in the past.  Her INR is high. She is  not stable for dilation at this time. She was given 2 mg of Vit K IV   infusion yesterday. Plan today for EGD to evaluate. For ASA classification, please see anesthesia note. SURGEON:  Josh Dorman MD    ESTIMATED BLOOD LOSS:  None. DESCRIPTION OF PROCEDURE:  The patient was brought to the GI lab. Consent was obtained. Risks involved with the procedure were explained  to the patient. Informed consent was obtained. The patient was  monitored during the procedure with pulse oximetry, blood pressure  monitoring, and oxygen by nasal cannula. Sedation by incremental doses  of IV propofol given by the anesthesia service to achieve total IV  anesthesia. For ASA classification and medications given during the  procedure, please see Anesthesia note. PROCEDURE PERFORMED:  EGD. Standard video upper scope was advanced under direct vision from the  oral cavity up to duodenum. The esophagus featured in the mid and  proximal esophagus or feature suggests mild esophageal candidiasis. Brushings were obtained. No ulcerations, no erosions seen. No  well-defined stricture seen. Scope was advanced to the stomach. Retroflex examination of the cardia revealed normal cardia. Antrum and  the body showed significant gastritis; however, it was patchy in  distribution. I elected not to take a biopsy as the INR is high.    Duodenum appeared normal.  Scope withdrawn with no immediate  complication. No biopsy obtained as the patient's INR is high. IMPRESSION:  1. Mild esophageal candidiasis, mid and upper esophagus. 2.  Gastritis localized, focal area. PLAN:  1. Continue PPI. 2.  Avoid NSAIDs. 3.  Nystatin swish and swallow for five days will be prescribed. 4.  Future plan for upper endoscopy with dilation especially if the  patient does not clinically improve with medication therapy.         Diane Olivarez M.D.    D: 08/28/2021 14:57:24       T: 08/28/2021 18:24:09     [email or web address]  Job#: [id number]     Doc#: [id number]    CC:  Ira Brink Md

## 2021-08-29 NOTE — PROGRESS NOTES
INTERNAL MEDICINE SPECIALTIES  Progress Note For Dr Hector Shen       Patient:  Cynthia Rockwell  YOB: 1934  Date of Service: 8/29/2021  MRN: [id number]   Acct:  [de-identified]   Primary Care Physician: Alondra Burdick MD    SUBJECTIVE: Had EGD yesterday and was noted to have esophageal candidiasis and gastritis. He was started on nystatin suspension. Feels remarkably better    Home Medications:   Current Facility-Administered Medications on File Prior to Encounter   Medication Dose Route Frequency Provider Last Rate Last Admin    betamethasone acetate-betamethasone sodium phosphate (CELESTONE) injection 12 mg  12 mg IntraMUSCular Once Clemente Antonio MD         Current Outpatient Medications on File Prior to Encounter   Medication Sig Dispense Refill    metoprolol tartrate (LOPRESSOR) 25 MG tablet TAKE HALF OF A TABLET TWICE A DAY. 90 tablet 1    warfarin (COUMADIN) 2.5 MG tablet 1. Take 2 tablets by mouth daily. Or as directed by INR results.  180 tablet 3    pantoprazole (PROTONIX) 40 MG tablet Take 1 tablet by mouth once daily 90 tablet 1    ondansetron (ZOFRAN ODT) 4 MG disintegrating tablet Take 1 tablet by mouth every 8 hours as needed for Nausea 20 tablet 0    propafenone (RYTHMOL) 150 MG tablet Take 1 tablet by mouth 2 times daily Take 300 mg of propafenone with 25 mg of metoprolol once and start 150 mg propafenone q 12 hours from next day 90 tablet 3    MEGARED OMEGA-3 KRILL  MG CAPS Take by mouth daily       Multiple Vitamins-Minerals (THERAPEUTIC MULTIVITAMIN-MINERALS) tablet Take 1 tablet by mouth daily      Multiple Vitamins-Minerals (ICAPS AREDS 2) CAPS Take 2 tablets by mouth daily       Handicap Placard MISC by Does not apply route Expires 6/2022 1 each 0         Scheduled Meds:   pantoprazole  40 mg IntraVENous BID    sodium chloride flush  5-40 mL IntraVENous 2 times per day    nystatin  5 mL Oral 4x Daily    metoprolol tartrate  12.5 mg Oral BID    propafenone  150 mg [] Joselito Syed                                                                         [] [address]          Electronically signed by Marcin Hare MD on 8/29/2021 at 10:01 AM

## 2021-08-30 ENCOUNTER — ANESTHESIA EVENT (OUTPATIENT)
Dept: ENDOSCOPY | Age: 86
DRG: 369 | End: 2021-08-30
Payer: MEDICARE

## 2021-08-30 ENCOUNTER — ANESTHESIA (OUTPATIENT)
Dept: ENDOSCOPY | Age: 86
DRG: 369 | End: 2021-08-30
Payer: MEDICARE

## 2021-08-30 VITALS
SYSTOLIC BLOOD PRESSURE: 183 MMHG | RESPIRATION RATE: 12 BRPM | OXYGEN SATURATION: 90 % | DIASTOLIC BLOOD PRESSURE: 86 MMHG

## 2021-08-30 LAB
INR BLD: 1.3 (ref 0.85–1.13)
ORGANISM: ABNORMAL
ORGANISM: ABNORMAL
URINE CULTURE REFLEX: ABNORMAL

## 2021-08-30 PROCEDURE — 3609012700 HC EGD DILATION SAVORY: Performed by: INTERNAL MEDICINE

## 2021-08-30 PROCEDURE — 2720000010 HC SURG SUPPLY STERILE: Performed by: INTERNAL MEDICINE

## 2021-08-30 PROCEDURE — 6360000002 HC RX W HCPCS: Performed by: NURSE ANESTHETIST, CERTIFIED REGISTERED

## 2021-08-30 PROCEDURE — 85610 PROTHROMBIN TIME: CPT

## 2021-08-30 PROCEDURE — 7100000010 HC PHASE II RECOVERY - FIRST 15 MIN: Performed by: INTERNAL MEDICINE

## 2021-08-30 PROCEDURE — 2580000003 HC RX 258: Performed by: INTERNAL MEDICINE

## 2021-08-30 PROCEDURE — 0D758ZZ DILATION OF ESOPHAGUS, VIA NATURAL OR ARTIFICIAL OPENING ENDOSCOPIC: ICD-10-PCS | Performed by: INTERNAL MEDICINE

## 2021-08-30 PROCEDURE — 3700000001 HC ADD 15 MINUTES (ANESTHESIA): Performed by: INTERNAL MEDICINE

## 2021-08-30 PROCEDURE — 3700000000 HC ANESTHESIA ATTENDED CARE: Performed by: INTERNAL MEDICINE

## 2021-08-30 PROCEDURE — 92610 EVALUATE SWALLOWING FUNCTION: CPT

## 2021-08-30 PROCEDURE — 6370000000 HC RX 637 (ALT 250 FOR IP): Performed by: INTERNAL MEDICINE

## 2021-08-30 PROCEDURE — 7100000011 HC PHASE II RECOVERY - ADDTL 15 MIN: Performed by: INTERNAL MEDICINE

## 2021-08-30 PROCEDURE — 2500000003 HC RX 250 WO HCPCS: Performed by: NURSE ANESTHETIST, CERTIFIED REGISTERED

## 2021-08-30 PROCEDURE — 36415 COLL VENOUS BLD VENIPUNCTURE: CPT

## 2021-08-30 PROCEDURE — 2709999900 HC NON-CHARGEABLE SUPPLY: Performed by: INTERNAL MEDICINE

## 2021-08-30 PROCEDURE — 1200000003 HC TELEMETRY R&B

## 2021-08-30 PROCEDURE — 6360000002 HC RX W HCPCS: Performed by: INTERNAL MEDICINE

## 2021-08-30 PROCEDURE — C9113 INJ PANTOPRAZOLE SODIUM, VIA: HCPCS | Performed by: INTERNAL MEDICINE

## 2021-08-30 RX ORDER — SODIUM CHLORIDE 9 MG/ML
25 INJECTION, SOLUTION INTRAVENOUS PRN
Status: DISCONTINUED | OUTPATIENT
Start: 2021-08-30 | End: 2021-08-31 | Stop reason: HOSPADM

## 2021-08-30 RX ORDER — SODIUM CHLORIDE 0.9 % (FLUSH) 0.9 %
5-40 SYRINGE (ML) INJECTION EVERY 12 HOURS SCHEDULED
Status: DISCONTINUED | OUTPATIENT
Start: 2021-08-30 | End: 2021-08-31 | Stop reason: HOSPADM

## 2021-08-30 RX ORDER — SODIUM CHLORIDE 0.9 % (FLUSH) 0.9 %
5-40 SYRINGE (ML) INJECTION PRN
Status: DISCONTINUED | OUTPATIENT
Start: 2021-08-30 | End: 2021-08-31 | Stop reason: HOSPADM

## 2021-08-30 RX ORDER — LIDOCAINE HYDROCHLORIDE 20 MG/ML
INJECTION, SOLUTION EPIDURAL; INFILTRATION; INTRACAUDAL; PERINEURAL PRN
Status: DISCONTINUED | OUTPATIENT
Start: 2021-08-30 | End: 2021-08-30 | Stop reason: SDUPTHER

## 2021-08-30 RX ADMIN — PANTOPRAZOLE SODIUM 40 MG: 40 INJECTION, POWDER, FOR SOLUTION INTRAVENOUS at 20:43

## 2021-08-30 RX ADMIN — PROPOFOL 30 MG: 10 INJECTION, EMULSION INTRAVENOUS at 08:12

## 2021-08-30 RX ADMIN — PROPAFENONE HYDROCHLORIDE 150 MG: 150 TABLET, FILM COATED ORAL at 09:35

## 2021-08-30 RX ADMIN — LIDOCAINE HYDROCHLORIDE 100 MG: 20 INJECTION, SOLUTION EPIDURAL; INFILTRATION; INTRACAUDAL; PERINEURAL at 08:04

## 2021-08-30 RX ADMIN — SODIUM CHLORIDE, PRESERVATIVE FREE 10 ML: 5 INJECTION INTRAVENOUS at 09:36

## 2021-08-30 RX ADMIN — METOPROLOL TARTRATE 12.5 MG: 25 TABLET, FILM COATED ORAL at 09:35

## 2021-08-30 RX ADMIN — PANTOPRAZOLE SODIUM 40 MG: 40 INJECTION, POWDER, FOR SOLUTION INTRAVENOUS at 09:34

## 2021-08-30 RX ADMIN — HYDROMORPHONE HYDROCHLORIDE 0.25 MG: 1 INJECTION, SOLUTION INTRAMUSCULAR; INTRAVENOUS; SUBCUTANEOUS at 11:55

## 2021-08-30 RX ADMIN — SODIUM CHLORIDE, PRESERVATIVE FREE 10 ML: 5 INJECTION INTRAVENOUS at 11:56

## 2021-08-30 RX ADMIN — PROPAFENONE HYDROCHLORIDE 150 MG: 150 TABLET, FILM COATED ORAL at 20:43

## 2021-08-30 RX ADMIN — PROPOFOL 50 MG: 10 INJECTION, EMULSION INTRAVENOUS at 08:04

## 2021-08-30 RX ADMIN — METOPROLOL TARTRATE 12.5 MG: 25 TABLET, FILM COATED ORAL at 20:43

## 2021-08-30 RX ADMIN — SODIUM CHLORIDE: 9 INJECTION, SOLUTION INTRAVENOUS at 07:54

## 2021-08-30 RX ADMIN — SODIUM CHLORIDE, PRESERVATIVE FREE 10 ML: 5 INJECTION INTRAVENOUS at 20:43

## 2021-08-30 RX ADMIN — SODIUM CHLORIDE 1500 MG: 900 INJECTION INTRAVENOUS at 23:32

## 2021-08-30 RX ADMIN — SODIUM CHLORIDE 1500 MG: 900 INJECTION INTRAVENOUS at 17:55

## 2021-08-30 ASSESSMENT — PAIN DESCRIPTION - FREQUENCY
FREQUENCY: CONTINUOUS
FREQUENCY: INTERMITTENT

## 2021-08-30 ASSESSMENT — PAIN SCALES - GENERAL
PAINLEVEL_OUTOF10: 4
PAINLEVEL_OUTOF10: 5
PAINLEVEL_OUTOF10: 10
PAINLEVEL_OUTOF10: 4
PAINLEVEL_OUTOF10: 2
PAINLEVEL_OUTOF10: 0

## 2021-08-30 ASSESSMENT — PAIN - FUNCTIONAL ASSESSMENT: PAIN_FUNCTIONAL_ASSESSMENT: 0-10

## 2021-08-30 ASSESSMENT — PAIN DESCRIPTION - DESCRIPTORS
DESCRIPTORS: SORE;TENDER
DESCRIPTORS: CONSTANT;DISCOMFORT

## 2021-08-30 ASSESSMENT — PAIN DESCRIPTION - PAIN TYPE
TYPE: ACUTE PAIN
TYPE: ACUTE PAIN

## 2021-08-30 NOTE — ANESTHESIA POSTPROCEDURE EVALUATION
Department of Anesthesiology  Postprocedure Note    Patient: Tari Gonzalez  MRN: [id number]  YOB: 1934  Date of evaluation: 8/30/2021  Time:  8:19 AM     Procedure Summary     Date: 08/30/21 Room / Location: [address] [place] [address] / [address] [place] [address]    Anesthesia Start: 0802 Anesthesia Stop: 0818    Procedure: EGD DILATION SAVORY (N/A ) Diagnosis: (Naeem Gonzáles)    Surgeons: Alfonzo Pulido MD Responsible Provider: Marcin Marshall DO    Anesthesia Type: MAC ASA Status: 3          Anesthesia Type: MAC    Koko Phase I: Koko Score: 10    Koko Phase II: Koko Score: 10    Last vitals: Reviewed and per EMR flowsheets.        Anesthesia Post Evaluation    Patient location during evaluation: bedside  Patient participation: complete - patient participated  Level of consciousness: awake and alert  Airway patency: patent  Nausea & Vomiting: no nausea and no vomiting  Complications: no  Cardiovascular status: hemodynamically stable  Respiratory status: acceptable, room air and spontaneous ventilation  Hydration status: euvolemic

## 2021-08-30 NOTE — PROGRESS NOTES
EGD completed. Tolerated well. Photos taken. No biopsies. Esophageal dilation with 45, 48, and 51FR american dilators. Scope [id number].

## 2021-08-30 NOTE — PROGRESS NOTES
INTERNAL MEDICINE SPECIALTIES  Progress Note For Dr Bandar Kearns       Patient:  Kinza Portillo  YOB: 1934  Date of Service: 8/30/2021  MRN: [id number]   Acct:  [de-identified]   Primary Care Physician: Zaheer Adam MD    SUBJECTIVE: Had EGD with dilatation, feels a bit sore when she swallows. Home Medications:   Current Facility-Administered Medications on File Prior to Encounter   Medication Dose Route Frequency Provider Last Rate Last Admin    betamethasone acetate-betamethasone sodium phosphate (CELESTONE) injection 12 mg  12 mg IntraMUSCular Once Nancy MD Duane         Current Outpatient Medications on File Prior to Encounter   Medication Sig Dispense Refill    metoprolol tartrate (LOPRESSOR) 25 MG tablet TAKE HALF OF A TABLET TWICE A DAY. 90 tablet 1    warfarin (COUMADIN) 2.5 MG tablet 1. Take 2 tablets by mouth daily. Or as directed by INR results.  180 tablet 3    pantoprazole (PROTONIX) 40 MG tablet Take 1 tablet by mouth once daily 90 tablet 1    ondansetron (ZOFRAN ODT) 4 MG disintegrating tablet Take 1 tablet by mouth every 8 hours as needed for Nausea 20 tablet 0    propafenone (RYTHMOL) 150 MG tablet Take 1 tablet by mouth 2 times daily Take 300 mg of propafenone with 25 mg of metoprolol once and start 150 mg propafenone q 12 hours from next day 90 tablet 3    MEGARED OMEGA-3 KRILL  MG CAPS Take by mouth daily       Multiple Vitamins-Minerals (THERAPEUTIC MULTIVITAMIN-MINERALS) tablet Take 1 tablet by mouth daily      Multiple Vitamins-Minerals (ICAPS AREDS 2) CAPS Take 2 tablets by mouth daily       Handicap Placard MISC by Does not apply route Expires 6/2022 1 each 0         Scheduled Meds:   sodium chloride flush  5-40 mL IntraVENous 2 times per day    pantoprazole  40 mg IntraVENous BID    metoprolol tartrate  12.5 mg Oral BID    propafenone  150 mg Oral BID    cefTRIAXone (ROCEPHIN) IV  1,000 mg IntraVENous Q24H     Continuous Infusions:   sodium chloride  sodium chloride      sodium chloride       PRN Meds:sodium chloride flush, sodium chloride, acetaminophen, promethazine, promethazine, HYDROmorphone, sodium chloride, sodium chloride, acetaminophen **OR** [DISCONTINUED] acetaminophen, [DISCONTINUED] promethazine **OR** ondansetron        Allergies:  Statins, Tape [adhesive tape], Tizanidine hcl, and Tramadol    OBJECTIVE:    Vitals:   Vitals:    08/30/21 1552   BP: 129/65   Pulse: 56   Resp: 16   Temp: 97.7 °F (36.5 °C)   SpO2: 94%      BMI: Body mass index is 24.53 kg/m². PHYSICAL EXAMINATION:            General appearance:  No apparent distress, appears stated age and cooperative. HEENT:  Normal cephalic, atraumatic without obvious deformity. Pupils equal, round, and reactive to light. Extra ocular muscles intact. Conjunctivae/corneas clear. Healing wound over nose and left forehead  Neck: Supple  Respiratory:  clinically clear bilaterally  Cardiovascular:  Regular rhythm with normal S1/S2 without rubs or gallops. Abdomen: Soft,mildly tender epigastrium, non-distended with normal bowel sounds. Musculoskeletal:  No clubbing, cyanosis  or edema bilaterally . Neurologic: Alert and oriented x3 no gross motor deficits          Review of Labs and Diagnostic Testing:    Recent Results (from the past 24 hour(s))   Protime-INR    Collection Time: 08/30/21  5:26 AM   Result Value Ref Range    INR 1.30 (H) 0.85 - 1.13       Radiology:     XR CHEST (2 VW)    Result Date: 8/27/2021  PROCEDURE: XR CHEST (2 VW) CLINICAL INFORMATION: Chest pain TECHNIQUE: AP and lateral chest radiographs COMPARISON: None. FINDINGS: Cardiac silhouette is at the upper limits of normal in size. Atherosclerotic calcifications are present in the thoracic aorta. There are no lung consolidations. Degenerative changes in the thoracic spine are poorly visualized. There are surgical clips in the right upper quadrant of the abdomen. No acute intrathoracic process.  **This report has been created using voice recognition software. It may contain minor errors which are inherent in voice recognition technology. ** Final report electronically signed by Dr. Kelsey Maxwell on 8/27/2021 2:14 PM        ASSESMENT:      Active Problems:    Dysphagia  Resolved Problems:    * No resolved hospital problems. *  Assessment/Plan:     1. Epigastric pain with dysphagia with hx of esophageal stricture  a. EGD showed esophageal candidiasis and gastritis and was started on nystatin suspension. Continue  PPI , s/p EGD+ dilatation. b. Hyponatremia resolved, stop NS      2. A flutter/A fib on BB, Rhythmol, Coumadin on hold  a. on prn IM phenergan for nausea  3. Hx squamous cell carcinoma s/p removal  a. Noted   4. PT/OT  5. UTI - urine culture had grown a low titer of  PCN sensitive Enterococcus and Streptococcus anginosus,  Use Unasyn for now then transition to Augmentin.   6. DVT prophylaxis   Pt is on Coumadin-- currently on hold , resume once ok with GI service        DVT prophylaxis: [] Lovenox                                 [x] SCDs                                 [] SQ Heparin                                 [] Encourage ambulation, low risk for DVT, no chemical or mechanical prophylaxis necessary              [] Already on Anticoagulation                Anticipated Disposition upon discharge: [] Home                                                                         [] Home with Home Health                                                                         [] [facility]                                                                         [] [address]          Electronically signed by Gabe Mcmillan MD on 8/30/2021 at 4:11 PM

## 2021-08-30 NOTE — OP NOTE
45-French until 51-French introduced over the guidewire. Dilator  was withdrawn. No blood seen on the dilator. Scope was advanced again  to inspect the dilation. Seen bleeding at the level of upper esophageal  sphincter indicating significant stricture. No biopsy obtained. Scope  was withdrawn with no immediate complication. IMPRESSION:  1. Dysphagia with severe stricture at the level of the upper sphincter  with post dilation until size 51-French. 2.  Mild gastritis in the body, no biopsy obtained at this time. PLAN:  1. Should be on soft diet for a week, will start today with full-liquid  diet. I will reevaluate. 2.  Hold anticoagulation at this time to make sure no bleeding after  upper endoscopy dilations. 3.  Repeat upper endoscopy and dilation as needed in the future for  dysphagia.         Camila Crooks M.D.    D: 08/30/2021 8:30:50       T: 08/30/2021 9:39:00     [email or web address]  Job#: [id number]     Doc#: [id number]    CC:  Md Suri Lewis M.D.

## 2021-08-30 NOTE — ANESTHESIA PRE PROCEDURE
Department of Anesthesiology  Preprocedure Note       Name:  Gay Other   Age:  80 y.o.  :  1934                                          MRN:  [id number]         Date:  2021      Surgeon: Macrina Pimentel):  Satish Can MD    Procedure: Procedure(s):  EGD DILATION SAVORY    Medications prior to admission:   Prior to Admission medications    Medication Sig Start Date End Date Taking? Authorizing Provider   metoprolol tartrate (LOPRESSOR) 25 MG tablet TAKE HALF OF A TABLET TWICE A DAY. 21   Amber Urena MD   warfarin (COUMADIN) 2.5 MG tablet 1. Take 2 tablets by mouth daily. Or as directed by INR results. 21   Amber Urena MD   pantoprazole (PROTONIX) 40 MG tablet Take 1 tablet by mouth once daily 21   Amber Urena MD   ondansetron (ZOFRAN ODT) 4 MG disintegrating tablet Take 1 tablet by mouth every 8 hours as needed for Nausea 21   Mandy Shi MD   propafenone (RYTHMOL) 150 MG tablet Take 1 tablet by mouth 2 times daily Take 300 mg of propafenone with 25 mg of metoprolol once and start 150 mg propafenone q 12 hours from next day 21   Karena Connolly Phillip, DO   Handicap Placard MISC by Does not apply route Expires 2022 6/15/17   MD CELINE MannRED OMEGA-3 KRILL  MG CAPS Take by mouth daily     Historical Provider, MD   Multiple Vitamins-Minerals (THERAPEUTIC MULTIVITAMIN-MINERALS) tablet Take 1 tablet by mouth daily    Historical MD João   Multiple Vitamins-Minerals (ICAPS AREDS 2) CAPS Take 2 tablets by mouth daily     Historical Provider, MD       Current medications:    No current facility-administered medications for this visit. No current outpatient medications on file.      Facility-Administered Medications Ordered in Other Visits   Medication Dose Route Frequency Provider Last Rate Last Admin    sodium chloride flush 0.9 % injection 5-40 mL  5-40 mL IntraVENous 2 times per day Satish Can MD        sodium chloride flush 0.9 % Comments)     \"makes me Goofy\"       Problem List:    Patient Active Problem List   Diagnosis Code    Trochanteric bursitis of left hip M70.62    Drug-induced constipation K59.03    Bradycardia R00.1    Esophageal stricture K22.2    Gastritis without bleeding K29.70    Lumbar radicular pain M54.16    Lumbar spine pain M54.5    Left hip pain M25.552    Arthritis of left hip M16.12    Paroxysmal atrial fibrillation with rapid ventricular response (HCC) I48.0    Anticoagulation monitoring by pharmacist Z79.01    Venous stasis dermatitis of both lower extremities I87.2    Arthritis of right hand M19.041    Vaginal erosion secondary to pessary use (HCC) T83.89XA, N89.8    Pelvic relaxation due to uterovaginal prolapse N81.4    Postmenopausal bleeding N95.0    History of hysterectomy Z90.710    Dysphagia R13.10       Past Medical History:        Diagnosis Date    Actinic keratosis     history of    Arthritis     Atrial fibrillation ([address]     CAD (coronary artery disease)     history of afib    Cancer ([facility]     skin    Cervical prolapse     history of    Diverticulosis     Endocervical polyp     history of    Heel spur     Hip pain     Hypertension     Nasal septal deviation     Osteoporosis     Stress incontinence     history of    Syncope     Vaginal prolapse     history of       Past Surgical History:        Procedure Laterality Date    APPENDECTOMY  1960's   Bernestine Nick BLADDER REPAIR  2011    [address]  37/4769    [facility]    COLONOSCOPY  01/26/2000    ENDOSCOPIC ULTRASOUND (LOWER) Left 07/20/2017    ENDOSCOPIC ULTRASOUND performed by Taye Tucker MD at [facility]    EYE SURGERY      bilat cataract    FACIAL SURGERY N/A 1/27/2021    DIVISION AND INSET/CHEEK performed by Annel Mosher MD at [address] [place] [address]  2017    left total hip relpament    MOHS SURGERY N/A 1/8/2021    MOHS REPAIR BCC NASAL TIP WITH FLAP AND GRAFT performed by Katy Patel MD at [address] Left 8/6/2021    MOHS DEFECT REPAIR SCC DORSAL NOSE AND LEFT LOWER FOREHEAD WITH SKIN GRAFT FROM RIGHT CHEEK/EAR (PLACED ON NOSE) performed by Katy Patel MD at [address]  1960's    [place] EGD TRANSORAL BIOPSY SINGLE/MULTIPLE Left 07/16/2017    EGD BIOPSY performed by Bernardo Muñiz MD at [facility]    [place] OFFICE/OUTPT VISIT,PROCEDURE ONLY Right 06/01/2018    MOHS REPAIR BASEL CELL CARCINOMA RIGHT DORSAL NOSE performed by Katy Patel MD at [address] Left 09/19/2017    Left Total Hip ARTHROPLASTY performed by Sonia Diamond MD at [address]  07/16/2017    UPPER GASTROINTESTINAL ENDOSCOPY Left 07/16/2017    EGD DILATION BALLOON performed by Bernardo Muñiz MD at [place] [id number] LEFT Left 4/21/2021    US BREAST NEEDLE BIOPSY LEFT 4/21/2021 [facility] ULTRASOUND    US BREAST NEEDLE BIOPSY RIGHT Right 4/21/2021    US BREAST NEEDLE BIOPSY RIGHT 4/21/2021 [facility] ULTRASOUND    US GUIDED NEEDLE LOC BREAST ADDL LEFT Left 4/21/2021    US GUIDED NEEDLE LOC BREAST ADDL LEFT 4/21/2021 [facility] ULTRASOUND       Social History:    Social History     Tobacco Use    Smoking status: Never Smoker    Smokeless tobacco: Never Used   Substance Use Topics    Alcohol use: No                                Counseling given: Not Answered      Vital Signs (Current): There were no vitals filed for this visit.                                            BP Readings from Last 3 Encounters:   08/30/21 (!) 159/74   08/28/21 (!) 152/71   08/23/21 107/80       NPO Status:                                                                                 BMI:   Wt Readings from Last 3 Encounters:   08/30/21 147 lb 6.4 oz (66.9 kg)   08/23/21 162 lb (73.5 kg)   08/23/21 159 lb (72.1 kg)     There is no GI/Hepatic/Renal: Neg GI/Hepatic/Renal ROS           ROS comment: Epigastric pain with dysphagia with hx of esophageal stricture    Hyponatremia. Endo/Other: Negative Endo/Other ROS             Pt had no PAT visit       Abdominal:             Vascular: negative vascular ROS. Other Findings:               Anesthesia Plan      MAC     ASA 3       Induction: intravenous. Anesthetic plan and risks discussed with patient. Plan discussed with attending.                   Taylor Curran, APRN - CRNA   8/30/2021

## 2021-08-30 NOTE — CARE COORDINATION
8/30/21, 4:14 PM EDT  DISCHARGE PLANNING EVALUATION:    Isaias Lopez       Admitted: 8/27/2021/ [address] day: 3   Location: -02/002-A Reason for admit: Abdominal pain, epigastric [R10.13]  Dehydration [E86.0]  Hyponatremia [E87.1]  Dysphagia [R13.10]   PMH:  has a past medical history of Actinic keratosis, Arthritis, Atrial fibrillation ([facility] CAD (coronary artery disease), Cancer ([facility] Cervical prolapse, Diverticulosis, Endocervical polyp, Heel spur, Hip pain, Hypertension, Nasal septal deviation, Osteoporosis, Stress incontinence, Syncope, and Vaginal prolapse. Procedure: 8/28 EGD  8/30 EGD with dilation  Barriers to Discharge:  IV Unasyn, IV protonix bid, pain control. GI following. PT/OT. SLP eval completed- \"no barriers to diet advancement\". PCP: Sonia Elizondo MD  Readmission Risk Score: 16%    Patient Goals/Plan/Treatment Preferences: Met with Lawrence and her daughter, she plans to return home with her  at discharge. She is completing outpatient [facility] at [facility]. She has a cane and walker. She denies all further needs. Transportation/Food Security/Housekeeping Addressed:  No issues identified.

## 2021-08-30 NOTE — PROGRESS NOTES
[address] [place] [address]  SPEECH THERAPY  [facility] RENAL TELEMETRY 6K  Clinical Swallow Evaluation      SLP Individual Minutes  Time In: 3147  Time Out: 0558  Minutes: 11  Timed Code Treatment Minutes: 0 Minutes       Date: 2021  Patient Name: Marco A Chang      CSN: [id number]   : 1934  (80 y.o.)  Gender: female   Referring Physician:  VASU Camacho CNP  Diagnosis: Abdominal pain, epigastric   Secondary Diagnosis: Dysphagia   History of Present Illness/Injury: Patient admitted to [facility] with above dx. See physician H&P for full report. ST consulted d/t concerns for dysphagia. Patient with recent EGD with dilatation completed this AM; ST to complete clinical swallow evaluation to determine safety of PO intake and further POC. Past Medical History:   Diagnosis Date    Actinic keratosis     history of    Arthritis     Atrial fibrillation ([address]     CAD (coronary artery disease)     history of afib    Cancer (HCC)     skin    Cervical prolapse     history of    Diverticulosis     Endocervical polyp     history of    Heel spur     Hip pain     Hypertension     Nasal septal deviation     Osteoporosis     Stress incontinence     history of    Syncope     Vaginal prolapse     history of       SUBJECTIVE:  Patient seen at bedside; alert and pleasant. Patient reporting, \"pain 10/10 in throat upon activity swallowing d/t recent procedure. \" RN aware. Family present reporting, \"patient slightly groggy yet from procedure. \" Patient reporting, \"I am doing better just tired. \"     OBJECTIVE:    Pain:  10/10 - Pain location: throat upon active swallow; RN aware    Current Diet: Full liquid diet   *advanced per GI    Respiratory Status:  Independent    Behavioral Observation:  Alert and Oriented    Oral Mechanism Evaluation:      Facial / Labial WFL    Lingual Impaired Thick/white material coating dorsal body of lingual surface    Dentition WFL    Velum WFL    Vocal Quality WFL Sensation [facility]    Cough Not Tested      Patient Evaluated Using: Thin liquids via cup and straw, puree    Oral Phase:  WFL    Pharyngeal Phase: WFL:  Pharyngeal phase appears WFL but cannot rule out pharyngeal phase deficits from a bedside swallowing evaluation alone. Signs and Symptoms of Laryngeal Penetration/Aspiration: No signs/symptoms of aspiration evident in this evaluation, but cannot rule out silent aspiration. Impresssions: Patient presents with oral phase of swallow function that is essentially [facility] with inability to fully discern potential presence of pharyngeal phase deficits without formal instrumentation. All labial/lingual structures intact and appear to be functioning appropriately at bedside. Patient did note with white-like coating on tongue; recommend continued oral care. Oral phase highly unremarkable during consumption of puree textures, patient demonstrating adequate mastication pattern for soft textural breakdown, cohesive bolus formation, and manipulation. Thin liquids consumed without overt difficulty and with suspected control/containment of fluid bolus. x1 instance of oral holding of the bolus noted with thin liquids; patient in agreement holding of the bolus completed d/t anticipated pain upon swallowing d/t recent procedure. NO overt s/s aspiration exhibited across all consistencies/trials consumed, certainly not able to exclude pharyngeal phase dysfunction and/or airway invasion events in its entirety at bedside alone. Patient's swallow physiology does appear appropriate to support PO intake without distress with instrumental evaluation not warranted. ST unable to evaluate hard solid textures at this time d/t GI restrictions. Recommend continuation of full liquid diet. Patient would benefit from ongoing skilled dysphagia tx to continue to evaluate hard solid textures as approved by GI. Education provided to patient and family; all verbalized understanding.  RN also verbalized understanding. RECOMMENDATIONS/ASSESSMENT:  Instrumental Evaluation: Instrumental evaluation not indicated at this time. Diet Recommendations:  Full liquid diet   *ST anticipating no barriers to diet advancement; please advance diet as recommended per GI. RN verbalized understanding   Strategies:  Full Upright Position and Small Bite/Sip   Rehabilitation Potential: good    EDUCATION:  Learner: Patient, Significant Other and Family  Education:  Reviewed results and recommendations of this evaluation, Reviewed diet and strategies, Reviewed signs, symptoms and risks of aspiration, Reviewed ST goals and Plan of Care, Reviewed recommendations for follow-up and Education Related to Potential Risks and Complications Due to Impairment/Illness/Injury  Evaluation of Education: Verbalizes understanding and Demonstrates with assistance    PLAN:  Skilled SLP intervention on acute care 3-5 x per week or until goals met and/or pt plateaus in function. Specific interventions for next session may include: dysphagia tx. PATIENT GOAL:    Did not state. Will further assess during treatment.     SHORT TERM GOALS:  Short-term Goals  Timeframe for Short-term Goals: 2 weeks  Goal 1: Patient will consume full liquid diet without s/s of aspiration in order to safely maintain adequate hydration and nutrition  Goal 2: Patient will complete advanced textures as directed per GI without s/s of aspiration in order to determine safety readiness for diet advancement  Goal 3: Patient and family will demonstrate understanding of oral care in order to reduce risk of colinization of oral bacteria and improve overall oral hygeine    LONG TERM GOALS:  No LTGs due to CATH[address]

## 2021-08-30 NOTE — PRE SEDATION
Sedation/Analgesia History & Physical    Patient: Allie Hung : 1934  Med Rec#: [id number] Acc#: [id number]   Provider Performing Procedure: Gutierrez Purcell MD  Primary Care Physician: Miriam Muller MD    PRE-PROCEDURE   Full CODE [x]Yes  DNR-CCA/DNR-CC []Yes   Brief History/Pre-Procedure Diagnosis:dysphagia           MEDICAL HISTORY    []Additional information:       has a past medical history of Actinic keratosis, Arthritis, Atrial fibrillation ([facility] [address] CAD (coronary artery disease), Cancer ([facility] [address] Cervical prolapse, Diverticulosis, Endocervical polyp, Heel spur, Hip pain, Hypertension, Nasal septal deviation, Osteoporosis, Stress incontinence, Syncope, and Vaginal prolapse. SOCIAL HISTORY  Social History     Tobacco History     Smoking Status  Never Smoker    Smokeless Tobacco Use  Never Used          Alcohol History     Alcohol Use Status  No          Drug Use     Drug Use Status  No          Sexual Activity     Sexually Active  Not Currently Partners  Male Comment                  FAMILY HISTORY     Family History   Problem Relation Age of Onset    Colon Cancer Father     Osteoporosis Mother     Heart Disease Mother        SURGICAL HISTORY   has a past surgical history that includes Colonoscopy (2000); Cholecystectomy (1976); Upper gastrointestinal endoscopy (2017); pr egd transoral biopsy single/multiple (Left, 2017); Upper gastrointestinal endoscopy (Left, 2017); Endoscopic ultrasonography, GI (Left, 2017); eye surgery; Total hip arthroplasty (Left, 2017); bladder repair (); partial hysterectomy (cervix not removed) ('s); Appendectomy (s); skin biopsy; pr office/outpt visit,procedure only (Right, 2018); joint replacement (); Mohs surgery (N/A, 2021);  Facial Surgery (N/A, 2021); US BREAST BIOPSY W LOC DEVICE 1ST LESION LEFT (Left, 2021); US PLACE BREAST LOC DEVICE EACH ADDL LEFT (Left, 2021); US BREAST BIOPSY W LOC DEVICE 1ST LESION RIGHT (Right, 4/21/2021); Hysterectomy; and Mohs surgery (Left, 8/6/2021).   Additional information:       ALLERGIES   Allergies as of 08/27/2021 - Fully Reviewed 08/27/2021   Allergen Reaction Noted    Statins Other (See Comments) 07/18/2017    Tape Milton Devin tape] Other (See Comments) 06/01/2018    Tizanidine hcl Other (See Comments) 08/24/2017    Tramadol Other (See Comments) 08/22/2017     Additional information:       MEDICATIONS   Coumadin Use Last 7 Days [x]No []Yes  Antiplatelet drug therapy use last 7 days  [x]No []Yes  Other anticoagulant use last 7 days  [x]No []Yes    Current Facility-Administered Medications:     sodium chloride flush 0.9 % injection 5-40 mL, 5-40 mL, IntraVENous, 2 times per day, Lacey Carmona MD    sodium chloride flush 0.9 % injection 5-40 mL, 5-40 mL, IntraVENous, PRN, Lacey Carmona MD    0.9 % sodium chloride infusion, 25 mL, IntraVENous, PRN, Lacey Carmona MD    acetaminophen (TYLENOL) suppository 650 mg, 650 mg, Rectal, Q4H PRN, Umesh Estrada MD    promethazine (PHENERGAN) injection 6.25 mg, 6.25 mg, IntraMUSCular, Q6H PRN, Umesh Estrada MD    promethazine (PHENERGAN) injection 12.5 mg, 12.5 mg, IntraMUSCular, Q6H PRN, Umesh Estrada MD, 12.5 mg at 08/28/21 1211    pantoprazole (PROTONIX) injection 40 mg, 40 mg, IntraVENous, BID, Umesh Estrada MD, 40 mg at 08/29/21 2014    HYDROmorphone (DILAUDID) injection 0.25 mg, 0.25 mg, IntraVENous, Q3H PRN, Umesh Estrada MD, 0.25 mg at 08/29/21 0815    0.9 % sodium chloride infusion, 25 mL, IntraVENous, PRN, Lacey Carmona MD    metoprolol tartrate (LOPRESSOR) tablet 12.5 mg, 12.5 mg, Oral, BID, Raina Mai MD, 12.5 mg at 08/29/21 2014    propafenone (RYTHMOL) tablet 150 mg, 150 mg, Oral, BID, Raina Mai MD, 150 mg at 08/29/21 2014    0.9 % sodium chloride infusion, 25 mL, IntraVENous, PRN, Brent Londonn, APRN - CNP    acetaminophen (TYLENOL) tablet 650 mg, 650 mg, Oral, Q6H PRN, 650 mg at 08/28/21 1856 **OR** [DISCONTINUED] acetaminophen (TYLENOL) suppository 650 mg, 650 mg, Rectal, Q6H PRN, Adam Precise, APRN - CNP, 650 mg at 08/28/21 3965    [DISCONTINUED] promethazine (PHENERGAN) tablet 12.5 mg, 12.5 mg, Oral, Q6H PRN **OR** ondansetron (ZOFRAN) injection 4 mg, 4 mg, IntraVENous, Q6H PRN, Adam Precise, APRN - CNP, 4 mg at 08/28/21 7658    cefTRIAXone (ROCEPHIN) 1000 mg IVPB in 50 mL D5W minibag, 1,000 mg, IntraVENous, Q24H, Adam Precise, APRN - CNP, Stopped at 08/29/21 2302    Facility-Administered Medications Ordered in Other Encounters:     betamethasone acetate-betamethasone sodium phosphate (CELESTONE) injection 12 mg, 12 mg, IntraMUSCular, Once, Genia Ying MD  Prior to Admission medications    Medication Sig Start Date End Date Taking? Authorizing Provider   metoprolol tartrate (LOPRESSOR) 25 MG tablet TAKE HALF OF A TABLET TWICE A DAY. 8/26/21  Yes Abner Homans, MD   warfarin (COUMADIN) 2.5 MG tablet 1. Take 2 tablets by mouth daily. Or as directed by INR results.  8/26/21  Yes Abner Homans, MD   pantoprazole (PROTONIX) 40 MG tablet Take 1 tablet by mouth once daily 8/26/21  Yes Abner Homans, MD   ondansetron (ZOFRAN ODT) 4 MG disintegrating tablet Take 1 tablet by mouth every 8 hours as needed for Nausea 8/23/21  Yes Mohini Ying MD   propafenone (RYTHMOL) 150 MG tablet Take 1 tablet by mouth 2 times daily Take 300 mg of propafenone with 25 mg of metoprolol once and start 150 mg propafenone q 12 hours from next day 4/6/21  Yes DO MARNIE Burroughs OMEGA-3 KRILL  MG CAPS Take by mouth daily    Yes Historical Provider, MD   Multiple Vitamins-Minerals (THERAPEUTIC MULTIVITAMIN-MINERALS) tablet Take 1 tablet by mouth daily   Yes Historical Provider, MD   Multiple Vitamins-Minerals (ICAPS AREDS 2) CAPS Take 2 tablets by mouth daily    Yes Historical Provider, MD   Handicap Placard MISC by Does not apply route Expires 6/2022 6/15/17   Trell Pringle MD     Additional information:       PHYSICAL:   Heart:  [x]Regular rate and rhythm  []Other:    Lungs:  [x]Clear    []Other:    Abdomen: [x]Soft    []Other:    Mental Status: [x]Alert & Oriented  []Other:        PLANNED PROCEDURE   [x]EGD  []Colonoscopy []Flex Sigmoid     Consent: I have discussed with the patient and/or the patient representative the indication, alternatives, and the possible risks and/or complications of the planned procedure and the anesthesia methods. The patient and/or patient representative appear to understand and agree to proceed. SEDATION  Please see anesthesia note. The medication Planned :  Planned agent:[x]Midazolam []Meperidine [x]Sublimaze []Morphine  []Diazepam  [x]Propofol     Airway Assessment:   See anesthesia no please     Monitoring and Safety: The patient will be placed on a cardiac monitor and vital signs, pulse oximetry and level of consciousness will be continuously evaluated throughout the procedure. The patient will be closely monitored until recovery from the medications is complete and the patient has returned to baseline status. Respiratory therapy will be on standby during the procedure. [x]Pre-procedure diagnostic studies complete and results available. Comment:    [x]Previous sedation/anesthesia experiences assessed. Comment:    [x]The patient is an appropriate candidate to undergo the planned procedure sedation and anesthesia. (Refer to nursing sedation/analgesia documentation record)  [x]Formulation and discussion of sedation/procedure plan, risks, and expectations with patient and/or responsible adult completed. [x]Patient examined immediately prior to the procedure.  (Refer to nursing sedation/analgesia documentation record)    Lou Beaver MD, MD   Electronically signed

## 2021-08-31 VITALS
TEMPERATURE: 97.4 F | DIASTOLIC BLOOD PRESSURE: 80 MMHG | OXYGEN SATURATION: 92 % | RESPIRATION RATE: 16 BRPM | HEART RATE: 64 BPM | SYSTOLIC BLOOD PRESSURE: 141 MMHG | WEIGHT: 147.4 LBS | BODY MASS INDEX: 24.53 KG/M2

## 2021-08-31 PROBLEM — B37.81 ESOPHAGEAL CANDIDIASIS (HCC): Status: ACTIVE | Noted: 2021-08-31

## 2021-08-31 PROBLEM — R13.10 DYSPHAGIA: Status: RESOLVED | Noted: 2021-08-27 | Resolved: 2021-08-31

## 2021-08-31 LAB
FUNGUS SMEAR: ABNORMAL
INR BLD: 1.18 (ref 0.85–1.13)
ORGANISM: ABNORMAL

## 2021-08-31 PROCEDURE — 6370000000 HC RX 637 (ALT 250 FOR IP): Performed by: INTERNAL MEDICINE

## 2021-08-31 PROCEDURE — C9113 INJ PANTOPRAZOLE SODIUM, VIA: HCPCS | Performed by: INTERNAL MEDICINE

## 2021-08-31 PROCEDURE — 2580000003 HC RX 258: Performed by: INTERNAL MEDICINE

## 2021-08-31 PROCEDURE — 97165 OT EVAL LOW COMPLEX 30 MIN: CPT

## 2021-08-31 PROCEDURE — 85610 PROTHROMBIN TIME: CPT

## 2021-08-31 PROCEDURE — 6360000002 HC RX W HCPCS: Performed by: INTERNAL MEDICINE

## 2021-08-31 PROCEDURE — 36415 COLL VENOUS BLD VENIPUNCTURE: CPT

## 2021-08-31 PROCEDURE — 97530 THERAPEUTIC ACTIVITIES: CPT

## 2021-08-31 RX ORDER — AMOXICILLIN AND CLAVULANATE POTASSIUM 500; 125 MG/1; MG/1
1 TABLET, FILM COATED ORAL 2 TIMES DAILY
Qty: 10 TABLET | Refills: 0 | Status: ON HOLD | OUTPATIENT
Start: 2021-08-31 | End: 2021-09-05

## 2021-08-31 RX ORDER — AMOXICILLIN AND CLAVULANATE POTASSIUM 500; 125 MG/1; MG/1
1 TABLET, FILM COATED ORAL 2 TIMES DAILY
Qty: 10 TABLET | Refills: 0 | Status: SHIPPED | OUTPATIENT
Start: 2021-08-31 | End: 2021-08-31 | Stop reason: SDUPTHER

## 2021-08-31 RX ADMIN — SODIUM CHLORIDE, PRESERVATIVE FREE 10 ML: 5 INJECTION INTRAVENOUS at 09:14

## 2021-08-31 RX ADMIN — PANTOPRAZOLE SODIUM 40 MG: 40 INJECTION, POWDER, FOR SOLUTION INTRAVENOUS at 09:14

## 2021-08-31 RX ADMIN — NYSTATIN 500000 UNITS: 100000 SUSPENSION ORAL at 11:25

## 2021-08-31 RX ADMIN — PROPAFENONE HYDROCHLORIDE 150 MG: 150 TABLET, FILM COATED ORAL at 09:14

## 2021-08-31 RX ADMIN — METOPROLOL TARTRATE 12.5 MG: 25 TABLET, FILM COATED ORAL at 09:14

## 2021-08-31 RX ADMIN — SODIUM CHLORIDE 1500 MG: 900 INJECTION INTRAVENOUS at 12:51

## 2021-08-31 RX ADMIN — SODIUM CHLORIDE 1500 MG: 900 INJECTION INTRAVENOUS at 06:11

## 2021-08-31 NOTE — PROGRESS NOTES
[facility]  INPATIENT OCCUPATIONAL THERAPY  [facility] RENAL TELEMETRY 6K  EVALUATION    Time:   Time In: 830  Time Out: 1494  Timed Code Treatment Minutes: 10 Minutes  Minutes: 25          Date: 2021  Patient Name: Charles Donahue,   Gender: female      MRN: [id number]  : 1934  (80 y.o.)  Referring Practitioner: JAY Brown  Diagnosis: Abdominal Pain, epigastric  Additional Pertinent Hx: Pt admitted with above dx. She had EGD 21 and was noted to have esophageal candidiasis and gastritis. EGD with dilation completed on 21. Restrictions/Precautions:  Restrictions/Precautions: Fall Risk    Subjective  Chart Reviewed: Yes, Orders, History and Physical  Family / Caregiver Present: Yes (Pt'd daughter present and supportive)    Subjective: Pleasant and cooperative  Comments: RN approved session. Pt agreed to do her lower body dressing. She also showed her upper body functioning. She does not report having any pain. Pain:  Pain Assessment  Patient Currently in Pain: Denies    Vitals: Vitals not assessed per clinical judgement, see nursing flowsheet    Social/Functional History:  Lives With: Spouse  Type of Home: House  Home Layout: Multi-level  Home Access: Stairs to enter without rails  Entrance Stairs - Number of Steps: 1 step at the entrance and 8 steps either to the upstairs where she has her bedroom or the basement where she has a walk in shower.   Home Equipment: [place], Rolling walker   Bathroom Shower/Tub: Tub/Shower unit, Walk-in shower, Shower chair with back  [employer]: Standard  Bathroom Equipment: Grab bars in shower  Bathroom Accessibility: Accessible    Receives Help From: Family  ADL Assistance: Independent  Homemaking Assistance: Needs assistance  Homemaking Responsibilities: Yes  Ambulation Assistance: Independent  Transfer Assistance: Independent    Active : Yes  Occupation: Retired  Type of occupation:   Leisure & Hobbies: Reading the newspaper, [employer], watching the daily news shows  Additional Comments: Pt was using a cane at times. She is independent with ADLs including taking a shower while standing. She does most of the homemaking and also mows the lawn with a riding mower. Pt had been receiving outpatient therapy in [place]. VISION:Corrected; has Macular degeneration with occasional eye aches and has floaters    HEARING:  WFL    COGNITION: WFL    RANGE OF MOTION:  Bilateral Upper Extremity:  Impaired - Only limited DIP extension in 5th and 3rd fingers secondary to OA    STRENGTH:  Bilateral Upper Extremity:  Impaired - 3+/5 deltoid and pectoral; 4/5 biceps/triceps    SENSATION:   WFL    ADL:   Lower Extremity Dressing: Stand By Assistance. donning her pajama bottoms and straightening the slipper socks. BALANCE:  Sitting Balance:  Supervision. Lower body dressing while at the edge of bed  Standing Balance: Supervision. preparing to walk in the hallway    BED MOBILITY:  Rolling to Right: Supervision using the bedrail  Supine to Sit: Supervision with head of bed elevated  Scooting: Supervision using the bedrail    TRANSFERS:  Sit to Stand:  Supervision. from the edge of bed  Stand to Sit: Supervision. to the edge of bed    FUNCTIONAL MOBILITY:  Assistive Device: None  Assist Level:  Stand By Assistance. Distance: 120 ft in the hallway  Pt had an even step and arm sway being shown. Activity Tolerance:  Patient tolerance of  treatment: good. Pt could carry on a conversation while walking in the hallway. She remained sitting in the recliner chair following session. Assessment:  Assessment: Pt presents with abdominal pain, epigastric. She had EDG this admission including a dilation of her esophagus yesterday. She reports inproved swallowing and now having no pain. She demonstrated self care which was at her baseline. She also demonstrated functional mobility which appears close to her baseline.   She would be able to

## 2021-08-31 NOTE — PROGRESS NOTES
INTERNAL MEDICINE SPECIALTIES  Progress Note For Dr Alana Fitzpatrick       Patient:  Gay Turcios  YOB: 1934  Date of Service: 8/31/2021  MRN: [id number]   Acct:  [de-identified]   Primary Care Physician: Amber Urena MD    SUBJECTIVE:       Home Medications:   No current facility-administered medications on file prior to encounter. Current Outpatient Medications on File Prior to Encounter   Medication Sig Dispense Refill    metoprolol tartrate (LOPRESSOR) 25 MG tablet TAKE HALF OF A TABLET TWICE A DAY. 90 tablet 1    warfarin (COUMADIN) 2.5 MG tablet 1. Take 2 tablets by mouth daily. Or as directed by INR results.  180 tablet 3    pantoprazole (PROTONIX) 40 MG tablet Take 1 tablet by mouth once daily 90 tablet 1    ondansetron (ZOFRAN ODT) 4 MG disintegrating tablet Take 1 tablet by mouth every 8 hours as needed for Nausea 20 tablet 0    propafenone (RYTHMOL) 150 MG tablet Take 1 tablet by mouth 2 times daily Take 300 mg of propafenone with 25 mg of metoprolol once and start 150 mg propafenone q 12 hours from next day 90 tablet 3    MEGARED OMEGA-3 KRILL  MG CAPS Take by mouth daily       Multiple Vitamins-Minerals (THERAPEUTIC MULTIVITAMIN-MINERALS) tablet Take 1 tablet by mouth daily      Multiple Vitamins-Minerals (ICAPS AREDS 2) CAPS Take 2 tablets by mouth daily       Handicap Placard MISC by Does not apply route Expires 6/2022 1 each 0         Scheduled Meds:   nystatin  5 mL Oral 4x Daily    sodium chloride flush  5-40 mL IntraVENous 2 times per day    ampicillin-sulbactam  1,500 mg IntraVENous Q6H    pantoprazole  40 mg IntraVENous BID    metoprolol tartrate  12.5 mg Oral BID    propafenone  150 mg Oral BID     Continuous Infusions:   sodium chloride      sodium chloride      sodium chloride       PRN Meds:sodium chloride flush, sodium chloride, acetaminophen, promethazine, promethazine, HYDROmorphone, sodium chloride, sodium chloride, acetaminophen **OR** [DISCONTINUED] acetaminophen, [DISCONTINUED] promethazine **OR** ondansetron        Allergies:  Statins, Tape [adhesive tape], Tizanidine hcl, and Tramadol    OBJECTIVE:    Vitals:   Vitals:    08/31/21 1206   BP: 129/69   Pulse: 66   Resp: 14   Temp: 96 °F (35.6 °C)   SpO2: 95%      BMI: Body mass index is 24.53 kg/m². PHYSICAL EXAMINATION:            General appearance:  No apparent distress, appears stated age and cooperative. HEENT:  Normal cephalic, atraumatic without obvious deformity. Pupils equal, round, and reactive to light. Extra ocular muscles intact. Conjunctivae/corneas clear. Healing wound over nose and left forehead  Neck: Supple  Respiratory:  clinically clear bilaterally  Cardiovascular:  Regular rhythm with normal S1/S2 without rubs or gallops. Abdomen: Soft,mildly tender epigastrium, non-distended with normal bowel sounds. Musculoskeletal:  No clubbing, cyanosis  or edema bilaterally . Neurologic: Alert and oriented x3 no gross motor deficits          Review of Labs and Diagnostic Testing:    Recent Results (from the past 24 hour(s))   Protime-INR    Collection Time: 08/31/21  4:41 AM   Result Value Ref Range    INR 1.18 (H) 0.85 - 1.13       Radiology:     XR CHEST (2 VW)    Result Date: 8/27/2021  PROCEDURE: XR CHEST (2 VW) CLINICAL INFORMATION: Chest pain TECHNIQUE: AP and lateral chest radiographs COMPARISON: None. FINDINGS: Cardiac silhouette is at the upper limits of normal in size. Atherosclerotic calcifications are present in the thoracic aorta. There are no lung consolidations. Degenerative changes in the thoracic spine are poorly visualized. There are surgical clips in the right upper quadrant of the abdomen. No acute intrathoracic process. **This report has been created using voice recognition software. It may contain minor errors which are inherent in voice recognition technology. ** Final report electronically signed by Dr. Savanah Santos on 8/27/2021 2:14 PM        ASSESMENT:      Active Problems:    Dysphagia  Resolved Problems:    * No resolved hospital problems. *  Assessment/Plan:     1. Epigastric pain with dysphagia with hx of esophageal stricture  a. EGD showed esophageal candidiasis and gastritis and was started on nystatin suspension. Continue  PPI , s/p EGD+ dilatation. Discussed with shane Bradley to start coumadin, no bridge. Hyponatremia resolved, stop NS      2. A flutter/A fib on BB, Rhythmol, Coumadin on hold  a. on prn IM phenergan for nausea  3. Hx squamous cell carcinoma s/p removal  a. Noted   4. PT/OT  5. UTI - urine culture had grown a low titer of  PCN sensitive Enterococcus and Streptococcus anginosus,  Use Unasyn for now then transition to Augmentin.   6. DVT prophylaxis          DVT prophylaxis: [] Lovenox                                 [x] SCDs                                 [] SQ Heparin                                 [] Encourage ambulation, low risk for DVT, no chemical or mechanical prophylaxis necessary              [] Already on Anticoagulation                Anticipated Disposition upon discharge: [] Home                                                                         [] Home with Home Health                                                                         [] [facility]                                                                         [] [address] [place] [address],[address]          Electronically signed by Vanesa Pierre MD on 8/31/2021 at 2:26 PM

## 2021-08-31 NOTE — DISCHARGE SUMMARY
Internal Medicine Specialties     Discharge Summary      Patient Identification:   Tracey Carter   : 1934  MRN: [id number]   Account: [de-identified]      Patient's PCP: Donaldo Nair MD    Admit Date: 2021     Discharge Date:   2021    Admitting Physician: Kate Vicente MD     Discharge Physician: Kate Vicente MD     Discharge Diagnoses: Active Problems:    Esophageal candidiasis (HCC)  Resolved Problems:    Dysphagia  Other problems  Gastritis  Atrial flutter/fibrillation  UTI secondary to Enterococcus/Streptococcus anginosus    The patient was seen and examined on day of discharge and this discharge summary is in conjunction with any daily progress note from day of discharge. Hospital Course:   Tracey Carter is a 80 y.o. 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 Pt denies any SOB or chest pain. She has had decreased intake in oral diet due to epigastric pain and emesis. In the emergency department Na 125, CXR negative. Patient admitted for dysphagia and hyponatremia. The patient had initially been made n.p.o. and maintained on IV fluids. Her Coumadin was withheld. Urine cultures have been obtained and at this had grown a low titer of PCN sensitive Enterococcus and Streptococcus anginanosus. She had been placed on antibiotics empirically due to her being symptomatic. She had also received PT OT during this admission. She had undergone EGD initially which had shown esophageal candidiasis and she has been placed on nystatin swish and swallow with significant improvement in her symptoms. She will also be maintained on PPI.   Once her INR was low enough she had undergone a repeat EGD with dilatation with significant improvement in her ability to swallow. She continues to do well and is being discharged in a stable condition. The GI physician has okayed resumption of Coumadin without bridge. Patient's INR will be rechecked in a couple of days by the [facility]) Coumadin clinic in [place]. Patient will contact pleads 5 days of both Augmentin and nystatin. Labs: For convenience and continuity at follow-up the following most recent labs are provided:      CBC:    Lab Results   Component Value Date    WBC 2.9 08/28/2021    HGB 12.3 08/28/2021    HCT 37.1 08/28/2021    PLT 96 08/28/2021       Renal:    Lab Results   Component Value Date     08/29/2021    K 4.0 08/28/2021    CL 95 08/28/2021    CO2 23 08/28/2021    BUN 7 08/28/2021    CREATININE 0.8 08/28/2021    CALCIUM 8.4 08/28/2021         Significant Diagnostic Studies    Radiology:   XR CHEST (2 VW)    Result Date: 8/27/2021  PROCEDURE: XR CHEST (2 VW) CLINICAL INFORMATION: Chest pain TECHNIQUE: AP and lateral chest radiographs COMPARISON: None. FINDINGS: Cardiac silhouette is at the upper limits of normal in size. Atherosclerotic calcifications are present in the thoracic aorta. There are no lung consolidations. Degenerative changes in the thoracic spine are poorly visualized. There are surgical clips in the right upper quadrant of the abdomen. No acute intrathoracic process. **This report has been created using voice recognition software. It may contain minor errors which are inherent in voice recognition technology. ** Final report electronically signed by Dr. Andie Graves on 8/27/2021 2:14 PM    CT HEAD WO CONTRAST    Result Date: 8/28/2021  PROCEDURE: CT HEAD WO CONTRAST CLINICAL INFORMATION: persistent headache. COMPARISON: No prior study. TECHNIQUE: Noncontrast 5 mm axial images were obtained through the brain. Sagittal and coronal reconstructions were obtained.  All CT scans at this facility use dose modulation, iterative reconstruction, and/or weight-based dosing when appropriate to reduce radiation dose to as low as reasonably achievable. FINDINGS: There is mild global volume loss. There is no hemorrhage. There are no intra-or extra-axial collections. There is no hydrocephalus, midline shift or mass effect. There is a mild amount of abnormal low attenuation in the white matter the brain suggesting chronic small vessel ischemic changes. There is some mild mucosal thickening in the left aspect of the sphenoid sinus. The mastoid air cells are normally aerated. There is no suspicious calvarial abnormality. 1. Mild severity chronic small vessel ischemic changes. 2. No acute findings. **This report has been created using voice recognition software. It may contain minor errors which are inherent in voice recognition technology. ** Final report electronically signed by Dr. Dolores Aguilar on 8/28/2021 2:38 PM    CT ABDOMEN PELVIS W IV CONTRAST Additional Contrast? None    Result Date: 8/23/2021  EXAMINATION: CT OF THE ABDOMEN AND PELVIS WITH CONTRAST 8/23/2021 11:35 am TECHNIQUE: CT of the abdomen and pelvis was performed with the administration of intravenous contrast. Multiplanar reformatted images are provided for review. Dose modulation, iterative reconstruction, and/or weight based adjustment of the mA/kV was utilized to reduce the radiation dose to as low as reasonably achievable.  COMPARISON: 07/15/2017 HISTORY: ORDERING SYSTEM PROVIDED HISTORY: Abdominal pain TECHNOLOGIST PROVIDED HISTORY: Abdominal pain Decision Support Exception - unselect if not a suspected or confirmed emergency medical condition->Emergency Medical Condition (MA) Reason for Exam: Upper abd pain, hx appendectomy, cholecystectomy, hysterectomy Acuity: Acute Type of Exam: Initial FINDINGS: Lower Chest: Multiple solid noncalcified pulmonary nodules in the partially imaged lung bases ranging in size from 0.5-1.5 cm, either unchanged in size or decreased in caliber relative to comparison imaging from 2017.  Two of the right lower lobe nodules appear to contain internal fat attenuation compatible hamartomas. Minimal dependent changes in the lung bases. Cardiomegaly. No pericardial effusion. Organs: Cholecystectomy. Liver is without focal abnormality. Mild splenomegaly. Diffusely atrophic pancreas. Adrenal glands are normal caliber. 1.9 cm water attenuation cyst superior pole right kidney. No hydronephrosis or perinephric stranding. GI/Bowel: No bowel obstruction. Colonic diverticulosis without diverticulitis. Pelvis: Pelvic floor descent with a pessary in place. Evaluation is limited by streak artifact from hip arthroplasty hardware but there appears to be urinary bladder wall thickening with perivesicular stranding. Hysterectomy. Peritoneum/Retroperitoneum: Small volume simple free fluid in the dependent pelvis. No free air or lymphadenopathy. Heavy aortic and iliofemoral atherosclerosis without aneurysm. Bones/Soft Tissues: Mild levoconvex lumbar spinal curvature. Grade 1 degenerative anterolisthesis L4 on L5. Partially imaged left hip arthroplasty hardware appears grossly appropriate in alignment without surrounding lucency or fracture. Remote postoperative changes along the right upper quadrant anterior abdominal wall. Mild splenomegaly, new from remote prior imaging. Urinary bladder wall thickening with perivesicular inflammatory stranding and small volume simple free fluid in the pelvis. Correlate for cystitis. Background of pelvic floor descent with a pessary in place. Colonic diverticulosis without diverticulitis. Multiple basilar pulmonary nodules which are either unchanged in size or decreased in caliber relative to remote comparison from 2017.           Consults:     IP CONSULT TO GI    Disposition:    [x] Home       [] TCU       [] Rehab       [] Psych       [] SNF       [] [place]       [] Other-    Condition at Discharge: Stable    Code Status:  Full Code Patient Instructions: Activity: activity as tolerated  Diet: ADULT DIET; Full Liquid  Adult Oral Nutrition Supplement; Standard 4 oz Oral Supplement      Follow-up visits:   Carleen Sparrow MD  [address] [place] [place]  [phone]    On 9/7/2021  VIRTUAL APPT AT 1:20PM, OFFICE WILL CALL 15 MINUTES PRIOR TO APPT    Brenda Degroot MD  [address] [facility]. [address]  [phone]    Go on 9/15/2021  @ 11:45 AM    [address] [place], [place] [address].  [phone]  In 2 days  check INR          Discharge Medications:      Earl Ferreira"   Home Medication Instructions THZ:[id number]    Printed on:08/31/21 4308   Medication Information                      amoxicillin-clavulanate (AUGMENTIN) 500-125 MG per tablet  Take 1 tablet by mouth 2 times daily for 5 days             Handicap Placard MISC  by Does not apply route Expires 6/2022             MEGARED OMEGA-3 KRILL  MG CAPS  Take by mouth daily              metoprolol tartrate (LOPRESSOR) 25 MG tablet  TAKE HALF OF A TABLET TWICE A DAY. Multiple Vitamins-Minerals (ICAPS AREDS 2) CAPS  Take 2 tablets by mouth daily              Multiple Vitamins-Minerals (THERAPEUTIC MULTIVITAMIN-MINERALS) tablet  Take 1 tablet by mouth daily             nystatin (MYCOSTATIN) 206789 UNIT/ML suspension  Take 5 mLs by mouth 4 times daily for 19 doses             ondansetron (ZOFRAN ODT) 4 MG disintegrating tablet  Take 1 tablet by mouth every 8 hours as needed for Nausea             pantoprazole (PROTONIX) 40 MG tablet  Take 1 tablet by mouth once daily             propafenone (RYTHMOL) 150 MG tablet  Take 1 tablet by mouth 2 times daily Take 300 mg of propafenone with 25 mg of metoprolol once and start 150 mg propafenone q 12 hours from next day             warfarin (COUMADIN) 2.5 MG tablet  1. Take 2 tablets by mouth daily. Or as directed by INR results.                  Time Spent on discharge is more than 35 minutes in the examination, evaluation, counseling and review of medications and discharge plan. Signed: Thank you Farhat Grossman MD for the opportunity to be involved in this patient's care.     Electronically signed by Justin Black MD on 8/31/2021 at 5:38 PM

## 2021-08-31 NOTE — FLOWSHEET NOTE
[facility] PROGRESS NOTE      Patient: Chadwick Parr  Room #: 8O-38/714-X            YOB: 1934  Age: 80 y.o. Gender: female            Admit Date & Time: 8/27/2021 10:47 AM    Assessment:  Bobby Brice was sitting up in a chair. Pt's daughter was present. Pt shared that she only has this one daughter, who lives in [facility]). Pt's daughter is a CNRA, and pt views her as a great advocate for her health care. Pt had the same procedure 4 years ago. Pt has been  to her  for over 61 years. Pt is a member of [employer]. Pt is disappointed that the Christianity had their 125 anniversary celebration this past Sunday and she missed it, because she was here. Pt has a very strong damian and an optimistic outlook about her prognosis. Interventions:   provided a listening and supportive presence.  explored pt's support network and understanding of her illness. Outcomes:  Pt expressed gratitude for the encounter. Plan:  1. Provide spiritual care and support. 2.  Encourage pt to continue sharing about her damian. Electronically signed by Asad Sanchez on 8/31/2021 at 2:29 PM.  [address]  [phone]       08/31/21 1350   Encounter Summary   Services provided to: Patient and family together   Referral/Consult From: [address] [place] [address] Children;Religious/damian community   Place of Congregational   ([employer])   Continue Visiting Yes  (8/31)   Complexity of Encounter Moderate   Length of Encounter 30 minutes   Spiritual Assessment Completed Yes   Spiritual/Baptism   Type Spiritual support   Assessment Calm; Approachable   Intervention Active listening;Explored feelings, thoughts, concerns;Explored coping resources;Sustaining presence/ Ministry of presence;Scripture   Outcome Connection/belonging;Comfort;Engaged in conversation;Expressed feelings/needs/concerns

## 2021-08-31 NOTE — PROGRESS NOTES
Discharge teaching and instructions for diagnosis/procedure of Dysphagia completed with patient using teachback method. AVS reviewed. Printed prescriptions given to patient. Patient voiced understanding regarding prescriptions, follow up appointments, and care of self at home.  Discharged in a wheelchair to  home with support per family

## 2021-09-01 ENCOUNTER — CARE COORDINATION (OUTPATIENT)
Dept: CASE MANAGEMENT | Age: 86
End: 2021-09-01

## 2021-09-01 ENCOUNTER — TELEPHONE (OUTPATIENT)
Dept: FAMILY MEDICINE CLINIC | Age: 86
End: 2021-09-01

## 2021-09-01 DIAGNOSIS — K22.2 ESOPHAGEAL STRICTURE: Primary | ICD-10-CM

## 2021-09-01 PROCEDURE — 1111F DSCHRG MED/CURRENT MED MERGE: CPT | Performed by: FAMILY MEDICINE

## 2021-09-01 NOTE — TELEPHONE ENCOUNTER
No need for transitional care call; pt had [facility] call and appt made within 14 days of discharge.

## 2021-09-01 NOTE — CARE COORDINATION
[place] [facility] Transitions Initial Follow Up Call    Call within 2 business days of discharge: Yes    Patient: Marco A Chang Patient : 1934   MRN: [id number]  Reason for Admission:  Paroxysmal atrial fibrillation with rapid ventricular response  Discharge Date: 21 RARS: Readmission Risk Score: 13      Last Discharge [facility]       Complaint Diagnosis Description Type Department Provider    21 Headache; Abdominal Pain; Nausea Paroxysmal atrial fibrillation with rapid ventricular response ([address] . .. ED to Hosp-Admission (Discharged) (ADMITTED) IGSELLE Nielson MD; Dori. .. Spoke with: Daughter Arlet Monreal states Yan Pearson is still sleeping. States she thinks things are going well but too early in the game to tell. They have follow up appts scheduled, we reviewed, have all new medications; denies questions related to this. She appreciated the call and understands we will check in next week as well if something arises in the meantime to reach out to CTN . Transitions of Care Initial Call    Was this an external facility discharge? No Discharge Facility: [facility]      Challenges to be reviewed by the provider   Additional needs identified to be addressed with provider: No  none             Method of communication with provider : none      Advance Care Planning:   Does patient have an Advance Directive: not on file; education provided. Was this a readmission? No  Patient stated reason for admission:  Dehydration             -Abdominal pain/esophagitis             -Esophageal stricture  Patients top risk factors for readmission: polypharmacy    Care Transition Nurse (CTN) contacted the family by telephone to perform post hospital discharge assessment. Verified name and  with family as identifiers. Provided introduction to self, and explanation of the CTN role. CTN reviewed discharge instructions, medical action plan and red flags with family who verbalized understanding. Family given an opportunity to ask questions and does not have any further questions or concerns at this time. Were discharge instructions available to patient? Yes. Reviewed appropriate site of care based on symptoms and resources available to patient including: PCP and When to call 911. The family agrees to contact the PCP office for questions related to their healthcare. Medication reconciliation was performed with family, who verbalizes understanding of administration of home medications. Advised obtaining a 90-day supply of all daily and as-needed medications. Covid Risk Education     Educated patient about risk for severe COVID-19 due to risk factors according to CDC guidelines. CTN reviewed discharge instructions, medical action plan and red flag symptoms with the family who verbalized understanding. Discussed COVID vaccination status: No. Education provided on COVID-19 vaccination as appropriate. Discussed exposure protocols and quarantine with CDC Guidelines. Family was given an opportunity to verbalize any questions and concerns and agrees to contact CTN or health care provider for questions related to their healthcare. Reviewed and educated family on any new and changed medications related to discharge diagnosis. Was patient discharged with a pulse oximeter? No Discussed and confirmed pulse oximeter discharge instructions and when to notify provider or seek emergency care. CTN provided contact information. Plan for follow-up call in 5-7 days based on severity of symptoms and risk factors.   Plan for next call: symptom management-pain , chest pain , shortness of breath and follow up appointment- PCP     Non-face-to-face services provided:  Obtained and reviewed discharge summary and/or continuity of care documents    Care Transitions 24 Hour Call    Schedule Follow Up Appointment with PCP: Completed  Do you have any ongoing symptoms?: No  Do you have a copy of your discharge instructions?: Yes  Do you have all of your prescriptions and are they filled?: Yes  Have you been contacted by a [address] [place] [address] Avenue?: No  Have you scheduled your follow up appointment?: Yes  How are you going to get to your appointment?: Car - family or friend to transport  Were you discharged with any Home Care or Post Acute Services: No  Care Transitions Interventions         Follow Up  Future Appointments   Date Time Provider Gina Beauchamp   9/7/2021  1:20 PM [facility]   9/15/2021 11:45 AM Lacey Carmona MD [facility]   [facility]  [facility] [address] [facility] [place]   11/11/2021 11:30 AM MD PETAR Whitt [facility]   12/21/2021  3:00 PM Mya Stewart MD [facility]       Silverio Vasquez, NUBIA

## 2021-09-02 ENCOUNTER — HOSPITAL ENCOUNTER (OUTPATIENT)
Dept: PHARMACY | Age: 86
Setting detail: THERAPIES SERIES
Discharge: HOME | End: 2021-09-02
Payer: MEDICARE

## 2021-09-02 DIAGNOSIS — Z79.01 ANTICOAGULATION MONITORING BY PHARMACIST: Primary | ICD-10-CM

## 2021-09-02 DIAGNOSIS — I48.0 PAROXYSMAL ATRIAL FIBRILLATION WITH RAPID VENTRICULAR RESPONSE (HCC): ICD-10-CM

## 2021-09-02 LAB
INR BLD: 1.1
PROTIME: 13.8 SECONDS

## 2021-09-02 PROCEDURE — 85610 PROTHROMBIN TIME: CPT

## 2021-09-02 PROCEDURE — 36416 COLLJ CAPILLARY BLOOD SPEC: CPT

## 2021-09-02 PROCEDURE — 99211 OFF/OP EST MAY X REQ PHY/QHP: CPT

## 2021-09-02 NOTE — PROGRESS NOTES
ANTICOAGULATION SERVICE    Date of Clinic Visit:  2021    Marco A Chang is a 80 y.o. female who presents to clinic today for anticoagulation monitoring and adjustment. Recent INR Results:  Internal QC passed  Lab Results   Component Value Date    INR 1.1 2021    INR 1.18 (H) 2021       Current Warfarin Dosage:  Dosing Plan  As of 2021    TTR:  88.9 % (3.8 y)   Full warfarin instructions:  : 7.5 mg; 9/3: 7.5 mg; Otherwise 5 mg every day               Assessment/Plan:    Continue current regimen as INR remains stable. INR is low today due to being held for procedure and just restarting two days ago. I will increase dose x 2 days then back on regular dose. Recheck 2 weeks. Next Clinic Appointment:  Return date  As of 2021    TTR:  88.9 % (3.8 y)   Next INR check:  2021             Please call [facility] Anticoagulation Clinic at [phone] with any questions. Thanks!   Sourav Bradshaw [facility]  Anticoagulation Service Pharmacist  2021 1:09 PM     For Pharmacy Admin Tracking Only     Intervention Detail: Adherence Monitorin   Total # of Interventions Recommended: 0   Total # of Interventions Accepted: 0   Time Spent (min): 15

## 2021-09-04 ENCOUNTER — APPOINTMENT (OUTPATIENT)
Dept: CT IMAGING | Age: 86
DRG: 641 | End: 2021-09-04
Payer: MEDICARE

## 2021-09-04 ENCOUNTER — HOSPITAL ENCOUNTER (INPATIENT)
Age: 86
LOS: 4 days | Discharge: HOME | DRG: 641 | End: 2021-09-08
Attending: EMERGENCY MEDICINE | Admitting: INTERNAL MEDICINE
Payer: MEDICARE

## 2021-09-04 DIAGNOSIS — R10.13 ABDOMINAL PAIN, EPIGASTRIC: Primary | ICD-10-CM

## 2021-09-04 DIAGNOSIS — E87.1 HYPONATREMIA: ICD-10-CM

## 2021-09-04 LAB
ALBUMIN SERPL-MCNC: 4.1 G/DL (ref 3.5–5.1)
ALP BLD-CCNC: 98 U/L (ref 38–126)
ALT SERPL-CCNC: 12 U/L (ref 11–66)
ANION GAP SERPL CALCULATED.3IONS-SCNC: 11 MEQ/L (ref 8–16)
AST SERPL-CCNC: 18 U/L (ref 5–40)
BACTERIA: ABNORMAL
BASOPHILS # BLD: 0.4 %
BASOPHILS ABSOLUTE: 0 THOU/MM3 (ref 0–0.1)
BILIRUB SERPL-MCNC: 0.6 MG/DL (ref 0.3–1.2)
BILIRUBIN DIRECT: < 0.2 MG/DL (ref 0–0.3)
BILIRUBIN URINE: NEGATIVE
BLOOD, URINE: NEGATIVE
BUN BLDV-MCNC: 13 MG/DL (ref 7–22)
CALCIUM SERPL-MCNC: 9 MG/DL (ref 8.5–10.5)
CASTS: ABNORMAL /LPF
CASTS: ABNORMAL /LPF
CHARACTER, URINE: CLEAR
CHLORIDE BLD-SCNC: 85 MEQ/L (ref 98–111)
CO2: 24 MEQ/L (ref 23–33)
COLOR: YELLOW
CREAT SERPL-MCNC: 0.8 MG/DL (ref 0.4–1.2)
CREATININE URINE: 85.7 MG/DL
CRYSTALS: ABNORMAL
EOSINOPHIL # BLD: 3 %
EOSINOPHILS ABSOLUTE: 0.1 THOU/MM3 (ref 0–0.4)
EPITHELIAL CELLS, UA: ABNORMAL /HPF
ERYTHROCYTE [DISTWIDTH] IN BLOOD BY AUTOMATED COUNT: 15.8 % (ref 11.5–14.5)
ERYTHROCYTE [DISTWIDTH] IN BLOOD BY AUTOMATED COUNT: 44.6 FL (ref 35–45)
GFR SERPL CREATININE-BSD FRML MDRD: 68 ML/MIN/1.73M2
GLUCOSE BLD-MCNC: 89 MG/DL (ref 70–108)
GLUCOSE, URINE: NEGATIVE MG/DL
HCT VFR BLD CALC: 41.9 % (ref 37–47)
HEMOGLOBIN: 14.1 GM/DL (ref 12–16)
IMMATURE GRANS (ABS): 0.06 THOU/MM3 (ref 0–0.07)
IMMATURE GRANULOCYTES: 1.3 %
INR BLD: 1.61 (ref 0.85–1.13)
KETONES, URINE: ABNORMAL
LEUKOCYTE ESTERASE, URINE: ABNORMAL
LIPASE: 42.5 U/L (ref 5.6–51.3)
LYMPHOCYTES # BLD: 25.6 %
LYMPHOCYTES ABSOLUTE: 1.2 THOU/MM3 (ref 1–4.8)
MCH RBC QN AUTO: 26.9 PG (ref 26–33)
MCHC RBC AUTO-ENTMCNC: 33.7 GM/DL (ref 32.2–35.5)
MCV RBC AUTO: 79.8 FL (ref 81–99)
MISCELLANEOUS LAB TEST RESULT: ABNORMAL
MONOCYTES # BLD: 14.1 %
MONOCYTES ABSOLUTE: 0.6 THOU/MM3 (ref 0.4–1.3)
NITRITE, URINE: NEGATIVE
NUCLEATED RED BLOOD CELLS: 0 /100 WBC
OSMOLALITY CALCULATION: 241.8 MOSMOL/KG (ref 275–300)
OSMOLALITY URINE: 464 MOSMOL/KG (ref 250–750)
PH UA: 6.5 (ref 5–9)
PLATELET # BLD: 106 THOU/MM3 (ref 130–400)
PMV BLD AUTO: 10.5 FL (ref 9.4–12.4)
POTASSIUM SERPL-SCNC: 4.3 MEQ/L (ref 3.5–5.2)
PRO-BNP: 1226 PG/ML (ref 0–1800)
PROTEIN UA: ABNORMAL MG/DL
RBC # BLD: 5.25 MILL/MM3 (ref 4.2–5.4)
RBC URINE: ABNORMAL /HPF
RENAL EPITHELIAL, UA: ABNORMAL
SEG NEUTROPHILS: 55.6 %
SEGMENTED NEUTROPHILS ABSOLUTE COUNT: 2.6 THOU/MM3 (ref 1.8–7.7)
SODIUM BLD-SCNC: 120 MEQ/L (ref 135–145)
SODIUM BLD-SCNC: 122 MEQ/L (ref 135–145)
SODIUM URINE: 90 MEQ/L
SPECIFIC GRAVITY UA: 1.01 (ref 1–1.03)
TOTAL PROTEIN: 6.2 G/DL (ref 6.1–8)
TROPONIN T: < 0.01 NG/ML
TSH SERPL DL<=0.05 MIU/L-ACNC: 1.82 UIU/ML (ref 0.4–4.2)
URIC ACID: 3.8 MG/DL (ref 2.4–5.7)
UROBILINOGEN, URINE: 0.2 EU/DL (ref 0–1)
WBC # BLD: 4.6 THOU/MM3 (ref 4.8–10.8)
WBC UA: ABNORMAL /HPF
YEAST: ABNORMAL

## 2021-09-04 PROCEDURE — 83880 ASSAY OF NATRIURETIC PEPTIDE: CPT

## 2021-09-04 PROCEDURE — 84443 ASSAY THYROID STIM HORMONE: CPT

## 2021-09-04 PROCEDURE — 6360000002 HC RX W HCPCS: Performed by: EMERGENCY MEDICINE

## 2021-09-04 PROCEDURE — 6360000004 HC RX CONTRAST MEDICATION: Performed by: EMERGENCY MEDICINE

## 2021-09-04 PROCEDURE — 36415 COLL VENOUS BLD VENIPUNCTURE: CPT

## 2021-09-04 PROCEDURE — 84300 ASSAY OF URINE SODIUM: CPT

## 2021-09-04 PROCEDURE — 82248 BILIRUBIN DIRECT: CPT

## 2021-09-04 PROCEDURE — 85025 COMPLETE CBC W/AUTO DIFF WBC: CPT

## 2021-09-04 PROCEDURE — 2580000003 HC RX 258: Performed by: EMERGENCY MEDICINE

## 2021-09-04 PROCEDURE — 96374 THER/PROPH/DIAG INJ IV PUSH: CPT

## 2021-09-04 PROCEDURE — 81001 URINALYSIS AUTO W/SCOPE: CPT

## 2021-09-04 PROCEDURE — 82570 ASSAY OF URINE CREATININE: CPT

## 2021-09-04 PROCEDURE — 99222 1ST HOSP IP/OBS MODERATE 55: CPT | Performed by: INTERNAL MEDICINE

## 2021-09-04 PROCEDURE — 99285 EMERGENCY DEPT VISIT HI MDM: CPT

## 2021-09-04 PROCEDURE — 84484 ASSAY OF TROPONIN QUANT: CPT

## 2021-09-04 PROCEDURE — 80053 COMPREHEN METABOLIC PANEL: CPT

## 2021-09-04 PROCEDURE — 83690 ASSAY OF LIPASE: CPT

## 2021-09-04 PROCEDURE — 83935 ASSAY OF URINE OSMOLALITY: CPT

## 2021-09-04 PROCEDURE — 96375 TX/PRO/DX INJ NEW DRUG ADDON: CPT

## 2021-09-04 PROCEDURE — 84550 ASSAY OF BLOOD/URIC ACID: CPT

## 2021-09-04 PROCEDURE — 87086 URINE CULTURE/COLONY COUNT: CPT

## 2021-09-04 PROCEDURE — 85610 PROTHROMBIN TIME: CPT

## 2021-09-04 PROCEDURE — 74177 CT ABD & PELVIS W/CONTRAST: CPT

## 2021-09-04 PROCEDURE — 82533 TOTAL CORTISOL: CPT

## 2021-09-04 PROCEDURE — 2060000000 HC ICU INTERMEDIATE R&B

## 2021-09-04 PROCEDURE — 84295 ASSAY OF SERUM SODIUM: CPT

## 2021-09-04 PROCEDURE — 93005 ELECTROCARDIOGRAM TRACING: CPT | Performed by: EMERGENCY MEDICINE

## 2021-09-04 RX ORDER — FENTANYL CITRATE 50 UG/ML
50 INJECTION, SOLUTION INTRAMUSCULAR; INTRAVENOUS ONCE
Status: COMPLETED | OUTPATIENT
Start: 2021-09-04 | End: 2021-09-04

## 2021-09-04 RX ORDER — SODIUM CHLORIDE 9 MG/ML
25 INJECTION, SOLUTION INTRAVENOUS PRN
Status: CANCELLED | OUTPATIENT
Start: 2021-09-04

## 2021-09-04 RX ORDER — DROPERIDOL 2.5 MG/ML
0.62 INJECTION, SOLUTION INTRAMUSCULAR; INTRAVENOUS EVERY 6 HOURS PRN
Status: DISCONTINUED | OUTPATIENT
Start: 2021-09-04 | End: 2021-09-05 | Stop reason: HOSPADM

## 2021-09-04 RX ORDER — FENTANYL CITRATE 50 UG/ML
50 INJECTION, SOLUTION INTRAMUSCULAR; INTRAVENOUS ONCE
Status: DISCONTINUED | OUTPATIENT
Start: 2021-09-04 | End: 2021-09-08 | Stop reason: HOSPADM

## 2021-09-04 RX ORDER — ONDANSETRON 2 MG/ML
4 INJECTION INTRAMUSCULAR; INTRAVENOUS ONCE
Status: COMPLETED | OUTPATIENT
Start: 2021-09-04 | End: 2021-09-04

## 2021-09-04 RX ORDER — 0.9 % SODIUM CHLORIDE 0.9 %
500 INTRAVENOUS SOLUTION INTRAVENOUS ONCE
Status: COMPLETED | OUTPATIENT
Start: 2021-09-04 | End: 2021-09-04

## 2021-09-04 RX ADMIN — IOPAMIDOL 85 ML: 755 INJECTION, SOLUTION INTRAVENOUS at 19:59

## 2021-09-04 RX ADMIN — ONDANSETRON 4 MG: 2 INJECTION INTRAMUSCULAR; INTRAVENOUS at 16:34

## 2021-09-04 RX ADMIN — FENTANYL CITRATE 50 MCG: 0.05 INJECTION, SOLUTION INTRAMUSCULAR; INTRAVENOUS at 18:21

## 2021-09-04 RX ADMIN — SODIUM CHLORIDE 500 ML: 9 INJECTION, SOLUTION INTRAVENOUS at 16:35

## 2021-09-04 RX ADMIN — DROPERIDOL 0.62 MG: 2.5 INJECTION, SOLUTION INTRAMUSCULAR; INTRAVENOUS at 18:48

## 2021-09-04 ASSESSMENT — ENCOUNTER SYMPTOMS
COUGH: 0
VOMITING: 1
CHEST TIGHTNESS: 0
VOICE CHANGE: 0
DIARRHEA: 0
SHORTNESS OF BREATH: 0
TROUBLE SWALLOWING: 0
ABDOMINAL PAIN: 1
BACK PAIN: 0
NAUSEA: 1
BLOOD IN STOOL: 0

## 2021-09-04 ASSESSMENT — PAIN DESCRIPTION - ORIENTATION: ORIENTATION: RIGHT;LOWER;UPPER

## 2021-09-04 ASSESSMENT — PAIN DESCRIPTION - LOCATION: LOCATION: ABDOMEN

## 2021-09-04 ASSESSMENT — PAIN DESCRIPTION - PAIN TYPE: TYPE: ACUTE PAIN

## 2021-09-04 ASSESSMENT — PAIN SCALES - GENERAL
PAINLEVEL_OUTOF10: 9
PAINLEVEL_OUTOF10: 3
PAINLEVEL_OUTOF10: 7

## 2021-09-04 ASSESSMENT — PAIN DESCRIPTION - FREQUENCY: FREQUENCY: CONTINUOUS

## 2021-09-04 NOTE — ED NOTES
Bedside report taken from [facility] at this time. This nurse assuming care. Patient resting in bed. Respirations easy and unlabored. No distress noted. Call light within reach.        Tolu Dinero RN  09/04/21 1591

## 2021-09-04 NOTE — ED NOTES
ED nurse-to-nurse bedside report    Chief Complaint   Patient presents with    Abdominal Pain    Emesis      LOC: alert and orientated to name, place, date  Vital signs   Vitals:    09/04/21 1452 09/04/21 1633   BP: 123/82 130/80   Pulse: 74 75   Resp: 16 16   Temp: 97.7 °F (36.5 °C)    TempSrc: Oral    SpO2: 96% 95%   Weight: 147 lb (66.7 kg)    Height: 5' 5\" (1.651 m)       Pain:    Pain Interventions: Fentanyl  Pain Goal:   Oxygen: No    Current needs required none   Telemetry: Yes  LDAs:   Peripheral IV 08/27/21 Right Forearm (Active)       Peripheral IV 09/04/21 Left Forearm (Active)   Site Assessment Clean;Dry; Intact 09/04/21 1633   Line Status Normal saline locked; Infusing 09/04/21 1633   Dressing Status Clean;Dry; Intact 09/04/21 1633   Dressing Intervention New 09/04/21 1502     Continuous Infusions:   Mobility: Requires assistance * 1  Felton Fall Risk Score: Fall Risk 8/23/2021 6/21/2021 6/17/2020 6/12/2019 6/8/2018   2 or more falls in past year? yes no no yes no   Fall with injury in past year?  yes no no yes no     Fall Interventions: call light within sumit, Family at bedside  Report given to: Tristan Paz RN and [address] [facility]  09/04/21 2837

## 2021-09-04 NOTE — ED NOTES
Patient resting quietly in cot showing no signs of distress at this time. Medicated per MAR. Updated on POC. Will continue to monitor.       Immanuel Cummings RN  09/04/21 0562

## 2021-09-04 NOTE — Clinical Note
Patient Class: Inpatient [101]   REQUIRED: Diagnosis: Hyponatremia [221322]   Estimated Length of Stay: Estimated stay of more than 2 midnights   Admitting Provider: Clemente Quiñones [[id number]]

## 2021-09-04 NOTE — ED NOTES
Patient resting in bed. Respirations easy and unlabored. No distress noted. Call light within reach. Pt medicated per MAR at this time.      Billy Mcadams RN  09/04/21 9255

## 2021-09-05 ENCOUNTER — APPOINTMENT (OUTPATIENT)
Dept: GENERAL RADIOLOGY | Age: 86
DRG: 641 | End: 2021-09-05
Payer: MEDICARE

## 2021-09-05 PROBLEM — I48.92 ATRIAL FLUTTER (HCC): Status: ACTIVE | Noted: 2021-09-05

## 2021-09-05 PROBLEM — R10.30 LOWER ABDOMINAL PAIN: Status: ACTIVE | Noted: 2021-09-05

## 2021-09-05 LAB
ANION GAP SERPL CALCULATED.3IONS-SCNC: 10 MEQ/L (ref 8–16)
BASOPHILS # BLD: 0.2 %
BASOPHILS ABSOLUTE: 0 THOU/MM3 (ref 0–0.1)
BUN BLDV-MCNC: 10 MG/DL (ref 7–22)
CALCIUM SERPL-MCNC: 9 MG/DL (ref 8.5–10.5)
CHLORIDE BLD-SCNC: 89 MEQ/L (ref 98–111)
CHLORIDE, URINE: 93 MEQ/L
CO2: 24 MEQ/L (ref 23–33)
CORTISOL COLLECTION INFO: NORMAL
CORTISOL: 3.42 UG/DL
CREAT SERPL-MCNC: 0.9 MG/DL (ref 0.4–1.2)
CREATININE URINE: 45 MG/DL
EKG ATRIAL RATE: 292 BPM
EKG P AXIS: -121 DEGREES
EKG Q-T INTERVAL: 444 MS
EKG QRS DURATION: 96 MS
EKG QTC CALCULATION (BAZETT): 489 MS
EKG R AXIS: 70 DEGREES
EKG T AXIS: 52 DEGREES
EKG VENTRICULAR RATE: 73 BPM
EOSINOPHIL # BLD: 1.1 %
EOSINOPHILS ABSOLUTE: 0.1 THOU/MM3 (ref 0–0.4)
ERYTHROCYTE [DISTWIDTH] IN BLOOD BY AUTOMATED COUNT: 15.9 % (ref 11.5–14.5)
ERYTHROCYTE [DISTWIDTH] IN BLOOD BY AUTOMATED COUNT: 45.4 FL (ref 35–45)
GFR SERPL CREATININE-BSD FRML MDRD: 59 ML/MIN/1.73M2
GLUCOSE BLD-MCNC: 84 MG/DL (ref 70–108)
HCT VFR BLD CALC: 45.7 % (ref 37–47)
HEMOGLOBIN: 15.2 GM/DL (ref 12–16)
IMMATURE GRANS (ABS): 0.05 THOU/MM3 (ref 0–0.07)
IMMATURE GRANULOCYTES: 1.1 %
INR BLD: 1.66 (ref 0.85–1.13)
LYMPHOCYTES # BLD: 6.1 %
LYMPHOCYTES ABSOLUTE: 0.3 THOU/MM3 (ref 1–4.8)
MCH RBC QN AUTO: 26.9 PG (ref 26–33)
MCHC RBC AUTO-ENTMCNC: 33.3 GM/DL (ref 32.2–35.5)
MCV RBC AUTO: 80.9 FL (ref 81–99)
MONOCYTES # BLD: 9.8 %
MONOCYTES ABSOLUTE: 0.5 THOU/MM3 (ref 0.4–1.3)
MRSA SCREEN RT-PCR: NEGATIVE
NUCLEATED RED BLOOD CELLS: 0 /100 WBC
OSMOLALITY URINE: 403 MOSMOL/KG (ref 250–750)
OSMOLALITY: 264 MOSMOL/KG (ref 275–295)
PLATELET # BLD: 92 THOU/MM3 (ref 130–400)
PLATELET ESTIMATE: ABNORMAL
PMV BLD AUTO: 9.8 FL (ref 9.4–12.4)
POTASSIUM REFLEX MAGNESIUM: 4.7 MEQ/L (ref 3.5–5.2)
POTASSIUM, URINE: 27.5 MEQ/L
RBC # BLD: 5.65 MILL/MM3 (ref 4.2–5.4)
SARS-COV-2, NAAT: NOT DETECTED
SCAN OF BLOOD SMEAR: NORMAL
SEG NEUTROPHILS: 81.7 %
SEGMENTED NEUTROPHILS ABSOLUTE COUNT: 3.8 THOU/MM3 (ref 1.8–7.7)
SODIUM BLD-SCNC: 120 MEQ/L (ref 135–145)
SODIUM BLD-SCNC: 122 MEQ/L (ref 135–145)
SODIUM BLD-SCNC: 123 MEQ/L (ref 135–145)
SODIUM BLD-SCNC: 123 MEQ/L (ref 135–145)
SODIUM BLD-SCNC: 124 MEQ/L (ref 135–145)
SODIUM BLD-SCNC: 125 MEQ/L (ref 135–145)
SODIUM URINE: 96 MEQ/L
VANCOMYCIN RESISTANT ENTEROCOCCUS: NEGATIVE
WBC # BLD: 4.6 THOU/MM3 (ref 4.8–10.8)

## 2021-09-05 PROCEDURE — 99222 1ST HOSP IP/OBS MODERATE 55: CPT | Performed by: INTERNAL MEDICINE

## 2021-09-05 PROCEDURE — 80048 BASIC METABOLIC PNL TOTAL CA: CPT

## 2021-09-05 PROCEDURE — 36415 COLL VENOUS BLD VENIPUNCTURE: CPT

## 2021-09-05 PROCEDURE — 93010 ELECTROCARDIOGRAM REPORT: CPT | Performed by: NUCLEAR MEDICINE

## 2021-09-05 PROCEDURE — 6370000000 HC RX 637 (ALT 250 FOR IP): Performed by: INTERNAL MEDICINE

## 2021-09-05 PROCEDURE — 71045 X-RAY EXAM CHEST 1 VIEW: CPT

## 2021-09-05 PROCEDURE — 6360000002 HC RX W HCPCS: Performed by: INTERNAL MEDICINE

## 2021-09-05 PROCEDURE — C9113 INJ PANTOPRAZOLE SODIUM, VIA: HCPCS | Performed by: INTERNAL MEDICINE

## 2021-09-05 PROCEDURE — 82436 ASSAY OF URINE CHLORIDE: CPT

## 2021-09-05 PROCEDURE — 83930 ASSAY OF BLOOD OSMOLALITY: CPT

## 2021-09-05 PROCEDURE — 84295 ASSAY OF SERUM SODIUM: CPT

## 2021-09-05 PROCEDURE — 83935 ASSAY OF URINE OSMOLALITY: CPT

## 2021-09-05 PROCEDURE — 87081 CULTURE SCREEN ONLY: CPT

## 2021-09-05 PROCEDURE — 2060000000 HC ICU INTERMEDIATE R&B

## 2021-09-05 PROCEDURE — 2580000003 HC RX 258: Performed by: INTERNAL MEDICINE

## 2021-09-05 PROCEDURE — 84133 ASSAY OF URINE POTASSIUM: CPT

## 2021-09-05 PROCEDURE — 85610 PROTHROMBIN TIME: CPT

## 2021-09-05 PROCEDURE — 87500 VANOMYCIN DNA AMP PROBE: CPT

## 2021-09-05 PROCEDURE — 84300 ASSAY OF URINE SODIUM: CPT

## 2021-09-05 PROCEDURE — 85025 COMPLETE CBC W/AUTO DIFF WBC: CPT

## 2021-09-05 PROCEDURE — 87635 SARS-COV-2 COVID-19 AMP PRB: CPT

## 2021-09-05 PROCEDURE — 87040 BLOOD CULTURE FOR BACTERIA: CPT

## 2021-09-05 PROCEDURE — 82570 ASSAY OF URINE CREATININE: CPT

## 2021-09-05 PROCEDURE — 87641 MR-STAPH DNA AMP PROBE: CPT

## 2021-09-05 RX ORDER — PANTOPRAZOLE SODIUM 40 MG/10ML
40 INJECTION, POWDER, LYOPHILIZED, FOR SOLUTION INTRAVENOUS DAILY
Status: DISCONTINUED | OUTPATIENT
Start: 2021-09-05 | End: 2021-09-06

## 2021-09-05 RX ORDER — ONDANSETRON 4 MG/1
4 TABLET, ORALLY DISINTEGRATING ORAL EVERY 8 HOURS PRN
Status: DISCONTINUED | OUTPATIENT
Start: 2021-09-05 | End: 2021-09-08 | Stop reason: HOSPADM

## 2021-09-05 RX ORDER — AMOXICILLIN AND CLAVULANATE POTASSIUM 500; 125 MG/1; MG/1
1 TABLET, FILM COATED ORAL 2 TIMES DAILY
Status: DISCONTINUED | OUTPATIENT
Start: 2021-09-05 | End: 2021-09-05

## 2021-09-05 RX ORDER — PROPAFENONE HYDROCHLORIDE 150 MG/1
150 TABLET, FILM COATED ORAL 2 TIMES DAILY
Status: DISCONTINUED | OUTPATIENT
Start: 2021-09-05 | End: 2021-09-08 | Stop reason: HOSPADM

## 2021-09-05 RX ORDER — ACETAMINOPHEN 650 MG/1
650 SUPPOSITORY RECTAL EVERY 6 HOURS PRN
Status: DISCONTINUED | OUTPATIENT
Start: 2021-09-05 | End: 2021-09-08 | Stop reason: HOSPADM

## 2021-09-05 RX ORDER — SODIUM CHLORIDE 1000 MG
2 TABLET, SOLUBLE MISCELLANEOUS ONCE
Status: COMPLETED | OUTPATIENT
Start: 2021-09-05 | End: 2021-09-05

## 2021-09-05 RX ORDER — MAGNESIUM SULFATE IN WATER 40 MG/ML
2000 INJECTION, SOLUTION INTRAVENOUS PRN
Status: DISCONTINUED | OUTPATIENT
Start: 2021-09-05 | End: 2021-09-08 | Stop reason: HOSPADM

## 2021-09-05 RX ORDER — POTASSIUM CHLORIDE 7.45 MG/ML
10 INJECTION INTRAVENOUS PRN
Status: DISCONTINUED | OUTPATIENT
Start: 2021-09-05 | End: 2021-09-08 | Stop reason: HOSPADM

## 2021-09-05 RX ORDER — POTASSIUM CHLORIDE 20 MEQ/1
40 TABLET, EXTENDED RELEASE ORAL PRN
Status: DISCONTINUED | OUTPATIENT
Start: 2021-09-05 | End: 2021-09-08 | Stop reason: HOSPADM

## 2021-09-05 RX ORDER — WARFARIN SODIUM 3 MG/1
6 TABLET ORAL ONCE
Status: COMPLETED | OUTPATIENT
Start: 2021-09-05 | End: 2021-09-05

## 2021-09-05 RX ORDER — POLYETHYLENE GLYCOL 3350 17 G/17G
17 POWDER, FOR SOLUTION ORAL DAILY PRN
Status: DISCONTINUED | OUTPATIENT
Start: 2021-09-05 | End: 2021-09-08 | Stop reason: HOSPADM

## 2021-09-05 RX ORDER — SODIUM CHLORIDE 0.9 % (FLUSH) 0.9 %
5-40 SYRINGE (ML) INJECTION PRN
Status: DISCONTINUED | OUTPATIENT
Start: 2021-09-05 | End: 2021-09-08 | Stop reason: HOSPADM

## 2021-09-05 RX ORDER — PROCHLORPERAZINE EDISYLATE 5 MG/ML
10 INJECTION INTRAMUSCULAR; INTRAVENOUS EVERY 6 HOURS PRN
Status: DISCONTINUED | OUTPATIENT
Start: 2021-09-05 | End: 2021-09-08 | Stop reason: HOSPADM

## 2021-09-05 RX ORDER — LIDOCAINE HYDROCHLORIDE 10 MG/ML
5 INJECTION, SOLUTION EPIDURAL; INFILTRATION; INTRACAUDAL; PERINEURAL ONCE
Status: DISCONTINUED | OUTPATIENT
Start: 2021-09-05 | End: 2021-09-05

## 2021-09-05 RX ORDER — SODIUM CHLORIDE 0.9 % (FLUSH) 0.9 %
5-40 SYRINGE (ML) INJECTION EVERY 12 HOURS SCHEDULED
Status: DISCONTINUED | OUTPATIENT
Start: 2021-09-05 | End: 2021-09-05 | Stop reason: SDUPTHER

## 2021-09-05 RX ORDER — SODIUM CHLORIDE 0.9 % (FLUSH) 0.9 %
5-40 SYRINGE (ML) INJECTION PRN
Status: DISCONTINUED | OUTPATIENT
Start: 2021-09-05 | End: 2021-09-05 | Stop reason: SDUPTHER

## 2021-09-05 RX ORDER — ACETAMINOPHEN 325 MG/1
650 TABLET ORAL EVERY 6 HOURS PRN
Status: DISCONTINUED | OUTPATIENT
Start: 2021-09-05 | End: 2021-09-08 | Stop reason: HOSPADM

## 2021-09-05 RX ORDER — ONDANSETRON 2 MG/ML
4 INJECTION INTRAMUSCULAR; INTRAVENOUS EVERY 6 HOURS PRN
Status: DISCONTINUED | OUTPATIENT
Start: 2021-09-05 | End: 2021-09-08 | Stop reason: HOSPADM

## 2021-09-05 RX ORDER — SODIUM CHLORIDE 0.9 % (FLUSH) 0.9 %
5-40 SYRINGE (ML) INJECTION EVERY 12 HOURS SCHEDULED
Status: DISCONTINUED | OUTPATIENT
Start: 2021-09-05 | End: 2021-09-08 | Stop reason: HOSPADM

## 2021-09-05 RX ORDER — SODIUM CHLORIDE 9 MG/ML
25 INJECTION, SOLUTION INTRAVENOUS PRN
Status: DISCONTINUED | OUTPATIENT
Start: 2021-09-05 | End: 2021-09-08 | Stop reason: HOSPADM

## 2021-09-05 RX ORDER — SODIUM CHLORIDE 9 MG/ML
INJECTION, SOLUTION INTRAVENOUS CONTINUOUS
Status: DISCONTINUED | OUTPATIENT
Start: 2021-09-05 | End: 2021-09-06

## 2021-09-05 RX ADMIN — AMOXICILLIN AND CLAVULANATE POTASSIUM 1 TABLET: 500; 125 TABLET, FILM COATED ORAL at 02:13

## 2021-09-05 RX ADMIN — HYDROMORPHONE HYDROCHLORIDE 0.5 MG: 1 INJECTION, SOLUTION INTRAMUSCULAR; INTRAVENOUS; SUBCUTANEOUS at 11:11

## 2021-09-05 RX ADMIN — ACETAMINOPHEN 650 MG: 325 TABLET ORAL at 08:27

## 2021-09-05 RX ADMIN — FLUCONAZOLE IN SODIUM CHLORIDE 200 MG: 2 INJECTION, SOLUTION INTRAVENOUS at 02:03

## 2021-09-05 RX ADMIN — SODIUM CHLORIDE, PRESERVATIVE FREE 10 ML: 5 INJECTION INTRAVENOUS at 19:35

## 2021-09-05 RX ADMIN — HYDROMORPHONE HYDROCHLORIDE 0.25 MG: 1 INJECTION, SOLUTION INTRAMUSCULAR; INTRAVENOUS; SUBCUTANEOUS at 19:36

## 2021-09-05 RX ADMIN — HYDROMORPHONE HYDROCHLORIDE 0.5 MG: 1 INJECTION, SOLUTION INTRAMUSCULAR; INTRAVENOUS; SUBCUTANEOUS at 15:30

## 2021-09-05 RX ADMIN — PROPAFENONE HYDROCHLORIDE 150 MG: 150 TABLET, FILM COATED ORAL at 02:12

## 2021-09-05 RX ADMIN — PANTOPRAZOLE SODIUM 40 MG: 40 INJECTION, POWDER, FOR SOLUTION INTRAVENOUS at 02:07

## 2021-09-05 RX ADMIN — WARFARIN SODIUM 6 MG: 3 TABLET ORAL at 17:22

## 2021-09-05 RX ADMIN — METOPROLOL TARTRATE 12.5 MG: 25 TABLET, FILM COATED ORAL at 02:12

## 2021-09-05 RX ADMIN — ONDANSETRON 4 MG: 2 INJECTION INTRAMUSCULAR; INTRAVENOUS at 13:22

## 2021-09-05 RX ADMIN — METOPROLOL TARTRATE 12.5 MG: 25 TABLET, FILM COATED ORAL at 22:06

## 2021-09-05 RX ADMIN — ONDANSETRON 4 MG: 2 INJECTION INTRAMUSCULAR; INTRAVENOUS at 05:51

## 2021-09-05 RX ADMIN — HYDROMORPHONE HYDROCHLORIDE 0.25 MG: 1 INJECTION, SOLUTION INTRAMUSCULAR; INTRAVENOUS; SUBCUTANEOUS at 06:30

## 2021-09-05 RX ADMIN — CEFTRIAXONE SODIUM 1000 MG: 1 INJECTION, POWDER, FOR SOLUTION INTRAMUSCULAR; INTRAVENOUS at 13:05

## 2021-09-05 RX ADMIN — SODIUM CHLORIDE, PRESERVATIVE FREE 10 ML: 5 INJECTION INTRAVENOUS at 02:06

## 2021-09-05 RX ADMIN — Medication 2 G: at 22:08

## 2021-09-05 RX ADMIN — ONDANSETRON 4 MG: 2 INJECTION INTRAMUSCULAR; INTRAVENOUS at 23:56

## 2021-09-05 RX ADMIN — METOPROLOL TARTRATE 12.5 MG: 25 TABLET, FILM COATED ORAL at 10:38

## 2021-09-05 RX ADMIN — PROCHLORPERAZINE EDISYLATE 10 MG: 5 INJECTION INTRAMUSCULAR; INTRAVENOUS at 17:06

## 2021-09-05 RX ADMIN — SODIUM CHLORIDE: 9 INJECTION, SOLUTION INTRAVENOUS at 10:35

## 2021-09-05 RX ADMIN — SODIUM CHLORIDE: 9 INJECTION, SOLUTION INTRAVENOUS at 22:07

## 2021-09-05 RX ADMIN — PROPAFENONE HYDROCHLORIDE 150 MG: 150 TABLET, FILM COATED ORAL at 22:06

## 2021-09-05 RX ADMIN — PROPAFENONE HYDROCHLORIDE 150 MG: 150 TABLET, FILM COATED ORAL at 10:38

## 2021-09-05 RX ADMIN — SODIUM CHLORIDE, PRESERVATIVE FREE 10 ML: 5 INJECTION INTRAVENOUS at 10:38

## 2021-09-05 ASSESSMENT — PAIN DESCRIPTION - DESCRIPTORS
DESCRIPTORS: CRAMPING

## 2021-09-05 ASSESSMENT — PAIN DESCRIPTION - LOCATION
LOCATION: ABDOMEN

## 2021-09-05 ASSESSMENT — PAIN DESCRIPTION - PROGRESSION
CLINICAL_PROGRESSION: GRADUALLY WORSENING
CLINICAL_PROGRESSION: GRADUALLY WORSENING
CLINICAL_PROGRESSION: RAPIDLY WORSENING
CLINICAL_PROGRESSION: NOT CHANGED
CLINICAL_PROGRESSION: GRADUALLY IMPROVING
CLINICAL_PROGRESSION: GRADUALLY WORSENING

## 2021-09-05 ASSESSMENT — PAIN DESCRIPTION - ORIENTATION
ORIENTATION: RIGHT;LEFT;LOWER;MID
ORIENTATION: RIGHT;LEFT;LOWER;MID;UPPER
ORIENTATION: RIGHT;LEFT;LOWER;MID

## 2021-09-05 ASSESSMENT — ENCOUNTER SYMPTOMS
SHORTNESS OF BREATH: 0
VOMITING: 1
EYES NEGATIVE: 1
NAUSEA: 1
COUGH: 0
BACK PAIN: 0
DIARRHEA: 1
ABDOMINAL PAIN: 1

## 2021-09-05 ASSESSMENT — PAIN SCALES - GENERAL
PAINLEVEL_OUTOF10: 4
PAINLEVEL_OUTOF10: 0
PAINLEVEL_OUTOF10: 0
PAINLEVEL_OUTOF10: 2
PAINLEVEL_OUTOF10: 2
PAINLEVEL_OUTOF10: 10
PAINLEVEL_OUTOF10: 2
PAINLEVEL_OUTOF10: 1
PAINLEVEL_OUTOF10: 0
PAINLEVEL_OUTOF10: 0
PAINLEVEL_OUTOF10: 7
PAINLEVEL_OUTOF10: 7

## 2021-09-05 ASSESSMENT — PAIN DESCRIPTION - PAIN TYPE
TYPE: ACUTE PAIN

## 2021-09-05 ASSESSMENT — PAIN DESCRIPTION - FREQUENCY
FREQUENCY: CONTINUOUS
FREQUENCY: INTERMITTENT
FREQUENCY: CONTINUOUS

## 2021-09-05 ASSESSMENT — PAIN DESCRIPTION - ONSET
ONSET: ON-GOING

## 2021-09-05 ASSESSMENT — PAIN - FUNCTIONAL ASSESSMENT
PAIN_FUNCTIONAL_ASSESSMENT: ACTIVITIES ARE NOT PREVENTED

## 2021-09-05 NOTE — ED NOTES
Patient resting in bed. Respirations easy and unlabored. No distress noted. Call light within reach.        Sara Fuentes RN  09/04/21 4399

## 2021-09-05 NOTE — CONSULTS
[facility]          [facility]        [address]        [place] [address] [address]        [phone]           Inpatient Initial consult note         9/5/2021 9:26 AM    Patient Name:   Igor Mccain  YOB: 1934  Primary Care Physician:  Kandace Loyola MD     History Obtained From:  patient     Consultation requested by : Lorrie Don MD    requested for  : Evaluation of hyponatremia     History of presentingillness   Igor Mccain is a 80 y.o.   female with Past Medical History as stated below presented with a chief complaint of Abdominal Pain and Emesis   on 9/4/2021 . Patient presented with chief complaints of abdominal pain, moderate severity for the last 3 days, mainly in the epigastric region, associated with nausea vomiting, also was having some watery diarrhea 2-3 small bowel movements daily no other modifying factors. Patient was recently discharged from [facility] with similar complaints and had an EGD done which found esophageal candidiasis and esophageal stricture and has undergone esophageal dilation and she was also found to have Streptococcus UTI and was home on Augmentin. Patient presented with a sodium of 120 yesterday, which gradually has rising to 125 and now down to 123.   So nephrology has been consulted for further evaluation and management     Past History      Past Medical History:   Diagnosis Date    Actinic keratosis     history of    Arthritis     Atrial fibrillation ([address]     CAD (coronary artery disease)     history of afib    Cancer ([address]     skin    Cervical prolapse     history of    Diverticulosis     Endocervical polyp     history of    Heel spur     Hip pain     Hypertension     Nasal septal deviation     Osteoporosis     Stress incontinence     history of    Syncope     Vaginal prolapse     history of     Past Surgical History:   Procedure Laterality Date    APPENDECTOMY  1960's    BLADDER REPAIR  2011 [address]   [id number]    [facility]    COLONOSCOPY  01/26/2000    ENDOSCOPIC ULTRASOUND (LOWER) Left 07/20/2017    [facility]    FACIAL SURGERY N/A 1/27/2021    DIVISION AND INSET/CHEEK performed by Tonia Thomas MD at [facility] REPLACEMENT  2017    left total hip relpament    MOHS SURGERY N/A 1/8/2021    MOHS REPAIR BCC NASAL TIP WITH FLAP AND GRAFT performed by Tonia Thomas MD at [facility] [address] Left 8/6/2021    MOHS DEFECT REPAIR SCC DORSAL NOSE AND LEFT LOWER FOREHEAD WITH SKIN GRAFT FROM RIGHT CHEEK/EAR (PLACED ON NOSE) performed by Tonia Thomas MD at [address]  1960's    AR EGD TRANSORAL BIOPSY SINGLE/MULTIPLE Left 07/16/2017    EGD BIOPSY performed by Hector Retana MD at [facility]    AR OFFICE/OUTPT VISIT,PROCEDURE ONLY Right 06/01/2018    MOHS REPAIR BASEL CELL CARCINOMA RIGHT DORSAL NOSE performed by Tonia Thomas MD at [address] Left 09/19/2017    Left Total Hip ARTHROPLASTY performed by Bertha Aviles MD at [address] [facility]  07/16/2017    UPPER GASTROINTESTINAL ENDOSCOPY Left 07/16/2017    EGD DILATION BALLOON performed by Hector Retana MD at [facility] Left 8/28/2021    EGD BIOPSY performed by Monie Park MD at [address] [place] [address] 8/30/2021    EGD DILATION SAVORY performed by Monie Park MD at [place] [address] LEFT Left 4/21/2021    US BREAST NEEDLE BIOPSY LEFT 4/21/2021 [facility] [facility] ULTRASOUND    US BREAST NEEDLE BIOPSY RIGHT Right 4/21/2021    US BREAST NEEDLE BIOPSY RIGHT 4/21/2021 [facility] ULTRASOUND    US GUIDED NEEDLE LOC BREAST ADDL LEFT Left 4/21/2021    US GUIDED NEEDLE LOC BREAST ADDL LEFT 4/21/2021 [facility] ULTRASOUND     Social History     Socioeconomic History    Marital status:      Spouse name: Not on file    Number of children: Not on file    Years of education: Not on file    Highest education level: Not on file   Occupational History    Not on file   Tobacco Use    Smoking status: Never Smoker    Smokeless tobacco: Never Used   Vaping Use    Vaping Use: Never used   Substance and Sexual Activity    Alcohol use: No    Drug use: No    Sexual activity: Not Currently     Partners: Male     Comment:    Other Topics Concern    Not on file   Social History Narrative    Not on file     Social Determinants of Health     Financial Resource Strain: Unknown    Difficulty of Paying Living Expenses: Patient refused   Food Insecurity: Unknown    Worried About [address] in the Last Year: Patient refused    [address] [employer] in the Last Year: Patient refused   Transportation Needs:     Lack of Transportation (Medical):  Lack of Transportation (Non-Medical):    Physical Activity:     Days of Exercise per Week:     Minutes of Exercise per Session:    Stress:     Feeling of Stress :    Social Connections:     Frequency of Communication with Friends and Family:     Frequency of Social Gatherings with Friends and Family:     Attends Holiness Services:     Active Member of Clubs or Organizations:     Attends Club or Organization Meetings:     Marital Status:    Intimate Partner Violence:     Fear of Current or Ex-Partner:     Emotionally Abused:     Physically Abused:     Sexually Abused:      Family History   Problem Relation Age of Onset    Colon Cancer Father     Osteoporosis Mother     Heart Disease Mother      Medications & Allergies      Prior to Admission medications    Medication Sig Start Date End Date Taking? Authorizing Provider   metoprolol tartrate (LOPRESSOR) 25 MG tablet TAKE HALF OF A TABLET TWICE A DAY. 8/26/21  Yes Lulla Lesch, MD   warfarin (COUMADIN) 2.5 MG tablet 1. Take 2 tablets by mouth daily. Or as directed by INR results. 8/26/21  Yes Lulla Lesch, MD   pantoprazole (PROTONIX) 40 MG tablet Take 1 tablet by mouth once daily 8/26/21  Yes Lulla Lesch, MD   ondansetron (ZOFRAN ODT) 4 MG disintegrating tablet Take 1 tablet by mouth every 8 hours as needed for Nausea 8/23/21  Yes Lloyd Pyle MD   propafenone (RYTHMOL) 150 MG tablet Take 1 tablet by mouth 2 times daily Take 300 mg of propafenone with 25 mg of metoprolol once and start 150 mg propafenone q 12 hours from next day 4/6/21  Yes Aristeo Fisher, DO   Multiple Vitamins-Minerals (THERAPEUTIC MULTIVITAMIN-MINERALS) tablet Take 1 tablet by mouth daily   Yes Historical Provider, MD   Multiple Vitamins-Minerals (ICAPS AREDS 2) CAPS Take 2 tablets by mouth daily    Yes Historical Provider, MD   Handicap Joellen [address] by Does not apply route Expires 6/2022 6/15/17   Lulla Lesch, MD   MEGARED OMEGA-3 KRILL  MG CAPS Take by mouth daily     Historical Provider, MD     Allergies: Statins, Tape [place] R[place] tape], Tizanidine hcl, and Tramadol  IP meds : Scheduled Meds:   amoxicillin-clavulanate  1 tablet Oral BID    metoprolol tartrate  12.5 mg Oral BID    propafenone  150 mg Oral BID    fluconazole  200 mg IntraVENous Q24H    pantoprazole  40 mg IntraVENous Daily    warfarin (COUMADIN) daily dosing (placeholder)   Other RX Placeholder    sodium chloride flush  5-40 mL IntraVENous 2 times per day    fentanNYL  50 mcg IntraVENous Once     Continuous Infusions:   sodium chloride       Review of Systems Physical Exam   Review of Systems   Constitutional: Positive for appetite change and fatigue. Negative for chills and fever. HENT: Negative. Eyes: Negative. Respiratory: Negative for cough and shortness of breath. Cardiovascular: Negative for chest pain and leg swelling.    Gastrointestinal: Positive for abdominal pain, Resp: 16   Temp: 102.3 °F (39.1 °C)   SpO2: 95%     Labs, Radiology and Tests       Recent Labs     09/04/21  1503 09/05/21  0530   WBC 4.6* 4.6*   RBC 5.25 5.65*   HGB 14.1 15.2   HCT 41.9 45.7   MCV 79.8* 80.9*   MCH 26.9 26.9   MCHC 33.7 33.3   * 92*     Recent Labs     09/04/21  1503 09/05/21  0326 09/05/21  0530   *  120* 125* 123*   K 4.3  --  4.7   CL 85*  --  89*   CO2 24  --  24   BUN 13  --  10   CREATININE 0.8  --  0.9   CALCIUM 9.0  --  9.0   PROT 6.2  --   --    LABALBU 4.1  --   --    BILITOT 0.6  --   --    ALKPHOS 98  --   --    AST 18  --   --    ALT 12  --   --        Radiology : CT scan of the abdomen and pelvis shows mild colitis small amount of free fluid in the pelvis. Small renal cyst    Other : Old lab data have been reviewed and patient has been consistently hyponatremic even during the last admission. However prior to that her sodium has been normal    Echocardiogram-7/2017 shows ejection fraction 60%    Assessment    1 Renal -renal function appears to be stable at baseline  2 Hyponatremia exact etiology not clear however it appears most likely due to poor oral intake and prerenal etiology  ? Slowly improving we will continue normal saline for now. ? Unfortunately urine lites not helpful from yesterday due to administration of IV contrast  ? However based on the sodium level we can try some urine studies again later today. 3 Essential hypertension running reasonable  4 Recent urinary tract infection on antibiotics  5 Hx of atrial fibrillation  6 Meds reviewed and discussed with patient      **This report has been created using voice recognition software. It maycontain minor  errors which are inherent in voice recognition technology. **    Argelia Rubio MD,M.D  [facility].

## 2021-09-05 NOTE — ED NOTES
Patient transported to [id number]  by cart in stable condition. Patient monitored on cardiac telemetry. IV line is patent.         Monica Zavala, EMT-P  09/05/21 [id number]

## 2021-09-05 NOTE — PROGRESS NOTES
Clinical Pharmacy Note    Bam Baez is a 80 y.o. female for whom pharmacy has been asked to manage warfarin therapy. Reason for Admission: Epigastric pain     Consulting Physician: Vivian  Warfarin dose prior to admission: 7.5 mg 9/2 and 9/3, then resume 5 mg daily  Warfarin indication: Afib  Target INR range: 2-3   Outpatient warfarin provider: [facility] Medication Management    Past Medical History:   Diagnosis Date    Actinic keratosis     history of    Arthritis     Atrial [address]     CAD (coronary artery disease)     history of afib    Cancer ([facility] [address]     skin    Cervical prolapse     history of    Diverticulosis     Endocervical polyp     history of    Heel spur     Hip pain     Hypertension     Nasal septal deviation     Osteoporosis     Stress incontinence     history of    Syncope     Vaginal prolapse     history of              Recent Labs     09/05/21  0530   INR 1.66*     Recent Labs     09/04/21  1503 09/05/21  0530   HGB 14.1 15.2   HCT 41.9 45.7   * 92*     Current warfarin drug-drug interactions: Fluconazole 400 mg IV Q24H, Propafenone 150 mg BID ()    Date INR Warfarin Dose   9/5/2021 1.66 6 mg                                   Daily PT/INR until stable within therapeutic range. Thank you for the consult.      Cindy Winslow PharmD 9/5/2021 3:23 PM

## 2021-09-05 NOTE — ED PROVIDER NOTES
[address] [facility] [address] [address] EMERGENCY DEPT    EMERGENCY MEDICINE     Pt Name: Renetta Vitale  MRN: [id number]  [place] 1934  Date of evaluation: 9/4/2021  Provider: Atif Chong DO, [address] [facility] [address]       Chief Complaint   Patient presents with    Abdominal Pain    Emesis       HISTORY OF PRESENT ILLNESS    Renetta Vitale is a pleasant 80 y.o. female who presents to the emergency department from home for evaluation of epigastric abdominal pain and vomiting. The patient recently got discharged, diagnosed with esophageal candidiasis. Past 2 days has had vomiting and epigastric abdominal pain. No chest pain, no shortness of breath. No dark or bloody stools. No fever. She is currently on Augmentin      Triage notes and Nursing notes were reviewed by myself. Any discrepancies are addressed above.     PAST MEDICAL HISTORY     Past Medical History:   Diagnosis Date    Actinic keratosis     history of    Arthritis     Atrial fibrillation ([address]     CAD (coronary artery disease)     history of afib    Cancer ([address]     skin    Cervical prolapse     history of    Diverticulosis     Endocervical polyp     history of    Heel spur     Hip pain     Hypertension     Nasal septal deviation     Osteoporosis     Stress incontinence     history of    Syncope     Vaginal prolapse     history of       SURGICAL HISTORY       Past Surgical History:   Procedure Laterality Date    APPENDECTOMY  1960's   Mary Curl BLADDER REPAIR  2011    [address]   54/9933    [facility]    COLONOSCOPY  01/26/2000    ENDOSCOPIC ULTRASOUND (LOWER) Left 07/20/2017    ENDOSCOPIC ULTRASOUND performed by Julio Aguirre MD at /[facility] 110[address] cataract    FACIAL SURGERY N/A 1/27/2021    DIVISION AND INSET/CHEEK performed by Alcides Corral MD at [facility]  2017    left total hip relpament    MOHS SURGERY N/A 1/8/2021    [facility] NASAL TIP WITH FLAP AND GRAFT performed by Katy Patel MD at [address] [employer] [address] Left 8/6/2021    MOHS DEFECT REPAIR SCC DORSAL NOSE AND LEFT LOWER FOREHEAD WITH SKIN GRAFT FROM RIGHT CHEEK/EAR (PLACED ON NOSE) performed by Katy Patel MD at [address]  1960's    PA EGD TRANSORAL BIOPSY SINGLE/MULTIPLE Left 07/16/2017    EGD BIOPSY performed by Bernardo Muñiz MD at [facility]    PA OFFICE/OUTPT [address] Right 06/01/2018    MOHS REPAIR BASEL CELL CARCINOMA RIGHT DORSAL NOSE performed by Katy Patel MD at [address] [facility] Left 09/19/2017    Left Total Hip ARTHROPLASTY performed by Sonia Diamond MD at [address] [facility]  07/16/2017    UPPER GASTROINTESTINAL ENDOSCOPY Left 07/16/2017    EGD DILATION BALLOON performed by Bernardo Muñiz MD at [facility]   [address] Left 8/28/2021    EGD BIOPSY performed by Afia Stallworth MD at [address] [facility] N/A 8/30/2021    EGD DILATION SAVORY performed by Afia Stallworth MD at [place] [address] LEFT Left 4/21/2021    US BREAST NEEDLE BIOPSY LEFT 4/21/2021 COLLEEN ULTRASOUND    US BREAST NEEDLE BIOPSY RIGHT Right 4/21/2021    US BREAST NEEDLE BIOPSY RIGHT 4/21/2021 COLLEEN ULTRASOUND    US GUIDED NEEDLE LOC BREAST ADDL LEFT Left 4/21/2021    US GUIDED NEEDLE LOC BREAST ADDL LEFT 4/21/2021 COLLEEN ULTRASOUND       CURRENT MEDICATIONS       Previous Medications    AMOXICILLIN-CLAVULANATE (AUGMENTIN) 500-125 MG PER TABLET    Take 1 tablet by mouth 2 times daily for 5 days    HANDICAP PLACARD MISC    by Does not apply route Expires 6/2022    MEGARED OMEGA-3 KRILL  MG CAPS    Take by mouth daily     METOPROLOL TARTRATE (LOPRESSOR) 25 MG TABLET    TAKE HALF OF A TABLET TWICE A DAY.     MULTIPLE VITAMINS-MINERALS (ICAPS AREDS 2) CAPS Take 2 tablets by mouth daily     MULTIPLE VITAMINS-MINERALS (THERAPEUTIC MULTIVITAMIN-MINERALS) TABLET    Take 1 tablet by mouth daily    NYSTATIN (MYCOSTATIN) 431690 UNIT/ML SUSPENSION    Take 5 mLs by mouth 4 times daily for 19 doses    ONDANSETRON (ZOFRAN ODT) 4 MG DISINTEGRATING TABLET    Take 1 tablet by mouth every 8 hours as needed for Nausea    PANTOPRAZOLE (PROTONIX) 40 MG TABLET    Take 1 tablet by mouth once daily    PROPAFENONE (RYTHMOL) 150 MG TABLET    Take 1 tablet by mouth 2 times daily Take 300 mg of propafenone with 25 mg of metoprolol once and start 150 mg propafenone q 12 hours from next day    WARFARIN (COUMADIN) 2.5 MG TABLET    1. Take 2 tablets by mouth daily. Or as directed by INR results. ALLERGIES     Statins, Tape [adhesive tape], Tizanidine hcl, and Tramadol    FAMILY HISTORY       Family History   Problem Relation Age of Onset    Colon Cancer Father     Osteoporosis Mother     Heart Disease Mother         SOCIAL HISTORY       Social History     Socioeconomic History    Marital status:      Spouse name: None    Number of children: None    Years of education: None    Highest education level: None   Occupational History    None   Tobacco Use    Smoking status: Never Smoker    Smokeless tobacco: Never Used   Vaping Use    Vaping Use: Never used   Substance and Sexual Activity    Alcohol use: No    Drug use: No    Sexual activity: Not Currently     Partners: Male     Comment:    Other Topics Concern    None   Social History Narrative    None     Social Determinants of Health     Financial Resource Strain: Unknown    Difficulty of Paying Living Expenses: Patient refused   Food Insecurity: Unknown    Worried About Running Out of Food in the Last Year: Patient refused    Ran Out of Food in the Last Year: Patient refused   Transportation Needs:     Lack of Transportation (Medical):      Lack of Transportation (Non-Medical):    Physical Activity:     Days of Exercise per Week:     Minutes of Exercise per Session:    Stress:     Feeling of Stress :    Social Connections:     Frequency of Communication with Friends and Family:     Frequency of Social Gatherings with Friends and Family:     Attends [employer] Services:     Active Member of Clubs or Organizations:     Attends Club or Organization Meetings:     Marital Status:    Intimate Partner Violence:     Fear of Current or Ex-Partner:     Emotionally Abused:     Physically Abused:     Sexually Abused:        REVIEW OF SYSTEMS     Review of Systems   Constitutional: Negative for chills, diaphoresis and fever. HENT: Negative for trouble swallowing and voice change. Eyes: Negative for visual disturbance. Respiratory: Negative for cough, chest tightness and shortness of breath. Cardiovascular: Negative for chest pain and leg swelling. Gastrointestinal: Positive for abdominal pain, nausea and vomiting. Negative for blood in stool and diarrhea. Genitourinary: Negative for dysuria, frequency and hematuria. Musculoskeletal: Negative for back pain and neck pain. Skin: Negative for rash and wound. Neurological: Negative for speech difficulty, weakness, numbness and headaches. Psychiatric/Behavioral: Negative for confusion. Except as noted above the remainder of the review of systems was reviewed and is. PHYSICAL EXAM    (up to 7 for level 4, 8 or more for level 5)     ED Triage Vitals [09/04/21 1452]   BP Temp Temp Source Pulse Resp SpO2 Height Weight   123/82 97.7 °F (36.5 °C) Oral 74 16 96 % 5' 5\" (1.651 m) 147 lb (66.7 kg)       Physical Exam  Vitals and nursing note reviewed. Constitutional:       General: She is not in acute distress. Appearance: She is well-developed. She is not ill-appearing, toxic-appearing or diaphoretic. HENT:      Head: Normocephalic and atraumatic. Eyes:      General: No scleral icterus.      Conjunctiva/sclera: Conjunctivae normal.      Right eye: Right conjunctiva is not injected. Left eye: Left conjunctiva is not injected. Pupils: Pupils are equal, round, and reactive to light. Neck:      Thyroid: No thyromegaly. Trachea: No tracheal deviation. Cardiovascular:      Rate and Rhythm: Normal rate and regular rhythm. Heart sounds: Normal heart sounds. No murmur heard. No friction rub. No gallop. Pulmonary:      Effort: Pulmonary effort is normal. No respiratory distress. Breath sounds: Normal breath sounds. No stridor. No wheezing or rales. Abdominal:      General: Bowel sounds are normal. There is no distension. Palpations: Abdomen is soft. There is no mass. Tenderness: There is abdominal tenderness in the epigastric area. There is no guarding or rebound. Comments: Negative Jean's sign  Nontender McBurney's Point  Negative Rovsig's sign  No bruising or echymosis of abdomen   Musculoskeletal:         General: No tenderness. Cervical back: Normal range of motion and neck supple. Comments: Negative Dolly's Sign bilaterally   Lymphadenopathy:      Cervical: No cervical adenopathy. Skin:     General: Skin is warm and dry. Coloration: Skin is not pale. Findings: No erythema or rash. Neurological:      Mental Status: She is alert and oriented to person, place, and time. Cranial Nerves: No cranial nerve deficit. Motor: No abnormal muscle tone. Coordination: Coordination normal.      Comments: No nystagmus   Psychiatric:         Behavior: Behavior normal.         Thought Content:  Thought content normal.         DIAGNOSTIC RESULTS     EKG:(none if blank)  All E[facility]'s are [facility] who either signs or Co-signs this chart in the absence of a cardiologist.        RADIOLOGY: (none if blank)   Interpretation per the Radiologistbelow, if available at the time of this note:    CT ABDOMEN PELVIS W IV CONTRAST Additional Contrast? None   Final Result   Impression:   No bowel infarction or definite diverticulitis. Mild colonic wall thickening could be incidental. Mild colitis is    technically a possibility. Small amount of free fluid in the pelvis of uncertain etiology. Indeterminate pulmonary nodules recommend comparison to previous or    further evaluation. This document has been electronically signed by: Carly Matos MD on    09/04/2021 08:42 PM      All CTs at this facility use dose modulation techniques and iterative    reconstructions, and/or weight-based dosing   when appropriate to reduce radiation to a low as reasonably achievable.           LABS:  Labs Reviewed   CBC WITH AUTO DIFFERENTIAL - Abnormal; Notable for the following components:       Result Value    WBC 4.6 (*)     MCV 79.8 (*)     RDW-CV 15.8 (*)     Platelets 750 (*)     All other components within normal limits   PROTIME-INR - Abnormal; Notable for the following components:    INR 1.61 (*)     All other components within normal limits   BASIC METABOLIC PANEL - Abnormal; Notable for the following components:    Sodium 120 (*)     Chloride 85 (*)     All other components within normal limits   URINALYSIS WITH MICROSCOPIC - Abnormal; Notable for the following components:    Ketones, Urine TRACE (*)     Protein, UA TRACE (*)     Leukocyte Esterase, Urine LARGE (*)     All other components within normal limits   GLOMERULAR FILTRATION RATE, ESTIMATED - Abnormal; Notable for the following components:    Est, Glom Filt Rate 68 (*)     All other components within normal limits   OSMOLALITY - Abnormal; Notable for the following components:    Osmolality Calc 241.8 (*)     All other components within normal limits   SODIUM - Abnormal; Notable for the following components:    Sodium 122 (*)     All other components within normal limits   CULTURE, URINE   HEPATIC FUNCTION PANEL   LIPASE   TROPONIN   ANION GAP   SODIUM, URINE, RANDOM   CREATININE, RANDOM URINE   OSMOLALITY, URINE   URIC ACID   TSH WITH REFLEX   BRAIN NATRIURETIC PEPTIDE   CORTISOL TOTAL       All other labs were within normal range or not returned as of this dictation. Please note, any cultures that may have been sent were not resulted at the time of this patient visit. EMERGENCY DEPARTMENT COURSE andMedical Decision Making:     MDM/  ED Course as of Sep 04 2243   Sat Sep 04, 2021   1927 Reassessed, nausea vomiting and pain are significantly better. Awaiting for CT to take the patient. Was had multiple traumas and strokes and therefore CT has been tied up and unable to get the patient so far. I contact them to request that the patient be taken to scan ASAP    [AB]   [phone] Hospitalist called back, stating that patient was discharged from different service just a few days ago and requested the patient be admitted to that service. Therefore discussed the case with Dr. Elysia Weldon, who accepts the patient for admission. He requested the patient be placed on fluid restriction of 1.5 L per 24.,  Hep-locked and these orders were entered by myself.    [AB]      ED Course User Index  [AB] Carmine Dumas,            ED Medications administered this visit:    Medications   fentaNYL (SUBLIMAZE) injection 50 mcg (has no administration in time range)   droperidol (INAPSINE) injection 0.625 mg (0.625 mg IntraVENous Given 9/4/21 1848)   ondansetron (ZOFRAN) injection 4 mg (4 mg IntraVENous Given 9/4/21 1634)   0.9 % sodium chloride bolus (0 mLs IntraVENous Stopped 9/4/21 1831)   fentaNYL (SUBLIMAZE) injection 50 mcg (50 mcg IntraVENous Given 9/4/21 1821)   iopamidol (ISOVUE-370) 76 % injection 85 mL (85 mLs IntraVENous Given 9/4/21 1959)         Procedures: (None if blank)       CLINICAL       1. Abdominal pain, epigastric    2. Hyponatremia          DISPOSITION/PLAN   DISPOSITION Admitted 09/04/2021 09:40:29 PM      PATIENT REFERRED TO:  No follow-up provider specified.     DISCHARGE MEDICATIONS:  New Prescriptions    No medications on file              (Please note that portions of this note were completed with a voice recognition program.  Efforts were made to edit the dictations but occasionallywords are mis-transcribed.)      Fadumo Booker DO, FACEP (electronically signed)  Attending Physician, Emergency [address] [place] [address] DO Edwige  09/04/21 5442

## 2021-09-05 NOTE — ED NOTES
Patient resting in bed. Respirations easy and unlabored. No distress noted. Call light within reach.        Sara Fuentes RN  09/05/21 6932

## 2021-09-05 NOTE — PROGRESS NOTES
Pt admitted to  4K14 via cart/stretcher. Vital signs obtained. Assessment and data collection initiated. Two nurse skin assessment performed by [facility] RN and Elisha Haney RN. Oriented to room. Policies and procedures for 4K explained. All questions answered with no further questions at this time. Fall prevention and safety brochure discussed with patient. Bed alarm on. Call light in reach.

## 2021-09-05 NOTE — ED NOTES
ED to inpatient nurses report    Chief Complaint   Patient presents with    Abdominal Pain    Emesis      Present to ED from home  LOC: alert and orientated to name, place, date  Vital signs   Vitals:    09/04/21 2040 09/04/21 2152 09/04/21 2314 09/05/21 0016   BP: 131/85 128/71 123/71 129/77   Pulse: 89 91 90 77   Resp: 20 18 16 17   Temp: 98.3 °F (36.8 °C) 98.5 °F (36.9 °C) 98.2 °F (36.8 °C) 98.3 °F (36.8 °C)   TempSrc: Oral Oral Oral Oral   SpO2: 97% 99% 99% 99%   Weight:       Height:          Oxygen Baseline     Current needs required 0L   LDAs:   Peripheral IV 08/27/21 Right Forearm (Active)       Peripheral IV 09/04/21 Left Forearm (Active)   Site Assessment Clean;Dry; Intact 09/04/21 1633   Line Status Normal saline locked; Infusing 09/04/21 1633   Dressing Status Clean;Dry; Intact 09/04/21 1633   Dressing Intervention New 09/04/21 1502     Mobility: Requires assistance * 1  Pending ED orders: none  Present condition: stable    Electronically signed by Leonel Pederson RN on 9/5/2021 at 12:18 AM     Leonel Pederson RN  09/05/21 0660

## 2021-09-05 NOTE — FLOWSHEET NOTE
09/05/21 0608   Provider Notification   Reason for Communication Review case   Provider Name Vivian   Provider Notification Physician   Method of Communication Secure Message   Response Waiting for response   Notification Time [phone]   Pt. Having increased pain with nausea. Tylenol is on for pain and patient does not wish to swallow it at this time.

## 2021-09-05 NOTE — CONSULTS
[address] [place] [address]                                  CONSULTATION    PATIENT NAME: Ethan SCHRADER                     :        1934  MED REC NO:   [id number]                           ROOM:       0014  ACCOUNT NO:   [de-identified]                           ADMIT DATE: 2021  PROVIDER:     Phil Cole. Elmer Russell M.D.    Zarina Neal:  2021    REASON FOR CONSULTATION:  Fever. HISTORY OF PRESENT ILLNESS:  She is an 19-year-old female patient  admitted to the hospital with epigastric pain. The patient was found to  have fever for which reason I was asked to see this patient. She is an  19-year-old female patient who was recently discharged after she was  treated for esophageal candidiasis and esophageal stricture that  required dilatation. She was discharged with Augmentin for  streptococcal UTI. She had esophageal candidiasis confirmed with  culture. She has also at this time Candida albicans on her urine. I  was asked to see this patient because she has been having fever. The  patient was complaining of abdominal pain. She had nausea during this  hospitalization. She was also found to have hyponatremia. The  endoscopic dilatation was uneventful, however, she has been complaining  of the epigastric pain with nausea. She has been having fevers. She  denies any frequency of urination. She had loose stool related to the  antibiotic and she has been having nausea. Her CAT scan of the abdomen  and pelvis was noted. PAST MEDICAL HISTORY:  Significant for actinic keratosis,  osteoarthritis, atrial fibrillation, coronary artery disease, history of  skin cancer, uterine prolapse, diverticulosis. She has also  endocervical polyp contributing to the cervical prolapse, hypertension,  osteoporosis, urinary incontinence, history of syncope.     PAST SURGICAL HISTORY:  Includes appendectomy, bladder repair,  cholecystectomy, colonoscopy, facial surgery, hysterectomy, joint  replacement, partial hysterectomy, skin biopsy, esophageal endoscopy,  dilatation, and breast biopsy. CURRENT MEDICATIONS:  Include Augmentin, Tylenol, fentanyl, Diflucan,  Norco, Zofran, Compazine, propafenone, and Coumadin. REVIEW OF SYSTEMS:  Noncontributory. PHYSICAL EXAMINATION:  VITAL SIGNS:  Temperature maximum was 102.3, respirations 16, pulse 114,  and blood pressure 112/59. HEENT:  She has slightly pale conjunctivae. Anicteric sclerae. CHEST:  Bilateral air entry. Few rhonchi. CARDIOVASCULAR SYSTEM:  Regular. ABDOMEN:  Soft. No guarding or rebound tenderness. EXTREMITIES:  No cyanosis or clubbing. CNS:  She is awake, oriented but appears to be sick. DIAGNOSTICS:  WBC 4.6, hemoglobin 15.2, hematocrit 45.7, and platelets  of 92. Sodium 123, potassium of 4.7, chloride 89, bicarb 24, BUN 10,  and creatinine 0.9. Urine was growing _____. COVID test was negative. MRSA screen was negative. Her urine culture is pending. Her last urine  culture from 08/31/2021 five days ago was growing Candida albicans. IMPRESSION:  She is an 68-year-old female patient admitted with  epigastric pain, nausea, and has fever. Recent history of UTI. She has  esophageal candidiasis and candiduria due to her fever. RECOMMENDATIONS:  Is to stop Augmentin, which may be contributing to the  nausea and diarrhea. I will put her on IV ceftriaxone. We will  increase the dose of Diflucan to 400 mg a day. We will continue to  follow the patient. We will ask Pharmacy to closely monitor the INR due  to drug-drug interaction between Coumadin and Diflucan. Thank you for the consultation.         Jaycee Lynch M.D.    D: 09/05/2021 11:18:53       T: 09/05/2021 12:49:02     [email or web address]  Job#: [id number]     Doc#: [id number]    CC:

## 2021-09-05 NOTE — H&P
Hospitalist - History & Physical      Patient: Chadwick Parr    Unit/Bed:18/018A  YOB: 1934  MRN: [id number]   Acct: [de-identified]   PCP: Artur Christiansen MD    Date of Service: Pt seen/examined on 09/04/21  and Admitted to Inpatient with expected LOS greater than two midnights due to medical therapy. Chief Complaint:  Epigastric pain    Assessment and Plan:-  59-year-old female patient recent hospitalization discharged on August 31 had esophageal candidiasis, esophageal stricture status post dilatation UTI urine culture grew Streptococcus discharged on nystatin swish and swallow. Presented with epigastric pain nausea and vomiting    -Epigastric pain, nausea and vomiting probably due to esophageal candidiasis. I suspect patient had failed nystatin swish and swallow  -UTI urine culture grew Streptococcus on Augmentin  -Status post esophageal dilatation for esophageal stricture 8/2021  -Hyponatremia, serum sodium 120 mEq/L likely due to SIADH    Plan  Start nystatin swish and swallow  IV fluconazole  Consult nephrology  Clear liquid diet  IV pantoprazole  Serum sodium every 4 hours        History Of Present Illness:    59-year-old female patient was hospitalized at [facility] from August 27 through August 31 for epigastric pain, dysphagia and vomiting. Patient had EGD and was found to have esophageal candidiasis and esophageal stricture subsequently undergone endoscopic dilatation also she was found to have Streptococcus UTI discharged on Augmentin. Patient was treated with nystatin swish and swallow. On day of discharge patient was tolerating diet and epigastric pain has subsided. Patient had epigastric pain nausea and vomiting since yesterday. She vomited twice. Also she had watery diarrhea 2-3 small bowel movements daily for 2 days. According to the daughter, nausea triggered by nystatin. In the ER CT abdomen pelvis unremarkable.       Past Medical History:        Diagnosis Date    Actinic MD at [facility]    UPPER GASTROINTESTINAL ENDOSCOPY Left 8/28/2021    EGD BIOPSY performed by Marixa Baeza MD at [address] [place] [address] N/A 8/30/2021    EGD DILATION SAVORY performed by Marixa Baeza MD at [place] [address] LEFT Left 4/21/2021    US BREAST NEEDLE BIOPSY LEFT 4/21/2021 [facility]TR[facility]    US BREAST NEEDLE BIOPSY RIGHT Right 4/21/2021    US BREAST NEEDLE BIOPSY RIGHT 4/21/2021 [facility] ULTRASOUND    US GUIDED NEEDLE LOC BREAST ADDL LEFT Left 4/21/2021    US GUIDED NEEDLE LOC BREAST ADDL LEFT 4/21/2021 [facility] ULTRASOUND       Home Medications:   No current facility-administered medications on file prior to encounter. Current Outpatient Medications on File Prior to Encounter   Medication Sig Dispense Refill    amoxicillin-clavulanate (AUGMENTIN) 500-125 MG per tablet Take 1 tablet by mouth 2 times daily for 5 days 10 tablet 0    nystatin (MYCOSTATIN) 828602 UNIT/ML suspension Take 5 mLs by mouth 4 times daily for 19 doses 95 mL 0    metoprolol tartrate (LOPRESSOR) 25 MG tablet TAKE HALF OF A TABLET TWICE A DAY. 90 tablet 1    warfarin (COUMADIN) 2.5 MG tablet 1. Take 2 tablets by mouth daily. Or as directed by INR results.  180 tablet 3    pantoprazole (PROTONIX) 40 MG tablet Take 1 tablet by mouth once daily 90 tablet 1    ondansetron (ZOFRAN ODT) 4 MG disintegrating tablet Take 1 tablet by mouth every 8 hours as needed for Nausea 20 tablet 0    propafenone (RYTHMOL) 150 MG tablet Take 1 tablet by mouth 2 times daily Take 300 mg of propafenone with 25 mg of metoprolol once and start 150 mg propafenone q 12 hours from next day 90 tablet 3    Handicap Placard MISC by Does not apply route Expires 6/2022 1 each 0    MEGARED OMEGA-3 KRILL  MG CAPS Take by mouth daily       Multiple Vitamins-Minerals (THERAPEUTIC MULTIVITAMIN-MINERALS) tablet Take 1 tablet by mouth daily      Multiple Vitamins-Minerals (ICAPS AREDS 2) CAPS Take 2 tablets by mouth daily          Allergies:    Statins, Tape [adhesive tape], Tizanidine hcl, and Tramadol    Social History:    reports that she has never smoked. She has never used smokeless tobacco. She reports that she does not drink alcohol and does not use drugs. Family History:       Problem Relation Age of Onset    Colon Cancer Father     Osteoporosis Mother     Heart Disease Mother        Diet:  No diet orders on file    Review of systems:   Pertinent positives as noted in the HPI. All other systems reviewed and negative. PHYSICAL EXAM:  /85   Pulse 89   Temp 98.3 °F (36.8 °C) (Oral)   Resp 20   Ht 5' 5\" (1.651 m)   Wt 147 lb (66.7 kg)   SpO2 97%   BMI 24.46 kg/m²   General appearance: No apparent distress, appears stated age and cooperative. HEENT: Normal cephalic, atraumatic without obvious deformity. Pupils equal, round, and reactive to light. Extra ocular muscles intact. Conjunctivae/corneas clear. Neck: Supple, with full range of motion. No jugular venous distention. Trachea midline. Respiratory:  Normal respiratory effort. Clear to auscultation, bilaterally without Rales/Wheezes/Rhonchi. Cardiovascular: Regular rate and rhythm with normal S1/S2 without murmurs, rubs or gallops. Abdomen: Soft, non-tender, non-distended with normal bowel sounds. Musculoskeletal:  No clubbing, cyanosis or edema bilaterally. Skin: Skin color, texture, turgor normal.  No rashes or lesions. Neurologic:  Neurovascularly intact without any focal sensory/motor deficits.  Cranial nerves: II-XII intact, grossly non-focal.  Psychiatric: Alert and oriented, thought content appropriate, normal insight  Capillary Refill: Brisk,< 3 seconds   Peripheral Pulses: +2 palpable, equal bilaterally     Labs:   Recent Labs     09/04/21  1503   WBC 4.6*   HGB 14.1   HCT 41.9   *     Recent Labs     09/04/21  1503   *   K 4.3   CL 85*   CO2 24   BUN 13   CREATININE 0.8   CALCIUM 9.0     Recent Labs 09/04/21  1503   AST 18   ALT 12   BILIDIR <0.2   BILITOT 0.6   ALKPHOS 98     Recent Labs     09/02/21  1303 09/04/21  1503   INR 1.1 1.61*     No results for input(s): CKTOTAL, TROPONINI in the last 72 hours. Urinalysis:    Lab Results   Component Value Date    NITRU NEGATIVE 09/04/2021    WBCUA 10-15 09/04/2021    BACTERIA NONE SEEN 09/04/2021    RBCUA 0-2 09/04/2021    BLOODU NEGATIVE 09/04/2021    SPECGRAV 1.014 09/04/2021    GLUCOSEU NEGATIVE 08/27/2021       Radiology:   CT ABDOMEN PELVIS W IV CONTRAST Additional Contrast? None   Final Result   Impression:   No bowel infarction or definite diverticulitis. Mild colonic wall thickening could be incidental. Mild colitis is    technically a possibility. Small amount of free fluid in the pelvis of uncertain etiology. Indeterminate pulmonary nodules recommend comparison to previous or    further evaluation. This document has been electronically signed by: Katie Marcus MD on    09/04/2021 08:42 PM      All CTs at this facility use dose modulation techniques and iterative    reconstructions, and/or weight-based dosing   when appropriate to reduce radiation to a low as reasonably achievable. CT ABDOMEN PELVIS W IV CONTRAST Additional Contrast? None    Result Date: 9/4/2021  CT of the abdomen and pelvis was performed utilizing intravenous contrast. Comparison MR 7/19/2017. Findings: The liver is unremarkable. Cholecystectomy. There is no hydronephrosis. Small right renal cyst. The spleen is enlarged measuring 15.3 cm in length. The pancreas is unremarkable. No abdominal aortic aneurysm. Small hiatal hernia. There is mild ectasia of the celiac axis. A left hip prosthesis causes artifact through the pelvis. No definite diverticulitis is identified. The appendix is not seen. There is no bowel obstruction. The bladder is mildly distended. A pessary is in place. There is mild free fluid in the pelvis.  There are several indeterminate nodules in the lower lungs. The largest is on the right measuring 1.3 cm. Impression: No bowel infarction or definite diverticulitis. Mild colonic wall thickening could be incidental. Mild colitis is technically a possibility. Small amount of free fluid in the pelvis of uncertain etiology. Indeterminate pulmonary nodules recommend comparison to previous or further evaluation. This document has been electronically signed by: Sean Christopher MD on 09/04/2021 08:42 PM All CTs at this facility use dose modulation techniques and iterative reconstructions, and/or weight-based dosing when appropriate to reduce radiation to a low as reasonably achievable.             Electronically signed by Tania Singh MD on 9/4/2021 at 9:27 PM

## 2021-09-05 NOTE — ED NOTES
Patient resting in bed. Respirations easy and unlabored. No distress noted. Call light within reach.   \     Carmen Mcdonough RN  09/04/21 2041

## 2021-09-05 NOTE — PROGRESS NOTES
Progress Note    Patient:  Isaias Lopez      Unit/Bed:4K-14/014-A    YOB: 1934    MRN: [id number]     Acct: [de-identified]     Admit date: 9/4/2021    Patient Seen, Chart, Consults notes, Labs, Radiology studies reviewed. HPI:  This is on 63-year-old female who presented yesterday with a 2-day history of epigastric pain associated with nausea and vomiting. This patient was recently diagnosed with esophageal candidiasis and was discharged on nystatin swish and swallow. She was also on treatment for UTI with Augmentin. She did well on this regimen until 2 days when she developed the above symptoms. In the ED she was found to be hyponatremic with serum sodium of 120 as a result of which she was placed on fluid restriction. This morning she is having a fever with a temperature of 102. She is also having a mild headache. Nausea has improved. There is no chest pain or shortness of breath. She continues to have abdominal pain except now it appears to involve both the upper and lower abdomen.     All other ROS negative except noted in HPI    Past Medical History:   Diagnosis Date    Actinic keratosis     history of    Arthritis     Atrial fibrillation ([facility] [address]     CAD (coronary artery disease)     history of afib    Cancer (HCC)     skin    Cervical prolapse     history of    Diverticulosis     Endocervical polyp     history of    Heel spur     Hip pain     Hypertension     Nasal septal deviation     Osteoporosis     Stress incontinence     history of    Syncope     Vaginal prolapse     history of     Family History   Problem Relation Age of Onset    Colon Cancer Father     Osteoporosis Mother     Heart Disease Mother      Social History     Socioeconomic History    Marital status:      Spouse name: Not on file    Number of children: Not on file    Years of education: Not on file    Highest education level: Not on file   Occupational History    Not on file   Tobacco Use    Smoking status: Never Smoker    Smokeless tobacco: Never Used   Vaping Use    Vaping Use: Never used   Substance and Sexual Activity    Alcohol use: No    Drug use: No    Sexual activity: Not Currently     Partners: Male     Comment:    Other Topics Concern    Not on file   Social History Narrative    Not on file     Social Determinants of Health     Financial Resource Strain: Unknown    Difficulty of Paying Living Expenses: Patient refused   Food Insecurity: Unknown    Worried About Running Out of Food in the Last Year: Patient refused    Ran Out of Food in the Last Year: Patient refused   Transportation Needs:     Lack of Transportation (Medical):  Lack of Transportation (Non-Medical):    Physical Activity:     Days of Exercise per Week:     Minutes of Exercise per Session:    Stress:     Feeling of Stress :    Social Connections:     Frequency of Communication with Friends and Family:     Frequency of Social Gatherings with Friends and Family:     Attends Confucianism Services:     Active Member of Clubs or Organizations:     Attends Club or Organization Meetings:     Marital Status:    Intimate Partner Violence:     Fear of Current or Ex-Partner:     Emotionally Abused:     Physically Abused:     Sexually Abused:          Diet:  ADULT DIET;  Clear Liquid    Medications:  Scheduled Meds:   amoxicillin-clavulanate  1 tablet Oral BID    metoprolol tartrate  12.5 mg Oral BID    propafenone  150 mg Oral BID    fluconazole  200 mg IntraVENous Q24H    pantoprazole  40 mg IntraVENous Daily    warfarin (COUMADIN) daily dosing (placeholder)   Other RX Placeholder    sodium chloride flush  5-40 mL IntraVENous 2 times per day    fentanNYL  50 mcg IntraVENous Once     Continuous Infusions:   sodium chloride       PRN Meds:ondansetron **OR** ondansetron, polyethylene glycol, acetaminophen **OR** acetaminophen, magnesium sulfate, potassium chloride **OR** potassium alternative oral replacement **OR** potassium chloride, sodium chloride flush, sodium chloride, HYDROmorphone **OR** HYDROmorphone, prochlorperazine    Objective:    CBC:   Recent Labs     09/04/21  1503 09/05/21  0530   WBC 4.6* 4.6*   HGB 14.1 15.2   * 92*     BMP:    Recent Labs     09/04/21  1503 09/05/21  0326 09/05/21  0530   *  120* 125* 123*   K 4.3  --  4.7   CL 85*  --  89*   CO2 24  --  24   BUN 13  --  10   CREATININE 0.8  --  0.9   GLUCOSE 89  --  84     Calcium:  Recent Labs     09/05/21  0530   CALCIUM 9.0     Ionized Calcium:No results for input(s): IONCA in the last 72 hours. Magnesium:No results for input(s): MG in the last 72 hours. Phosphorus:No results for input(s): PHOS in the last 72 hours. BNP:No results for input(s): BNP in the last 72 hours. Glucose:No results for input(s): POCGLU in the last 72 hours. HgbA1C: No results for input(s): LABA1C in the last 72 hours. INR:   Recent Labs     09/05/21  0530   INR 1.66*     Hepatic:   Recent Labs     09/04/21  1503   ALKPHOS 98   ALT 12   AST 18   PROT 6.2   BILITOT 0.6   BILIDIR <0.2   LABALBU 4.1     Amylase and Lipase:No results for input(s): LACTA, AMYLASE in the last 72 hours. Lactic Acid: No results for input(s): LACTA in the last 72 hours. Troponin:   Recent Labs     09/04/21  1503   TROPONINT < 0.010     BNP: No results for input(s): BNP in the last 72 hours. Lipids: No results for input(s): CHOL, TRIG, HDL, LDLCALC in the last 72 hours. Invalid input(s): LDL  ABGs: No results found for: PH, PCO2, PO2, HCO3, O2SAT    Radiology reports as per the Radiologist  Radiology: CT ABDOMEN PELVIS W IV CONTRAST Additional Contrast? None    Result Date: 9/4/2021  CT of the abdomen and pelvis was performed utilizing intravenous contrast. Comparison MR 7/19/2017. Findings: The liver is unremarkable. Cholecystectomy. There is no hydronephrosis. Small right renal cyst. The spleen is enlarged measuring 15.3 cm in length.  The pancreas is unremarkable. No abdominal aortic aneurysm. Small hiatal hernia. There is mild ectasia of the celiac axis. A left hip prosthesis causes artifact through the pelvis. No definite diverticulitis is identified. The appendix is not seen. There is no bowel obstruction. The bladder is mildly distended. A pessary is in place. There is mild free fluid in the pelvis. There are several indeterminate nodules in the lower lungs. The largest is on the right measuring 1.3 cm. Impression: No bowel infarction or definite diverticulitis. Mild colonic wall thickening could be incidental. Mild colitis is technically a possibility. Small amount of free fluid in the pelvis of uncertain etiology. Indeterminate pulmonary nodules recommend comparison to previous or further evaluation. This document has been electronically signed by: Martha Olmos MD on 09/04/2021 08:42 PM All CTs at this facility use dose modulation techniques and iterative reconstructions, and/or weight-based dosing when appropriate to reduce radiation to a low as reasonably achievable. Physical Exam:  Vitals: BP (!) 121/59   Pulse 114   Temp 102.3 °F (39.1 °C) (Oral)   Resp 16   Ht 5' 5\" (1.651 m)   Wt 147 lb (66.7 kg)   SpO2 95%   BMI 24.46 kg/m²   24 hour intake/output:    Intake/Output Summary (Last 24 hours) at 9/5/2021 0848  Last data filed at 9/5/2021 0302  Gross per 24 hour   Intake 187.73 ml   Output 0 ml   Net 187.73 ml     Last 3 weights:   Wt Readings from Last 3 Encounters:   09/05/21 147 lb (66.7 kg)   08/30/21 147 lb 6.4 oz (66.9 kg)   08/23/21 162 lb (73.5 kg)       General appearance - ill-appearing and otherwise in no acute distress  HEENT: Normocephalic, Atraumatic, Conjuctiva pink and PERRL  Chest - clear to auscultation, no wheezes, rales or rhonchi, symmetric air entry  Cardiovascular - normal rate, regular rhythm, normal S1, S2, no murmurs, rubs, clicks or gallops  Abdomen -soft with mild tenderness in the epigastric and suprapubic area.  Bowel sounds are present. I cannot palpate any organomegaly. Neurological - Alert and oriented, Normal speech, No focal findings or movement disorder noted and Motor and sensory grossly normal bilaterally  Integumentary - Skin color, texture, turgor normal. No Rashes or lesions  Musculoskeletal -Full ROM times 4 extremities, No clubbing or cyanosis and No peripheral edema      DVT prophylaxis: [] Lovenox                                 [] SCDs                                 [] SQ Heparin                                 [] Encourage ambulation           [x] Already on Anticoagulation                 Assessment:    Active Problems:    Hyponatremia    Lower abdominal pain    Atrial flutter (HCC)  Resolved Problems:    * No resolved hospital problems. *          Plan:  1. Hyponatremia: Severe but chronic. Patient is having increased urinary sodium lost so we are dealing with some kind of SIADH. She is on free water restriction. We will check thyroid function tests and adrenal status. Sodium is slightly better than it was yesterday so we will continue to monitor. 2. Abdominal pain: Initially attributed to the esophageal candidiasis. The patient is status post recent esophageal dilatation but she has been doing well. Because she is now having lower abdominal pain normal equally concerned about UTI. I note labs from yesterday but we will repeat urinalysis with culture and sensitivity. Check chest x-ray. Check amylase and lipase. Continue fluconazole for now. Continue PPI as well. 3. Rate is controlled. Patient is anticoagulated.     Electronically signed by Marianna Hernandez MD on 9/5/2021 at 8:48 AM    Rounding Hospitalist

## 2021-09-06 LAB
ANION GAP SERPL CALCULATED.3IONS-SCNC: 8 MEQ/L (ref 8–16)
BUN BLDV-MCNC: 11 MG/DL (ref 7–22)
CALCIUM SERPL-MCNC: 8 MG/DL (ref 8.5–10.5)
CHLORIDE BLD-SCNC: 101 MEQ/L (ref 98–111)
CO2: 23 MEQ/L (ref 23–33)
CREAT SERPL-MCNC: 1 MG/DL (ref 0.4–1.2)
GFR SERPL CREATININE-BSD FRML MDRD: 52 ML/MIN/1.73M2
GLUCOSE BLD-MCNC: 144 MG/DL (ref 70–108)
INR BLD: 2.18 (ref 0.85–1.13)
OSMOLALITY URINE: 155 MOSMOL/KG (ref 250–750)
POTASSIUM SERPL-SCNC: 4.1 MEQ/L (ref 3.5–5.2)
SODIUM BLD-SCNC: 132 MEQ/L (ref 135–145)
SODIUM BLD-SCNC: 132 MEQ/L (ref 135–145)
SODIUM BLD-SCNC: 133 MEQ/L (ref 135–145)
SODIUM URINE: 31 MEQ/L
URINE CULTURE, ROUTINE: NORMAL

## 2021-09-06 PROCEDURE — 6370000000 HC RX 637 (ALT 250 FOR IP): Performed by: INTERNAL MEDICINE

## 2021-09-06 PROCEDURE — 2580000003 HC RX 258: Performed by: INTERNAL MEDICINE

## 2021-09-06 PROCEDURE — C9113 INJ PANTOPRAZOLE SODIUM, VIA: HCPCS | Performed by: INTERNAL MEDICINE

## 2021-09-06 PROCEDURE — 80048 BASIC METABOLIC PNL TOTAL CA: CPT

## 2021-09-06 PROCEDURE — 84295 ASSAY OF SERUM SODIUM: CPT

## 2021-09-06 PROCEDURE — 85610 PROTHROMBIN TIME: CPT

## 2021-09-06 PROCEDURE — 6370000000 HC RX 637 (ALT 250 FOR IP): Performed by: PHARMACIST

## 2021-09-06 PROCEDURE — 6360000002 HC RX W HCPCS: Performed by: INTERNAL MEDICINE

## 2021-09-06 PROCEDURE — 99232 SBSQ HOSP IP/OBS MODERATE 35: CPT | Performed by: INTERNAL MEDICINE

## 2021-09-06 PROCEDURE — 84300 ASSAY OF URINE SODIUM: CPT

## 2021-09-06 PROCEDURE — 83935 ASSAY OF URINE OSMOLALITY: CPT

## 2021-09-06 PROCEDURE — 2060000000 HC ICU INTERMEDIATE R&B

## 2021-09-06 PROCEDURE — 36415 COLL VENOUS BLD VENIPUNCTURE: CPT

## 2021-09-06 RX ORDER — DEXTROSE MONOHYDRATE 50 MG/ML
INJECTION, SOLUTION INTRAVENOUS CONTINUOUS
Status: ACTIVE | OUTPATIENT
Start: 2021-09-06 | End: 2021-09-06

## 2021-09-06 RX ORDER — WARFARIN SODIUM 1 MG/1
1 TABLET ORAL ONCE
Status: COMPLETED | OUTPATIENT
Start: 2021-09-06 | End: 2021-09-06

## 2021-09-06 RX ORDER — PANTOPRAZOLE SODIUM 40 MG/10ML
40 INJECTION, POWDER, LYOPHILIZED, FOR SOLUTION INTRAVENOUS 2 TIMES DAILY
Status: DISCONTINUED | OUTPATIENT
Start: 2021-09-06 | End: 2021-09-07

## 2021-09-06 RX ADMIN — SODIUM CHLORIDE, PRESERVATIVE FREE 10 ML: 5 INJECTION INTRAVENOUS at 09:38

## 2021-09-06 RX ADMIN — PANTOPRAZOLE SODIUM 40 MG: 40 INJECTION, POWDER, FOR SOLUTION INTRAVENOUS at 09:38

## 2021-09-06 RX ADMIN — HYDROMORPHONE HYDROCHLORIDE 0.25 MG: 1 INJECTION, SOLUTION INTRAMUSCULAR; INTRAVENOUS; SUBCUTANEOUS at 02:51

## 2021-09-06 RX ADMIN — WARFARIN SODIUM 1 MG: 1 TABLET ORAL at 17:54

## 2021-09-06 RX ADMIN — SODIUM CHLORIDE: 9 INJECTION, SOLUTION INTRAVENOUS at 06:36

## 2021-09-06 RX ADMIN — DEXTROSE MONOHYDRATE: 50 INJECTION, SOLUTION INTRAVENOUS at 17:54

## 2021-09-06 RX ADMIN — PANTOPRAZOLE SODIUM 40 MG: 40 INJECTION, POWDER, FOR SOLUTION INTRAVENOUS at 21:16

## 2021-09-06 RX ADMIN — PROPAFENONE HYDROCHLORIDE 150 MG: 150 TABLET, FILM COATED ORAL at 21:16

## 2021-09-06 RX ADMIN — PROPAFENONE HYDROCHLORIDE 150 MG: 150 TABLET, FILM COATED ORAL at 09:38

## 2021-09-06 RX ADMIN — METOPROLOL TARTRATE 12.5 MG: 25 TABLET, FILM COATED ORAL at 21:16

## 2021-09-06 RX ADMIN — CEFTRIAXONE SODIUM 1000 MG: 1 INJECTION, POWDER, FOR SOLUTION INTRAMUSCULAR; INTRAVENOUS at 12:36

## 2021-09-06 RX ADMIN — FLUCONAZOLE 400 MG: 2 INJECTION, SOLUTION INTRAVENOUS at 02:38

## 2021-09-06 RX ADMIN — METOPROLOL TARTRATE 12.5 MG: 25 TABLET, FILM COATED ORAL at 09:38

## 2021-09-06 ASSESSMENT — PAIN SCALES - GENERAL
PAINLEVEL_OUTOF10: 5
PAINLEVEL_OUTOF10: 0
PAINLEVEL_OUTOF10: 0

## 2021-09-06 ASSESSMENT — PAIN - FUNCTIONAL ASSESSMENT: PAIN_FUNCTIONAL_ASSESSMENT: ACTIVITIES ARE NOT PREVENTED

## 2021-09-06 ASSESSMENT — PAIN DESCRIPTION - ONSET: ONSET: ON-GOING

## 2021-09-06 ASSESSMENT — PAIN DESCRIPTION - PAIN TYPE: TYPE: ACUTE PAIN

## 2021-09-06 ASSESSMENT — PAIN DESCRIPTION - DESCRIPTORS: DESCRIPTORS: CRAMPING

## 2021-09-06 ASSESSMENT — PAIN DESCRIPTION - PROGRESSION: CLINICAL_PROGRESSION: GRADUALLY WORSENING

## 2021-09-06 ASSESSMENT — PAIN DESCRIPTION - FREQUENCY: FREQUENCY: INTERMITTENT

## 2021-09-06 ASSESSMENT — PAIN DESCRIPTION - LOCATION: LOCATION: ABDOMEN

## 2021-09-06 ASSESSMENT — PAIN DESCRIPTION - ORIENTATION: ORIENTATION: RIGHT;LEFT;MID;LOWER;UPPER

## 2021-09-06 NOTE — CONSULTS
[address] [place] [id number]                                  CONSULTATION    PATIENT NAME: Rehana SCHRADER                     :        1934  MED REC NO:   [id number]                           ROOM:       [id number]  ACCOUNT NO:   [de-identified]                           ADMIT DATE: 2021  PROVIDER:     GLENN Akhtari:  2021    REASON FOR CONSULTATION:  For further evaluation of abdominal pain and  nausea. This is the patient of Dr. Laisha Franks. I am covering for him. HISTORY OF PRESENT ILLNESS:  The patient is an 49-year-old pleasant  female who has past medical history of arthritis; chronic atrial  fibrillation; coronary artery disease; cervical prolapse;  diverticulosis; hip pain; nasal septal deviation; osteoporosis; syncope,  vagal; and vaginal prolapse who had EGD by Dr. Laisha Franks on 2021, which  showed Candida esophagitis. Again, repeat EGD by Dr. Laisha Franks on 2021  reported as proximal esophageal stricture, dilated with 51-Urdu  American dilator who presented back with nausea and vomiting. Again,  the patient was diagnosed with esophageal candidiasis on , EGD by  Dr. Laisha Franks and discharged home on nystatin swish and swallow and also the  patient had EGD and dilation done on 2021 for possible esophageal  stricture. The patient went home. The patient did well home 2 days  after the discharge, but then she developed nausea, vomiting, unable to  keep any fluids or liquids down and epigastric abdominal pain, the pain  was nonradiating pain. Pain was 5-6 on the pain scale of 0-10. In the  emergency room, workup showed hyponatremia with sodium of 120,  temperature 102. CT scan of the abdomen and pelvis, which reported as  no bowel infarction or definite diverticulitis, mild colonic wall  thickening, could be incidental to small amount of free fluid in the  pelvis.   Subsequently, the patient was admitted. She is undergoing  further evaluation. As I am covering for Dr. David Neumann, he will resume the  patient's tomorrow morning. PAST MEDICAL HISTORY:  Actinic keratosis, arthritis, chronic atrial  fibrillation, coronary artery disease, skin cancer, cervical prolapse,  diverticulosis, endocervical polyp, heel spur, hip pain, hypertension,  nasal septum deviation, osteoporosis, stress incontinence, esophageal  candidiasis, most recently diagnosed esophageal stricture status post  dilation. PAST SURGICAL HISTORY:  Appendectomy, bladder repair, cholecystectomy,  colonoscopy, endoscopic ultrasound on 07/20/2017, bilateral cataract  surgery, facial surgery, hysterectomy, left total hip replacement, Mohs  surgery, partial hysterectomy, skin biopsy, total hip arthroplasty, EGD  with biopsy by Dr. David Neumann on 08/28/2021, and EGD with dilation by David Neumann on  08/30/2021. SOCIAL HISTORY:  The patient is . Denied any tobacco, alcohol,  or illicit drug. Daughter is at bedside. FAMILY HISTORY:  Father had colon cancer. Mother had osteoporosis. Mother had heart disease. HOME MEDICATIONS:  Metoprolol tartrate, warfarin, pantoprazole 40 mg,  ondansetron 4 mg, propafenone, multivitamin with mineral, and MegaRed  omega-3 krill oil. ALLERGIES:  STATINS, TAPE, TIZANIDINE, AND TRAMADOL. REVIEW OF SYSTEMS:  Fourteen-point review of systems was reviewed. Pertinent positives were mentioned in HPI and past medical history,  otherwise noncontributory. PHYSICAL EXAMINATION:  VITAL SIGNS:  Blood pressure 121/59, pulse 114, respiratory rate 16, and  temperature 102. 3. GENERAL:  An 42-year-old pleasant female lying in bed, well built,  appears to be in no acute distress. HEENT:  Normocephalic and atraumatic. Pupils are equal, round, and  react to light. No erythema of oropharynx. NECK:  Supple. No JVD. No thyromegaly. CHEST:  No axillary or supraclavicular adenopathy.   LUNGS:  Equal to expansion on inspection. [address] air entry bilaterally. Decreased sounds at bases posteriorly and inferiorly. HEART:  Irregularly irregular. No S3.  ABDOMEN:  Soft. Normoactive bowel sounds. Nontender. No palpable  masses. No appreciable hernias. RECTAL:  Examination deferred. EXTREMITIES:  No clubbing, cyanosis, or edema. SKIN:  No skin rash. NEUROLOGIC:  Generalized weakness. DIAGNOSTIC DATA:  WBC 4.6, hemoglobin 15.2, hematocrit 45.7, MCV 80.9,  and platelet count 76,212. Sodium 123, potassium 4.7, chloride 89, CO2  24, BUN 10, and creatinine 0.9. Liver enzymes within normal limits. IMPRESSION:  This 63-year-old pleasant female who has,  1. Recurrent nausea, vomiting, abdominal pain, hyponatremia, rule out  adrenal insufficiency. 2.  Candida esophagitis, agree with changing the nystatin to Diflucan  ____ getting the symptomatic relief. 3.  Multiple comorbid conditions. RECOMMENDATIONS:  At this time is aspiration precautions. Protonix 40  mg q.12h. Agree with Diflucan 400 mg by mouth daily, Ensure 1 can p.o.  b.i.d. Nephrology is followed to check for adrenal insufficiency. Further plans pending the clinical response. Dr. Adin Armas is going to  follow the patient tomorrow. Nawaf Duncan M.D.    D: 09/06/2021 10:50:18       T: 09/06/2021 10:54:09     [email or web address]  Job#: [id number]     Doc#: [id number]    CC:  GLENN Schreiber M.D.

## 2021-09-06 NOTE — PROGRESS NOTES
IM Progress Note  Dr. Benjamin [place] for Dr Hector Shen  9/6/2021 1:46 PM      Patient name Cynthia Rockwell  KJQ9/7/1991  PCP: Alondra Burdick MD  Admit Date: 9/4/2021  Acct No. [de-identified]    Subjective: Interval History: pt feels better today   Tolerated liquid diet  No abd pain  D/w daughter at bedside and all questions answered      Diet: ADULT DIET; Clear Liquid  Adult Oral Nutrition Supplement; Standard High Calorie/High Protein Oral Supplement    I/O last 3 completed shifts: In: 2048.6 [P.O.:700; I.V.:1348.6]  Out: 1902 [Urine:1901; Emesis/NG output:1]  No intake/output data recorded. Admission weight: 147 lb (66.7 kg) as of 9/4/2021  2:27 PM  Wt Readings from Last 3 Encounters:   09/06/21 146 lb 14.4 oz (66.6 kg)   08/30/21 147 lb 6.4 oz (66.9 kg)   08/23/21 162 lb (73.5 kg)     Body mass index is 24.45 kg/m².     ROS   CVS;  no cp or palpitation  Resp: no SOB or cough  Neuro:  No numbness or weakness or dizziness  Abd:  Nausea, vomiting and  abd pain better      Medications:   Scheduled Meds:   metoprolol tartrate  12.5 mg Oral BID    propafenone  150 mg Oral BID    pantoprazole  40 mg IntraVENous Daily    warfarin (COUMADIN) daily dosing (placeholder)   Other RX Placeholder    sodium chloride flush  5-40 mL IntraVENous 2 times per day    fluconazole  400 mg IntraVENous Q24H    cefTRIAXone (ROCEPHIN) IV  1,000 mg IntraVENous Q24H    fentanNYL  50 mcg IntraVENous Once     Continuous Infusions:   sodium chloride         Labs :     CBC:   Recent Labs     09/04/21  1503 09/05/21  0530   WBC 4.6* 4.6*   HGB 14.1 15.2   * 92*     BMP:    Recent Labs     09/04/21  1503 09/05/21  0326 09/05/21  0530 09/05/21  1200 09/05/21  1938 09/05/21  2228 09/06/21  1037   *  120*   < > 123*   < > 120* 124* 132*   K 4.3  --  4.7  --   --   --  4.1   CL 85*  --  89*  --   --   --  101   CO2 24  --  24  --   --   --  23   BUN 13  --  10  --   --   --  11   CREATININE 0.8  --  0.9  --   --   --  1.0 GLUCOSE 89  --  84  --   --   --  144*    < > = values in this interval not displayed. Hepatic:   Recent Labs     09/04/21  1503   AST 18   ALT 12   BILITOT 0.6   ALKPHOS 98     Troponin: No results for input(s): TROPONINI in the last 72 hours. BNP: No results for input(s): BNP in the last 72 hours. Lipids: No results for input(s): CHOL, HDL in the last 72 hours.     Invalid input(s): LDLCALCU  INR:   Recent Labs     09/04/21  1503 09/05/21  0530 09/06/21  0555   INR 1.61* 1.66* 2.18*       Radiology    Objective:   Vitals: /76   Pulse 74   Temp 97.8 °F (36.6 °C) (Oral)   Resp 18   Ht 5' 5\" (1.651 m)   Wt 146 lb 14.4 oz (66.6 kg)   SpO2 92%   BMI 24.45 kg/m²   HEENT: Head:pupils react  Neck: supple  Lungs: clear to auscultation  Heart: regular rate and rhythm   Abdomen: soft BS heard NG NT  Extremities: warm trace edema  Neurologic:  Alert, oriented X3    Impression:   :   N/V/abd pain improved  S/p recent EGD with esophageal candidiasis  Hyponatremia per renal  Leucopenia and  thrombocytopenia  A fib on anticoag  Recent UTI + enterococcus  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

## 2021-09-06 NOTE — PROGRESS NOTES
Renal Progress Note  [facility]    Patient :  Gay Other; 80 y.o. MRN# [id number]  Location:  [facility]14/014-A  Attending:  Haven Holter, MD  Admit Date:  9/4/2021   Hospital Day: 2      Subjective:     Nephrology is following the patient for hyponatremia. Patient seen and examined. Daughter at bedside. Pt feeling better today. No further nausea, vomiting. Outpatient Medications:     Medications Prior to Admission: metoprolol tartrate (LOPRESSOR) 25 MG tablet, TAKE HALF OF A TABLET TWICE A DAY. warfarin (COUMADIN) 2.5 MG tablet, 1. Take 2 tablets by mouth daily. Or as directed by INR results.   pantoprazole (PROTONIX) 40 MG tablet, Take 1 tablet by mouth once daily  ondansetron (ZOFRAN ODT) 4 MG disintegrating tablet, Take 1 tablet by mouth every 8 hours as needed for Nausea  propafenone (RYTHMOL) 150 MG tablet, Take 1 tablet by mouth 2 times daily Take 300 mg of propafenone with 25 mg of metoprolol once and start 150 mg propafenone q 12 hours from next day  Multiple Vitamins-Minerals (THERAPEUTIC MULTIVITAMIN-MINERALS) tablet, Take 1 tablet by mouth daily  Multiple Vitamins-Minerals (ICAPS AREDS 2) CAPS, Take 2 tablets by mouth daily   [DISCONTINUED] amoxicillin-clavulanate (AUGMENTIN) 500-125 MG per tablet, Take 1 tablet by mouth 2 times daily for 5 days  [DISCONTINUED] nystatin (MYCOSTATIN) 418983 UNIT/ML suspension, Take 5 mLs by mouth 4 times daily for 19 doses  Handicap Placard MISC, by Does not apply route Expires 6/2022  MEGARED OMEGA-3 KRILL  MG CAPS, Take by mouth daily     Current Medications:     Scheduled Meds:    metoprolol tartrate  12.5 mg Oral BID    propafenone  150 mg Oral BID    pantoprazole  40 mg IntraVENous Daily    warfarin (COUMADIN) daily dosing (placeholder)   Other RX Placeholder    sodium chloride flush  5-40 mL IntraVENous 2 times per day    fluconazole  400 mg IntraVENous Q24H    cefTRIAXone (ROCEPHIN) IV  1,000 mg IntraVENous Q24H    fentanNYL 50 mcg IntraVENous Once     Continuous Infusions:    sodium chloride      sodium chloride 100 mL/hr at 21 0636     PRN Meds:  ondansetron **OR** ondansetron, polyethylene glycol, acetaminophen **OR** acetaminophen, magnesium sulfate, potassium chloride **OR** potassium alternative oral replacement **OR** potassium chloride, sodium chloride flush, sodium chloride, HYDROmorphone **OR** HYDROmorphone, prochlorperazine    Input/Output:       I/O last 3 completed shifts: In: 8.6 [P.O.:700; I.V.:1348.6]  Out:  [Urine:1901; Emesis/NG output:1]. Patient Vitals for the past 96 hrs (Last 3 readings):   Weight   21 0245 146 lb 14.4 oz (66.6 kg)   21 0133 147 lb (66.7 kg)   21 1452 147 lb (66.7 kg)       Vital Signs:   Temperature:  Temp: 98.3 °F (36.8 °C)  TMax:   Temp (24hrs), Av.7 °F (36.5 °C), Min:97 °F (36.1 °C), Max:98.3 °F (36.8 °C)    Respirations:  Resp: 18  Pulse:   Pulse: 94  BP:    BP: 109/64  BP Range: Systolic (61LLD), CPE:829 , Min:93 , MYM:178       Diastolic (77DBA), OWM:33, Min:55, Max:64      Physical Examination:     General:  Awake, alert, no acute distress  HEENT: NC/AT/ MMM  Chest:               Faint rales left lung base  Cardiac:  S1 S2   Abdomen: Soft, non-tender,  Neuro:  No facial droop, No Asterixis  SKIN:  No rashes, good skin turgor.   Extremities:  No edema, no cyanosis    Labs:       Recent Labs     21  1503 21  0530   WBC 4.6* 4.6*   RBC 5.25 5.65*   HGB 14.1 15.2   HCT 41.9 45.7   MCV 79.8* 80.9*   MCH 26.9 26.9   MCHC 33.7 33.3   * 92*   MPV 10.5 9.8      BMP:   Recent Labs     21  1503 21  0326 21  0530 21  1200 21  1938 218 21  1037   *  120*   < > 123*   < > 120* 124* 132*   K 4.3  --  4.7  --   --   --  4.1   CL 85*  --  89*  --   --   --  101   CO2 24  --  24  --   --   --  23   BUN 13  --  10  --   --   --  11   CREATININE 0.8  --  0.9  --   --   --  1.0   GLUCOSE 89  -- 84  --   --   --  144*   CALCIUM 9.0  --  9.0  --   --   --  8.0*    < > = values in this interval not displayed. Phosphorus:   No results for input(s): PHOS in the last 72 hours. Magnesium:  No results for input(s): MG in the last 72 hours. Albumin:    Recent Labs     09/04/21  1503   LABALBU 4.1     BNP:    No results found for: BNP  GLORIA:    No results found for: GLORIA  SPEP:  Lab Results   Component Value Date    PROT 6.2 09/04/2021     UPEP:   No results found for: LABPE  C3:   No results found for: C3  C4:   No results found for: C4  MPO ANCA:   No results found for: MPO  PR3 ANCA:   No results found for: PR3  Anti-GBM:   No results found for: GBMABIGG  Hep BsAg:         Lab Results   Component Value Date    HEPBSAG Negative 07/16/2017     Hep C AB:          Lab Results   Component Value Date    HEPCAB Negative 07/16/2017       Urinalysis/Chemistries:      Lab Results   Component Value Date    NITRU NEGATIVE 09/04/2021    COLORU YELLOW 09/04/2021    PHUR 6.5 09/04/2021    LABCAST NONE SEEN 09/04/2021    LABCAST NONE SEEN 09/04/2021    WBCUA 10-15 09/04/2021    RBCUA 0-2 09/04/2021    MUCUS NOT REPORTED 12/17/2020    TRICHOMONAS NOT REPORTED 12/17/2020    YEAST NONE SEEN 09/04/2021    BACTERIA NONE SEEN 09/04/2021    SPECGRAV 1.014 09/04/2021    LEUKOCYTESUR LARGE 09/04/2021    UROBILINOGEN 0.2 09/04/2021    BILIRUBINUR NEGATIVE 09/04/2021    BLOODU NEGATIVE 09/04/2021    GLUCOSEU NEGATIVE 08/27/2021    KETUA TRACE 09/04/2021    AMORPHOUS NOT REPORTED 12/17/2020     Urine Sodium:   No results found for: ADAM  Urine Potassium:  No results found for: KUR  Urine Chloride:  No results found for: CLUR  Urine Osmolarity:   Lab Results   Component Value Date    OSMOU 403 09/05/2021     Urine Protein:   No components found for: TOTALPROTEIN, URINE   Urine Creatinine:   No results found for: LABCREA  Urine Eosinophils:  No components found for: UEOS        Impression and Plan:  1. Hyponatremia: likely poor po intake. Sodium improved up to 132 today. Will stop IV fluids. Repeat this afternoon if still increasing may need to give some hypotonic fluids as sodium increased a little too quickly  2. HTN: stable  3. UTI  4. Esophageal candidiasis  5. AFib        Please don't hesitate to call with any questions.   Electronically signed by Vahe Diallo DO on 9/6/2021 at 11:53 AM

## 2021-09-06 NOTE — FLOWSHEET NOTE
09/05/21 2106   Provider Notification   Reason for Communication Critical Value (comment)  (Na+ 120)   Provider Name Candice Henriquez   Provider Notification Physician   Method of Communication Call   Response See orders   Notification Time 2105   Critical value Na+ 120-   Orders at this time by telephone with read back for 2g sodium chloride tabs one time.

## 2021-09-06 NOTE — PROGRESS NOTES
Clinical Pharmacy Note    Warfarin consult follow-up    Recent Labs     09/06/21  0555   INR 2.18*     Recent Labs     09/04/21  1503 09/05/21  0530   HGB 14.1 15.2   HCT 41.9 45.7   * 92*       Significant Drug-Drug Interactions:  New warfarin drug-drug interactions: none  Discontinued drug-drug interactions: none  Current warfarin drug-drug interactions: Fluconazole 400 mg IV Q24H, Propafenone 150 mg BID ()     Date INR Warfarin Dose   9/5/2021 1.66 6 mg    9/6/2021 2.18   1 mg                                              Notes:                   Daily PT/INR until stable within therapeutic range.      [facility] PharmD 9/6/2021 4:01 PM

## 2021-09-07 LAB
ANION GAP SERPL CALCULATED.3IONS-SCNC: 9 MEQ/L (ref 8–16)
BASOPHILS # BLD: 0.8 %
BASOPHILS ABSOLUTE: 0 THOU/MM3 (ref 0–0.1)
BUN BLDV-MCNC: 7 MG/DL (ref 7–22)
CALCIUM SERPL-MCNC: 8.9 MG/DL (ref 8.5–10.5)
CHLORIDE BLD-SCNC: 104 MEQ/L (ref 98–111)
CO2: 25 MEQ/L (ref 23–33)
CREAT SERPL-MCNC: 0.9 MG/DL (ref 0.4–1.2)
EOSINOPHIL # BLD: 4.2 %
EOSINOPHILS ABSOLUTE: 0.2 THOU/MM3 (ref 0–0.4)
ERYTHROCYTE [DISTWIDTH] IN BLOOD BY AUTOMATED COUNT: 16.2 % (ref 11.5–14.5)
ERYTHROCYTE [DISTWIDTH] IN BLOOD BY AUTOMATED COUNT: 48.6 FL (ref 35–45)
GFR SERPL CREATININE-BSD FRML MDRD: 59 ML/MIN/1.73M2
GLUCOSE BLD-MCNC: 97 MG/DL (ref 70–108)
HCT VFR BLD CALC: 39.9 % (ref 37–47)
HEMOGLOBIN: 13.1 GM/DL (ref 12–16)
IMMATURE GRANS (ABS): 0.03 THOU/MM3 (ref 0–0.07)
IMMATURE GRANULOCYTES: 0.8 %
INR BLD: 2.69 (ref 0.85–1.13)
LYMPHOCYTES # BLD: 31.7 %
LYMPHOCYTES ABSOLUTE: 1.1 THOU/MM3 (ref 1–4.8)
MCH RBC QN AUTO: 27.2 PG (ref 26–33)
MCHC RBC AUTO-ENTMCNC: 32.8 GM/DL (ref 32.2–35.5)
MCV RBC AUTO: 83 FL (ref 81–99)
MONOCYTES # BLD: 15.4 %
MONOCYTES ABSOLUTE: 0.6 THOU/MM3 (ref 0.4–1.3)
MRSA SCREEN: NORMAL
NUCLEATED RED BLOOD CELLS: 0 /100 WBC
PLATELET # BLD: 98 THOU/MM3 (ref 130–400)
PMV BLD AUTO: 10.2 FL (ref 9.4–12.4)
POTASSIUM SERPL-SCNC: 4 MEQ/L (ref 3.5–5.2)
RBC # BLD: 4.81 MILL/MM3 (ref 4.2–5.4)
SEG NEUTROPHILS: 47.1 %
SEGMENTED NEUTROPHILS ABSOLUTE COUNT: 1.7 THOU/MM3 (ref 1.8–7.7)
SODIUM BLD-SCNC: 134 MEQ/L (ref 135–145)
SODIUM BLD-SCNC: 138 MEQ/L (ref 135–145)
WBC # BLD: 3.6 THOU/MM3 (ref 4.8–10.8)

## 2021-09-07 PROCEDURE — 97535 SELF CARE MNGMENT TRAINING: CPT

## 2021-09-07 PROCEDURE — 85610 PROTHROMBIN TIME: CPT

## 2021-09-07 PROCEDURE — 6370000000 HC RX 637 (ALT 250 FOR IP): Performed by: INTERNAL MEDICINE

## 2021-09-07 PROCEDURE — 97162 PT EVAL MOD COMPLEX 30 MIN: CPT

## 2021-09-07 PROCEDURE — 97110 THERAPEUTIC EXERCISES: CPT

## 2021-09-07 PROCEDURE — 2580000003 HC RX 258: Performed by: INTERNAL MEDICINE

## 2021-09-07 PROCEDURE — 6370000000 HC RX 637 (ALT 250 FOR IP): Performed by: NURSE PRACTITIONER

## 2021-09-07 PROCEDURE — 84295 ASSAY OF SERUM SODIUM: CPT

## 2021-09-07 PROCEDURE — 80048 BASIC METABOLIC PNL TOTAL CA: CPT

## 2021-09-07 PROCEDURE — 6360000002 HC RX W HCPCS: Performed by: INTERNAL MEDICINE

## 2021-09-07 PROCEDURE — 97165 OT EVAL LOW COMPLEX 30 MIN: CPT

## 2021-09-07 PROCEDURE — 85025 COMPLETE CBC W/AUTO DIFF WBC: CPT

## 2021-09-07 PROCEDURE — 99232 SBSQ HOSP IP/OBS MODERATE 35: CPT | Performed by: INTERNAL MEDICINE

## 2021-09-07 PROCEDURE — 36415 COLL VENOUS BLD VENIPUNCTURE: CPT

## 2021-09-07 PROCEDURE — C9113 INJ PANTOPRAZOLE SODIUM, VIA: HCPCS | Performed by: INTERNAL MEDICINE

## 2021-09-07 PROCEDURE — 2060000000 HC ICU INTERMEDIATE R&B

## 2021-09-07 RX ORDER — PANTOPRAZOLE SODIUM 40 MG/1
40 TABLET, DELAYED RELEASE ORAL
Status: DISCONTINUED | OUTPATIENT
Start: 2021-09-08 | End: 2021-09-08 | Stop reason: HOSPADM

## 2021-09-07 RX ORDER — COSYNTROPIN 0.25 MG/ML
250 INJECTION, POWDER, FOR SOLUTION INTRAMUSCULAR; INTRAVENOUS ONCE
Status: COMPLETED | OUTPATIENT
Start: 2021-09-08 | End: 2021-09-08

## 2021-09-07 RX ORDER — WARFARIN SODIUM 1 MG/1
1 TABLET ORAL
Status: COMPLETED | OUTPATIENT
Start: 2021-09-07 | End: 2021-09-07

## 2021-09-07 RX ADMIN — WARFARIN SODIUM 1 MG: 1 TABLET ORAL at 17:18

## 2021-09-07 RX ADMIN — CEFTRIAXONE SODIUM 1000 MG: 1 INJECTION, POWDER, FOR SOLUTION INTRAMUSCULAR; INTRAVENOUS at 13:00

## 2021-09-07 RX ADMIN — METOPROLOL TARTRATE 12.5 MG: 25 TABLET, FILM COATED ORAL at 20:05

## 2021-09-07 RX ADMIN — PANTOPRAZOLE SODIUM 40 MG: 40 INJECTION, POWDER, FOR SOLUTION INTRAVENOUS at 09:24

## 2021-09-07 RX ADMIN — BENZOCAINE AND MENTHOL 1 LOZENGE: 15; 3.6 LOZENGE ORAL at 20:05

## 2021-09-07 RX ADMIN — PROPAFENONE HYDROCHLORIDE 150 MG: 150 TABLET, FILM COATED ORAL at 20:05

## 2021-09-07 RX ADMIN — METOPROLOL TARTRATE 12.5 MG: 25 TABLET, FILM COATED ORAL at 09:23

## 2021-09-07 RX ADMIN — SODIUM CHLORIDE, PRESERVATIVE FREE 10 ML: 5 INJECTION INTRAVENOUS at 09:29

## 2021-09-07 RX ADMIN — SODIUM CHLORIDE, PRESERVATIVE FREE 10 ML: 5 INJECTION INTRAVENOUS at 20:05

## 2021-09-07 RX ADMIN — FLUCONAZOLE 400 MG: 2 INJECTION, SOLUTION INTRAVENOUS at 02:43

## 2021-09-07 RX ADMIN — PROPAFENONE HYDROCHLORIDE 150 MG: 150 TABLET, FILM COATED ORAL at 09:25

## 2021-09-07 ASSESSMENT — PAIN SCALES - GENERAL
PAINLEVEL_OUTOF10: 0
PAINLEVEL_OUTOF10: 3
PAINLEVEL_OUTOF10: 0

## 2021-09-07 ASSESSMENT — PAIN DESCRIPTION - FREQUENCY: FREQUENCY: CONTINUOUS

## 2021-09-07 NOTE — CARE COORDINATION
09/07/21 1457   Readmission Assessment   Number of Days since last admission? 1-7 days   Previous Disposition Home with Family   Who is being Interviewed Patient;Caregiver   What was the patient's/caregiver's perception as to why they think they needed to return back to the hospital?   (nausea, emesis, abominal pain)   Did you visit your Primary Care Physician after you left the hospital, before you returned this time? No  (readmitted too soon)   Did you see a specialist, such as Cardiac, Pulmonary, Orthopedic Physician, etc. after you left the hospital? No   Does the patient report anything that got in the way of taking their medications? No   In our efforts to provide the best possible care to you and others like you, can you think of anything that we could have done to help you after you left the hospital the first time, so that you might not have needed to return so soon?  Other (Comment)  (kept her here longer, ID told us Augmentin likely reason for nausea symptoms, Nystatin would make her vomit, not enough time to work, now on Diflucan this admission)

## 2021-09-07 NOTE — PROGRESS NOTES
[facility]  INPATIENT OCCUPATIONAL THERAPY  [facility] ICU STEPDOWN TELEMETRY 4K  EVALUATION    Time:   Time In: 1027  Time Out: 1050  Timed Code Treatment Minutes: 13 Minutes  Minutes: 23          Date: 2021  Patient Name: Allie Hung,   Gender: female      MRN: [id number]  : 1934  (80 y.o.)  Referring Practitioner: Gregg Velez. Goyo Velazquez MD  Diagnosis: lower abdominal pain  Additional Pertinent Hx: per chart review; Allie Hung is a pleasant 80 y.o. female who presents to the emergency department from home for evaluation of epigastric abdominal pain and vomiting. The patient recently got discharged, diagnosed with esophageal candidiasis. Past 2 days has had vomiting and epigastric abdominal pain. No chest pain, no shortness of breath. Restrictions/Precautions:  Restrictions/Precautions: Fall Risk, General Precautions  Position Activity Restriction  Other position/activity restrictions: hx L NANETTE    Subjective  Chart Reviewed: Yes, Orders, Progress Notes, History and Physical  Patient assessed for rehabilitation services?: Yes  Response to previous treatment: Patient with no complaints from previous session  Family / Caregiver Present: Yes (daughter and )    Subjective: RN approved session. Patient seated up in recliner; pleasant and agreeable to OT evaluation. A & O x 4.     Pain:  Pain Assessment  Patient Currently in Pain: Yes  Pain Assessment: 0-10  Pain Level: 3 (L arm from the IV that nursing is aware of and will be removing)  Pain Frequency: Continuous    Vitals: Vitals not assessed per clinical judgement, see nursing flowsheet    Social/Functional History:  Lives With: Spouse  Type of Home: House  Home Layout: Multi-level, Bed/Bath upstairs  Home Access: Stairs to enter with rails  Entrance Stairs - Number of Steps: pt has 7 steps with 1 HR to get to bathroom, bedroom, and laundry  Entrance Stairs - Rails: Right  Home Equipment: 4 wheeled walker, [employer] [place] Shower/Tub: Walk-in shower  Bathroom Toilet: Handicap height  Bathroom Equipment: Shower chair  Bathroom Accessibility: Accessible       ADL Assistance: Independent  Homemaking Assistance: Independent  Ambulation Assistance: Independent  Transfer Assistance: Independent    Active : Yes     Additional Comments: pt states she recently started using cane per recommendation of her physical therapist; pt had 2 sessions of outpatient physical therapy at [facility], and then due to medical issues has been in and out of the hospital--she states she has been working on balance and hip strength with therapy    VISION:Corrected    HEARING:  WFL    COGNITION: WFL    RANGE OF MOTION:  Right Upper Extremity: WFL  Left Upper Extremity:  WFL    STRENGTH:  Right Upper Extremity: WFL  Left Upper Extremity:  WFL    SENSATION:   WFL    ADL:   Grooming: Stand By Assistance. standing at sink to wash hands and complete oral care with no c/o fatigue or SOB t/o, verbal cues for 2 w/w placement to stand safely at sink  Lower Extremity Dressing: Stand By Assistance. to doff/don slipper socks seated in recliner and able to reach foot by crossing leg  Toileting: [employer]. to complete toilet hygiene for urine and clothing mgmt with no LOB with two hand release   Toilet Transfer: [employer]. with use of 2 w/w and cues to not abandon . BALANCE:  Sitting Balance:  Independent. no LOB during dyn sitting task  Standing Balance: Contact Guard Assistance. with use of 2 w/w for support. requested 2 w/w when instructed to stand    BED MOBILITY:  Not Tested    TRANSFERS:  Sit to Stand:  Stand By Assistance. with good hand placement for effective sit to stand    FUNCTIONAL MOBILITY:  Assistive Device: Rolling Walker  Assist Level:  Contact Guard Assistance. Distance: To and from bathroom       Exercise:  none completed this date    Activity Tolerance:  Patient tolerance of  treatment: good.  No c/o fatigue or SOB t/o evaluation. Motivated to participate in evaluation. Assessment:  Assessment: Patient demonstrates (F+) activity tolerance for standing tasks during ADL routine and requires occasional verbal and tactile cues for safety and sequencing with 2 w/w t/o routine. patient has multiple levels in home to navigate impacting her ability to safely transition home due to inactivity and de-conditioning in hospital and would benefit from OT to increase activity tolerance and ease and (I) with ADLs and functional transfers to increase occupational performance and decrease caregiver burden. Performance deficits / Impairments: Decreased functional mobility , Decreased ADL status, Decreased endurance  Prognosis: Good  REQUIRES OT FOLLOW UP: Yes  Decision Making: Low Complexity    Treatment Initiated: Treatment and education initiated within context of evaluation. Evaluation time included review of current medical information, gathering information related to past medical, social and functional history, completion of standardized testing, formal and informal observation of tasks, assessment of data and development of plan of care and goals. Treatment time included skilled education and facilitation of tasks to increase safety and independence with ADL's for improved functional independence and quality of life. Discharge Recommendations:  Continue to assess pending progress, Patient would benefit from continued therapy after discharge    Patient Education:  OT Education: OT Role, Plan of Care, Precautions, ADL Adaptive Strategies, Transfer Training    Equipment Recommendations:  Equipment Needed: No    Plan:  Times per week: 3-5x  Times per day: Daily  Current Treatment Recommendations: Endurance Training, Neuromuscular Re-education, Patient/Caregiver Education & Training, Self-Care / ADL, Safety Education & Training. See long-term goal time frame for expected duration of plan of care.   If no long-term goals established, a short length of stay is anticipated. Goals:  Patient goals : return home [facility]  Short term goals  Time Frame for Short term goals: by discharge  Short term goal 1: Patient will tolerate 8 min functional standing with MOD I with 2 hand release to increase activity tolerance for IADLs. Short term goal 2: Patient will complete ADL routine with MOD I with 0-1 verbal cues for safety and sequencing t/o routine. Short term goal 3: Patient will tolerate functional ambulation household distances to increase ease with navigating home once returning to prior living enviornment. Following session, patient left in safe position with all fall risk precautions in place.

## 2021-09-07 NOTE — FLOWSHEET NOTE
09/07/21 0421   Provider Notification   Reason for Communication Review case  (Na 134)   Provider Name Dr. Enmanuel Holguin   Provider Notification Physician   Method of Communication Secure Message   Response See orders  (Stop D5)   Notification Time [id number]

## 2021-09-07 NOTE — PROGRESS NOTES
[facility]         Renal Inpatient Follow-Up note         9/7/2021 12:55 PM    Pt Name:   Jacob Roberts  YOB: 1934  Attending:   Sera Mckenzie MD    Chief Complaint : Jacob Roberts is a 80 y.o. female being followed by nephrology for hyponatremia    Interval History :   Patient seen and examined by me. No distress  Feels well denies any complaints  No chest pain or shortness of breath or abdominal pain  Tolerating p.o. liquids well     Scheduled Medications :    warfarin  1 mg Oral Once    [START ON 9/8/2021] fluconazole  200 mg IntraVENous Q24H    pantoprazole  40 mg IntraVENous BID    metoprolol tartrate  12.5 mg Oral BID    propafenone  150 mg Oral BID    warfarin (COUMADIN) daily dosing (placeholder)   Other RX Placeholder    sodium chloride flush  5-40 mL IntraVENous 2 times per day    cefTRIAXone (ROCEPHIN) IV  1,000 mg IntraVENous Q24H    fentanNYL  50 mcg IntraVENous Once      sodium chloride         Vitals :  /65   Pulse 94   Temp 97.5 °F (36.4 °C) (Oral)   Resp 18   Ht 5' 5\" (1.651 m)   Wt 157 lb 6.4 oz (71.4 kg)   SpO2 94%   BMI 26.19 kg/m²     24HR INTAKE/OUTPUT:      Intake/Output Summary (Last 24 hours) at 9/7/2021 1255  Last data filed at 9/7/2021 0800  Gross per 24 hour   Intake 3128.17 ml   Output 1000 ml   Net 2128.17 ml     Last 3 weights  Wt Readings from Last 3 Encounters:   09/07/21 157 lb 6.4 oz (71.4 kg)   08/30/21 147 lb 6.4 oz (66.9 kg)   08/23/21 162 lb (73.5 kg)           Physical Exam :  General Appearance:  Well developed.  No distress  Mouth/Throat:  Oral mucosa moist  Neck:  Supple, no JVD  Lungs:  Breath sounds: clear  Heart[de-identified]  S1,S2 heard  Abdomen:  Soft, non - tender  Musculoskeletal:  Edema -no edema noted         Last 3 CBC   Recent Labs     09/04/21  1503 09/05/21  0530 09/07/21  0454   WBC 4.6* 4.6* 3.6*   RBC 5.25 5.65* 4.81   HGB 14.1 15.2 13.1   HCT 41.9 45.7 39.9   * 92* 98*     Last 3 CMP  Recent Labs     09/04/21  1503 09/05/21  0326 09/05/21  0530 09/05/21  1200 09/06/21  1037 09/06/21  1528 09/06/21  1950 09/07/21  0232 09/07/21  0454   *  120*   < > 123*   < > 132*   < > 132* 134* 138   K 4.3  --  4.7  --  4.1  --   --   --  4.0   CL 85*  --  89*  --  101  --   --   --  104   CO2 24  --  24  --  23  --   --   --  25   BUN 13  --  10  --  11  --   --   --  7   CREATININE 0.8  --  0.9  --  1.0  --   --   --  0.9   CALCIUM 9.0  --  9.0  --  8.0*  --   --   --  8.9   LABALBU 4.1  --   --   --   --   --   --   --   --    BILITOT 0.6  --   --   --   --   --   --   --   --     < > = values in this interval not displayed. ASSESSMENT / Plan   1 Renal -renal function appears to be stable at baseline  2 Hyponatremia most likely secondary to poor oral intake improved with IV fluids. Currently 138 recheck labs in the morning. She is on a full liquid diet  3 Essential hypertension running reasonable  4 Esophageal candidiasis  5 Atrial fibrillation  6 Urinary tract infection  7 Meds reviewed discussed with the patient    Dr. Yanely Phoenix MD, M,D.  [facility].

## 2021-09-07 NOTE — PROGRESS NOTES
INTERNAL MEDICINE SPECIALTIES  Progress Note For Dr Bates Pump       Patient:  [place]  YOB: 1934  Date of Service: 9/7/2021  MRN: [id number]   Acct:  [de-identified]   Primary Care Physician: Lulla Lesch, MD    SUBJECTIVE:feels much better,met eating chicken pot pie  Home Medications:   No current facility-administered medications on file prior to encounter. Current Outpatient Medications on File Prior to Encounter   Medication Sig Dispense Refill    metoprolol tartrate (LOPRESSOR) 25 MG tablet TAKE HALF OF A TABLET TWICE A DAY. 90 tablet 1    warfarin (COUMADIN) 2.5 MG tablet 1. Take 2 tablets by mouth daily. Or as directed by INR results.  180 tablet 3    pantoprazole (PROTONIX) 40 MG tablet Take 1 tablet by mouth once daily 90 tablet 1    ondansetron (ZOFRAN ODT) 4 MG disintegrating tablet Take 1 tablet by mouth every 8 hours as needed for Nausea 20 tablet 0    propafenone (RYTHMOL) 150 MG tablet Take 1 tablet by mouth 2 times daily Take 300 mg of propafenone with 25 mg of metoprolol once and start 150 mg propafenone q 12 hours from next day 90 tablet 3    Multiple Vitamins-Minerals (THERAPEUTIC MULTIVITAMIN-MINERALS) tablet Take 1 tablet by mouth daily      Multiple Vitamins-Minerals (ICAPS AREDS 2) CAPS Take 2 tablets by mouth daily       Handicap Placard MISC by Does not apply route Expires 6/2022 1 each 0    MEGARED OMEGA-3 KRILL  MG CAPS Take by mouth daily            Scheduled Meds:   warfarin  1 mg Oral Once    [START ON 9/8/2021] fluconazole  200 mg IntraVENous Q24H    pantoprazole  40 mg IntraVENous BID    metoprolol tartrate  12.5 mg Oral BID    propafenone  150 mg Oral BID    warfarin (COUMADIN) daily dosing (placeholder)   Other RX Placeholder    sodium chloride flush  5-40 mL IntraVENous 2 times per day    cefTRIAXone (ROCEPHIN) IV  1,000 mg IntraVENous Q24H    fentanNYL  50 mcg IntraVENous Once     Continuous Infusions:   sodium chloride       PRN Meds:ondansetron **OR** ondansetron, polyethylene glycol, acetaminophen **OR** acetaminophen, magnesium sulfate, potassium chloride **OR** potassium alternative oral replacement **OR** potassium chloride, sodium chloride flush, sodium chloride, HYDROmorphone **OR** HYDROmorphone, prochlorperazine        Allergies:  Statins, Tape [adhesive tape], Tizanidine hcl, and Tramadol    OBJECTIVE:    Vitals:   Vitals:    09/07/21 1245   BP: (!) 118/55   Pulse: 75   Resp: 18   Temp: 97.4 °F (36.3 °C)   SpO2: 94%      BMI: Body mass index is 26.19 kg/m². PHYSICAL EXAMINATION:            General appearance:  No apparent distress, appears stated age and cooperative. HEENT:  Normal cephalic, atraumatic without obvious deformity. Pupils equal, round, and reactive to light.  Extra ocular muscles intact. Conjunctivae/corneas clear. Healing wound over nose and left forehead  Neck: Supple  Respiratory:  clinically clear bilaterally  Cardiovascular:  Regular rhythm with normal S1/S2 without rubs or gallops. Abdomen: Soft, nontender , non-distended with normal bowel sounds.   Musculoskeletal:  No clubbing, cyanosis  or edema bilaterally .   Neurologic: Alert and oriented x3 no gross motor deficits              Review of Labs and Diagnostic Testing:    Recent Results (from the past 24 hour(s))   Sodium    Collection Time: 09/06/21  7:50 PM   Result Value Ref Range    Sodium 132 (L) 135 - 145 meq/L   Sodium    Collection Time: 09/07/21  2:32 AM   Result Value Ref Range    Sodium 134 (L) 135 - 145 meq/L   Protime-INR    Collection Time: 09/07/21  4:54 AM   Result Value Ref Range    INR 2.69 (H) 0.85 - 8.05   Basic Metabolic Panel    Collection Time: 09/07/21  4:54 AM   Result Value Ref Range    Sodium 138 135 - 145 meq/L    Potassium 4.0 3.5 - 5.2 meq/L    Chloride 104 98 - 111 meq/L    CO2 25 23 - 33 meq/L    Glucose 97 70 - 108 mg/dL    BUN 7 7 - 22 mg/dL    CREATININE 0.9 0.4 - 1.2 mg/dL    Calcium 8.9 8.5 - 10.5 mg/dL   CBC Auto Differential    Collection Time: 09/07/21  4:54 AM   Result Value Ref Range    WBC 3.6 (L) 4.8 - 10.8 thou/mm3    RBC 4.81 4.20 - 5.40 mill/mm3    Hemoglobin 13.1 12.0 - 16.0 gm/dl    Hematocrit 39.9 37.0 - 47.0 %    MCV 83.0 81.0 - 99.0 fL    MCH 27.2 26.0 - 33.0 pg    MCHC 32.8 32.2 - 35.5 gm/dl    RDW-CV 16.2 (H) 11.5 - 14.5 %    RDW-SD 48.6 (H) 35.0 - 45.0 fL    Platelets 98 (L) 015 - 400 thou/mm3    MPV 10.2 9.4 - 12.4 fL    Seg Neutrophils 47.1 %    Lymphocytes 31.7 %    Monocytes 15.4 %    Eosinophils 4.2 %    Basophils 0.8 %    Immature Granulocytes 0.8 %    Segs Absolute 1.7 (L) 1 - 7 thou/mm3    Lymphocytes Absolute 1.1 1.0 - 4.8 thou/mm3    Monocytes Absolute 0.6 0.4 - 1.3 thou/mm3    Eosinophils Absolute 0.2 0.0 - 0.4 thou/mm3    Basophils Absolute 0.0 0.0 - 0.1 thou/mm3    Immature Grans (Abs) 0.03 0.00 - 0.07 thou/mm3    nRBC 0 /100 wbc   Anion Gap    Collection Time: 09/07/21  4:54 AM   Result Value Ref Range    Anion Gap 9.0 8.0 - 16.0 meq/L   Glomerular Filtration Rate, Estimated    Collection Time: 09/07/21  4:54 AM   Result Value Ref Range    Est, Glom Filt Rate 59 (A) ml/min/1.73m2       Radiology:     CT ABDOMEN PELVIS W IV CONTRAST Additional Contrast? None    Result Date: 9/4/2021  CT of the abdomen and pelvis was performed utilizing intravenous contrast. Comparison MR 7/19/2017. Findings: The liver is unremarkable. Cholecystectomy. There is no hydronephrosis. Small right renal cyst. The spleen is enlarged measuring 15.3 cm in length. The pancreas is unremarkable. No abdominal aortic aneurysm. Small hiatal hernia. There is mild ectasia of the celiac axis. A left hip prosthesis causes artifact through the pelvis. No definite diverticulitis is identified. The appendix is not seen. There is no bowel obstruction. The bladder is mildly distended. A pessary is in place. There is mild free fluid in the pelvis. There are several indeterminate nodules in the lower lungs.  The largest is on the right measuring 1.3 cm. Impression: No bowel infarction or definite diverticulitis. Mild colonic wall thickening could be incidental. Mild colitis is technically a possibility. Small amount of free fluid in the pelvis of uncertain etiology. Indeterminate pulmonary nodules recommend comparison to previous or further evaluation. This document has been electronically signed by: Akiko Cleary MD on 09/04/2021 08:42 PM All CTs at this facility use dose modulation techniques and iterative reconstructions, and/or weight-based dosing when appropriate to reduce radiation to a low as reasonably achievable. XR CHEST PORTABLE    Result Date: 9/5/2021  PROCEDURE: XR CHEST PORTABLE CLINICAL INFORMATION: SOB, FEVER. COMPARISON: Chest radiographs dated 8/27/2021. TECHNIQUE: AP upright view of the chest. FINDINGS: There is patchy opacity at the left lung base. Lungs otherwise appear grossly clear. The pulmonary vasculature and cardiomediastinal silhouette are within normal limits. Visualized osseous structures appear grossly intact. Patchy left basilar opacity as evidence for atelectasis or infiltrate. **This report has been created using voice recognition software. It may contain minor errors which are inherent in voice recognition technology. ** Final report electronically signed by Dr. Luz Elena Ng MD on 9/5/2021 9:52 AM        ASSESMENT:      Active Problems:    Lower abdominal pain    Hyponatremia    Atrial flutter ([address]  Resolved Problems:    * No resolved hospital problems. *  OTHER PROBLEMS  UTI  Esophageal Candidiasis  Gastritis  Atrial flutter/fibrillation    PLAN:  Serum sodium levels have improved, felt to have been secondary to poor oral intake. Agree with Cortrosyn stimulation test to rule out adrenal insufficiency, will plan for this tomorrow morning. She is currently on a full liquid diet plan to advance as tolerated. Heart rate controlled, INR therapeutic, continue current medications.   Patient on

## 2021-09-07 NOTE — PROGRESS NOTES
[facility]  INPATIENT PHYSICAL THERAPY  EVALUATION  [facility] ICU STEPDOWN TELEMETRY 4K - 4K-14/014-A    Time In: 09  Time Out: 6416  Timed Code Treatment Minutes: 20 Minutes  Minutes: 31          Date: 2021  Patient Name: Therese Blankenship,  Gender:  female        MRN: [id number]  : 1934  (80 y.o.)      Referring Practitioner: Filipe Barr MD  Diagnosis: abdominal pain, epigastric  Additional Pertinent Hx: Per HPI: \"80year-old female patient was hospitalized at [facility] from  through  for epigastric pain, dysphagia and vomiting. Patient had EGD and was found to have esophageal candidiasis and esophageal stricture subsequently undergone endoscopic dilatation also she was found to have Streptococcus UTI discharged on Augmentin. Patient was treated with nystatin swish and swallow. On day of discharge patient was tolerating diet and epigastric pain has subsided. Patient had epigastric pain nausea and vomiting since yesterday. She vomited twice. Also she had watery diarrhea 2-3 small bowel movements daily for 2 days. According to the daughter, nausea triggered by nystatin. In the ER CT abdomen pelvis unremarkable. \"     Restrictions/Precautions:  Restrictions/Precautions: General Precautions, Fall Risk  Position Activity Restriction  Other position/activity restrictions: hx L NANETTE    Subjective:  Chart Reviewed: Yes  Patient assessed for rehabilitation services?: Yes  Family / Caregiver Present: No  Subjective: Pt sitting EOB with nurse helping pt to stand to ambulate to bathroom upon PT arrival--PT took over to help pt to bathroom with RN approval. Pt states she did not sleep well last night, as she just found out that her twin sister  yesterday. Pt emotional at start of session, but was eager to participate in therapy, and states she likes to keep moving.     General:  Follows Commands: Within Functional Limits    Vision: Impaired  Vision Exceptions: Wears glasses for reading (macular degeneration)    Hearing: Within functional limits    Pain: none stated     Vitals: Vitals not assessed per clinical judgement, see nursing flowsheet    Social/Functional History:    Lives With: Spouse  Type of Home: House  Home Layout: Multi-level, Bed/Bath upstairs (laundry up stairs)  Home Access: Stairs to enter with rails  Entrance Stairs - Number of Steps: pt has 7 steps with 1 HR to get to bathroom, bedroom, and laundry  Entrance Stairs - Rails: Right  Home Equipment: 4 wheeled walker, Cane     Ambulation Assistance: Independent  Transfer Assistance: Independent    Active :  Yes     Additional Comments: pt states she recently started using cane per recommendation of her physical therapist; pt had 2 sessions of outpatient physical therapy at [facility], and then due to medical issues has been in and out of the hospital--she states she has been working on balance and hip strength with therapy    OBJECTIVE:  Range of Motion:  Bilateral Lower Extremity: WFL    Strength:  Bilateral Lower Extremity: [facility] for transfers--hip weakness noted with TherEx--compensations present     Balance:  Static Sitting Balance:  Modified Matheny   Dynamic Sitting Balance: Modified Independent  Static Standing Balance: Stand By Assistance  Dynamic Standing Balance: Stand By Assistance, Contact Guard Assistance  *pt completed dynamic standing balance tasks by performing otoniel-care after using toilet and washing hands with SBA     Bed Mobility:  Scooting: Modified Independent, with increased time for completion, for seated scooting    Transfers:  Sit to Stand: Stand By Assistance  Stand to Sit:Stand By Assistance   *cues for hand placement     Ambulation:  Stand By Assistance, X 1  Distance: 200', various smaller distances in room   Surface: Level Tile  Device:Rolling Walker  Gait Deviations:  Slow Deborah, Decreased Step Length Bilaterally and Decreased Gait Speed  *Lateral walking 10' x 2 sets each direction with CGA--pt had one minor LOB self-corrected with maintenance of CGA from PT   *trial SPC next session    Stairs:  Ascent/descent of 4 steps with SBA with use of BHRs. Exercise:  Patient was guided in 1 set(s) 10 reps of exercise to both lower extremities. Standing hip abduction/adduction, Standing hip extension and Mini squats. Exercises were completed for increased independence with functional mobility. Functional Outcome Measures: Completed  AM-PAC Inpatient Mobility Raw Score : 19  AM-PAC Inpatient T-Scale Score : 45.44    ASSESSMENT:  Activity Tolerance:  Patient tolerance of  treatment: good. Treatment Initiated: Treatment and education initiated within context of evaluation. Evaluation time included review of current medical information, gathering information related to past medical, social and functional history, completion of standardized testing, formal and informal observation of tasks, assessment of data and development of plan of care and goals. Treatment time included skilled education and facilitation of tasks to increase safety and independence with functional mobility for improved independence and quality of life. Assessment: Body structures, Functions, Activity limitations: Decreased functional mobility , Decreased balance, Decreased posture, Decreased strength, Decreased endurance  Assessment: Pt is below PLOF by way of transfers and ambulation. She is primarily limited by decreased functional strength, decreased balance, and decreased endurance associated with recent illness, and has required use of more restrictive AD. Pt will benefit from continued physical therapy to return to PLOF. Prognosis: Good    REQUIRES PT FOLLOW UP: Yes    Discharge Recommendations:  Discharge Recommendations: Outpatient PT, Home with assist PRN. Pt will benefit from continued physical therapy in the outpatient setting to address balance and hip strength deficits, as well as AD training.  Pt was going to [facility] outpatient therapy prior to recent admissions. Patient Education:  PT Education: Goals, General Safety, PT Role, Functional Mobility Training, Transfer Training, Home Exercise Program, Plan of Care    Equipment Recommendations:  Equipment Needed: No    Plan:  Times per week: 3-5x GM  Times per day: Daily  Current Treatment Recommendations: Strengthening, Safety Education & Training, Home Exercise Program, Balance Training, Endurance Training, Patient/Caregiver Education & Training, Functional Mobility Training, Gait Training, Transfer Training, Stair training    Goals:  Patient goals : return home, improve balance  Short term goals  Time Frame for Short term goals: by hospital discharge  Short term goal 1: Supine to/from sit with modified independence for ease of transfers at discharge site. Short term goal 2: Sit to/from stand with modified independence for ease of transfers at discharge site. Short term goal 3: Ambulate 200' with SPC with SBA for community distance ambulation. Short term goal 4: Score 11 seconds on TUG indicating no fall risk. Short term goal 5: Score 50/56 on the EUCEDA indicating age group norms and low fall risk. Long term goals  Time Frame for Long term goals : N/A due to short ELOS. Following session, patient left in safe position with all fall risk precautions in place.     Alessandro Garza, PT, DPT

## 2021-09-07 NOTE — PROGRESS NOTES
Progress note: Infectious diseases    Patient - Cynthia Rockwell,  Age - 80 y.o.    - 1934      Room Number - 4K-14/014-A   MRN -  [id number]   Acct # - [de-identified]  Date of Admission -  2021  2:27 PM    SUBJECTIVE:   No new issues. She is feeling much better  OBJECTIVE   VITALS    height is 5' 5\" (1.651 m) and weight is 157 lb 6.4 oz (71.4 kg). Her oral temperature is 97.5 °F (36.4 °C). Her blood pressure is 137/65 and her pulse is 94. Her respiration is 18 and oxygen saturation is 94%. Wt Readings from Last 3 Encounters:   21 157 lb 6.4 oz (71.4 kg)   21 147 lb 6.4 oz (66.9 kg)   21 162 lb (73.5 kg)       I/O (24 Hours)    Intake/Output Summary (Last 24 hours) at 2021 1212  Last data filed at 2021 0800  Gross per 24 hour   Intake 3128.17 ml   Output 1000 ml   Net 2128.17 ml       General Appearance  Awake, alert, oriented,  not  In acute distress  HEENT - normocephalic, atraumatic, pink conjunctiva,  anicteric sclera  Neck - Supple, no mass  Lungs -  Bilateral good air entry, no rhonchi, no wheeze.   Cardiovascular - Heart sounds are normal.     Abdomen - soft, not distended, nontender  Neurologic -oriented  Skin - No bruising or bleeding  Extremities - No edema, no cyanosis, clubbing     MEDICATIONS:      warfarin  1 mg Oral Once    [START ON 2021] fluconazole  200 mg IntraVENous Q24H    pantoprazole  40 mg IntraVENous BID    metoprolol tartrate  12.5 mg Oral BID    propafenone  150 mg Oral BID    warfarin (COUMADIN) daily dosing (placeholder)   Other RX Placeholder    sodium chloride flush  5-40 mL IntraVENous 2 times per day    cefTRIAXone (ROCEPHIN) IV  1,000 mg IntraVENous Q24H    fentanNYL  50 mcg IntraVENous Once      sodium chloride       ondansetron **OR** ondansetron, polyethylene glycol, acetaminophen **OR** acetaminophen, magnesium sulfate, potassium chloride **OR** potassium alternative oral replacement **OR** potassium chloride, sodium chloride flush, sodium chloride, HYDROmorphone **OR** HYDROmorphone, prochlorperazine      LABS:     CBC:   Recent Labs     09/04/21  1503 09/05/21  0530 09/07/21  0454   WBC 4.6* 4.6* 3.6*   HGB 14.1 15.2 13.1   * 92* 98*     BMP:    Recent Labs     09/05/21  0530 09/05/21  1200 09/06/21  1037 09/06/21  1528 09/06/21  1950 09/07/21  0232 09/07/21  0454   *   < > 132*   < > 132* 134* 138   K 4.7  --  4.1  --   --   --  4.0   CL 89*  --  101  --   --   --  104   CO2 24  --  23  --   --   --  25   BUN 10  --  11  --   --   --  7   CREATININE 0.9  --  1.0  --   --   --  0.9   GLUCOSE 84  --  144*  --   --   --  97    < > = values in this interval not displayed.      Calcium:  Recent Labs     09/07/21  0454   CALCIUM 8.9    INR:   Recent Labs     09/05/21  0530 09/06/21  0555 09/07/21  0454   INR 1.66* 2.18* 2.69*     Hepatic:   Recent Labs     09/04/21  1503   ALKPHOS 98   ALT 12   AST 18   PROT 6.2   BILITOT 0.6   BILIDIR <0.2   LABALBU 4.1        CULTURES:   UA:   Recent Labs     09/04/21  1637   SPECGRAV 1.014   PHUR 6.5   COLORU YELLOW   PROTEINU TRACE*   BLOODU NEGATIVE   RBCUA 0-2   WBCUA 10-15   BACTERIA NONE SEEN   NITRU NEGATIVE   BILIRUBINUR NEGATIVE   UROBILINOGEN 0.2   KETUA TRACE*   LABCAST NONE SEEN  NONE SEEN     Micro:   Lab Results   Component Value Date    BC No growth-preliminary  09/05/2021          Problem list of patient:     Patient Active Problem List   Diagnosis Code    Trochanteric bursitis of left hip M70.62    Drug-induced constipation K59.03    Bradycardia R00.1    Esophageal stricture K22.2    Gastritis without bleeding K29.70    Lumbar radicular pain M54.16    Lumbar spine pain M54.5    Left hip pain M25.552    Arthritis of left hip M16.12    Paroxysmal atrial fibrillation with rapid ventricular response (HCC) I48.0    Anticoagulation monitoring by pharmacist Z79.01    Venous stasis dermatitis of both lower extremities I87.2    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 Will stop rocephin at am  Continue oral diflucan for two wks.       Larna Galeazzi, MD, MD, FACP 9/7/2021 12:12 PM

## 2021-09-07 NOTE — FLOWSHEET NOTE
09/06/21 2144   Provider Notification   Reason for Communication Review case  (Na 132)   Provider Name Dr. Madhu Valenzuela   Provider Notification Physician   Method of Communication Secure Message   Response No new orders   Notification Time 2144

## 2021-09-07 NOTE — PROGRESS NOTES
Pharmacy Renal Adjustment    Martin Ramon is a 80 y.o. female. Pharmacy renally adjust the following medications per P[employer]T approved policy: fluconazole    Recent Labs     09/06/21  1037 09/07/21  0454   BUN 11 7   CREATININE 1.0 0.9     Estimated Creatinine Clearance: 44 mL/min (based on SCr of 0.9 mg/dL). Patient at baseline Scr of 0.9    Height:   Ht Readings from Last 1 Encounters:   09/04/21 5' 5\" (1.651 m)     Weight:  Wt Readings from Last 1 Encounters:   09/07/21 157 lb 6.4 oz (71.4 kg)     Baseline SCr: 0.9    Plan: Adjustments based on renal function:          Decrease fluconazole to 200mg IV daily.     Deyanira Wade, PharmD

## 2021-09-07 NOTE — FLOWSHEET NOTE
09/07/21 1522   Encounter Summary   Services provided to: Patient   Continue Visiting Yes  (9/7 )   Complexity of Encounter Low   Length of Encounter 15 minutes   Routine   Type Initial   Assessment Unable to respond   Intervention Prayer   During my encounter with the 80 yr old patient, I attempted to visit with the pt on 4K. The patient appears to be resting now and I didnt want to disturb the patient. The pt was admitted due to lower abdominal pain. I or another Star Wilsonin will attempt to visit the patient or the family at another time.

## 2021-09-07 NOTE — PROGRESS NOTES
Gastroenterology  Progress Note    9/7/2021 4:35 PM  Subjective:   Admit Date: 9/4/2021    Interval History:   9/7/21:   Assuming care from Dr César Rivas, covering physician. Reviewed Dr Ximena Pitts consultation note - see documentation in EMR. The patient is seen sitting up in the chair, family at the bedside. The patient reports no abdominal pain at this time. She denies chest pain. She denies N/V. She tolerated full liquids at lunch and diet has been advanced to regular/bland diet. Pt denies dysphagia since having EGD with esophageal dilation on 8/30. She does complain of sore/irritated throat and voice hoarseness. She denies diarrhea or constipation. She denies melena or hematochezia. Diet: ADULT DIET;  Regular; [facility] [place] (GERD/Peptic Ulcer)  Adult Oral Nutrition Supplement; Standard High Calorie/High Protein Oral Supplement    Patient Active Problem List:     Trochanteric bursitis of left hip     Drug-induced constipation     Bradycardia     Esophageal stricture     Gastritis without bleeding     Lumbar radicular pain     Lumbar spine pain     Left hip pain     Arthritis of left hip     Paroxysmal atrial fibrillation with rapid ventricular response (HCC)     Anticoagulation monitoring by pharmacist     Venous stasis dermatitis of both lower extremities     Arthritis of right hand     Vaginal erosion secondary to pessary use (HCC)     Pelvic relaxation due to uterovaginal prolapse     Postmenopausal bleeding     History of hysterectomy     Esophageal candidiasis (HCC)     Hyponatremia     Lower abdominal pain     Atrial flutter (HCC)    Medications:   Scheduled Meds:   warfarin  1 mg Oral Once    [START ON 9/8/2021] fluconazole  200 mg IntraVENous Q24H    [START ON 9/8/2021] cosyntropin  250 mcg IntraVENous Once    pantoprazole  40 mg IntraVENous BID    metoprolol tartrate  12.5 mg Oral BID    propafenone  150 mg Oral BID    warfarin (COUMADIN) daily dosing (placeholder)   Other RX Placeholder    sodium chloride flush  5-40 mL IntraVENous 2 times per day    cefTRIAXone (ROCEPHIN) IV  1,000 mg IntraVENous Q24H    fentanNYL  50 mcg IntraVENous Once     Continuous Infusions:   sodium chloride       CBC:   Recent Labs     09/05/21  0530 09/07/21  0454   WBC 4.6* 3.6*   HGB 15.2 13.1   PLT 92* 98*     BMP:    Recent Labs     09/05/21  0530 09/05/21  1200 09/06/21  1037 09/06/21  1528 09/06/21  1950 09/07/21  0232 09/07/21  0454   *   < > 132*   < > 132* 134* 138   K 4.7  --  4.1  --   --   --  4.0   CL 89*  --  101  --   --   --  104   CO2 24  --  23  --   --   --  25   BUN 10  --  11  --   --   --  7   CREATININE 0.9  --  1.0  --   --   --  0.9   GLUCOSE 84  --  144*  --   --   --  97    < > = values in this interval not displayed. Hepatic: No results for input(s): AST, ALT, ALB, BILITOT, ALKPHOS in the last 72 hours. INR:   Recent Labs     09/05/21  0530 09/06/21  0555 09/07/21  0454   INR 1.66* 2.18* 2.69*     Xray:   PROCEDURE: XR CHEST PORTABLE       CLINICAL INFORMATION: SOB, FEVER.       COMPARISON: Chest radiographs dated 8/27/2021.       TECHNIQUE: AP upright view of the chest.       FINDINGS:   There is patchy opacity at the left lung base. Lungs otherwise appear grossly clear. The pulmonary vasculature and cardiomediastinal silhouette are within normal limits. Visualized osseous structures appear grossly intact.           Impression   Patchy left basilar opacity as evidence for atelectasis or infiltrate.                   **This report has been created using voice recognition software. It may contain minor errors which are inherent in voice recognition technology. **       Final report electronically signed by Dr. Karla Marsh MD on 9/5/2021 9:52 AM     CT of the abdomen and pelvis was performed utilizing intravenous contrast.       Comparison MR 7/19/2017.       Findings: The liver is unremarkable. Cholecystectomy. There is no hydronephrosis.     Small right renal cyst.   The spleen is enlarged measuring 15.3 cm in length. The pancreas is unremarkable. No abdominal aortic aneurysm. Small hiatal    hernia.       There is mild ectasia of the celiac axis. A left hip prosthesis causes artifact through the pelvis. No definite diverticulitis is identified. The appendix is not seen. There    is no bowel obstruction.       The bladder is mildly distended. A pessary is in place. There is mild free    fluid in the pelvis. There are several indeterminate nodules in the lower lungs. The largest is    on the right measuring 1.3 cm.           Impression   Impression:   No bowel infarction or definite diverticulitis. Mild colonic wall thickening could be incidental. Mild colitis is    technically a possibility. Small amount of free fluid in the pelvis of uncertain etiology. Indeterminate pulmonary nodules recommend comparison to previous or    further evaluation.       This document has been electronically signed by: Boogie Pearl MD on    09/04/2021 08:42 PM       All CTs at this facility use dose modulation techniques and iterative    reconstructions, and/or weight-based dosing   when appropriate to reduce radiation to a low as reasonably achievable. EXAMINATION:   CT OF THE ABDOMEN AND PELVIS WITH CONTRAST 8/23/2021 11:35 am       TECHNIQUE:   CT of the abdomen and pelvis was performed with the administration of   intravenous contrast. Multiplanar reformatted images are provided for review.    Dose modulation, iterative reconstruction, and/or weight based adjustment of   the mA/kV was utilized to reduce the radiation dose to as low as reasonably   achievable.       COMPARISON:   07/15/2017       HISTORY:   ORDERING SYSTEM PROVIDED HISTORY: Abdominal pain   TECHNOLOGIST PROVIDED HISTORY:       Abdominal pain   Decision Support Exception - unselect if not a suspected or confirmed   emergency medical condition->Emergency Medical Condition (MA)   Reason for Exam: Upper abd pain, hx appendectomy, cholecystectomy,   hysterectomy   Acuity: Acute   Type of Exam: Initial       FINDINGS:   Lower Chest: Multiple solid noncalcified pulmonary nodules in the partially   imaged lung bases ranging in size from 0.5-1.5 cm, either unchanged in size   or decreased in caliber relative to comparison imaging from 2017.  Two of the   right lower lobe nodules appear to contain internal fat attenuation   compatible hamartomas. Minimal dependent changes in the lung bases.    Cardiomegaly.  No pericardial effusion.       Organs: Cholecystectomy. [place] See is without focal abnormality.  Mild   splenomegaly.  Diffusely atrophic pancreas.  Adrenal glands are normal   caliber.  1.9 cm water attenuation cyst superior pole right kidney.  No   hydronephrosis or perinephric stranding.       GI/Bowel: No bowel obstruction.  Colonic diverticulosis without   diverticulitis.       Pelvis: Pelvic floor descent with a pessary in place.  Evaluation is limited   by streak artifact from hip arthroplasty hardware but there appears to be   urinary bladder wall thickening with perivesicular stranding.  Hysterectomy.       Peritoneum/Retroperitoneum: Small volume simple free fluid in the dependent   pelvis.  No free air or lymphadenopathy.  Heavy aortic and iliofemoral   atherosclerosis without aneurysm.       Bones/Soft Tissues: Mild levoconvex lumbar spinal curvature.  Grade 1   degenerative anterolisthesis L4 on L5.  Partially imaged left hip   arthroplasty hardware appears grossly appropriate in alignment without   surrounding lucency or fracture.  Remote postoperative changes along the   right upper quadrant anterior abdominal wall.           Impression   Mild splenomegaly, new from remote prior imaging.       Urinary bladder wall thickening with perivesicular inflammatory stranding and   small volume simple free fluid in the pelvis.  Correlate for cystitis.       Background of pelvic floor descent with a pessary in place.     Colonic diverticulosis without diverticulitis.       Multiple basilar pulmonary nodules which are either unchanged in size or   decreased in caliber relative to remote comparison from 2017. Endoscopy Finding:    PATIENT NAME: Salome Wall                     :        1934  MED REC NO:   [id number]                           ROOM:       0002  ACCOUNT NO:   [de-identified]                           ADMIT DATE: 2021  PROVIDER:     Lorenzo Sims M.D.     DATE OF PROCEDURE:  2021     SURGEON:  Lorenzo Sims MD     INDICATIONS:  The patient with history of dysphagia, recent upper  endoscopy showed mild esophageal candidiasis. Currently, it is being  treated with nystatin swish and swallow. She had upper endoscopy and  dilation in 2016. She had difficulty to swallow  with both. Plan today for upper endoscopy to evaluate with dilation.     ASA CLASSIFICATION:  III.     ESTIMATED BLOOD LOSS:  Minimum.     DESCRIPTION OF PROCEDURE:  The patient was brought to the GI lab. Consent was obtained. Risks involved with the procedure were explained  to the patient. Informed consent was obtained. The patient was  monitored during the procedure with pulse oximetry, blood pressure  monitoring, and oxygen by nasal cannula. Sedation by incremental doses  of IV propofol given by Anesthesia Service to achieve total IV  anesthesia. For ASA classification and medications given during the  procedure, please see Anesthesia note.     PROCEDURE PERFORMED:  EGD with dilation.     A standard video 190 Olympus upper scope advanced under direct vision  from the oral cavity up to the duodenum. Esophagus featured mild acid  reflux both in the lower and upper due to mild stricture. Scope advanced  into the stomach. Retroflex examination of the cardia revealed small  hiatus hernia, mild gastritis seen in the body. Antrum appears normal.   Pylorus appears normal.  Scope advanced into the antrum.   Guidewire was  introduced. Scope was withdrawn with American dilator, starting from  size 45-French until 51-French introduced over the guidewire. Dilator  was withdrawn. No blood seen on the dilator. Scope was advanced again  to inspect the dilation. Seen bleeding at the level of upper esophageal  sphincter indicating significant stricture. No biopsy obtained. Scope  was withdrawn with no immediate complication.     IMPRESSION:  1. Dysphagia with severe stricture at the level of the upper sphincter  with post dilation until size 51-French. 2.  Mild gastritis in the body, no biopsy obtained at this time.     PLAN:  1. Should be on soft diet for a week, will start today with full-liquid  diet. I will reevaluate. 2.  Hold anticoagulation at this time to make sure no bleeding after  upper endoscopy dilations. 3.  Repeat upper endoscopy and dilation as needed in the future for  dysphagia.  -------------------------------------------------------------------------------------------------------------  PATIENT NAME: Levon SCHRADER                     :        1934  MED REC NO:   [id number]                           ROOM:       0002  ACCOUNT NO:   [de-identified]                           ADMIT DATE: 2021  PROVIDER:     Lacey Carmona M.D.     DATE OF PROCEDURE:  2021     INDICATIONS:  The patient with dysphagia, gastric reflux, benefitted  from upper endoscopy and dilation in the past.  Her INR is high. She is  not stable for dilation at this time. She was given 2 mg of Vit K IV   infusion yesterday. Plan today for EGD to evaluate.     For ASA classification, please see anesthesia note.     SURGEON:  Lacey Carmona MD     ESTIMATED BLOOD LOSS:  None.     DESCRIPTION OF PROCEDURE:  The patient was brought to the GI lab. Consent was obtained. Risks involved with the procedure were explained  to the patient. Informed consent was obtained.   The patient was  monitored during the procedure with pulse oximetry, blood pressure  monitoring, and oxygen by nasal cannula. Sedation by incremental doses  of IV propofol given by the anesthesia service to achieve total IV  anesthesia. For ASA classification and medications given during the  procedure, please see Anesthesia note.     PROCEDURE PERFORMED:  EGD.     Standard video upper scope was advanced under direct vision from the  oral cavity up to duodenum. The esophagus featured in the mid and  proximal esophagus or feature suggests mild esophageal candidiasis. Brushings were obtained. No ulcerations, no erosions seen. No  well-defined stricture seen. Scope was advanced to the stomach. Retroflex examination of the cardia revealed normal cardia. Antrum and  the body showed significant gastritis; however, it was patchy in  distribution. I elected not to take a biopsy as the INR is high. Duodenum appeared normal.  Scope withdrawn with no immediate  complication. No biopsy obtained as the patient's INR is high.     IMPRESSION:  1. Mild esophageal candidiasis, mid and upper esophagus. 2.  Gastritis localized, focal area.     PLAN:  1. Continue PPI. 2.  Avoid NSAIDs. 3.  Nystatin swish and swallow for five days will be prescribed. 4.  Future plan for upper endoscopy with dilation especially if the  patient does not clinically improve with medication therapy.       Objective:   Vitals: /64   Pulse 81   Temp 97.5 °F (36.4 °C) (Oral)   Resp 18   Ht 5' 5\" (1.651 m)   Wt 157 lb 6.4 oz (71.4 kg)   SpO2 94%   BMI 26.19 kg/m²     Intake/Output Summary (Last 24 hours) at 9/7/2021 1635  Last data filed at 9/7/2021 1300  Gross per 24 hour   Intake 2464.67 ml   Output 400 ml   Net 2064.67 ml     Weight:  Wt Readings from Last 3 Encounters:   09/07/21 157 lb 6.4 oz (71.4 kg)   08/30/21 147 lb 6.4 oz (66.9 kg)   08/23/21 162 lb (73.5 kg)     General appearance: alert and cooperative with exam.  Well groomed, well nourished elderly  female. Hoarseness noted. Lungs: clear to auscultation bilaterally  Heart: regular rate and rhythm, S1, S2 normal, no murmur, click, rub or gallop  Abdomen: soft, non-tender; bowel sounds normal; no masses,  no organomegaly  Extremities: extremities normal, atraumatic, no cyanosis or edema    Assessment and Plan:   1. Upper abdominal pain - resolved since admission. CT scan noted possible mild colitis but no other acute findings. Pt is currently asymptomatic; further testing not warranted at this time. 2. N/V - resolved since admission. Tolerating diet. 3. Hiatal hernia with GERD and gastritis noted on recent EGD - will change IV Protonix to oral Protonix. Standard anti-reflux measures. 4. Sore throat - Cepacol lozenges prn. Can use salt water gargles as well  5. Esophageal candidiasis - continue oral diflucan per ID.    6. S/p esophageal dilation for dysphagia on 8/30 - change diet to soft/bite sized. 7. Hyponatremia - Nephrology managing. 8. Thrombocytopenia -  Mild splenomegaly noted on CT. Stable.           Follow up in GI Clinic after discharge - keep previously scheduled appointment      VASU Wilkins - CNP  9/7/2021  4:35 PM     Patient is seen independently from the nurse practitioner and all  the pertinent data along with physical examination and assessment and plans are all obtained by my self and  Laboratory data, Radiology results, medications all are reviewed by my self and care is discussed extensively with the patient  and the patients nurse and all agree with plan and in addition see orders and plans    Electronically signed by Kathy Becerra MD on 9/7/2021

## 2021-09-07 NOTE — PROGRESS NOTES
SBAR report given to primary nurse Deborah Dumont. Educated patient on importance of the use of a gait belt and the purpose of pushing Protonix over 2 minutes.

## 2021-09-07 NOTE — PROGRESS NOTES
Clinical Pharmacy Note    Warfarin consult follow-up    Recent Labs     09/07/21  0454   INR 2.69*     Recent Labs     09/04/21  1503 09/05/21  0530 09/07/21  0454   HGB 14.1 15.2 13.1   HCT 41.9 45.7 39.9   * 92* 98*       Significant Drug-Drug Interactions:  New warfarin drug-drug interactions: none  Discontinued drug-drug interactions: none  Current warfarin drug-drug interactions: Fluconazole 400 mg IV Q24H, Propafenone 150 mg BID ()    Date INR Warfarin Dose   9/5/2021 1.66 6 mg    9/6/2021 2.18   1 mg     9/7/2021 2.69  1 mg                                       Notes:                   Daily PT/INR until stable within therapeutic range.       Luís Mcnamara, RabiaD

## 2021-09-07 NOTE — CARE COORDINATION
9/7/21, 2:39 PM EDT  DISCHARGE PLANNING EVALUATION:    Billy Alas       Admitted: 9/4/2021/ [facility] day: 3   Location: [facility]14/014-A Reason for admit: Abdominal pain, epigastric [R10.13]  Hyponatremia [E87.1]   PMH:  has a past medical history of Actinic keratosis, Arthritis, Atrial fibrillation ([facility] [address] CAD (coronary artery disease), Cancer ([facility] [address] Cervical prolapse, Diverticulosis, Endocervical polyp, Heel spur, Hip pain, Hypertension, Nasal septal deviation, Osteoporosis, Stress incontinence, Syncope, and Vaginal prolapse. Procedure:   9/5 CXR  Patchy left basilar opacity as evidence for atelectasis or infiltrate. Barriers to Discharge: UTI/Abdominal Pain/Hyponatremia. History s/p esophageal dilation and candidiasis. Na+ 138. Nephrology following. Diet advanced today. ID following; plans PO Diflucan at discharge  PCP: Niecy Camacho MD  Readmission Risk Score: 19%    Patient Goals/Plan/Treatment Preferences: plans home w spouse; has cane, walker; therapy following; current w [facility] therapy  Transportation/Food Security/Housekeeping Addressed:  No issues identified.

## 2021-09-08 ENCOUNTER — TELEPHONE (OUTPATIENT)
Dept: FAMILY MEDICINE CLINIC | Age: 86
End: 2021-09-08

## 2021-09-08 VITALS
HEART RATE: 88 BPM | HEIGHT: 65 IN | TEMPERATURE: 97.5 F | RESPIRATION RATE: 16 BRPM | SYSTOLIC BLOOD PRESSURE: 136 MMHG | BODY MASS INDEX: 26.24 KG/M2 | WEIGHT: 157.5 LBS | DIASTOLIC BLOOD PRESSURE: 68 MMHG | OXYGEN SATURATION: 95 %

## 2021-09-08 LAB
ANION GAP SERPL CALCULATED.3IONS-SCNC: 8 MEQ/L (ref 8–16)
BUN BLDV-MCNC: 11 MG/DL (ref 7–22)
CALCIUM SERPL-MCNC: 9.3 MG/DL (ref 8.5–10.5)
CHLORIDE BLD-SCNC: 103 MEQ/L (ref 98–111)
CO2: 28 MEQ/L (ref 23–33)
CORTISOL COLLECTION INFO: NORMAL
CORTISOL: 27.78 UG/DL
CORTISOL: 35.02 UG/DL
CORTISOL: 38.72 UG/DL
CREAT SERPL-MCNC: 0.8 MG/DL (ref 0.4–1.2)
GFR SERPL CREATININE-BSD FRML MDRD: 68 ML/MIN/1.73M2
GLUCOSE BLD-MCNC: 94 MG/DL (ref 70–108)
INR BLD: 2.77 (ref 0.85–1.13)
POTASSIUM SERPL-SCNC: 4 MEQ/L (ref 3.5–5.2)
SODIUM BLD-SCNC: 139 MEQ/L (ref 135–145)

## 2021-09-08 PROCEDURE — 97530 THERAPEUTIC ACTIVITIES: CPT

## 2021-09-08 PROCEDURE — 85610 PROTHROMBIN TIME: CPT

## 2021-09-08 PROCEDURE — 36415 COLL VENOUS BLD VENIPUNCTURE: CPT

## 2021-09-08 PROCEDURE — 97535 SELF CARE MNGMENT TRAINING: CPT

## 2021-09-08 PROCEDURE — 2580000003 HC RX 258: Performed by: INTERNAL MEDICINE

## 2021-09-08 PROCEDURE — 80048 BASIC METABOLIC PNL TOTAL CA: CPT

## 2021-09-08 PROCEDURE — 6360000002 HC RX W HCPCS: Performed by: INTERNAL MEDICINE

## 2021-09-08 PROCEDURE — 6370000000 HC RX 637 (ALT 250 FOR IP): Performed by: INTERNAL MEDICINE

## 2021-09-08 PROCEDURE — 99232 SBSQ HOSP IP/OBS MODERATE 35: CPT | Performed by: INTERNAL MEDICINE

## 2021-09-08 PROCEDURE — 97110 THERAPEUTIC EXERCISES: CPT

## 2021-09-08 PROCEDURE — 82533 TOTAL CORTISOL: CPT

## 2021-09-08 PROCEDURE — 6370000000 HC RX 637 (ALT 250 FOR IP): Performed by: NURSE PRACTITIONER

## 2021-09-08 RX ORDER — FLUCONAZOLE 200 MG/1
200 TABLET ORAL DAILY
Status: DISCONTINUED | OUTPATIENT
Start: 2021-09-09 | End: 2021-09-08

## 2021-09-08 RX ORDER — WARFARIN SODIUM 2 MG/1
2 TABLET ORAL
Status: COMPLETED | OUTPATIENT
Start: 2021-09-08 | End: 2021-09-08

## 2021-09-08 RX ORDER — FLUCONAZOLE 200 MG/1
200 TABLET ORAL DAILY
Status: DISCONTINUED | OUTPATIENT
Start: 2021-09-08 | End: 2021-09-08 | Stop reason: HOSPADM

## 2021-09-08 RX ORDER — FLUCONAZOLE 200 MG/1
200 TABLET ORAL DAILY
Qty: 14 TABLET | Refills: 0 | Status: SHIPPED | OUTPATIENT
Start: 2021-09-09 | End: 2021-09-23

## 2021-09-08 RX ADMIN — COSYNTROPIN 250 MCG: 0.25 INJECTION, POWDER, LYOPHILIZED, FOR SOLUTION INTRAMUSCULAR; INTRAVENOUS at 08:32

## 2021-09-08 RX ADMIN — CEFTRIAXONE SODIUM 1000 MG: 1 INJECTION, POWDER, FOR SOLUTION INTRAMUSCULAR; INTRAVENOUS at 11:50

## 2021-09-08 RX ADMIN — PROPAFENONE HYDROCHLORIDE 150 MG: 150 TABLET, FILM COATED ORAL at 09:06

## 2021-09-08 RX ADMIN — SODIUM CHLORIDE, PRESERVATIVE FREE 10 ML: 5 INJECTION INTRAVENOUS at 08:32

## 2021-09-08 RX ADMIN — FLUCONAZOLE IN SODIUM CHLORIDE 200 MG: 2 INJECTION, SOLUTION INTRAVENOUS at 02:57

## 2021-09-08 RX ADMIN — PANTOPRAZOLE SODIUM 40 MG: 40 TABLET, DELAYED RELEASE ORAL at 05:51

## 2021-09-08 RX ADMIN — FLUCONAZOLE 200 MG: 200 TABLET ORAL at 14:49

## 2021-09-08 RX ADMIN — METOPROLOL TARTRATE 12.5 MG: 25 TABLET, FILM COATED ORAL at 09:06

## 2021-09-08 RX ADMIN — WARFARIN SODIUM 2 MG: 2 TABLET ORAL at 16:27

## 2021-09-08 ASSESSMENT — PAIN SCALES - GENERAL
PAINLEVEL_OUTOF10: 0
PAINLEVEL_OUTOF10: 0

## 2021-09-08 NOTE — PROGRESS NOTES
CLINICAL PHARMACY: DISCHARGE MED RECONCILIATION/REVIEW    [facility]) Select Patient?: Yes  Total # of Interventions Recommended: 1 -    -   - Updated Order #: 1  Total # Interventions Accepted: 1  Intervention Severity:   - Level 1 Intervention Present?: No   - Level 2 #: 0   - Level 3 #: 1   Time Spent (min): 30    Additional Documentation:    Rabia CifuentesD

## 2021-09-08 NOTE — PROGRESS NOTES
Clinical Pharmacy Note    Warfarin consult follow-up    Recent Labs     09/08/21  0454   INR 2.77*     Recent Labs     09/07/21  0454   HGB 13.1   HCT 39.9   PLT 98*       Significant Drug-Drug Interactions:  New warfarin drug-drug interactions: none  Discontinued drug-drug interactions: none  Current warfarin drug-drug interactions: Fluconazole 200 mg IV Q24H, Propafenone 150 mg BID ()  Date INR Warfarin Dose   9/5/2021 1.66 6 mg    9/6/2021 2.18   1 mg     9/7/2021 2.69  1 mg    9/8/21  2.77  2 mg                                Notes:                   Daily PT/INR until stable within therapeutic range.       Kim Oconnor, PharmD

## 2021-09-08 NOTE — PROGRESS NOTES
[facility]         Renal Inpatient Follow-Up note         9/8/2021 7:42 AM    Pt Name:   Chadwick Parr  YOB: 1934  Attending:   Mike Garcia MD    Chief Complaint : Chadwick Parr is a 80 y.o. female being followed by nephrology for hyponatremia    Interval History :   Patient seen and examined by me. No distress  Feels well denies any complaints  No chest pain or shortness of breath or abdominal pain  Tolerating diet well. Scheduled Medications :    fluconazole  200 mg IntraVENous Q24H    cosyntropin  250 mcg IntraVENous Once    pantoprazole  40 mg Oral QAM AC    metoprolol tartrate  12.5 mg Oral BID    propafenone  150 mg Oral BID    warfarin (COUMADIN) daily dosing (placeholder)   Other RX Placeholder    sodium chloride flush  5-40 mL IntraVENous 2 times per day    cefTRIAXone (ROCEPHIN) IV  1,000 mg IntraVENous Q24H    fentanNYL  50 mcg IntraVENous Once      sodium chloride         Vitals :  BP (!) 159/92   Pulse 92   Temp 97.4 °F (36.3 °C) (Oral)   Resp 16   Ht 5' 5\" (1.651 m)   Wt 157 lb 8 oz (71.4 kg)   SpO2 95%   BMI 26.21 kg/m²     24HR INTAKE/OUTPUT:      Intake/Output Summary (Last 24 hours) at 9/8/2021 0742  Last data filed at 9/8/2021 0325  Gross per 24 hour   Intake 1599.27 ml   Output 300 ml   Net 1299.27 ml     Last 3 weights  Wt Readings from Last 3 Encounters:   09/08/21 157 lb 8 oz (71.4 kg)   08/30/21 147 lb 6.4 oz (66.9 kg)   08/23/21 162 lb (73.5 kg)           Physical Exam :  General Appearance:  Well developed.  No distress  Mouth/Throat:  Oral mucosa moist  Neck:  Supple, no JVD  Lungs:  Breath sounds: clear  Heart[de-identified]  S1,S2 heard  Abdomen:  Soft, non - tender  Musculoskeletal:  Edema -no edema noted         Last 3 CBC   Recent Labs     09/07/21  0454   WBC 3.6*   RBC 4.81   HGB 13.1   HCT 39.9   PLT 98*     Last 3 CMP  Recent Labs     09/06/21  1037 09/06/21  1528 09/07/21  0232 09/07/21  0454 09/08/21  0454   * < > 134* 138 139   K 4.1  --   --  4.0 4.0     --   --  104 103   CO2 23  --   --  25 28   BUN 11  --   --  7 11   CREATININE 1.0  --   --  0.9 0.8   CALCIUM 8.0*  --   --  8.9 9.3    < > = values in this interval not displayed. ASSESSMENT / Plan   1 Renal -renal function appears to be stable at baseline . 2 Hyponatremia most likely secondary to poor oral intake improved with IV fluids. Currently 139 . Tolerating diet well.  3 Essential hypertension running reasonable  4 Esophageal candidiasis  5 Atrial fibrillation  6 Urinary tract infection  7 Meds reviewed discussed with the patient. Will sign out . Please call with any questions or concerns    Dr. Michael Barrow MD, M,D.  [facility].

## 2021-09-08 NOTE — DISCHARGE SUMMARY
Discharge Summary  9/8/2021  3:08 PM    Patient:  Bar Guido  YOB: 1934    MRN: [id number]   Acct: [de-identified]   [id number]   Primary Care Physician: Lavern Bacon MD    Admit date:  9/4/2021    Discharge date:  9/8/2021    Discharge Diagnoses:    Esophageal candidasis   Hyponatremia  UTI  Gastritis  A flutter/A fib       Discharge Medications:       Henry Vance"   Home Medication Instructions GET:[id number]    Printed on:09/08/21 9477   Medication Information                      benzocaine-menthol (CEPACOL SORE THROAT) 15-3.6 MG lozenge  Take 1 lozenge by mouth every 2 hours as needed for Sore Throat             fluconazole (DIFLUCAN) 200 MG tablet  Take 1 tablet by mouth daily for 14 days             Handicap Placard MISC  by Does not apply route Expires 6/2022             MEGARED OMEGA-3 KRILL  MG CAPS  Take by mouth daily              metoprolol tartrate (LOPRESSOR) 25 MG tablet  TAKE HALF OF A TABLET TWICE A DAY. Multiple Vitamins-Minerals (ICAPS AREDS 2) CAPS  Take 2 tablets by mouth daily              ondansetron (ZOFRAN ODT) 4 MG disintegrating tablet  Take 1 tablet by mouth every 8 hours as needed for Nausea             pantoprazole (PROTONIX) 40 MG tablet  Take 1 tablet by mouth once daily             propafenone (RYTHMOL) 150 MG tablet  Take 1 tablet by mouth 2 times daily Take 300 mg of propafenone with 25 mg of metoprolol once and start 150 mg propafenone q 12 hours from next day             warfarin (COUMADIN) 2.5 MG tablet  1. Take 2 tablets by mouth daily. Or as directed by INR results.                Scheduled Meds: Scheduled Meds:   warfarin  2 mg Oral Once    fluconazole  200 mg Oral Daily    pantoprazole  40 mg Oral QAM AC    metoprolol tartrate  12.5 mg Oral BID    propafenone  150 mg Oral BID    warfarin (COUMADIN) daily dosing (placeholder)   Other RX Placeholder    sodium chloride flush  5-40 mL IntraVENous 2 times per day    cefTRIAXone (ROCEPHIN) IV  1,000 mg IntraVENous Q24H    fentanNYL  50 mcg IntraVENous Once     Continuous Infusions:   sodium chloride       PRN Meds:.benzocaine-menthol, ondansetron **OR** ondansetron, polyethylene glycol, acetaminophen **OR** acetaminophen, magnesium sulfate, potassium chloride **OR** potassium alternative oral replacement **OR** potassium chloride, sodium chloride flush, sodium chloride, HYDROmorphone **OR** HYDROmorphone, prochlorperazine  Continuous Infusions:   sodium chloride         Intake/Output Summary (Last 24 hours) at 9/8/2021 1508  Last data filed at 9/8/2021 0934  Gross per 24 hour   Intake 730 ml   Output --   Net 730 ml       Diet:  Adult Oral Nutrition Supplement; Standard High Calorie/High Protein Oral Supplement  ADULT DIET;  Dysphagia - Soft and Bite Sized; GI [place] (GERD/Peptic Ulcer)    Activity:  Activity as tolerated (Patient may move about with assist as indicated or with supervision.)    Follow-up:  in the next few weeks with Kandace Loyola MD    Consultants:  GI, ID, nephrology     Objective:  Lab Results   Component Value Date    WBC 3.6 09/07/2021    RBC 4.81 09/07/2021    RBC 3.84 07/29/2011    HGB 13.1 09/07/2021    HCT 39.9 09/07/2021    MCV 83.0 09/07/2021    MCH 27.2 09/07/2021    MCHC 32.8 09/07/2021    RDW 15.8 08/23/2021    PLT 98 09/07/2021    MPV 10.2 09/07/2021     Lab Results   Component Value Date     09/08/2021    K 4.0 09/08/2021    K 4.7 09/05/2021     09/08/2021    CO2 28 09/08/2021    BUN 11 09/08/2021    CREATININE 0.8 09/08/2021    GLUCOSE 94 09/08/2021    CALCIUM 9.3 09/08/2021     Lab Results   Component Value Date    CALCIUM 9.3 09/08/2021     No results found for: IONCA  Lab Results   Component Value Date    MG 2.1 09/22/2017     No results found for: PHOS  No results found for: BNP  Lab Results   Component Value Date    ALKPHOS 98 09/04/2021    ALT 12 09/04/2021    AST 18 09/04/2021    PROT 6.2 09/04/2021    BILITOT 0.6 09/04/2021 BILIDIR <0.2 09/04/2021    LABALBU 4.1 09/04/2021     Lab Results   Component Value Date    LACTA 0.9 08/23/2021     Lab Results   Component Value Date    AMYLASE 44 07/15/2017     Lab Results   Component Value Date    LIPASE 42.5 09/04/2021     Lab Results   Component Value Date    CHOL 212 01/19/2021    TRIG 135 01/19/2021    HDL 57 01/19/2021     No results for input(s): POCGLU in the last 72 hours. No results for input(s): CKTOTAL, CKMB, TROPONINI in the last 72 hours. Lab Results   Component Value Date    LABA1C 5.5 06/07/2017     Lab Results   Component Value Date    INR 2.77 09/08/2021    PROTIME 13.8 09/02/2021     No results found for: PHART, PO2ART, UTY8DPO, CJE2BBJ, [facility] Course: clinical course has improved    Initial H+P: 80-year-old female patient was hospitalized at [facility] from August 27 through August 31 for epigastric pain, dysphagia and vomiting. Patient had EGD and was found to have esophageal candidiasis and esophageal stricture subsequently undergone endoscopic dilatation also she was found to have Streptococcus UTI discharged on Augmentin. Patient was treated with nystatin swish and swallow. On day of discharge patient was tolerating diet and epigastric pain has subsided. Patient had epigastric pain nausea and vomiting since yesterday. She vomited twice. Also she had watery diarrhea 2-3 small bowel movements daily for 2 days. According to the daughter, nausea triggered by nystatin. In the ER CT abdomen pelvis unremarkable. Epigastric pain, nausea and vomiting probably due to esophageal candidiasis. 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  He recommended stopping Augmentin and was placed on IV Rocephin and dose of IV Diflucan was increased; pharmacy was following to closely monitor interaction between Coumadin and Diflucan. Hyponatremia was suspected to be due to poor oral intake and did improve with IV fluids. GI was consulted and placed pt on aspiration precautions and IV Protonix, which was eventually transitioned to PO. Repeat urine culture was negative and hence Rocephin was stopped day of discharge. ID recommends PO Diflucan for 2 weeks. Cortisol stimulation test was also performed on day of discharge to r/o adrenal insufficiency and the results were WNL. Pt stable for discharge. Vitals: /66   Pulse 90   Temp 97.4 °F (36.3 °C) (Oral)   Resp 16   Ht 5' 5\" (1.651 m)   Wt 157 lb 8 oz (71.4 kg)   SpO2 95%   BMI 26.21 kg/m²   Physical Exam:  General appearance:  No apparent distress, appears stated age and cooperative. HEENT:  Normal cephalic, atraumatic without obvious deformity. Pupils equal, round, and reactive to light.  Extra ocular muscles intact. Conjunctivae/corneas clear. Healing wound over nose and left forehead  Neck: Supple  Respiratory:  clinically clear bilaterally  Cardiovascular:  Regular rhythm with normal S1/S2 without rubs or gallops. Abdomen: Soft, nontender , non-distended with normal bowel sounds. Musculoskeletal:  No clubbing, cyanosis  or edema bilaterally .   Neurologic: Alert and oriented x3 no gross motor deficits      Disposition: home    Condition: Stable    Over 35 minutes spent on encounter  Assessment and plan of care discussed with supervising physician, Dr Pino Alamo. Ramin Jarquin MD,   Addendum/attestation by Merlyn De La Cruz MD:  I have seen and examined the patient independently. Face to face evaluation and examination was performed. The above evaluation and note has been reviewed. Laboratory and radiological data were reviewed. I Have discussed with Rogelio Parra CNP about this patient in detail.   The above assessment and plan has been reviewed. Please see my modifications mentioned below.       My modifications:  Electronically signed by Cici Darnell MD on 9/8/2021 at 3:09 PM       Copy: Primary Care Physician: Iban Lancaster MD  Internal Medicine

## 2021-09-08 NOTE — PROGRESS NOTES
Clinical Pharmacy Note                                                       Warfarin Discharge Recommendations      Pt discharged from [facility] today after admission for lower abdominal pain. INR today:  Recent Labs     09/08/21  0454   INR 2.77*       Coumadin 2.5 mg tabs    Interacting medications at discharge: fluconazole 200mg PO daily, Propafenone 150 mg BID (home)    INR goal during admission: 2-3    Recommendations for discharge:   Date Warfarin Dose   9/8/21 2mg (given prior to discharge)   9/9/21 2.5mg   9/10/21 INR     Provider dosing warfarin: [facility]    Recheck INR:  9/10/21 at [address] at 12:00pm with results to PCP.     Bo Watkins, RabiaD

## 2021-09-08 NOTE — PROGRESS NOTES
Patient discharged to home, patient's daughter, Diane Whitehead, to bring patient home via private vehicle. Transport takes patient down to discharge lobby via wheelchair. Discharge packet went over with patient including discharge instructions. Discharge instructions went over with patient include appointment information, when to start new medications, and importance of following medication administration instructions, along with information on a bland diet. Patient left with belongings and discharge packet and medications from meds to beds. Patient's questions and concerns were addressed, patient leaves stable and ambulatory. Patient was given Coumadin dose for the day prior to D/C.

## 2021-09-08 NOTE — CARE COORDINATION
9/8/21, 1:44 PM EDT  Plans home w spouse when medically cleared  Patient goals/plan/ treatment preferences discussed by  and . Patient goals/plan/ treatment preferences reviewed with patient/ family. Patient/ family verbalize understanding of discharge plan and are in agreement with goal/plan/treatment preferences. Understanding was demonstrated using the teach back method. AVS provided by RN at time of discharge, which includes all necessary medical information pertaining to the patients current course of illness, treatment, post-discharge goals of care, and treatment preferences.

## 2021-09-08 NOTE — DISCHARGE INSTR - MEDS
Clinical Pharmacy Note     Warfarin consult follow-up         Recent Labs     09/08/21  0454   INR 2.77*          Recent Labs     09/07/21  0454   HGB 13.1   HCT 39.9   PLT 98*         Significant Drug-Drug Interactions:  New warfarin drug-drug interactions: none  Discontinued drug-drug interactions: none  Current warfarin drug-drug interactions: Fluconazole 200 mg IV Q24H, Propafenone 150 mg BID (HM)  Date INR Warfarin Dose   9/5/2021 1.66 6 mg    9/6/2021 2. 18   1 mg     9/7/2021 2.69  1 mg    9/8/21  2.77  2 mg          Notes:                   Daily PT/INR until stable within therapeutic range. Clinical Pharmacy Note   Warfarin Discharge Recommendations      Pt discharged from [facility] today after admission for lower abdominal pain. INR today:  Recent Labs     09/08/21  0454   INR 2.77*       Coumadin 2.5 mg tabs    Interacting medications at discharge: fluconazole 200mg PO daily, Propafenone 150 mg BID (home)    INR goal during admission: 2-3    Recommendations for discharge:   Date Warfarin Dose   9/8/21 2mg (given prior to discharge)   9/9/21 2.5mg   9/10/21 INR     Provider dosing warfarin: [facility]    Recheck INR:  9/10/21 at 12:00pm at [facility] (appointment made) with results to PCP.

## 2021-09-08 NOTE — TELEPHONE ENCOUNTER
----- Message from Desiree Dean sent at 9/8/2021  9:37 AM EDT -----  Subject: Appointment Request    Reason for Call: Routine Hospital Follow Up    QUESTIONS  Type of Appointment? Established Patient  Reason for appointment request? Available appointments did not meet   patient need  Additional Information for Provider? Ivonne Reyna from [place] [address] called and stated   that the pt will be getting discharged from the hospital today 09/08/2021. They would like to have a follow up appointment in office. No appointments   in office are coming up on our end until Dec. Please call pt back to   schedule appointment.   ---------------------------------------------------------------------------  --------------  [address]  What is the best way for the office to contact you? OK to leave message on   voicemail  Preferred Call Back Phone Number? [phone]  ---------------------------------------------------------------------------  --------------  SCRIPT ANSWERS  Relationship to Patient? Third Party  Representative Name? Keisha Khan  (Patient requests to see provider urgently. )? No  (Has the patient been discharged from the hospital within 2 business days   AND does not have a Telephone Encounter  Follow Up From [address] [place] [address]   documented in [address]?)? No  Do you have any questions for your primary care provider that need to be   answered prior to your appointment? (Use RN Triage if question pertains to   anything on the red flag list)? No  (Patient needs follow up visit after hospital discharge) Book first   available appointment within 7 days OF DISCHARGE, if no appt, proceed to   book the next available time slot within 14 days OF DISCHARGE AND Send   Message to Provider. [address] Follow Up appointment cannot be booked   beyond 14 Days and should result in a Message to Provider. ? Yes   Have you been diagnosed with, awaiting test results for, or told that you   are suspected of having COVID-19 (Coronavirus)?  (If patient has tested   negative or was tested as a requirement for work, school, or travel and   not based on symptoms, answer no)? No  Do you currently have flu-like symptoms including fever or chills, cough,   shortness of breath, difficulty breathing, or new loss of taste or smell? No  Have you had close contact with someone with COVID-19 in the last 14 days? No  (Service Expert  click yes below to proceed with [employer] As Usual   Scheduling)?  Yes

## 2021-09-09 ENCOUNTER — CARE COORDINATION (OUTPATIENT)
Dept: CASE MANAGEMENT | Age: 86
End: 2021-09-09

## 2021-09-09 DIAGNOSIS — R10.30 LOWER ABDOMINAL PAIN: Primary | ICD-10-CM

## 2021-09-09 PROCEDURE — 1111F DSCHRG MED/CURRENT MED MERGE: CPT | Performed by: FAMILY MEDICINE

## 2021-09-09 NOTE — CARE COORDINATION
[place] [facility] Transitions Initial Follow Up Call    Call within 2 business days of discharge: Yes    Patient: Marco A Chang Patient : 1934   MRN: [id number]  Reason for Admission:  Abdominal Pain , Emeisis  Discharge Date: 21 RARS: Readmission Risk Score: 19      Last Discharge [facility]       Complaint Diagnosis Description Type Department Provider    21 Abdominal Pain; Emesis Abdominal pain, epigastric . .. ED to Hosp-Admission (Discharged) (ADMITTED) GISELLE AppiahK Tari Nielson MD; Dori. .. Spoke with: Arlet Monreal who is with [facility], states she is tired but doing well at home. She is able to eat without vomiting. We reviewed medications, they did get Diflucan but do not need the throat lozenges. Reviewed PCP appt, aware 2021 and Coumadin clinic 9/10. Transitions of Care Initial Call    Was this an external facility discharge? No Discharge Facility: [facility]      Challenges to be reviewed by the provider   Additional needs identified to be addressed with provider: No  none             Method of communication with provider : none      Advance Care Planning:   Does patient have an Advance Directive: not on file. Was this a readmission? Yes  Patient stated reason for admission: nausea/vomiting / abdominal pain  Patients top risk factors for readmission: lack of knowledge about disease, medication management and polypharmacy    Care Transition Nurse (CTN) contacted the caregiver by telephone to perform post hospital discharge assessment. Verified name and  with caregiver as identifiers. Provided introduction to self, and explanation of the CTN role. CTN reviewed discharge instructions, medical action plan and red flags with caregiver who verbalized understanding. Caregiver given an opportunity to ask questions and does not have any further questions or concerns at this time. Were discharge instructions available to patient? Yes.  Reviewed appropriate site of care based on symptoms and resources available to patient including: PCP and When to call 911. The caregiver agrees to contact the PCP office for questions related to their healthcare. Medication reconciliation was performed with caregiver, who verbalizes understanding of administration of home medications. Advised obtaining a 90-day supply of all daily and as-needed medications. Covid Risk Education     Educated patient about risk for severe COVID-19 due to risk factors according to CDC guidelines. CTN reviewed discharge instructions, medical action plan and red flag symptoms with the caregiver who verbalized understanding. Discussed COVID vaccination status: No. Education provided on COVID-19 vaccination as appropriate. Discussed exposure protocols and quarantine with CDC Guidelines. Caregiver was given an opportunity to verbalize any questions and concerns and agrees to contact CTN or health care provider for questions related to their healthcare. Reviewed and educated caregiver on any new and changed medications related to discharge diagnosis. Was patient discharged with a pulse oximeter? No Discussed and confirmed pulse oximeter discharge instructions and when to notify provider or seek emergency care. CTN provided contact information. Plan for follow-up call in 5-7 days based on severity of symptoms and risk factors.   Plan for next call: symptom management-nausea vomiting / appetite and follow up appointment-PCP 9/12/2021            Facility: [unfilled]    Non-face-to-face services provided:  Obtained and reviewed discharge summary and/or continuity of care documents  Education of patient/family/caregiver/guardian to support self-management-Yes  Assessment and support for treatment adherence and medication management-Yes    Care Transitions 24 Hour Call    Schedule Follow Up Appointment with PCP: Completed  Do you have any ongoing symptoms?: No  Do you have a copy of your discharge instructions?: Yes  Do you have all of your prescriptions and are they filled?: Yes  Have you been contacted by a [address]?: No  Have you scheduled your follow up appointment?: Yes  How are you going to get to your appointment?: Car - family or friend to transport  Were you discharged with any Home Care or Post Acute Services: No  Do you feel like you have everything you need to keep you well at home?: Yes  Care Transitions Interventions         Follow Up  Future Appointments   Date Time Provider Gina Beauchamp   9/10/2021 12:00 PM [address] [facility] [place]   9/13/2021  3:40 PM Lavern Bacon MD [facility]   9/15/2021 11:45 AM Kesha Smart MD [facility]   11/11/2021 11:30 AM Marichuy Morejon MD [facility]   12/21/2021  3:00 PM Lavern Bacon MD [facility]       Ally Suarez, RN

## 2021-09-09 NOTE — PROGRESS NOTES
Physician Progress Note      Emil Mae  CSN #:                  [id number]  :                       1934  ADMIT DATE:       2021 2:27 PM  [address] DATE:        2021 4:47 PM  RESPONDING  PROVIDER #:        Carly Murphy MD          QUERY TEXT:    Attending,    Patient admitted with n/v/abdominal pain. H&P notes \"probably due to   esophageal candidiasis,\" however  IM note states, \"N/V/abd pain   improved. Shaaron Money Shaaron Money S/p recent EGD with esophageal candidiasis. \"  If possible, please   respond below and document in the progress notes and discharge summary if the   esophageal candidiasis was: The medical record reflects the following:  Risk Factors: advanced age  Clinical Indicators: H&P notes \"probably due to esophageal candidiasis,\"   however  IM note states, \"N/V/abd pain improved. Shaaron Money Shaaron Money S/p recent EGD with   esophageal candidiasis. \"  Treatment: GI consult, IV Diflucan, IVF, Protonix, labs    Thank you! Sanjiv Finley, RN, BSN, RHIT, CCDS  RN Clinical   [phone]  Options provided:  -- Esophageal candidiasis confirmed after study  -- Esophageal candidiasis treated and resolved  -- Esophageal candidiasis ruled out after study  -- Other - I will add my own diagnosis  -- Disagree - Not applicable / Not valid  -- Disagree - Clinically unable to determine / Unknown  -- Refer to Clinical Documentation Reviewer    PROVIDER RESPONSE TEXT:    Esophageal candidiasis confirmed after study. Query created by: Artemio Goodrich on 2021 9:23 AM      QUERY TEXT:    Attending,    Pt admitted with n/v/abdominal pain. H&P states, \"UTI\" however  IM note   states, \"Recent UTI + enterococcus. \"  Urine culture this admission showed no   growth. Pt noted to be on Augmentin prior to admission.   If possible, please   respond below and document in the progress notes and discharge summary the   clinical indicators to support this diagnosis on current admission or document   if the UTI is PMH only. The medical record reflects the following:  Risk Factors: advanced age  Clinical Indicators: H&P states, \"UTI\" however 9/6 IM note states, \"Recent UTI   + enterococcus. \"  Urine culture this admission showed no growth. Pt noted to   be on Augmentin prior to admission  Treatment: IV Rocephin, IV Diflucan, IVF, Tylenol, labs, ID & Nephrology   consults    Thank you! Robert Quiñonez RN, BSN, RHIT, CCDS  RN Clinical   [phone]  Options provided:  -- UTI currently being treated/evaluated as evidenced by, Please document   supportive evidence. -- UTI is PMH only  -- Other - I will add my own diagnosis  -- Disagree - Not applicable / Not valid  -- Disagree - Clinically unable to determine / Unknown  -- Refer to Clinical Documentation Reviewer    PROVIDER RESPONSE TEXT:    UTI is currently being treated/evaluated as evidenced by UTI was incompletely   treated prior to admission due to intolerance of Augmentin    Query created by: Sonali Paredes on 9/7/2021 9:43 AM      QUERY TEXT:    Attending,    Patient admitted with n/v/abdominal pain. H&P states, \"Hyponatremia. ..likely   due to SIADH. \"  9/6 IM note only states, \"hyponatremia. \"  If possible, please   respond below and document in the progress notes and discharge summary if you   are evaluating and /or treating any of the following: The medical record reflects the following:  Risk Factors: poor oral intake, advanced age  Clinical Indicators: H&P states, \"Hyponatremia. ..likely due to SIADH. \"  9/6 IM   note only states, \"hyponatremia. \"  Treatment: IVF, labs, Nephrology consult    Thank you!     Robert Quiñonez RN, BSN, RHIT, CCDS  RN Clinical   [phone]  Options provided:  -- SIADH ruled out, hyponatremia confirmed  -- SIADH confirmed  -- Other - I will add my own diagnosis  -- Disagree - Not applicable / Not valid  -- Disagree - Clinically unable to determine / Unknown  -- Refer to Clinical Documentation Reviewer    PROVIDER RESPONSE TEXT:    After study, SIADH was ruled out and hyponatremia was confirmed.     Query created by: Aleta Harden on 9/7/2021 9:50 AM      Electronically signed by:  Andre Dang MD 9/9/2021 10:56 AM

## 2021-09-10 ENCOUNTER — HOSPITAL ENCOUNTER (OUTPATIENT)
Dept: PHARMACY | Age: 86
Setting detail: THERAPIES SERIES
Discharge: HOME | End: 2021-09-10
Payer: MEDICARE

## 2021-09-10 DIAGNOSIS — I48.0 PAROXYSMAL ATRIAL FIBRILLATION WITH RAPID VENTRICULAR RESPONSE (HCC): ICD-10-CM

## 2021-09-10 DIAGNOSIS — Z79.01 ANTICOAGULATION MONITORING BY PHARMACIST: Primary | ICD-10-CM

## 2021-09-10 LAB
INR BLD: 1.8
PROTIME: 21.7 SECONDS

## 2021-09-10 PROCEDURE — 36416 COLLJ CAPILLARY BLOOD SPEC: CPT

## 2021-09-10 PROCEDURE — 99211 OFF/OP EST MAY X REQ PHY/QHP: CPT

## 2021-09-10 PROCEDURE — 85610 PROTHROMBIN TIME: CPT

## 2021-09-10 NOTE — PROGRESS NOTES
ANTICOAGULATION SERVICE    Date of Clinic Visit:  9/10/2021    Tracey Carter is a 80 y.o. female who presents to clinic today for anticoagulation monitoring and adjustment. Recent INR Results:  Internal QC passed  Lab Results   Component Value Date    INR 1.8 09/10/2021    INR 2.77 (H) 2021       Current Warfarin Dosage:  Dosing Plan  As of 9/10/2021    TTR:  88.8 % (3.8 y)   Full warfarin instructions:  9/10: 2.5 mg; : 2.5 mg; : 2.5 mg; Otherwise 5 mg every day               Assessment/Plan:    Modify warfarin dose as noted above: INR is slightly low today but Rosendo Shea had a couple 1 mg doses in the hospital earlier in the week due to starting her on Fluconazole. Rosendo Shea was discharged with instructions to take 2.5 mg daily. I will continue with the 2.5 mg daily dose while on fluconazole and recheck INR is 3 days. Next Clinic Appointment:  Return date  As of 9/10/2021    TTR:  88.8 % (3.8 y)   Next INR check:  2021             Please call [facility] at [phone] with any questions. Thanks!   Jermaine Gama [facility]  Anticoagulation Service Pharmacist  9/10/2021 12:12 PM     For Pharmacy Admin Tracking Only     Intervention Detail: Adherence Monitorin   Total # of Interventions Recommended: 0   Total # of Interventions Accepted: 0   Time Spent (min): 15

## 2021-09-11 LAB
BLOOD CULTURE, ROUTINE: NORMAL
BLOOD CULTURE, ROUTINE: NORMAL

## 2021-09-13 ENCOUNTER — OFFICE VISIT (OUTPATIENT)
Dept: FAMILY MEDICINE CLINIC | Age: 86
End: 2021-09-13
Payer: MEDICARE

## 2021-09-13 ENCOUNTER — HOSPITAL ENCOUNTER (OUTPATIENT)
Dept: PHARMACY | Age: 86
Setting detail: THERAPIES SERIES
Discharge: HOME | End: 2021-09-13
Payer: MEDICARE

## 2021-09-13 VITALS
HEIGHT: 65 IN | HEART RATE: 59 BPM | BODY MASS INDEX: 25.99 KG/M2 | DIASTOLIC BLOOD PRESSURE: 72 MMHG | WEIGHT: 156 LBS | SYSTOLIC BLOOD PRESSURE: 138 MMHG | OXYGEN SATURATION: 96 % | TEMPERATURE: 98.2 F

## 2021-09-13 DIAGNOSIS — Z79.01 ANTICOAGULATION MONITORING BY PHARMACIST: Primary | ICD-10-CM

## 2021-09-13 DIAGNOSIS — I48.0 PAROXYSMAL ATRIAL FIBRILLATION WITH RAPID VENTRICULAR RESPONSE (HCC): ICD-10-CM

## 2021-09-13 DIAGNOSIS — Z91.81 AT HIGH RISK FOR FALLS: ICD-10-CM

## 2021-09-13 DIAGNOSIS — Z46.89 PESSARY MAINTENANCE: ICD-10-CM

## 2021-09-13 DIAGNOSIS — B37.81 ESOPHAGEAL CANDIDIASIS (HCC): Primary | ICD-10-CM

## 2021-09-13 LAB
INR BLD: 2.1
PROTIME: 25.4 SECONDS

## 2021-09-13 PROCEDURE — 99211 OFF/OP EST MAY X REQ PHY/QHP: CPT

## 2021-09-13 PROCEDURE — 1111F DSCHRG MED/CURRENT MED MERGE: CPT | Performed by: FAMILY MEDICINE

## 2021-09-13 PROCEDURE — 99495 TRANSJ CARE MGMT MOD F2F 14D: CPT | Performed by: FAMILY MEDICINE

## 2021-09-13 PROCEDURE — 85610 PROTHROMBIN TIME: CPT

## 2021-09-13 PROCEDURE — 36416 COLLJ CAPILLARY BLOOD SPEC: CPT

## 2021-09-13 ASSESSMENT — ENCOUNTER SYMPTOMS
CONSTIPATION: 0
CHEST TIGHTNESS: 0
COUGH: 0
ABDOMINAL PAIN: 0
WHEEZING: 0
DIARRHEA: 0
SHORTNESS OF BREATH: 0
NAUSEA: 0

## 2021-09-13 NOTE — PROGRESS NOTES
ANTICOAGULATION SERVICE    Date of Clinic Visit:  9/13/2021    Cruz Hernandez is a 80 y.o. female who presents to clinic today for anticoagulation monitoring and adjustment. Recent INR Results:  Internal QC passed  Lab Results   Component Value Date    INR 2.1 09/13/2021    INR 1.8 09/10/2021       Current Warfarin Dosage:  Dosing Plan  As of 9/13/2021    TTR:  88.7 % (3.8 y)   Full warfarin instructions:  9/13: 2.5 mg; 9/14: 2.5 mg; 9/15: 1.25 mg; 9/16: 2.5 mg; 9/17: 2.5 mg; 9/18: 2.5 mg; 9/19: 2.5 mg; Otherwise 5 mg every day               Assessment/Plan:    Modify warfarin dose as noted above: Patient is on diflucan. Reduce dose down a bit more and re-check on Friday this week. Next Clinic Appointment:  Return date  As of 9/13/2021    TTR:  88.7 % (3.8 y)   Next INR check:  9/17/2021             Please call [facility]agulation Clinic at [phone] with any questions. Thanks!   Neahl Lim, [facility]  Anticoagulation Service Pharmacist  9/13/2021 3:31 PM  For Pharmacy Admin Tracking Only     Intervention Detail: Dose Adjustment: 1, reason: Renal Adjustment, Therapy De-escalation   Total # of Interventions Recommended: 1   Total # of Interventions Accepted: 1   Time Spent (min): 10

## 2021-09-13 NOTE — PROGRESS NOTES
Post-Discharge Transitional Care Management Services or Hospital Follow Up      Elissa Barfield   YOB: 1934    Date of Office Visit:  9/13/2021  Date of Hospital Admission: 9/4/21  Date of Hospital Discharge: 9/8/21  Readmission Risk Score(high >=14%.  Medium >=10%):Readmission Risk Score: 19      Care management risk score Rising risk (score 2-5) and Complex Care (Scores >=6): 0     Non face to face  following discharge, date last encounter closed (first attempt may have been earlier): 9/9/2021  9:25 AM 9/9/2021  9:25 AM    Call initiated 2 business days of discharge: Yes     Patient Active Problem List   Diagnosis    Trochanteric bursitis of left hip    Drug-induced constipation    Bradycardia    Esophageal stricture    Gastritis without bleeding    Lumbar radicular pain    Lumbar spine pain    Left hip pain    Arthritis of left hip    Paroxysmal atrial fibrillation with rapid ventricular response ([address]    Anticoagulation monitoring by pharmacist    Venous stasis dermatitis of both lower extremities    Arthritis of right hand    Vaginal erosion secondary to pessary use ([address]    Pelvic relaxation due to uterovaginal prolapse    Postmenopausal bleeding    History of hysterectomy    Esophageal candidiasis ([address]    Hyponatremia    Lower abdominal pain    Atrial flutter (HCC)       Allergies   Allergen Reactions    Statins Other (See Comments)     Causes severe leg cramps     Tape Loyce Tash Tape] Other (See Comments)     \"sticks to skin\" causes redness    Tizanidine Hcl Other (See Comments)     Having issues with her liver- elevated her enzyme level    Tramadol Other (See Comments)     \"makes me Goofy\"    Augmentin [Amoxicillin-Pot Clavulanate] Diarrhea, Nausea And Vomiting and Other (See Comments)     abd pain    Nystatin Nausea And Vomiting     Pt states \"swish and swallow\"        Medications listed as ordered at the time of discharge from hospital   Natali Hooper \"Lawrence\"   Home Medication Instructions [id number]    Printed on:09/13/21 1818   Medication Information                      apixaban (ELIQUIS) 2.5 MG TABS tablet  Take 1 tablet by mouth 2 times daily             fluconazole (DIFLUCAN) 200 MG tablet  Take 1 tablet by mouth daily for 14 days             Handicap Placard MISC  by Does not apply route Expires 6/2022             MEGARED OMEGA-3 KRILL  MG CAPS  Take by mouth daily              metoprolol tartrate (LOPRESSOR) 25 MG tablet  TAKE HALF OF A TABLET TWICE A DAY. Multiple Vitamins-Minerals (ICAPS AREDS 2) CAPS  Take 2 tablets by mouth daily              ondansetron (ZOFRAN ODT) 4 MG disintegrating tablet  Take 1 tablet by mouth every 8 hours as needed for Nausea             pantoprazole (PROTONIX) 40 MG tablet  Take 1 tablet by mouth once daily             propafenone (RYTHMOL) 150 MG tablet  Take 1 tablet by mouth 2 times daily Take 300 mg of propafenone with 25 mg of metoprolol once and start 150 mg propafenone q 12 hours from next day             warfarin (COUMADIN) 2.5 MG tablet  1. Take 2 tablets by mouth daily. Or as directed by INR results. Medications marked \"taking\" at this time  Outpatient Medications Marked as Taking for the 9/13/21 encounter (Office Visit) with Ira Brink MD   Medication Sig Dispense Refill    apixaban (ELIQUIS) 2.5 MG TABS tablet Take 1 tablet by mouth 2 times daily 60 tablet 3    fluconazole (DIFLUCAN) 200 MG tablet Take 1 tablet by mouth daily for 14 days 14 tablet 0    metoprolol tartrate (LOPRESSOR) 25 MG tablet TAKE HALF OF A TABLET TWICE A DAY. 90 tablet 1    warfarin (COUMADIN) 2.5 MG tablet 1. Take 2 tablets by mouth daily. Or as directed by INR results.  180 tablet 3    pantoprazole (PROTONIX) 40 MG tablet Take 1 tablet by mouth once daily 90 tablet 1    ondansetron (ZOFRAN ODT) 4 MG disintegrating tablet Take 1 tablet by mouth every 8 hours as needed for Nausea 20 tablet 0    propafenone (RYTHMOL) 150 MG tablet Take 1 tablet by mouth 2 times daily Take 300 mg of propafenone with 25 mg of metoprolol once and start 150 mg propafenone q 12 hours from next day 90 tablet 3    Handicap Placard MISC by Does not apply route Expires 6/2022 1 each 0    MEGARED OMEGA-3 KRILL  MG CAPS Take by mouth daily       Multiple Vitamins-Minerals (ICAPS AREDS 2) CAPS Take 2 tablets by mouth daily           Medications patient taking as of now reconciled against medications ordered at time of hospital discharge: Yes    Chief Complaint   Patient presents with    Follow-Up from [address] [place] [address], 9/8/21, ABD pain       HPI Here today for a hospital follow up. Follow up for abdominal pain and esophageal esophagitis. Patient currently has no symptoms. She denies trouble swallowing, nausea, lack of appetite, dizziness, headaches, chest pain, or shortness of breath. Denies any discomfort urinating or blood in urine. She is on a soft diet and has not had a bowel movement since she left the hospital. She denies that her last bowel movement was bloody. She states that she is sleeping well. She also states that her energy level has been very low. Wants to set up appointment to get pessary checked out more often than once a year. The ID doctor she saw in the hospital recommended she have her pessary checked so that it does not erode through the vagina. Patient has a lesion on her right elbow. It is flesh colored and mobile. It is not tender. She first noticed it 3 months ago. It seems to be growing a little. It is only sore if she puts pressure on it. Inpatient course: Discharge summary reviewed- see chart. Interval history/Current status: improving    Review of Systems   Constitutional: Negative for activity change, appetite change, chills, fatigue and fever. Eyes: Negative for visual disturbance.    Respiratory: Negative for cough, chest tightness, shortness of breath and wheezing. Cardiovascular: Negative for chest pain, palpitations and leg swelling. Gastrointestinal: Negative for abdominal pain (improving), constipation, diarrhea and nausea. Genitourinary: Negative for difficulty urinating and pelvic pain. Skin:        Cyst on elbow   Neurological: Negative for dizziness, syncope, weakness, light-headedness and headaches. Vitals:    09/13/21 1610   BP: 138/72   Pulse: 59   Temp: 98.2 °F (36.8 °C)   SpO2: 96%   Weight: 156 lb (70.8 kg)   Height: 5' 5\" (1.651 m)     Body mass index is 25.96 kg/m². Wt Readings from Last 3 Encounters:   09/13/21 156 lb (70.8 kg)   09/08/21 157 lb 8 oz (71.4 kg)   08/30/21 147 lb 6.4 oz (66.9 kg)     BP Readings from Last 3 Encounters:   09/13/21 138/72   09/08/21 136/68   08/31/21 (!) 141/80       Physical Exam  Vitals and nursing note reviewed. Constitutional:       General: She is not in acute distress. Appearance: She is well-developed. Eyes:      Conjunctiva/sclera: Conjunctivae normal.   Neck:      Thyroid: No thyromegaly. Cardiovascular:      Rate and Rhythm: Normal rate and regular rhythm. Heart sounds: Normal heart sounds. No murmur heard. Pulmonary:      Effort: Pulmonary effort is normal. No respiratory distress. Breath sounds: Normal breath sounds. No wheezing. Musculoskeletal:      Cervical back: Normal range of motion and neck supple. Lymphadenopathy:      Cervical: No cervical adenopathy. Skin:     General: Skin is warm and dry. Findings: No erythema or rash. Neurological:      Mental Status: She is alert and oriented to person, place, and time. Psychiatric:         Mood and Affect: Mood normal.         Behavior: Behavior normal.         Assessment/Plan:  1. Esophageal candidiasis ([address]  New; she is doing better since starting the diflucan and having her esphaguous dilated. She will follow up with GI in 2 days.     - NH DISCHARGE MEDS RECONCILED W/ CURRENT OUTPATIENT MED LIST    2. Atrial Fibrillation  Stable; she is tired of having to adjust her coumadin based on her medications and she would like to try eliquis so I sent a script to the pharmacy to see what the cost will be. 3. At high risk for falls  On the basis of positive falls risk screening, assessment and plan is as follows: home safety tips provided. 4. Pessary maintenance  She would like to have her pessary checked at the recommendation of her ID doctor and she has not seen anyone since Dr. Jonah Vale left so I put in a referral for her. [facility] Gyne[facility]        Medical Decision Making: moderate complexity          Return if symptoms worsen or fail to improve.     Dipika Dennison MD  9/13/2021  6:18 PM

## 2021-09-13 NOTE — PATIENT INSTRUCTIONS
\"On day of next appointment, please screen for temperature and COVID-19 symptoms prior to you clinic appointment. If any symptoms present, please call [phone] to reschedule. \"

## 2021-09-15 ENCOUNTER — CARE COORDINATION (OUTPATIENT)
Dept: CASE MANAGEMENT | Age: 86
End: 2021-09-15

## 2021-09-15 NOTE — CARE COORDINATION
Care Transitions Outreach Attempt    -    Unsuccessful    Attempted to reach patient for transitions of care follow up. Unable to reach patient. Message left on voicemail. Patient: Sonia Yoo Patient : 1934 MRN: [id number]    Last Discharge [facility]       Complaint Diagnosis Description Type Department Provider    21 Abdominal Pain; Emesis Abdominal pain, epigastric . .. ED to Hosp-Admission (Discharged) (ADMITTED) GISELLE 4K Radha Mejia MD; Dori. ..             Noted following upcoming appointments from discharge chart review:   [facility] follow up appointment(s):   Future Appointments   Date Time Provider Gina Beauchamp   9/15/2021 11:45 AM Zeny Vega MD [facility]   2021 10:45 AM [facility] [place]   2021 11:30 AM MD PETAR Faustin DPP   2021  3:00 PM [facility] [phone]    RN Care Transitions Nurse

## 2021-09-17 ENCOUNTER — HOSPITAL ENCOUNTER (OUTPATIENT)
Dept: PHARMACY | Age: 86
Setting detail: THERAPIES SERIES
Discharge: HOME | End: 2021-09-17
Payer: MEDICARE

## 2021-09-17 DIAGNOSIS — Z79.01 ANTICOAGULATION MONITORING BY PHARMACIST: Primary | ICD-10-CM

## 2021-09-17 DIAGNOSIS — I48.0 PAROXYSMAL ATRIAL FIBRILLATION WITH RAPID VENTRICULAR RESPONSE (HCC): ICD-10-CM

## 2021-09-17 LAB
INR BLD: 2.4
PROTIME: 29.9 SECONDS

## 2021-09-17 PROCEDURE — 36416 COLLJ CAPILLARY BLOOD SPEC: CPT

## 2021-09-17 PROCEDURE — 99211 OFF/OP EST MAY X REQ PHY/QHP: CPT

## 2021-09-17 PROCEDURE — 85610 PROTHROMBIN TIME: CPT

## 2021-09-17 NOTE — PROGRESS NOTES
ANTICOAGULATION SERVICE    Date of Clinic Visit:  9/17/2021    Isaias Lopez is a 80 y.o. female who presents to clinic today for anticoagulation monitoring and adjustment. Recent INR Results:  Internal QC passed  Lab Results   Component Value Date    INR 2.4 09/17/2021    INR 2.1 09/13/2021       Current Warfarin Dosage:  Dosing Plan  As of 9/17/2021    TTR:  88.7 % (3.9 y)   Full warfarin instructions:  9/17: 2.5 mg; 9/18: 2.5 mg; 9/19: 2.5 mg; Otherwise 5 mg every day               Assessment/Plan:    Modify warfarin dose as noted above:     Next Clinic Appointment:  Return date  As of 9/17/2021    TTR:  88.7 % (3.9 y)   Next INR check:  9/30/2021             Please call [facility] Anticoagulation Clinic at [phone] with any questions. Thanks!   Geetha Christy [facility]  Anticoagulation Service Pharmacist  9/17/2021 11:49 AM  For Pharmacy Admin Tracking Only     Intervention Detail: Dose Adjustment: 1, reason: Therapy Optimization   Total # of Interventions Recommended: 1   Total # of Interventions Accepted: 1   Time Spent (min): 15

## 2021-09-21 ENCOUNTER — CARE COORDINATION (OUTPATIENT)
Dept: CASE MANAGEMENT | Age: 86
End: 2021-09-21

## 2021-09-21 NOTE — CARE COORDINATION
[facility] Transitions Follow Up Call    2021    Patient: Cece Esparza  Patient : 1934   MRN: [id number]  Reason for Admission:  Abdominal pain, epigastric     Discharge Date: 21 RARS: Readmission Risk Score: 23         Spoke with: Fran Miguel states she is doing well, no more vomiting so she is good. Reviewed Gastro notes, she denies any further questions or concerns. She is eating and drinking appropriately and taking all medications as indicated. Care Transitions Follow Up Call    Needs to be reviewed by the provider   Additional needs identified to be addressed with provider: No  none             Method of communication with provider : none      Care Transition Nurse (CTN) contacted the patient by telephone to follow up after admission on  2021. Verified name and  with patient as identifiers. Addressed changes since last contact: none  Discussed follow-up appointments. If no appointment was previously scheduled, appointment scheduling offered: No.   Is follow up appointment scheduled within 7 days of discharge? No.    Patients top risk factors for readmission: medication management and polypharmacy  Interventions to address risk factors: Obtained and reviewed discharge summary and/or continuity of care documents      Non-[facility] follow up appointment(s):      CTN provided contact information for future needs. Plan for follow-up call in 10-14 days based on severity of symptoms and risk factors.   Plan for next call: symptom management-abdominal pain ; emesis and follow up appointment- 2021      Care Transitions Subsequent and Final Call    Schedule Follow Up Appointment with PCP: Completed  Subsequent and Final Calls  Do you have any ongoing symptoms?: No  Have your medications changed?: No  Do you have any questions related to your medications?: No  Do you currently have any active services?: No  Do you have any needs or concerns that I can assist you with?: No  Identified Barriers: None  Care Transitions Interventions  Other Interventions:            Follow Up  Future Appointments   Date Time Provider Gina Quynh   2021 11:00 AM [facility] MD MED [facility] [place]   10/13/2021  1:00 PM DO ARRON Engel [facility]   2021 11:30 AM MD PETAR Arroyo DPP   2021  3:00 [facility]       Felicia Borden RN

## 2021-09-30 ENCOUNTER — HOSPITAL ENCOUNTER (OUTPATIENT)
Dept: PHARMACY | Age: 86
Setting detail: THERAPIES SERIES
Discharge: HOME | End: 2021-09-30
Payer: MEDICARE

## 2021-09-30 DIAGNOSIS — I48.0 PAROXYSMAL ATRIAL FIBRILLATION WITH RAPID VENTRICULAR RESPONSE (HCC): ICD-10-CM

## 2021-09-30 DIAGNOSIS — Z79.01 ANTICOAGULATION MONITORING BY PHARMACIST: Primary | ICD-10-CM

## 2021-09-30 LAB
INR BLD: 5.4
PROTIME: 64.2 SECONDS

## 2021-09-30 PROCEDURE — 99211 OFF/OP EST MAY X REQ PHY/QHP: CPT

## 2021-09-30 PROCEDURE — 85610 PROTHROMBIN TIME: CPT

## 2021-09-30 PROCEDURE — 36416 COLLJ CAPILLARY BLOOD SPEC: CPT

## 2021-09-30 NOTE — PROGRESS NOTES
ANTICOAGULATION SERVICE    Date of Clinic Visit:  9/30/2021    aTy Pope is a 80 y.o. female who presents to clinic today for anticoagulation monitoring and adjustment. Recent INR Results:  Internal QC passed  Lab Results   Component Value Date    INR 5.4 09/30/2021    INR 2.4 09/17/2021       Current Warfarin Dosage:  Dosing Plan  As of 9/30/2021    TTR:  88.1 % (3.9 y)   Full warfarin instructions:  9/30: Hold; 10/1: Hold; Otherwise 5 mg every day               Assessment/Plan:    Modify warfarin dose as noted above: INR is very elevated today. I'm sure this is due to the fluconazole Jina was taking but is now finished with. I will hold dose x 2 days then back on regular dose. Recheck next week to make sure INR is back down in range. Next Clinic Appointment:  Return date  As of 9/30/2021    TTR:  88.1 % (3.9 y)   Next INR check:  10/6/2021             Please call [address] [facility] Anticoagulation Clinic at [phone] with any questions. Thanks!   Cata Freeman, [facility]  Anticoagulation Service Pharmacist  9/30/2021 10:33 AM     For Pharmacy Admin Tracking Only     Intervention Detail: Dose Adjustment: 1, reason: Therapy Optimization   Total # of Interventions Recommended: 1   Total # of Interventions Accepted: 1   Time Spent (min): 15

## 2021-10-06 ENCOUNTER — TELEPHONE (OUTPATIENT)
Dept: FAMILY MEDICINE CLINIC | Age: 86
End: 2021-10-06

## 2021-10-06 ENCOUNTER — HOSPITAL ENCOUNTER (OUTPATIENT)
Dept: PHARMACY | Age: 86
Setting detail: THERAPIES SERIES
Discharge: HOME | End: 2021-10-06
Payer: MEDICARE

## 2021-10-06 DIAGNOSIS — M16.12 ARTHRITIS OF LEFT HIP: Primary | ICD-10-CM

## 2021-10-06 DIAGNOSIS — Z79.01 ANTICOAGULATION MONITORING BY PHARMACIST: Primary | ICD-10-CM

## 2021-10-06 DIAGNOSIS — I48.0 PAROXYSMAL ATRIAL FIBRILLATION WITH RAPID VENTRICULAR RESPONSE (HCC): ICD-10-CM

## 2021-10-06 LAB
INR BLD: 1.9
PROTIME: 22.5 SECONDS

## 2021-10-06 PROCEDURE — 99211 OFF/OP EST MAY X REQ PHY/QHP: CPT

## 2021-10-06 PROCEDURE — 85610 PROTHROMBIN TIME: CPT

## 2021-10-06 PROCEDURE — 36416 COLLJ CAPILLARY BLOOD SPEC: CPT

## 2021-10-06 NOTE — PATIENT INSTRUCTIONS
\"On day of next appointment, please screen for temperature and COVID-19 symptoms prior to you clinic appointment. If any symptoms present, please call [phone] to reschedule. \"

## 2021-10-06 NOTE — PROGRESS NOTES
ANTICOAGULATION SERVICE    Date of Clinic Visit:  10/6/2021    Douglas Sanders is a 80 y.o. female who presents to clinic today for anticoagulation monitoring and adjustment. Recent INR Results:  Internal QC passed  Lab Results   Component Value Date    INR 1.9 10/06/2021    INR 5.4 09/30/2021       Current Warfarin Dosage:  Dosing Plan  As of 10/6/2021    TTR:  87.8 % (3.9 y)   Full warfarin instructions:  5 mg every day               Assessment/Plan:    Continue current regimen as INR remains stable. Patient just below target likely d/t 2 day hold from last week. Will return to previous dosing plan and re-check as normal.      Next Clinic Appointment:  Return date  As of 10/6/2021    TTR:  87.8 % (3.9 y)   Next INR check:  11/10/2021             Please call [facility] Anticoagulation Clinic at [phone] with any questions. Thanks!   [facility]  Anticoagulation Service Pharmacist  10/6/2021 10:12 AM   For Pharmacy Admin Tracking Only     Total # of Interventions Recommended: 0   Total # of Interventions Accepted: 0   Time Spent (min): 15

## 2021-10-07 ENCOUNTER — HOSPITAL ENCOUNTER (OUTPATIENT)
Dept: PHYSICAL THERAPY | Age: 86
Setting detail: THERAPIES SERIES
Discharge: HOME | End: 2021-10-07
Payer: MEDICARE

## 2021-10-07 PROCEDURE — 97110 THERAPEUTIC EXERCISES: CPT | Performed by: PHYSICAL THERAPIST

## 2021-10-07 PROCEDURE — 97164 PT RE-EVAL EST PLAN CARE: CPT | Performed by: PHYSICAL THERAPIST

## 2021-10-07 NOTE — PLAN OF CARE
[facility]    [x] [place]  Phone: [phone]  Fax: [phone]      [] [place]  Phone: [phone]  Fax: [phone]    Physical Therapy Progress Note  Date: 10/7/2021        Patient Name:  Rowena Velazquez    :  1934  MRN: [id number]  Restrictions/Precautions:      Medical/Treatment Diagnosis Information:  ·   Diagnosis: M16.12 OA L hip  · Treatment Diagnosis: L LE weakness, L hip pain, decreased balance  ·    Insurance/Certification information:    Medicare  Physician Information:     Jessa Ordonez of care signed (Y/N): y  Visit# / total visits:  4  Pain level: 2/10 L hip    Plan of Care/Treatment to date:  [x] Therapeutic Exercise    [] Modalities:  [] Therapeutic Activity     [] Ultrasound  [] Electrical Stimulation  [x] Gait Training      [] Cervical Traction    [] Lumbar Traction  [x] Neuromuscular Re-education  [] Cold/hotpack [] Iontophoresis  [x] Instruction in HEP      Other:  [] Manual Therapy       []    [] Aquatic Therapy       []                    ? Subjective:    Has had 2 hospital admissions over the past 6 weeks due to esaphogus surgery and reaction to meds. Now feels like balance is off more after that medical complication         Objective:    Observation:   Ambulates with straight cane. Slow pace  Habitually looks down while walking due to balance  Test measurements:    Difficulty completing sit to stand due to balance loss.   Sit to  30\" ( no UE assist) x4  TUG ( with cane) 25\"  + Trandelenburg L, + glut medius/ abductor weakness  Balance loss with multi-position squat, not pain   Lumbar extension in standing, no pain            Plan:    Continue previous Plan of care       Goals:    Short term goals  Time Frame for Short term goals: 1 week  Short term goal 1: Initiate HEP (compliant)  Short term goal 2: Assess balance to determine fall risk.      Long term goals  Time Frame for Long term goals : 4 weeks  Long term goal 1: Improve L hip abduction  strength to 3+/5 to facilitate transfers and gait. Long term goal 2: Decrease TUG to 20'' with least restrictive assistive device for more efficient gait. Long term goal 3: Improve modified LEFS to at least 38/64 = 60% functional for improved participation in daily activity. Long term goal 4: Decrease hip pain to not more than 4/10 with amb to improve tolerance to walking at home and in the community  Long term goal 5: Sit to  30\" improve to 6x for total LE power and balance           Current Frequency/Duration:10/7/21 - 11/6/21  # Days per week: [] 1 day # Weeks: [] 1 week [x] 4 weeks      [x] 2 days? [] 2 weeks [] 5 weeks      [] 3 days   [] 3 weeks [] 6 weeks     Rehab Potential: [] Excellent [x] Good [] Fair  [] Poor     Goal Status:  [] Achieved [] Partially Achieved  [x] Not Achieved     Patient Status: [] Continue per initial plan of Care     [] Patient now discharged     [x] Additional visits requested, Please re-certify for additional visits:      Requested frequency/duration: 2 X/week for 4 weeks    Electronically signed by:  Svetlana Trejo PT      If you have any questions or concerns, please don't hesitate to call.   Thank you for your referral.    Physician Signature:________________________________Date:__________________  By signing above, therapists plan is approved by physician

## 2021-10-07 NOTE — FLOWSHEET NOTE
Physical Therapy Daily Treatment Note    Date:  10/7/2021    Patient Name:  Isamar Lovelace    :  1934  MRN: [id number]  Restrictions/Precautions:     Medical/Treatment Diagnosis Information:   · Diagnosis: M16.12 OA L hip  · Treatment Diagnosis: L LE weakness, L hip pain, decreased balance  Insurance/Certification information:  PT Insurance Information: Medicare  Physician Information:  Referring Practitioner: Shira Santiago of care signed (Y/N):  y  Visit# / total visits:  1/10 of 2nd POC, total 4   Pain level: 2/10       Time In: 10:00   Time Out: 10:38    Progress Note: [x]  Yes  []  No  Next due by: Visit #10 or by 21    Subjective: Has had 2 hospital admissions over the past 6 weeks due to esaphogus surgery and reaction to meds. Now feels like balance is off more after that medical complication    Objective:  Observation:   Ambulates with straight cane. Slow pace  Habitually looks down while walking due to balance  Test measurements:    Difficulty completing sit to stand due to balance loss.   Sit to  30\" ( no UE assist) x4  TUG ( with cane) 25\"  + Trandelenburg L, + glut medius/ abductor weakness  Balance loss with multi-position squat, not pain   Lumbar extension in standing, no pain  Exercises:   Exercise/Equipment Resistance/Repetitions Other comments        Nustep 8' level 3   Prone hip ext 15x         sidelying clamshell 15x    Supine bridge 10x Narrow, wide    LTR 10x3\"    B hip abd/ add Red 20x Sit & supine   Standing 3 way hip 10x    Counter exercises 10x    Modified lumbar extension 10x    Squat Matrix 10x, 3 position    Repeated sit to stand 10x No UE support   Lunges 10x Front/ lat                       Gait with cane vs walker                    [x] Provided verbal/tactile cueing for activities related to strengthening, flexibility, endurance, ROM. (33579)  [] Provided verbal/tactile cueing for activities related to improving balance, coordination, kinesthetic sense, posture, motor skill, proprioception. (39342)    Therapeutic Activities:     [] Therapeutic activities, direct (one-on-one) patient contact (use of dynamic activities to improve functional performance). (72975)    Gait:   [] Provided training and instruction to the patient for ambulation re-education. (93321)    Self-Care/ADL's  [] Self-care/home management training and compensatory training, meal preparation, safety procedures, and instructions in use of assistive technology devices/adaptive equipment, direct one-on-one contact. (21425)    Home Exercise Program:     [x] Reviewed/Progressed HEP activities related to strengthening, flexibility, endurance, ROM. (12149)  [] Reviewed/Progressed HEP activities related to improving balance, coordination, kinesthetic sense, posture, motor skill, proprioception.  (26457)    Manual Treatments:    [] Provided manual therapy to mobilize soft tissue/joints for the purpose of modulating pain, promoting relaxation,  increasing ROM, reducing/eliminating soft tissue swelling/inflammation/restriction, improving soft tissue extensibility. (72198)    Service Based Modalities:Re-eval 15'      Timed Code Treatment Minutes:  23' there-ex    Total Treatment Minutes:  45 '    Treatment/Activity Tolerance:  [x] Patient tolerated treatment well [] Patient limited by fatique  [] Patient limited by pain  [] Patient limited by other medical complications  [] Other:     Prognosis: [x] Good [] Fair  [] Poor    Patient Requires Follow-up: [x] Yes  [] No      Goals:  Short term goals  Time Frame for Short term goals: 1 week  Short term goal 1: Initiate HEP (compliant)  Short term goal 2: Assess balance to determine fall risk. Long term goals  Time Frame for Long term goals : 4 weeks  Long term goal 1: Improve L hip abduction  strength to 3+/5 to facilitate transfers and gait. Long term goal 2: Decrease TUG to 20'' with least restrictive assistive device for more efficient gait.   Long term goal 3: Improve modified LEFS to at least 38/64 = 60% functional for improved participation in daily activity.   Long term goal 4: Decrease hip pain to not more than 4/10 with amb to improve tolerance to walking at home and in the community  Long term goal 5: Sit to  30\" improve to 6x for total LE power and balance        Plan:   [] Continue per plan of care [] Alter current plan (see comments)  [x] Plan of care initiated [] Hold pending MD visit [] Discharge    Plan for Next Session: Primarily a balance focus approach for safety  Electronically signed by:  Eldon Mckeon, PT

## 2021-10-12 ENCOUNTER — HOSPITAL ENCOUNTER (OUTPATIENT)
Dept: PHYSICAL THERAPY | Age: 86
Setting detail: THERAPIES SERIES
Discharge: HOME | End: 2021-10-12
Payer: MEDICARE

## 2021-10-12 PROCEDURE — 97110 THERAPEUTIC EXERCISES: CPT

## 2021-10-12 NOTE — FLOWSHEET NOTE
Physical Therapy Daily Treatment Note    Date:  10/12/2021    Patient Name:  India Brian    :  1934  MRN: [id number]  Restrictions/Precautions:     Medical/Treatment Diagnosis Information:   · Diagnosis: M16.12 OA L hip  · Treatment Diagnosis: L LE weakness, L hip pain, decreased balance  Insurance/Certification information:  PT Insurance Information: Medicare  Physician Information:  Referring Practitioner: Radha Chery of care signed (Y/N):  y  Visit# / total visits:  2/10 of 2nd POC, total 5   Pain level: 2/10       Time In: 11:25 AM   Time Out: 12:05 PM    Progress Note: [x]  Yes  []  No  Next due by: Visit #10 or by 21    Subjective: Pt states she is feeling \"ok\" with 3/10 pain in her thighs. Pt reports feeling like balance is still off. Pt reports she has had no falls. Pt states she has not been compliant with HEP due to feeling \"too scared\" to do the exercises. Objective: ROB performed per flow sheet to increase strength, mobility and stability with daily tasks. Progressed exercises on this date with good tolerance. Verbal cueing required for proper technique. Increased fatigue noted, especially when performing sit to stand exercise, requiring seated rest breaks throughout treatment. Observation:   Ambulates with straight cane. Slow pace  Habitually looks down while walking due to balance  Test measurements:     Exercises:   Exercise/Equipment Resistance/Repetitions Other comments        Nustep 9' level 3   Prone hip ext          sidelying clamshell 20x    Supine bridge 15x Narrow, wide    LTR 10x3\"    B hip abd/ add Red 20x Sit & supine   Standing 3 way hip 15x    Counter exercises 15x    Modified lumbar extension 15x    Squat Matrix 10x, 3 position    Repeated sit to stand 10x No UE support   Lunges 10x Front/ lat                       Gait with cane vs walker                    [x] Provided verbal/tactile cueing for activities related to strengthening, flexibility, endurance, ROM. (05290)  [] Provided verbal/tactile cueing for activities related to improving balance, coordination, kinesthetic sense, posture, motor skill, proprioception. (55940)    Therapeutic Activities:     [] Therapeutic activities, direct (one-on-one) patient contact (use of dynamic activities to improve functional performance). (50586)    Gait:   [] Provided training and instruction to the patient for ambulation re-education. (00711)    Self-Care/ADL's  [] Self-care/home management training and compensatory training, meal preparation, safety procedures, and instructions in use of assistive technology devices/adaptive equipment, direct one-on-one contact. (80163)    Home Exercise Program:     [x] Reviewed/Progressed HEP activities related to strengthening, flexibility, endurance, ROM. (28767)  [] Reviewed/Progressed HEP activities related to improving balance, coordination, kinesthetic sense, posture, motor skill, proprioception.  (00734)    Manual Treatments:    [] Provided manual therapy to mobilize soft tissue/joints for the purpose of modulating pain, promoting relaxation,  increasing ROM, reducing/eliminating soft tissue swelling/inflammation/restriction, improving soft tissue extensibility. (67023)    Service Based Modalities:    Timed Code Treatment Minutes:  36' there-ex    Total Treatment Minutes:  36'    Treatment/Activity Tolerance:  [x] Patient tolerated treatment well [] Patient limited by fatique  [] Patient limited by pain  [] Patient limited by other medical complications  [] Other:     Prognosis: [x] Good [] Fair  [] Poor    Patient Requires Follow-up: [x] Yes  [] No      Goals:  Short term goals  Time Frame for Short term goals: 1 week  Short term goal 1: Initiate HEP (compliant)  Short term goal 2: Assess balance to determine fall risk. Long term goals  Time Frame for Long term goals : 4 weeks  Long term goal 1: Improve L hip abduction  strength to 3+/5 to facilitate transfers and gait.   Long term goal 2: Decrease TUG to 20'' with least restrictive assistive device for more efficient gait. Long term goal 3: Improve modified LEFS to at least 38/64 = 60% functional for improved participation in daily activity.   Long term goal 4: Decrease hip pain to not more than 4/10 with amb to improve tolerance to walking at home and in the community  Long term goal 5: Sit to  30\" improve to 6x for total LE power and balance        Plan:   [] Continue per plan of care [] Alter current plan (see comments)  [x] Plan of care initiated [] Hold pending MD visit [] Discharge    Plan for Next Session: Primarily a balance focus approach for safety  Electronically signed by:  Cody Melton PTA

## 2021-10-13 ENCOUNTER — PROCEDURE VISIT (OUTPATIENT)
Dept: OBGYN | Age: 86
End: 2021-10-13
Payer: MEDICARE

## 2021-10-13 VITALS
SYSTOLIC BLOOD PRESSURE: 152 MMHG | WEIGHT: 155.25 LBS | BODY MASS INDEX: 24.95 KG/M2 | DIASTOLIC BLOOD PRESSURE: 92 MMHG | HEART RATE: 64 BPM | HEIGHT: 66 IN

## 2021-10-13 DIAGNOSIS — Z90.710 HISTORY OF HYSTERECTOMY: ICD-10-CM

## 2021-10-13 DIAGNOSIS — Z96.0 PRESENCE OF PESSARY: ICD-10-CM

## 2021-10-13 DIAGNOSIS — Z46.89 ENCOUNTER FOR PESSARY MAINTENANCE: Primary | ICD-10-CM

## 2021-10-13 DIAGNOSIS — N81.4 PELVIC RELAXATION DUE TO UTEROVAGINAL PROLAPSE: ICD-10-CM

## 2021-10-13 PROCEDURE — G8484 FLU IMMUNIZE NO ADMIN: HCPCS | Performed by: OBSTETRICS & GYNECOLOGY

## 2021-10-13 PROCEDURE — 1090F PRES/ABSN URINE INCON ASSESS: CPT | Performed by: OBSTETRICS & GYNECOLOGY

## 2021-10-13 PROCEDURE — 1036F TOBACCO NON-USER: CPT | Performed by: OBSTETRICS & GYNECOLOGY

## 2021-10-13 PROCEDURE — G8427 DOCREV CUR MEDS BY ELIG CLIN: HCPCS | Performed by: OBSTETRICS & GYNECOLOGY

## 2021-10-13 PROCEDURE — G8417 CALC BMI ABV UP PARAM F/U: HCPCS | Performed by: OBSTETRICS & GYNECOLOGY

## 2021-10-13 PROCEDURE — 1123F ACP DISCUSS/DSCN MKR DOCD: CPT | Performed by: OBSTETRICS & GYNECOLOGY

## 2021-10-13 PROCEDURE — 99213 OFFICE O/P EST LOW 20 MIN: CPT

## 2021-10-13 PROCEDURE — 99213 OFFICE O/P EST LOW 20 MIN: CPT | Performed by: OBSTETRICS & GYNECOLOGY

## 2021-10-13 PROCEDURE — 99214 OFFICE O/P EST MOD 30 MIN: CPT

## 2021-10-13 PROCEDURE — 4040F PNEUMOC VAC/ADMIN/RCVD: CPT | Performed by: OBSTETRICS & GYNECOLOGY

## 2021-10-13 NOTE — PROGRESS NOTES
I have reviewed and agree to the content of the note written by the PTA.   Electronically signed by Fareed Hannon PT [id number]

## 2021-10-13 NOTE — PROGRESS NOTES
Alessandra Lentz  10/13/2021  1:04 PM          Alessandra Lentz is a 80 y.o. female       The patient was seen. She has no chief complaints today. She has been fitted for a # 4; ring type pessary. She states all of her symptoms that she had prior to the pessary have been relieved with its use. She denies any vaginal bleeding. She last had her pessary cleaned 12-16 weeks ago. She denies to any vaginal discharge or odor. Her bowels are regular and her voiding pattern is normal.     Blood pressure (!) 152/92, pulse 64, height 5' 6\" (1.676 m), weight 155 lb 4 oz (70.4 kg), not currently breastfeeding. Chaperone for Intimate Exam   Chaperone was offered and accepted as part of the rooming process.  Chaperone: Leonor        Abdomen: Soft and non-tender; good bowel sounds; no guarding, rebound or rigidity; no CVA tenderness bilaterally. Extremities: No calf tenderness bilaterally. DTR 2/4 bilaterally. No edema. Perineum/Speculum: There is not any signs of infection; The vaginal vault is without any signs of erythema or erosion. There is no vaginal discharge or odor appreciated. The pessary was cleansed and replaced without any problems and the patient tolerated the procedure well. T.O.S. Ointment was placed with the pessary to decrease discharge/odor. Assessment:   Diagnosis Orders   1. Encounter for pessary maintenance     2. Pelvic relaxation due to uterovaginal prolapse     3. History of hysterectomy     4.  Presence of pessary #4 ring       Chief Complaint   Patient presents with    Procedure     pessary cleaning      Patient Active Problem List    Diagnosis Date Noted    Lower abdominal pain 2021     Priority: High    Presence of pessary #4 ring 10/13/2021     Priority: Medium    Pelvic relaxation due to uterovaginal prolapse 2021     Priority: Medium    Atrial flutter ([address] 2021    Hyponatremia 2021    Esophageal candidiasis ([address] 2021    Vaginal erosion secondary to pessary use ([facility] [address] 01/21/2021    Postmenopausal bleeding 01/21/2021    History of hysterectomy 01/21/2021    Arthritis of right hand 01/13/2020    Venous stasis dermatitis of both lower extremities 12/08/2017    Anticoagulation monitoring by pharmacist 10/19/2017    Paroxysmal atrial fibrillation with rapid ventricular response ([facility] [address] 09/21/2017    Lumbar radicular pain 08/03/2017    Lumbar spine pain 08/03/2017    Left hip pain 08/03/2017    Arthritis of left hip 08/03/2017    Esophageal stricture     Gastritis without bleeding     Bradycardia     Drug-induced constipation 06/15/2017    Trochanteric bursitis of left hip 05/22/2017         Plan:  1. Return to the office 10-12 weeks  2. Report any vaginal bleeding or discharge  3. Abstinence  4. Annual Follow-up reviewed with patient. They will schedule appointment    The patient, Rosa Barkley is a 80 y.o. female, was seen with a total time spent of 20 minutes for the visit on this date of service by the E/M provider. The time component had both face to face and non face to face time spent in determining the total time component. Counseling and education regarding her diagnosis listed below and her options regarding those diagnoses were also included in determining her time component. Diagnosis Orders   1. Encounter for pessary maintenance     2. Pelvic relaxation due to uterovaginal prolapse     3. History of hysterectomy     4. Presence of pessary #4 ring          The patient had her preventative health maintenance recommendations and follow-up reviewed with her at the completion of her visit.

## 2021-10-14 ENCOUNTER — APPOINTMENT (OUTPATIENT)
Dept: PHYSICAL THERAPY | Age: 86
End: 2021-10-14
Payer: MEDICARE

## 2021-10-19 ENCOUNTER — HOSPITAL ENCOUNTER (OUTPATIENT)
Dept: PHYSICAL THERAPY | Age: 86
Setting detail: THERAPIES SERIES
Discharge: HOME | End: 2021-10-19
Payer: MEDICARE

## 2021-10-19 PROCEDURE — 97110 THERAPEUTIC EXERCISES: CPT | Performed by: PHYSICAL THERAPY ASSISTANT

## 2021-10-19 NOTE — FLOWSHEET NOTE
Physical Therapy Daily Treatment Note    Date:  10/19/2021    Patient Name:  Marck Kam    :  1934  MRN: [id number]  Restrictions/Precautions:     Medical/Treatment Diagnosis Information:   · Diagnosis: M16.12 OA L hip  · Treatment Diagnosis: L LE weakness, L hip pain, decreased balance  Insurance/Certification information:  PT Insurance Information: Medicare  Physician Information:  Referring Practitioner: Mary Jenkins of care signed (Y/N):  y  Visit# / total visits:  3/10 of 2nd POC, total 6   Pain level: 0/10       Time In: 343   Time Out: 427    Progress Note: []  Yes  [x]  No  Next due by: Visit #10 or by 21    Subjective: Pt. Relates pain this date rated 0/10. Feels wobbly when walking, varies day to day. Objective: ROB performed per flow sheet to increase strength, mobility and stability with daily tasks. Verbal cuing for progression and technique with exercises. Difficulty with advanced balance exercises noted. Patient given written and verbal instruction for standing HEP with understanding noted.      Observation:     Test measurements: Knee ext: 4-4+/5    Exercises:   Exercise/Equipment Resistance/Repetitions Other comments        Nustep 6' level 4   Prone hip ext          sidelying clamshell 20x    Supine bridge 15x Narrow, wide    LTR 10x3\"    B hip abd/ add Red 20x Sit & supine   Standing 3 way hip 15x    Counter exercises 15x    Modified lumbar extension 15x    Squat Matrix 10x, 3 position    Repeated sit to stand 10x No UE support   Lunges 10x Front/ lat        FTEO, FAEC 3x15'' FOAM             Gait with cane vs walker                    [x] Provided verbal/tactile cueing for activities related to strengthening, flexibility, endurance, ROM. (74390)  [] Provided verbal/tactile cueing for activities related to improving balance, coordination, kinesthetic sense, posture, motor skill, proprioception. (31043)    Therapeutic Activities:     [] Therapeutic activities, direct (one-on-one) patient contact (use of dynamic activities to improve functional performance). (55246)    Gait:   [] Provided training and instruction to the patient for ambulation re-education. (46483)    Self-Care/ADL's  [] Self-care/home management training and compensatory training, meal preparation, safety procedures, and instructions in use of assistive technology devices/adaptive equipment, direct one-on-one contact. (48723)    Home Exercise Program:     [x] Reviewed/Progressed HEP activities related to strengthening, flexibility, endurance, ROM. (97466)  [] Reviewed/Progressed HEP activities related to improving balance, coordination, kinesthetic sense, posture, motor skill, proprioception.  (23788)    Manual Treatments:    [] Provided manual therapy to mobilize soft tissue/joints for the purpose of modulating pain, promoting relaxation,  increasing ROM, reducing/eliminating soft tissue swelling/inflammation/restriction, improving soft tissue extensibility. (17607)    Service Based Modalities:    Timed Code Treatment Minutes:  40' there-ex    Total Treatment Minutes:  40'    Treatment/Activity Tolerance:  [x] Patient tolerated treatment well [] Patient limited by fatique  [] Patient limited by pain  [] Patient limited by other medical complications  [] Other:     Prognosis: [x] Good [] Fair  [] Poor    Patient Requires Follow-up: [x] Yes  [] No      Goals:  Short term goals  Time Frame for Short term goals: 1 week  Short term goal 1: Initiate HEP (compliant)  Short term goal 2: Assess balance to determine fall risk. Long term goals  Time Frame for Long term goals : 4 weeks  Long term goal 1: Improve L hip abduction  strength to 3+/5 to facilitate transfers and gait. Long term goal 2: Decrease TUG to 20'' with least restrictive assistive device for more efficient gait. Long term goal 3: Improve modified LEFS to at least 38/64 = 60% functional for improved participation in daily activity.   Long term goal 4: Decrease hip pain to not more than 4/10 with amb to improve tolerance to walking at home and in the community  Long term goal 5: Sit to  30\" improve to 6x for total LE power and balance        Plan:   [] Continue per plan of care [] Alter current plan (see comments)  [x] Plan of care initiated [] Hold pending MD visit [] Discharge    Plan for Next Session: Primarily a balance focus approach for safety    Electronically signed by:   Sobeida Francis PTA

## 2021-10-21 ENCOUNTER — HOSPITAL ENCOUNTER (OUTPATIENT)
Dept: PHYSICAL THERAPY | Age: 86
Setting detail: THERAPIES SERIES
Discharge: HOME | End: 2021-10-21
Payer: MEDICARE

## 2021-10-21 PROCEDURE — 97110 THERAPEUTIC EXERCISES: CPT

## 2021-10-21 NOTE — FLOWSHEET NOTE
Physical Therapy Daily Treatment Note    Date:  10/21/2021    Patient Name:  Thi Brandon    :  1934  MRN: [id number]  Restrictions/Precautions:     Medical/Treatment Diagnosis Information:   · Diagnosis: M16.12 OA L hip  · Treatment Diagnosis: L LE weakness, L hip pain, decreased balance  Insurance/Certification information:  PT Insurance Information: Medicare  Physician Information:  Referring Practitioner: Ochoa Romero of care signed (Y/N):  y  Visit# / total visits:  4/10 of 2nd POC, total 6   Pain level: 0/10       Time In: 10:17   Time Out:  11:03    Progress Note: []  Yes  [x]  No  Next due by: Visit #10 or by 21    Subjective: Pt. Relates pain this date rated 0/10. Feels wobbly when walking and muscle soreness from last session Patient reports compliance with HEP. Objective: ROB performed per flow sheet to increase strength, mobility and stability with daily tasks. Verbal cuing for progression and technique with exercises. Difficulty with advanced balance exercises noted requiring close CGA and with postural sway noted. Patient demonstrated fatigue with seated rest breaks given as needed.      Observation: difficulty with balance exercises with LOB at times requiring close CGA     Test measurements: Knee ext: 4-4+/5, hip abduction strength 3/5   8 sit to stands in 30 seconds   tandem stance  BLE able to stand 30sec with moderate postural sway noted    Exercises:   Exercise/Equipment Resistance/Repetitions Other comments        Nustep 6' level 4   Prone hip ext          sidelying clamshell 20x    Supine bridge 15x Narrow, wide    LTR 10x3\"    B hip abd/ add Red 20x Sit & supine   Standing 3 way hip 15x    Counter exercises 15x    Modified lumbar extension 15x    Squat Matrix 10x, 3 position    Repeated sit to stand 10x No UE support   Lunges 10x Front/ lat        FTEO, FAEC 3x15'' FOAM   Tandem stance BLE 2x30\"    Side stepping 4x    Gait with cane vs walker                    [x] Provided verbal/tactile cueing for activities related to strengthening, flexibility, endurance, ROM. (79087)  [] Provided verbal/tactile cueing for activities related to improving balance, coordination, kinesthetic sense, posture, motor skill, proprioception. (52232)    Therapeutic Activities:     [] Therapeutic activities, direct (one-on-one) patient contact (use of dynamic activities to improve functional performance). (89734)    Gait:   [] Provided training and instruction to the patient for ambulation re-education. (85139)    Self-Care/ADL's  [] Self-care/home management training and compensatory training, meal preparation, safety procedures, and instructions in use of assistive technology devices/adaptive equipment, direct one-on-one contact. (97545)    Home Exercise Program:     [x] Reviewed/Progressed HEP activities related to strengthening, flexibility, endurance, ROM. (56896)  [] Reviewed/Progressed HEP activities related to improving balance, coordination, kinesthetic sense, posture, motor skill, proprioception.  (89310)    Manual Treatments:    [] Provided manual therapy to mobilize soft tissue/joints for the purpose of modulating pain, promoting relaxation,  increasing ROM, reducing/eliminating soft tissue swelling/inflammation/restriction, improving soft tissue extensibility. (41341)    Service Based Modalities:    Timed Code Treatment Minutes:  39' there-ex    Total Treatment Minutes:  39'    Treatment/Activity Tolerance:  [x] Patient tolerated treatment well [] Patient limited by fatique  [] Patient limited by pain  [] Patient limited by other medical complications  [] Other:     Prognosis: [x] Good [] Fair  [] Poor    Patient Requires Follow-up: [x] Yes  [] No      Goals:  Short term goals  Time Frame for Short term goals: 1 week  Short term goal 1: Initiate HEP (compliant)  Short term goal 2: Assess balance to determine fall risk.  (8 sit to stands in 30 seconds, tandem stance  BLE able to stand 30sec with moderate postural sway noted)    Long term goals  Time Frame for Long term goals : 4 weeks  Long term goal 1: Improve L hip abduction  strength to 3+/5 to facilitate transfers and gait. Long term goal 2: Decrease TUG to 20'' with least restrictive assistive device for more efficient gait. Long term goal 3: Improve modified LEFS to at least 38/64 = 60% functional for improved participation in daily activity. Long term goal 4: Decrease hip pain to not more than 4/10 with amb to improve tolerance to walking at home and in the community (0/10 pain)  Long term goal 5: Sit to  30\" improve to 6x for total LE power and balance(8 sit to stands in 30 seconds)       Plan:   [x] Continue per plan of care [] Alter current plan (see comments)  [] Plan of care initiated [] Hold pending MD visit [] Discharge    Plan for Next Session: Primarily a balance focus approach for safety, continue per plan of care.      Electronically signed by:  Savannah Driscoll PTA

## 2021-10-26 ENCOUNTER — HOSPITAL ENCOUNTER (OUTPATIENT)
Dept: PHYSICAL THERAPY | Age: 86
Setting detail: THERAPIES SERIES
Discharge: HOME | End: 2021-10-26
Payer: MEDICARE

## 2021-10-26 PROCEDURE — 97110 THERAPEUTIC EXERCISES: CPT

## 2021-10-26 NOTE — FLOWSHEET NOTE
Physical Therapy Daily Treatment Note    Date:  10/26/2021    Patient Name:  Jamin Ruvalcaba    :  1934  MRN: [id number]  Restrictions/Precautions:     Medical/Treatment Diagnosis Information:   · Diagnosis: M16.12 OA L hip  · Treatment Diagnosis: L LE weakness, L hip pain, decreased balance  Insurance/Certification information:  PT Insurance Information: Medicare  Physician Information:  Referring Practitioner: Michael Haynes of care signed (Y/N):  y  Visit# / total visits:  5/10 of 2nd POC, total 7   Pain level: 4/10       Time In: 11:13   Time Out: 12:08    Progress Note: []  Yes  [x]  No  Next due by: Visit #10 or by 21    Subjective: Pt. Relates pain this date rated 4/10 in L hip, stating she believes it may be weather related. Pt notes she still feels wobbly when walking. Pt reports feeling \"very tired\" with muscle soreness after last PT session. Patient reports compliance with HEP, admitting to only performing 1x daily and skipping bridges due to not having a firm surface to perform them on. Objective: ROB performed per flow sheet to increase strength, mobility and stability with daily tasks. Progression held this date due to increased pain in L hip. Verbal cuing for proper technique with exercises required. Difficulty with advanced balance exercises noted requiring close CGA and with postural sway noted. Patient demonstrated fatigue with seated rest breaks given as needed. Pt notes no increase in pain at end of session.     Observation: difficulty with balance exercises with LOB at times requiring close CGA  Test measurements:    Exercises:   Exercise/Equipment Resistance/Repetitions Other comments        Nustep 6' level 4   Prone hip ext          sidelying clamshell 20x    Supine bridge 15x Narrow, wide    LTR 10x3\"         B hip abd/ add Red 20x Sit & supine   Standing 3 way hip 15x    Counter exercises 15x    Modified lumbar extension 15x    Squat Matrix 10x, 3 position    Repeated sit to stand week  Short term goal 1: Initiate HEP (compliant)  Short term goal 2: Assess balance to determine fall risk. (8 sit to stands in 30 seconds, tandem stance  BLE able to stand 30sec with moderate postural sway noted)    Long term goals  Time Frame for Long term goals : 4 weeks  Long term goal 1: Improve L hip abduction  strength to 3+/5 to facilitate transfers and gait. Long term goal 2: Decrease TUG to 20'' with least restrictive assistive device for more efficient gait. Long term goal 3: Improve modified LEFS to at least 38/64 = 60% functional for improved participation in daily activity. Long term goal 4: Decrease hip pain to not more than 4/10 with amb to improve tolerance to walking at home and in the community (0/10 pain)  Long term goal 5: Sit to  30\" improve to 6x for total LE power and balance(8 sit to stands in 30 seconds)       Plan:   [x] Continue per plan of care [] Alter current plan (see comments)  [] Plan of care initiated [] Hold pending MD visit [] Discharge    Plan for Next Session: Primarily a balance focus approach for safety, continue per plan of care.      Electronically signed by:  Karen Salgado PTA

## 2021-10-28 ENCOUNTER — HOSPITAL ENCOUNTER (OUTPATIENT)
Dept: PHYSICAL THERAPY | Age: 86
Setting detail: THERAPIES SERIES
Discharge: HOME | End: 2021-10-28
Payer: MEDICARE

## 2021-10-28 PROCEDURE — 97110 THERAPEUTIC EXERCISES: CPT

## 2021-10-28 NOTE — FLOWSHEET NOTE
only   Counter exercises 10x    Modified lumbar extension 10x    Squat Matrix 10x, 3 position    Repeated sit to stand  No UE support   Lunges Front/ lat        FTEO, FAEC 2x30'' FOAM   Tandem stance BLE 2x15\" FOAM   Side stepping 2x    Gait with cane vs walker                    [x] Provided verbal/tactile cueing for activities related to strengthening, flexibility, endurance, ROM. (84703)  [] Provided verbal/tactile cueing for activities related to improving balance, coordination, kinesthetic sense, posture, motor skill, proprioception. (18062)    Therapeutic Activities:     [] Therapeutic activities, direct (one-on-one) patient contact (use of dynamic activities to improve functional performance). (17309)    Gait:   [] Provided training and instruction to the patient for ambulation re-education. (56225)    Self-Care/ADL's  [] Self-care/home management training and compensatory training, meal preparation, safety procedures, and instructions in use of assistive technology devices/adaptive equipment, direct one-on-one contact. (44477)    Home Exercise Program:     [x] Reviewed/Progressed HEP activities related to strengthening, flexibility, endurance, ROM. (27179)  [] Reviewed/Progressed HEP activities related to improving balance, coordination, kinesthetic sense, posture, motor skill, proprioception.  (55371)    Manual Treatments:    [] Provided manual therapy to mobilize soft tissue/joints for the purpose of modulating pain, promoting relaxation,  increasing ROM, reducing/eliminating soft tissue swelling/inflammation/restriction, improving soft tissue extensibility.  (80990)    Service Based Modalities:    Timed Code Treatment Minutes:  39' there-ex    Total Treatment Minutes:  39'    Treatment/Activity Tolerance:  [x] Patient tolerated treatment well [] Patient limited by fatique  [] Patient limited by pain  [] Patient limited by other medical complications  [] Other:     Prognosis: [x] Good [] Fair  [] Poor    Patient Requires Follow-up: [x] Yes  [] No      Goals:  Short term goals  Time Frame for Short term goals: 1 week  Short term goal 1: Initiate HEP (compliant)  Short term goal 2: Assess balance to determine fall risk. (8 sit to stands in 30 seconds, tandem stance  BLE able to stand 30sec with moderate postural sway noted)    Long term goals  Time Frame for Long term goals : 4 weeks  Long term goal 1: Improve L hip abduction  strength to 3+/5 to facilitate transfers and gait. Long term goal 2: Decrease TUG to 20'' with least restrictive assistive device for more efficient gait. Long term goal 3: Improve modified LEFS to at least 38/64 = 60% functional for improved participation in daily activity. Long term goal 4: Decrease hip pain to not more than 4/10 with amb to improve tolerance to walking at home and in the community (0/10 pain)  Long term goal 5: Sit to  30\" improve to 6x for total LE power and balance(8 sit to stands in 30 seconds)       Plan:   [x] Continue per plan of care [] Alter current plan (see comments)  [] Plan of care initiated [] Hold pending MD visit [] Discharge    Plan for Next Session: Primarily a balance focus approach for safety, continue per plan of care.      Electronically signed by:  Justino lCeaning PTA

## 2021-10-29 RX ORDER — PROPAFENONE HYDROCHLORIDE 150 MG/1
150 TABLET, FILM COATED ORAL 2 TIMES DAILY
Qty: 90 TABLET | Refills: 3 | Status: ON HOLD | OUTPATIENT
Start: 2021-10-29 | End: 2022-04-27 | Stop reason: SDUPTHER

## 2021-10-29 NOTE — TELEPHONE ENCOUNTER
Laurie Blevins called requesting a refill of the below medication which has been pended for you:     Requested Prescriptions     Pending Prescriptions Disp Refills    propafenone (RYTHMOL) 150 MG tablet 90 tablet 3     Sig: Take 1 tablet by mouth 2 times daily Take 300 mg of propafenone with 25 mg of metoprolol once and start 150 mg propafenone q 12 hours from next day       Last Appointment Date: 9/13/2021  Next Appointment Date: 12/21/2021    Allergies   Allergen Reactions    Statins Other (See Comments)     Causes severe leg cramps     Tape Electa Ghazi Tape] Other (See Comments)     \"sticks to skin\" causes redness    Tizanidine Hcl Other (See Comments)     Having issues with her liver- elevated her enzyme level    Tramadol Other (See Comments)     \"makes me Goofy\"    Augmentin [Amoxicillin-Pot Clavulanate] Diarrhea, Nausea And Vomiting and Other (See Comments)     abd pain    Nystatin Nausea And Vomiting     Pt states \"swish and swallow\"

## 2021-10-30 NOTE — PROGRESS NOTES
I have reviewed and agree to the content of the note written by the PTA.   Electronically signed by Maximo Lisa PT [id number]

## 2021-10-30 NOTE — PROGRESS NOTES
I have reviewed and agree to the content of the note written by the PTA.   Electronically signed by Abe Mendez3

## 2021-11-02 ENCOUNTER — HOSPITAL ENCOUNTER (OUTPATIENT)
Dept: PHYSICAL THERAPY | Age: 86
Setting detail: THERAPIES SERIES
Discharge: HOME | End: 2021-11-02
Payer: MEDICARE

## 2021-11-02 PROCEDURE — 97110 THERAPEUTIC EXERCISES: CPT | Performed by: PHYSICAL THERAPY ASSISTANT

## 2021-11-02 NOTE — FLOWSHEET NOTE
Physical Therapy Daily Treatment Note    Date:  2021    Patient Name:  Riki Rosa    :  1934  MRN: [id number]  Restrictions/Precautions:     Medical/Treatment Diagnosis Information:   · Diagnosis: M16.12 OA L hip  · Treatment Diagnosis: L LE weakness, L hip pain, decreased balance  Insurance/Certification information:  PT Insurance Information: Medicare  Physician Information:  Referring Practitioner: Rey Gaminoion of care signed (Y/N):  y  Visit# / total visits:  9/10 of 2nd POC, total 8  Pain level: 6/10       Time In: 6042   Time Out:     Progress Note: []  Yes  [x]  No  Next due by: Visit #10 or by 21    Subjective: Pt. Relates pain this date rated 5/10 through left posterior LE. Varies with activity. Objective: ROB performed per flow sheet to increase strength, mobility and stability with daily tasks. Verbal cuing for progression and technique with exercises. Difficulty with weight bearing through left LE noted with increased pain reported. Reviewed HEP and encouraged patient to perform within limited pain range. Understanding noted.      Observation:   Test measurements: Strength flexion: -4/5      Abd: 4/5 with pain     Knee flexion: 4+/5      Ext: 4+/5     Exercises:   Exercise/Equipment Resistance/Repetitions Other comments        Nustep 8' level 4   Prone hip ext          sidelying clamshell     Supine bridge 10x Narrow, wide    LTR 10x3\"  L only        B hip abd/ add Red 20xSit & supine   Standing 3 way hip 10x Ext only   Counter exercises 10x    Modified lumbar extension 10x    Squat Matrix 10x, 3 position    Repeated sit to stand 10x No UE support   Lunges Front/ lat        FTEO, FAEC 2x30'' FOAM   Tandem stance BLE 2x15\" FOAM   Side stepping     Gait with cane vs walker                    [x] Provided verbal/tactile cueing for activities related to strengthening, flexibility, endurance, ROM. (84812)  [] Provided verbal/tactile cueing for activities related to improving balance, coordination, kinesthetic sense, posture, motor skill, proprioception. (22610)    Therapeutic Activities:     [] Therapeutic activities, direct (one-on-one) patient contact (use of dynamic activities to improve functional performance). (92972)    Gait:   [] Provided training and instruction to the patient for ambulation re-education. (55051)    Self-Care/ADL's  [] Self-care/home management training and compensatory training, meal preparation, safety procedures, and instructions in use of assistive technology devices/adaptive equipment, direct one-on-one contact. (59762)    Home Exercise Program:     [x] Reviewed/Progressed HEP activities related to strengthening, flexibility, endurance, ROM. (20604)  [] Reviewed/Progressed HEP activities related to improving balance, coordination, kinesthetic sense, posture, motor skill, proprioception.  (09924)    Manual Treatments:    [] Provided manual therapy to mobilize soft tissue/joints for the purpose of modulating pain, promoting relaxation,  increasing ROM, reducing/eliminating soft tissue swelling/inflammation/restriction, improving soft tissue extensibility. (82796)    Service Based Modalities:    Timed Code Treatment Minutes:  50' there-ex    Total Treatment Minutes:  50'    Treatment/Activity Tolerance:  [x] Patient tolerated treatment well [] Patient limited by fatique  [] Patient limited by pain  [] Patient limited by other medical complications  [] Other:     Prognosis: [x] Good [] Fair  [] Poor    Patient Requires Follow-up: [x] Yes  [] No      Goals:  Short term goals  Time Frame for Short term goals: 1 week  Short term goal 1: Initiate HEP (compliant)  Short term goal 2: Assess balance to determine fall risk.  (8 sit to stands in 30 seconds, tandem stance  BLE able to stand 30sec with moderate postural sway noted)    Long term goals  Time Frame for Long term goals : 4 weeks  Long term goal 1: Improve L hip abduction  strength to 3+/5 to facilitate transfers and gait. Long term goal 2: Decrease TUG to 20'' with least restrictive assistive device for more efficient gait. Long term goal 3: Improve modified LEFS to at least 38/64 = 60% functional for improved participation in daily activity. Long term goal 4: Decrease hip pain to not more than 4/10 with amb to improve tolerance to walking at home and in the community (  Long term goal 5: Sit to  30\" improve to 6x for total LE power and balance      Plan:   [x] Continue per plan of care [] Alter current plan (see comments)  [] Plan of care initiated [] Hold pending MD visit [] Discharge    Plan for Next Session: Primarily a balance focus approach for safety, continue per plan of care. Electronically signed by:   Maxine Moreno PTA

## 2021-11-03 NOTE — PROGRESS NOTES
I have reviewed and agree to the content of the note written by the PTA.   Electronically signed by Jessica Gastelum PT [id number]

## 2021-11-04 ENCOUNTER — HOSPITAL ENCOUNTER (OUTPATIENT)
Dept: PHYSICAL THERAPY | Age: 86
Setting detail: THERAPIES SERIES
Discharge: HOME | End: 2021-11-04
Payer: MEDICARE

## 2021-11-04 PROCEDURE — 97110 THERAPEUTIC EXERCISES: CPT | Performed by: PHYSICAL THERAPIST

## 2021-11-04 NOTE — FLOWSHEET NOTE
Physical Therapy Daily Treatment Note    Date:  2021    Patient Name:  Riki Rosa    :  1934  MRN: [id number]  Restrictions/Precautions:     Medical/Treatment Diagnosis Information:   · Diagnosis: M16.12 OA L hip  · Treatment Diagnosis: L LE weakness, L hip pain, decreased balance  Insurance/Certification information:  PT Insurance Information: Medicare  Physician Information:  Referring Practitioner: Rey Gaminoion of care signed (Y/N):  y  Visit# / total visits:   total 10  Pain level: 6/10       Time In: 2:58  Time Out:  3:26    Progress Note: []  Yes  [x]  No  Next due by: Visit #10 or by 21    Subjective:  Pain in the hip has not changed much    Objective:   Observation:   Test measurements:   + L lateral hip pain with wt bearing  + L Trandelenberg. Poor L 1leg balance ability. Pain and weakness are residual to L NANETTE from 4 yr ago  Lateral lurch with gait, puts abnormal stress on lumbar spine.   Use of RW has been encouraged to protect from the combination of pain, weakness, and slower balance reactions    Exercises:   Exercise/Equipment Resistance/Repetitions Other comments        Nustep 8' level 4   Prone hip ext          sidelying clamshell     Supine bridge 10x Narrow, wide    LTR 10x3\"  L only        B hip abd/ add Red 20xSit & supine   Standing 3 way hip 10x Ext only   Counter exercises 10x    Modified lumbar extension 10x    Squat Matrix 10x, 3 position    Repeated sit to stand 10x No UE support   Lunges Front/ lat        FTEO, FAEC  FOAM   Tandem stance BLE  FOAM   Side stepping     Gait with cane vs walker                    [x] Provided verbal/tactile cueing for activities related to strengthening, flexibility, endurance, ROM. (76700)  [] Provided verbal/tactile cueing for activities related to improving balance, coordination, kinesthetic sense, posture, motor skill, proprioception. (65749)    Therapeutic Activities:     [] Therapeutic activities, direct (one-on-one) patient contact (use of dynamic activities to improve functional performance). (43313)    Gait:   [] Provided training and instruction to the patient for ambulation re-education. (21111)    Self-Care/ADL's  [] Self-care/home management training and compensatory training, meal preparation, safety procedures, and instructions in use of assistive technology devices/adaptive equipment, direct one-on-one contact. (48514)    Home Exercise Program:     [x] Reviewed/Progressed HEP activities related to strengthening, flexibility, endurance, ROM. (76650)  [] Reviewed/Progressed HEP activities related to improving balance, coordination, kinesthetic sense, posture, motor skill, proprioception.  (46420)    Manual Treatments:    [] Provided manual therapy to mobilize soft tissue/joints for the purpose of modulating pain, promoting relaxation,  increasing ROM, reducing/eliminating soft tissue swelling/inflammation/restriction, improving soft tissue extensibility. (90624)    Service Based Modalities:    Timed Code Treatment Minutes:  50' there-ex    Total Treatment Minutes:  50'    Treatment/Activity Tolerance:  [x] Patient tolerated treatment well [] Patient limited by fatique  [] Patient limited by pain  [] Patient limited by other medical complications  [] Other:     Prognosis: [x] Good [] Fair  [] Poor    Patient Requires Follow-up: [x] Yes  [] No      Goals:  Short term goals  Time Frame for Short term goals: 1 week  Short term goal 1: Initiate HEP- met  Short term goal 2: Assess balance to determine fall risk. - met    Long term goals  Time Frame for Long term goals : 4 weeks  Long term goal 1: Improve L hip abduction  strength to 3+/5 to facilitate transfers and gait. - met  Long term goal 2: Decrease TUG to 20'' with least restrictive assistive device for more efficient gait- met. Long term goal 3: Improve modified LEFS to at least 38/64 = 60% functional for improved participation in daily activity.   Long term goal 4: Decrease hip pain to not more than 4/10 with amb to improve tolerance to walking at home and in the community (  Long term goal 5: Sit to  30\" improve to 6x for total LE power and balance- met      Plan:   [] Continue per plan of care [] Alter current plan (see comments)  [] Plan of care initiated [] Hold pending MD visit [x] Discharge    Plan for Next Session: Primarily a balance focus approach for safety, continue per plan of care.      Electronically signed by:  Chance Daniels PT

## 2021-11-04 NOTE — DISCHARGE SUMMARY
[facility]    [x] [place]  Phone: [phone]  Fax: [phone]      [] [place]  Phone: [phone]  Fax: [phone]    Physical Therapy Discharge Note  Date: 2021        Patient Name:  Joby Padilla    :  1934  MRN: [id number]  Restrictions/Precautions:      Medical/Treatment Diagnosis Information:  ·   Diagnosis: M16.12 OA L hip  · Treatment Diagnosis: L LE weakness, L hip pain, decreased balance  ·    Insurance/Certification information:    Medicare  Physician Information:    Gold Tiwari of care signed (Y/N): y  Visit# / total visits:  10  Pain level: 6/10       Plan of Care/Treatment to date:  [x] Therapeutic Exercise    [] Modalities:  [] Therapeutic Activity     [] Ultrasound  [] Electrical Stimulation  [x] Gait Training      [] Cervical Traction    [] Lumbar Traction  [] Neuromuscular Re-education  [] Cold/hotpack [] Iontophoresis  [x] Instruction in HEP      Other:  [x] Manual Therapy       []    [] Aquatic Therapy       []                              Subjective:    Pain in the hip has not changed much         Objective:    Test measurements:   + L lateral hip pain with wt bearing  + L Trandelenberg. Poor L 1leg balance ability. Pain and weakness are residual to L NANETTE from 4 yr ago  Lateral lurch with gait, puts abnormal stress on lumbar spine. Use of RW has been encouraged to protect from the combination of pain, weakness, and slower balance reactions         Plan:     d/c       Goals:    Short term goals  Time Frame for Short term goals: 1 week  Short term goal 1: Initiate HEP- met  Short term goal 2: Assess balance to determine fall risk. - met     Long term goals  Time Frame for Long term goals : 4 weeks  Long term goal 1: Improve L hip abduction  strength to 3+/5 to facilitate transfers and gait. - met  Long term goal 2: Decrease TUG to 20'' with least restrictive assistive device for more efficient gait- met.   Long term goal 3: Improve modified LEFS to at least 38/64 = 60% functional for improved participation in daily activity.   Long term goal 4: Decrease hip pain to not more than 4/10 with amb to improve tolerance to walking at home and in the community (  Long term goal 5: Sit to  30\" improve to 6x for total LE power and balance- met         Percentage of Goals Met: 57%            Discharge Prognosis: [] Excellent [] Good [x] Fair  [] Poor     Goal Status:  [] Achieved [x] Partially Achieved  [] Not Achieved       Electronically signed by:  Svetlana Trejo, PT

## 2021-11-11 ENCOUNTER — OFFICE VISIT (OUTPATIENT)
Dept: CARDIOLOGY | Age: 86
End: 2021-11-11
Payer: MEDICARE

## 2021-11-11 ENCOUNTER — HOSPITAL ENCOUNTER (OUTPATIENT)
Dept: PHARMACY | Age: 86
Setting detail: THERAPIES SERIES
Discharge: HOME | End: 2021-11-11
Payer: MEDICARE

## 2021-11-11 VITALS
BODY MASS INDEX: 25.88 KG/M2 | DIASTOLIC BLOOD PRESSURE: 77 MMHG | HEIGHT: 66 IN | WEIGHT: 161 LBS | SYSTOLIC BLOOD PRESSURE: 126 MMHG | HEART RATE: 121 BPM

## 2021-11-11 DIAGNOSIS — I48.0 PAROXYSMAL ATRIAL FIBRILLATION WITH RAPID VENTRICULAR RESPONSE (HCC): ICD-10-CM

## 2021-11-11 DIAGNOSIS — I48.0 PAROXYSMAL ATRIAL FIBRILLATION WITH RAPID VENTRICULAR RESPONSE (HCC): Primary | ICD-10-CM

## 2021-11-11 DIAGNOSIS — Z79.01 ANTICOAGULATION MONITORING BY PHARMACIST: Primary | ICD-10-CM

## 2021-11-11 LAB
INR BLD: 4.1
PROTIME: 49.2 SECONDS

## 2021-11-11 PROCEDURE — G8427 DOCREV CUR MEDS BY ELIG CLIN: HCPCS | Performed by: INTERNAL MEDICINE

## 2021-11-11 PROCEDURE — 93010 ELECTROCARDIOGRAM REPORT: CPT | Performed by: INTERNAL MEDICINE

## 2021-11-11 PROCEDURE — 1123F ACP DISCUSS/DSCN MKR DOCD: CPT | Performed by: INTERNAL MEDICINE

## 2021-11-11 PROCEDURE — G8417 CALC BMI ABV UP PARAM F/U: HCPCS | Performed by: INTERNAL MEDICINE

## 2021-11-11 PROCEDURE — 85610 PROTHROMBIN TIME: CPT

## 2021-11-11 PROCEDURE — 99211 OFF/OP EST MAY X REQ PHY/QHP: CPT

## 2021-11-11 PROCEDURE — G8484 FLU IMMUNIZE NO ADMIN: HCPCS | Performed by: INTERNAL MEDICINE

## 2021-11-11 PROCEDURE — 4040F PNEUMOC VAC/ADMIN/RCVD: CPT | Performed by: INTERNAL MEDICINE

## 2021-11-11 PROCEDURE — 1090F PRES/ABSN URINE INCON ASSESS: CPT | Performed by: INTERNAL MEDICINE

## 2021-11-11 PROCEDURE — 99214 OFFICE O/P EST MOD 30 MIN: CPT | Performed by: INTERNAL MEDICINE

## 2021-11-11 PROCEDURE — 36416 COLLJ CAPILLARY BLOOD SPEC: CPT

## 2021-11-11 PROCEDURE — 93005 ELECTROCARDIOGRAM TRACING: CPT | Performed by: INTERNAL MEDICINE

## 2021-11-11 PROCEDURE — 1036F TOBACCO NON-USER: CPT | Performed by: INTERNAL MEDICINE

## 2021-11-11 RX ORDER — METOPROLOL TARTRATE 50 MG/1
TABLET, FILM COATED ORAL
Qty: 60 TABLET | Refills: 3 | Status: SHIPPED | OUTPATIENT
Start: 2021-11-11 | End: 2021-12-16 | Stop reason: DRUGHIGH

## 2021-11-11 NOTE — PATIENT INSTRUCTIONS
\"On day of next appointment, please screen for temperature and COVID-19 symptoms prior to you clinic appointment. If any symptoms present, please call [phone] to reschedule. \"

## 2021-11-11 NOTE — PROGRESS NOTES
Today's Date: 11/11/2021  Patient Name: Joby Padilla  Patient's age: 80 y.o., 1934      HPI:   The patient is a 80 y.o.  female is in the office for follow up. She is in atrial flutter with RVR today with heart rate greater than 120  He denies any chest pain, dyspnea or palpitations  Denies any exertional dyspnea  Lightheadedness or dizziness  Does not want to go to [place] for any procedure    Past Medical History:   has a past medical history of Actinic keratosis, Arthritis, Atrial fibrillation ([facility] [address] CAD (coronary artery disease), Cancer ([facility] [address] Cervical prolapse, Diverticulosis, Endocervical polyp, Heel spur, Hip pain, Hypertension, Nasal septal deviation, Osteoporosis, Stress incontinence, Syncope, and Vaginal prolapse. Past Surgical History:   has a past surgical history that includes Colonoscopy (01/26/2000); Cholecystectomy (11/1976); Upper gastrointestinal endoscopy (07/16/2017); pr egd transoral biopsy single/multiple (Left, 07/16/2017); Upper gastrointestinal endoscopy (Left, 07/16/2017); Endoscopic ultrasonography, GI (Left, 07/20/2017); eye surgery; Total hip arthroplasty (Left, 09/19/2017); bladder repair (2011); partial hysterectomy (cervix not removed) (1960's); Appendectomy (1960's); skin biopsy; pr office/outpt visit,procedure only (Right, 06/01/2018); joint replacement (2017); Mohs surgery (N/A, 1/8/2021); Facial Surgery (N/A, 1/27/2021); US BREAST BIOPSY W LOC DEVICE 1ST LESION LEFT (Left, 4/21/2021); US PLACE BREAST LOC DEVICE EACH ADDL LEFT (Left, 4/21/2021); US BREAST BIOPSY W LOC DEVICE 1ST LESION RIGHT (Right, 4/21/2021); Hysterectomy; Mohs surgery (Left, 8/6/2021); Upper gastrointestinal endoscopy (Left, 8/28/2021); and Upper gastrointestinal endoscopy (N/A, 8/30/2021). Home Medications:    Prior to Admission medications    Medication Sig Start Date End Date Taking?  Authorizing Provider   metoprolol tartrate (LOPRESSOR) 50 MG tablet TAKE HALF OF A TABLET TWICE A DAY. 11/11/21  Yes Raffaele Ford MD   propafenone (RYTHMOL) 150 MG tablet Take 1 tablet by mouth 2 times daily Take 300 mg of propafenone with 25 mg of metoprolol once and start 150 mg propafenone q 12 hours from next day 10/29/21  Yes Leif Beyer MD   warfarin (COUMADIN) 2.5 MG tablet 1. Take 2 tablets by mouth daily. Or as directed by INR results. 8/26/21  Yes Leif Beyer MD   pantoprazole (PROTONIX) 40 MG tablet Take 1 tablet by mouth once daily 8/26/21  Yes Leif Beyer MD   ondansetron (ZOFRAN ODT) 4 MG disintegrating tablet Take 1 tablet by mouth every 8 hours as needed for Nausea 8/23/21  Yes Ricarda Vivar MD   Handicap Placard MISC by Does not apply route Expires 6/2022 6/15/17  Yes Leif Beyer MD   MEGARED OMEGA-3 KRILL  MG CAPS Take by mouth daily    Yes Historical Provider, MD   Multiple Vitamins-Minerals (ICAPS AREDS 2) CAPS Take 2 tablets by mouth daily    Yes Historical Provider, MD       Allergies:  Statins, Tape [adhesive tape], Tizanidine hcl, Tramadol, Augmentin [amoxicillin-pot clavulanate], and Nystatin    Social History:   reports that she has never smoked. She has never used smokeless tobacco. She reports that she does not drink alcohol and does not use drugs. REVIEW OF SYSTEMS:  CONSTITUTIONAL:NEGATIVE  HEENT:NEG  Cardiovascular: No chest pain, No dyspnea on exertion, No palpitations. Lower extremity edema: No  RESPIRATORY: neg  GASTROINTESTINAL:  negative  GENITOURINARY:  negative  INTEGUMENT:  negative  MUSCULOSKELETAL:  positive for  pain  NEUROLOGICAL:  negative    PHYSICAL EXAM:      /77   Pulse 121   Ht 5' 6\" (1.676 m)   Wt 161 lb (73 kg)   BMI 25.99 kg/m²    HEENT: PERRL, no cervical lymphadenopathy. No masses palpable. Cardiovascular:  · The apical impulse is not displaced  · Heart  Sounds: Irregularly irregular.   No murmur or gallop  · Jugular venous pulsation Normal  · The carotid upstroke is normal  · Peripheral pulses are symmetrical and full  Respiratory: Good respiratory effort. On auscultation: clear to auscultation bilaterally  Abdomen:  · No masses or tenderness  · Bowel sounds present  Extremities:  ·  No Cyanosis or Clubbing  ·  Lower extremity edema: No  Skin: Warm and dry    Cardiac data:      ECG atrial flutter with RVR    Labs:   PT/INR:   Recent Labs     11/11/21  0000   PROTIME 49.2   INR 4.1     FASTING LIPID PANEL:  Lab Results   Component Value Date    HDL 57 01/19/2021    TRIG 135 01/19/2021       Echocardiogram 7/2017:  Normal left ventricle size and systolic function. Ejection fraction was estimated at 60%. There were no regional left ventricular wall motion abnormalities and wall thickness was within normal limits. There was mild aortic regurgitation. There was mild mitral regurgitation. There was mild tricuspid regurgitation. Nuclear stress test 7/2017:   [facility] EKG stress test is not suggestive for ischemia. This Nuclear Medicine study was negative for ischemia. Assessment:    · PAF, in atrial flutter today, with RVR   · MILD MR  · MILD AI  · A/C WITH COUMADIN  · Preserved LV systolic function  · No ischemia or infarction on nuclear stress test 2017.       Patient Active Problem List   Diagnosis    Trochanteric bursitis of left hip    Drug-induced constipation    Bradycardia    Esophageal stricture    Gastritis without bleeding    Lumbar radicular pain    Lumbar spine pain    Left hip pain    Arthritis of left hip    Paroxysmal atrial fibrillation with rapid ventricular response (HCC)    Anticoagulation monitoring by pharmacist    Venous stasis dermatitis of both lower extremities    Arthritis of right hand    Vaginal erosion secondary to pessary use ([facility])    Pelvic relaxation due to uterovaginal prolapse    Postmenopausal bleeding    History of hysterectomy    Esophageal candidiasis ([facility])    Hyponatremia    Lower abdominal pain    Atrial flutter ([address]    Presence of pessary #4 ring       Recommendations:  1-Recommend DCCV to restore NSR. Patient has been on anticoagulation with Coumadin, denies missing any doses, INR over last 2 months fairly therapeutic. Will repeat one today. Risks, benefits and alternative of procedure discussed with patient.   She verbalized understanding and willing to proceed tomorrow at [facility].  2-increase metoprolol to 50 mg twice daily in the meantime  3-continue Rythmol and Coumadin  4-F/U in 6 weeks      Electronically signed by She Asher MD on 11/11/2021 at 1:07 [address] [place] [address] [facility]  [phone]

## 2021-11-12 ENCOUNTER — HOSPITAL ENCOUNTER (OUTPATIENT)
Dept: POSTOP/PACU | Age: 86
Discharge: HOME | End: 2021-11-12
Payer: MEDICARE

## 2021-11-12 LAB
EKG ATRIAL RATE: 61 BPM
EKG P AXIS: 74 DEGREES
EKG P-R INTERVAL: 190 MS
EKG Q-T INTERVAL: 454 MS
EKG QRS DURATION: 88 MS
EKG QTC CALCULATION (BAZETT): 457 MS
EKG R AXIS: 89 DEGREES
EKG T AXIS: 76 DEGREES
EKG VENTRICULAR RATE: 61 BPM

## 2021-11-12 PROCEDURE — 93005 ELECTROCARDIOGRAM TRACING: CPT

## 2021-11-12 NOTE — PROGRESS NOTES
Patient presents to PACU for elective cardioversion. Telemetry applies and shows Normal sinus rhythm. EKG 12-lead ordered. EKG reviewed by Dr. Rey Kaufman who verifies NSR.  Cancel cardioversion

## 2021-11-24 ENCOUNTER — HOSPITAL ENCOUNTER (OUTPATIENT)
Dept: PHARMACY | Age: 86
Setting detail: THERAPIES SERIES
Discharge: HOME | End: 2021-11-24
Payer: MEDICARE

## 2021-11-24 DIAGNOSIS — Z79.01 ANTICOAGULATION MONITORING BY PHARMACIST: Primary | ICD-10-CM

## 2021-11-24 DIAGNOSIS — I48.0 PAROXYSMAL ATRIAL FIBRILLATION WITH RAPID VENTRICULAR RESPONSE (HCC): ICD-10-CM

## 2021-11-24 LAB
INR BLD: 2.5
PROTIME: 30.3 SECONDS

## 2021-11-24 PROCEDURE — 36416 COLLJ CAPILLARY BLOOD SPEC: CPT

## 2021-11-24 PROCEDURE — 99211 OFF/OP EST MAY X REQ PHY/QHP: CPT

## 2021-11-24 PROCEDURE — 85610 PROTHROMBIN TIME: CPT

## 2021-11-24 NOTE — PROGRESS NOTES
ANTICOAGULATION SERVICE    Date of Clinic Visit:  2021    Katja Thorpe is a 80 y.o. female who presents to clinic today for anticoagulation monitoring and adjustment. Recent INR Results:  Internal QC passed  Lab Results   Component Value Date    INR 2.5 2021    INR 4.1 2021       Current Warfarin Dosage:  Dosing Plan  As of 2021    TTR:  86.3 % (4 y)   Full warfarin instructions:  2.5 mg every Fri; 5 mg all other days               Assessment/Plan:    Continue current regimen as INR remains stable. INR is back in range after dose adjustment at last visit. I will recheck in 3 weeks. I want to make sure INR will not continue to decrease below range. Next Clinic Appointment:  Return date  As of 2021    TTR:  86.3 % (4 y)   Next INR check:  2021             Please call [facility] at [phone] with any questions. Thanks!   Ankita Hernandez [facility]  Anticoagulation Service Pharmacist  2021 11:25 AM     For Pharmacy Admin Tracking Only     Intervention Detail: Adherence Monitorin   Total # of Interventions Recommended: 0   Total # of Interventions Accepted: 0   Time Spent (min): 15

## 2021-12-11 NOTE — TELEPHONE ENCOUNTER
Murdock Lyon called requesting a refill of the below medication which has been pended for you:     Requested Prescriptions     Pending Prescriptions Disp Refills    pantoprazole (PROTONIX) 40 MG tablet [Pharmacy Med Name: Pantoprazole Sodium 40 MG Oral Tablet Delayed Release] 90 tablet 1     Sig: Take 1 tablet by mouth once daily       Last Appointment Date: 9/13/2021  Next Appointment Date: 12/21/2021    Allergies   Allergen Reactions    Statins Other (See Comments)     Causes severe leg cramps     Tape Gil Jon Tape] Other (See Comments)     \"sticks to skin\" causes redness    Tizanidine Hcl Other (See Comments)     Having issues with her liver- elevated her enzyme level    Tramadol Other (See Comments)     \"makes me Goofy\"    Augmentin [Amoxicillin-Pot Clavulanate] Diarrhea, Nausea And Vomiting and Other (See Comments)     abd pain    Nystatin Nausea And Vomiting     Pt states \"swish and swallow\"

## 2021-12-13 RX ORDER — PANTOPRAZOLE SODIUM 40 MG/1
TABLET, DELAYED RELEASE ORAL
Qty: 90 TABLET | Refills: 1 | Status: SHIPPED | OUTPATIENT
Start: 2021-12-13 | End: 2022-03-11 | Stop reason: SDUPTHER

## 2021-12-16 ENCOUNTER — OFFICE VISIT (OUTPATIENT)
Dept: CARDIOLOGY | Age: 86
End: 2021-12-16
Payer: MEDICARE

## 2021-12-16 ENCOUNTER — HOSPITAL ENCOUNTER (OUTPATIENT)
Dept: PHARMACY | Age: 86
Setting detail: THERAPIES SERIES
Discharge: HOME | End: 2021-12-16
Payer: MEDICARE

## 2021-12-16 VITALS
HEART RATE: 62 BPM | HEIGHT: 66 IN | WEIGHT: 161 LBS | BODY MASS INDEX: 25.88 KG/M2 | SYSTOLIC BLOOD PRESSURE: 152 MMHG | DIASTOLIC BLOOD PRESSURE: 75 MMHG

## 2021-12-16 DIAGNOSIS — Z79.01 ANTICOAGULATION MONITORING BY PHARMACIST: Primary | ICD-10-CM

## 2021-12-16 DIAGNOSIS — I48.0 PAROXYSMAL ATRIAL FIBRILLATION WITH RAPID VENTRICULAR RESPONSE (HCC): ICD-10-CM

## 2021-12-16 DIAGNOSIS — I48.0 PAROXYSMAL ATRIAL FIBRILLATION WITH RAPID VENTRICULAR RESPONSE (HCC): Primary | ICD-10-CM

## 2021-12-16 LAB
INR BLD: 2.6
PROTIME: 31.4 SECONDS

## 2021-12-16 PROCEDURE — G8417 CALC BMI ABV UP PARAM F/U: HCPCS | Performed by: INTERNAL MEDICINE

## 2021-12-16 PROCEDURE — 1090F PRES/ABSN URINE INCON ASSESS: CPT | Performed by: INTERNAL MEDICINE

## 2021-12-16 PROCEDURE — 4040F PNEUMOC VAC/ADMIN/RCVD: CPT | Performed by: INTERNAL MEDICINE

## 2021-12-16 PROCEDURE — 99211 OFF/OP EST MAY X REQ PHY/QHP: CPT

## 2021-12-16 PROCEDURE — 93010 ELECTROCARDIOGRAM REPORT: CPT | Performed by: INTERNAL MEDICINE

## 2021-12-16 PROCEDURE — 85610 PROTHROMBIN TIME: CPT

## 2021-12-16 PROCEDURE — 1036F TOBACCO NON-USER: CPT | Performed by: INTERNAL MEDICINE

## 2021-12-16 PROCEDURE — 36416 COLLJ CAPILLARY BLOOD SPEC: CPT

## 2021-12-16 PROCEDURE — 99213 OFFICE O/P EST LOW 20 MIN: CPT | Performed by: INTERNAL MEDICINE

## 2021-12-16 PROCEDURE — 93005 ELECTROCARDIOGRAM TRACING: CPT | Performed by: INTERNAL MEDICINE

## 2021-12-16 PROCEDURE — 99214 OFFICE O/P EST MOD 30 MIN: CPT | Performed by: INTERNAL MEDICINE

## 2021-12-16 PROCEDURE — 1123F ACP DISCUSS/DSCN MKR DOCD: CPT | Performed by: INTERNAL MEDICINE

## 2021-12-16 PROCEDURE — G8427 DOCREV CUR MEDS BY ELIG CLIN: HCPCS | Performed by: INTERNAL MEDICINE

## 2021-12-16 PROCEDURE — G8484 FLU IMMUNIZE NO ADMIN: HCPCS | Performed by: INTERNAL MEDICINE

## 2021-12-16 RX ORDER — METOPROLOL TARTRATE 50 MG/1
TABLET, FILM COATED ORAL
Qty: 60 TABLET | Refills: 3 | Status: SHIPPED | OUTPATIENT
Start: 2021-12-16 | End: 2022-07-08 | Stop reason: SDUPTHER

## 2021-12-16 NOTE — PROGRESS NOTES
Today's Date: 12/16/2021  Patient Name: Peg Miguel  Patient's age: 80 y.o., 1934      HPI:   The patient is a 80 y.o.  female is in the office for follow up. Spontaneously converted to normal sinus rhythm and has been maintaining since last 1 month. No complaints of palpitations. According to daughter blood pressure during the daytime is been running low  She denies any chest pain, dyspnea or Lightheadedness or dizziness      Past Medical History:   has a past medical history of Actinic keratosis, Arthritis, Atrial fibrillation ([facility] [address] CAD (coronary artery disease), Cancer ([facility] [address] Cervical prolapse, Diverticulosis, Endocervical polyp, Heel spur, Hip pain, Hypertension, Nasal septal deviation, Osteoporosis, Stress incontinence, Syncope, and Vaginal prolapse. Past Surgical History:   has a past surgical history that includes Colonoscopy (01/26/2000); Cholecystectomy (11/1976); Upper gastrointestinal endoscopy (07/16/2017); pr egd transoral biopsy single/multiple (Left, 07/16/2017); Upper gastrointestinal endoscopy (Left, 07/16/2017); Endoscopic ultrasonography, GI (Left, 07/20/2017); eye surgery; Total hip arthroplasty (Left, 09/19/2017); bladder repair (2011); partial hysterectomy (cervix not removed) (1960's); Appendectomy (1960's); skin biopsy; pr office/outpt visit,procedure only (Right, 06/01/2018); joint replacement (2017); Mohs surgery (N/A, 1/8/2021); Facial Surgery (N/A, 1/27/2021); US BREAST BIOPSY W LOC DEVICE 1ST LESION LEFT (Left, 4/21/2021); US PLACE BREAST LOC DEVICE EACH ADDL LEFT (Left, 4/21/2021); US BREAST BIOPSY W LOC DEVICE 1ST LESION RIGHT (Right, 4/21/2021); Hysterectomy; Mohs surgery (Left, 8/6/2021); Upper gastrointestinal endoscopy (Left, 8/28/2021); and Upper gastrointestinal endoscopy (N/A, 8/30/2021). Home Medications:    Prior to Admission medications    Medication Sig Start Date End Date Taking?  Authorizing Provider   metoprolol tartrate (LOPRESSOR) 50 MG tablet TAKE HALF OF A TABLET IN MORNING AND ONE AT NIGHT 12/16/21  Yes Gilberto Joseph MD   pantoprazole (PROTONIX) 40 MG tablet Take 1 tablet by mouth once daily 12/13/21  Yes Jamaal Wright MD   propafenone (RYTHMOL) 150 MG tablet Take 1 tablet by mouth 2 times daily Take 300 mg of propafenone with 25 mg of metoprolol once and start 150 mg propafenone q 12 hours from next day 10/29/21  Yes Jamaal Wright MD   warfarin (COUMADIN) 2.5 MG tablet 1. Take 2 tablets by mouth daily. Or as directed by INR results. 8/26/21  Yes Jamaal Wright MD   ondansetron (ZOFRAN ODT) 4 MG disintegrating tablet Take 1 tablet by mouth every 8 hours as needed for Nausea 8/23/21  Yes Henok Manuel MD   Handicap Placard MISC by Does not apply route Expires 6/2022 6/15/17  Yes Jamaal Wright MD   MEGARED OMEGA-3 KRILL  MG CAPS Take by mouth daily    Yes Historical Provider, MD   Multiple Vitamins-Minerals (ICAPS AREDS 2) CAPS Take 2 tablets by mouth daily    Yes Historical Provider, MD       Allergies:  Statins, Tape [adhesive tape], Tizanidine hcl, Tramadol, Augmentin [amoxicillin-pot clavulanate], and Nystatin    Social History:   reports that she has never smoked. She has never used smokeless tobacco. She reports that she does not drink alcohol and does not use drugs. REVIEW OF SYSTEMS:  CONSTITUTIONAL:NEGATIVE  HEENT:NEG  Cardiovascular: No chest pain, No dyspnea on exertion, No palpitations. Lower extremity edema: No  RESPIRATORY: neg  GASTROINTESTINAL:  negative  GENITOURINARY:  negative  INTEGUMENT:  negative  MUSCULOSKELETAL:  positive for  pain  NEUROLOGICAL:  negative    PHYSICAL EXAM:      BP (!) 152/75   Pulse 62   Ht 5' 6\" (1.676 m)   Wt 161 lb (73 kg)   BMI 25.99 kg/m²    HEENT: PERRL, no cervical lymphadenopathy. No masses palpable. Cardiovascular:  · The apical impulse is not displaced  · Heart  Sounds: Regular.   No murmur or gallop  · Jugular venous pulsation Normal  · The carotid upstroke is normal  · Peripheral pulses are symmetrical and full  Respiratory: Good respiratory effort. On auscultation: clear to auscultation bilaterally  Abdomen:  · No masses or tenderness  · Bowel sounds present  Extremities:  ·  No Cyanosis or Clubbing  ·  Lower extremity edema: No  Skin: Warm and dry    Cardiac data:      ECG 12/16/2021 normal sinus rhythm, normal ECG    Labs:   PT/INR:   Recent Labs     12/16/21  0000   PROTIME 31.4   INR 2.6     FASTING LIPID PANEL:  Lab Results   Component Value Date    HDL 57 01/19/2021    TRIG 135 01/19/2021       Echocardiogram 7/2017:  Normal left ventricle size and systolic function. Ejection fraction was estimated at 60%. There were no regional left ventricular wall motion abnormalities and wall thickness was within normal limits. There was mild aortic regurgitation. There was mild mitral regurgitation. There was mild tricuspid regurgitation. Nuclear stress test 7/2017:   [facility] EKG stress test is not suggestive for ischemia. This Nuclear Medicine study was negative for ischemia. Assessment:    · PAF/flutter, currently in NSR. · MILD MR  · MILD AI  · A/C WITH COUMADIN  · Preserved LV systolic function  · No ischemia or infarction on nuclear stress test 2017.       Patient Active Problem List   Diagnosis    Trochanteric bursitis of left hip    Drug-induced constipation    Bradycardia    Esophageal stricture    Gastritis without bleeding    Lumbar radicular pain    Lumbar spine pain    Left hip pain    Arthritis of left hip    Paroxysmal atrial fibrillation with rapid ventricular response (HCC)    Anticoagulation monitoring by pharmacist    Venous stasis dermatitis of both lower extremities    Arthritis of right hand    Vaginal erosion secondary to pessary use ([address]    Pelvic relaxation due to uterovaginal prolapse    Postmenopausal bleeding    History of hysterectomy    Esophageal candidiasis ([address]    Hyponatremia    Lower abdominal pain    Atrial flutter (HCC)    Presence of pessary #4 ring       Recommendations:  1-lopressor 25 mg in AM and 50 mg at night   2-continue Rythmol and Coumadin  4-F/U in 6 months or sooner if needed       Electronically signed by Pily Sanchez MD on 12/16/2021 at [address] [place] [address] [facility]  [phone]

## 2021-12-16 NOTE — PATIENT INSTRUCTIONS
\"On day of next appointment, please screen for temperature and COVID-19 symptoms prior to you clinic appointment. If any symptoms present, please call [phone] to reschedule. \"

## 2021-12-21 ENCOUNTER — OFFICE VISIT (OUTPATIENT)
Dept: FAMILY MEDICINE CLINIC | Age: 86
End: 2021-12-21
Payer: MEDICARE

## 2021-12-21 VITALS
HEIGHT: 66 IN | TEMPERATURE: 97.1 F | WEIGHT: 162 LBS | BODY MASS INDEX: 26.03 KG/M2 | DIASTOLIC BLOOD PRESSURE: 70 MMHG | SYSTOLIC BLOOD PRESSURE: 130 MMHG | HEART RATE: 84 BPM | OXYGEN SATURATION: 97 %

## 2021-12-21 DIAGNOSIS — Z23 FLU VACCINE NEED: ICD-10-CM

## 2021-12-21 DIAGNOSIS — Z13.220 SCREENING CHOLESTEROL LEVEL: ICD-10-CM

## 2021-12-21 DIAGNOSIS — E87.1 HYPONATREMIA: ICD-10-CM

## 2021-12-21 DIAGNOSIS — K21.9 GASTROESOPHAGEAL REFLUX DISEASE, UNSPECIFIED WHETHER ESOPHAGITIS PRESENT: ICD-10-CM

## 2021-12-21 DIAGNOSIS — H91.93 BILATERAL HEARING LOSS, UNSPECIFIED HEARING LOSS TYPE: ICD-10-CM

## 2021-12-21 DIAGNOSIS — M54.16 LUMBAR RADICULAR PAIN: Primary | ICD-10-CM

## 2021-12-21 PROCEDURE — PBSHW INFLUENZA, QUADV, ADJUVANTED, 65 YRS +, IM, PF, PREFILL SYR, 0.5ML (FLUAD): Performed by: FAMILY MEDICINE

## 2021-12-21 PROCEDURE — 99214 OFFICE O/P EST MOD 30 MIN: CPT | Performed by: FAMILY MEDICINE

## 2021-12-21 PROCEDURE — 90694 VACC AIIV4 NO PRSRV 0.5ML IM: CPT | Performed by: FAMILY MEDICINE

## 2021-12-21 PROCEDURE — 1036F TOBACCO NON-USER: CPT | Performed by: FAMILY MEDICINE

## 2021-12-21 PROCEDURE — G8427 DOCREV CUR MEDS BY ELIG CLIN: HCPCS | Performed by: FAMILY MEDICINE

## 2021-12-21 PROCEDURE — 4040F PNEUMOC VAC/ADMIN/RCVD: CPT | Performed by: FAMILY MEDICINE

## 2021-12-21 PROCEDURE — G8417 CALC BMI ABV UP PARAM F/U: HCPCS | Performed by: FAMILY MEDICINE

## 2021-12-21 PROCEDURE — G8484 FLU IMMUNIZE NO ADMIN: HCPCS | Performed by: FAMILY MEDICINE

## 2021-12-21 PROCEDURE — 99212 OFFICE O/P EST SF 10 MIN: CPT

## 2021-12-21 PROCEDURE — 1123F ACP DISCUSS/DSCN MKR DOCD: CPT | Performed by: FAMILY MEDICINE

## 2021-12-21 PROCEDURE — 1090F PRES/ABSN URINE INCON ASSESS: CPT | Performed by: FAMILY MEDICINE

## 2021-12-21 ASSESSMENT — ENCOUNTER SYMPTOMS
SHORTNESS OF BREATH: 0
COUGH: 0
CHEST TIGHTNESS: 0
WHEEZING: 0

## 2021-12-21 NOTE — PATIENT INSTRUCTIONS
Can try pepcid 20mg at bedtime to see if that helps sore throat. Also call GI doctor for appointment for possible repeat dilation of esophagus.

## 2021-12-21 NOTE — PROGRESS NOTES
[facility] [address]  [address] [address]  Dept: [phone]  Dept Fax: [phone]  Loc: [phone]    Marylou Gardiner is a 80 y.o. female who presents today for her medical conditions/complaints as noted below. Marylou Gardiner is c/o of   Chief Complaint   Patient presents with    6 Month Follow-Up     GERD, balance issues       HPI:     HPI Here today for a follow up of her GERD and balance issues. She had an appointment with cardiology and her heart was beating so quickly that her cardiologist wanted to do an ablation but she converted on her own. Her metoprolol was increased and her dose has since been adjusted a little. No issues with lightheadedness or dizziness. She has not had any chest pain. Her hearing seems to be worsening. It has worsened since Sept. She is also struggling with word finding as well. Her balance has not improved at all. She is not falling. She has been using her daughter to help her walk. At home she uses the wall and counters. She is not having any issues with numbness or tingling in her legs. She did some PT but there was nothing else they could do for her. She is not having any issues with pain in her hips or knees. GERD: She has been having a nighttime sore throat and she has a sore throat during the day as well. Cough drops help. She has been having some continued issues with swallowing. She occasionally feels like things \"go down the wrong pipe\". No issues with belly pain.  She does feel like food and liquids get \"stuck\" when she is swallowing on occasion    Past Medical History:   Diagnosis Date    Actinic keratosis     history of    Arthritis     Atrial fibrillation (HCC)     CAD (coronary artery disease)     history of afib    Cancer (HCC)     skin    Cervical prolapse     history of    Diverticulosis     Endocervical polyp     history of    Heel spur     Hip pain     Hypertension     Nasal septal deviation     Osteoporosis     Stress incontinence     history of    Syncope     Vaginal prolapse     history of          Social History     Tobacco Use    Smoking status: Never Smoker    Smokeless tobacco: Never Used   Substance Use Topics    Alcohol use: No     Current Outpatient Medications   Medication Sig Dispense Refill    metoprolol tartrate (LOPRESSOR) 50 MG tablet TAKE HALF OF A TABLET IN MORNING AND ONE AT NIGHT 60 tablet 3    pantoprazole (PROTONIX) 40 MG tablet Take 1 tablet by mouth once daily 90 tablet 1    propafenone (RYTHMOL) 150 MG tablet Take 1 tablet by mouth 2 times daily Take 300 mg of propafenone with 25 mg of metoprolol once and start 150 mg propafenone q 12 hours from next day 90 tablet 3    warfarin (COUMADIN) 2.5 MG tablet 1. Take 2 tablets by mouth daily. Or as directed by INR results. 180 tablet 3    ondansetron (ZOFRAN ODT) 4 MG disintegrating tablet Take 1 tablet by mouth every 8 hours as needed for Nausea 20 tablet 0    Handicap Placard MISC by Does not apply route Expires 6/2022 1 each 0    MEGARED OMEGA-3 KRILL  MG CAPS Take by mouth daily       Multiple Vitamins-Minerals (ICAPS AREDS 2) CAPS Take 2 tablets by mouth daily        No current facility-administered medications for this visit.           Allergies   Allergen Reactions    Statins Other (See Comments)     Causes severe leg cramps     Tape Margrett Brodie Tape] Other (See Comments)     \"sticks to skin\" causes redness    Tizanidine Hcl Other (See Comments)     Having issues with her liver- elevated her enzyme level    Tramadol Other (See Comments)     \"makes me Goofy\"    Augmentin [Amoxicillin-Pot Clavulanate] Diarrhea, Nausea And Vomiting and Other (See Comments)     abd pain    Nystatin Nausea And Vomiting     Pt states \"swish and swallow\"        Subjective:     Review of Systems   Constitutional: Negative for activity change, appetite change, chills, fatigue and fever.   Eyes: Negative for visual disturbance. Respiratory: Negative for cough, chest tightness, shortness of breath and wheezing. Cardiovascular: Negative for chest pain, palpitations and leg swelling. Genitourinary: Negative for difficulty urinating. Musculoskeletal: Positive for gait problem. Neurological: Negative for dizziness, syncope, weakness, light-headedness and headaches. Objective:      Physical Exam  Vitals and nursing note reviewed. Constitutional:       General: She is not in acute distress. Appearance: She is well-developed. Eyes:      Conjunctiva/sclera: Conjunctivae normal.   Neck:      Thyroid: No thyromegaly. Cardiovascular:      Rate and Rhythm: Normal rate and regular rhythm. Heart sounds: Normal heart sounds. No murmur heard. Pulmonary:      Effort: Pulmonary effort is normal. No respiratory distress. Breath sounds: Normal breath sounds. No wheezing. Musculoskeletal:      Cervical back: Normal range of motion and neck supple. Lymphadenopathy:      Cervical: No cervical adenopathy. Skin:     General: Skin is warm and dry. Findings: No erythema or rash. Neurological:      Mental Status: She is alert and oriented to person, place, and time. Psychiatric:         Mood and Affect: Mood normal.         Behavior: Behavior normal.         Thought Content: Thought content normal.         Judgment: Judgment normal.       /70   Pulse 84   Temp 97.1 °F (36.2 °C)   Ht 5' 6\" (1.676 m)   Wt 162 lb (73.5 kg)   SpO2 97%   BMI 26.15 kg/m²     Assessment:       Diagnosis Orders   1. Lumbar radicular pain     2. Gastroesophageal reflux disease, unspecified whether esophagitis present     3. Flu vaccine need  INFLUENZA, QUADV, ADJUVANTED, 65 YRS =, IM, PF, PREFILL SYR, 0.5ML (FLUAD)   4. Bilateral hearing loss, unspecified hearing loss type  External Referral [facility] Megan Connell MD, Otolaryngology, [place]   5. Hyponatremia  Basic Metabolic Panel   6. Screening cholesterol level  Lipid Panel             Plan:        Back pain: stable; she continues to have some gait disturbance and she is not using her walker but I told her to start using her walker when she is out and at home. GERD: worsening; she is having some signs of dysphagia so I advised ehr daughter to call the surgeon to have a dilatation done. Bilateral hearing loss: worsening; it is bothering her and her kids so I put in a referral to audiology and ent    Return in about 6 months (around 6/21/2022) for [address] Orders Placed This Encounter   Procedures    INFLUENZA, QUADV, ADJUVANTED, 72 YRS =, IM, PF, PREFILL SYR, 0.5ML (FLUAD)    Basic Metabolic Panel     Standing Status:   Future     Standing Expiration Date:   12/21/2022    Lipid Panel     Standing Status:   Future     Standing Expiration Date:   12/21/2022     Order Specific Question:   Is Patient Fasting?/# of Hours     Answer:   0 per dr Spencer Reilly External Referral [facility] [facility] [address]     Referral Priority:   Routine     Referral Type:   Eval and Treat     Referral Reason:   Specialty Services Required     Requested Specialty:   Audiology     Number of Visits Requested:   Juanita Hayden MD, Otolaryngology, [place]     Referral Priority:   Routine     Referral Type:   Eval and Treat     Referral Reason:   Specialty Services Required     Referred to Provider:   Germain Wynne MD     Requested Specialty:   Otolaryngology     Number of Visits Requested:   1         Patientgiven educational materials - see patient instructions. Discussed use, benefit,and side effects of prescribed medications. All patient questions answered. Ptvoiced understanding. Reviewed health maintenance. Instructed to continue currentmedications, diet and exercise. Patient agreed with treatment plan. Follow up asdirected.      Electronically signed by Kelli Howard MD on 12/21/2021 at 5:35 PM

## 2021-12-21 NOTE — PROGRESS NOTES
Have you had an allergic reaction to the flu (influenza) shot? no  Are you allergic to eggs or any component of the flu vaccine? no  Do you have a history of Guillain-[place] Syndrome (GBS), which is paralysis after receiving the flu vaccine? no  Are you feeling well today? yes  Flu vaccine given as ordered. Patient tolerated it well. No questions re: VIS information.

## 2022-01-05 ENCOUNTER — PROCEDURE VISIT (OUTPATIENT)
Dept: OBGYN | Age: 87
End: 2022-01-05
Payer: MEDICARE

## 2022-01-05 VITALS
OXYGEN SATURATION: 95 % | WEIGHT: 162.8 LBS | HEIGHT: 66 IN | DIASTOLIC BLOOD PRESSURE: 70 MMHG | SYSTOLIC BLOOD PRESSURE: 118 MMHG | BODY MASS INDEX: 26.16 KG/M2 | HEART RATE: 111 BPM

## 2022-01-05 DIAGNOSIS — Z96.0 PRESENCE OF PESSARY: ICD-10-CM

## 2022-01-05 DIAGNOSIS — N81.4 PELVIC RELAXATION DUE TO UTEROVAGINAL PROLAPSE: Primary | ICD-10-CM

## 2022-01-05 DIAGNOSIS — Z90.710 HISTORY OF HYSTERECTOMY: ICD-10-CM

## 2022-01-05 PROCEDURE — 99213 OFFICE O/P EST LOW 20 MIN: CPT | Performed by: OBSTETRICS & GYNECOLOGY

## 2022-01-05 PROCEDURE — G8484 FLU IMMUNIZE NO ADMIN: HCPCS | Performed by: OBSTETRICS & GYNECOLOGY

## 2022-01-05 PROCEDURE — 99212 OFFICE O/P EST SF 10 MIN: CPT | Performed by: OBSTETRICS & GYNECOLOGY

## 2022-01-05 PROCEDURE — 1123F ACP DISCUSS/DSCN MKR DOCD: CPT | Performed by: OBSTETRICS & GYNECOLOGY

## 2022-01-05 PROCEDURE — G8417 CALC BMI ABV UP PARAM F/U: HCPCS | Performed by: OBSTETRICS & GYNECOLOGY

## 2022-01-05 PROCEDURE — G8427 DOCREV CUR MEDS BY ELIG CLIN: HCPCS | Performed by: OBSTETRICS & GYNECOLOGY

## 2022-01-05 PROCEDURE — 1036F TOBACCO NON-USER: CPT | Performed by: OBSTETRICS & GYNECOLOGY

## 2022-01-05 PROCEDURE — 4040F PNEUMOC VAC/ADMIN/RCVD: CPT | Performed by: OBSTETRICS & GYNECOLOGY

## 2022-01-05 PROCEDURE — 1090F PRES/ABSN URINE INCON ASSESS: CPT | Performed by: OBSTETRICS & GYNECOLOGY

## 2022-01-20 ENCOUNTER — HOSPITAL ENCOUNTER (OUTPATIENT)
Dept: PHARMACY | Age: 87
Setting detail: THERAPIES SERIES
Discharge: HOME | End: 2022-01-20
Payer: MEDICARE

## 2022-01-20 DIAGNOSIS — I48.0 PAROXYSMAL ATRIAL FIBRILLATION WITH RAPID VENTRICULAR RESPONSE (HCC): ICD-10-CM

## 2022-01-20 DIAGNOSIS — Z79.01 ANTICOAGULATION MONITORING BY PHARMACIST: Primary | ICD-10-CM

## 2022-01-20 LAB
INR BLD: 3.8
PROTIME: 45.5 SECONDS

## 2022-01-20 PROCEDURE — 36416 COLLJ CAPILLARY BLOOD SPEC: CPT

## 2022-01-20 PROCEDURE — 85610 PROTHROMBIN TIME: CPT

## 2022-01-20 PROCEDURE — 99211 OFF/OP EST MAY X REQ PHY/QHP: CPT

## 2022-01-20 NOTE — PROGRESS NOTES
ANTICOAGULATION SERVICE    Date of Clinic Visit:  2022    Artem Ravi is a 80 y.o. female who presents to clinic today for anticoagulation monitoring and adjustment. Recent INR Results:  Internal QC passed  Lab Results   Component Value Date    INR 3.8 2022    INR 2.6 2021       Current Warfarin Dosage:  Dosing Plan  As of 2022    TTR:  85.3 % (4.2 y)   Full warfarin instructions:  2.5 mg every Fri; 5 mg all other days               Assessment/Plan:    Continue current regimen as INR remains stable. INR is elevated today but [facility] has been taking a higher dose than ordered. [facility] will start taking ordered dose which should decrease INR back into range. Next Clinic Appointment:  Return date  As of 2022    TTR:  85.3 % (4.2 y)   Next INR check:  2022             Please call [facility] at [phone] with any questions. Thanks!   Kinza Parks, [address] [facility]  Anticoagulation Service Pharmacist  2022 12:43 PM     For Pharmacy Admin Tracking Only     Intervention Detail: Adherence Monitorin   Total # of Interventions Recommended: 1   Total # of Interventions Accepted: 1   Time Spent (min): 15

## 2022-02-10 DIAGNOSIS — I48.92 ATRIAL FLUTTER, UNSPECIFIED TYPE (HCC): Primary | ICD-10-CM

## 2022-02-10 DIAGNOSIS — Z79.01 ANTICOAGULATION MONITORING BY PHARMACIST: ICD-10-CM

## 2022-02-10 DIAGNOSIS — I48.0 PAROXYSMAL ATRIAL FIBRILLATION WITH RAPID VENTRICULAR RESPONSE (HCC): ICD-10-CM

## 2022-02-17 ENCOUNTER — HOSPITAL ENCOUNTER (OUTPATIENT)
Dept: PHARMACY | Age: 87
Setting detail: THERAPIES SERIES
Discharge: HOME | End: 2022-02-17
Payer: MEDICARE

## 2022-02-17 DIAGNOSIS — I48.0 PAROXYSMAL ATRIAL FIBRILLATION WITH RAPID VENTRICULAR RESPONSE (HCC): ICD-10-CM

## 2022-02-17 DIAGNOSIS — Z79.01 ANTICOAGULATION MONITORING BY PHARMACIST: Primary | ICD-10-CM

## 2022-02-17 LAB
INR BLD: 3.1
PROTIME: 36.8 SECONDS

## 2022-02-17 PROCEDURE — 99211 OFF/OP EST MAY X REQ PHY/QHP: CPT

## 2022-02-17 PROCEDURE — 85610 PROTHROMBIN TIME: CPT

## 2022-02-17 PROCEDURE — 36416 COLLJ CAPILLARY BLOOD SPEC: CPT

## 2022-02-17 NOTE — PROGRESS NOTES
ANTICOAGULATION SERVICE    Date of Clinic Visit:  2022    Lucero Mendez is a 80 y.o. female who presents to clinic today for anticoagulation monitoring and adjustment. Recent INR Results:  Internal QC passed  Lab Results   Component Value Date    INR 3.1 2022    INR 3.8 2022       Current Warfarin Dosage:  Dosing Plan  As of 2022    TTR:  83.7 % (4.3 y)   Full warfarin instructions:  2.5 mg every Fri; 5 mg all other days               Assessment/Plan:    Continue current regimen as INR remains stable. INR is just above range today but has decreased since Soni Zayas has been taking correct dose. I will not change dose at this time and believe INR will continue to decline into range by next visit. Recheck 4 weeks. Next Clinic Appointment:  Return date  As of 2022    TTR:  83.7 % (4.3 y)   Next INR check:  3/17/2022             Please call [facility]ticoagulation Clinic at [phone] with any questions. Thanks!   Regina Kapadia, [address] [facility]  Anticoagulation Service Pharmacist  2022 11:10 AM     For Pharmacy Admin Tracking Only     Intervention Detail: Adherence Monitorin   Total # of Interventions Recommended: 0   Total # of Interventions Accepted: 0   Time Spent (min): 15

## 2022-03-11 RX ORDER — PANTOPRAZOLE SODIUM 40 MG/1
TABLET, DELAYED RELEASE ORAL
Qty: 90 TABLET | Refills: 1 | Status: SHIPPED | OUTPATIENT
Start: 2022-03-11 | End: 2022-09-13 | Stop reason: SDUPTHER

## 2022-03-17 ENCOUNTER — HOSPITAL ENCOUNTER (OUTPATIENT)
Dept: PHARMACY | Age: 87
Setting detail: THERAPIES SERIES
Discharge: HOME | End: 2022-03-17
Payer: MEDICARE

## 2022-03-17 DIAGNOSIS — I48.0 PAROXYSMAL ATRIAL FIBRILLATION WITH RAPID VENTRICULAR RESPONSE (HCC): ICD-10-CM

## 2022-03-17 DIAGNOSIS — Z79.01 ANTICOAGULATION MONITORING BY PHARMACIST: Primary | ICD-10-CM

## 2022-03-17 LAB
INR BLD: 3.2
PROTIME: 38.5 SECONDS

## 2022-03-17 PROCEDURE — 36416 COLLJ CAPILLARY BLOOD SPEC: CPT

## 2022-03-17 PROCEDURE — 99211 OFF/OP EST MAY X REQ PHY/QHP: CPT

## 2022-03-17 PROCEDURE — 85610 PROTHROMBIN TIME: CPT

## 2022-03-17 NOTE — PROGRESS NOTES
ANTICOAGULATION SERVICE    Date of Clinic Visit:  3/17/2022    Andria Powers is a 80 y.o. female who presents to clinic today for anticoagulation monitoring and adjustment. Recent INR Results:  Internal QC passed  Lab Results   Component Value Date    INR 3.2 2022    INR 3.1 2022       Current Warfarin Dosage:  Dosing Plan  As of 3/17/2022    TTR:  82.2 % (4.4 y)   Full warfarin instructions:  3/17: 2.5 mg; Otherwise 2.5 mg every Fri; 5 mg all other days               Assessment/Plan:    Continue current regimen as INR remains stable. INR is slightly above range today. I will decrease dose x 1 day then back on regular dose. Recheck 4 weeks. Next Clinic Appointment:  Return date  As of 3/17/2022    TTR:  82.2 % (4.4 y)   Next INR check:  2022             Please call [facility] Anticoagulation Clinic at [phone] with any questions. Thanks!   Keysha Mcleod, [address] [facility]  Anticoagulation Service Pharmacist  3/17/2022 11:27 AM     For Pharmacy Admin Tracking Only     Intervention Detail: Adherence Monitorin   Total # of Interventions Recommended: 0   Total # of Interventions Accepted: 0   Time Spent (min): 15

## 2022-04-15 ENCOUNTER — TELEPHONE (OUTPATIENT)
Dept: FAMILY MEDICINE CLINIC | Age: 87
End: 2022-04-15

## 2022-04-15 DIAGNOSIS — M70.62 TROCHANTERIC BURSITIS OF LEFT HIP: Primary | ICD-10-CM

## 2022-04-20 ENCOUNTER — PROCEDURE VISIT (OUTPATIENT)
Dept: OBGYN | Age: 87
End: 2022-04-20
Payer: MEDICARE

## 2022-04-20 ENCOUNTER — HOSPITAL ENCOUNTER (OUTPATIENT)
Dept: PHARMACY | Age: 87
Setting detail: THERAPIES SERIES
Discharge: HOME | End: 2022-04-20
Payer: MEDICARE

## 2022-04-20 VITALS
SYSTOLIC BLOOD PRESSURE: 118 MMHG | BODY MASS INDEX: 27.49 KG/M2 | DIASTOLIC BLOOD PRESSURE: 80 MMHG | HEART RATE: 56 BPM | OXYGEN SATURATION: 94 % | WEIGHT: 165 LBS | HEIGHT: 65 IN

## 2022-04-20 DIAGNOSIS — I48.0 PAROXYSMAL ATRIAL FIBRILLATION WITH RAPID VENTRICULAR RESPONSE (HCC): ICD-10-CM

## 2022-04-20 DIAGNOSIS — N81.4 PELVIC RELAXATION DUE TO UTEROVAGINAL PROLAPSE: ICD-10-CM

## 2022-04-20 DIAGNOSIS — Z79.01 ANTICOAGULATION MONITORING BY PHARMACIST: Primary | ICD-10-CM

## 2022-04-20 DIAGNOSIS — Z46.89 ENCOUNTER FOR PESSARY MAINTENANCE: Primary | ICD-10-CM

## 2022-04-20 DIAGNOSIS — Z96.0 PRESENCE OF PESSARY: ICD-10-CM

## 2022-04-20 LAB
INR BLD: 2.3
PROTIME: 27.6 SECONDS

## 2022-04-20 PROCEDURE — 99212 OFFICE O/P EST SF 10 MIN: CPT | Performed by: OBSTETRICS & GYNECOLOGY

## 2022-04-20 PROCEDURE — 99213 OFFICE O/P EST LOW 20 MIN: CPT | Performed by: OBSTETRICS & GYNECOLOGY

## 2022-04-20 PROCEDURE — 1036F TOBACCO NON-USER: CPT | Performed by: OBSTETRICS & GYNECOLOGY

## 2022-04-20 PROCEDURE — 4040F PNEUMOC VAC/ADMIN/RCVD: CPT | Performed by: OBSTETRICS & GYNECOLOGY

## 2022-04-20 PROCEDURE — 99211 OFF/OP EST MAY X REQ PHY/QHP: CPT

## 2022-04-20 PROCEDURE — 36416 COLLJ CAPILLARY BLOOD SPEC: CPT

## 2022-04-20 PROCEDURE — 85610 PROTHROMBIN TIME: CPT

## 2022-04-20 PROCEDURE — G8417 CALC BMI ABV UP PARAM F/U: HCPCS | Performed by: OBSTETRICS & GYNECOLOGY

## 2022-04-20 PROCEDURE — 1123F ACP DISCUSS/DSCN MKR DOCD: CPT | Performed by: OBSTETRICS & GYNECOLOGY

## 2022-04-20 PROCEDURE — 1090F PRES/ABSN URINE INCON ASSESS: CPT | Performed by: OBSTETRICS & GYNECOLOGY

## 2022-04-20 PROCEDURE — G8427 DOCREV CUR MEDS BY ELIG CLIN: HCPCS | Performed by: OBSTETRICS & GYNECOLOGY

## 2022-04-20 NOTE — PROGRESS NOTES
ANTICOAGULATION SERVICE    Date of Clinic Visit:  4/20/2022    Cynthia Rockwell is a 80 y.o. female who presents to clinic today for anticoagulation monitoring and adjustment. Recent INR Results:  Internal QC passed  Lab Results   Component Value Date    INR 2.3 04/20/2022    INR 3.2 03/17/2022       Current Warfarin Dosage:  . Dosing Plan  As of 4/20/2022    TTR:  82.2 % (4.4 y)   Full warfarin instructions:  2.5 mg every Fri; 5 mg all other days               Assessment/Plan:    Continue current regimen as INR remains stable. Next Clinic Appointment:  Return date  As of 4/20/2022    TTR:  82.2 % (4.4 y)   Next INR check:  5/25/2022             Please call [facility] Anticoagulation Clinic at [phone] with any questions. Thanks!   Madison Francis, [facility]  Anticoagulation Service Pharmacist  4/20/2022 11:56 AM  For Pharmacy Admin Tracking Only     Total # of Interventions Recommended: 0   Total # of Interventions Accepted: 0   Time Spent (min): 10

## 2022-04-20 NOTE — PATIENT INSTRUCTIONS
\"On day of next appointment, please screen for temperature and COVID-19 symptoms prior to you clinic appointment. If any symptoms present, please call [phone] to reschedule. \"

## 2022-04-20 NOTE — PROGRESS NOTES
Alvino Buckner  2022  1:30 PM          Alvino Buckner is a 80 y.o. female       The patient was seen. She has no chief complaints today. She has been fitted for a # 4; ring type pessary. She states all of her symptoms that she had prior to the pessary have been relieved with its use. She denies any vaginal bleeding. She denies to any vaginal discharge or odor. Her bowels are regular and her voiding pattern is normal.     Blood pressure 118/80, pulse 56, height 5' 5\" (1.651 m), weight 165 lb (74.8 kg), SpO2 94 %, not currently breastfeeding. Chaperone for Intimate Exam   Chaperone was offered and accepted as part of the rooming process.  Chaperone: Connor Grateenoc        Abdomen: Soft and non-tender; good bowel sounds; no guarding, rebound or rigidity; no CVA tenderness bilaterally. Extremities: No calf tenderness bilaterally. DTR 2/4 bilaterally. No edema. Perineum/Speculum: There is not any signs of infection; The vaginal vault is without any signs of erythema or erosion. There is no vaginal discharge or odor appreciated. The pessary was cleansed and replaced without any problems and the patient tolerated the procedure well. T.O.S. Ointment was placed with the pessary to decrease discharge/odor. Assessment:   Diagnosis Orders   1. Encounter for pessary maintenance     2. Pelvic relaxation due to uterovaginal prolapse     3.  Presence of pessary #4 ring       Chief Complaint   Patient presents with    Procedure     pessary     Patient Active Problem List    Diagnosis Date Noted    Lower abdominal pain 2021     Priority: High    Presence of pessary #4 ring 10/13/2021     Priority: Medium    Pelvic relaxation due to uterovaginal prolapse 2021     Priority: Medium    Atrial flutter ([address] 2021    Hyponatremia 2021    Esophageal candidiasis ([address] 2021    Vaginal erosion secondary to pessary use ([address] 2021    Postmenopausal bleeding 2021    History of hysterectomy 01/21/2021    Arthritis of right hand 01/13/2020    Venous stasis dermatitis of both lower extremities 12/08/2017    Anticoagulation monitoring by pharmacist 10/19/2017    Paroxysmal atrial fibrillation with rapid ventricular response ([facility] 09/21/2017    Lumbar radicular pain 08/03/2017    Lumbar spine pain 08/03/2017    Left hip pain 08/03/2017    Arthritis of left hip 08/03/2017    Esophageal stricture     Gastritis without bleeding     Bradycardia     Drug-induced constipation 06/15/2017    Trochanteric bursitis of left hip 05/22/2017         Plan:  1. Return to the office 10-12 weeks  2. Report any vaginal bleeding or discharge  3. Abstinence  4. Annual Follow-up reviewed with patient. They will schedule appointment    The patient, Bam Baez is a 80 y.o. female, was seen with a total time spent of 20 minutes for the visit on this date of service by the E/M provider. The time component had both face to face and non face to face time spent in determining the total time component. Counseling and education regarding her diagnosis listed below and her options regarding those diagnoses were also included in determining her time component. Diagnosis Orders   1. Encounter for pessary maintenance     2. Pelvic relaxation due to uterovaginal prolapse     3. Presence of pessary #4 ring          The patient had her preventative health maintenance recommendations and follow-up reviewed with her at the completion of her visit.

## 2022-04-23 ENCOUNTER — HOSPITAL ENCOUNTER (INPATIENT)
Age: 87
LOS: 4 days | Discharge: HOME | DRG: 641 | End: 2022-04-29
Attending: FAMILY MEDICINE | Admitting: INTERNAL MEDICINE
Payer: MEDICARE

## 2022-04-23 DIAGNOSIS — R10.13 ABDOMINAL PAIN, EPIGASTRIC: ICD-10-CM

## 2022-04-23 DIAGNOSIS — R11.2 NON-INTRACTABLE VOMITING WITH NAUSEA, UNSPECIFIED VOMITING TYPE: Primary | ICD-10-CM

## 2022-04-23 DIAGNOSIS — E87.1 HYPONATREMIA: ICD-10-CM

## 2022-04-23 LAB
ALBUMIN SERPL-MCNC: 3.9 G/DL (ref 3.5–5.1)
ALP BLD-CCNC: 90 U/L (ref 38–126)
ALT SERPL-CCNC: 14 U/L (ref 11–66)
ANION GAP SERPL CALCULATED.3IONS-SCNC: 11 MEQ/L (ref 8–16)
ANION GAP SERPL CALCULATED.3IONS-SCNC: 14 MEQ/L (ref 8–16)
AST SERPL-CCNC: 23 U/L (ref 5–40)
BASOPHILS # BLD: 0.4 %
BASOPHILS ABSOLUTE: 0 THOU/MM3 (ref 0–0.1)
BILIRUB SERPL-MCNC: 0.7 MG/DL (ref 0.3–1.2)
BILIRUBIN DIRECT: < 0.2 MG/DL (ref 0–0.3)
BILIRUBIN URINE: NEGATIVE
BLOOD, URINE: NEGATIVE
BUN BLDV-MCNC: 11 MG/DL (ref 7–22)
BUN BLDV-MCNC: 12 MG/DL (ref 7–22)
CALCIUM SERPL-MCNC: 8.2 MG/DL (ref 8.5–10.5)
CALCIUM SERPL-MCNC: 8.8 MG/DL (ref 8.5–10.5)
CHARACTER, URINE: CLEAR
CHLORIDE BLD-SCNC: 86 MEQ/L (ref 98–111)
CHLORIDE BLD-SCNC: 90 MEQ/L (ref 98–111)
CO2: 21 MEQ/L (ref 23–33)
CO2: 22 MEQ/L (ref 23–33)
COLOR: YELLOW
CREAT SERPL-MCNC: 0.9 MG/DL (ref 0.4–1.2)
CREAT SERPL-MCNC: 0.9 MG/DL (ref 0.4–1.2)
EOSINOPHIL # BLD: 1.6 %
EOSINOPHILS ABSOLUTE: 0.1 THOU/MM3 (ref 0–0.4)
ERYTHROCYTE [DISTWIDTH] IN BLOOD BY AUTOMATED COUNT: 16.2 % (ref 11.5–14.5)
ERYTHROCYTE [DISTWIDTH] IN BLOOD BY AUTOMATED COUNT: 46.5 FL (ref 35–45)
GFR SERPL CREATININE-BSD FRML MDRD: 59 ML/MIN/1.73M2
GFR SERPL CREATININE-BSD FRML MDRD: 59 ML/MIN/1.73M2
GLUCOSE BLD-MCNC: 79 MG/DL (ref 70–108)
GLUCOSE BLD-MCNC: 87 MG/DL (ref 70–108)
GLUCOSE BLD-MCNC: 94 MG/DL (ref 70–108)
GLUCOSE URINE: NEGATIVE MG/DL
HCT VFR BLD CALC: 42.3 % (ref 37–47)
HEMOGLOBIN: 14.2 GM/DL (ref 12–16)
IMMATURE GRANS (ABS): 0.03 THOU/MM3 (ref 0–0.07)
IMMATURE GRANULOCYTES: 0.7 %
INR BLD: 2.09 (ref 0.85–1.13)
KETONES, URINE: 15
LACTIC ACID: 0.7 MMOL/L (ref 0.5–2)
LEUKOCYTE ESTERASE, URINE: NEGATIVE
LIPASE: 65.9 U/L (ref 5.6–51.3)
LYMPHOCYTES # BLD: 23.8 %
LYMPHOCYTES ABSOLUTE: 1.1 THOU/MM3 (ref 1–4.8)
MCH RBC QN AUTO: 27 PG (ref 26–33)
MCHC RBC AUTO-ENTMCNC: 33.6 GM/DL (ref 32.2–35.5)
MCV RBC AUTO: 80.4 FL (ref 81–99)
MONOCYTES # BLD: 17.5 %
MONOCYTES ABSOLUTE: 0.8 THOU/MM3 (ref 0.4–1.3)
NITRITE, URINE: NEGATIVE
NUCLEATED RED BLOOD CELLS: 0 /100 WBC
OSMOLALITY CALCULATION: 243.6 MOSMOL/KG (ref 275–300)
OSMOLALITY CALCULATION: 246.1 MOSMOL/KG (ref 275–300)
OSMOLALITY URINE: 218 MOSMOL/KG (ref 250–750)
PH UA: 5.5 (ref 5–9)
PLATELET # BLD: 108 THOU/MM3 (ref 130–400)
PMV BLD AUTO: 10.7 FL (ref 9.4–12.4)
POTASSIUM REFLEX MAGNESIUM: 4 MEQ/L (ref 3.5–5.2)
POTASSIUM REFLEX MAGNESIUM: 4.1 MEQ/L (ref 3.5–5.2)
PROTEIN UA: NEGATIVE
RBC # BLD: 5.26 MILL/MM3 (ref 4.2–5.4)
SEG NEUTROPHILS: 56 %
SEGMENTED NEUTROPHILS ABSOLUTE COUNT: 2.5 THOU/MM3 (ref 1.8–7.7)
SODIUM BLD-SCNC: 121 MEQ/L (ref 135–145)
SODIUM BLD-SCNC: 123 MEQ/L (ref 135–145)
SODIUM URINE: 32 MEQ/L
SPECIFIC GRAVITY, URINE: 1.01 (ref 1–1.03)
TOTAL PROTEIN: 6.4 G/DL (ref 6.1–8)
URIC ACID: 4.7 MG/DL (ref 2.4–5.7)
UROBILINOGEN, URINE: 0.2 EU/DL (ref 0–1)
WBC # BLD: 4.5 THOU/MM3 (ref 4.8–10.8)

## 2022-04-23 PROCEDURE — 81003 URINALYSIS AUTO W/O SCOPE: CPT

## 2022-04-23 PROCEDURE — 96374 THER/PROPH/DIAG INJ IV PUSH: CPT

## 2022-04-23 PROCEDURE — 82948 REAGENT STRIP/BLOOD GLUCOSE: CPT

## 2022-04-23 PROCEDURE — 84550 ASSAY OF BLOOD/URIC ACID: CPT

## 2022-04-23 PROCEDURE — 93005 ELECTROCARDIOGRAM TRACING: CPT | Performed by: FAMILY MEDICINE

## 2022-04-23 PROCEDURE — 99220 PR INITIAL OBSERVATION CARE/DAY 70 MINUTES: CPT | Performed by: INTERNAL MEDICINE

## 2022-04-23 PROCEDURE — 36415 COLL VENOUS BLD VENIPUNCTURE: CPT

## 2022-04-23 PROCEDURE — 85610 PROTHROMBIN TIME: CPT

## 2022-04-23 PROCEDURE — 96361 HYDRATE IV INFUSION ADD-ON: CPT

## 2022-04-23 PROCEDURE — 84300 ASSAY OF URINE SODIUM: CPT

## 2022-04-23 PROCEDURE — 80048 BASIC METABOLIC PNL TOTAL CA: CPT

## 2022-04-23 PROCEDURE — G0378 HOSPITAL OBSERVATION PER HR: HCPCS

## 2022-04-23 PROCEDURE — 6370000000 HC RX 637 (ALT 250 FOR IP): Performed by: INTERNAL MEDICINE

## 2022-04-23 PROCEDURE — 85025 COMPLETE CBC W/AUTO DIFF WBC: CPT

## 2022-04-23 PROCEDURE — 99285 EMERGENCY DEPT VISIT HI MDM: CPT

## 2022-04-23 PROCEDURE — 83690 ASSAY OF LIPASE: CPT

## 2022-04-23 PROCEDURE — 2580000003 HC RX 258: Performed by: INTERNAL MEDICINE

## 2022-04-23 PROCEDURE — 83605 ASSAY OF LACTIC ACID: CPT

## 2022-04-23 PROCEDURE — 6370000000 HC RX 637 (ALT 250 FOR IP): Performed by: FAMILY MEDICINE

## 2022-04-23 PROCEDURE — 2580000003 HC RX 258: Performed by: FAMILY MEDICINE

## 2022-04-23 PROCEDURE — 93005 ELECTROCARDIOGRAM TRACING: CPT | Performed by: INTERNAL MEDICINE

## 2022-04-23 PROCEDURE — 80076 HEPATIC FUNCTION PANEL: CPT

## 2022-04-23 PROCEDURE — 6360000002 HC RX W HCPCS: Performed by: FAMILY MEDICINE

## 2022-04-23 PROCEDURE — 83935 ASSAY OF URINE OSMOLALITY: CPT

## 2022-04-23 RX ORDER — PROPAFENONE HYDROCHLORIDE 150 MG/1
150 TABLET, FILM COATED ORAL 2 TIMES DAILY
Status: DISCONTINUED | OUTPATIENT
Start: 2022-04-23 | End: 2022-04-29 | Stop reason: HOSPADM

## 2022-04-23 RX ORDER — SODIUM CHLORIDE 0.9 % (FLUSH) 0.9 %
5-40 SYRINGE (ML) INJECTION PRN
Status: DISCONTINUED | OUTPATIENT
Start: 2022-04-23 | End: 2022-04-29 | Stop reason: HOSPADM

## 2022-04-23 RX ORDER — ONDANSETRON 4 MG/1
4 TABLET, ORALLY DISINTEGRATING ORAL EVERY 8 HOURS PRN
Status: DISCONTINUED | OUTPATIENT
Start: 2022-04-23 | End: 2022-04-29 | Stop reason: HOSPADM

## 2022-04-23 RX ORDER — SUCRALFATE 1 G/1
1 TABLET ORAL EVERY 6 HOURS SCHEDULED
Status: DISCONTINUED | OUTPATIENT
Start: 2022-04-23 | End: 2022-04-29 | Stop reason: HOSPADM

## 2022-04-23 RX ORDER — SODIUM CHLORIDE 9 MG/ML
INJECTION, SOLUTION INTRAVENOUS PRN
Status: DISCONTINUED | OUTPATIENT
Start: 2022-04-23 | End: 2022-04-29 | Stop reason: HOSPADM

## 2022-04-23 RX ORDER — ONDANSETRON 2 MG/ML
4 INJECTION INTRAMUSCULAR; INTRAVENOUS ONCE
Status: COMPLETED | OUTPATIENT
Start: 2022-04-23 | End: 2022-04-23

## 2022-04-23 RX ORDER — METOPROLOL TARTRATE 50 MG/1
50 TABLET, FILM COATED ORAL NIGHTLY
Status: DISCONTINUED | OUTPATIENT
Start: 2022-04-23 | End: 2022-04-27

## 2022-04-23 RX ORDER — WARFARIN SODIUM 5 MG/1
5 TABLET ORAL DAILY
Status: COMPLETED | OUTPATIENT
Start: 2022-04-23 | End: 2022-04-23

## 2022-04-23 RX ORDER — DICYCLOMINE HYDROCHLORIDE 10 MG/1
10 CAPSULE ORAL ONCE
Status: COMPLETED | OUTPATIENT
Start: 2022-04-23 | End: 2022-04-23

## 2022-04-23 RX ORDER — 0.9 % SODIUM CHLORIDE 0.9 %
1000 INTRAVENOUS SOLUTION INTRAVENOUS ONCE
Status: COMPLETED | OUTPATIENT
Start: 2022-04-23 | End: 2022-04-23

## 2022-04-23 RX ORDER — ACETAMINOPHEN 650 MG/1
650 SUPPOSITORY RECTAL EVERY 6 HOURS PRN
Status: DISCONTINUED | OUTPATIENT
Start: 2022-04-23 | End: 2022-04-29 | Stop reason: HOSPADM

## 2022-04-23 RX ORDER — ACETAMINOPHEN 325 MG/1
650 TABLET ORAL EVERY 6 HOURS PRN
Status: DISCONTINUED | OUTPATIENT
Start: 2022-04-23 | End: 2022-04-29 | Stop reason: HOSPADM

## 2022-04-23 RX ORDER — PANTOPRAZOLE SODIUM 40 MG/1
40 TABLET, DELAYED RELEASE ORAL
Status: DISCONTINUED | OUTPATIENT
Start: 2022-04-24 | End: 2022-04-24 | Stop reason: SDUPTHER

## 2022-04-23 RX ORDER — POLYETHYLENE GLYCOL 3350 17 G/17G
17 POWDER, FOR SOLUTION ORAL DAILY PRN
Status: DISCONTINUED | OUTPATIENT
Start: 2022-04-23 | End: 2022-04-29 | Stop reason: HOSPADM

## 2022-04-23 RX ORDER — SODIUM CHLORIDE 0.9 % (FLUSH) 0.9 %
5-40 SYRINGE (ML) INJECTION EVERY 12 HOURS SCHEDULED
Status: DISCONTINUED | OUTPATIENT
Start: 2022-04-23 | End: 2022-04-29 | Stop reason: HOSPADM

## 2022-04-23 RX ORDER — DICYCLOMINE HYDROCHLORIDE 10 MG/1
10 CAPSULE ORAL EVERY 6 HOURS PRN
Status: DISCONTINUED | OUTPATIENT
Start: 2022-04-23 | End: 2022-04-29 | Stop reason: HOSPADM

## 2022-04-23 RX ORDER — ONDANSETRON 2 MG/ML
4 INJECTION INTRAMUSCULAR; INTRAVENOUS EVERY 6 HOURS PRN
Status: DISCONTINUED | OUTPATIENT
Start: 2022-04-23 | End: 2022-04-29 | Stop reason: HOSPADM

## 2022-04-23 RX ORDER — SODIUM CHLORIDE 9 MG/ML
INJECTION, SOLUTION INTRAVENOUS CONTINUOUS
Status: DISCONTINUED | OUTPATIENT
Start: 2022-04-23 | End: 2022-04-24

## 2022-04-23 RX ADMIN — WARFARIN SODIUM 5 MG: 5 TABLET ORAL at 22:50

## 2022-04-23 RX ADMIN — DICYCLOMINE HYDROCHLORIDE 10 MG: 10 CAPSULE ORAL at 15:44

## 2022-04-23 RX ADMIN — SUCRALFATE 1 G: 1 TABLET ORAL at 23:04

## 2022-04-23 RX ADMIN — PROPAFENONE HYDROCHLORIDE 150 MG: 150 TABLET, FILM COATED ORAL at 21:23

## 2022-04-23 RX ADMIN — SODIUM CHLORIDE: 9 INJECTION, SOLUTION INTRAVENOUS at 19:02

## 2022-04-23 RX ADMIN — ONDANSETRON 4 MG: 2 INJECTION INTRAMUSCULAR; INTRAVENOUS at 13:36

## 2022-04-23 RX ADMIN — SODIUM CHLORIDE 1000 ML: 9 INJECTION, SOLUTION INTRAVENOUS at 13:40

## 2022-04-23 SDOH — SOCIAL STABILITY: SOCIAL NETWORK: ARE YOU MARRIED, WIDOWED, DIVORCED, SEPARATED, NEVER MARRIED, OR LIVING WITH A PARTNER?: MARRIED

## 2022-04-23 SDOH — HEALTH STABILITY: PHYSICAL HEALTH: ON AVERAGE, HOW MANY MINUTES DO YOU ENGAGE IN EXERCISE AT THIS LEVEL?: 0 MIN

## 2022-04-23 SDOH — SOCIAL STABILITY: SOCIAL INSECURITY: WITHIN THE LAST YEAR, HAVE YOU BEEN HUMILIATED OR EMOTIONALLY ABUSED IN OTHER WAYS BY YOUR PARTNER OR EX-PARTNER?: NO

## 2022-04-23 SDOH — HEALTH STABILITY: PHYSICAL HEALTH: ON AVERAGE, HOW MANY DAYS PER WEEK DO YOU ENGAGE IN MODERATE TO STRENUOUS EXERCISE (LIKE A BRISK WALK)?: 0 DAYS

## 2022-04-23 SDOH — ECONOMIC STABILITY: HOUSING INSECURITY
IN THE LAST 12 MONTHS, WAS THERE A TIME WHEN YOU DID NOT HAVE A STEADY PLACE TO SLEEP OR SLEPT IN A SHELTER (INCLUDING NOW)?: NO

## 2022-04-23 SDOH — HEALTH STABILITY: MENTAL HEALTH
STRESS IS WHEN SOMEONE FEELS TENSE, NERVOUS, ANXIOUS, OR CAN'T SLEEP AT NIGHT BECAUSE THEIR MIND IS TROUBLED. HOW STRESSED ARE YOU?: NOT AT ALL

## 2022-04-23 SDOH — ECONOMIC STABILITY: HOUSING INSECURITY: IN THE LAST 12 MONTHS, HOW MANY PLACES HAVE YOU LIVED?: 1

## 2022-04-23 SDOH — SOCIAL STABILITY: SOCIAL NETWORK
DO YOU BELONG TO ANY CLUBS OR ORGANIZATIONS SUCH AS CHURCH GROUPS UNIONS, FRATERNAL OR ATHLETIC GROUPS, OR SCHOOL GROUPS?: PATIENT DECLINED

## 2022-04-23 SDOH — SOCIAL STABILITY: SOCIAL NETWORK: HOW OFTEN DO YOU GET TOGETHER WITH FRIENDS OR RELATIVES?: PATIENT DECLINED

## 2022-04-23 SDOH — SOCIAL STABILITY: SOCIAL NETWORK: HOW OFTEN DO YOU ATTEND CHURCH OR RELIGIOUS SERVICES?: PATIENT DECLINED

## 2022-04-23 SDOH — ECONOMIC STABILITY: FOOD INSECURITY: WITHIN THE PAST 12 MONTHS, YOU WORRIED THAT YOUR FOOD WOULD RUN OUT BEFORE YOU GOT MONEY TO BUY MORE.: NEVER TRUE

## 2022-04-23 SDOH — SOCIAL STABILITY: SOCIAL INSECURITY
WITHIN THE LAST YEAR, HAVE TO BEEN RAPED OR FORCED TO HAVE ANY KIND OF SEXUAL ACTIVITY BY YOUR PARTNER OR EX-PARTNER?: NO

## 2022-04-23 SDOH — HEALTH STABILITY: MENTAL HEALTH: HOW OFTEN DO YOU HAVE A DRINK CONTAINING ALCOHOL?: NEVER

## 2022-04-23 SDOH — SOCIAL STABILITY: SOCIAL INSECURITY: WITHIN THE LAST YEAR, HAVE YOU BEEN AFRAID OF YOUR PARTNER OR EX-PARTNER?: NO

## 2022-04-23 SDOH — ECONOMIC STABILITY: INCOME INSECURITY: IN THE LAST 12 MONTHS, WAS THERE A TIME WHEN YOU WERE NOT ABLE TO PAY THE MORTGAGE OR RENT ON TIME?: NO

## 2022-04-23 SDOH — SOCIAL STABILITY: SOCIAL NETWORK: HOW OFTEN DO YOU ATTENT MEETINGS OF THE CLUB OR ORGANIZATION YOU BELONG TO?: PATIENT DECLINED

## 2022-04-23 SDOH — SOCIAL STABILITY: SOCIAL INSECURITY
WITHIN THE LAST YEAR, HAVE YOU BEEN KICKED, HIT, SLAPPED, OR OTHERWISE PHYSICALLY HURT BY YOUR PARTNER OR EX-PARTNER?: NO

## 2022-04-23 SDOH — SOCIAL STABILITY: SOCIAL NETWORK: IN A TYPICAL WEEK, HOW MANY TIMES DO YOU TALK ON THE PHONE WITH FAMILY, FRIENDS, OR NEIGHBORS?: NEVER

## 2022-04-23 SDOH — HEALTH STABILITY: MENTAL HEALTH: HOW MANY STANDARD DRINKS CONTAINING ALCOHOL DO YOU HAVE ON A TYPICAL DAY?: PATIENT DECLINED

## 2022-04-23 SDOH — ECONOMIC STABILITY: FOOD INSECURITY: WITHIN THE PAST 12 MONTHS, THE FOOD YOU BOUGHT JUST DIDN'T LAST AND YOU DIDN'T HAVE MONEY TO GET MORE.: NEVER TRUE

## 2022-04-23 SDOH — ECONOMIC STABILITY: INCOME INSECURITY: HOW HARD IS IT FOR YOU TO PAY FOR THE VERY BASICS LIKE FOOD, HOUSING, MEDICAL CARE, AND HEATING?: NOT HARD AT ALL

## 2022-04-23 ASSESSMENT — PAIN SCALES - GENERAL
PAINLEVEL_OUTOF10: 8
PAINLEVEL_OUTOF10: 0

## 2022-04-23 ASSESSMENT — PAIN SCALES - WONG BAKER: WONGBAKER_NUMERICALRESPONSE: 0

## 2022-04-23 ASSESSMENT — LIFESTYLE VARIABLES
HOW OFTEN DO YOU HAVE A DRINK CONTAINING ALCOHOL: NEVER
HOW MANY STANDARD DRINKS CONTAINING ALCOHOL DO YOU HAVE ON A TYPICAL DAY: PATIENT DECLINED

## 2022-04-23 ASSESSMENT — PAIN - FUNCTIONAL ASSESSMENT
PAIN_FUNCTIONAL_ASSESSMENT: WONG-BAKER FACES
PAIN_FUNCTIONAL_ASSESSMENT: 0-10

## 2022-04-23 NOTE — ED NOTES
PT ambulated to bathroom and back to room with minimal assistance. Tolerated well.      Wendy Ingram RN  04/23/22 9741

## 2022-04-23 NOTE — ED NOTES
Called 6k to notify that pt would be up in 10 minutes, talked to Parish. Pt transported by cart, stable.       Johnathan Cespedes  04/23/22 [id number]

## 2022-04-23 NOTE — ED PROVIDER NOTES
[address] COMPLAINT   Chief Complaint   Patient presents with    Abdominal Pain    Emesis        HPI   Martin Ramon is a 80 y.o. female who presents with diffuse abdominal pain, onset was 3 days ago. The duration has been constant since the onset. The patient has associated nausea, vomiting without diarrhea. She does not have an appendix or gallbladder anymore. She denies urinary symptoms. There are no alleviating factors. The context is that the symptoms started spontaneously, without any known precipitants. REVIEW OF SYSTEMS   GI: See history of present illness   Cardiac: No chest pain or syncope   Pulmonary: No difficulty breathing or new cough   General: No fevers   : No hematuria or dysuria   See HPI for further details. All other review of systems are reviewed and are otherwise negative.      PAST MEDICAL & SURGICAL HISTORY   Past Medical History:   Diagnosis Date    Actinic keratosis     history of    Arthritis     Atrial fibrillation ([address]     CAD (coronary artery disease)     history of afib    Cancer ([address]     skin    Cervical prolapse     history of    Diverticulosis     Endocervical polyp     history of    Heel spur     Hip pain     Hypertension     Nasal septal deviation     Osteoporosis     Stress incontinence     history of    Syncope     Vaginal prolapse     history of      Past Surgical History:   Procedure Laterality Date    APPENDECTOMY  1960's   [facility] BLADDER REPAIR  2011    [address]  51/[address]94    [address]     COLONOSCOPY  01/26/2000    ENDOSCOPIC ULTRASOUND (LOWER) Left 07/20/2017    [facility]    FACIAL SURGERY N/A 1/27/2021    DIVISION AND INSET/CHEEK performed by Shannan Bailey MD at [facility]  2017    left total hip relpament    MOHS SURGERY N/A 1/8/2021    MOHS REPAIR BCC NASAL TIP WITH FLAP AND GRAFT performed by Katy Patel MD at [address] Left 8/6/2021    MOHS DEFECT REPAIR SCC DORSAL NOSE AND LEFT LOWER FOREHEAD WITH SKIN GRAFT FROM RIGHT CHEEK/EAR (PLACED ON NOSE) performed by Katy Patel MD at [address]  1960's    MO EGD TRANSORAL BIOPSY SINGLE/MULTIPLE Left 07/16/2017    [facility]    MO OFFICE/OUTPT [address] Right 06/01/2018    MOHS REPAIR BASEL CELL CARCINOMA RIGHT DORSAL NOSE performed by Katy Patel MD at [address] Left 09/19/2017    Left Total Hip ARTHROPLASTY performed by Sonia Diamond MD at [address]  07/16/2017    UPPER GASTROINTESTINAL ENDOSCOPY Left 07/16/2017    EGD DILATION BALLOON performed [facility]   [address] Left 8/28/2021    EGD BIOPSY performed by Afia Stallworth MD at [facility] [place] [facility]   [facility] [address] N/A 8/30/2021    EGD DILATION SAVORY performed by Afia Stallworth MD at [facility] [address] LEFT Left 4/21/2021    US BREAST NEEDLE BIOPSY LEFT 4/21/2021 COLLEEN ULTRASOUND    US BREAST NEEDLE BIOPSY RIGHT Right 4/21/2021    US BREAST NEEDLE BIOPSY RIGHT 4/21/2021 COLLEEN ULTRASOUND    US GUIDED NEEDLE LOC BREAST ADDL LEFT Left 4/21/2021    US GUIDED NEEDLE LOC BREAST ADDL LEFT 4/21/2021 COLLEEN ULTRASOUND        CURRENT MEDICATIONS   Current Outpatient Rx   Medication Sig Dispense Refill    Handicap Placard [address] [facility] by Does not apply route Expires 4/15/2027 1 each 0    pantoprazole (PROTONIX) 40 MG tablet Take 1 tablet by mouth once daily 90 tablet 1    metoprolol tartrate (LOPRESSOR) 50 MG tablet TAKE HALF OF A TABLET IN MORNING AND ONE AT NIGHT 60 tablet 3    propafenone (RYTHMOL) 150 MG tablet Take 1 tablet by mouth 2 times daily Take 300 mg of propafenone with 25 mg of metoprolol once and start 150 mg propafenone q 12 hours from next day 90 tablet 3    warfarin (COUMADIN) 2.5 MG tablet 1. Take 2 tablets by mouth daily. Or as directed by INR results.  180 tablet 3    ondansetron (ZOFRAN ODT) 4 MG disintegrating tablet Take 1 tablet by mouth every 8 hours as needed for Nausea (Patient not taking: Reported on 4/20/2022) 20 tablet 0    Handicap Placard MISC by Does not apply route Expires 6/2022 1 each 0    MEGARED OMEGA-3 KRILL  MG CAPS Take by mouth daily       Multiple Vitamins-Minerals (ICAPS AREDS 2) CAPS Take 2 tablets by mouth daily           ALLERGIES   Allergies   Allergen Reactions    Statins Other (See Comments)     Causes severe leg cramps     Tape [Adhesive Tape] Other (See Comments)     \"sticks to skin\" causes redness    Tizanidine Hcl Other (See Comments)     Having issues with her liver- elevated her enzyme level    Tramadol Other (See Comments)     \"makes me Goofy\"    Augmentin [Amoxicillin-Pot Clavulanate] Diarrhea, Nausea And Vomiting and Other (See Comments)     abd pain    Nystatin Nausea And Vomiting     Pt states \"swish and swallow\"         SOCIAL AND FAMILY HISTORY   Social History     Socioeconomic History    Marital status:      Spouse name: None    Number of children: None    Years of education: None    Highest education level: None   Occupational History    None   Tobacco Use    Smoking status: Never Smoker    Smokeless tobacco: Never Used   Vaping Use    Vaping Use: Never used   Substance and Sexual Activity    Alcohol use: No    Drug use: No    Sexual activity: Not Currently     Partners: Male     Comment:    Other Topics Concern    None   Social History Narrative    None     Social Determinants of Health     Financial Resource Strain: Unknown    Difficulty of Paying Living Expenses: Patient refused   Food Insecurity: Unknown    Worried About Running Out of Food in the Last Year: Patient refused   [address] in the Last Year: Patient refused   Transportation Needs:     Lack of Transportation (Medical): Not on file    Lack of Transportation (Non-Medical):  Not on file   Physical Activity:     Days of Exercise per Week: Not on file    Minutes of Exercise per Session: Not on file   Stress:     Feeling of Stress : Not on file   Social Connections:     Frequency of Communication with Friends and Family: Not on file    Frequency of Social Gatherings with Friends and Family: Not on file    Attends Yazidism Services: Not on file    Active Member of [address] [place] [address] [employer] or Organizations: Not on file    Attends Club or Organization Meetings: Not on file    Marital Status: Not on file   Intimate Partner Violence:     Fear of Current or Ex-Partner: Not on file    Emotionally Abused: Not on file    Physically Abused: Not on file    Sexually Abused: Not on file   Housing Stability:     Unable to Pay for Housing in the Last Year: Not on file    Number of [place] in the Last Year: Not on file    Unstable Housing in the Last Year: Not on file      Family History   Problem Relation Age of Onset    Colon Cancer Father     Osteoporosis Mother     Heart Disease Mother         PHYSICAL EXAM   VITAL SIGNS: /71   Pulse 65   Temp 97.5 °F (36.4 °C) (Oral)   Resp 16   Ht 5' 5\" (1.651 m)   Wt 165 lb (74.8 kg)   SpO2 97%   BMI 27.46 kg/m²    Constitutional: Well developed, well nourished, mild acute distress  Eyes: Sclera nonicteric, conjunctiva moist   HENT: Atraumatic, nose normal   Neck: Supple, no JVD   Respiratory: No retractions, no accessory muscle use, normal breath sounds   Cardiovascular: Normal rate, normal rhythm, no murmurs   GI: Soft, mild mid-epigastric abdominal tenderness, no guarding, bowel sounds present, no audible bruits or palpable pulsatile masses   Musculoskeletal: No edema, no deformity   Integument: No rash, dry skin   Neurologic: Alert & oriented, normal speech   Psychiatric: Cooperative, pleasant affect     RADIOLOGY/PROCEDURES   No orders to display      EKG Interpretation.   EKG Interpretation    Interpreted by emergency department physician on 4/23/22    Rhythm: a-flutter  Rate: 62 bpm  Axis: normal  Ectopy: none  Conduction: normal  ST Segments: no acute change  T Waves: no acute change  Q Waves: none    Clinical Impression: non-specific EKG    LABS   Labs Reviewed   URINALYSIS WITH REFLEX TO CULTURE - Abnormal; Notable for the following components:       Result Value    Ketones, Urine 15 (*)     All other components within normal limits   CBC WITH AUTO DIFFERENTIAL - Abnormal; Notable for the following components:    WBC 4.5 (*)     MCV 80.4 (*)     RDW-CV 16.2 (*)     RDW-SD 46.5 (*)     Platelets 451 (*)     All other components within normal limits   BASIC METABOLIC PANEL W/ REFLEX TO MG FOR LOW K - Abnormal; Notable for the following components:    Sodium 121 (*)     Chloride 86 (*)     CO2 21 (*)     All other components within normal limits   LIPASE - Abnormal; Notable for the following components:    Lipase 65.9 (*)     All other components within normal limits   GLOMERULAR FILTRATION RATE, ESTIMATED - Abnormal; Notable for the following components:    Est, Glom Filt Rate 59 (*)     All other components within normal limits   OSMOLALITY - Abnormal; Notable for the following components:    Osmolality Calc 243.6 (*)     All other components within normal limits   BASIC METABOLIC PANEL W/ REFLEX TO MG FOR LOW K - Abnormal; Notable for the following components:    Sodium 123 (*)     Chloride 90 (*)     CO2 22 (*)     Calcium 8.2 (*)     All other components within normal limits   GLOMERULAR FILTRATION RATE, ESTIMATED - Abnormal; Notable for the following components:    Est, Glom Filt Rate 59 (*)     All other components within normal limits   OSMOLALITY - Abnormal; Notable for the following components:    Osmolality Calc 246.1 (*) All other components within normal limits   HEPATIC FUNCTION PANEL   ANION GAP   ANION GAP   POCT GLUCOSE        ED COURSE & MEDICAL DECISION MAKING   Pertinent Labs & Imaging studies reviewed and interpreted. (See chart for details)   See EMR for medications prescribed   Vitals:    04/23/22 1715   BP: 115/71   Pulse: 65   Resp: 16   Temp:    SpO2: 97%        Differential diagnosis: Pancreatitis, non-specific abdominal pain, Ischemic Bowel, Bowel Obstruction, diverticulitis other metabolic issues such as hyponatremia. FINAL IMPRESSION   1. Non-intractable vomiting with nausea, unspecified vomiting type    2. Abdominal pain, epigastric    3. Hyponatremia         PLAN   MDM - pt is uncomfortable has had intermittent to constant diffuse abdominal pain and nausea/vomiting for the past 3-4 days. She supposedly is prone to being hyponatremic. Pt is stable for evaluation and resuscitation at this time. No acute abdomen was noted. Pt was admitted for her ongoing hyponatremia, recurrent A-flutter (no STEMI), will benefit from gentle fluid hydration and monitoring. Perhaps renal consult.     Electronically signed by: Lita Horn MD, 4/23/2022 5:56 PM   (This note was completed with a voice recognition program)         Tammy Clarke MD  04/23/22 [id number]

## 2022-04-23 NOTE — ED NOTES
Upon first contact with patient this RN receives bedside shift report from [facility]. Pt resting comfortably on cot with no acute distress noted. VSS. Telemetry in place.  at bedside for support.  Will monitor      Katerin Chairez RN  04/23/22 8822

## 2022-04-23 NOTE — ED NOTES
Presents to ER with complaints of abdominal pain and emesis that has been ongoing for 3 days. Pt states she started to feel better yesterday but then this morning woke up feeling worse than before. Pt states she has constant abdominal pain rating the pain a 8 out of 10 at this time. Blood sugar 87 upon arrival.  at bedside.      Wendy Ingram, NUBIA  04/23/22 8262

## 2022-04-23 NOTE — Clinical Note
Discharge Plan[de-identified] Home with Office Follow-up   Telemetry/Cardiac Monitoring Required?: No   Bed request comments: Taiwo Osuna

## 2022-04-23 NOTE — H&P
bilat cataract    FACIAL SURGERY N/A 1/27/2021    DIVISION AND INSET/CHEEK performed by Parker Douglas MD at [address]  2017    left total hip relpament    MOHS SURGERY N/A 1/8/2021    MOHS REPAIR BCC NASAL TIP WITH FLAP AND GRAFT performed by Parker Douglas MD at [address] Left 8/6/2021    MOHS DEFECT REPAIR SCC DORSAL NOSE AND LEFT LOWER FOREHEAD WITH SKIN GRAFT FROM RIGHT CHEEK/EAR (PLACED ON NOSE) performed by Parker Douglas MD at [address] [facility]  1960's    OR EGD TRANSORAL BIOPSY SINGLE/MULTIPLE Left 07/16/2017    EGD BIOPSY performed by Leda Rondon MD at [facility]    OR OFFICE/OUTPT VISIT,PROCEDURE ONLY Right 06/01/2018    MOHS REPAIR BASEL CELL CARCINOMA RIGHT DORSAL NOSE performed by Parker Douglas MD at [address] [facility] [address] HIP ARTHROPLASTY Left 09/19/2017    Left Total Hip ARTHROPLASTY performed by Amandeep Garcia MD at [facility]  07/16/2017    UPPER GASTROINTESTINAL ENDOSCOPY Left 07/16/2017    EGD DILATION BALLOON performed by Leda Rondon MD at [facility]    UPPER GASTROINTESTINAL ENDOSCOPY Left 8/28/2021    EGD BIOPSY performed by Georgette Storey MD at [facility]    [address] N/A 8/30/2021    EGD DILATION SAVORY performed by Georgette Storey MD at [place] [address] LEFT Left 4/21/2021    US BREAST NEEDLE BIOPSY LEFT 4/21/2021 [address] ULTRASOUND    US BREAST NEEDLE BIOPSY RIGHT Right 4/21/2021    US BREAST NEEDLE BIOPSY RIGHT 4/21/2021 [facility] ULTRASOUND    US GUIDED NEEDLE LOC BREAST ADDL LEFT Left 4/21/2021    US GUIDED NEEDLE LOC BREAST ADDL LEFT 4/21/2021 [facility]        Medications Prior to Admission     Prior to Admission medications    Medication Sig Start Date End Date Taking?  Authorizing Provider   Handicap Placard MISC by Does not apply route Expires 4/15/2027 4/15/22   Sonia Elizondo MD   pantoprazole (PROTONIX) 40 MG tablet Take 1 tablet by mouth once daily 3/11/22   Sonia Elizondo MD   metoprolol tartrate (LOPRESSOR) 50 MG tablet TAKE HALF OF A TABLET IN MORNING AND ONE AT NIGHT 12/16/21   Hemindermeet Luis Urena MD   propafenone (RYTHMOL) 150 MG tablet Take 1 tablet by mouth 2 times daily Take 300 mg of propafenone with 25 mg of metoprolol once and start 150 mg propafenone q 12 hours from next day 10/29/21   Sonia Elizondo MD   warfarin (COUMADIN) 2.5 MG tablet 1. Take 2 tablets by mouth daily. Or as directed by INR results. 8/26/21   Sonia Elizondo MD   ondansetron (ZOFRAN ODT) 4 MG disintegrating tablet Take 1 tablet by mouth every 8 hours as needed for Nausea  Patient not taking: Reported on 4/20/2022 8/23/21   Lynette Cruz MD   Handicap Placard MISC by Does not apply route Expires 6/2022 6/15/17   Sonia Elizondo MD   MEGARED OMEGA-3 KRILL  MG CAPS Take by mouth daily     Historical Provider, MD   Multiple Vitamins-Minerals (ICAPS AREDS 2) CAPS Take 2 tablets by mouth daily     Historical Provider, MD        Allergies   Statins, Tape [adhesive tape], Tizanidine hcl, Tramadol, Augmentin [amoxicillin-pot clavulanate], and Nystatin    Social History     Social History     Tobacco History     Smoking Status  Never Smoker    Smokeless Tobacco Use  Never Used          Alcohol History     Alcohol Use Status  No          Drug Use     Drug Use Status  No          Sexual Activity     Sexually Active  Not Currently Partners  Male Comment                  Family History     Family History   Problem Relation Age of Onset    Colon Cancer Father     Osteoporosis Mother     Heart Disease Mother        Review of Systems   Review of Systems  Twelve point ROS completed and found to be negative except as noted above.     Physical Exam   /71   Pulse 65   Temp 97.5 °F (36.4 °C) (Oral)   Resp 16   Ht 5' 5\" (1.651 m)   Wt 165 lb (74.8 kg)   SpO2 97%   BMI 27.46 kg/m²     Physical Exam  General appearance - alert, well appearing, and in no distress    Mental Status - alert, oriented to person, place, and time          Neck - supple, no significant adenopathy        Chest - clear to auscultation, no wheezes, rales or rhonchi, symmetric air entry     Heart - no murmurs noted, no gallops noted, irregularly irregular rhythm with rate 70     Abdomen - soft, minimal tenderness epigastric area, no rebound; bowel sounds present, no guarding, rigidity         Neurological - alert, oriented, normal speech, no focal findings or movement disorder noted     Musculoskeletal -   msk exam:[id number]}     Extremities - peripheral pulses normal, no pedal edema, no clubbing or cyanosis     Skin - normal coloration and turgor, no rashes, no suspicious skin lesions noted    Labs      Recent Results (from the past 24 hour(s))   CBC with Auto Differential    Collection Time: 04/23/22  1:00 PM   Result Value Ref Range    WBC 4.5 (L) 4.8 - 10.8 thou/mm3    RBC 5.26 4.20 - 5.40 mill/mm3    Hemoglobin 14.2 12.0 - 16.0 gm/dl    Hematocrit 42.3 37.0 - 47.0 %    MCV 80.4 (L) 81.0 - 99.0 fL    MCH 27.0 26.0 - 33.0 pg    MCHC 33.6 32.2 - 35.5 gm/dl    RDW-CV 16.2 (H) 11.5 - 14.5 %    RDW-SD 46.5 (H) 35.0 - 45.0 fL    Platelets 145 (L) 158 - 400 thou/mm3    MPV 10.7 9.4 - 12.4 fL    Seg Neutrophils 56.0 %    Lymphocytes 23.8 %    Monocytes 17.5 %    Eosinophils 1.6 %    Basophils 0.4 %    Immature Granulocytes 0.7 %    Segs Absolute 2.5 1.8 - 7.7 thou/mm3    Lymphocytes Absolute 1.1 1.0 - 4.8 thou/mm3    Monocytes Absolute 0.8 0.4 - 1.3 thou/mm3    Eosinophils Absolute 0.1 0.0 - 0.4 thou/mm3    Basophils Absolute 0.0 0.0 - 0.1 thou/mm3    Immature Grans (Abs) 0.03 0.00 - 0.07 thou/mm3    nRBC 0 /100 wbc   Basic Metabolic Panel w/ Reflex to MG    Collection Time: 04/23/22  1:00 PM   Result Value Ref Range    Sodium 121 (L) 135 - 145 meq/L    Potassium reflex Magnesium 4.0 3.5 - 5.2 meq/L    Chloride 86 (L) 98 - 111 meq/L    CO2 21 (L) 23 - 33 meq/L    Glucose 94 70 - 108 mg/dL    BUN 12 7 - 22 mg/dL    CREATININE 0.9 0.4 - 1.2 mg/dL    Calcium 8.8 8.5 - 10.5 mg/dL   Hepatic Function Panel    Collection Time: 04/23/22  1:00 PM   Result Value Ref Range    Albumin 3.9 3.5 - 5.1 g/dL    Total Bilirubin 0.7 0.3 - 1.2 mg/dL    Bilirubin, Direct <0.2 0.0 - 0.3 mg/dL    Alkaline Phosphatase 90 38 - 126 U/L    AST 23 5 - 40 U/L    ALT 14 11 - 66 U/L    Total Protein 6.4 6.1 - 8.0 g/dL   Lipase    Collection Time: 04/23/22  1:00 PM   Result Value Ref Range    Lipase 65.9 (H) 5.6 - 51.3 U/L   Anion Gap    Collection Time: 04/23/22  1:00 PM   Result Value Ref Range    Anion Gap 14.0 8.0 - 16.0 meq/L   Glomerular Filtration Rate, Estimated    Collection Time: 04/23/22  1:00 PM   Result Value Ref Range    Est, Glom Filt Rate 59 (A) ml/min/1.73m2   Osmolality    Collection Time: 04/23/22  1:00 PM   Result Value Ref Range    Osmolality Calc 243.6 (L) 275.0 - 300.0 mOsmol/kg   POCT Glucose    Collection Time: 04/23/22  1:13 PM   Result Value Ref Range    POC Glucose 87 70 - 108 mg/dl   Urinalysis with Reflex to Culture    Collection Time: 04/23/22  2:00 PM    Specimen: Urine   Result Value Ref Range    Glucose, Ur NEGATIVE NEGATIVE mg/dl    Bilirubin Urine NEGATIVE NEGATIVE    Ketones, Urine 15 (A) NEGATIVE    Specific Gravity, Urine 1.012 1.002 - 1.030    Blood, Urine NEGATIVE NEGATIVE    pH, UA 5.5 5.0 - 9.0    Protein, UA NEGATIVE NEGATIVE    Urobilinogen, Urine 0.2 0.0 - 1.0 eu/dl    Nitrite, Urine NEGATIVE NEGATIVE    Leukocyte Esterase, Urine NEGATIVE NEGATIVE    Color, UA YELLOW STRAW-YELLOW    Character, Urine CLEAR CLEAR-SL CLOUD   Basic Metabolic Panel w/ Reflex to MG    Collection Time: 04/23/22  3:32 PM   Result Value Ref Range    Sodium 123 (L) 135 - 145 meq/L    Potassium reflex Magnesium 4.1 3.5 - 5.2 meq/L    Chloride 90 (L) 98 - 111 meq/L    CO2 22 (L) 23 - 33 meq/L    Glucose 79 70 - 108 mg/dL    BUN 11 7 - 22 mg/dL    CREATININE 0.9 0.4 - 1.2 mg/dL    Calcium 8.2 (L) 8.5 - 10.5 mg/dL   Anion Gap    Collection Time: 04/23/22  3:32 PM   Result Value Ref Range    Anion Gap 11.0 8.0 - 16.0 meq/L   Glomerular Filtration Rate, Estimated    Collection Time: 04/23/22  3:32 PM   Result Value Ref Range    Est, Glom Filt Rate 59 (A) ml/min/1.73m2   Osmolality    Collection Time: 04/23/22  3:32 PM   Result Value Ref Range    Osmolality Calc 246.1 (L) 275.0 - 300.0 mOsmol/kg   EKG 12 Lead    Collection Time: 04/23/22  4:49 PM   Result Value Ref Range    Ventricular Rate 61 BPM    QRS Duration 96 ms    Q-T Interval 488 ms    QTc Calculation (Bazett) 491 ms    R Axis 100 degrees    T Axis 54 degrees        Imaging/Diagnostics Last 24 Hours   No results found. Assessment      Hospital Problems           Last Modified POA    * (Principal) Hyponatremia 4/23/2022 Yes          Plan   1. Admit; gentle hydration; monitor sodiums; if sxs persist, consult GI (Danielle Fowler).     Consultations Ordered:  None    Electronically signed by Ann Adams MD on 4/23/22 at 6:19 PM EDT

## 2022-04-23 NOTE — ED NOTES
Dr. Efra Prince at bedside to discuss porter of care.      [facility] [employer]) RACIEL Ingram RN  04/23/22 [id number]

## 2022-04-23 NOTE — ED NOTES
ED nurse-to-nurse bedside report    Chief Complaint   Patient presents with    Abdominal Pain    Emesis      LOC: alert and orientated to name, place, date  Vital signs   Vitals:    04/23/22 1316 04/23/22 1417 04/23/22 1522 04/23/22 1630   BP: 112/62 122/68 127/65    Pulse:  75 76 67   Resp:  15 15 15   Temp:       TempSrc:       SpO2:  97% 97% 98%   Weight:       Height:          Pain:  7  Pain Interventions: comfort  Pain Goal: 4  Oxygen: NA    Current needs required none   Telemetry: Yes  LDAs:   Peripheral IV 04/23/22 Left Forearm (Active)   Site Assessment Clean, dry & intact 04/23/22 1316     Continuous Infusions:   Mobility: Requires assistance * 1  Felton Fall Risk Score: Fall Risk 8/23/2021 6/21/2021 6/17/2020 6/12/2019 6/8/2018   2 or more falls in past year? yes no no yes no   Fall with injury in past year?  yes no no yes no     Fall Interventions: call light  Report given to: Marah Cheng [facility] In[facility] RN  04/23/22 7106

## 2022-04-23 NOTE — ED NOTES
ED to inpatient nurses report    Chief Complaint   Patient presents with    Abdominal Pain    Emesis      Present to ED from home  LOC: alert and orientated to name, place, date  Vital signs   Vitals:    04/23/22 1417 04/23/22 1522 04/23/22 1630 04/23/22 1715   BP: 122/68 127/65  115/71   Pulse: 75 76 67 65   Resp: 15 15 15 16   Temp:       TempSrc:       SpO2: 97% 97% 98% 97%   Weight:       Height:          Oxygen Baseline room air     Current needs required none   LDAs:   Peripheral IV 04/23/22 Left Forearm (Active)   Site Assessment Clean, dry & intact 04/23/22 1716     Mobility: Requires assistance * 1  Pending ED orders: none  Present condition: stable      Electronically signed by Vola Ormond, RN on 4/23/2022 at 5:37 PM     Vola Ormond, RN  04/23/22 5754

## 2022-04-24 LAB
ANION GAP SERPL CALCULATED.3IONS-SCNC: 13 MEQ/L (ref 8–16)
BUN BLDV-MCNC: 7 MG/DL (ref 7–22)
CALCIUM SERPL-MCNC: 8.4 MG/DL (ref 8.5–10.5)
CHLORIDE BLD-SCNC: 95 MEQ/L (ref 98–111)
CO2: 19 MEQ/L (ref 23–33)
CREAT SERPL-MCNC: 0.8 MG/DL (ref 0.4–1.2)
EKG ATRIAL RATE: 208 BPM
EKG ATRIAL RATE: 227 BPM
EKG Q-T INTERVAL: 438 MS
EKG Q-T INTERVAL: 462 MS
EKG Q-T INTERVAL: 488 MS
EKG QRS DURATION: 102 MS
EKG QRS DURATION: 102 MS
EKG QRS DURATION: 96 MS
EKG QTC CALCULATION (BAZETT): 448 MS
EKG QTC CALCULATION (BAZETT): 465 MS
EKG QTC CALCULATION (BAZETT): 491 MS
EKG R AXIS: 100 DEGREES
EKG R AXIS: 100 DEGREES
EKG R AXIS: 99 DEGREES
EKG T AXIS: 19 DEGREES
EKG T AXIS: 37 DEGREES
EKG T AXIS: 54 DEGREES
EKG VENTRICULAR RATE: 61 BPM
EKG VENTRICULAR RATE: 61 BPM
EKG VENTRICULAR RATE: 63 BPM
GFR SERPL CREATININE-BSD FRML MDRD: 68 ML/MIN/1.73M2
GLUCOSE BLD-MCNC: 70 MG/DL (ref 70–108)
INR BLD: 2.02 (ref 0.85–1.13)
MAGNESIUM: 1.8 MG/DL (ref 1.6–2.4)
OSMOLALITY URINE: 286 MOSMOL/KG (ref 250–750)
POTASSIUM REFLEX MAGNESIUM: 3.9 MEQ/L (ref 3.5–5.2)
POTASSIUM SERPL-SCNC: 3.9 MEQ/L (ref 3.5–5.2)
SODIUM BLD-SCNC: 124 MEQ/L (ref 135–145)
SODIUM BLD-SCNC: 125 MEQ/L (ref 135–145)
SODIUM BLD-SCNC: 127 MEQ/L (ref 135–145)
SODIUM URINE: 82 MEQ/L

## 2022-04-24 PROCEDURE — 96375 TX/PRO/DX INJ NEW DRUG ADDON: CPT

## 2022-04-24 PROCEDURE — A4216 STERILE WATER/SALINE, 10 ML: HCPCS | Performed by: INTERNAL MEDICINE

## 2022-04-24 PROCEDURE — 6370000000 HC RX 637 (ALT 250 FOR IP): Performed by: INTERNAL MEDICINE

## 2022-04-24 PROCEDURE — G0378 HOSPITAL OBSERVATION PER HR: HCPCS

## 2022-04-24 PROCEDURE — 83935 ASSAY OF URINE OSMOLALITY: CPT

## 2022-04-24 PROCEDURE — 2580000003 HC RX 258: Performed by: INTERNAL MEDICINE

## 2022-04-24 PROCEDURE — C9113 INJ PANTOPRAZOLE SODIUM, VIA: HCPCS | Performed by: INTERNAL MEDICINE

## 2022-04-24 PROCEDURE — 36415 COLL VENOUS BLD VENIPUNCTURE: CPT

## 2022-04-24 PROCEDURE — 99225 PR SBSQ OBSERVATION CARE/DAY 25 MINUTES: CPT | Performed by: INTERNAL MEDICINE

## 2022-04-24 PROCEDURE — 6360000002 HC RX W HCPCS: Performed by: INTERNAL MEDICINE

## 2022-04-24 PROCEDURE — 96361 HYDRATE IV INFUSION ADD-ON: CPT

## 2022-04-24 PROCEDURE — 84295 ASSAY OF SERUM SODIUM: CPT

## 2022-04-24 PROCEDURE — 96376 TX/PRO/DX INJ SAME DRUG ADON: CPT

## 2022-04-24 PROCEDURE — 93010 ELECTROCARDIOGRAM REPORT: CPT | Performed by: INTERNAL MEDICINE

## 2022-04-24 PROCEDURE — 80048 BASIC METABOLIC PNL TOTAL CA: CPT

## 2022-04-24 PROCEDURE — 83735 ASSAY OF MAGNESIUM: CPT

## 2022-04-24 PROCEDURE — 84300 ASSAY OF URINE SODIUM: CPT

## 2022-04-24 PROCEDURE — 85610 PROTHROMBIN TIME: CPT

## 2022-04-24 RX ORDER — SODIUM CHLORIDE 1000 MG
1 TABLET, SOLUBLE MISCELLANEOUS ONCE
Status: COMPLETED | OUTPATIENT
Start: 2022-04-24 | End: 2022-04-24

## 2022-04-24 RX ORDER — SODIUM CHLORIDE 1000 MG
1 TABLET, SOLUBLE MISCELLANEOUS
Status: DISCONTINUED | OUTPATIENT
Start: 2022-04-25 | End: 2022-04-29

## 2022-04-24 RX ORDER — WARFARIN SODIUM 5 MG/1
5 TABLET ORAL ONCE
Status: DISCONTINUED | OUTPATIENT
Start: 2022-04-24 | End: 2022-04-25

## 2022-04-24 RX ADMIN — ONDANSETRON 4 MG: 2 INJECTION INTRAMUSCULAR; INTRAVENOUS at 15:55

## 2022-04-24 RX ADMIN — SODIUM CHLORIDE 1 G: 1 TABLET ORAL at 17:46

## 2022-04-24 RX ADMIN — SUCRALFATE 1 G: 1 TABLET ORAL at 16:56

## 2022-04-24 RX ADMIN — SUCRALFATE 1 G: 1 TABLET ORAL at 23:16

## 2022-04-24 RX ADMIN — PROPAFENONE HYDROCHLORIDE 150 MG: 150 TABLET, FILM COATED ORAL at 08:06

## 2022-04-24 RX ADMIN — SODIUM CHLORIDE, PRESERVATIVE FREE 40 MG: 5 INJECTION INTRAVENOUS at 12:16

## 2022-04-24 RX ADMIN — SUCRALFATE 1 G: 1 TABLET ORAL at 06:04

## 2022-04-24 RX ADMIN — SUCRALFATE 1 G: 1 TABLET ORAL at 12:16

## 2022-04-24 RX ADMIN — METOPROLOL TARTRATE 25 MG: 50 TABLET, FILM COATED ORAL at 06:03

## 2022-04-24 RX ADMIN — PROPAFENONE HYDROCHLORIDE 150 MG: 150 TABLET, FILM COATED ORAL at 21:19

## 2022-04-24 RX ADMIN — PANTOPRAZOLE SODIUM 40 MG: 40 TABLET, DELAYED RELEASE ORAL at 06:03

## 2022-04-24 RX ADMIN — SODIUM CHLORIDE, PRESERVATIVE FREE 10 ML: 5 INJECTION INTRAVENOUS at 21:19

## 2022-04-24 RX ADMIN — METOPROLOL TARTRATE 50 MG: 50 TABLET, FILM COATED ORAL at 21:19

## 2022-04-24 RX ADMIN — ONDANSETRON 4 MG: 2 INJECTION INTRAMUSCULAR; INTRAVENOUS at 08:06

## 2022-04-24 ASSESSMENT — PAIN SCALES - GENERAL
PAINLEVEL_OUTOF10: 0

## 2022-04-24 NOTE — PROGRESS NOTES
Assessment and Plan:        1. Hyponatremia-improved last pm.  Urine sodium lowish, osm low; not c/w SIADH strictly  2. Abdominal pain- resolved; no pain to palpation. Wonder if it relates to change in rhythm. Currently in atrial flutter. Prior CT scans have been unrevealing. CC:  Abdominal pain, emesis  HPI: pt with hx of paroxysmal aflutter, episodes of hyponatremia, presented with epigastric pain and hyponatremia. She had several episodes of emesis and a headache. She was hyponatremic in ER. Repeat last pm showed slight improvement. ROS (12 point review of systems completed. Pertinent positives noted.  Otherwise ROS is negative) :   PMH:  Per HPI  SHX:  , nonsmoker  FHX: Noncontributory    Allergies: See above    Medications:     sodium chloride      sodium chloride 40 mL/hr at 04/23/22 1902      metoprolol tartrate  25 mg Oral QAM AC    metoprolol tartrate  50 mg Oral Nightly    pantoprazole  40 mg Oral QAM AC    propafenone  150 mg Oral BID    sodium chloride flush  5-40 mL IntraVENous 2 times per day    sucralfate  1 g Oral 4 times per day    warfarin placeholder: dosing by pharmacy   Other RX Placeholder       Vital Signs:   /78   Pulse 96   Temp 97.9 °F (36.6 °C) (Oral)   Resp 16   Ht 5' 5\" (1.651 m)   Wt 165 lb (74.8 kg)   SpO2 96%   BMI 27.46 kg/m²      Intake/Output Summary (Last 24 hours) at 4/24/2022 0588  Last data filed at 4/24/2022 0319  Gross per 24 hour   Intake --   Output 700 ml   Net -700 ml        Physical Examination: General appearance - alert, well appearing, and in no distress  Mental status - alert, oriented to person, place, and time  Neck - supple, no significant adenopathy, no JVD, or carotid bruits  Chest - clear to auscultation, no wheezes, rales or rhonchi, symmetric air entry  Heart - irregularly irregular rhythm with rate 70  Abdomen - soft, nontender, nondistended, no masses or organomegaly  Neurological - alert, oriented, normal speech, no focal findings or movement disorder noted  Musculoskeletal - no muscular tenderness noted  Extremities - no pedal edema noted  Skin - normal coloration and turgor, no rashes, no suspicious skin lesions noted    Data: (All radiographs, tracings, PFTs, and imaging are personally viewed and interpreted unless otherwise noted).     Await labs      Electronically signed by Yvette Trujillo MD on 4/24/2022 at 5:57 AM

## 2022-04-24 NOTE — PROGRESS NOTES
Clinical Pharmacy Note    Warfarin consult follow-up    Recent Labs     04/24/22  0553   INR 2.02*     Recent Labs     04/23/22  1300   HGB 14.2   HCT 42.3   *     Significant Drug-Drug Interactions:  New warfarin drug-drug interactions: none  Discontinued drug-drug interactions: none  Current warfarin drug-drug interactions: none     Date INR Warfarin Dose   4/23/22 2.09 5 mg   4/24/22 2.02 5 mg                                               Notes:                   PT/INR or POC-INR will be monitored routinely until therapeutic INR is achieved.

## 2022-04-24 NOTE — PLAN OF CARE
Problem: Safety - Adult  Goal: Free from fall injury  4/24/2022 1255 by Stephen Aguila RN  Outcome: Progressing  Call light within reach, bed alarm on, hourly rounding, patient free from falls      Problem: ABCDS Injury Assessment  Goal: Absence of physical injury  4/24/2022 1255 by Stephen Aguila RN  Outcome: Progressing  Call light within reach, bed alarm on, hourly rounding, patient free from falls      Problem: Skin/Tissue Integrity  Goal: Absence of new skin breakdown  Description: 1. Monitor for areas of redness and/or skin breakdown  2. Assess vascular access sites hourly  3. Every 4-6 hours minimum:  Change oxygen saturation probe site  4. Every 4-6 hours:  If on nasal continuous positive airway pressure, respiratory therapy assess nares and determine need for appliance change or resting period.   4/24/2022 1255 by Stephen Aguila RN  Outcome: Progressing   No skin breakdown noted, q2hr turn and PRN

## 2022-04-24 NOTE — PLAN OF CARE
Problem: Discharge Planning  Goal: Discharge to home or other facility with appropriate resources  4/24/2022 0518 by Deep Alvarez RN  Outcome: Progressing  Note: Pt plans to return home at discharge. Problem: Safety - Adult  Goal: Free from fall injury  4/24/2022 0518 by Deep Alvarez RN  Outcome: Progressing  Note: No falls noted this shift. Continue falling star program. Bed alarm on, bed in low position. Call light and personal belongings in reach. Patient uses call light appropriately. Problem: ABCDS Injury Assessment  Goal: Absence of physical injury  4/24/2022 0518 by Deep Alvarez RN  Outcome: Progressing  Note: No injury noted during shift.       Problem: Skin/Tissue Integrity  Goal: Absence of new skin breakdown  4/24/2022 0518 by Deep Alvarez RN  Outcome: Progressing  Note: No new signs or symptoms of skin breakdown noted this shift, encouraging patient to turn and reposition self in bed q2h

## 2022-04-24 NOTE — PROGRESS NOTES
Clinical Pharmacy Note    Bam Baez is a 80 y.o. female for whom pharmacy has been asked to manage warfarin therapy. Reason for Admission: abdominal pain    Consulting Provider: Diaz Muñiz  Warfarin dose prior to admission: 2.5mg F, 5mg all other days  Warfarin indication: AF  Target INR range: 2-3   Outpatient warfarin provider[facility]    Past Medical History:   Diagnosis Date    Actinic keratosis     history of    Arthritis     Atrial fibrillation ([address]     CAD (coronary artery disease)     history of afib    Cancer (HCC)     skin    Cervical prolapse     history of    Diverticulosis     Endocervical polyp     history of    Heel spur     Hip pain     Hypertension     Nasal septal deviation     Osteoporosis     Stress incontinence     history of    Syncope     Vaginal prolapse     history of              Recent Labs     04/23/22  1933   INR 2.09*     Recent Labs     04/23/22  1300   HGB 14.2   HCT 42.3   *     Current warfarin drug-drug interactions: none    Date INR Warfarin Dose   4/23/22 2.09 5 mg                                   PT/INR or POC-INR will be monitored routinely until therapeutic INR is achieved.     Thank you for the consult,

## 2022-04-25 LAB
ANION GAP SERPL CALCULATED.3IONS-SCNC: 9 MEQ/L (ref 8–16)
BUN BLDV-MCNC: 3 MG/DL (ref 7–22)
CALCIUM SERPL-MCNC: 8.3 MG/DL (ref 8.5–10.5)
CHLORIDE BLD-SCNC: 92 MEQ/L (ref 98–111)
CO2: 23 MEQ/L (ref 23–33)
CREAT SERPL-MCNC: 0.7 MG/DL (ref 0.4–1.2)
GFR SERPL CREATININE-BSD FRML MDRD: 79 ML/MIN/1.73M2
GLUCOSE BLD-MCNC: 80 MG/DL (ref 70–108)
INR BLD: 2.04 (ref 0.85–1.13)
INR BLD: 2.5 (ref 0.85–1.13)
POTASSIUM SERPL-SCNC: 3.6 MEQ/L (ref 3.5–5.2)
SODIUM BLD-SCNC: 120 MEQ/L (ref 135–145)
SODIUM BLD-SCNC: 124 MEQ/L (ref 135–145)
SODIUM BLD-SCNC: 127 MEQ/L (ref 135–145)
SODIUM BLD-SCNC: 133 MEQ/L (ref 135–145)
TSH SERPL DL<=0.05 MIU/L-ACNC: 1.27 UIU/ML (ref 0.4–4.2)
URIC ACID: 4.2 MG/DL (ref 2.4–5.7)

## 2022-04-25 PROCEDURE — 36415 COLL VENOUS BLD VENIPUNCTURE: CPT

## 2022-04-25 PROCEDURE — C9113 INJ PANTOPRAZOLE SODIUM, VIA: HCPCS | Performed by: INTERNAL MEDICINE

## 2022-04-25 PROCEDURE — 6360000002 HC RX W HCPCS: Performed by: INTERNAL MEDICINE

## 2022-04-25 PROCEDURE — 6370000000 HC RX 637 (ALT 250 FOR IP): Performed by: INTERNAL MEDICINE

## 2022-04-25 PROCEDURE — 2580000003 HC RX 258: Performed by: INTERNAL MEDICINE

## 2022-04-25 PROCEDURE — 84550 ASSAY OF BLOOD/URIC ACID: CPT

## 2022-04-25 PROCEDURE — 99232 SBSQ HOSP IP/OBS MODERATE 35: CPT | Performed by: INTERNAL MEDICINE

## 2022-04-25 PROCEDURE — 80048 BASIC METABOLIC PNL TOTAL CA: CPT

## 2022-04-25 PROCEDURE — 1200000003 HC TELEMETRY R&B

## 2022-04-25 PROCEDURE — 85610 PROTHROMBIN TIME: CPT

## 2022-04-25 PROCEDURE — 84295 ASSAY OF SERUM SODIUM: CPT

## 2022-04-25 PROCEDURE — G0378 HOSPITAL OBSERVATION PER HR: HCPCS

## 2022-04-25 PROCEDURE — 84443 ASSAY THYROID STIM HORMONE: CPT

## 2022-04-25 RX ORDER — PHYTONADIONE 5 MG/1
2.5 TABLET ORAL ONCE
Status: COMPLETED | OUTPATIENT
Start: 2022-04-25 | End: 2022-04-25

## 2022-04-25 RX ORDER — SODIUM CHLORIDE 9 MG/ML
INJECTION, SOLUTION INTRAVENOUS CONTINUOUS
Status: DISCONTINUED | OUTPATIENT
Start: 2022-04-25 | End: 2022-04-28

## 2022-04-25 RX ORDER — PROCHLORPERAZINE EDISYLATE 5 MG/ML
5 INJECTION INTRAMUSCULAR; INTRAVENOUS EVERY 6 HOURS PRN
Status: DISCONTINUED | OUTPATIENT
Start: 2022-04-25 | End: 2022-04-29 | Stop reason: HOSPADM

## 2022-04-25 RX ADMIN — ONDANSETRON 4 MG: 2 INJECTION INTRAMUSCULAR; INTRAVENOUS at 08:07

## 2022-04-25 RX ADMIN — SODIUM CHLORIDE 1 G: 1 TABLET ORAL at 08:14

## 2022-04-25 RX ADMIN — SUCRALFATE 1 G: 1 TABLET ORAL at 14:52

## 2022-04-25 RX ADMIN — PHYTONADIONE 2.5 MG: 5 TABLET ORAL at 22:59

## 2022-04-25 RX ADMIN — METOPROLOL TARTRATE 50 MG: 50 TABLET, FILM COATED ORAL at 20:35

## 2022-04-25 RX ADMIN — SODIUM CHLORIDE: 9 INJECTION, SOLUTION INTRAVENOUS at 09:42

## 2022-04-25 RX ADMIN — PROPAFENONE HYDROCHLORIDE 150 MG: 150 TABLET, FILM COATED ORAL at 20:35

## 2022-04-25 RX ADMIN — SUCRALFATE 1 G: 1 TABLET ORAL at 23:02

## 2022-04-25 RX ADMIN — SUCRALFATE 1 G: 1 TABLET ORAL at 11:57

## 2022-04-25 RX ADMIN — SODIUM CHLORIDE, PRESERVATIVE FREE 10 ML: 5 INJECTION INTRAVENOUS at 20:35

## 2022-04-25 RX ADMIN — SODIUM CHLORIDE, PRESERVATIVE FREE 40 MG: 5 INJECTION INTRAVENOUS at 08:07

## 2022-04-25 RX ADMIN — PROPAFENONE HYDROCHLORIDE 150 MG: 150 TABLET, FILM COATED ORAL at 08:07

## 2022-04-25 RX ADMIN — PROCHLORPERAZINE EDISYLATE 5 MG: 5 INJECTION INTRAMUSCULAR; INTRAVENOUS at 11:57

## 2022-04-25 RX ADMIN — PHYTONADIONE 2.5 MG: 5 TABLET ORAL at 09:53

## 2022-04-25 ASSESSMENT — PAIN SCALES - WONG BAKER
WONGBAKER_NUMERICALRESPONSE: 0
WONGBAKER_NUMERICALRESPONSE: 0

## 2022-04-25 ASSESSMENT — PAIN SCALES - GENERAL
PAINLEVEL_OUTOF10: 0

## 2022-04-25 ASSESSMENT — PAIN - FUNCTIONAL ASSESSMENT: PAIN_FUNCTIONAL_ASSESSMENT: 0-10

## 2022-04-25 NOTE — PROGRESS NOTES
Pharmacy Medication History Note      List of current medications patient is taking is complete. Source of information: Patient and surescript    Changes made to medication list:  Medications removed (include reason, ex. therapy complete or physician discontinued):  · None    Medications added/doses adjusted:  · Krill oil 500mg 1 cap QD    Other notes (ex. Recent course of antibiotics, Coumadin dosing):  · Warfarin 2.5mg (2 tabs) nightly (5mg total)  · Denies use of other OTC or herbal medications.       Allergies reviewed      Electronically signed by Bev Henderson on 4/25/2022 at 1:42 PM

## 2022-04-25 NOTE — CARE COORDINATION
04/25/22 1113   Service Assessment   Patient Orientation Alert and Oriented;Person;Place;Situation;Self   Cognition Alert   History Provided By Patient; Child/Family   Primary Caregiver Self   Accompanied By/Relationship Daughter   Support Systems Spouse/Significant Other;Children   Patient's Healthcare Decision Maker is: Legal Next of Kin  (Spouse then daughter)   Prior Functional Level Independent in ADLs/IADLs   Current Functional Level Independent in ADLs/IADLs   Can patient return to prior living arrangement Yes   Ability to make needs known: Good   Family able to assist with home care needs: Yes   Would you like for me to discuss the discharge plan with any other family members/significant others, and if so, who? Yes  ( and daughter)   Financial Resources Medicare   Social/Functional History   Lives With Spouse   Type of [address] to enter with rails   Entrance Stairs - Number of Steps 1   Entrance Stairs - Rails Both   Bathroom Shower/Tub Walk-in shower   Bathroom Toilet Handicap height   Bathroom Equipment Grab bars in shower;Built-in shower seat   [facility], 4 wheeled;Grab bars   Receives Help From Family   ADL Assistance Independent   Homemaking Assistance Independent   Ambulation Assistance Independent   Transfer Assistance Independent   Active  No   Patient's  Info    Mode of Transportation Car   Discharge Planning   Type of [address] Prior To Admission None   Potential Assistance Needed N/A   DME Ordered?  No   Potential Assistance Purchasing Medications No   Type of Home Care Services None   Patient expects to be discharged to: House   One/Two Story Residence Split level   Interior Rails Right   Lift Chair Available No   Services At/After Discharge   Services At/After Discharge None   Mode of Transport at Discharge Other (see comment)  (Daughter and/or )   Confirm Follow Up Transport Family   Condition of Participation: Discharge Planning   The Plan for Transition of Care is related to the following treatment goals: EGD 4/26; delayed due to labs, receiving vitamin K   See initial case management note for additional details.

## 2022-04-25 NOTE — CONSULTS
[address] [place] [address]                                  CONSULTATION    PATIENT NAME: Rangel SCHRADER                     :        1934  MED REC NO:   [id number]                           ROOM:  ACCOUNT NO:   [de-identified]                           ADMIT DATE: 2022  PROVIDER:     GLENN Metzgerolaf Mutters:  2022    HISTORY OF PRESENT ILLNESS:  The patient is known to the practice;  admitted with nausea, vomiting, abdominal discomfort, as well as slight  difficulty to swallow. The patient is a healthy lady. She has had  difficulty to swallow. She said that the food does not go all the way. For the last four days, not able to eat anything significant. Clear  liquids tolerated at this time. For a while, she was not able to even  tolerate water. However, she is able to tolerate that at this time. She feels the food stopped in the epigastric area with burning sensation  on doing that. She lost no weight despite that. She has nausea and  vomiting. She has no hematemesis. Appetite is not good at this time. Has abdominal discomfort. She says she is currently taking Protonix  despite that she is still symptomatic. Bowel movement has not changed. She is not moving her bowel a lot, she is not moving. Stool is brown,  not black. No blood with the bowel movement. No mucus discharge with  the stool or defecations. She has a history of esophageal stricture. She was dilated in the past with size 51-Romansh with bleeding post  dilation at the level of upper sphincter. Also had one time showed  candidiasis. She is not taking any inhalers. When she had esophageal  candidiasis, she was on multiple course of antibiotics because of UTI at  that time. She has AFib. Currently, atrial flutter. She does not  complain of chest pain at this time.     PAST MEDICAL HISTORY:  Significant for basal cell carcinoma, slightly high. 4.  Hyponatremia, being replaced. PLAN:  1. Upper endoscopy done tomorrow to evaluate. 2.  Dilation cannot be done especially INR is high. If we decide to  that, then we have to reverse anticoagulation. I feel risky to do that  at this time knowing that she might not need dilation. 3.  I have changed the PPI to IV b.i.d. until we have her more stable  for endoscopy. Thank you for allowing me to participate in this patient's care.         Alberto Ferro M.D.    D: 04/24/2022 11:13:08       T: 04/24/2022 13:29:51     [email or web address]  Job#: [id number]     Doc#: [id number]    CC:  Gavin Britt Md

## 2022-04-25 NOTE — PROGRESS NOTES
Sent perfect serve message to [facility].  To tell Dr. Juan R Myles abut patients INR this AM.

## 2022-04-25 NOTE — PROGRESS NOTES
Hospitalist Progress Note    Patient:  Igor Mccain    YOB: 1934  Unit/Bed:6K-05/005-A  MRN: [id number]    Acct: [de-identified]   PCP: Kandace Loyola MD    Date of Admission: 4/23/2022      Assessment/Plan:  [place] Self Acute on chronic Hyponatremia: slowly improving. Baseline is in the low 130s. Mid 120s on arrival.  TSH wnl. History and labs are consistent with volume depletion (urine sodium is high but had been getting salt tabs, urine osm in 200s after IVF, making SIADH less likely). Could be combination of hypovolemia and poor solute intake. She had this during her previous stay as well and etiology was felt to be volume depletion by Nephrology, improved with fluids. Thus we will stop the salt tabs and restart gentle fluids at 50/hr and keep close eye on Na level. Continue monitoring sodium every 6 hours, goal is no more improvement than 6 in any 24-hour period.  Abdominal Pain: Unclear etiology. Reports epigastric tenderness, however this is better, not related to eating. .  Does have nausea which is not relieved with Zofran. Could be related to hyponatremia?  o GI has been consulted. o PRN compazine, stop zofran.   o Planning EGD once INR is okay  o Continue on PPI   Dysphagia with history of esophageal dilation: GI is planning EGD for repeat dilation. Need INR to be less than 1.4. Patient is tolerating her GI modified diet, at this point we will hold off giving FFP and treat with small aliquots of oral vitamin K.  o INR 2.5 this morning  o Repeat INR this evening status post 2.5 mg oral vitamin K 4/25 AM  o If greater than 1.8 we will repeat another dose of 2.5 mg oral vitamin K, INR in the morning   Afib/Aflutter Hx: Holding home Coumadin, continue on Lopressor and propafenone. Rate controlled.       Expected discharge date:  tbd    Disposition: tbd  [] Home  [] TCU  [] Rehab  [] Psych  [] SNF  [] [facility]  [] Other-    ===================================================================      Chief Complaint: Abdominal pain    Subjective (past 24 hours): Patient seen at bedside, her abdominal pain has improved however still having nausea. She reports that activity makes her discomfort worse. No change with eating but does report decreased p.o. intake because of her dysphagia. She had some nausea today that was not helped by Zofran. States that Compazine has helped in the past.  Having loose stools but notes this intermittently. Medications:  Reviewed    Infusion Medications    sodium chloride       Scheduled Medications    warfarin  5 mg Oral Once    pantoprazole (PROTONIX) 40 mg injection  40 mg IntraVENous Daily    sodium chloride  1 g Oral TID WC    metoprolol tartrate  25 mg Oral QAM AC    metoprolol tartrate  50 mg Oral Nightly    propafenone  150 mg Oral BID    sodium chloride flush  5-40 mL IntraVENous 2 times per day    sucralfate  1 g Oral 4 times per day    warfarin placeholder: dosing by pharmacy   Other RX Placeholder     PRN Meds: ondansetron, sodium chloride flush, sodium chloride, ondansetron **OR** ondansetron, polyethylene glycol, acetaminophen **OR** acetaminophen, dicyclomine      ROS: reviewed from prior note, full ROS unchanged unless otherwise stated in hospital course/subjective portion. Intake/Output Summary (Last 24 hours) at 4/25/2022 0851  Last data filed at 4/25/2022 0245  Gross per 24 hour   Intake 401.95 ml   Output 1500 ml   Net -1098.05 ml       Exam:  /73   Pulse 77   Temp 97.9 °F (36.6 °C) (Oral)   Resp 16   Ht 5' 5\" (1.651 m)   Wt 165 lb (74.8 kg)   SpO2 95%   BMI 27.46 kg/m²     General appearance: No distress, well developed, appears stated age. Eyes:  PERRL. Conjunctivae/corneas clear. HENT: Head normal appearing. Nares normal. Oral mucosa moist  Hard of hearing  Neck: Supple, with full range of motion. Trachea midline.   No gross JVD appreciated. Respiratory:  Normal effort. Clear to auscultation, without rales or wheezes or rhonchi. Cardiovascular: Normal rate, irregularly irregular rhythm with normal S1/S2 without murmurs. No lower extremity edema. Abdomen: Soft,  non-distended with normal bowel sounds. Tenderness in the epigastrium  Musculoskeletal: No joint swelling or tenderness. Normal tone. No abnormal movements. Skin: Warm and dry. No rashes or lesions. Neurologic:  No focal sensory/motor deficits in the upper or lower extremities. Cranial nerves:  grossly non-focal 2-12. Psychiatric: Alert and oriented, normal insight and thought content. Labs:   Recent Labs     04/23/22  1300   WBC 4.5*   HGB 14.2   HCT 42.3   *     Recent Labs     04/23/22  1532 04/23/22  1532 04/24/22  0553 04/24/22  0553 04/24/22  1355 04/24/22  1700 04/25/22  0514   *   < > 127*   < > 124* 125* 124*   K 4.1   < > 3.9  3.9  --   --   --  3.6   CL 90*  --  95*  --   --   --  92*   CO2 22*  --  19*  --   --   --  23   BUN 11  --  7  --   --   --  3*   CREATININE 0.9  --  0.8  --   --   --  0.7   CALCIUM 8.2*  --  8.4*  --   --   --  8.3*    < > = values in this interval not displayed. Recent Labs     04/23/22  1300   AST 23   ALT 14   BILIDIR <0.2   BILITOT 0.7   ALKPHOS 90     Recent Labs     04/23/22  1933 04/24/22  0553 04/25/22  0514   INR 2.09* 2.02* 2.50*     No results for input(s): Wanda Zulma in the last 72 hours. No results for input(s): PROCAL in the last 72 hours. Lab Results   Component Value Date    NITRU NEGATIVE 04/23/2022    WBCUA 10-15 09/04/2021    BACTERIA NONE SEEN 09/04/2021    RBCUA 0-2 09/04/2021    BLOODU NEGATIVE 04/23/2022    SPECGRAV 1.014 09/04/2021    GLUCOSEU NEGATIVE 04/23/2022       Radiology (48 hours):  No results found. DVT prophylaxis:    [] Lovenox  [x] SCDs  [] SQ Heparin  [] Encourage ambulation   [] Already on Anticoagulation       Diet: ADULT DIET;  Clear Liquid  Code Status: Full Code  PT/OT: na  Tele: yes  IVF: yes    Electronically signed by Lucendia Severin, DO on 4/25/2022 at 8:51 AM

## 2022-04-25 NOTE — PROGRESS NOTES
Gastroenterology  Progress Note    4/25/2022 7:26 AM  Subjective:   Admit Date: 4/23/2022    Interval History: Patient is dysphagia severe stricture dilated in the past and size 48 [place], she is on Coumadin her INR went up to 2.5 today. Plan to do an endoscopy on him today for diagnosis purposes none dilations. The CRNA nurse anesthetist for the patient need to be intubated and able to have an EGD on her after discussion with the family will elect to postpone the case until that time when INR is corrected so we could do EGD with dilation at the same time patient will tolerate liquid diet again we will reevaluate when INR corrected EGD will be done  Diet: Diet NPO    Medications:   Scheduled Meds:    warfarin  5 mg Oral Once    pantoprazole (PROTONIX) 40 mg injection  40 mg IntraVENous Daily    sodium chloride  1 g Oral TID WC    metoprolol tartrate  25 mg Oral QAM AC    metoprolol tartrate  50 mg Oral Nightly    propafenone  150 mg Oral BID    sodium chloride flush  5-40 mL IntraVENous 2 times per day    sucralfate  1 g Oral 4 times per day    warfarin placeholder: dosing by pharmacy   Other RX Placeholder     Continuous Infusions:    sodium chloride         CBC:   Recent Labs     04/23/22  1300   WBC 4.5*   HGB 14.2   *     BMP:    Recent Labs     04/23/22  1532 04/23/22  1532 04/24/22  0553 04/24/22  0553 04/24/22  1355 04/24/22  1700 04/25/22  0514   *   < > 127*   < > 124* 125* 124*   K 4.1   < > 3.9  3.9  --   --   --  3.6   CL 90*  --  95*  --   --   --  92*   CO2 22*  --  19*  --   --   --  23   BUN 11  --  7  --   --   --  3*   CREATININE 0.9  --  0.8  --   --   --  0.7   GLUCOSE 79  --  70  --   --   --  80    < > = values in this interval not displayed.      Hepatic:   Recent Labs     04/23/22  1300   AST 23   ALT 14   BILITOT 0.7   ALKPHOS 90     INR:   Recent Labs     04/23/22  1933 04/24/22  0553 04/25/22  0514   INR 2.09* 2.02* 2.50*       Imaging:  No results found for this or any previous visit. Results for orders placed during the hospital encounter of 09/04/21    CT ABDOMEN PELVIS W IV CONTRAST Additional Contrast? None    Narrative  CT of the abdomen and pelvis was performed utilizing intravenous contrast.    Comparison MR 7/19/2017. Findings: The liver is unremarkable. Cholecystectomy. There is no hydronephrosis. Small right renal cyst.  The spleen is enlarged measuring 15.3 cm in length. The pancreas is unremarkable. No abdominal aortic aneurysm. Small hiatal  hernia. There is mild ectasia of the celiac axis. A left hip prosthesis causes artifact through the pelvis. No definite diverticulitis is identified. The appendix is not seen. There  is no bowel obstruction. The bladder is mildly distended. A pessary is in place. There is mild free  fluid in the pelvis. There are several indeterminate nodules in the lower lungs. The largest is  on the right measuring 1.3 cm. Impression  Impression:  No bowel infarction or definite diverticulitis. Mild colonic wall thickening could be incidental. Mild colitis is  technically a possibility. Small amount of free fluid in the pelvis of uncertain etiology. Indeterminate pulmonary nodules recommend comparison to previous or  further evaluation. This document has been electronically signed by: Frida Rao MD on  09/04/2021 08:42 PM    All CTs at this facility use dose modulation techniques and iterative  reconstructions, and/or weight-based dosing  when appropriate to reduce radiation to a low as reasonably achievable. No results found for this or any previous visit. Results for orders placed during the hospital encounter of 07/15/17    MRI ABDOMEN W WO CONTRAST MRCP    Narrative  PROCEDURE: MRI ABDOMEN W WO CONTRAST MRCP    CLINICAL INFORMATION: Elevated lipase; technologist notes state abdominal pain, nausea and vomiting.     COMPARISON: Liver ultrasound dated 7/18/2017 and ultrasound abdomen complete dated 7/15/2017. TECHNIQUE: Routine MRI abdomen without and with IV contrast.  Routine MRCP was also performed. CONTRAST: 15 mL OptiMARK intravenously. FINDINGS:  LIVER:  1. The liver is normal size. 2. The hepatic parenchymal signal is normal.  3. There is no hepatic mass. 4. There is no intrahepatic biliary dilatation. 5. There is normal enhancement of the portal and the hepatic veins. 6. There is normal enhancement of the portal venous confluence, SMV, and splenic vein. GALLBLADDER:  1. Cholecystectomy. BILIARY TREE:  1. The common duct is normal size measuring 0.5 cm in transverse dimension. 2. There is no choledocholithiasis. PANCREAS:  1. The pancreas is normal size and signal characteristics. 2. There is normal pancreatic enhancement. 3. There is no pancreatic mass. 4. The pancreatic duct is normal size measuring 0.2 cm in transverse dimension. 5. There is no pancreatic divisum. 6. There is no peripancreatic fluid or inflammation. SPLEEN/ADRENAL GLANDS/KIDNEYS:  1. The spleen is borderline enlarged measuring 9.5 x 4.4 x 13.5 cm in longitudinal, transverse and AP dimensions. 2. There is a 1.0 cm Bosniak 1 cyst projecting off the lateral margin of the upper pole the right kidney. BOWEL:  1. Nonobstructive bowel gas pattern. 2. The imaged portions of the distal esophagus and stomach are unremarkable. 3. Imaged portions of the small bowel and colon are unremarkable. FREE AIR/FREE FLUID/INFLAMMATION: None. LYMPHADENOPATHY:  1. There are no pathologically enlarged lymph nodes. ABDOMINAL AORTA/IVC: Unremarkable. LUNG BASES:  1. There are 2 cysts at the right lung base measuring 1.8 and 0.9 cm. MUSCULOSKELETAL:  1. Hematopoietic marrow signal throughout the imaged osseous structures. 2. Mild levoscoliosis of the lumbar spine. 3. Discogenic degenerative changes L5-S1.    OTHER: None. Impression  1. Cholecystectomy. There is no biliary dilatation.  There is no choledocholithiasis. 2. The pancreas is unremarkable. There are no MRI findings of acute pancreatitis. 3. The spleen is borderline enlarged measuring 9.5 x 4.4 x 13.5 cm in longitudinal, transverse and AP dimensions. 4. There is a 1 cm Bosniak 1 cyst projecting off the lateral margin of the upper pole the right kidney. 5. There is no inflammatory process. **This report has been created using voice recognition software. It may contain minor errors which are inherent in voice recognition technology. **    Final report electronically signed by Dr. Rita Ferrer on 7/19/2017 2:28 PM      Endoscopy Finding:      Objective:   Vitals: BP (!) 145/90   Pulse 80   Temp 98 °F (36.7 °C) (Oral)   Resp 16   Ht 5' 5\" (1.651 m)   Wt 165 lb (74.8 kg)   SpO2 94%   BMI 27.46 kg/m²     Intake/Output Summary (Last 24 hours) at 4/25/2022 0726  Last data filed at 4/25/2022 0245  Gross per 24 hour   Intake 441.88 ml   Output 1500 ml   Net -1058.12 ml     General appearance: alert and cooperative with exam  Lungs: clear to auscultation bilaterally  Heart: regular rate and rhythm, S1, S2 normal, no murmur, click, rub or gallop  Abdomen: soft, non-tender; bowel sounds normal; no masses,  no organomegaly  Extremities: extremities normal, atraumatic, no cyanosis or edema    Assessment and Plan:   1. Dysphagia with severe dyspnea which is showed dilated the past will need an EGD  2. Shortly called today for EGD however patient is canceled as anesthesia believed patient to be intubated so we will wait until he clinically improve with INR corrected before proceed with EGD that after discussion with the patient and her daughter at the same time  3.  Patient to continue in PPI IV twice daily       Follow up in GI Clinic after discharge in 2 weeks week(s)    Patient Active Problem List:     Trochanteric bursitis of left hip     Drug-induced constipation     Bradycardia     Esophageal stricture     Gastritis without bleeding     Lumbar radicular pain     Lumbar spine pain     Left hip pain     Arthritis of left hip     Paroxysmal atrial fibrillation with rapid ventricular response (HCC)     Anticoagulation monitoring by pharmacist     Venous stasis dermatitis of both lower extremities     Arthritis of right hand     Vaginal erosion secondary to pessary use (HCC)     Pelvic relaxation due to uterovaginal prolapse     Postmenopausal bleeding     History of hysterectomy     Esophageal candidiasis (HCC)     Hyponatremia     Lower abdominal pain     Atrial flutter (HCC)     Presence of pessary #4 ring     Epigastric pain      Electronically signed by Gutierrez Purcell MD on 4/25/2022 at 7:26 AM

## 2022-04-25 NOTE — PLAN OF CARE
Problem: Discharge Planning  Goal: Discharge to home or other facility with appropriate resources  Outcome: Progressing  Flowsheets (Taken 4/24/2022 2112)  Discharge to home or other facility with appropriate resources: Identify barriers to discharge with patient and caregiver  Note: Feedback readiness for discharge. Promoting inclusion for discharge planning. Problem: Safety - Adult  Goal: Free from fall injury  4/24/2022 2326 by Urban Odell RN  Outcome: Progressing  Note: Bed in low position  Call light in reach  Bed wheel lock  Teaching to use call light for assistance. Problem: ABCDS Injury Assessment  Goal: Absence of physical injury  4/24/2022 2326 by Urban Odell RN  Outcome: Progressing  Note: Bed in low position  Call light in reach  Bed wheel lock  Teaching to use call light for assistance. Problem: Skin/Tissue Integrity  Goal: Absence of new skin breakdown  Description: 1. Monitor for areas of redness and/or skin breakdown  2. Assess vascular access sites hourly  3. Every 4-6 hours minimum:  Change oxygen saturation probe site  4. Every 4-6 hours:  If on nasal continuous positive airway pressure, respiratory therapy assess nares and determine need for appliance change or resting period. 4/24/2022 2326 by Urban Odell RN  Outcome: Progressing  Note: Encouraging good fluid and diet intake. Encourage hygiene  Increase movement and encourage turns. Problem: Pain  Goal: Verbalizes/displays adequate comfort level or baseline comfort level  Outcome: Progressing  Note: Educate patient on pain control. Educate patient on acceptable pain level with chronic pain. Talk to patient about non-pharmaceutical pain interventions. Pain Assessment: 0-10  Pain Level: 0   Patient's Stated Pain Goal: 0 - No pain   Is pain goal met at this time? Yes       Care plan reviewed with patient. Patient verbalizes understanding of the plan of care and contributes to goal setting.

## 2022-04-25 NOTE — CARE COORDINATION
4/25/22, 7:33 AM EDT  DISCHARGE PLANNING EVALUATION:    Arlyn Quintanilla       Admitted: 4/23/2022/ 1308   Hospital day: 0   Location: [address] [place] [address] Reason for admit: Abdominal pain, epigastric [R10.13]  Hyponatremia [E87.1]  Non-intractable vomiting with nausea, unspecified vomiting type [R11.2]   PMH:  has a past medical history of Actinic keratosis, Arthritis, Atrial fibrillation ([facility] [address] CAD (coronary artery disease), Cancer ([facility] [address] Cervical prolapse, Diverticulosis, Endocervical polyp, Heel spur, Hip pain, Hypertension, Nasal septal deviation, Osteoporosis, Stress incontinence, Syncope, and Vaginal prolapse. Procedure:   None  Barriers to Discharge:   Patient admitted through ED for abdominal pain and emesis. GFR 79, Sodium 124, BUN 3 and Calcium 8.3. Diet NPO. GI following. IV fluids and Zofran. Plans to return home with . PCP: Lino Fierro MD   %  Patient Goals/Plan/Treatment Preferences:   Plans to discharge home with .  and/or daughter able to transport home. Denies need for DME or HH. If HH is needed prefers [facility]. Transportation/Food Security/Housekeeping Addressed:  No issues identified.

## 2022-04-26 LAB
BASOPHILS # BLD: 0.6 %
BASOPHILS ABSOLUTE: 0 THOU/MM3 (ref 0–0.1)
CORTISOL COLLECTION INFO: NORMAL
CORTISOL: 1.43 UG/DL
EOSINOPHIL # BLD: 2 %
EOSINOPHILS ABSOLUTE: 0.1 THOU/MM3 (ref 0–0.4)
ERYTHROCYTE [DISTWIDTH] IN BLOOD BY AUTOMATED COUNT: 16.3 % (ref 11.5–14.5)
ERYTHROCYTE [DISTWIDTH] IN BLOOD BY AUTOMATED COUNT: 47.7 FL (ref 35–45)
HCT VFR BLD CALC: 39.9 % (ref 37–47)
HEMOGLOBIN: 13.4 GM/DL (ref 12–16)
IMMATURE GRANS (ABS): 0.02 THOU/MM3 (ref 0–0.07)
IMMATURE GRANULOCYTES: 0.6 %
INR BLD: 1.73 (ref 0.85–1.13)
LYMPHOCYTES # BLD: 32.3 %
LYMPHOCYTES ABSOLUTE: 1.1 THOU/MM3 (ref 1–4.8)
MCH RBC QN AUTO: 27.1 PG (ref 26–33)
MCHC RBC AUTO-ENTMCNC: 33.6 GM/DL (ref 32.2–35.5)
MCV RBC AUTO: 80.8 FL (ref 81–99)
MONOCYTES # BLD: 15.6 %
MONOCYTES ABSOLUTE: 0.5 THOU/MM3 (ref 0.4–1.3)
NUCLEATED RED BLOOD CELLS: 0 /100 WBC
OSMOLALITY URINE: 276 MOSMOL/KG (ref 250–750)
OSMOLALITY: 256 MOSMOL/KG (ref 275–295)
PLATELET # BLD: 101 THOU/MM3 (ref 130–400)
PMV BLD AUTO: 9.4 FL (ref 9.4–12.4)
RBC # BLD: 4.94 MILL/MM3 (ref 4.2–5.4)
SEG NEUTROPHILS: 48.9 %
SEGMENTED NEUTROPHILS ABSOLUTE COUNT: 1.7 THOU/MM3 (ref 1.8–7.7)
SODIUM BLD-SCNC: 121 MEQ/L (ref 135–145)
SODIUM BLD-SCNC: 122 MEQ/L (ref 135–145)
SODIUM BLD-SCNC: 124 MEQ/L (ref 135–145)
SODIUM BLD-SCNC: 124 MEQ/L (ref 135–145)
SODIUM BLD-SCNC: 126 MEQ/L (ref 135–145)
SODIUM BLD-SCNC: 126 MEQ/L (ref 135–145)
SODIUM URINE: 87 MEQ/L
WBC # BLD: 3.5 THOU/MM3 (ref 4.8–10.8)

## 2022-04-26 PROCEDURE — 6360000002 HC RX W HCPCS: Performed by: INTERNAL MEDICINE

## 2022-04-26 PROCEDURE — 6370000000 HC RX 637 (ALT 250 FOR IP): Performed by: INTERNAL MEDICINE

## 2022-04-26 PROCEDURE — 84295 ASSAY OF SERUM SODIUM: CPT

## 2022-04-26 PROCEDURE — 84300 ASSAY OF URINE SODIUM: CPT

## 2022-04-26 PROCEDURE — C9113 INJ PANTOPRAZOLE SODIUM, VIA: HCPCS | Performed by: INTERNAL MEDICINE

## 2022-04-26 PROCEDURE — 83930 ASSAY OF BLOOD OSMOLALITY: CPT

## 2022-04-26 PROCEDURE — 83935 ASSAY OF URINE OSMOLALITY: CPT

## 2022-04-26 PROCEDURE — 85025 COMPLETE CBC W/AUTO DIFF WBC: CPT

## 2022-04-26 PROCEDURE — 2580000003 HC RX 258: Performed by: INTERNAL MEDICINE

## 2022-04-26 PROCEDURE — 99233 SBSQ HOSP IP/OBS HIGH 50: CPT | Performed by: INTERNAL MEDICINE

## 2022-04-26 PROCEDURE — 82533 TOTAL CORTISOL: CPT

## 2022-04-26 PROCEDURE — 36415 COLL VENOUS BLD VENIPUNCTURE: CPT

## 2022-04-26 PROCEDURE — 85610 PROTHROMBIN TIME: CPT

## 2022-04-26 PROCEDURE — 84436 ASSAY OF TOTAL THYROXINE: CPT

## 2022-04-26 PROCEDURE — 99223 1ST HOSP IP/OBS HIGH 75: CPT | Performed by: INTERNAL MEDICINE

## 2022-04-26 PROCEDURE — 1200000003 HC TELEMETRY R&B

## 2022-04-26 RX ORDER — SODIUM CHLORIDE 1000 MG
2 TABLET, SOLUBLE MISCELLANEOUS EVERY 8 HOURS
Status: DISCONTINUED | OUTPATIENT
Start: 2022-04-26 | End: 2022-04-29 | Stop reason: HOSPADM

## 2022-04-26 RX ORDER — SODIUM CHLORIDE 1000 MG
2 TABLET, SOLUBLE MISCELLANEOUS
Status: DISCONTINUED | OUTPATIENT
Start: 2022-04-26 | End: 2022-04-26

## 2022-04-26 RX ORDER — COSYNTROPIN 0.25 MG/ML
250 INJECTION, POWDER, FOR SOLUTION INTRAMUSCULAR; INTRAVENOUS ONCE
Status: COMPLETED | OUTPATIENT
Start: 2022-04-27 | End: 2022-04-27

## 2022-04-26 RX ORDER — SODIUM CHLORIDE 9 MG/ML
INJECTION, SOLUTION INTRAVENOUS CONTINUOUS
Status: DISCONTINUED | OUTPATIENT
Start: 2022-04-26 | End: 2022-04-28

## 2022-04-26 RX ADMIN — SUCRALFATE 1 G: 1 TABLET ORAL at 17:49

## 2022-04-26 RX ADMIN — SODIUM CHLORIDE, PRESERVATIVE FREE 40 MG: 5 INJECTION INTRAVENOUS at 09:12

## 2022-04-26 RX ADMIN — SUCRALFATE 1 G: 1 TABLET ORAL at 11:28

## 2022-04-26 RX ADMIN — SODIUM CHLORIDE 2 G: 1 TABLET ORAL at 11:28

## 2022-04-26 RX ADMIN — ACETAMINOPHEN 650 MG: 325 TABLET ORAL at 14:45

## 2022-04-26 RX ADMIN — SUCRALFATE 1 G: 1 TABLET ORAL at 23:17

## 2022-04-26 RX ADMIN — PROCHLORPERAZINE EDISYLATE 5 MG: 5 INJECTION INTRAMUSCULAR; INTRAVENOUS at 17:48

## 2022-04-26 RX ADMIN — SUCRALFATE 1 G: 1 TABLET ORAL at 05:27

## 2022-04-26 RX ADMIN — PROPAFENONE HYDROCHLORIDE 150 MG: 150 TABLET, FILM COATED ORAL at 09:12

## 2022-04-26 RX ADMIN — SODIUM CHLORIDE 2 G: 1 TABLET ORAL at 17:49

## 2022-04-26 RX ADMIN — METOPROLOL TARTRATE 25 MG: 50 TABLET, FILM COATED ORAL at 05:27

## 2022-04-26 RX ADMIN — SODIUM CHLORIDE, PRESERVATIVE FREE 10 ML: 5 INJECTION INTRAVENOUS at 09:15

## 2022-04-26 RX ADMIN — SODIUM CHLORIDE: 9 INJECTION, SOLUTION INTRAVENOUS at 09:14

## 2022-04-26 RX ADMIN — Medication 15 G: at 21:03

## 2022-04-26 RX ADMIN — SODIUM CHLORIDE: 9 INJECTION, SOLUTION INTRAVENOUS at 17:50

## 2022-04-26 RX ADMIN — SODIUM CHLORIDE 2 G: 1 TABLET ORAL at 09:12

## 2022-04-26 RX ADMIN — PROCHLORPERAZINE EDISYLATE 5 MG: 5 INJECTION INTRAMUSCULAR; INTRAVENOUS at 11:27

## 2022-04-26 RX ADMIN — DICYCLOMINE HYDROCHLORIDE 10 MG: 10 CAPSULE ORAL at 14:45

## 2022-04-26 RX ADMIN — METOPROLOL TARTRATE 50 MG: 50 TABLET, FILM COATED ORAL at 21:03

## 2022-04-26 RX ADMIN — PROPAFENONE HYDROCHLORIDE 150 MG: 150 TABLET, FILM COATED ORAL at 21:04

## 2022-04-26 ASSESSMENT — PAIN SCALES - WONG BAKER
WONGBAKER_NUMERICALRESPONSE: 0

## 2022-04-26 ASSESSMENT — ENCOUNTER SYMPTOMS
COUGH: 0
BACK PAIN: 0
DIARRHEA: 0
ABDOMINAL PAIN: 0
EYES NEGATIVE: 1
NAUSEA: 0
VOMITING: 0
SHORTNESS OF BREATH: 0

## 2022-04-26 ASSESSMENT — PAIN SCALES - GENERAL
PAINLEVEL_OUTOF10: 3
PAINLEVEL_OUTOF10: 0
PAINLEVEL_OUTOF10: 0

## 2022-04-26 ASSESSMENT — PAIN DESCRIPTION - ORIENTATION: ORIENTATION: MID

## 2022-04-26 ASSESSMENT — PAIN DESCRIPTION - LOCATION: LOCATION: ABDOMEN

## 2022-04-26 ASSESSMENT — PAIN DESCRIPTION - DESCRIPTORS: DESCRIPTORS: CRAMPING

## 2022-04-26 NOTE — PROGRESS NOTES
Hospitalist Progress Note    Patient:  Maxine Merlos    YOB: 1934  Unit/Bed:6K-05/005-A  MRN: [id number]    Acct: [de-identified]   PCP: Seble Ruiz MD    Date of Admission: 4/23/2022      Assessment/Plan:  [place] Acute on chronic Hyponatremia: slowly improving. Baseline is in the low 130s. Mid 120s on arrival.  TSH wnl. History and labs are consistent with volume depletion (urine sodium is high but had been getting salt tabs, urine osm in 200s after IVF, making SIADH less likely). Could be combination of hypovolemia and poor solute intake. She had this during her previous stay as well and etiology was felt to be volume depletion by Nephrology, improved with fluids. o Gentle fluids last night led to sodium dropped from 1 27-1 20. Nephrology consulted 4/26 am.   Patient was restarted on both fluids and sodium tablets by nephrology this morning. o Continue every 4 hour monitoring, further work-up of HPA axis tomorrow morning   Very low a.m. cortisol ? AI: 1.4 this morning at 8:30 AM.  Will confirm with repeat testing tomorrow and cosyntropin stim test, as well as ACTH level drawn. More suspicious for ACTH deficiency due to normal potassium, making aldosterone function likely intact. Patient denies recent steroids   Abdominal Pain: Unclear etiology. Reports epigastric tenderness, however this is better, not related to eating. .  Does have nausea which is not relieved with Zofran. Could be related to hyponatremia?  o GI has been consulted. o PRN compazine, stop zofran.   o Planning EGD once INR is okay (now limiting factor is sodium)  o Continue on PPI   Dysphagia with history of esophageal dilation: GI is planning EGD for repeat dilation. Need INR to be less than 1.4. Patient is tolerating her GI modified diet, at this point we will hold off giving FFP and treat with small aliquots of oral vitamin K.  o INR coming down, suspect will be at goal for EGD by tomorrow morning.   She received 2 doses of 2.5 mg oral vitamin K.  o Can have EGD if sodium level is appropriate (likely to be ok if >128, will D/w Nephro)   Chronic Thrombocytopenia: Appears to be a chronic issue, overall stable in the low 100s which is where it has been since 8/2021. B12 and folate in am.  Noted normal thyroid. Adrenal disease could contribute. Should be ok to continue coumadin.  Afib/Aflutter Hx: Holding home Coumadin, continue on Lopressor and propafenone. Rate controlled. Expected discharge date:  tbd    Disposition: tbd  [] Home  [] TCU  [] Rehab  [] Psych  [] SNF  [] [place]  [] Other-    ===================================================================      Chief Complaint: Abdominal pain    Subjective (past 24 hours): Patient seen at bedside, still has some nausea and reports that she had a slight headache that came back this morning. She denies any current dizziness or emesis. Bowel movements are still a bit loose but no increased in frequency. Sodium dropped overnight after fluids and these were stopped, restarted on salt tabs this morning.       Medications:  Reviewed    Infusion Medications    sodium chloride 50 mL/hr at 04/26/22 1750    [Held by provider] sodium chloride Stopped (04/25/22 2226)    sodium chloride       Scheduled Medications    [START ON 4/27/2022] cosyntropin  250 mcg IntraVENous Once    sodium chloride  2 g Oral Q8H    urea  15 g Oral BID    pantoprazole (PROTONIX) 40 mg injection  40 mg IntraVENous Daily    [Held by provider] sodium chloride  1 g Oral TID WC    metoprolol tartrate  25 mg Oral QAM AC    metoprolol tartrate  50 mg Oral Nightly    propafenone  150 mg Oral BID    sodium chloride flush  5-40 mL IntraVENous 2 times per day    sucralfate  1 g Oral 4 times per day     PRN Meds: prochlorperazine, ondansetron, sodium chloride flush, sodium chloride, [Held by provider] ondansetron **OR** [Held by provider] ondansetron, polyethylene glycol, acetaminophen **OR** acetaminophen, dicyclomine      ROS: reviewed from prior note, full ROS unchanged unless otherwise stated in hospital course/subjective portion. Intake/Output Summary (Last 24 hours) at 4/26/2022 1820  Last data filed at 4/26/2022 1510  Gross per 24 hour   Intake 914.17 ml   Output 825 ml   Net 89.17 ml       Exam:  BP (!) 136/102   Pulse 76   Temp 97.7 °F (36.5 °C) (Oral)   Resp 16   Ht 5' 5\" (1.651 m)   Wt 165 lb (74.8 kg)   SpO2 96%   BMI 27.46 kg/m²     General appearance: No distress, well developed, appears stated age. Chronically ill appearing. Eyes:  PERRL. Conjunctivae/corneas clear. HENT: Head normal appearing. Nares normal. Oral mucosa moist  Hard of hearing  Neck: Supple, with full range of motion. Trachea midline. No gross JVD appreciated. Respiratory:  Normal effort. Clear to auscultation, without rales or wheezes or rhonchi. Cardiovascular: Normal rate, irregularly irregular rhythm with normal S1/S2 without murmurs. No lower extremity edema. Abdomen: Soft,  non-distended with normal bowel sounds. No tenderness    Musculoskeletal: No joint swelling or tenderness. Normal tone. No abnormal movements. Skin: Warm and dry. No rashes or lesions. Neurologic:  No focal sensory/motor deficits in the upper or lower extremities. Cranial nerves:  grossly non-focal 2-12. Psychiatric: Alert and oriented, normal insight and thought content.        Labs:   Recent Labs     04/26/22  0828   WBC 3.5*   HGB 13.4   HCT 39.9   *     Recent Labs     04/24/22  0553 04/24/22  1355 04/25/22  0514 04/25/22  1018 04/26/22  0728 04/26/22  0828 04/26/22  1526   *   < > 124*   < > 124* 121* 126*   K 3.9  3.9  --  3.6  --   --   --   --    CL 95*  --  92*  --   --   --   --    CO2 19*  --  23  --   --   --   --    BUN 7  --  3*  --   --   --   --    CREATININE 0.8  --  0.7  --   --   --   --    CALCIUM 8.4*  --  8.3*  --   --   --   --     < > = values in this interval not displayed. No results for input(s): AST, ALT, BILIDIR, BILITOT, ALKPHOS in the last 72 hours. Recent Labs     04/25/22  0514 04/25/22  2045 04/26/22  0310   INR 2.50* 2.04* 1.73*     No results for input(s): CKTOTAL, TROPONINI in the last 72 hours. No results for input(s): PROCAL in the last 72 hours. Lab Results   Component Value Date    NITRU NEGATIVE 04/23/2022    WBCUA 10-15 09/04/2021    BACTERIA NONE SEEN 09/04/2021    RBCUA 0-2 09/04/2021    BLOODU NEGATIVE 04/23/2022    SPECGRAV 1.014 09/04/2021    GLUCOSEU NEGATIVE 04/23/2022       Radiology (48 hours):  No results found. DVT prophylaxis:    [] Lovenox  [x] SCDs  [] SQ Heparin  [] Encourage ambulation   [] Already on Anticoagulation       Diet: ADULT ORAL NUTRITION SUPPLEMENT; Breakfast, Lunch, Dinner; Clear Liquid Oral Supplement  ADULT DIET;  Full Liquid  Code Status: Full Code  PT/OT: na - when appropriate  Tele: yes  IVF: yes    Electronically signed by Dyan Cartagena DO on 4/26/2022 at 6:20 PM

## 2022-04-26 NOTE — CONSULTS
[facility]          [facility]        [address]        [place], [place] [id number]           Inpatient Initial consult note         4/26/2022 1:06 PM    Patient Name:   Mana Floyd  YOB: 1934  Primary Care Physician:  Jeffy Conte MD     History Obtained From:  patient     Consultation requested by : Jonas Hester DO    requested for  : Evaluation of hyponatremia     History of presentingillness   Mana Floyd is a 80 y.o.   female with Past Medical History as stated below presented with a chief complaint of Abdominal Pain and Emesis   on 4/23/2022 . Patient presented with chief complaints of nausea vomiting and abdominal pain which has been ongoing since Wednesday. Moderate severity, associated with some dizziness and lightheadedness and headaches as well. No other modifying factors. Upon arrival to the hospital patient was found to have a sodium of 121 subsequently patient was admitted. Patient sodium has rising to 127 and subsequently has dropped down to 121 again. Patient has similar admissions for hyponatremia in the past as well.      Past History      Past Medical History:   Diagnosis Date    Actinic keratosis     history of    Arthritis     Atrial fibrillation ([address]     CAD (coronary artery disease)     history of afib    Cancer ([address]     skin    Cervical prolapse     history of    Diverticulosis     Endocervical polyp     history of    Heel spur     Hip pain     Hypertension     Nasal septal deviation     Osteoporosis     Stress incontinence     history of    Syncope     Vaginal prolapse     history of     Past Surgical History:   Procedure Laterality Date    APPENDECTOMY  1960's   [address] [place] [address] BLADDER REPAIR  2011    [id number]    [facility]    COLONOSCOPY  01/26/2000    ENDOSCOPIC ULTRASOUND (LOWER) Left 07/20/2017    ENDOSCOPIC ULTRASOUND performed by Erin Davalos MD at [facility]    EYE SURGERY      bilat cataract    FACIAL SURGERY N/A 1/27/2021    DIVISION AND INSET/CHEEK performed by Parker Douglas MD at [facility]  2017    left total hip relpament    MOHS SURGERY N/A 1/8/2021    MOHS REPAIR BCC NASAL TIP WITH FLAP AND GRAFT performed by Parker Douglas MD at [address] Left 8/6/2021    MOHS DEFECT REPAIR SCC DORSAL NOSE AND LEFT LOWER FOREHEAD WITH SKIN GRAFT FROM RIGHT CHEEK/EAR (PLACED ON NOSE) performed by Parker Douglas MD at [address] [facility]  1960's    [place] E[place] TRANSORAL BIOPSY SINGLE/MULTIPLE Left 07/16/2017    EGD BIOPSY performed by Leda Rondon MD at [facility]    [place] OFFICE/OUTPT VISIT,PROCEDURE ONLY Right 06/01/2018    MOHS REPAIR BASEL CELL CARCINOMA RIGHT DORSAL NOSE performed by Parker Douglas MD at [facility] HIP ARTHROPLASTY Left 09/19/2017    Left Total Hip ARTHROPLASTY performed by Amandeep Garcia MD at [address]  07/16/2017    UPPER GASTROINTESTINAL ENDOSCOPY Left 07/16/2017    EGD DILATION BALLOON performed by Leda Rondon MD at [facility]    [address] Left 8/28/2021    EGD BIOPSY performed by Georgette Storey MD at [facility]    [address] N/A 8/30/2021    EGD DILATION SAVORY performed by Georgette Storey MD at [place] [address] LEFT Left 4/21/2021    US BREAST NEEDLE BIOPSY LEFT 4/21/2021 [facility] ULTRASOUND    US BREAST NEEDLE BIOPSY RIGHT Right 4/21/2021    US BREAST NEEDLE BIOPSY RIGHT 4/21/2021 [facility] ULTRASOUND    US GUIDED NEEDLE LOC BREAST ADDL LEFT Left 4/21/2021    US GUIDED NEEDLE LOC BREAST ADDL LEFT 4/21/2021 [facility] ULTRASOUND     Social History     Socioeconomic History    Marital status:      Spouse name: Not on file    Number of children: Not on file    Years of education: Not on file    Highest education level: Not on file   Occupational History    Not on file   Tobacco Use    Smoking status: Never Smoker    Smokeless tobacco: Never Used   Vaping Use    Vaping Use: Never used   Substance and Sexual Activity    Alcohol use: No    Drug use: No    Sexual activity: Not Currently     Partners: Male     Comment:    Other Topics Concern    Not on file   Social History Narrative    Not on file     Social Determinants of Health     Financial Resource Strain: Low Risk     Difficulty of Paying Living Expenses: Not hard at all   Food Insecurity: No Food Insecurity    Worried About [address] in the Last Year: Never true    [address] in the Last Year: Never true   Transportation Needs: No Transportation Needs    Lack of Transportation (Medical): No    Lack of Transportation (Non-Medical):  No   Physical Activity: Inactive    Days of Exercise per Week: 0 days    Minutes of Exercise per Session: 0 min   Stress: No Stress Concern Present    Feeling of Stress : Not at all   Social Connections: Unknown    Frequency of Communication with Friends and Family: Never    Frequency of Social Gatherings with Friends and Family: Patient refused    Attends [employer] Services: Patient refused    Active Member of Clubs or Organizations: Patient refused    Attends Club or Organization Meetings: Patient refused    Marital Status:    Intimate Partner Violence: Not At Risk    Fear of Current or Ex-Partner: No    Emotionally Abused: No    Physically Abused: No    Sexually Abused: No   Housing Stability: Low Risk     Unable to Pay for Housing in the Last Year: No    Number of [place] in the Last Year: 1    Unstable Housing in the Last Year: No     Family History   Problem Relation Age of Onset    Colon Cancer Father     Osteoporosis Mother     Heart Disease Mother      Medications & Allergies      Prior to Admission medications Medication Sig Start Date End Date Taking? Authorizing Provider   Handicap Placard MISC by Does not apply route Expires 4/15/2027 4/15/22   Epifanio Waddell MD   pantoprazole (PROTONIX) 40 MG tablet Take 1 tablet by mouth once daily 3/11/22   Epifanio Waddell MD   metoprolol tartrate (LOPRESSOR) 50 MG tablet TAKE HALF OF A TABLET IN MORNING AND ONE AT NIGHT 12/16/21   Hemindermeet Sabino Barajas MD   propafenone (RYTHMOL) 150 MG tablet Take 1 tablet by mouth 2 times daily Take 300 mg of propafenone with 25 mg of metoprolol once and start 150 mg propafenone q 12 hours from next day 10/29/21   Epifanio Waddell MD   warfarin (COUMADIN) 2.5 MG tablet 1. Take 2 tablets by mouth daily. Or as directed by INR results.  8/26/21   Epifanio Waddell MD   ondansetron (ZOFRAN ODT) 4 MG disintegrating tablet Take 1 tablet by mouth every 8 hours as needed for Nausea 8/23/21   MD Derrick Ruffin MISC by Does not apply route Expires 6/2022 6/15/17   Epifanio Waddell MD   MEGARED OMEGA-3 KRILL  MG CAPS Take 1 capsule by mouth daily     Historical Provider, MD   Multiple Vitamins-Minerals (ICAPS AREDS 2) CAPS Take 2 tablets by mouth daily     Historical Provider, MD     Allergies: Statins, Tape [adhesive tape], Tizanidine hcl, Tramadol, Augmentin [amoxicillin-pot clavulanate], and Nystatin  IP meds : Scheduled Meds:   sodium chloride  2 g Oral TID WC    [START ON 4/27/2022] cosyntropin  250 mcg IntraVENous Once    pantoprazole (PROTONIX) 40 mg injection  40 mg IntraVENous Daily    [Held by provider] sodium chloride  1 g Oral TID WC    metoprolol tartrate  25 mg Oral QAM AC    metoprolol tartrate  50 mg Oral Nightly    propafenone  150 mg Oral BID    sodium chloride flush  5-40 mL IntraVENous 2 times per day    sucralfate  1 g Oral 4 times per day     Continuous Infusions:   sodium chloride 50 mL/hr at 04/26/22 0914    [Held by provider] sodium chloride Stopped (04/25/22 2226)    sodium chloride Review of Systems Physical Exam   Review of Systems   Constitutional: Positive for appetite change and fatigue. Negative for activity change, chills and fever. HENT: Negative. Eyes: Negative. Respiratory: Negative for cough and shortness of breath. Cardiovascular: Negative for chest pain and leg swelling. Gastrointestinal: Negative for abdominal pain, diarrhea, nausea and vomiting. Genitourinary: Negative for dysuria, frequency and hematuria. Musculoskeletal: Negative for back pain and neck pain. Skin: Negative for rash. Neurological: Positive for dizziness, light-headedness and headaches. Psychiatric/Behavioral: Negative for agitation and confusion. Physical Exam  Vitals reviewed. Constitutional:       General: She is not in acute distress. Appearance: She is not diaphoretic. HENT:      Right Ear: External ear normal.      Left Ear: External ear normal.      Nose: Nose normal.      Mouth/Throat:      Mouth: Mucous membranes are moist.   Eyes:      General: No scleral icterus. Right eye: No discharge. Left eye: No discharge. Conjunctiva/sclera: Conjunctivae normal.   Neck:      Thyroid: No thyromegaly. Vascular: No JVD. Cardiovascular:      Rate and Rhythm: Normal rate and regular rhythm. Heart sounds: Normal heart sounds. No murmur heard. Pulmonary:      Effort: Pulmonary effort is normal. No respiratory distress. Breath sounds: Normal breath sounds. No stridor. Chest:      Chest wall: No tenderness. Abdominal:      General: Bowel sounds are normal.      Palpations: Abdomen is soft. Tenderness: There is no abdominal tenderness. Musculoskeletal:         General: No swelling or tenderness. Cervical back: Normal range of motion and neck supple. Right lower leg: No edema. Left lower leg: No edema. Skin:     General: Skin is warm and dry. Findings: No erythema or rash.    Neurological:      General: No focal deficit present. Mental Status: She is alert and oriented to person, place, and time. Psychiatric:         Mood and Affect: Mood normal.         Behavior: Behavior normal.           Vitals:    04/26/22 1115   BP: 121/69   Pulse: 62   Resp: 16   Temp: 97.5 °F (36.4 °C)   SpO2: 97%     Labs, Radiology and Tests       Recent Labs     04/26/22  0828   WBC 3.5*   RBC 4.94   HGB 13.4   HCT 39.9   MCV 80.8*   MCH 27.1   MCHC 33.6   *     Recent Labs     04/23/22  1532 04/23/22  1532 04/24/22  0553 04/24/22  1355 04/25/22  0514 04/25/22  1018 04/26/22  0310 04/26/22  0728 04/26/22  0828   *   < > 127*   < > 124*   < > 122* 124* 121*   K 4.1   < > 3.9  3.9  --  3.6  --   --   --   --    CL 90*  --  95*  --  92*  --   --   --   --    CO2 22*  --  19*  --  23  --   --   --   --    BUN 11  --  7  --  3*  --   --   --   --    CREATININE 0.9  --  0.8  --  0.7  --   --   --   --    CALCIUM 8.2*  --  8.4*  --  8.3*  --   --   --   --     < > = values in this interval not displayed. Echocardiogram from 2017 shows ejection fraction 60%    Other : Old lab data have been reviewed which shows that the patient's sodium has been running low for quite a while and she did have normal cortisol level in September    Assessment    1 Renal -renal function appears to be stable at baseline  2 Hyponatremia exact etiology not clear however she has been getting some normal saline and some salt tablets on and off  ? Patient's cortisol level is also very low being planned for a corticotropin stim test.  ? Patient has not been eating or drinking well  ? Initial urine studies somewhat skewed, could not make out much  ?  She has not been eating much continue salt tablets and add Urena    3 Mild acidosis stable  4 Possible adrenal insufficiency-elevated corticotropin stim test in the morning  5 Abdominal pain being planned for an EGD  6 Also has some dysphagia  7 Meds reviewed and discussed with patient, family and  Klaudia      **This report has been created using voice recognition software. It maycontain minor  errors which are inherent in voice recognition technology. **    Meeta Smith MD,M.D  [facility].

## 2022-04-26 NOTE — PROGRESS NOTES
Per hospitalist, cancel EGD d/t Na, called Endo and cancelled this. Star You RN stated she would notify Dr. Catrachita Paez.

## 2022-04-26 NOTE — PLAN OF CARE
Problem: Discharge Planning  Goal: Discharge to home or other facility with appropriate resources  Outcome: Progressing  Flowsheets (Taken 4/25/2022 1940 by Jenaro Sparrow RN)  Discharge to home or other facility with appropriate resources: Identify barriers to discharge with patient and caregiver  Note: Patient plans to be discharged home with  when medically stable. Problem: Safety - Adult  Goal: Free from fall injury  4/26/2022 1153 by Patel Minaya RN  Outcome: Progressing  Flowsheets (Taken 4/26/2022 1151)  Free From Fall Injury: Instruct family/caregiver on patient safety  Note: Patient ambulates 1 assist with walker. Call light within reach. Side rails up x2. Bed alarm on. Non skid slippers available. Problem: Skin/Tissue Integrity  Goal: Absence of new skin breakdown  Description: 1. Monitor for areas of redness and/or skin breakdown  2. Assess vascular access sites hourly  3. Every 4-6 hours minimum:  Change oxygen saturation probe site  4. Every 4-6 hours:  If on nasal continuous positive airway pressure, respiratory therapy assess nares and determine need for appliance change or resting period. 4/26/2022 1153 by Patel Minaya RN  Outcome: Progressing  Note: Patient free from skin breakdown. Patient turns self and makes frequent positional changes. Will continue to monitor. Problem: Pain  Goal: Verbalizes/displays adequate comfort level or baseline comfort level  4/26/2022 1153 by Patel Minaya RN  Outcome: Progressing  Note: Patient free from pain this shift. Pain rated on 0-10 pain rating scale. Will continue to reassess. Care plan reviewed with patient.   Patient  Problem: Chronic Conditions and Co-morbidities  Goal: Patient's chronic conditions and co-morbidity symptoms are monitored and maintained or improved  Outcome: Progressing  Flowsheets (Taken 4/26/2022 1156)  Care Plan - Patient's Chronic Conditions and Co-Morbidity Symptoms are Monitored and Maintained or Improved: Monitor and assess patient's chronic conditions and comorbid symptoms for stability, deterioration, or improvement  Note: Patient having low sodiums, 0.9 @ 50 ml/hr ordered, nephrology consult completed this shift, urine sample sent to lab, Na tablets TID with meals ordered per Nephrology, last Na 124, will continue to monitor. Pending Na, patient to be going to EGD 1545 this day for esophageal dilation with Dr. Janessa Holloway, INR 1.7 at this time, status post Vit K, will continue to monitor. verbalize understanding of the plan of care and contribute to goal setting. Care plan reviewed with patient and daughter and . Patient and daughter and  verbalize understanding of the plan of care and contribute to goal setting.

## 2022-04-26 NOTE — PLAN OF CARE
Problem: Safety - Adult  Goal: Free from fall injury  Outcome: Progressing  Note: Call light within reach. Side rails up x2. Bed alarm on. Non skid slippers available. Problem: ABCDS Injury Assessment  Goal: Absence of physical injury  Outcome: Progressing  Note: No injuries observed this shift     Problem: Skin/Tissue Integrity  Goal: Absence of new skin breakdown  Description: 1. Monitor for areas of redness and/or skin breakdown  2. Assess vascular access sites hourly  3. Every 4-6 hours minimum:  Change oxygen saturation probe site  4. Every 4-6 hours:  If on nasal continuous positive airway pressure, respiratory therapy assess nares and determine need for appliance change or resting period.   Outcome: Progressing  Note: No new signs or symptoms of skin breakdown noted this shift, encouraging patient to turn and reposition self in bed q2h       Problem: Pain  Goal: Verbalizes/displays adequate comfort level or baseline comfort level  Outcome: Progressing  Note: Pt denies any pain at this time

## 2022-04-26 NOTE — FLOWSHEET NOTE
04/25/22 2156   Treatment Team Notification   Reason for Communication Critical results   Type of Critical Result Laboratory   Critical Lab Result Type Other (comment)  (sodium level-120)   Team Member Name EVERETTE ivye   Treatment Team Role Advanced Practice Nurse   Method of Communication Secure Message   Response See orders   Notification Time 2156   per EVERETTE ivey he would like to hold saline infusion at this time and recheck labs at 0230 to see where pt is off of fluids as pt also had sodium clhoride tablet earlier today. Pt denies any nausea or dizziness,headache or muscle cramps at this time and remains alert and oriented x4.

## 2022-04-26 NOTE — PROGRESS NOTES
Gastroenterology  Progress Note    4/26/2022 4:58 PM  Subjective:   Admit Date: 4/23/2022    Interval History: Patient is dysphagia severe stricture dilated in the past and size 48 [place], she is on Coumadin her INR went up to 2.5 today. Plan to do an endoscopy on him today for diagnosis purposes none dilations. The CRNA nurse anesthetist for the patient need to be intubated and able to have an EGD on her after discussion with the family will elect to postpone the case until that time when INR is corrected so we could do EGD with dilation at the same time patient will tolerate liquid diet again we will reevaluate when INR corrected EGD will be done  2026 2022 patient scheduled for EGD today was canceled due to high INR as well as sodium she is tolerating clear liquid diet and Ensure will advance to full liquid diet we will watch INR closely once accepted and clinically stable we will do upon post with dilations. In the meanwhile anticoagulation cannot be given as shorter acting element like Lovenox if needed  Diet: ADULT ORAL NUTRITION SUPPLEMENT; Breakfast, Lunch, Dinner; Clear Liquid Oral Supplement  ADULT DIET;  Clear Liquid; 1500 ml    Medications:   Scheduled Meds:    [START ON 4/27/2022] cosyntropin  250 mcg IntraVENous Once    sodium chloride  2 g Oral Q8H    urea  15 g Oral BID    pantoprazole (PROTONIX) 40 mg injection  40 mg IntraVENous Daily    [Held by provider] sodium chloride  1 g Oral TID WC    metoprolol tartrate  25 mg Oral QAM AC    metoprolol tartrate  50 mg Oral Nightly    propafenone  150 mg Oral BID    sodium chloride flush  5-40 mL IntraVENous 2 times per day    sucralfate  1 g Oral 4 times per day     Continuous Infusions:    sodium chloride 50 mL/hr at 04/26/22 0914    [Held by provider] sodium chloride Stopped (04/25/22 2226)    sodium chloride         CBC:   Recent Labs     04/26/22  0828   WBC 3.5*   HGB 13.4   *     BMP:    Recent Labs     04/24/22  9757 04/24/22  1355 04/25/22  0514 04/25/22  1018 04/26/22  0728 04/26/22  0828 04/26/22  1526   *   < > 124*   < > 124* 121* 126*   K 3.9  3.9  --  3.6  --   --   --   --    CL 95*  --  92*  --   --   --   --    CO2 19*  --  23  --   --   --   --    BUN 7  --  3*  --   --   --   --    CREATININE 0.8  --  0.7  --   --   --   --    GLUCOSE 70  --  80  --   --   --   --     < > = values in this interval not displayed. Hepatic:   No results for input(s): AST, ALT, ALB, BILITOT, ALKPHOS in the last 72 hours. INR:   Recent Labs     04/25/22  0514 04/25/22  2045 04/26/22  0310   INR 2.50* 2.04* 1.73*       Imaging:  No results found for this or any previous visit. Results for orders placed during the hospital encounter of 09/04/21    CT ABDOMEN PELVIS W IV CONTRAST Additional Contrast? None    Narrative  CT of the abdomen and pelvis was performed utilizing intravenous contrast.    Comparison MR 7/19/2017. Findings: The liver is unremarkable. Cholecystectomy. There is no hydronephrosis. Small right renal cyst.  The spleen is enlarged measuring 15.3 cm in length. The pancreas is unremarkable. No abdominal aortic aneurysm. Small hiatal  hernia. There is mild ectasia of the celiac axis. A left hip prosthesis causes artifact through the pelvis. No definite diverticulitis is identified. The appendix is not seen. There  is no bowel obstruction. The bladder is mildly distended. A pessary is in place. There is mild free  fluid in the pelvis. There are several indeterminate nodules in the lower lungs. The largest is  on the right measuring 1.3 cm. Impression  Impression:  No bowel infarction or definite diverticulitis. Mild colonic wall thickening could be incidental. Mild colitis is  technically a possibility. Small amount of free fluid in the pelvis of uncertain etiology. Indeterminate pulmonary nodules recommend comparison to previous or  further evaluation.     This document has been electronically signed by: Sandro Ramirez MD on  09/04/2021 08:42 PM    All CTs at this facility use dose modulation techniques and iterative  reconstructions, and/or weight-based dosing  when appropriate to reduce radiation to a low as reasonably achievable. No results found for this or any previous visit. Results for orders placed during the hospital encounter of 07/15/17    MRI ABDOMEN W WO CONTRAST MRCP    Narrative  PROCEDURE: MRI ABDOMEN W WO CONTRAST MRCP    CLINICAL INFORMATION: Elevated lipase; technologist notes state abdominal pain, nausea and vomiting. COMPARISON: Liver ultrasound dated 7/18/2017 and ultrasound abdomen complete dated 7/15/2017. TECHNIQUE: Routine MRI abdomen without and with IV contrast.  Routine MRCP was also performed. CONTRAST: 15 mL OptiMARK intravenously. FINDINGS:  LIVER:  1. The liver is normal size. 2. The hepatic parenchymal signal is normal.  3. There is no hepatic mass. 4. There is no intrahepatic biliary dilatation. 5. There is normal enhancement of the portal and the hepatic veins. 6. There is normal enhancement of the portal venous confluence, SMV, and splenic vein. GALLBLADDER:  1. Cholecystectomy. BILIARY TREE:  1. The common duct is normal size measuring 0.5 cm in transverse dimension. 2. There is no choledocholithiasis. PANCREAS:  1. The pancreas is normal size and signal characteristics. 2. There is normal pancreatic enhancement. 3. There is no pancreatic mass. 4. The pancreatic duct is normal size measuring 0.2 cm in transverse dimension. 5. There is no pancreatic divisum. 6. There is no peripancreatic fluid or inflammation. SPLEEN/ADRENAL GLANDS/KIDNEYS:  1. The spleen is borderline enlarged measuring 9.5 x 4.4 x 13.5 cm in longitudinal, transverse and AP dimensions. 2. There is a 1.0 cm Bosniak 1 cyst projecting off the lateral margin of the upper pole the right kidney. BOWEL:  1. Nonobstructive bowel gas pattern.   2. The imaged portions of the distal esophagus and stomach are unremarkable. 3. Imaged portions of the small bowel and colon are unremarkable. FREE AIR/FREE FLUID/INFLAMMATION: None. LYMPHADENOPATHY:  1. There are no pathologically enlarged lymph nodes. ABDOMINAL AORTA/IVC: Unremarkable. LUNG BASES:  1. There are 2 cysts at the right lung base measuring 1.8 and 0.9 cm. MUSCULOSKELETAL:  1. Hematopoietic marrow signal throughout the imaged osseous structures. 2. Mild levoscoliosis of the lumbar spine. 3. Discogenic degenerative changes L5-S1.    OTHER: None. Impression  1. Cholecystectomy. There is no biliary dilatation. There is no choledocholithiasis. 2. The pancreas is unremarkable. There are no MRI findings of acute pancreatitis. 3. The spleen is borderline enlarged measuring 9.5 x 4.4 x 13.5 cm in longitudinal, transverse and AP dimensions. 4. There is a 1 cm Bosniak 1 cyst projecting off the lateral margin of the upper pole the right kidney. 5. There is no inflammatory process. **This report has been created using voice recognition software. It may contain minor errors which are inherent in voice recognition technology. **    Final report electronically signed by Dr. Dilip Cuadra on 7/19/2017 2:28 PM      Endoscopy Finding:      Objective:   Vitals: BP (!) 136/102   Pulse 76   Temp 97.7 °F (36.5 °C) (Oral)   Resp 16   Ht 5' 5\" (1.651 m)   Wt 165 lb (74.8 kg)   SpO2 96%   BMI 27.46 kg/m²     Intake/Output Summary (Last 24 hours) at 4/26/2022 1658  Last data filed at 4/26/2022 1510  Gross per 24 hour   Intake 914.17 ml   Output 825 ml   Net 89.17 ml     General appearance: alert and cooperative with exam  Lungs: clear to auscultation bilaterally  Heart: regular rate and rhythm, S1, S2 normal, no murmur, click, rub or gallop  Abdomen: soft, non-tender; bowel sounds normal; no masses,  no organomegaly  Extremities: extremities normal, atraumatic, no cyanosis or edema    Assessment and Plan: 1. Dysphagia with severe dyspnea which is showed dilated the past will need an EGD  2. Shortly called today for EGD however patient is canceled as anesthesia believed patient to be intubated so we will wait until he clinically improve with INR corrected before proceed with EGD that after discussion with the patient and her daughter at the same time  3. Patient to continue in PPI IV twice daily   4. Diet to advance to full liquid Ensure to be given supplement  5.  Hold EGD due to hyponatremia as well as high INR will do once clinically appropriate       Follow up in GI Clinic after discharge in 2 weeks week(s)    Patient Active Problem List:     Trochanteric bursitis of left hip     Drug-induced constipation     Bradycardia     Esophageal stricture     Gastritis without bleeding     Lumbar radicular pain     Lumbar spine pain     Left hip pain     Arthritis of left hip     Paroxysmal atrial fibrillation with rapid ventricular response (HCC)     Anticoagulation monitoring by pharmacist     Venous stasis dermatitis of both lower extremities     Arthritis of right hand     Vaginal erosion secondary to pessary use (HCC)     Pelvic relaxation due to uterovaginal prolapse     Postmenopausal bleeding     History of hysterectomy     Esophageal candidiasis (HCC)     Hyponatremia     Lower abdominal pain     Atrial flutter (HCC)     Presence of pessary #4 ring     Epigastric pain      Electronically signed by Marixa Baeza MD on 4/26/2022 at 4:58 PM

## 2022-04-27 LAB
ANION GAP SERPL CALCULATED.3IONS-SCNC: 8 MEQ/L (ref 8–16)
BUN BLDV-MCNC: 10 MG/DL (ref 7–22)
CALCIUM SERPL-MCNC: 8.6 MG/DL (ref 8.5–10.5)
CHLORIDE BLD-SCNC: 93 MEQ/L (ref 98–111)
CO2: 25 MEQ/L (ref 23–33)
CORTISOL COLLECTION INFO: NORMAL
CORTISOL: 10.68 UG/DL
CORTISOL: 16 UG/DL
CORTISOL: 17.06 UG/DL
CREAT SERPL-MCNC: 0.8 MG/DL (ref 0.4–1.2)
GFR SERPL CREATININE-BSD FRML MDRD: 68 ML/MIN/1.73M2
GLUCOSE BLD-MCNC: 117 MG/DL (ref 70–108)
INR BLD: 1.17 (ref 0.85–1.13)
MAGNESIUM: 1.6 MG/DL (ref 1.6–2.4)
POTASSIUM SERPL-SCNC: 3.8 MEQ/L (ref 3.5–5.2)
SODIUM BLD-SCNC: 126 MEQ/L (ref 135–145)
THYROXINE (T4): 3.7 UG/DL (ref 4.5–10.9)

## 2022-04-27 PROCEDURE — 2580000003 HC RX 258: Performed by: INTERNAL MEDICINE

## 2022-04-27 PROCEDURE — 6360000002 HC RX W HCPCS: Performed by: INTERNAL MEDICINE

## 2022-04-27 PROCEDURE — 99232 SBSQ HOSP IP/OBS MODERATE 35: CPT | Performed by: INTERNAL MEDICINE

## 2022-04-27 PROCEDURE — 6370000000 HC RX 637 (ALT 250 FOR IP): Performed by: INTERNAL MEDICINE

## 2022-04-27 PROCEDURE — 80048 BASIC METABOLIC PNL TOTAL CA: CPT

## 2022-04-27 PROCEDURE — 99233 SBSQ HOSP IP/OBS HIGH 50: CPT | Performed by: INTERNAL MEDICINE

## 2022-04-27 PROCEDURE — A4216 STERILE WATER/SALINE, 10 ML: HCPCS | Performed by: INTERNAL MEDICINE

## 2022-04-27 PROCEDURE — 36415 COLL VENOUS BLD VENIPUNCTURE: CPT

## 2022-04-27 PROCEDURE — 82533 TOTAL CORTISOL: CPT

## 2022-04-27 PROCEDURE — 85610 PROTHROMBIN TIME: CPT

## 2022-04-27 PROCEDURE — 80400 ACTH STIMULATION PANEL: CPT

## 2022-04-27 PROCEDURE — 1200000003 HC TELEMETRY R&B

## 2022-04-27 PROCEDURE — 82024 ASSAY OF ACTH: CPT

## 2022-04-27 PROCEDURE — C9113 INJ PANTOPRAZOLE SODIUM, VIA: HCPCS | Performed by: INTERNAL MEDICINE

## 2022-04-27 PROCEDURE — 83735 ASSAY OF MAGNESIUM: CPT

## 2022-04-27 PROCEDURE — 84295 ASSAY OF SERUM SODIUM: CPT

## 2022-04-27 RX ORDER — PROPAFENONE HYDROCHLORIDE 150 MG/1
150 TABLET, FILM COATED ORAL 2 TIMES DAILY
Qty: 60 TABLET | Refills: 3 | Status: SHIPPED | OUTPATIENT
Start: 2022-04-27 | End: 2022-09-01 | Stop reason: SDUPTHER

## 2022-04-27 RX ADMIN — Medication 15 G: at 10:33

## 2022-04-27 RX ADMIN — Medication 15 G: at 20:34

## 2022-04-27 RX ADMIN — SODIUM CHLORIDE, PRESERVATIVE FREE 40 MG: 5 INJECTION INTRAVENOUS at 10:33

## 2022-04-27 RX ADMIN — SODIUM CHLORIDE: 9 INJECTION, SOLUTION INTRAVENOUS at 12:19

## 2022-04-27 RX ADMIN — SODIUM CHLORIDE 2 G: 1 TABLET ORAL at 20:34

## 2022-04-27 RX ADMIN — SUCRALFATE 1 G: 1 TABLET ORAL at 06:07

## 2022-04-27 RX ADMIN — PROPAFENONE HYDROCHLORIDE 150 MG: 150 TABLET, FILM COATED ORAL at 10:33

## 2022-04-27 RX ADMIN — METOPROLOL TARTRATE 25 MG: 50 TABLET, FILM COATED ORAL at 06:08

## 2022-04-27 RX ADMIN — SUCRALFATE 1 G: 1 TABLET ORAL at 18:00

## 2022-04-27 RX ADMIN — SUCRALFATE 1 G: 1 TABLET ORAL at 10:33

## 2022-04-27 RX ADMIN — PROCHLORPERAZINE EDISYLATE 5 MG: 5 INJECTION INTRAMUSCULAR; INTRAVENOUS at 10:41

## 2022-04-27 RX ADMIN — BENZOCAINE AND MENTHOL 1 LOZENGE: 15; 3.6 LOZENGE ORAL at 04:00

## 2022-04-27 RX ADMIN — SODIUM CHLORIDE 2 G: 1 TABLET ORAL at 03:45

## 2022-04-27 RX ADMIN — COSYNTROPIN 250 MCG: 0.25 INJECTION, POWDER, LYOPHILIZED, FOR SOLUTION INTRAVENOUS at 07:56

## 2022-04-27 RX ADMIN — PROPAFENONE HYDROCHLORIDE 150 MG: 150 TABLET, FILM COATED ORAL at 20:35

## 2022-04-27 RX ADMIN — SUCRALFATE 1 G: 1 TABLET ORAL at 23:29

## 2022-04-27 RX ADMIN — METOPROLOL TARTRATE 25 MG: 50 TABLET, FILM COATED ORAL at 20:35

## 2022-04-27 RX ADMIN — SODIUM CHLORIDE 2 G: 1 TABLET ORAL at 10:32

## 2022-04-27 ASSESSMENT — PAIN SCALES - GENERAL: PAINLEVEL_OUTOF10: 0

## 2022-04-27 NOTE — CARE COORDINATION
4/27/22, 8:18 AM EDT    DISCHARGE ON GOING EVALUATION    [facility]  Location: -05/005-A Reason for admit: Abdominal pain, epigastric [R10.13]  Hyponatremia [E87.1]  Non-intractable vomiting with nausea, unspecified vomiting type [R11.2]   Procedure:   None  Barriers to Discharge:  Patient admitted through ED for abdominal pain and emesis. Sodium 126, WBC 3.5 Osmolality 256 and INR 1.73. Diet Full liquids. GI following. IV fluids. Nephrology following. Bed alarm. EGD cancelled related to sodium. PCP: Miriam Muller MD  Readmission Risk Score: 16.1 ( )%  Patient Goals/Plan/Treatment Preferences:   Plans to discharge home with .  and/or daughter able to transport home. Denies need for DME or HH. If HH is needed prefers [facility].

## 2022-04-27 NOTE — PROGRESS NOTES
Gastroenterology  Progress Note    4/27/2022 8:41 AM  Subjective:   Admit Date: 4/23/2022    Interval History: weakness, hyponatremia,esophageal dysphagia  Diet: ADULT ORAL NUTRITION SUPPLEMENT; Breakfast, Lunch, Dinner; Clear Liquid Oral Supplement  ADULT DIET; Full Liquid  4/25/22 Patient has dysphagia severe stricture dilated in the past and size 50 [place], she is on Coumadin her INR went up to 2.5 today. Plan to do an endoscopy  today for diagnosis purposes none dilations. The CRNA nurse anesthetist for the patient need to be intubated and able to have an EGD on her after discussion with the family will elect to postpone the case until that time when INR is corrected so we could do EGD with dilation at the same time patient will tolerate liquid diet again we will reevaluate when INR corrected EGD will be done  4/26/22 patient scheduled for EGD today was canceled due to high INR as well as sodium she is tolerating clear liquid diet and Ensure will advance to full liquid diet we will watch INR closely once accepted and clinically stable we will do upon post with dilations. In the meanwhile anticoagulation cannot be given as shorter acting element like Lovenox if needed. 4/27/22 Patient INR stable at 1.17 Na remains low at 126. Patient tolerating full liquid diet with Ensures. EGD holding for hyponatremia per attending.    Patient Active Problem List:     Trochanteric bursitis of left hip     Drug-induced constipation     Bradycardia     Esophageal stricture     Gastritis without bleeding     Lumbar radicular pain     Lumbar spine pain     Left hip pain     Arthritis of left hip     Paroxysmal atrial fibrillation with rapid ventricular response (HCC)     Anticoagulation monitoring by pharmacist     Venous stasis dermatitis of both lower extremities     Arthritis of right hand     Vaginal erosion secondary to pessary use (HCC)     Pelvic relaxation due to uterovaginal prolapse     Postmenopausal bleeding History of hysterectomy     Esophageal candidiasis (HCC)     Hyponatremia     Lower abdominal pain     Atrial flutter (HCC)     Presence of pessary #4 ring     Epigastric pain        Medications:   Scheduled Meds:   sodium chloride  2 g Oral Q8H    urea  15 g Oral BID    pantoprazole (PROTONIX) 40 mg injection  40 mg IntraVENous Daily    [Held by provider] sodium chloride  1 g Oral TID WC    metoprolol tartrate  25 mg Oral QAM AC    metoprolol tartrate  50 mg Oral Nightly    propafenone  150 mg Oral BID    sodium chloride flush  5-40 mL IntraVENous 2 times per day    sucralfate  1 g Oral 4 times per day     Continuous Infusions:   sodium chloride 50 mL/hr at 04/26/22 1750    [Held by provider] sodium chloride Stopped (04/25/22 2226)    sodium chloride       CBC:   Recent Labs     04/26/22  0828   WBC 3.5*   HGB 13.4   *     BMP:    Recent Labs     04/25/22  0514 04/25/22  1018 04/26/22  2247 04/27/22  0326 04/27/22  0800   *   < > 126* 126* 126*   K 3.6  --   --   --  3.8   CL 92*  --   --   --  93*   CO2 23  --   --   --  25   BUN 3*  --   --   --  10   CREATININE 0.7  --   --   --  0.8   GLUCOSE 80  --   --   --  117*    < > = values in this interval not displayed. Hepatic: No results for input(s): AST, ALT, ALB, BILITOT, ALKPHOS in the last 72 hours.   INR:   Recent Labs     04/25/22  2045 04/26/22  0310 04/27/22  0800   INR 2.04* 1.73* 1.17*           Objective:   Vitals: /83   Pulse 88   Temp 97.5 °F (36.4 °C) (Oral)   Resp 18   Ht 5' 5\" (1.651 m)   Wt 164 lb 0.4 oz (74.4 kg)   SpO2 96%   BMI 27.29 kg/m²     Intake/Output Summary (Last 24 hours) at 4/27/2022 0841  Last data filed at 4/27/2022 4278  Gross per 24 hour   Intake 1522.5 ml   Output 2300 ml   Net -777.5 ml     Weight:  Wt Readings from Last 3 Encounters:   04/27/22 164 lb 0.4 oz (74.4 kg)   04/20/22 165 lb (74.8 kg)   01/05/22 162 lb 12.8 oz (73.8 kg)     General appearance: alert and cooperative with exam  Lungs: clear to auscultation bilaterally  Heart: regular rate and rhythm, S1, S2 normal, no murmur, click, rub or gallop and irregularly irregular rhythm  Abdomen: soft, non-tender; bowel sounds normal; no masses,  no organomegaly  Extremities: extremities normal, atraumatic, no cyanosis or edema    Assessment and Plan:   1. Dysphagia with severe dyspnea which has been dilated in the past will need an EGD with dilation with clinically stable. 2. EGD scheduled 4/25 however patient was canceled as anesthesia believed patient needed to be intubated so we will wait until he clinically improve with INR corrected before proceeding with EGD after discussion with patient and daughter  3. Patient to continue PPI IV daily and Carafate 4 times/day. 4. Diet advanced to full liquid, Ensure  Supplement every meal. Patient tolerating. 5. EGD tomorrow with possible esophageal dilation, INR is normal today.          Follow up in GI Clinic after discharge in 2 week(s)    Time In Room:0830  Time Out Room: 0840  Total Dictation Time:15 min (including chart review)  MD time 10 min    VASU Urrutia CNP  4/27/2022  8:41 AM

## 2022-04-27 NOTE — PROGRESS NOTES
Hospitalist Progress Note    Patient:  Arlyn Quintanilla    YOB: 1934  Unit/Bed:6K-05/005-A  MRN: [id number]    Acct: [de-identified]   PCP: Lino Fierro MD    Date of Admission: 4/23/2022      Assessment/Plan:  Floydene Ada Acute on chronic Hyponatremia: slowly improving. Baseline is in the low 130s. Mid 120s on arrival.  TSH wnl. History and labs are consistent with volume depletion (urine sodium is high but had been getting salt tabs, urine osm in 200s after IVF, making SIADH less likely). Could be combination of hypovolemia and poor solute intake. She had this during her previous stay as well and etiology was felt to be volume depletion by Nephrology, improved with fluids. o Gentle fluids last night led to sodium dropped from 127->120. Nephrology consulted 4/26 am.     o Patient continued on salt tabs 2 g 3 times daily, as well as Urena packet  o Sodium 126 this morning, will repeat this afternoon.  Very low a.m. cortisolI: 1.4 on 4/26 at 8:30 AM.  Denies recent steroids. o Cosyntropin stim test on 4/27 AM shows a baseline cortisol of 10. This improves to 17 after administration. This is less consistent with adrenal insufficiency. o Endocrinology is following, appreciate recommendations.  Abdominal Pain: Unclear etiology. Reports epigastric tenderness, however this is better, not related to eating. .  Does have nausea which is not relieved with Zofran. Could be related to hyponatremia?  o GI has been consulted. o PRN compazine, stop zofran.   o Planning EGD once INR is okay (now limiting factor is sodium)  o Continue on PPI/Carafate   Dysphagia with history of esophageal dilation: GI is planning EGD for repeat dilation. Need INR to be less than 1.4. Patient is tolerating her GI modified diet, at this point we will hold off giving FFP and treat with small aliquots of oral vitamin K.  o INR at goal, planning EGD 4/28.     Chronic Thrombocytopenia: Appears to be a chronic issue, overall stable in the low 100s which is where it has been since 8/2021. B12 and folate in am.  Noted normal thyroid. Adrenal disease could contribute. Should be ok to continue coumadin. o CBC in am.    Afib/Aflutter Hx: Holding home Coumadin, continue on Lopressor and propafenone.    o 2.5s pause noted on telemetry  o We will decrease metoprolol dose, continue propafenone      Expected discharge date:  tbd - 1-2 days    Disposition: tbd-pt is now ordered. Pending EGD and improvement in sodium levels. [] Home  [] TCU  [] Rehab  [] Psych  [] SNF  [] [place]  [] Other-    ===================================================================      Chief Complaint: Abdominal pain    Subjective (past 24 hours): Patient seen at bedside, feeling slightly worse today than she did previously. Although she is not quite as tired as the previous day she still has nausea and abdominal discomfort. She is still having looser stools and is frustrated her sodium level has not improved. She denies any vomiting and has been able to keep down liquids. No fevers or chills.     Medications:  Reviewed    Infusion Medications    sodium chloride 50 mL/hr at 04/27/22 1219    [Held by provider] sodium chloride Stopped (04/25/22 2226)    sodium chloride       Scheduled Medications    metoprolol tartrate  25 mg Oral Nightly    sodium chloride  2 g Oral Q8H    urea  15 g Oral BID    pantoprazole (PROTONIX) 40 mg injection  40 mg IntraVENous Daily    [Held by provider] sodium chloride  1 g Oral TID WC    metoprolol tartrate  25 mg Oral QAM AC    propafenone  150 mg Oral BID    sodium chloride flush  5-40 mL IntraVENous 2 times per day    sucralfate  1 g Oral 4 times per day     PRN Meds: benzocaine-menthol, prochlorperazine, ondansetron, sodium chloride flush, sodium chloride, [Held by provider] ondansetron **OR** [Held by provider] ondansetron, polyethylene glycol, acetaminophen **OR** acetaminophen, dicyclomine      ROS: reviewed from prior note, full ROS unchanged unless otherwise stated in hospital course/subjective portion. Intake/Output Summary (Last 24 hours) at 4/27/2022 1750  Last data filed at 4/27/2022 1542  Gross per 24 hour   Intake 1542.5 ml   Output 2825 ml   Net -1282.5 ml       Exam:  /85   Pulse 98   Temp 97.8 °F (36.6 °C) (Oral)   Resp 18   Ht 5' 5\" (1.651 m)   Wt 164 lb 0.4 oz (74.4 kg)   SpO2 95%   BMI 27.29 kg/m²     General appearance: No distress, well developed, appears stated age. Chronically ill appearing. Eyes:  PERRL. Conjunctivae/corneas clear. HENT: Head normal appearing. Nares normal. Oral mucosa moist  Hard of hearing  Neck: Supple, with full range of motion. Trachea midline. No gross JVD appreciated. Respiratory:  Normal effort. Clear to auscultation, without rales or wheezes or rhonchi. Cardiovascular: Normal rate, irregularly irregular rhythm with normal S1/S2 without murmurs. No lower extremity edema. Abdomen: Soft,  non-distended with normal bowel sounds. No tenderness    Musculoskeletal: No joint swelling or tenderness. Normal tone. No abnormal movements. Skin: Warm and dry. No rashes or lesions. Neurologic:  No focal sensory/motor deficits in the upper or lower extremities. Cranial nerves:  grossly non-focal 2-12. Psychiatric: Alert and oriented, normal insight and thought content. Labs:   Recent Labs     04/26/22  0828   WBC 3.5*   HGB 13.4   HCT 39.9   *     Recent Labs     04/25/22  0514 04/25/22  1018 04/26/22  2247 04/27/22  0326 04/27/22  0800   *   < > 126* 126* 126*   K 3.6  --   --   --  3.8   CL 92*  --   --   --  93*   CO2 23  --   --   --  25   BUN 3*  --   --   --  10   CREATININE 0.7  --   --   --  0.8   CALCIUM 8.3*  --   --   --  8.6    < > = values in this interval not displayed. No results for input(s): AST, ALT, BILIDIR, BILITOT, ALKPHOS in the last 72 hours.   Recent Labs     04/25/22 2041 04/26/22  0310 04/27/22  0800   INR 2.04* 1.73* 1.17*     No results for input(s): CKTOTAL, TROPONINI in the last 72 hours. No results for input(s): PROCAL in the last 72 hours. Lab Results   Component Value Date    NITRU NEGATIVE 04/23/2022    WBCUA 10-15 09/04/2021    BACTERIA NONE SEEN 09/04/2021    RBCUA 0-2 09/04/2021    BLOODU NEGATIVE 04/23/2022    SPECGRAV 1.014 09/04/2021    GLUCOSEU NEGATIVE 04/23/2022       Radiology (48 hours):  No results found. DVT prophylaxis:    [] Lovenox  [x] SCDs  [] SQ Heparin  [] Encourage ambulation   [] Already on Anticoagulation       Diet: ADULT ORAL NUTRITION SUPPLEMENT; Breakfast, Lunch, Dinner; Clear Liquid Oral Supplement  ADULT DIET;  Full Liquid  Code Status: Full Code  PT/OT: jerome  Tele: yes  IVF: yes    Electronically signed by Lucendia Severin, DO on 4/27/2022 at 5:50 PM

## 2022-04-27 NOTE — PROGRESS NOTES
[facility]         Renal Inpatient Follow-Up note         4/27/2022 9:44 AM    Pt Name:   Gay Turcios  YOB: 1934  Attending:   Arlin Li DO    Chief Complaint : Gay Turcios is a 80 y.o. female being followed by nephrology for Hyponatremia    Interval History :   Patient seen and examined by me. No distress  Feels ok. No cp or SOB. Feels tired . Decent UO noted      Scheduled Medications :    sodium chloride  2 g Oral Q8H    urea  15 g Oral BID    pantoprazole (PROTONIX) 40 mg injection  40 mg IntraVENous Daily    [Held by provider] sodium chloride  1 g Oral TID WC    metoprolol tartrate  25 mg Oral QAM AC    metoprolol tartrate  50 mg Oral Nightly    propafenone  150 mg Oral BID    sodium chloride flush  5-40 mL IntraVENous 2 times per day    sucralfate  1 g Oral 4 times per day      sodium chloride 50 mL/hr at 04/26/22 1750    [Held by provider] sodium chloride Stopped (04/25/22 2226)    sodium chloride         Vitals :  /83   Pulse 88   Temp 97.5 °F (36.4 °C) (Oral)   Resp 18   Ht 5' 5\" (1.651 m)   Wt 164 lb 0.4 oz (74.4 kg)   SpO2 96%   BMI 27.29 kg/m²     24HR INTAKE/OUTPUT:      Intake/Output Summary (Last 24 hours) at 4/27/2022 0944  Last data filed at 4/27/2022 0041  Gross per 24 hour   Intake 1522.5 ml   Output 2000 ml   Net -477.5 ml     Last 3 weights  Wt Readings from Last 3 Encounters:   04/27/22 164 lb 0.4 oz (74.4 kg)   04/20/22 165 lb (74.8 kg)   01/05/22 162 lb 12.8 oz (73.8 kg)           Physical Exam :  General Appearance:  Well developed.  No distress  Mouth/Throat:  Oral mucosa moist  Neck:  Supple, no JVD  Lungs:  Breath sounds: clear  Heart[de-identified]  S1,S2 heard  Abdomen:  Soft, non - tender  Musculoskeletal:  Edema - none         Last 3 CBC   Recent Labs     04/26/22  0828   WBC 3.5*   RBC 4.94   HGB 13.4   HCT 39.9   *     Last 3 CMP  Recent Labs     04/25/22  0514 04/25/22  1018 04/26/22  2247 04/27/22  0326 04/27/22  0800   *   < > 126* 126* 126*   K 3.6  --   --   --  3.8   CL 92*  --   --   --  93*   CO2 23  --   --   --  25   BUN 3*  --   --   --  10   CREATININE 0.7  --   --   --  0.8   CALCIUM 8.3*  --   --   --  8.6    < > = values in this interval not displayed. ASSESSMENT / Plan   1. Renal -renal function appears to be stable at baseline  2. Hyponatremia exact etiology not clear however she has been getting some normal saline and some salt tablets on and off  ? Patient's cortisol level is also very low , however repeat studies/stim test  does not suggest adrenal insufficiency. Await endo eval  ? Patient has not been eating or drinking well  ? Initial urine studies somewhat skewed, could not make out much, does show some salt wasting though  ? D/C IVF continue salt tablets and add Urena, sodium getting better     3. Mild acidosis stable  4. Possible adrenal insufficiency- repeat studies/stim test  does not suggest adrenal insufficiency. Await endo eval  5. Abdominal pain being planned for an EGD  6. Also has some dysphagia  7. Meds reviewed and discussed with patient and RN      Dr. Laina Silva MD, M,D.  [facility].

## 2022-04-27 NOTE — CONSULTS
[facility]      Endocrine Consult    Patient:  Wilfred Fritz  YOB: 1934    MRN: [id number]         History Of Present Illness:      80 y.o. female who we are asked to see/evaluate by Rhonda Beaver DO for possible adrenal insufficiency. This patient was admitted on 4/20/2022 with a 4-day history of epigastric pain associated with intermittent nausea. In the ED the patient was found to be hyponatremic with sodium of 121. Osmolality was 243, with urinary sodium level of 32. She has a history of hyponatremia, esophageal, Candida esophagitis and paroxysmal atrial fibrillation. Patient reports intermittent lightheadedness. She had a headache earlier but that has improved. She is having throat discomfort with difficulty swallowing. There is also intermittent epigastric abdominal pain. No fevers or chills.     Past Medical History:   Diagnosis Date    Actinic keratosis     history of    Arthritis     Atrial fibrillation ([address]     CAD (coronary artery disease)     history of afib    Cancer ([address]     skin    Cervical prolapse     history of    Diverticulosis     Endocervical polyp     history of    Heel spur     Hip pain     Hypertension     Nasal septal deviation     Osteoporosis     Stress incontinence     history of    Syncope     Vaginal prolapse     history of       Past Surgical History:   Procedure Laterality Date    APPENDECTOMY  1960's   [facility] BLADDER REPAIR  2011    [address]  [id number]    [facility]    COLONOSCOPY  01/26/2000    ENDOSCOPIC ULTRASOUND (LOWER) Left 07/20/2017    ENDOSCOPIC ULTRASOUND performed by Shareen Hodgkin, MD at /[facility] cataract    FACIAL SURGERY N/A 1/27/2021    DIVISION AND INSET/CHEEK performed by Roxana Gardner MD at [facility]  2017    left total hip relpament    MOHS SURGERY N/A 1/8/2021    MOHS REPAIR BCC NASAL TIP WITH FLAP AND GRAFT performed by Cassius Pierson MD at [address] Left 8/6/2021    MOHS DEFECT REPAIR SCC DORSAL NOSE AND LEFT LOWER FOREHEAD WITH SKIN GRAFT FROM RIGHT CHEEK/EAR (PLACED ON NOSE) performed by Cassius Pierson MD at [address]  1960's    DC EGD TRANSORAL BIOPSY SINGLE/MULTIPLE Left 07/16/2017    EGD BIOPSY performed by Andres Simon MD at [address] [facility]    DC OFFICE/OUTPT VISIT,PROCEDURE ONLY Right 06/01/2018    MOHS REPAIR BASEL CELL CARCINOMA RIGHT DORSAL NOSE performed by Cassius Peirson MD at [facility] HIP ARTHROPLASTY Left 09/19/2017    Left Total Hip ARTHROPLASTY performed by Roxann Parker MD at [place] [address]  07/16/2017    UPPER GASTROINTESTINAL ENDOSCOPY Left 07/16/2017    EGD DILATION BALLOON performed by Andres Simon MD at [address] [facility]    [address] Left 8/28/2021    EGD BIOPSY performed by Kesha Smart MD at [address] [facility]    [address] N/A 8/30/2021    EGD DILATION SAVORY performed by Kesha Smart MD at [facility] LEFT Left 4/21/2021    US BREAST NEEDLE BIOPSY LEFT 4/21/2021 [facility] ULTRASOUND    US BREAST NEEDLE BIOPSY RIGHT Right 4/21/2021    US BREAST NEEDLE BIOPSY RIGHT 4/21/2021 [facility] ULTRASOUND    US GUIDED NEEDLE LOC BREAST ADDL LEFT Left 4/21/2021    US GUIDED NEEDLE LOC BREAST ADDL LEFT 4/21/2021 [facility] ULTRASOUND       Social History     Tobacco Use    Smoking status: Never Smoker    Smokeless tobacco: Never Used   Substance Use Topics    Alcohol use: No        Family History   Problem Relation Age of Onset    Colon Cancer Father     Osteoporosis Mother     Heart Disease Mother         Current Facility-Administered Medications   Medication Dose Route Frequency Provider Last Rate Last Admin    0.9 % sodium chloride infusion   IntraVENous Continuous Karl Saint Karthikeyan Pryor MD 50 mL/hr at 04/26/22 1750 New Bag at 04/26/22 1750    [START ON 4/27/2022] cosyntropin (CORTROSYN) injection 250 mcg  250 mcg IntraVENous Once Ada [place], DO        sodium chloride tablet 2 g  2 g Oral Q8H Gio Mae MD   2 g at 04/26/22 1749    urea (URE-NA) packet 15 g  15 g Oral BID Gio Mae MD       [facility] by provider] 0.9 % sodium chloride infusion   IntraVENous Continuous Ada [place], DO   Stopped at 04/25/22 2226    prochlorperazine (COMPAZINE) injection 5 mg  5 mg IntraVENous Q6H PRN Ada [place], DO   5 mg at 04/26/22 1748    pantoprazole (PROTONIX) 40 mg in sodium chloride (PF) 10 mL injection  40 mg IntraVENous Daily Parmjit Torres MD   40 mg at 04/26/22 0912    [Held by provider] sodium chloride tablet 1 g  1 g Oral TID WC Roselyn Burnett MD   1 g at 04/25/22 4360    metoprolol tartrate (LOPRESSOR) tablet 25 mg  25 mg Oral QAM AC Roselyn Burnett MD   25 mg at 04/26/22 0527    metoprolol tartrate (LOPRESSOR) tablet 50 mg  50 mg Oral Nightly Roselyn Burnett MD   50 mg at 04/25/22 2035    ondansetron (ZOFRAN-ODT) disintegrating tablet 4 mg  4 mg Oral Q8H PRN Roselyn Burnett MD        propafenone (RYTHMOL) tablet 150 mg  150 mg Oral BID Roselyn Burnett MD   150 mg at 04/26/22 0912    sodium chloride flush 0.9 % injection 5-40 mL  5-40 mL IntraVENous 2 times per day Roselyn Burnett MD   10 mL at 04/26/22 0915    sodium chloride flush 0.9 % injection 5-40 mL  5-40 mL IntraVENous PRN Roselyn Burnett MD        0.9 % sodium chloride infusion   IntraVENous PRN Roselyn Burnett MD        [Held by provider] ondansetron (ZOFRAN-ODT) disintegrating tablet 4 mg  4 mg Oral Q8H PRN Roselyn Burnett MD        Or   Calos Khanna by provider] ondansetron [facility]) injection 4 mg  4 mg IntraVENous Q6H PRN Roselyn Burnett MD   4 mg at 04/25/22 8930    polyethylene glycol (GLYCOLAX) packet 17 g  17 g Oral Daily PRN Roselyn Burnett MD        acetaminophen (TYLENOL) tablet 650 mg  650 mg Oral Q6H PRN Sixto Hugo MD [facility]   650 mg at 04/26/22 1445    Or    acetaminophen (TYLENOL) suppository 650 mg  650 mg Rectal Q6H PRN Lori Armas MD        sucralfate (CARAFATE) tablet 1 g  1 g Oral 4 times per day Lori Armas MD   1 g at 04/26/22 1749    dicyclomine (BENTYL) capsule 10 mg  10 mg Oral Q6H PRN Lori Armas MD   10 mg at 04/26/22 1445      Allergies:  Statins, Tape Tally Ashing tape], Tizanidine hcl, Tramadol, Augmentin [amoxicillin-pot clavulanate], and Nystatin    Social History:     TOBACCO:   reports that she has never smoked. She has never used smokeless tobacco.  ETOH:   reports no history of alcohol use. Family History:      Problem Relation Age of Onset    Colon Cancer Father     Osteoporosis Mother     Heart Disease Mother        Diet:  ADULT ORAL NUTRITION SUPPLEMENT; Breakfast, Lunch, Dinner; Clear Liquid Oral Supplement  ADULT DIET; Full Liquid    REVIEW OF SYSTEMS:   Pertinent positives as noted in the HPI. All other systems reviewed and negative. PHYSICAL EXAM:  BP (!) 136/102   Pulse 76   Temp 97.7 °F (36.5 °C) (Oral)   Resp 16   Ht 5' 5\" (1.651 m)   Wt 165 lb (74.8 kg)   SpO2 96%   BMI 27.46 kg/m²   General appearance: No apparent distress, appears stated age and cooperative. HEENT: Normal cephalic, atraumatic without obvious deformity. Pupils equal, round, and reactive to light. Extra ocular muscles intact. Conjunctivae/corneas clear. Neck: Supple, with full range of motion. No jugular venous distention. Trachea midline. Respiratory:  Normal respiratory effort. Clear to auscultation, bilaterally without Rales/Wheezes/Rhonchi. Cardiovascular: Regular rate and rhythm with normal S1/S2 without murmurs, rubs or gallops. Abdomen: Soft, non-distended with normal bowel sounds. Musculoskeletal:  No clubbing, cyanosis or edema bilaterally. Skin: Skin color, texture, turgor normal.  No rashes or lesions. Neurologic:  Neurovascularly intact without any focal sensory/motor deficits. Cranial nerves: II-XII intact, grossly non-focal.  Psychiatric: Alert and oriented, thought content appropriate, normal insight  Capillary Refill: Brisk,< 3 seconds   Peripheral Pulses: +2 palpable, equal bilaterally     Labs:     Recent Labs     04/26/22  0828   WBC 3.5*   HGB 13.4   HCT 39.9   *     Recent Labs     04/24/22  0553 04/24/22  1355 04/25/22  0514 04/25/22  1018 04/26/22  0828 04/26/22  1526 04/26/22  1843   *   < > 124*   < > 121* 126* 124*   K 3.9  3.9  --  3.6  --   --   --   --    CL 95*  --  92*  --   --   --   --    CO2 19*  --  23  --   --   --   --    BUN 7  --  3*  --   --   --   --    CREATININE 0.8  --  0.7  --   --   --   --    CALCIUM 8.4*  --  8.3*  --   --   --   --     < > = values in this interval not displayed. No results for input(s): AST, ALT, BILIDIR, BILITOT, ALKPHOS in the last 72 hours. Recent Labs     04/25/22  0514 04/25/22  2045 04/26/22  0310   INR 2.50* 2.04* 1.73*     No results for input(s): CKTOTAL, TROPONINI in the last 72 hours. Urinalysis:    Lab Results   Component Value Date    NITRU NEGATIVE 04/23/2022    WBCUA 10-15 09/04/2021    BACTERIA NONE SEEN 09/04/2021    RBCUA 0-2 09/04/2021    BLOODU NEGATIVE 04/23/2022    SPECGRAV 1.014 09/04/2021    GLUCOSEU NEGATIVE 04/23/2022     ASSESSMENT / PLAN:   1. 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 Patient tested normal for adrenal insufficiency in September of last year. I agree with repeating ACTH stimulation testing in the morning. 2.  Atrial fibrillation: She is anticoagulated. Rate is controlled. 3.  Throat discomfort: Patient with esophageal stricture status post dilatation in the past.  She is being seen by GI.  4.  Non-intractable nausea and vomiting: Slightly improved according to patient's report. Thank you for the consultation.     Electronically signed by Indu Ramirez MD on 4/26/2022 at 8:37 PM

## 2022-04-28 ENCOUNTER — ANESTHESIA EVENT (OUTPATIENT)
Dept: ENDOSCOPY | Age: 87
DRG: 641 | End: 2022-04-28
Payer: MEDICARE

## 2022-04-28 ENCOUNTER — ANESTHESIA (OUTPATIENT)
Dept: ENDOSCOPY | Age: 87
DRG: 641 | End: 2022-04-28
Payer: MEDICARE

## 2022-04-28 VITALS
RESPIRATION RATE: 18 BRPM | DIASTOLIC BLOOD PRESSURE: 90 MMHG | SYSTOLIC BLOOD PRESSURE: 158 MMHG | OXYGEN SATURATION: 95 %

## 2022-04-28 LAB
ANION GAP SERPL CALCULATED.3IONS-SCNC: 11 MEQ/L (ref 8–16)
BUN BLDV-MCNC: 22 MG/DL (ref 7–22)
CALCIUM SERPL-MCNC: 8.8 MG/DL (ref 8.5–10.5)
CHLORIDE BLD-SCNC: 94 MEQ/L (ref 98–111)
CO2: 26 MEQ/L (ref 23–33)
CREAT SERPL-MCNC: 0.7 MG/DL (ref 0.4–1.2)
ERYTHROCYTE [DISTWIDTH] IN BLOOD BY AUTOMATED COUNT: 16.2 % (ref 11.5–14.5)
ERYTHROCYTE [DISTWIDTH] IN BLOOD BY AUTOMATED COUNT: 47.2 FL (ref 35–45)
GFR SERPL CREATININE-BSD FRML MDRD: 79 ML/MIN/1.73M2
GLUCOSE BLD-MCNC: 110 MG/DL (ref 70–108)
HCT VFR BLD CALC: 40.3 % (ref 37–47)
HEMOGLOBIN: 13.4 GM/DL (ref 12–16)
INR BLD: 1.17 (ref 0.85–1.13)
MCH RBC QN AUTO: 27 PG (ref 26–33)
MCHC RBC AUTO-ENTMCNC: 33.3 GM/DL (ref 32.2–35.5)
MCV RBC AUTO: 81.1 FL (ref 81–99)
PLATELET # BLD: 132 THOU/MM3 (ref 130–400)
PMV BLD AUTO: 10.7 FL (ref 9.4–12.4)
POTASSIUM SERPL-SCNC: 3.5 MEQ/L (ref 3.5–5.2)
RBC # BLD: 4.97 MILL/MM3 (ref 4.2–5.4)
SODIUM BLD-SCNC: 131 MEQ/L (ref 135–145)
WBC # BLD: 5.5 THOU/MM3 (ref 4.8–10.8)

## 2022-04-28 PROCEDURE — 7100000011 HC PHASE II RECOVERY - ADDTL 15 MIN: Performed by: INTERNAL MEDICINE

## 2022-04-28 PROCEDURE — 2580000003 HC RX 258: Performed by: INTERNAL MEDICINE

## 2022-04-28 PROCEDURE — 2720000010 HC SURG SUPPLY STERILE: Performed by: INTERNAL MEDICINE

## 2022-04-28 PROCEDURE — 6370000000 HC RX 637 (ALT 250 FOR IP): Performed by: INTERNAL MEDICINE

## 2022-04-28 PROCEDURE — 88305 TISSUE EXAM BY PATHOLOGIST: CPT

## 2022-04-28 PROCEDURE — 80048 BASIC METABOLIC PNL TOTAL CA: CPT

## 2022-04-28 PROCEDURE — 6360000002 HC RX W HCPCS: Performed by: INTERNAL MEDICINE

## 2022-04-28 PROCEDURE — 3609012700 HC EGD DILATION SAVORY: Performed by: INTERNAL MEDICINE

## 2022-04-28 PROCEDURE — 0D718ZZ DILATION OF UPPER ESOPHAGUS, VIA NATURAL OR ARTIFICIAL OPENING ENDOSCOPIC: ICD-10-PCS | Performed by: INTERNAL MEDICINE

## 2022-04-28 PROCEDURE — 7100000010 HC PHASE II RECOVERY - FIRST 15 MIN: Performed by: INTERNAL MEDICINE

## 2022-04-28 PROCEDURE — 3609012400 HC EGD TRANSORAL BIOPSY SINGLE/MULTIPLE: Performed by: INTERNAL MEDICINE

## 2022-04-28 PROCEDURE — 99232 SBSQ HOSP IP/OBS MODERATE 35: CPT | Performed by: INTERNAL MEDICINE

## 2022-04-28 PROCEDURE — 85027 COMPLETE CBC AUTOMATED: CPT

## 2022-04-28 PROCEDURE — 3700000000 HC ANESTHESIA ATTENDED CARE: Performed by: INTERNAL MEDICINE

## 2022-04-28 PROCEDURE — 36415 COLL VENOUS BLD VENIPUNCTURE: CPT

## 2022-04-28 PROCEDURE — 0DB68ZX EXCISION OF STOMACH, VIA NATURAL OR ARTIFICIAL OPENING ENDOSCOPIC, DIAGNOSTIC: ICD-10-PCS | Performed by: INTERNAL MEDICINE

## 2022-04-28 PROCEDURE — 97165 OT EVAL LOW COMPLEX 30 MIN: CPT

## 2022-04-28 PROCEDURE — 85610 PROTHROMBIN TIME: CPT

## 2022-04-28 PROCEDURE — 2500000003 HC RX 250 WO HCPCS: Performed by: NURSE ANESTHETIST, CERTIFIED REGISTERED

## 2022-04-28 PROCEDURE — A4216 STERILE WATER/SALINE, 10 ML: HCPCS | Performed by: INTERNAL MEDICINE

## 2022-04-28 PROCEDURE — 3700000001 HC ADD 15 MINUTES (ANESTHESIA): Performed by: INTERNAL MEDICINE

## 2022-04-28 PROCEDURE — 0D738ZZ DILATION OF LOWER ESOPHAGUS, VIA NATURAL OR ARTIFICIAL OPENING ENDOSCOPIC: ICD-10-PCS | Performed by: INTERNAL MEDICINE

## 2022-04-28 PROCEDURE — 6360000002 HC RX W HCPCS: Performed by: NURSE ANESTHETIST, CERTIFIED REGISTERED

## 2022-04-28 PROCEDURE — 97535 SELF CARE MNGMENT TRAINING: CPT

## 2022-04-28 PROCEDURE — C9113 INJ PANTOPRAZOLE SODIUM, VIA: HCPCS | Performed by: INTERNAL MEDICINE

## 2022-04-28 PROCEDURE — 2580000003 HC RX 258: Performed by: NURSE ANESTHETIST, CERTIFIED REGISTERED

## 2022-04-28 PROCEDURE — 1200000003 HC TELEMETRY R&B

## 2022-04-28 RX ORDER — LIDOCAINE HYDROCHLORIDE 10 MG/ML
INJECTION, SOLUTION INFILTRATION; PERINEURAL PRN
Status: DISCONTINUED | OUTPATIENT
Start: 2022-04-28 | End: 2022-04-28 | Stop reason: SDUPTHER

## 2022-04-28 RX ORDER — SODIUM CHLORIDE 0.9 % (FLUSH) 0.9 %
5-40 SYRINGE (ML) INJECTION EVERY 12 HOURS SCHEDULED
Status: DISCONTINUED | OUTPATIENT
Start: 2022-04-28 | End: 2022-04-28 | Stop reason: HOSPADM

## 2022-04-28 RX ORDER — PANTOPRAZOLE SODIUM 40 MG/1
40 TABLET, DELAYED RELEASE ORAL
Status: DISCONTINUED | OUTPATIENT
Start: 2022-04-29 | End: 2022-04-29 | Stop reason: HOSPADM

## 2022-04-28 RX ORDER — SODIUM CHLORIDE 0.9 % (FLUSH) 0.9 %
5-40 SYRINGE (ML) INJECTION PRN
Status: DISCONTINUED | OUTPATIENT
Start: 2022-04-28 | End: 2022-04-28 | Stop reason: HOSPADM

## 2022-04-28 RX ORDER — SODIUM CHLORIDE 9 MG/ML
INJECTION, SOLUTION INTRAVENOUS PRN
Status: DISCONTINUED | OUTPATIENT
Start: 2022-04-28 | End: 2022-04-28 | Stop reason: HOSPADM

## 2022-04-28 RX ADMIN — FLUCONAZOLE, SODIUM CHLORIDE 100 MG: 2 INJECTION INTRAVENOUS at 21:10

## 2022-04-28 RX ADMIN — SUCRALFATE 1 G: 1 TABLET ORAL at 21:16

## 2022-04-28 RX ADMIN — METOPROLOL TARTRATE 25 MG: 50 TABLET, FILM COATED ORAL at 05:53

## 2022-04-28 RX ADMIN — SODIUM CHLORIDE: 9 INJECTION, SOLUTION INTRAVENOUS at 08:29

## 2022-04-28 RX ADMIN — PROPOFOL 20 MG: 10 INJECTION, EMULSION INTRAVENOUS at 16:10

## 2022-04-28 RX ADMIN — SUCRALFATE 1 G: 1 TABLET ORAL at 05:53

## 2022-04-28 RX ADMIN — METOPROLOL TARTRATE 25 MG: 50 TABLET, FILM COATED ORAL at 21:17

## 2022-04-28 RX ADMIN — Medication 15 G: at 08:34

## 2022-04-28 RX ADMIN — SODIUM CHLORIDE 2 G: 1 TABLET ORAL at 03:44

## 2022-04-28 RX ADMIN — PROPAFENONE HYDROCHLORIDE 150 MG: 150 TABLET, FILM COATED ORAL at 21:17

## 2022-04-28 RX ADMIN — SODIUM CHLORIDE, PRESERVATIVE FREE 10 ML: 5 INJECTION INTRAVENOUS at 21:17

## 2022-04-28 RX ADMIN — LIDOCAINE HYDROCHLORIDE 50 MG: 10 INJECTION, SOLUTION INFILTRATION; PERINEURAL at 16:03

## 2022-04-28 RX ADMIN — NYSTATIN 500000 UNITS: 100000 SUSPENSION ORAL at 21:07

## 2022-04-28 RX ADMIN — PROPOFOL 40 MG: 10 INJECTION, EMULSION INTRAVENOUS at 16:03

## 2022-04-28 RX ADMIN — SODIUM CHLORIDE, PRESERVATIVE FREE 40 MG: 5 INJECTION INTRAVENOUS at 08:32

## 2022-04-28 RX ADMIN — Medication 15 G: at 21:16

## 2022-04-28 RX ADMIN — BENZOCAINE AND MENTHOL 1 LOZENGE: 15; 3.6 LOZENGE ORAL at 19:59

## 2022-04-28 RX ADMIN — PROPOFOL 20 MG: 10 INJECTION, EMULSION INTRAVENOUS at 16:07

## 2022-04-28 RX ADMIN — PROPAFENONE HYDROCHLORIDE 150 MG: 150 TABLET, FILM COATED ORAL at 08:32

## 2022-04-28 RX ADMIN — SODIUM CHLORIDE: 9 INJECTION, SOLUTION INTRAVENOUS at 15:51

## 2022-04-28 RX ADMIN — SODIUM CHLORIDE 2 G: 1 TABLET ORAL at 21:16

## 2022-04-28 ASSESSMENT — PAIN DESCRIPTION - DESCRIPTORS
DESCRIPTORS: ACHING
DESCRIPTORS: ACHING

## 2022-04-28 ASSESSMENT — PAIN - FUNCTIONAL ASSESSMENT: PAIN_FUNCTIONAL_ASSESSMENT: NONE - DENIES PAIN

## 2022-04-28 ASSESSMENT — PAIN DESCRIPTION - LOCATION
LOCATION: THROAT
LOCATION: THROAT

## 2022-04-28 ASSESSMENT — PAIN SCALES - GENERAL
PAINLEVEL_OUTOF10: 5
PAINLEVEL_OUTOF10: 5

## 2022-04-28 NOTE — PROGRESS NOTES
EGD completed. Tolerated well. Photos taken. Biopsies taken. 1 specimen jar labeled and sent to lab. Esophageal dilation completed using45 fr, 48 fr, 51 fr american dilators. Scope [id number].

## 2022-04-28 NOTE — CARE COORDINATION
4/28/22, 11:33 AM EDT    DISCHARGE ON GOING EVALUATION    Kota Hennessy day: 3  Location: -05/005-A Reason for admit: Abdominal pain, epigastric [R10.13]  Hyponatremia [E87.1]  Non-intractable vomiting with nausea, unspecified vomiting type [R11.2]   Procedure: none  Barriers to Discharge:  Na+ 131. IV protonix. EGD today at 1430. PCP: Leyla Mckeon MD  Readmission Risk Score: 14.8 ( )%  Patient Goals/Plan/Treatment Preferences: Met with Lawrence and her daughter. Lawrence clarifies that she only wants [facility] if it is determined needed at time of discharge. PCP list provided to Lawrence's daughter as she verbalized interest in finding an internist for her mother.

## 2022-04-28 NOTE — PLAN OF CARE
Problem: Safety - Adult  Goal: Free from fall injury  Outcome: Progressing  Note: Call light within reach.  Side rails up x2.  Bed alarm on.  Non skid slippers available.        Problem: ABCDS Injury Assessment  Goal: Absence of physical injury  Outcome: Progressing  Note: No injuries observed this shift     Problem: Skin/Tissue Integrity  Goal: Absence of new skin breakdown  Description: 1.  Monitor for areas of redness and/or skin breakdown  Outcome: Progressing  Note: No new signs or symptoms of skin breakdown noted this shift, encouraging patient to turn and reposition self in bed q2h        Problem: Pain  Goal: Verbalizes/displays adequate comfort level or baseline comfort level  Outcome: Progressing  Note: Pt denies any pain at this time

## 2022-04-28 NOTE — BRIEF OP NOTE
Brief Postoperative Note      Patient: Wilfred Fritz  YOB: 1934  MRN: [id number]    Date of Procedure: 4/28/2022    Pre-Op Diagnosis: DYSPAHGIA and esophageal strictures    Post-Op Diagnosis: Mildly severe candidiasis dilation to size 46 [place] neoterminal upper and lower sphincter to dilations       Procedure(s):  EGD BIOPSY  EGD DILATION SAVORY    Surgeon(s):  Layo Cintron MD    Assistant:  * No surgical staff found *    Anesthesia: Monitor Anesthesia Care    Estimated Blood Loss (mL): Minimal    Complications: None    Specimens:   ID Type Source Tests Collected by Time Destination   A : bx Body gastritis Tissue Stomach SURGICAL PATHOLOGY Layo Cintron MD 4/28/2022 1613        Implants:  * No implants in log *      Drains: * No LDAs found *    Findings:   Mildly severe candidiasis dilation to size 51 [place] neoterminal upper and lower sphincter to dilations    Electronically signed by Layo Cintron MD on 4/28/2022 at 4:18 PM

## 2022-04-28 NOTE — PRE SEDATION
[address]     Sedation/Analgesia History & Physical    Patient: Eduardo Munoz : 1934  Med Rec#: [id number] Acc#: [id number]   Provider Performing Procedure: Kathy Becerra MD  Primary Care Physician: Germania Hernandes MD    PRE-PROCEDURE   Full CODE [x]Yes  DNR-CCA/DNR-CC []Yes   Brief History/Pre-Procedure Diagnosis: GERD and dysphagia           MEDICAL HISTORY         has a past medical history of Actinic keratosis, Arthritis, Atrial fibrillation ([facility] CAD (coronary artery disease), Cancer ([facility] Cervical prolapse, Diverticulosis, Endocervical polyp, Heel spur, Hip pain, Hypertension, Nasal septal deviation, Osteoporosis, Stress incontinence, Syncope, and Vaginal prolapse. SURGICAL HISTORY   has a past surgical history that includes Colonoscopy (2000); Cholecystectomy (1976); Upper gastrointestinal endoscopy (2017); pr egd transoral biopsy single/multiple (Left, 2017); Upper gastrointestinal endoscopy (Left, 2017); Endoscopic ultrasonography, GI (Left, 2017); eye surgery; Total hip arthroplasty (Left, 2017); bladder repair (); partial hysterectomy (cervix not removed) ('s); Appendectomy (s); skin biopsy; pr office/outpt visit,procedure only (Right, 2018); joint replacement (); Mohs surgery (N/A, 2021); Facial Surgery (N/A, 2021); US BREAST BIOPSY W LOC DEVICE 1ST LESION LEFT (Left, 2021); US PLACE BREAST LOC DEVICE EACH ADDL LEFT (Left, 2021); US BREAST BIOPSY W LOC DEVICE 1ST LESION RIGHT (Right, 2021); Hysterectomy; Mohs surgery (Left, 2021); Upper gastrointestinal endoscopy (Left, 2021); and Upper gastrointestinal endoscopy (N/A, 2021).   Additional information:       ALLERGIES   Allergies as of 2022 - Fully Reviewed 2022   Allergen Reaction Noted    Statins Other (See Comments) 2017    Tape Mittie Sol tape] Other (See Comments) 2018    Tizanidine hcl Other (See Comments) 08/24/2017    Tramadol Other (See Comments) 08/22/2017    Augmentin [amoxicillin-pot clavulanate] Diarrhea, Nausea And Vomiting, and Other (See Comments) 09/13/2021    Nystatin Nausea And Vomiting 09/13/2021     Additional information:       MEDICATIONS   Coumadin Use Last 7 Days [x]No []Yes  Antiplatelet drug therapy use last 7 days  [x]No []Yes  Other anticoagulant use last 7 days  [x]No []Yes    Current Facility-Administered Medications:     [START ON 4/29/2022] pantoprazole (PROTONIX) tablet 40 mg, 40 mg, Oral, QAM AC, Anthony Pandya MD    metoprolol tartrate (LOPRESSOR) tablet 25 mg, 25 mg, Oral, Nightly, Dakotah Wolf DO, 25 mg at 04/27/22 2035    sodium chloride tablet 2 g, 2 g, Oral, Q8H, Joel Graham MD, 2 g at 04/28/22 0344    urea (URE-NA) packet 15 g, 15 g, Oral, BID, Charma Kawasaki, MD, 15 g at 04/28/22 0834    benzocaine-menthol (CEPACOL SORE THROAT) lozenge 1 lozenge, 1 lozenge, Oral, Q2H PRN, Sim El MD, 1 lozenge at 04/27/22 0400    prochlorperazine (COMPAZINE) injection 5 mg, 5 mg, IntraVENous, Q6H PRN, Jonas Hester, DO, 5 mg at 04/27/22 1041    [Held by provider] sodium chloride tablet 1 g, 1 g, Oral, TID WC, Yvette Trujillo MD, 1 g at 04/25/22 9382    metoprolol tartrate (LOPRESSOR) tablet 25 mg, 25 mg, Oral, QAM AC, Yvette Trujillo MD, 25 mg at 04/28/22 0553    ondansetron (ZOFRAN-ODT) disintegrating tablet 4 mg, 4 mg, Oral, Q8H PRN, Yvette Trujillo MD    propafenone (RYTHMOL) tablet 150 mg, 150 mg, Oral, BID, Yvette Trujillo MD, 150 mg at 04/28/22 9479    sodium chloride flush 0.9 % injection 5-40 mL, 5-40 mL, IntraVENous, 2 times per day, Yvette Trujillo MD, 10 mL at 04/26/22 0915    sodium chloride flush 0.9 % injection 5-40 mL, 5-40 mL, IntraVENous, PRN, Yvette Trujillo MD    0.9 % sodium chloride infusion, , IntraVENous, PRN, Yvette Trujillo MD    [Held by provider] ondansetron (ZOFRAN-ODT) disintegrating tablet 4 mg, 4 mg, Oral, Q8H PRN **OR** [Held by provider] ondansetron (ZOFRAN) injection 4 mg, 4 mg, IntraVENous, Q6H PRN, Dory Myles MD, 4 mg at 04/25/22 0807    polyethylene glycol (GLYCOLAX) packet 17 g, 17 g, Oral, Daily PRN, Dory Myles MD    acetaminophen (TYLENOL) tablet 650 mg, 650 mg, Oral, Q6H PRN, 650 mg at 04/26/22 1445 **OR** acetaminophen (TYLENOL) suppository 650 mg, 650 mg, Rectal, Q6H PRN, Dory Myles MD    sucralfate (CARAFATE) tablet 1 g, 1 g, Oral, 4 times per day, Dory Myles MD, 1 g at 04/28/22 0553    dicyclomine (BENTYL) capsule 10 mg, 10 mg, Oral, Q6H PRN, Dory Myles MD, 10 mg at 04/26/22 1445  Prior to Admission medications    Medication Sig Start Date End Date Taking? Authorizing Provider   propafenone (RYTHMOL) 150 MG tablet Take 1 tablet by mouth 2 times daily 4/27/22   MD Derrick Oglesby MISC by Does not apply route Expires 4/15/2027 4/15/22   Lavern Bacon MD   pantoprazole (PROTONIX) 40 MG tablet Take 1 tablet by mouth once daily 3/11/22   Lavern Bcaon MD   metoprolol tartrate (LOPRESSOR) 50 MG tablet TAKE HALF OF A TABLET IN MORNING AND ONE AT NIGHT 12/16/21   Marichuy Morejon MD   warfarin (COUMADIN) 2.5 MG tablet 1. Take 2 tablets by mouth daily. Or as directed by INR results.  8/26/21   Lavern Bacon MD   ondansetron (ZOFRAN ODT) 4 MG disintegrating tablet Take 1 tablet by mouth every 8 hours as needed for Nausea 8/23/21   MD Derrick Bass [facility] by Does not apply route Expires 6/2022 6/15/17   Lavern Bacon MD   MEGARED OMEGA-3 KRILL  MG CAPS Take 1 capsule by mouth daily     Historical Provider, MD   Multiple Vitamins-Minerals (ICAPS AREDS 2) CAPS Take 2 tablets by mouth daily     Historical Provider, MD     Additional information:       PHYSICAL:   Heart:  [x]Regular rate and rhythm  []Other:    Lungs:  [x]Clear    []Other:    Abdomen: [x]Soft    []Other:    Mental Status: [x]Alert & Oriented  []Other:        PLANNED PROCEDURE   [x]EGD  []Colonoscopy []Flex Sigmoid     Consent: I have discussed with the patient and/or the patient representative the indication, alternatives, and the possible risks and/or complications of the planned procedure and the anesthesia methods. The patient and/or patient representative appear to understand and agree to proceed. SEDATION  Planned agent:[]Midazolam []Meperidine []Sublimaze []Morphine  []Diazepam  [x]Propofol   ASA Classification: Class 3 - A patient with severe systemic disease that limits activity but is not incapacitating    Monitoring and Safety: The patient will be placed on a cardiac monitor and vital signs, pulse oximetry and level of consciousness will be continuously evaluated throughout the procedure. The patient will be closely monitored until recovery from the medications is complete and the patient has returned to baseline status. Respiratory therapy will be on standby during the procedure. [x]Pre-procedure diagnostic studies complete and results available. Comment:    [x]Previous sedation/anesthesia experiences assessed. Comment:    [x]The patient is an appropriate candidate to undergo the planned procedure sedation and anesthesia. (Refer to nursing sedation/analgesia documentation record)  [x]Formulation and discussion of sedation/procedure plan, risks, and expectations with patient and/or responsible adult completed. [x]Patient examined immediately prior to the procedure.  (Refer to nursing sedation/analgesia documentation record)    Taj Padilla MD, MD   Electronically signed

## 2022-04-28 NOTE — PROGRESS NOTES
Hospitalist Progress Note    Patient:  Kate Looney    YOB: 1934  Unit/Bed:[facility] ENDO POOL RM/NONE  MRN: [id number]    Acct: [de-identified]   PCP: Jannet Huertas MD    Date of Admission: 4/23/2022      Assessment/Plan:  [facility] Acute on chronic Hyponatremia: slowly improving. Baseline is in the low 130s. Mid 120s on arrival.  TSH wnl. History and labs are consistent with volume depletion (urine sodium is high but had been getting salt tabs, urine osm in 200s after IVF, making SIADH less likely). Could be combination of hypovolemia and poor solute intake. She had this during her previous stay as well and etiology was felt to be volume depletion by Nephrology, improved with fluids. o Gentle fluids last night led to sodium dropped from 127->120. Nephrology consulted 4/26 am.     o Patient continued on salt tabs 2 g 3 times daily, as well as Urena packet  o Sodium 131 this morning, continue current regimen.  Very low a.m. cortisoI: 1.4 on 4/26 at 8:30 AM.  Denies recent steroids. o Cosyntropin stim test on 4/27 AM shows a baseline cortisol of 10. This improves to 17 after administration. This is less consistent with adrenal insufficiency. o Endocrinology is following, appreciate recommendations.  Abdominal Pain (improved): Unclear etiology. Reports epigastric tenderness, however this is better, not related to eating. .  Does have nausea which is not relieved with Zofran. Could be related to hyponatremia? Suspect so since better as sodium rises. o GI has been consulted. o PRN compazine, stop zofran.   o S/p EGD - has mildly severe esophageal candidiasis  i. Will start nystatin swish and swallow  ii. Diflucan ordered per GI IV  o Continue on PPI/Carafate   Dysphagia with history of esophageal dilation: GI is planning EGD for repeat dilation. Need INR to be less than 1.4.   Patient is tolerating her GI modified diet, at this point we will hold off giving FFP and treat with small aliquots of oral vitamin K.  o INR at goal, s/p dilatation with EGD 4/28.  Chronic Thrombocytopenia: Appears to be a chronic issue, overall stable in the low 100s which is where it has been since 8/2021. B12 and folate in am.  Noted normal thyroid. Adrenal disease could contribute. Should be ok to continue coumadin. o CBC stable. No need to trend   Afib/Aflutter Hx: Holding home Coumadin, continue on Lopressor and propafenone.   o 2.5s pause noted on telemetry 4/26    We will decrease metoprolol dose, continue propafenone - no further events  o Patient wants to initiate Eliquis upon resumption of [address] (stop coumadin). i. Will see if can in AM-d/w GI pending final report of EGD      Expected discharge date:  tbd - 1-2 days    Disposition: tbd-pt is now ordered. EGD and improvement in sodium levels. [] Home   [] TCU  [] Rehab  [] Psych  [] SNF  [] [place]  [] Other-    ===================================================================      Chief Complaint: Abdominal pain    Subjective (past 24 hours): Patient seen at bedside, she is sitting in chair and states she is doing better today. She states her nausea and stomach discomfort has improved. She denies any current dizziness or headaches. She is to have EGD this afternoon, with dilation. No other issues or concerns at this time.     Medications:  Reviewed    Infusion Medications    sodium chloride Stopped (04/28/22 1616)    sodium chloride       Scheduled Medications    [START ON 4/29/2022] pantoprazole  40 mg Oral QAM AC    sodium chloride flush  5-40 mL IntraVENous 2 times per day    fluconazole  100 mg IntraVENous Q24H    nystatin  5 mL Oral 4x Daily    metoprolol tartrate  25 mg Oral Nightly    sodium chloride  2 g Oral Q8H    urea  15 g Oral BID    [Held by provider] sodium chloride  1 g Oral TID WC    metoprolol tartrate  25 mg Oral QAM AC    propafenone  150 mg Oral BID    sodium chloride flush  5-40 mL IntraVENous 2 times per day    sucralfate  1 g Oral 4 times per day     PRN Meds: sodium chloride flush, sodium chloride, benzocaine-menthol, prochlorperazine, ondansetron, sodium chloride flush, sodium chloride, [Held by provider] ondansetron **OR** [Held by provider] ondansetron, polyethylene glycol, acetaminophen **OR** acetaminophen, dicyclomine      ROS: reviewed from prior note, full ROS unchanged unless otherwise stated in hospital course/subjective portion. Intake/Output Summary (Last 24 hours) at 4/28/2022 1914  Last data filed at 4/28/2022 1848  Gross per 24 hour   Intake 2207.77 ml   Output 2950 ml   Net -742.23 ml       Exam:  /82   Pulse 75   Temp 97.3 °F (36.3 °C)   Resp 18   Ht 5' 5\" (1.651 m)   Wt 163 lb 6.4 oz (74.1 kg)   SpO2 94%   BMI 27.19 kg/m²     General appearance: No distress, well developed, appears stated age. Chronically ill appearing. Eyes:  PERRL. Conjunctivae/corneas clear. HENT: Head normal appearing. Nares normal. Oral mucosa moist  Hard of hearing  Neck: Supple, with full range of motion. Trachea midline. No gross JVD appreciated. Respiratory:  Normal effort. Clear to auscultation, without rales or wheezes or rhonchi. Cardiovascular: Normal rate, irregularly irregular rhythm with normal S1/S2 without murmurs. No lower extremity edema. Abdomen: Soft,  non-distended with normal bowel sounds. No tenderness    Musculoskeletal: No joint swelling or tenderness. Normal tone. No abnormal movements. Skin: Warm and dry. No rashes or lesions. Neurologic:  No focal sensory/motor deficits in the upper or lower extremities. Cranial nerves:  grossly non-focal 2-12. Psychiatric: Alert and oriented, normal insight and thought content.        Labs:   Recent Labs     04/26/22  0828 04/28/22  0335   WBC 3.5* 5.5   HGB 13.4 13.4   HCT 39.9 40.3   * 132     Recent Labs     04/27/22  0800 04/27/22  1808 04/28/22  0335   * 126* 131*   K 3.8  --  3.5   CL 93*  -- 94*   CO2 25  --  26   BUN 10  --  22   CREATININE 0.8  --  0.7   CALCIUM 8.6  --  8.8     No results for input(s): AST, ALT, BILIDIR, BILITOT, ALKPHOS in the last 72 hours. Recent Labs     04/26/22  0310 04/27/22  0800 04/28/22  0335   INR 1.73* 1.17* 1.17*     No results for input(s): Dilia Comment in the last 72 hours. No results for input(s): PROCAL in the last 72 hours. Lab Results   Component Value Date    NITRU NEGATIVE 04/23/2022    WBCUA 10-15 09/04/2021    BACTERIA NONE SEEN 09/04/2021    RBCUA 0-2 09/04/2021    BLOODU NEGATIVE 04/23/2022    SPECGRAV 1.014 09/04/2021    GLUCOSEU NEGATIVE 04/23/2022       Radiology (48 hours):  No results found. DVT prophylaxis:    [] Lovenox  [x] SCDs  [] SQ Heparin  [] Encourage ambulation   [] Already on Anticoagulation       Diet: ADULT ORAL NUTRITION SUPPLEMENT; Breakfast, Lunch, Dinner; Clear Liquid Oral Supplement  ADULT DIET;  Dysphagia - Minced and Moist  Code Status: Full Code  PT/OT: jerome  Tele: yes  IVF: na    Electronically signed by Cortes Ernst DO on 4/28/2022 at 7:14 PM

## 2022-04-28 NOTE — PROGRESS NOTES
[facility]         Renal Inpatient Follow-Up note         4/28/2022 8:12 AM    Pt Name:   Cynthia Rockwell  YOB: 1934  Attending:   Mikala Garcia DO    Chief Complaint : Cynthia Rockwell is a 80 y.o. female being followed by nephrology for Hyponatremia    Interval History :   Patient seen and examined by me. No distress  Feels ok. No cp or SOB. Feels slightly better excellent urine output noted yesterday     Scheduled Medications :    metoprolol tartrate  25 mg Oral Nightly    sodium chloride  2 g Oral Q8H    urea  15 g Oral BID    pantoprazole (PROTONIX) 40 mg injection  40 mg IntraVENous Daily    [Held by provider] sodium chloride  1 g Oral TID WC    metoprolol tartrate  25 mg Oral QAM AC    propafenone  150 mg Oral BID    sodium chloride flush  5-40 mL IntraVENous 2 times per day    sucralfate  1 g Oral 4 times per day      sodium chloride 50 mL/hr at 04/27/22 2037    [Held by provider] sodium chloride Stopped (04/25/22 2226)    sodium chloride         Vitals :  /83   Pulse 79   Temp 98.2 °F (36.8 °C) (Oral)   Resp 16   Ht 5' 5\" (1.651 m)   Wt 163 lb 6.4 oz (74.1 kg)   SpO2 97%   BMI 27.19 kg/m²     24HR INTAKE/OUTPUT:      Intake/Output Summary (Last 24 hours) at 4/28/2022 0812  Last data filed at 4/28/2022 0758  Gross per 24 hour   Intake 2107.77 ml   Output 3400 ml   Net -1292.23 ml     Last 3 weights  Wt Readings from Last 3 Encounters:   04/28/22 163 lb 6.4 oz (74.1 kg)   04/20/22 165 lb (74.8 kg)   01/05/22 162 lb 12.8 oz (73.8 kg)           Physical Exam :  General Appearance:  Well developed.  No distress  Mouth/Throat:  Oral mucosa moist  Neck:  Supple, no JVD  Lungs:  Breath sounds: clear  Heart[de-identified]  S1,S2 heard  Abdomen:  Soft, non - tender  Musculoskeletal:  Edema - none         Last 3 CBC   Recent Labs     04/26/22  0828 04/28/22  0335   WBC 3.5* 5.5   RBC 4.94 4.97   HGB 13.4 13.4   HCT 39.9 40.3   * 132     Last 3 CMP  Recent Labs 04/27/22  0800 04/27/22  1808 04/28/22  0335   * 126* 131*   K 3.8  --  3.5   CL 93*  --  94*   CO2 25  --  26   BUN 10  --  22   CREATININE 0.8  --  0.7   CALCIUM 8.6  --  8.8             ASSESSMENT / Plan   1. Renal -renal function appears to be stable at baseline  2. Hyponatremia exact etiology not clear however she has been getting some normal saline and some salt tablets on and off  ? Patient's cortisol level is also very low , however repeat studies/stim test  does not suggest adrenal insufficiency. Await endo eval  ? I think this is most likely due to decreased osmotic intake  ? Improving well with salt tablets and [place] will continue for now  ? Sodium is up to 131 follow labs in the morning. DC IV fluids     3. Mild acidosis stable  4. Borderline hypokalemia we will give 40 mEq of KCl one-time  5. Possible adrenal insufficiency- repeat studies/stim test  does not suggest adrenal insufficiency. Being seen by GI  6. Abdominal pain being planned for an EGD  7. Meds reviewed and discussed with patient and Dr. Camille Mayorga MD, M,D.  [facility].

## 2022-04-28 NOTE — FLOWSHEET NOTE
[facility] PROGRESS NOTE      Patient: Kinza Portillo  Room #: [facility] ENDO POOL RM/NONE            YOB: 1934  Age: 80 y.o. Gender: female            Admit Date & Time: 4/23/2022  1:08 PM    Assessment:  Lawrence\" as she likes to be called, Just returned from a procedure. Her nurse and her daughter are in the room with her. She is awake and talkative. Interventions:  Prayer is offered and accepted. Outcomes:  thankful    Plan:    1.follow up as needed  Care Plan:  Continue spiritual and emotional care for patient and family. Including prayers.    Electronically signed by Phoebe Vazquez on 4/28/2022 at 5:25 PM.  [facility]  [phone]

## 2022-04-28 NOTE — PROGRESS NOTES
Independent  Homemaking Assistance: Independent  Ambulation Assistance: Independent  Transfer Assistance: Independent    Active : Yes  Patient's  Info:   Mode of Transportation: Car  Occupation: Retired       VISION:Corrected    HEARING:  WFL    COGNITION: WFL    RANGE OF MOTION:  Right Upper Extremity: WFL  Left Upper Extremity:  WFL    STRENGTH:  Right Upper Extremity: shldr, elbow flex and ext 4/5  (F+)  Left Upper Extremity:  shldr, elbow flex and ext 4/5  (F+)    SENSATION:   WFL    ADL:   Grooming: Stand By Assistance. to stand at sink to wash hands following toileting routine. Lower Extremity Dressing: Stand By Assistance. to doff slipper sock, increased time attempting to don and unable to get around toes due to limited ROM in hip when crossing leg and required MIN A to start sock at end of foot and patient able to complete rest of way  Toileting: Supervision. for clothing mgmt and toilet hygiene for urine  Toilet Transfer: [address] with use of 2 w/w and grab bars . BALANCE:  Sitting Balance:  Independent. .  Standing Balance: Contact Guard Assistance. with use of 2 w/w for support    BED MOBILITY:  Not Tested    TRANSFERS:  Sit to Stand:  Contact Guard Assistance. demo good hand placement    FUNCTIONAL MOBILITY:  Assistive Device: Rolling Walker  Assist Level:  Contact Guard Assistance. Distance: To and from bathroom  No c/o SOB or fatigue during eval.        Activity Tolerance:  Patient tolerance of  treatment: good. .      Assessment:  Assessment: patient is pleasant and motivated to participate in OT. patient reports she feels weaker than normal and would benefit from OT to increase activity tolerance, UB strength and ease and (I) with ADLs and functional transfers to safely transition to prior living environment, decrease caregiver burden and increase occupational performance.   Performance deficits / Impairments: Decreased functional mobility ,Decreased ADL status,Decreased endurance,Decreased strength  Prognosis: Good  REQUIRES OT FOLLOW-UP: Yes  Decision Making: Low Complexity    Treatment Initiated: Treatment and education initiated within context of evaluation. Evaluation time included review of current medical information, gathering information related to past medical, social and functional history, completion of standardized testing, formal and informal observation of tasks, assessment of data and development of plan of care and goals. Treatment time included skilled education and facilitation of tasks to increase safety and independence with ADL's for improved functional independence and quality of life. Discharge Recommendations:  Continue to assess pending progress,Patient would benefit from continued therapy after discharge    Patient Education:     Patient Education  Education Given To: Patient  Education Provided: Role of [address] [place] [address],Precautions,Transfer Training,Fall Prevention Strategies  Barriers to Learning: None    Equipment Recommendations:  Equipment Needed: No    Plan:  Times per Week: 3-5x  Times per Day: Daily  Current Treatment Recommendations: Strengthening,Neuromuscular re-education,Endurance training,Safety education & training,Patient/Caregiver education & training,Self-Care / ADL. See long-term goal time frame for expected duration of plan of care. If no long-term goals established, a short length of stay is anticipated. Goals:  Patient goals : return home at [facility]  Short Term Goals  Time Frame for Short term goals: by discharge  Short Term Goal 1: patient will tolerate 7 min func dyn standing with two hand release with (S) to increase activity tolerance for toileting. Short Term Goal 2: patient will functionally ambulate house hold distances with (S) and 0-1 verbal cues for safety and sequencing. Short Term Goal 3: patient will complete ADL routine with MOD I.   Short Term Goal 4: patient will participate in moderate resistive UB exer to increase UB strength for functional transfers. Following session, patient left in safe position with all fall risk precautions in place.

## 2022-04-28 NOTE — ANESTHESIA PRE PROCEDURE
Department of Anesthesiology  Preprocedure Note       Name:  Melanie Russell   Age:  80 y.o.  :  1934                                          MRN:  [id number]         Date:  2022      Surgeon: Chuy Mae):  Christine Pederson MD    Procedure: Procedure(s):  EGD ESOPHAGOGASTRODUODENOSCOPY    Medications prior to admission:   Prior to Admission medications    Medication Sig Start Date End Date Taking? Authorizing Provider   propafenone (RYTHMOL) 150 MG tablet Take 1 tablet by mouth 2 times daily 22   MD Derrick Larkin MISC by Does not apply route Expires 4/15/2027 4/15/22   Leyla Mckeon MD   pantoprazole (PROTONIX) 40 MG tablet Take 1 tablet by mouth once daily 3/11/22   Leyla Mckeon MD   metoprolol tartrate (LOPRESSOR) 50 MG tablet TAKE HALF OF A TABLET IN MORNING AND ONE AT NIGHT 21   Pedrito Oshea MD   warfarin (COUMADIN) 2.5 MG tablet 1. Take 2 tablets by mouth daily. Or as directed by INR results.  21   Leyla Mckeon MD   ondansetron (ZOFRAN ODT) 4 MG disintegrating tablet Take 1 tablet by mouth every 8 hours as needed for Nausea 21   MD Derrick Clark MISC by Does not apply route Expires 2022 6/15/17   Leyla Mckeon MD   MEGARED OMEGA-3 KRILL  MG CAPS Take 1 capsule by mouth daily     Historical Provider, MD   Multiple Vitamins-Minerals (ICAPS AREDS 2) CAPS Take 2 tablets by mouth daily     Historical Provider, MD       Current medications:    Current Facility-Administered Medications   Medication Dose Route Frequency Provider Last Rate Last Admin    [START ON 2022] pantoprazole (PROTONIX) tablet 40 mg  40 mg Oral QAM AC Christine Pederson MD        metoprolol tartrate (LOPRESSOR) tablet 25 mg  25 mg Oral Nightly Rip Savers, DO   25 mg at 22    sodium chloride tablet 2 g  2 g Oral Q8H Joel Graham MD   2 g at 22 0344    urea (URE-NA) packet 15 g  15 g Oral BID Kalyani Kiran MD   15 g at 04/28/22 0834    benzocaine-menthol (CEPACOL SORE THROAT) lozenge 1 lozenge  1 lozenge Oral Q2H PRN Edgar Mari MD   1 lozenge at 04/27/22 0400    prochlorperazine (COMPAZINE) injection 5 mg  5 mg IntraVENous Q6H PRN Norrine Ang, DO   5 mg at 04/27/22 1041    [Held by provider] sodium chloride tablet 1 g  1 g Oral TID WC Brad Mendoza MD   1 g at 04/25/22 0814    metoprolol tartrate (LOPRESSOR) tablet 25 mg  25 mg Oral QAM AC Brad Mendoza MD   25 mg at 04/28/22 0553    ondansetron (ZOFRAN-ODT) disintegrating tablet 4 mg  4 mg Oral Q8H PRN Brad Mendoza MD        propafenone (RYTHMOL) tablet 150 mg  150 mg Oral BID Brad Mendoza MD   150 mg at 04/28/22 7847    sodium chloride flush 0.9 % injection 5-40 mL  5-40 mL IntraVENous 2 times per day Brad Mendoza MD   10 mL at 04/26/22 0915    sodium chloride flush 0.9 % injection 5-40 mL  5-40 mL IntraVENous PRN Brad Mendoza MD        0.9 % sodium chloride infusion   IntraVENous PRN Brad Mendoza MD        [Held by provider] ondansetron (ZOFRAN-ODT) disintegrating tablet 4 mg  4 mg Oral Q8H PRN Brad Mendoza MD        Or   Roxana Sepulveda by provider] ondansetron [facility]) injection 4 mg  4 mg IntraVENous Q6H PRN Brad Mendoza MD   4 mg at 04/25/22 4196    polyethylene glycol (GLYCOLAX) packet 17 g  17 g Oral Daily PRN Brad Mendoza MD        acetaminophen (TYLENOL) tablet 650 mg  650 mg Oral Q6H PRN Brad Mendoza MD   650 mg at 04/26/22 1445    Or    acetaminophen (TYLENOL) suppository 650 mg  650 mg Rectal Q6H PRN Brad Mendoza MD        sucralfate (CARAFATE) tablet 1 g  1 g Oral 4 times per day Brad Mendoza MD   1 g at 04/28/22 0553    dicyclomine (BENTYL) capsule 10 mg  10 mg Oral Q6H PRN Brad Mendoza MD   10 mg at 04/26/22 7738       Allergies:     Allergies   Allergen Reactions    Statins Other (See Comments)     Causes severe leg cramps     Tape Cristina Coffey Tape] Other (See Comments)     \"sticks to skin\" causes redness    Tizanidine Hcl Other (See Comments)     Having issues with her liver- elevated her enzyme level    Tramadol Other (See Comments)     \"makes me Goofy\"    Augmentin [Amoxicillin-Pot Clavulanate] Diarrhea, Nausea And Vomiting and Other (See Comments)     abd pain    Nystatin Nausea And Vomiting     Pt states \"swish and swallow\"        Problem List:    Patient Active Problem List   Diagnosis Code    Trochanteric bursitis of left hip M70.62    Drug-induced constipation K59.03    Bradycardia R00.1    Esophageal stricture K22.2    Gastritis without bleeding K29.70    Lumbar radicular pain M54.16    Lumbar spine pain M54.50    Left hip pain M25.552    Arthritis of left hip M16.12    Paroxysmal atrial fibrillation with rapid ventricular response (HCC) I48.0    Anticoagulation monitoring by pharmacist Z79.01    Venous stasis dermatitis of both lower extremities I87.2    Arthritis of right hand M19.041    Vaginal erosion secondary to pessary use (HCC) T83.89XA, N89.8    Pelvic relaxation due to uterovaginal prolapse N81.4    Postmenopausal bleeding N95.0    History of hysterectomy Z90.710    Esophageal candidiasis (HCC) B37.81    Hyponatremia E87.1    Lower abdominal pain R10.30    Atrial flutter (HCC) I48.92    Presence of pessary #4 ring Z96.0    Epigastric pain R10.13       Past Medical History:        Diagnosis Date    Actinic keratosis     history of    Arthritis     Atrial fibrillation ([address]     CAD (coronary artery disease)     history of afib    Cancer (HCC)     skin    Cervical prolapse     history of    Diverticulosis     Endocervical polyp     history of    Heel spur     Hip pain     Hypertension     Nasal septal deviation     Osteoporosis     Stress incontinence     history of    Syncope     Vaginal prolapse     history of       Past Surgical History:        Procedure Laterality Date    APPENDECTOMY  1960's   Ardyth Moritz BLADDER REPAIR  [address]    CHOLECYSTECTOMY  11/1976    [facility]    COLONOSCOPY  01/26/2000    ENDOSCOPIC ULTRASOUND (LOWER) Left 07/20/2017    [facility]    FACIAL SURGERY N/A 1/27/2021    DIVISION AND INSET/CHEEK performed by Dorene Reza MD at [facility]  2017    left total hip relpament    MOHS SURGERY N/A 1/8/2021    MOHS REPAIR BCC NASAL TIP WITH FLAP AND GRAFT [facility] 8/6/2021    MOHS DEFECT REPAIR SCC DORSAL NOSE AND LEFT LOWER FOREHEAD WITH SKIN GRAFT FROM RIGHT CHEEK/EAR (PLACED ON NOSE) performed by Dorene Reza MD at [address] [facility]  1960's    AR EGD TRANSORAL BIOPSY SINGLE/MULTIPLE Left 07/16/2017    EGD BIOPSY performed by Vikas Reid MD at [address] [employer] [facility]    AR OFFICE/OUTPT VISIT,PROCEDURE ONLY Right 06/01/2018    MOHS REPAIR BASEL CELL CARCINOMA RIGHT DORSAL NOSE [facility] Left 09/19/2017    Left Total Hip ARTHROPLASTY performed by Iwona Elaine MD at [address] [place] [address]  07/16/2017    UPPER GASTROINTESTINAL ENDOSCOPY Left 07/16/2017    EGD DILATION BALLOON performed by Vikas Reid MD at [address] [place] [address] Left 8/28/2021    EGD BIOPSY performed by Alfonzo Pulido MD at [address] [place] [address] 8/30/2021    EGD DILATION SAVORY performed by Alfonzo Pulido MD at [place] [address] LEFT Left 4/21/2021    US BREAST NEEDLE BIOPSY LEFT 4/21/2021 [facility] ULTRASOUND    US BREAST NEEDLE BIOPSY RIGHT Right 4/21/2021    US BREAST NEEDLE BIOPSY RIGHT 4/21/2021 [facility] ULTRASOUND    US GUIDED NEEDLE LOC BREAST ADDL LEFT Left 4/21/2021    US GUIDED NEEDLE LOC BREAST ADDL LEFT 4/21/2021 MD ULTRASOUND Social History:    Social History     Tobacco Use    Smoking status: Never Smoker    Smokeless tobacco: Never Used   Substance Use Topics    Alcohol use: No                                Counseling given: Not Answered      Vital Signs (Current):   Vitals:    04/28/22 0554 04/28/22 0700 04/28/22 1134 04/28/22 1528   BP: 129/83 113/64 124/71 (!) 170/84   Pulse: 79 79 68 76   Resp:  18 18 16   Temp:  36.7 °C (98.1 °F) 36.4 °C (97.5 °F)    TempSrc:  Oral Oral    SpO2:  97% 93% 94%   Weight:       Height:                                                  BP Readings from Last 3 Encounters:   04/28/22 (!) 170/84   04/20/22 118/80   01/05/22 118/70       NPO Status: Time of last liquid consumption: 0800                        Time of last solid consumption: 1200                        Date of last liquid consumption: 04/28/22                        Date of last solid food consumption: 04/25/22    BMI:   Wt Readings from Last 3 Encounters:   04/28/22 163 lb 6.4 oz (74.1 kg)   04/20/22 165 lb (74.8 kg)   01/05/22 162 lb 12.8 oz (73.8 kg)     Body mass index is 27.19 kg/m². CBC:   Lab Results   Component Value Date    WBC 5.5 04/28/2022    RBC 4.97 04/28/2022    RBC 3.84 07/29/2011    HGB 13.4 04/28/2022    HCT 40.3 04/28/2022    MCV 81.1 04/28/2022    RDW 15.8 08/23/2021     04/28/2022       CMP:   Lab Results   Component Value Date     04/28/2022    K 3.5 04/28/2022    K 3.9 04/24/2022    CL 94 04/28/2022    CO2 26 04/28/2022    BUN 22 04/28/2022    CREATININE 0.7 04/28/2022    GFRAA >60 08/23/2021    LABGLOM 79 04/28/2022    GLUCOSE 110 04/28/2022    PROT 6.4 04/23/2022    CALCIUM 8.8 04/28/2022    BILITOT 0.7 04/23/2022    ALKPHOS 90 04/23/2022    AST 23 04/23/2022    ALT 14 04/23/2022       POC Tests: No results for input(s): POCGLU, POCNA, POCK, POCCL, POCBUN, POCHEMO, POCHCT in the last 72 hours.     Coags:   Lab Results   Component Value Date    PROTIME 27.6 04/20/2022    INR 1.17 04/28/2022 APTT 41.3 07/15/2021       HCG (If Applicable): No results found for: PREGTESTUR, PREGSERUM, HCG, HCGQUANT     ABGs: No results found for: PHART, PO2ART, JXI5TUF, RJL9TDH, BEART, F2VDZUFK     Type & Screen (If Applicable):  Lab Results   Component Value Date    LABRH NEG 07/27/2011       Drug/Infectious Status (If Applicable):  Lab Results   Component Value Date    HEPCAB Negative 07/16/2017       COVID-19 Screening (If Applicable):   Lab Results   Component Value Date    COVID19 NOT DETECTED 09/05/2021    COVID19 Not Detected 01/21/2021           Anesthesia Evaluation    Airway: Mallampati: III        Dental: normal exam         Pulmonary:Negative Pulmonary ROS and normal exam                               Cardiovascular:    (+) hypertension:, CAD:, dysrhythmias: atrial fibrillation,                   Neuro/Psych:   (+) neuromuscular disease:,             GI/Hepatic/Renal: Neg GI/Hepatic/Renal ROS  (+) GERD: well controlled,           Endo/Other:                     Abdominal:             Vascular: Other Findings:           Anesthesia Plan      MAC     ASA 3             Anesthetic plan and risks discussed with patient. Use of blood products discussed with patient whom. Plan discussed with RULA.                 BEST GARCÍA   4/28/2022

## 2022-04-28 NOTE — PROGRESS NOTES
ACTH stim test discussed with pathology. We are using elecsys II platform. Recent publications suggest that cortisol levels of 14 on this platform are normal. No need to initiate glucocorticoid therapy.

## 2022-04-28 NOTE — PROGRESS NOTES
Pharmacy Intravenous to Oral Protocol  Medication changed per P&T protocol: pantoprazole    Patient meets criteria based on the followin. IV therapy > 24 hours - yes  2. Nausea/vomiting - no  3. Regular diet - full liquids   4. Tolerating other oral medications: yes  5. Afebrile for 24 hours: n/a  6.  WBC trending downward: n/a    Lucila Turner PharmD 2022 1:11 PM

## 2022-04-29 VITALS
TEMPERATURE: 97.5 F | HEART RATE: 73 BPM | BODY MASS INDEX: 27.22 KG/M2 | SYSTOLIC BLOOD PRESSURE: 131 MMHG | HEIGHT: 65 IN | DIASTOLIC BLOOD PRESSURE: 80 MMHG | OXYGEN SATURATION: 95 % | RESPIRATION RATE: 18 BRPM | WEIGHT: 163.4 LBS

## 2022-04-29 PROBLEM — E44.0 MODERATE MALNUTRITION (HCC): Chronic | Status: ACTIVE | Noted: 2022-04-29

## 2022-04-29 LAB
ANION GAP SERPL CALCULATED.3IONS-SCNC: 14 MEQ/L (ref 8–16)
BUN BLDV-MCNC: 18 MG/DL (ref 7–22)
CALCIUM SERPL-MCNC: 8.8 MG/DL (ref 8.5–10.5)
CHLORIDE BLD-SCNC: 102 MEQ/L (ref 98–111)
CO2: 25 MEQ/L (ref 23–33)
CREAT SERPL-MCNC: 0.7 MG/DL (ref 0.4–1.2)
GFR SERPL CREATININE-BSD FRML MDRD: 79 ML/MIN/1.73M2
GLUCOSE BLD-MCNC: 92 MG/DL (ref 70–108)
POTASSIUM SERPL-SCNC: 3.3 MEQ/L (ref 3.5–5.2)
SODIUM BLD-SCNC: 141 MEQ/L (ref 135–145)

## 2022-04-29 PROCEDURE — 2580000003 HC RX 258: Performed by: INTERNAL MEDICINE

## 2022-04-29 PROCEDURE — 6370000000 HC RX 637 (ALT 250 FOR IP): Performed by: INTERNAL MEDICINE

## 2022-04-29 PROCEDURE — 94760 N-INVAS EAR/PLS OXIMETRY 1: CPT

## 2022-04-29 PROCEDURE — 97162 PT EVAL MOD COMPLEX 30 MIN: CPT

## 2022-04-29 PROCEDURE — 36415 COLL VENOUS BLD VENIPUNCTURE: CPT

## 2022-04-29 PROCEDURE — 80048 BASIC METABOLIC PNL TOTAL CA: CPT

## 2022-04-29 PROCEDURE — 99232 SBSQ HOSP IP/OBS MODERATE 35: CPT | Performed by: INTERNAL MEDICINE

## 2022-04-29 PROCEDURE — 97116 GAIT TRAINING THERAPY: CPT

## 2022-04-29 PROCEDURE — 99239 HOSP IP/OBS DSCHRG MGMT >30: CPT | Performed by: INTERNAL MEDICINE

## 2022-04-29 RX ORDER — POTASSIUM CHLORIDE 20 MEQ/1
40 TABLET, EXTENDED RELEASE ORAL PRN
Status: DISCONTINUED | OUTPATIENT
Start: 2022-04-29 | End: 2022-04-29 | Stop reason: HOSPADM

## 2022-04-29 RX ORDER — POTASSIUM CHLORIDE 7.45 MG/ML
10 INJECTION INTRAVENOUS PRN
Status: DISCONTINUED | OUTPATIENT
Start: 2022-04-29 | End: 2022-04-29 | Stop reason: HOSPADM

## 2022-04-29 RX ORDER — POTASSIUM CHLORIDE 20 MEQ/1
20 TABLET, EXTENDED RELEASE ORAL DAILY
Qty: 20 TABLET | Refills: 1 | Status: SHIPPED | OUTPATIENT
Start: 2022-04-29 | End: 2022-05-26

## 2022-04-29 RX ORDER — ONDANSETRON 4 MG/1
4 TABLET, ORALLY DISINTEGRATING ORAL EVERY 8 HOURS PRN
Qty: 20 TABLET | Refills: 0 | Status: SHIPPED | OUTPATIENT
Start: 2022-04-29 | End: 2022-11-04 | Stop reason: SDUPTHER

## 2022-04-29 RX ORDER — POLYETHYLENE GLYCOL 3350 17 G/17G
17 POWDER, FOR SOLUTION ORAL DAILY PRN
Qty: 527 G | Refills: 1 | COMMUNITY
Start: 2022-04-29 | End: 2022-05-26 | Stop reason: ALTCHOICE

## 2022-04-29 RX ORDER — SODIUM CHLORIDE 1000 MG
1 TABLET, SOLUBLE MISCELLANEOUS 2 TIMES DAILY
Qty: 60 TABLET | Refills: 3 | Status: SHIPPED | OUTPATIENT
Start: 2022-04-29 | End: 2022-05-26

## 2022-04-29 RX ADMIN — BENZOCAINE AND MENTHOL 1 LOZENGE: 15; 3.6 LOZENGE ORAL at 00:21

## 2022-04-29 RX ADMIN — PANTOPRAZOLE SODIUM 40 MG: 40 TABLET, DELAYED RELEASE ORAL at 05:21

## 2022-04-29 RX ADMIN — SODIUM CHLORIDE 2 G: 1 TABLET ORAL at 03:29

## 2022-04-29 RX ADMIN — NYSTATIN 500000 UNITS: 100000 SUSPENSION ORAL at 10:03

## 2022-04-29 RX ADMIN — PROPAFENONE HYDROCHLORIDE 150 MG: 150 TABLET, FILM COATED ORAL at 10:02

## 2022-04-29 RX ADMIN — SUCRALFATE 1 G: 1 TABLET ORAL at 10:03

## 2022-04-29 RX ADMIN — SODIUM CHLORIDE, PRESERVATIVE FREE 10 ML: 5 INJECTION INTRAVENOUS at 10:07

## 2022-04-29 RX ADMIN — SUCRALFATE 1 G: 1 TABLET ORAL at 00:21

## 2022-04-29 RX ADMIN — SODIUM CHLORIDE 2 G: 1 TABLET ORAL at 10:03

## 2022-04-29 RX ADMIN — Medication 15 G: at 10:03

## 2022-04-29 RX ADMIN — SUCRALFATE 1 G: 1 TABLET ORAL at 05:20

## 2022-04-29 RX ADMIN — METOPROLOL TARTRATE 25 MG: 50 TABLET, FILM COATED ORAL at 05:20

## 2022-04-29 ASSESSMENT — PAIN DESCRIPTION - DESCRIPTORS: DESCRIPTORS: SORE

## 2022-04-29 ASSESSMENT — PAIN DESCRIPTION - LOCATION: LOCATION: THROAT

## 2022-04-29 ASSESSMENT — PAIN SCALES - GENERAL: PAINLEVEL_OUTOF10: 4

## 2022-04-29 NOTE — PROGRESS NOTES
[facility]         Renal Inpatient Follow-Up note         4/29/2022 10:50 AM    Pt Name:   Bar Guido  YOB: 1934  Attending:   Dory Myles MD    Chief Complaint : Bar Guido is a 80 y.o. female being followed by nephrology for Hyponatremia    Interval History :   Patient seen and examined by me. No distress  Feels ok. No cp or SOB. Feels slightly better excellent urine output noted yesterday  Clinically she looks much better     Scheduled Medications :    rivaroxaban  20 mg Oral Daily    pantoprazole  40 mg Oral QAM AC    fluconazole  100 mg IntraVENous Q24H    nystatin  5 mL Oral 4x Daily    metoprolol tartrate  25 mg Oral Nightly    sodium chloride  2 g Oral Q8H    urea  15 g Oral BID    metoprolol tartrate  25 mg Oral QAM AC    propafenone  150 mg Oral BID    sodium chloride flush  5-40 mL IntraVENous 2 times per day    sucralfate  1 g Oral 4 times per day      sodium chloride         Vitals :  /83   Pulse 83   Temp 98 °F (36.7 °C) (Oral)   Resp 16   Ht 5' 5\" (1.651 m)   Wt 163 lb 6.4 oz (74.1 kg)   SpO2 95%   BMI 27.19 kg/m²     24HR INTAKE/OUTPUT:      Intake/Output Summary (Last 24 hours) at 4/29/2022 1050  Last data filed at 4/29/2022 0333  Gross per 24 hour   Intake 893.98 ml   Output 1950 ml   Net -1056.02 ml     Last 3 weights  Wt Readings from Last 3 Encounters:   04/28/22 163 lb 6.4 oz (74.1 kg)   04/20/22 165 lb (74.8 kg)   01/05/22 162 lb 12.8 oz (73.8 kg)           Physical Exam :  General Appearance:  Well developed.  No distress  Mouth/Throat:  Oral mucosa moist  Neck:  Supple, no JVD  Lungs:  Breath sounds: clear  Heart[de-identified]  S1,S2 heard  Abdomen:  Soft, non - tender  Musculoskeletal:  Edema - none         Last 3 CBC   Recent Labs     04/28/22  0335   WBC 5.5   RBC 4.97   HGB 13.4   HCT 40.3        Last 3 CMP  Recent Labs     04/27/22  0800 04/27/22  0800 04/27/22  1808 04/28/22  0335 04/29/22  0558   *   < > 126* 131* 141   K 3.8  --   --  3.5 3.3*   CL 93*  --   --  94* 102   CO2 25  --   --  26 25   BUN 10  --   --  22 18   CREATININE 0.8  --   --  0.7 0.7   CALCIUM 8.6  --   --  8.8 8.8    < > = values in this interval not displayed. ASSESSMENT / Plan   1. Renal -renal function appears to be stable at baseline  2. Hyponatremia exact etiology not clear however she has been getting some normal saline and some salt tablets on and off  ? Patient's cortisol level is also very low , however repeat studies/stim test  does not suggest adrenal insufficiency. ? I think this is most likely due to decreased osmotic intake  ? Currently sodium overall much improved. She is eating much better     3. Mild acidosis stable  4. Borderline hypokalemia 40 mEq of KCl today  5. Possible adrenal insufficiency- repeat studies/stim test  does not suggest adrenal insufficiency. Being seen by endocrinology  6. Abdominal pain status post EGD with some strictures  7. Meds reviewed and discussed with patient and daughter    Ema Vargas from renal standpoint for discharge no ure-NA upon discharge, discharged home on salt tablets 1 g twice daily and 1500 ml fluid restriction. Follow-up in my office in 4 weeks with a BMP prior      Dr. Argelia Rubio MD, M,D.  [facility].

## 2022-04-29 NOTE — PLAN OF CARE
Problem: Discharge Planning  Goal: Discharge to home or other facility with appropriate resources  Outcome: Progressing     Problem: Safety - Adult  Goal: Free from fall injury  Outcome: Progressing     Problem: ABCDS Injury Assessment  Goal: Absence of physical injury  Outcome: Progressing     Problem: Skin/Tissue Integrity  Goal: Absence of new skin breakdown  Description: 1. Monitor for areas of redness and/or skin breakdown  2. Assess vascular access sites hourly  3. Every 4-6 hours minimum:  Change oxygen saturation probe site  4. Every 4-6 hours:  If on nasal continuous positive airway pressure, respiratory therapy assess nares and determine need for appliance change or resting period.   Outcome: Progressing     Problem: Pain  Goal: Verbalizes/displays adequate comfort level or baseline comfort level  Outcome: Progressing     Problem: Chronic Conditions and Co-morbidities  Goal: Patient's chronic conditions and co-morbidity symptoms are monitored and maintained or improved  Outcome: Progressing

## 2022-04-29 NOTE — OP NOTE
[address] [place] [address]                                OPERATIVE REPORT    PATIENT NAME: Katya Alvares                     :        1934  MED REC NO:   [id number]                           ROOM:       0005  ACCOUNT NO:   [de-identified]                           ADMIT DATE: 2022  PROVIDER:     Ayan Hallman M.D.    Sarah Almontey OF PROCEDURE:  2022    SURGEON:  Ayan Hallman MD    INDICATION:  The patient with history of dysphagia, esophageal  stricture. Plan today for upper endoscopy to evaluate with dilation. ASA CLASSIFICATION:  III. ESTIMATED BLOOD LOSS:  None. DESCRIPTION OF PROCEDURE:  The patient was brought to the GI lab. Consent was obtained. Risks involved with the procedure were explained  to the patient. Informed consent was obtained. The patient was  monitored during the procedure with pulse oximetry, blood pressure  monitoring, and oxygen by nasal cannula. Sedation by incremental doses  of IV propofol given by Anesthesia Service to achieve monitored  anesthesia care. For ASA classification and medications given during  the procedure, please see Anesthesia note. PROCEDURE PERFORMED:  EGD with biopsy and dilation. A standard video upper scope advanced under direct vision from the oral  cavity up to the duodenum. Mid esophagus featured very few area of what  was thought consistent with very early candidiasis. Scope advanced into  the stomach. Retroflex examination of the cardia revealed no evidence  of hiatus hernia. Body and antrum showed significant gastritis and ____  in a few areas only. Duodenum appears normal.  Scope advanced into the  antrum. Guidewire was introduced. Scope was withdrawn. Then, American  dilator starting from size 45-Irish until 51-Irish introduced over the  guidewire, led to dilation of the esophagus. Dilator was withdrawn. No  blood seen on the dilator.   Scope was advanced again to inspect the site  of dilation. Seen bleeding at the level of upper and lower esophageal  sphincter indicating significant stricture in both locations with  successful dilation until size 51-Gibraltarian. IMPRESSION:  1. Gastritis in the body and antrum. 2.  Colitis, biopsy obtained to evaluate. 3.  Dysphagia with stricture at the level of the upper and lower  sphincter, dilated until size 51-Gibraltarian. There is a very mild early  candidiasis. PLAN:  1. Start Diflucan as well as nystatin. 2.  Soft diet. 3.  Hold the Coumadin until she was hemodynamically stable to prevent GI  bleed.         Suzan Herbert M.D.    D: 04/28/2022 16:32:22       T: 04/28/2022 22:47:56     [email or web address]  Job#: [id number]     Doc#: [id number]    CC:  Jp Hernandez Md

## 2022-04-29 NOTE — PROGRESS NOTES
Gastroenterology  Progress Note    4/29/2022 1:51 PM  Subjective:   Admit Date: 4/23/2022    Interval History:   4/29/22:  EGD reviewed with pt. Pt c/o sore throat, worse with swallowing. Denies chest or abdominal pain. Tolerating minced/moist diet. Plan for discharge later today. Diet: ADULT DIET;  Dysphagia - Minced and Moist  ADULT ORAL NUTRITION SUPPLEMENT; Breakfast, Lunch, Dinner; Standard High Calorie/High Protein Oral Supplement    Patient Active Problem List:     Trochanteric bursitis of left hip     Drug-induced constipation     Bradycardia     Esophageal stricture     Gastritis without bleeding     Lumbar radicular pain     Lumbar spine pain     Left hip pain     Arthritis of left hip     Paroxysmal atrial fibrillation with rapid ventricular response (HCC)     Anticoagulation monitoring by pharmacist     Venous stasis dermatitis of both lower extremities     Arthritis of right hand     Vaginal erosion secondary to pessary use (HCC)     Pelvic relaxation due to uterovaginal prolapse     Postmenopausal bleeding     History of hysterectomy     Esophageal candidiasis (HCC)     Hyponatremia     Lower abdominal pain     Atrial flutter (HCC)     Presence of pessary #4 ring     Epigastric pain     Moderate malnutrition (HCC)    Medications:   Scheduled Meds:   rivaroxaban  20 mg Oral Daily    pantoprazole  40 mg Oral QAM AC    fluconazole  100 mg IntraVENous Q24H    nystatin  5 mL Oral 4x Daily    metoprolol tartrate  25 mg Oral Nightly    sodium chloride  2 g Oral Q8H    urea  15 g Oral BID    metoprolol tartrate  25 mg Oral QAM AC    propafenone  150 mg Oral BID    sodium chloride flush  5-40 mL IntraVENous 2 times per day    sucralfate  1 g Oral 4 times per day     Continuous Infusions:   sodium chloride       CBC:   Recent Labs     04/28/22  0335   WBC 5.5   HGB 13.4        BMP:    Recent Labs     04/27/22  0800 04/27/22  0800 04/27/22  1808 04/28/22  0335 04/29/22  0558   *   < > 126* 131* 141   K 3.8  --   --  3.5 3.3*   CL 93*  --   --  94* 102   CO2 25  --   --  26 25   BUN 10  --   --  22 18   CREATININE 0.8  --   --  0.7 0.7   GLUCOSE 117*  --   --  110* 92    < > = values in this interval not displayed. Hepatic: No results for input(s): AST, ALT, ALB, BILITOT, ALKPHOS in the last 72 hours. INR:   Recent Labs     22  0800 22  0335   INR 1.17* 1.17*     Xray:   N/A    Endoscopy Finding:    PATIENT NAME: Alvina Renae                     :        1934  MED REC NO:   [id number]                           ROOM:       0005  ACCOUNT NO:   [de-identified]                           ADMIT DATE: 2022  PROVIDER:     GLENN Ramirez OF PROCEDURE:  2022     SURGEON:  Nita Carbajal MD     INDICATION:  The patient with history of dysphagia, esophageal  stricture. Plan today for upper endoscopy to evaluate with dilation. ASA CLASSIFICATION:  III. ESTIMATED BLOOD LOSS:  None. DESCRIPTION OF PROCEDURE:  The patient was brought to the GI lab. Consent was obtained. Risks involved with the procedure were explained  to the patient. Informed consent was obtained. The patient was  monitored during the procedure with pulse oximetry, blood pressure  monitoring, and oxygen by nasal cannula. Sedation by incremental doses  of IV propofol given by Anesthesia Service to achieve monitored  anesthesia care. For ASA classification and medications given during  the procedure, please see Anesthesia note. PROCEDURE PERFORMED:  EGD with biopsy and dilation. A standard video upper scope advanced under direct vision from the oral  cavity up to the duodenum. Mid esophagus featured very few area of what  was thought consistent with very early candidiasis. Scope advanced into  the stomach. Retroflex examination of the cardia revealed no evidence  of hiatus hernia. Body and antrum showed significant gastritis and ____  in a few areas only.   Duodenum appears normal.  Scope advanced into the  antrum. Guidewire was introduced. Scope was withdrawn. Then, American  dilator starting from size 45-Slovak until 51-Slovak introduced over the  guidewire, led to dilation of the esophagus. Dilator was withdrawn. No  blood seen on the dilator. Scope was advanced again to inspect the site  of dilation. Seen bleeding at the level of upper and lower esophageal  sphincter indicating significant stricture in both locations with  successful dilation until size 51-Slovak _____. IMPRESSION:  1. Gastritis in the body and antrum. 2.  Colitis, biopsy obtained to evaluate. 3.  Dysphagia with stricture at the level of the upper and lower  sphincter, dilated until size 51-Slovak. There is a very mild early  candidiasis. PLAN:  1. Start Diflucan as well as nystatin. 2.  Soft diet. 3.  Hold the Coumadin until she was hemodynamically stable with no  GI bleeding   bleed. Objective:   Vitals: /80   Pulse 73   Temp 97.5 °F (36.4 °C) (Oral)   Resp 18   Ht 5' 5\" (1.651 m)   Wt 163 lb 6.4 oz (74.1 kg)   SpO2 95%   BMI 27.19 kg/m²     Intake/Output Summary (Last 24 hours) at 4/29/2022 1351  Last data filed at 4/29/2022 9719  Gross per 24 hour   Intake 893.98 ml   Output 1950 ml   Net -1056.02 ml     Weight:  Wt Readings from Last 3 Encounters:   04/28/22 163 lb 6.4 oz (74.1 kg)   04/20/22 165 lb (74.8 kg)   01/05/22 162 lb 12.8 oz (73.8 kg)     General appearance: alert and cooperative with exam.  Well groomed, well nourished elderly  female  Lungs: clear to auscultation bilaterally  Heart: regular rate and rhythm, S1, S2 normal, no murmur, click, rub or gallop  Abdomen: soft, non-tender; bowel sounds normal; no masses,  no organomegaly  Extremities: extremities normal, atraumatic, no cyanosis or edema    Assessment and Plan:   GERD with esophageal stricture - s/p EGD with esophageal dilation. Continue PPI at discharge.   Will need to continue minced/moist diet at home. Repeat EGD with dilation for recurrent dysphagia. Esophageal candidiasis  Per EGD - continue nystatin swish/swallow x 5 days. [id number] Jackelin Gilliam for discharge from GI standpoint at Attending's discretion.     Follow up in GI Clinic after discharge in 2 week(s) with Radha Wallace 49, APRN - CNP  4/29/2022  1:51 PM    Patient is seen independently from the nurse practitioner and all  the pertinent data along with physical examination and assessment and plans are all obtained by my self and  Laboratory data, Radiology results, medications all are reviewed by my self and care is discussed extensively with the patient  and the patients nurse and all agree with plan and in addition see orders and plans    Electronically signed by Kesha Smart MD on 4/29/2022

## 2022-04-29 NOTE — PROGRESS NOTES
[facility]  INPATIENT PHYSICAL THERAPY  EVALUATION  STRZ RENAL TELEMETRY 6K - 6K-05/005-A    Time In: 8313  Time Out: 0806  Timed Code Treatment Minutes: 8 Minutes  Minutes: 16          Date: 2022  Patient Name: Cece Esparza,  Gender:  female        MRN: [id number]  : 1934  (80 y.o.)      Referring Practitioner: Merlin Lagos, DO  Diagnosis: abdominal pain, epigastric  Additional Pertinent Hx: Cece Esparza is a 80 y.o. female who presents with diffuse abdominal pain, onset was 3 days ago. The duration has been constant since the onset. The patient has associated nausea, vomiting without diarrhea. She does not have an appendix or gallbladder anymore. She denies urinary symptoms. There are no alleviating factors. The context is that the symptoms started spontaneously, without any known precipitants. EGD BIOPSYEGD DILATION SAVORY on      Restrictions/Precautions:  Restrictions/Precautions: Fall Risk,General Precautions    Subjective:  Chart Reviewed: Yes  Patient assessed for rehabilitation services?: Yes  Family / Caregiver Present: No  Subjective: RN approved session.  Pt pleasant and agreeable to therapy    General:  Overall Orientation Status: Within Functional Limits         Hearing: Within functional limits         Pain: 7/10: when swallowing     Vitals: Vitals not assessed per clinical judgement, see nursing flowsheet    Social/Functional History:    Lives With: Spouse  Type of Home: House  Home Layout: Multi-level (tri level)  Home Access: Stairs to enter with rails  Entrance Stairs - Number of Steps: 1 at back door, no railings in front entrance  Entrance Stairs - Rails: Both  Home Equipment: Isabelle Oliveros, 4 wheeled,Grab bars     Bathroom Shower/Tub: Walk-in shower  Bathroom Toilet: Handicap height  Bathroom Equipment: Grab bars in shower,Built-in shower seat,Grab bars around toilet  Bathroom Accessibility: Walker accessible    Receives Help From: Family  ADL Assistance: Independent  Homemaking Assistance: Independent  Ambulation Assistance:  (no AD)  Transfer Assistance: Independent    Active : Yes  Mode of Transportation: Car  Occupation: Retired       OBJECTIVE:  Range of Motion:  Bilateral Lower Extremity: WFL    Strength:  Bilateral Lower Extremity: WFL    Balance:  Static Sitting Balance:  Supervision  Static Standing Balance: Stand By Assistance    Bed Mobility:  Not Tested    Transfers:  Sit to Stand: Stand By Assistance, Isidro 6 to Sit:Stand By Assistance    Ambulation:  Stand By Assistance, [place]: 12' x2; seated rest break between   Surface: Level Tile  Device:Rolling Walker  Gait Deviations: Forward Flexed Posture, Slow Deborah, Decreased Step Length Bilaterally and Decreased Gait Speed    Functional Outcome Measures: Completed  -PAC Inpatient Mobility Raw Score : 18  AM-PAC Inpatient T-Scale Score : 43.63    ASSESSMENT:  Activity Tolerance:  Patient tolerance of  treatment: good. Treatment Initiated: Treatment and education initiated within context of evaluation. Evaluation time included review of current medical information, gathering information related to past medical, social and functional history, completion of standardized testing, formal and informal observation of tasks, assessment of data and development of plan of care and goals. Treatment time included skilled education and facilitation of tasks to increase safety and independence with functional mobility for improved independence and quality of life. Assessment:   Body Structures, Functions, Activity Limitations Requiring Skilled Therapeutic Intervention: Decreased functional mobility ,Decreased endurance,Decreased balance,Decreased strength,Decreased posture  Assessment: Pt demonstrates a decrease in baseline by way of bed mobility, transfers and ambulation secondary to decreased activity tolerance, strength, fatigue, and balance deficits. Pt will benefit from skilled PT services throughout admission and beyond hospital discharge for improvements in functional mobility and in order to decrease fall risk and return pt to PLOF. Therapy Prognosis: Good    Requires PT Follow-Up: Yes    Discharge Recommendations:  Discharge Recommendations: Home with assist PRN,Home with Home health PT    Patient Education:   . Patient Education  Education Given To: Patient  Education Provided: Plan of Care,Role of Therapy  Education Method: Verbal  Barriers to Learning: None  Education Outcome: Verbalized understanding      Equipment Recommendations:  Equipment Needed: No    Plan:  Current Treatment Recommendations: Strengthening,Balance training,Functional mobility training,Gait training,Endurance training  Plan:  (5xGM)    Goals:  Patient goals : none stated  Short Term Goals  Time Frame for Short term goals: by discharge  Short term goal 1: bed mobility with HOB flat, no rails, mod I for increased functional ind  Short term goal 2: sit<>stand from various surfaces with LRD mod I for safe transfers  Short term goal 3: ambulate 80' with LRD mod I for safe household distances  Long Term Goals  Time Frame for Long term goals : NA d/t short ELOS    Following session, patient left in safe position with all fall risk precautions in place.     Bruce Tay PT, DPT

## 2022-04-29 NOTE — PROGRESS NOTES
Evaluated patient cost of Eliquis for [place], [place]. Eliquis is not covered by patient insurance, preferred is Xarelto. First fill for patient would be $454.78 due to $428.65 being applied to deductible. Once deductible met, cost to patient is $26.13 per month. Patient is eligible for first 30 days at no cost through trial card. -MARIA Garcia (ext. [phone]) 4/29/2022,7:30 AM

## 2022-04-29 NOTE — PLAN OF CARE
Problem: Nutrition Deficit:  Goal: Optimize nutritional status  Outcome: Progressing   Nutrition Problem #1: Moderate malnutrition,In context of acute illness or injury  Intervention: Food and/or Nutrient Delivery: Continue Current Diet,Modify Oral Nutrition Supplement

## 2022-04-29 NOTE — DISCHARGE SUMMARY
Discharge Summary    Patient:  Arlyn Quintanilla  YOB: 1934    MRN: [id number]   Acct: [de-identified]    Primary Care Physician: Lino Fierro MD    Admit date:  4/23/2022    Discharge date:   4/29/2022        Discharge Diagnoses:   Hyponatremia  Principal Problem:    Hyponatremia  Active Problems:    Epigastric pain    Moderate malnutrition (HCC)    Atrial flutter ([address]  Resolved Problems:    * No resolved hospital problems. *        Admitted for: (HPI) abdominal pain, emesis, hyponatremia    Hospital Course: This is an elderly female with hx of atrial fibrillation and prior admissions for abdominal pain and hyponatremia. She presented with a several day hx of epigastric pain and was found to have a low sodium again and was admitted. She has a prior hx of candida esophagitis and stricture. She was admitted and given slow hydration. Dr Sidney Ozuna was consulted on her first hospital day but unfortunately due to her elevated protime from coumadin diagnostic endoscopy was delayed. In the meantime her sodium became difficult to control and Nephrology and Endocrinology were consulted. She was treated with salt tabs and intermittent saline. Her urine studies were not strictly consistent with SIADH. Finally at discharge her sodium was 141 and consultants felt she could be discharged. She had a cortrosyn test which was probably normal.  Her thyroid function was normal.    She did undergo EGD showing Candida esophagitis once again and 2 esophageal strictures as well as esophagitis. She had dilation and was started on diflucan and nystatin. She also had colitis on colonoscopy. The patient will follow up with pcp and consultants as appropriate. She was noted to be in atrial flutter with a controlled rate on admission; she was changed from coumadin to Xarelto at discharge. Her Cardiologist can evaluate this later as an outpt.       Consultants:  See above      Discharge Medications: Medication List      START taking these medications    nystatin 628469 UNIT/ML suspension  Commonly known as: MYCOSTATIN  Take 5 mLs by mouth 4 times daily Swish and swallow.   Take for 5 days     polyethylene glycol 17 g packet  Commonly known as: GLYCOLAX  Take 17 g by mouth daily as needed for Constipation     potassium chloride 20 MEQ extended release tablet  Commonly known as: KLOR-CON M  Take 1 tablet by mouth daily     rivaroxaban 20 MG Tabs tablet  Commonly known as: XARELTO  Take 1 tablet by mouth daily     sodium chloride 1 g tablet  Take 1 tablet by mouth 2 times daily        CHANGE how you take these medications    propafenone 150 MG tablet  Commonly known as: RYTHMOL  Take 1 tablet by mouth 2 times daily  What changed: additional instructions        CONTINUE taking these medications    Handicap Placard Misc  by Does not apply route Expires 6/2022     Handicap Placard Misc  by Does not apply route Expires 4/15/2027     ICaps Areds 2 Caps     metoprolol tartrate 50 MG tablet  Commonly known as: LOPRESSOR  TAKE HALF OF A TABLET IN MORNING AND ONE AT NIGHT     ondansetron 4 MG disintegrating tablet  Commonly known as: Zofran ODT  Take 1 tablet by mouth every 8 hours as needed for Nausea     pantoprazole 40 MG tablet  Commonly known as: PROTONIX  Take 1 tablet by mouth once daily        STOP taking these medications    MegaRed Omega-3 Krill Oil 500 MG Caps     warfarin 2.5 MG tablet  Commonly known as: COUMADIN           Where to Get Your Medications      These medications were sent to [address], [address]  [facility] Floor, [address] [facility]    Phone: [phone]   · nystatin 592799 UNIT/ML suspension  · ondansetron 4 MG disintegrating tablet  · potassium chloride 20 MEQ extended release tablet  · rivaroxaban 20 MG Tabs tablet  · sodium chloride 1 g tablet     These medications were sent to [address] - [place] [address], [address] [address]    Phone: [phone]   · propafenone 150 MG tablet     You can get these medications from any pharmacy    You don't need a prescription for these medications  · polyethylene glycol 17 g packet           Physical Exam:    Vitals:  Vitals:    04/29/22 0520 04/29/22 0830 04/29/22 1155 04/29/22 1300   BP: 129/85 129/83 131/80    Pulse: 77 83 73    Resp:  16 18 18   Temp:  98 °F (36.7 °C) 97.5 °F (36.4 °C)    TempSrc:  Oral Oral    SpO2:  95% 95% 95%   Weight:       Height:         Weight: Weight: 163 lb 6.4 oz (74.1 kg)     24 hour intake/output:    Intake/Output Summary (Last 24 hours) at 4/29/2022 1547  Last data filed at 4/29/2022 1447  Gross per 24 hour   Intake 893.98 ml   Output 2050 ml   Net -1156.02 ml       General appearance - alert, well appearing, and in no distress  Chest - clear to auscultation, no wheezes, rales or rhonchi, symmetric air entry  Heart -irregular   rhythm, normal S1, S2, no murmurs, rubs, clicks or gallops  Abdomen - soft, nontender, nondistended, no masses or organomegaly  Obese: No; Protuberant: No   Neurological - alert, oriented, normal speech, no focal findings or movement disorder noted  Extremities - no pedal edema noted  Skin - normal coloration and turgor, no rashes, no suspicious skin lesions noted    Procedures:  EGD AND DILATION  COLONOSCOPY            Labs can be found in the Lab tab of Chart Review    Diet:  ADULT DIET;  Dysphagia - Minced and Moist  ADULT ORAL NUTRITION SUPPLEMENT; Breakfast, Lunch, Dinner; Standard High Calorie/High Protein Oral Supplement    Activity:  Activity as tolerated (Patient may move about with assist as indicated or with supervision.)    Follow-up:  in the next few days with Brenda Qureshi MD,  in the next few weeks with consultants; she will have a bmp one week    Disposition: home    Condition: Stable      Time Spent: 39 You can access the FollowMyHealth Patient Portal offered by [facility] by registering at the following website: [email or web address] By joining [facility]’s FollowMyHealth portal, you will also be able to view your health information using other applications (apps) compatible with our system. minutes    Electronically signed by Katy Wagner MD on 4/29/2022 at 3:47 PM    Discharging Hospitalist

## 2022-04-29 NOTE — CARE COORDINATION
4/29/22, 10:31 AM EDT    Anticipate discharge today. Met with Lawrence and her daughter again this morning, they deny all needs. Patient goals/plan/ treatment preferences discussed by  and . Patient goals/plan/ treatment preferences reviewed with patient/ family. Patient/ family verbalize understanding of discharge plan and are in agreement with goal/plan/treatment preferences. Understanding was demonstrated using the teach back method. AVS provided by RN at time of discharge, which includes all necessary medical information pertaining to the patients current course of illness, treatment, post-discharge goals of care, and treatment preferences.      Services At/After Discharge: None       IMM Letter  IMM Letter given to Patient/Family/Significant other/Guardian/POA/by[de-identified] Cayla Lindo CM  IMM Letter date given[de-identified] 04/29/22  IMM Letter time given[de-identified] 1027  Observation Status Letter date given[de-identified] 04/23/22  Observation Status Letter time given[de-identified] 3847  Observation Status Letter given to Patient/Family/Significant other/Guardian/POA/by[de-identified] Pt Access

## 2022-04-29 NOTE — PROGRESS NOTES
Physician Progress Note      May Kenney  [facility] #:                  [id number]  :                       1934  ADMIT DATE:       2022 1:08 PM  Enmanuel Garsia DATE:        2022 4:05 PM  RESPONDING  PROVIDER #:        Kapil Doty MD          QUERY TEXT:    Pt admitted with hyponatremia. Noted documentation of moderate malnutrition   by dietician consultant. If possible, please document in progress notes and   discharge summary:    The medical record reflects the following:    Risk Factors: poor appetite  Clinical Indicators: Moderate malnutrition per dietician per AND/APSEN   guidelines as evidenced by energy intake 75% or less of estimated energy   requirements for 7 or more days, Mild body fat loss Orbital,Triceps,Fat   Overlying Ribs,  Mild muscle mass loss Temples (temporalis),Clavicles   (pectoralis & deltoids),Thigh (quadraceps),Calf (gastrocnemius)  Treatment: Dietician consult & Ensure Enlive    Thank you! Emilie Malik CRCR  RN Clinical   P: [phone]  Options provided:  -- Moderate malnutrition confirmed present on admission  -- Moderate malnutrition confirmed not present on admission  -- Moderate malnutrition ruled out  -- Other - I will add my own diagnosis  -- Disagree - Not applicable / Not valid  -- Disagree - Clinically unable to determine / Unknown  -- Refer to Clinical Documentation Reviewer    PROVIDER RESPONSE TEXT:    The diagnosis of moderate malnutrition was confirmed as present on admission.     Query created by: Vincent Mckeon on 2022 1:56 PM      Electronically signed by:  Kapil Doty MD 2022 6:33 PM

## 2022-04-29 NOTE — PROGRESS NOTES
Comprehensive Nutrition Assessment    Type and Reason for Visit:  Initial,Consult (poor appetite/intake for 5 days or more)    Nutrition Recommendations/Plan:   1. Consider MVI. 2. ONS: Ensure Enlive-encouraged patient to drink in between meals. 3. Continue current diet-further recommendations per MD.  4. Diet education provided to patient and daughter, handouts given. Malnutrition Assessment:  Malnutrition Status: Moderate malnutrition (04/29/22 1125)    Context:  Acute Illness     Findings of the 6 clinical characteristics of malnutrition:  Energy Intake:  75% or less of estimated energy requirements for 7 or more days  Weight Loss:  No significant weight loss     Body Fat Loss:  Mild body fat loss Orbital,Triceps,Fat Overlying Ribs   Muscle Mass Loss:  Mild muscle mass loss Temples (temporalis),Clavicles (pectoralis & deltoids),Thigh (quadraceps),Calf (gastrocnemius)  Fluid Accumulation:  No significant fluid accumulation     Strength:  Not Performed    Nutrition Assessment:    Pt. moderately malnourished AEB criteria as listed below. At risk for further nutrition compromise r/t admit with abdominal pain, epigastric pain, s/p EGD with esophageal stricture dilation 4/28/22, early candidiasis found, acute on chronic hyponatremia,  Low cortisol levels,  and underlying medical condition (hx: HTN, osteoporosis, diverticulosis, arthritis, CAD, skin Ca, afib). Nutrition Related Findings:      Wound Type: None     Pt. Report/Treatments/Miscellaneous: Patient and daughter seen. Poor appetite/intake for the last couple of months, denies any significant weight loss however has had a lot of weakness and felt very tired. S/p EGD with esophageal dilation, reports tolerated breakfast this morning but throat is still sore from dilation. Reports for a few days prior to admit was having nausea/vomiting d/t foods not going past stricture.   Home ONS use reported however only a small amount in her coffee, encouraged her to drink a least a whole full one daily until appetite improves at home. Home diet reveals minimal fruit/vegetable intake, little breakfast or supper. Enjoys sweets. GI Status: sore throat from esophageal dilation yesterday. BM 4/27/22. No nausea/vomiting today. Pertinent Labs: 4/29/22: Potassium 3.3, Sodium 141  Pertinent Meds: diflucan, nystatin, protonix, carafate, urea    Current Nutrition Intake & Therapies:    Average Meal Intake: 51-75%,26-50%  Average Supplements Intake:  (drinking some per pt)  ADULT DIET; Dysphagia - Minced and Moist  ADULT ORAL NUTRITION SUPPLEMENT; Breakfast, Lunch, Dinner; Standard High Calorie/High Protein Oral Supplement    Anthropometric Measures:  Height: 5' 5\" (165.1 cm)  Ideal Body Weight (IBW): 125 lbs (57 kg)    Admission Body Weight: 165 lb (74.8 kg) (4/23/22: stated)  Current Body Weight: 163 lb 6.4 oz (74.1 kg) (4/28/22 no edema),       Current BMI (kg/m2): 27.2  Usual Body Weight:  (~ 165# per pt. Per EMR: 5/6/21: 161#, 8/30/21: 147#6. 4oz, 9/13/20: 156#, 12/16/21: 161#)     Weight Adjustment For: No Adjustment                 BMI Categories: Overweight (BMI 25.0-29. 9)    Estimated Daily Nutrient Needs:  Energy Requirements Based On: Kcal/kg     Energy (kcal/day): 5090-0489 kcals  Weight Used for Protein Requirements: Ideal  Protein (g/day): 74  grams or more (1.3 grams/kg/day)       Nutrition Diagnosis:   · Moderate malnutrition,In context of acute illness or injury related to inadequate protein-energy intake as evidenced by poor intake prior to admission,mild muscle loss,mild loss of subcutaneous fat      Nutrition Interventions:   Food and/or Nutrient Delivery: Continue Current Diet,Modify Oral Nutrition Supplement  Nutrition Education/Counseling: Education completed (Minced/Moist Soft diet and General Heathy diet reviewed with patient and daughter, handouts given as well 4/29/22.    Nutrition Education:  · Educated on minced and moist, general healthy diet.  · Learners: Patient and Family  · Readiness: Acceptance  · Method: Explanation, Handout and Teachback  · Response: Verbalizes Understanding  · Contact name and number provided. Encouraged oral intake, ONS use in between her meals.)  Coordination of Nutrition Care: Continue to monitor while inpatient       Goals:  Previous Goal Met:  (Goal initiated)  Goals: PO intake 75% or greater,by next RD assessment       Nutrition Monitoring and Evaluation:   Behavioral-Environmental Outcomes: None Identified  Food/Nutrient Intake Outcomes: Food and Nutrient Intake,Supplement Intake,Diet Advancement/Tolerance  Physical Signs/Symptoms Outcomes: Biochemical Data,Chewing or Swallowing,GI Status,Meal Time Behavior,Nutrition Focused Physical Findings,Weight    Discharge Planning:     Too soon to determine     Tonie Grace RD, LD  Contact: [phone]

## 2022-04-30 LAB — ACTH: 17.4 PG/ML (ref 7.2–63.3)

## 2022-05-02 ENCOUNTER — CARE COORDINATION (OUTPATIENT)
Dept: CASE MANAGEMENT | Age: 87
End: 2022-05-02

## 2022-05-02 ENCOUNTER — TELEPHONE (OUTPATIENT)
Dept: FAMILY MEDICINE CLINIC | Age: 87
End: 2022-05-02

## 2022-05-02 DIAGNOSIS — K22.2 ESOPHAGEAL STRICTURE: Primary | ICD-10-CM

## 2022-05-02 PROCEDURE — 1111F DSCHRG MED/CURRENT MED MERGE: CPT | Performed by: FAMILY MEDICINE

## 2022-05-02 NOTE — TELEPHONE ENCOUNTER
[place] [facility] Transitions Initial Follow Up Call    Outreach made within 2 business days of discharge: Yes    Patient: Arthea Ahumada Patient : 1934   MRN: [id number]  Reason for Admission: Hyponatremia  Discharge Date: 22       Spoke with: pt    Discharge department/facility: [facility] Interactive Patient Contact:  Was patient able to fill all prescriptions: Yes  Was patient instructed to bring all medications to the follow-up visit: Yes  Is patient taking all medications as directed in the discharge summary?  Yes  Does patient understand their discharge instructions: Yes  Does patient have questions or concerns that need addressed prior to 7-14 day follow up office visit: no    Scheduled appointment with PCP within 7-14 days    Follow Up  Future Appointments   Date Time Provider Gina Beauchamp   2022  1:15 PM Deborah Wilson APRN - [address]   5/10/2022  2:20 PM [facility]   2022 10:45 AM COLLEEN MEDICATION MGMT [facility] MED MGMT [place]   2022 10:00 AM Blake Mccallum MD [facility]   2022  1:15 PM MD PETAR Graves [facility]   2022 10:00 AM Aditi Faustin MD [facility]       Michelle Quach LPN

## 2022-05-02 NOTE — TELEPHONE ENCOUNTER
Daughter would like to know if sodium lab needs to since [facility] monitoring it on a daily basis and discharged with sodium chloride 2 mg daily. \"     Pt has follow up 5/10/22 with pcp. FYI-Pt was changed from coumadin to xarelto.

## 2022-05-02 NOTE — TELEPHONE ENCOUNTER
The doctor from the hospital ordered a sodium level and potassium level to be done on 5/4. Based on that I will decide how frequently we need to check it.      The endocrinologist was not worried about her T4 because her TSH was normal so the T4 doesn't mean anything with a normal TSH Normal vision: sees adequately in most situations; can see medication labels, newsprint

## 2022-05-02 NOTE — CARE COORDINATION
[place] [facility] Transitions Initial Follow Up Call    Call within 2 business days of discharge: Yes    Patient: Shanell Hinson Patient : 1934   MRN: [id number]  Reason for Admission: Colitis, Dysphagia, Gastritis, Esoph Stricture  Discharge Date: 22 RARS: Readmission Risk Score: 13.9 ( )    Last Discharge [facility]       Complaint Diagnosis Description Type Department Provider    22 Abdominal Pain; Emesis Non-intractable vomiting with nausea, unspecified vomiting type . .. ED to Hosp-Admission (Discharged) (ADMITTED) Melvi Gonzalez MD; Anthony Salas, ...         Non-face-to-face services provided:  Obtained and reviewed discharge summary and/or continuity of care documents    Care Transitions 24 Hour Call    Schedule Follow Up Appointment with PCP: Yasmin Santacruz you have a copy of your discharge instructions?: Yes  Do you have all of your prescriptions and are they filled?: Yes  Have you been contacted by a [facility] Pharmacist?: No  Have you scheduled your follow up appointment?: Yes  How are you going to get to your appointment?: Car - family or friend to transport  Do you feel like you have everything you need to keep you well at home?: Yes  Care Transitions Interventions  No Identified Needs     Transportation Support: Declined      DME Assistance: Declined     Senior Services: Declined         Future Appointments   Date Time Provider Gina Beauchamp   2022  1:15 PM VASU Desai - CNP [facility]   5/10/2022  2:20 PM [facility]   2022 10:45 AM COLLEEN MEDICATION MGMT [facility] MED MGMT [place]   2022 10:00 AM Gio Mae MD [facility], [facility]   2022  1:15 PM MD PETAR [facility]   2022 10:00 AM [facility]     Challenges to be reviewed by the provider   Additional needs identified to be addressed with provider: denies         Method of communication with provider : n/a    CARE TRANSITION MONITORING    Facility: [facility]:  15    Was this a readmission? No  Patient stated reason for admission: Abd pain, emesis  Patients top risk factors for readmission: medical condition-Colitis, Dysphagia, Gastritis, Esoph Stricture    Care Transition Nurse (CTN) contacted the patient by telephone to perform post hospital discharge assessment. Verified name and  with patient as identifiers. Provided introduction to self, and explanation of the CTN role. Phone Assessment: Patient reports doing well since home other than gen weakness, fatigue which she attributes to hospitalization, recent illness. Denies abd pain, n/v/d, choking episodes, difficulty swallowing, muscle weakness/cramping. Reports eating bland blended/soft foods; tolerating well. Advised sitting upright while eating, slow/small bites, chin tuck. Discussed sx which require immediate medical attention/MD notification. Reports b&b wnl. AVS/Meds: Confirmed copy of AVS received, reviewed with Patient. Confirmed Patient obtained and is taking all new and routine rx as directed. Med education provided, questions answered, verbalizes understanding. Stressed importance of med compliance. 1111F medication review completed with Patient . Advised obtaining 90 day supply of routine, prn meds. Advised contacting pharmacy/md for refill needs. Resource Need Assessment:   Living Arrangements: lives w/ spouse; dtr staying next few days  ADLS:independent, drives   DME:4 wheeled walker, grab bars  Transportation: self  HHC:none, denies need at present   Community Assistance:none, denies need   Referrals Made per CTN with Patient/Family Approval: [phone] Follow Up Appointments: Discussed importance of 7 day hospital follow up appointment for continuity of care. Transportation confirmed for the above appts. [id number] Education:   Educated patient about risk for severe COVID-19 due to risk factors according to CDC guidelines.  Reviewed red flag symptoms with patient who verbalized understanding. Discussed COVID vaccination status: Yes--Pfizer x 3. Education provided on COVID-19 vaccination as appropriate, advised to speak w/ PCP re: vaccine, booster. Discussed exposure protocols and quarantine with CDC Guidelines. Patient was given an opportunity to verbalize any questions and concerns and agrees to contact CTN or health care provider for questions related to their healthcare. Reviewed, advised Covid19 preventative measures including mask covering nose/mouth, social distancing, hand washing. Advance Care Planning  Reports Advanced Directives up to date, reflecting current wishes . Encouraged to place on file w/ PCP, [facility]. Healthcare Decision Maker:    Primary Decision Maker: Jaja Zepeda  [phone]    Advised to contact PCP / Dr Yeni STEEL 24/7 re: any health concerns for early outpatient intervention in an effort to avoid hospitalization. Discussed benefits of WIC, Urgent Care. Advised 911 if noting acute distress. CTN provided contact information. Plan for follow-up call in 3-5 days based on severity of symptoms and risk factors.   Plan for next call: symptom management-, f/u on appts    [address] [place], [place] [phone]

## 2022-05-03 NOTE — PROGRESS NOTES
CLINICAL PHARMACY NOTE: MEDS TO BEDS    Total # of Prescriptions Filled: 5   The following medications were delivered to the patient:  Xarelto 20 mg  Potassium Chloride 20 mg  Nystatin suspension   Sodium Chloride 1 gm  Ondansetron 4 mg     Additional Documentation:

## 2022-05-04 ENCOUNTER — HOSPITAL ENCOUNTER (OUTPATIENT)
Dept: LAB | Age: 87
Discharge: HOME | End: 2022-05-04
Payer: MEDICARE

## 2022-05-04 DIAGNOSIS — E87.1 HYPONATREMIA: ICD-10-CM

## 2022-05-04 LAB
ANION GAP SERPL CALCULATED.3IONS-SCNC: 10 MMOL/L (ref 9–17)
BUN BLDV-MCNC: 19 MG/DL (ref 8–23)
BUN/CREAT BLD: 15 (ref 9–20)
CALCIUM SERPL-MCNC: 9.3 MG/DL (ref 8.6–10.4)
CHLORIDE BLD-SCNC: 101 MMOL/L (ref 98–107)
CO2: 31 MMOL/L (ref 20–31)
CREAT SERPL-MCNC: 1.29 MG/DL (ref 0.5–0.9)
GFR AFRICAN AMERICAN: 47 ML/MIN
GFR NON-AFRICAN AMERICAN: 39 ML/MIN
GFR SERPL CREATININE-BSD FRML MDRD: ABNORMAL ML/MIN/{1.73_M2}
GLUCOSE BLD-MCNC: 90 MG/DL (ref 70–99)
POTASSIUM SERPL-SCNC: 4.6 MMOL/L (ref 3.7–5.3)
SODIUM BLD-SCNC: 142 MMOL/L (ref 135–144)

## 2022-05-04 PROCEDURE — 36415 COLL VENOUS BLD VENIPUNCTURE: CPT

## 2022-05-04 PROCEDURE — 80048 BASIC METABOLIC PNL TOTAL CA: CPT

## 2022-05-05 ENCOUNTER — HOSPITAL ENCOUNTER (EMERGENCY)
Age: 87
Discharge: HOME | End: 2022-05-05
Payer: MEDICARE

## 2022-05-05 VITALS
HEART RATE: 93 BPM | RESPIRATION RATE: 18 BRPM | OXYGEN SATURATION: 95 % | DIASTOLIC BLOOD PRESSURE: 73 MMHG | TEMPERATURE: 97.1 F | WEIGHT: 158 LBS | SYSTOLIC BLOOD PRESSURE: 140 MMHG | HEIGHT: 65 IN | BODY MASS INDEX: 26.33 KG/M2

## 2022-05-05 DIAGNOSIS — N30.01 ACUTE CYSTITIS WITH HEMATURIA: Primary | ICD-10-CM

## 2022-05-05 LAB
BILIRUBIN URINE: NEGATIVE
BLOOD, URINE: ABNORMAL
CHARACTER, URINE: CLEAR
COLOR: YELLOW
GLUCOSE URINE: NEGATIVE MG/DL
KETONES, URINE: NEGATIVE
LEUKOCYTE ESTERASE, URINE: ABNORMAL
NITRITE, URINE: POSITIVE
PH UA: 5.5 (ref 5–9)
PROTEIN UA: 100 MG/DL
SPECIFIC GRAVITY UA: 1.02 (ref 1–1.03)
UROBILINOGEN, URINE: 1 EU/DL (ref 0.2–1)

## 2022-05-05 PROCEDURE — 87086 URINE CULTURE/COLONY COUNT: CPT

## 2022-05-05 PROCEDURE — 99213 OFFICE O/P EST LOW 20 MIN: CPT

## 2022-05-05 PROCEDURE — 99213 OFFICE O/P EST LOW 20 MIN: CPT | Performed by: EMERGENCY MEDICINE

## 2022-05-05 PROCEDURE — 87077 CULTURE AEROBIC IDENTIFY: CPT

## 2022-05-05 PROCEDURE — 81003 URINALYSIS AUTO W/O SCOPE: CPT

## 2022-05-05 PROCEDURE — 87186 SC STD MICRODIL/AGAR DIL: CPT

## 2022-05-05 RX ORDER — FLUCONAZOLE 150 MG/1
150 TABLET ORAL
Qty: 2 TABLET | Refills: 0 | Status: SHIPPED | OUTPATIENT
Start: 2022-05-05 | End: 2022-05-11

## 2022-05-05 RX ORDER — NITROFURANTOIN 25; 75 MG/1; MG/1
100 CAPSULE ORAL 2 TIMES DAILY
Qty: 14 CAPSULE | Refills: 0 | Status: SHIPPED | OUTPATIENT
Start: 2022-05-05 | End: 2022-05-12

## 2022-05-05 ASSESSMENT — ENCOUNTER SYMPTOMS
ABDOMINAL PAIN: 0
BACK PAIN: 0
SHORTNESS OF BREATH: 0
COUGH: 0

## 2022-05-05 NOTE — ED PROVIDER NOTES
[facility]  Urgent Care Encounter       CHIEF COMPLAINT       Chief Complaint   Patient presents with    Dysuria       Nurses Notes reviewed and I agree except as noted in the HPI. HISTORY OF PRESENT ILLNESS   Cece Esparza is a 80 y.o. female who presents for complaints of urinary frequency and urgency. Symptoms have been present for 2 days. Denies any abdominal pain or flank pain. Denies any increase in urinary incontinence. No fevers. Patient has recently been at the hospital and was diagnosed with Candida esophagitis. She has just completed a course of nystatin and was on Diflucan. She was advised by her GI physician that if she receives an antibiotic for her UTI, she will need Diflucan when completed to avoid this from occurring again. Patient also had noted hyponatremia when admitted to the hospital.  Patient denies any dizziness, lightheadedness or vertigo. She had a repeat lab test performed yesterday at [facility], but has not received results yet. HPI    REVIEW OF SYSTEMS     Review of Systems   Constitutional: Negative for chills, diaphoresis, fatigue and fever. Respiratory: Negative for cough and shortness of breath. Gastrointestinal: Negative for abdominal pain. Genitourinary: Positive for dysuria, frequency and urgency. Negative for difficulty urinating and flank pain. Musculoskeletal: Negative for back pain. Neurological: Negative for dizziness and light-headedness. Psychiatric/Behavioral: Negative for behavioral problems.        PAST MEDICAL HISTORY         Diagnosis Date    Actinic keratosis     history of    Arthritis     Atrial fibrillation ([address]     CAD (coronary artery disease)     history of afib    Cancer (HCC)     skin    Cervical prolapse     history of    Diverticulosis     Endocervical polyp     history of    Heel spur     Hip pain     Hypertension     Nasal septal deviation     Osteoporosis     Stress incontinence history of    Syncope     Vaginal prolapse     history of       SURGICALHISTORY     Patient  has a past surgical history that includes Colonoscopy (01/26/2000); Cholecystectomy (11/1976); Upper gastrointestinal endoscopy (07/16/2017); pr egd transoral biopsy single/multiple (Left, 07/16/2017); Upper gastrointestinal endoscopy (Left, 07/16/2017); Endoscopic ultrasonography, GI (Left, 07/20/2017); eye surgery; Total hip arthroplasty (Left, 09/19/2017); bladder repair (2011); partial hysterectomy (cervix not removed) (1960's); Appendectomy (1960's); skin biopsy; pr office/outpt visit,procedure only (Right, 06/01/2018); joint replacement (2017); Mohs surgery (N/A, 1/8/2021); Facial Surgery (N/A, 1/27/2021); US BREAST BIOPSY W LOC DEVICE 1ST LESION LEFT (Left, 4/21/2021); US PLACE BREAST LOC DEVICE EACH ADDL LEFT (Left, 4/21/2021); US BREAST BIOPSY W LOC DEVICE 1ST LESION RIGHT (Right, 4/21/2021); Hysterectomy; Mohs surgery (Left, 8/6/2021); Upper gastrointestinal endoscopy (Left, 8/28/2021); Upper gastrointestinal endoscopy (N/A, 8/30/2021); Upper gastrointestinal endoscopy (N/A, 4/28/2022); and Upper gastrointestinal endoscopy (4/28/2022).     CURRENT MEDICATIONS       Previous Medications    HANDICAP PLACARD MISC    by Does not apply route Expires 6/2022    HANDICAP PLACARD MISC    by Does not apply route Expires 4/15/2027    METOPROLOL TARTRATE (LOPRESSOR) 50 MG TABLET    TAKE HALF OF A TABLET IN MORNING AND ONE AT NIGHT    MULTIPLE VITAMINS-MINERALS (ICAPS AREDS 2) CAPS    Take 2 tablets by mouth daily     ONDANSETRON (ZOFRAN ODT) 4 MG DISINTEGRATING TABLET    Take 1 tablet by mouth every 8 hours as needed for Nausea    PANTOPRAZOLE (PROTONIX) 40 MG TABLET    Take 1 tablet by mouth once daily    POLYETHYLENE GLYCOL (GLYCOLAX) 17 G PACKET    Take 17 g by mouth daily as needed for Constipation    POTASSIUM CHLORIDE (KLOR-CON M) 20 MEQ EXTENDED RELEASE TABLET    Take 1 tablet by mouth daily    PROPAFENONE (RYTHMOL) 150 MG TABLET    Take 1 tablet by mouth 2 times daily    RIVAROXABAN (XARELTO) 20 MG TABS TABLET    Take 1 tablet by mouth daily    SODIUM CHLORIDE 1 G TABLET    Take 1 tablet by mouth 2 times daily       ALLERGIES     Patient is is allergic to statins, tape [adhesive tape], tizanidine hcl, tramadol, augmentin [amoxicillin-pot clavulanate], and nystatin. Patients   Immunization History   Administered Date(s) Administered    COVID-19, Pfizer Purple top, DILUTE for use, 12+ yrs, 30mcg/0.3mL dose 01/30/2021, 02/20/2021    COVID-19, [place] Vaccine, Vaccine Unspecified 10/13/2021, 10/13/2021    Influenza Virus Vaccine 10/28/2011    Influenza, High Dose (Fluzone 65 yrs and older) 10/19/2017, 12/10/2018    Influenza, Quadv, adjuvanted, 65 yrs +, IM, PF (Fluad) 12/17/2020, 12/21/2021    Influenza, Triv, inactivated, subunit, adjuvanted, IM (Fluad 65 yrs and older) 01/13/2020    Pneumococcal Conjugate 13-valent ([id number]) 06/07/2017    Pneumococcal Conjugate 7-valent (Prevnar7) 05/06/2003    Pneumococcal Polysaccharide ([id number]) 06/08/2018    Td, unspecified formulation 05/06/2003    Zoster Live (Zostavax) 11/09/2013       FAMILY HISTORY     Patient's family history includes Colon Cancer in her father; Heart Disease in her mother; Osteoporosis in her mother. SOCIAL HISTORY     Patient  reports that she has never smoked. She has never used smokeless tobacco. She reports that she does not drink alcohol and does not use drugs. PHYSICAL EXAM     ED TRIAGE VITALS  BP: (!) 140/73, Temp: 97.1 °F (36.2 °C), Pulse: 93, Resp: 18, SpO2: 95 %,Estimated body mass index is 26.29 kg/m² as calculated from the following:    Height as of this encounter: 5' 5\" (1.651 m). Weight as of this encounter: 158 lb (71.7 kg). ,No LMP recorded. Patient has had a hysterectomy. Physical Exam  Constitutional:       General: She is not in acute distress. Appearance: She is normal weight. She is not ill-appearing.    HENT: Head: Normocephalic. Cardiovascular:      Rate and Rhythm: Normal rate. Rhythm irregular. Pulses: Normal pulses. Pulmonary:      Effort: Pulmonary effort is normal.      Breath sounds: Normal breath sounds. Abdominal:      General: Abdomen is flat. Bowel sounds are normal.      Palpations: Abdomen is soft. Tenderness: There is no abdominal tenderness. There is no right CVA tenderness or left CVA tenderness. Skin:     General: Skin is warm and dry. Neurological:      General: No focal deficit present. Mental Status: She is alert. Psychiatric:         Mood and Affect: Mood normal.         DIAGNOSTIC RESULTS     Labs:  Results for orders placed or performed during the hospital encounter of 05/05/22   Urinalysis   Result Value Ref Range    Glucose, Ur Negative NEGATIVE mg/dl    Bilirubin Urine Negative NEGATIVE    Ketones, Urine Negative NEGATIVE    Specific Gravity, UA 1.025 1.002 - 1.030    Blood, Urine Large (A) NEGATIVE    pH, UA 5.50 5.0 - 9.0    Protein,  (A) NEGATIVE mg/dl    Urobilinogen, Urine 1.00 0.2 - 1.0 eu/dl    Nitrite, Urine Positive (A) NEGATIVE    Leukocyte Esterase, Urine Large (A) NEGATIVE    Color, UA Yellow STRAW-YELLOW    Character, Urine Clear CLEAR-SL CLOUD       IMAGING:    No orders to display         EKG:      URGENT CARE COURSE:     Vitals:    05/05/22 1438   BP: (!) 140/73   Pulse: 93   Resp: 18   Temp: 97.1 °F (36.2 °C)   TempSrc: Infrared   SpO2: 95%   Weight: 158 lb (71.7 kg)   Height: 5' 5\" (1.651 m)       Medications - No data to display         PROCEDURES:  None    FINAL IMPRESSION      1. Acute cystitis with hematuria          DISPOSITION/ PLAN   Presents for was likely acute cystitis with hematuria. Will place patient on Macrobid for treatment of symptoms. Advise drink plenty of fluids while on the antibiotic. Take a probiotic in addition. Rx for Diflucan to use after completion of antibiotics.   Advise follow-up primary care provider 3 to 5 days if no improvement of symptoms. Go to the emergency department if symptoms worsen.         PATIENT REFERRED TO:  Alondra Burdick MD  [address] [facility] / [facility] [id number]      DISCHARGE MEDICATIONS:  New Prescriptions    FLUCONAZOLE (DIFLUCAN) 150 MG TABLET    Take 1 tablet by mouth every 72 hours for 6 days    NITROFURANTOIN, MACROCRYSTAL-MONOHYDRATE, (MACROBID) 100 MG CAPSULE    Take 1 capsule by mouth 2 times daily for 7 days       Discontinued Medications    No medications on file       Current Discharge Medication List          VASU Menezes CNP    (Please note that portions of this note were completed with a voice recognition program. Efforts were made to edit the dictations but occasionally words are mis-transcribed.)          VASU Menezes CNP  05/05/22 5013

## 2022-05-05 NOTE — ED TRIAGE NOTES
Recent hospitalization for esophageal/stomach fungal infection and got home last Friday, now  for last 2 days has had frequent urination, and has not been getting her rest, and been a little more disoriented

## 2022-05-06 ENCOUNTER — CARE COORDINATION (OUTPATIENT)
Dept: CASE MANAGEMENT | Age: 87
End: 2022-05-06

## 2022-05-06 PROBLEM — Z79.01 ANTICOAGULATION MONITORING BY PHARMACIST: Status: RESOLVED | Noted: 2017-10-19 | Resolved: 2022-05-06

## 2022-05-06 NOTE — CARE COORDINATION
[place] [facility] Transitions Follow Up Call    2022    Patient: Marco A Chang  Patient : 1934   MRN: [id number]  Reason for Admission: Colitis, Dysphagia, Gastritis, Esoph Stricture  Discharge Date: 22 RARS: Readmission Risk Score: 13.9 ( )    Care Transitions Subsequent and Final Call    Schedule Follow Up Appointment with PCP: Declined  Subsequent and Final Calls  Do you have any ongoing symptoms?: No  Have your medications changed?: No  Do you currently have any active services?: No  Do you have any needs or concerns that I can assist you with?: No (Comment: Denies need)  Identified Barriers: Other  Care Transitions Interventions   Home Care Waiver: Declined        Transportation Support: Declined      DME Assistance: Declined     Senior Services: Declined    Other Interventions:         Future Appointments   Date Time Provider Gina Beauchamp   5/10/2022  2:20 PM Trell Pringle MD [facility]   2022 10:00 AM Frank Powers MD [facility]   2022  1:15 PM Kenyon Garcia MD [facility]   2022 10:00 AM Trell Pringle MD [facility]   2022 12:30 PM VASU Sanches - CNP [facility] [address] [place] [address] Drive: [facility] Leader  RARS:  14    Per chart review: Patient noted to have been seen at [facility] 22 for dysuria, urinary frequency w/ dx of Ac Cystitis. New rx for Roena Gosselin. Spoke w/ Patient for follow up call. Reports doing \"pretty good, feeling much better\". Did not call PCP prior to presenting to Urgent Care. Reports voiding qs w/ clear urine. Denies urgency, hematuria, dysuria, abd/flank pain, fever, chills. Noted to be a&o, answering questions appropriately. Aware she is to start Diflucan once Macrobid completed. Reports drinking water. Advised to contact PCP / if sx return or worsen for early outpt intervention in effort to avoid ED or hospitalization.    Reviewed the following:  UTI   >Take atb as directed. Do not skip doses, complete full course of atb unless otherwise directed by MD.  >Drink extra water (unless contraindicated as per MD) to help make urine less concentrated and help wash out bacteria causing infection. >Avoid drinks that are carbonated or have caffeine as can irritate bladder. >Urinate often, try to fully empty bladder each time. >Change sanitary pads often. >Avoid douching, bubble baths, feminine hygiene sprays. >After urinating wipe front to back. >To relive pain, heating pad - never go to sleep w/ heating pad in place. Was seen by Teresa Saleh NP-GI yesterday. Denies abd pain, n/v/d, abd distention. Continues to take Protonix as directed. Denies issues w/ dysphagia. Continues to eat soft, pureed foods w/ ease. Denies need for dietician referral.      Confirmed transportation for 5/10/22 PCP appt. Patient denies resource needs including [facility], community assistance. Has friend who is retired nurse who helps with transportation, healthcare needs. Denies dme needs. Agreeable to ongoing CT follow up. Plan for next outreach early next week. Upon next outreach: confirm Macrobid/Diflucan, f/u on PCP appt, sx.       Ahsan Roldan RN

## 2022-05-08 LAB
ORGANISM: ABNORMAL
URINE CULTURE, ROUTINE: ABNORMAL
URINE CULTURE, ROUTINE: ABNORMAL

## 2022-05-10 ENCOUNTER — HOSPITAL ENCOUNTER (OUTPATIENT)
Dept: LAB | Age: 87
Discharge: HOME | End: 2022-05-10
Payer: MEDICARE

## 2022-05-10 ENCOUNTER — OFFICE VISIT (OUTPATIENT)
Dept: FAMILY MEDICINE CLINIC | Age: 87
End: 2022-05-10
Payer: MEDICARE

## 2022-05-10 VITALS
BODY MASS INDEX: 26.33 KG/M2 | HEART RATE: 111 BPM | OXYGEN SATURATION: 96 % | WEIGHT: 158 LBS | HEIGHT: 65 IN | TEMPERATURE: 97.9 F | SYSTOLIC BLOOD PRESSURE: 122 MMHG | DIASTOLIC BLOOD PRESSURE: 70 MMHG

## 2022-05-10 DIAGNOSIS — B37.81 ESOPHAGEAL CANDIDIASIS (HCC): ICD-10-CM

## 2022-05-10 DIAGNOSIS — K22.2 ESOPHAGEAL STRICTURE: ICD-10-CM

## 2022-05-10 DIAGNOSIS — E87.1 HYPONATREMIA: ICD-10-CM

## 2022-05-10 DIAGNOSIS — Z09 HOSPITAL DISCHARGE FOLLOW-UP: ICD-10-CM

## 2022-05-10 DIAGNOSIS — E87.1 HYPONATREMIA: Primary | ICD-10-CM

## 2022-05-10 LAB
ANION GAP SERPL CALCULATED.3IONS-SCNC: 8 MMOL/L (ref 9–17)
BUN BLDV-MCNC: 32 MG/DL (ref 8–23)
BUN/CREAT BLD: 27 (ref 9–20)
CALCIUM SERPL-MCNC: 9.2 MG/DL (ref 8.6–10.4)
CHLORIDE BLD-SCNC: 101 MMOL/L (ref 98–107)
CO2: 29 MMOL/L (ref 20–31)
CREAT SERPL-MCNC: 1.19 MG/DL (ref 0.5–0.9)
GFR AFRICAN AMERICAN: 52 ML/MIN
GFR NON-AFRICAN AMERICAN: 43 ML/MIN
GFR SERPL CREATININE-BSD FRML MDRD: ABNORMAL ML/MIN/{1.73_M2}
GLUCOSE BLD-MCNC: 96 MG/DL (ref 70–99)
POTASSIUM SERPL-SCNC: 4.9 MMOL/L (ref 3.7–5.3)
SODIUM BLD-SCNC: 138 MMOL/L (ref 135–144)

## 2022-05-10 PROCEDURE — 1111F DSCHRG MED/CURRENT MED MERGE: CPT | Performed by: FAMILY MEDICINE

## 2022-05-10 PROCEDURE — 36415 COLL VENOUS BLD VENIPUNCTURE: CPT

## 2022-05-10 PROCEDURE — 80048 BASIC METABOLIC PNL TOTAL CA: CPT

## 2022-05-10 PROCEDURE — 99495 TRANSJ CARE MGMT MOD F2F 14D: CPT | Performed by: FAMILY MEDICINE

## 2022-05-10 PROCEDURE — 99213 OFFICE O/P EST LOW 20 MIN: CPT

## 2022-05-10 ASSESSMENT — ENCOUNTER SYMPTOMS
SHORTNESS OF BREATH: 0
COUGH: 0
ABDOMINAL PAIN: 0
CHEST TIGHTNESS: 0
CONSTIPATION: 0
NAUSEA: 0
VOMITING: 0
WHEEZING: 0
DIARRHEA: 0

## 2022-05-10 NOTE — PROGRESS NOTES
Post-Discharge Transitional Care Follow Up      Tari Gonzalez   YOB: 1934    Date of Office Visit:  5/10/2022  Date of Hospital Admission: 4/23/22  Date of Hospital Discharge: 4/29/22  Readmission Risk Score (high >=14%. Medium >=10%):Readmission Risk Score: 13.9 ( )      Care management risk score Rising risk (score 2-5) and Complex Care (Scores >=6): 4     Non face to face  following discharge, date last encounter closed (first attempt may have been earlier): 5/2/2022  4:39 PM     Call initiated 2 business days of discharge: Yes     Hyponatremia  Improving; her sodium has normalized so I will have her continue her sodium and potassium supplements and recheck labs today and again in a month. I also suggested high salt content food and Gatorade to drink.     -     Basic Metabolic Panel; Future  Esophageal candidiasis ([address]  Improving; she was told to eat a low sugar diet so we discussed options for that to help give her some variety in her diet. Esophageal stricture  Resolved; she was given GERD precautions and we talked about diet options to try to keep her symptoms from returning. Hospital discharge follow-up  -     [place] DISCHARGE MEDS RECONCILED W/ CURRENT OUTPATIENT MED LIST      Medical Decision Making: moderate complexity  No follow-ups on file. On this date 5/10/2022 I have spent 45 minutes reviewing previous notes, test results and face to face with the patient discussing the diagnosis and importance of compliance with the treatment plan as well as documenting on the day of the visit. Subjective:   HPI Here today for a hospital follow up. She says she is feeling weak and tired consistently throughout the day. She has been feeling this way since discharge and has been gradually been getting better very very slowly. She feels like she doesn't have any strength to do anything. She is not experiencing SOB or chest pain.  She says she needs to sit down when doing everyday activities. She has been sleeping well at night. She has been sleeping about 7 hours per night and occasionally takes naps during the day. She is occasionally lightheaded when standing up too quickly. She has not fallen. She is eating regularly with some medical restrictions, watching her sugar intake to help prevent the candida irritation. She is a sugar \"junkie\" so that has been challenging. She has changed from regular yogurt to low sugar. She has one Boost per day. She is taking antibiotics for a UTI. Inpatient course: Discharge summary reviewed- see chart. Interval history/Current status: improving    Patient Active Problem List   Diagnosis    Trochanteric bursitis of left hip    Drug-induced constipation    Bradycardia    Esophageal stricture    Gastritis without bleeding    Lumbar radicular pain    Lumbar spine pain    Left hip pain    Arthritis of left hip    Paroxysmal atrial fibrillation with rapid ventricular response (HCC)    Venous stasis dermatitis of both lower extremities    Arthritis of right hand    Vaginal erosion secondary to pessary use ([address]    Pelvic relaxation due to uterovaginal prolapse    Postmenopausal bleeding    History of hysterectomy    Esophageal candidiasis (HCC)    Hyponatremia    Lower abdominal pain    Atrial flutter (HCC)    Presence of pessary #4 ring    Epigastric pain    Moderate malnutrition ([address]       Medications listed as ordered at the time of discharge from hospital     Medication List          Accurate as of May 10, 2022  5:27 PM. If you have any questions, ask your nurse or doctor.             CONTINUE taking these medications    fluconazole 150 MG tablet  Commonly known as: DIFLUCAN  Take 1 tablet by mouth every 72 hours for 6 days     Handicap Placard Misc  by Does not apply route Expires 4/15/2027     ICaps Areds 2 Caps     metoprolol tartrate 50 MG tablet  Commonly known as: LOPRESSOR  TAKE HALF OF A TABLET IN MORNING AND ONE AT NIGHT     nitrofurantoin (macrocrystal-monohydrate) 100 MG capsule  Commonly known as: Macrobid  Take 1 capsule by mouth 2 times daily for 7 days     ondansetron 4 MG disintegrating tablet  Commonly known as: Zofran ODT  Take 1 tablet by mouth every 8 hours as needed for Nausea     pantoprazole 40 MG tablet  Commonly known as: PROTONIX  Take 1 tablet by mouth once daily     polyethylene glycol 17 g packet  Commonly known as: GLYCOLAX  Take 17 g by mouth daily as needed for Constipation     potassium chloride 20 MEQ extended release tablet  Commonly known as: KLOR-CON M  Take 1 tablet by mouth daily     propafenone 150 MG tablet  Commonly known as: RYTHMOL  Take 1 tablet by mouth 2 times daily     rivaroxaban 20 MG Tabs tablet  Commonly known as: XARELTO  Take 1 tablet by mouth daily     sodium chloride 1 g tablet  Take 1 tablet by mouth 2 times daily             Medications marked \"taking\" at this time  Outpatient Medications Marked as Taking for the 5/10/22 encounter (Office Visit) with Milad Child MD   Medication Sig Dispense Refill    nitrofurantoin, macrocrystal-monohydrate, (MACROBID) 100 MG capsule Take 1 capsule by mouth 2 times daily for 7 days 14 capsule 0    fluconazole (DIFLUCAN) 150 MG tablet Take 1 tablet by mouth every 72 hours for 6 days 2 tablet 0    rivaroxaban (XARELTO) 20 MG TABS tablet Take 1 tablet by mouth daily 30 tablet 2    polyethylene glycol (GLYCOLAX) 17 g packet Take 17 g by mouth daily as needed for Constipation 527 g 1    sodium chloride 1 g tablet Take 1 tablet by mouth 2 times daily 60 tablet 3    potassium chloride (KLOR-CON M) 20 MEQ extended release tablet Take 1 tablet by mouth daily 20 tablet 1    ondansetron (ZOFRAN ODT) 4 MG disintegrating tablet Take 1 tablet by mouth every 8 hours as needed for Nausea 20 tablet 0    propafenone (RYTHMOL) 150 MG tablet Take 1 tablet by mouth 2 times daily 60 tablet 3    Handicap Placard MISC by Does not apply route Expires 4/15/2027 1 each 0    pantoprazole (PROTONIX) 40 MG tablet Take 1 tablet by mouth once daily 90 tablet 1    metoprolol tartrate (LOPRESSOR) 50 MG tablet TAKE HALF OF A TABLET IN MORNING AND ONE AT NIGHT 60 tablet 3    Multiple Vitamins-Minerals (ICAPS AREDS 2) CAPS Take 2 tablets by mouth daily           Medications patient taking as of now reconciled against medications ordered at time of hospital discharge: Yes    Review of Systems   Constitutional: Positive for fatigue. Negative for activity change, appetite change, chills and fever. Eyes: Negative for visual disturbance. Respiratory: Negative for cough, chest tightness, shortness of breath and wheezing. Cardiovascular: Negative for chest pain, palpitations and leg swelling. Gastrointestinal: Negative for abdominal pain, constipation, diarrhea, nausea and vomiting. Endocrine: Negative for polyuria. Genitourinary: Negative for difficulty urinating. Neurological: Positive for weakness. Negative for dizziness, syncope and light-headedness. Objective:    /70   Pulse 111   Temp 97.9 °F (36.6 °C)   Ht 5' 5\" (1.651 m)   Wt 158 lb (71.7 kg)   SpO2 96%   BMI 26.29 kg/m²   Physical Exam  Vitals and nursing note reviewed. Constitutional:       General: She is not in acute distress. Appearance: She is well-developed. Eyes:      Conjunctiva/sclera: Conjunctivae normal.   Neck:      Thyroid: No thyromegaly. Cardiovascular:      Rate and Rhythm: Normal rate and regular rhythm. Heart sounds: Normal heart sounds. No murmur heard. Pulmonary:      Effort: Pulmonary effort is normal. No respiratory distress. Breath sounds: Normal breath sounds. No wheezing. Musculoskeletal:      Cervical back: Normal range of motion and neck supple. Lymphadenopathy:      Cervical: No cervical adenopathy. Skin:     General: Skin is warm and dry. Findings: No erythema or rash.    Neurological:      Mental Status: She is alert and oriented to person, place, and time. Psychiatric:         Mood and Affect: Mood normal.         Behavior: Behavior normal.         An electronic signature was used to authenticate this note.   --Bibi Krause MD

## 2022-05-11 ENCOUNTER — CARE COORDINATION (OUTPATIENT)
Dept: CASE MANAGEMENT | Age: 87
End: 2022-05-11

## 2022-05-12 NOTE — CARE COORDINATION
[place] [facility] Transitions Follow Up Call    2022    Patient: Kinza Portillo  Patient : 1934   MRN: [id number]  Reason for Admission: Colitis, Dysphagia, Gastritis, Esoph Stricture  Discharge Date: 22 RARS: Readmission Risk Score: 13.9 ( )    Care Transitions Subsequent and Final Call    Schedule Follow Up Appointment with PCP: Declined  Subsequent and Final Calls  Do you have any ongoing symptoms?: No  Have your medications changed?: No  Do you have any questions related to your medications?: No  Do you currently have any active services?: No  Do you have any needs or concerns that I can assist you with?: No  Identified Barriers: Other  Care Transitions Interventions   Home Care Waiver: Declined        Transportation Support: Declined      DME Assistance: Declined     Senior Services: Declined    Other Interventions:         Future Appointments   Date Time Provider Gina Beauchamp   2022 10:00 AM [facility]   2022  1:15 PM Rigoberto Olsen MD [facility]   2022 10:00 AM Zaheer Adam MD [facility]   2022 12:30 PM [address], APRN - CNP [facility]     CARE TRANSITION MONITORING    Facility: [facility]    Spoke briefly w/ Patient for follow up call; very polite however not engaged. Reports doing \"pretty good, drinking water\". Denies n/v/d, abd pain. Reports UTI sx resolved. Denies any urinary c/o. Reports voiding qs, clear yellow urine. Has one day left of Macrobid to be followed by Diflucan---Dtr aware. Denies choking episodes, difficulty swallowing. Reports \"slowly getting stronger\". Declines need for [facility], community assistance. Drinking Boost Supplement daily. Declines dietitian referral. Has been seen by GI and PCP since hosp discharge; no new rx. Advised to contact PCP  if sx return or worsen for early outpt intervention in effort to avoid ED or hospitalization.  Patient declines need for ongoing CT follow up as well supported by family. Has CTN contact numner should needs arise. Episode closed.   Remy Olivia RN

## 2022-05-20 DIAGNOSIS — N18.30 STAGE 3 CHRONIC KIDNEY DISEASE, UNSPECIFIED WHETHER STAGE 3A OR 3B CKD (HCC): Primary | ICD-10-CM

## 2022-05-24 ENCOUNTER — HOSPITAL ENCOUNTER (OUTPATIENT)
Dept: LAB | Age: 87
Discharge: HOME | End: 2022-05-24
Payer: MEDICARE

## 2022-05-24 DIAGNOSIS — N18.30 STAGE 3 CHRONIC KIDNEY DISEASE, UNSPECIFIED WHETHER STAGE 3A OR 3B CKD (HCC): ICD-10-CM

## 2022-05-24 LAB
ANION GAP SERPL CALCULATED.3IONS-SCNC: 9 MMOL/L (ref 9–17)
BUN BLDV-MCNC: 15 MG/DL (ref 8–23)
BUN/CREAT BLD: 18 (ref 9–20)
CALCIUM SERPL-MCNC: 9.7 MG/DL (ref 8.6–10.4)
CHLORIDE BLD-SCNC: 100 MMOL/L (ref 98–107)
CO2: 27 MMOL/L (ref 20–31)
CREAT SERPL-MCNC: 0.84 MG/DL (ref 0.5–0.9)
GFR AFRICAN AMERICAN: >60 ML/MIN
GFR NON-AFRICAN AMERICAN: >60 ML/MIN
GFR SERPL CREATININE-BSD FRML MDRD: NORMAL ML/MIN/{1.73_M2}
GLUCOSE BLD-MCNC: 94 MG/DL (ref 70–99)
POTASSIUM SERPL-SCNC: 4.5 MMOL/L (ref 3.7–5.3)
SODIUM BLD-SCNC: 136 MMOL/L (ref 135–144)

## 2022-05-24 PROCEDURE — 36415 COLL VENOUS BLD VENIPUNCTURE: CPT

## 2022-05-24 PROCEDURE — 80048 BASIC METABOLIC PNL TOTAL CA: CPT

## 2022-05-26 ENCOUNTER — OFFICE VISIT (OUTPATIENT)
Dept: NEPHROLOGY | Age: 87
End: 2022-05-26
Payer: MEDICARE

## 2022-05-26 VITALS
TEMPERATURE: 97.2 F | BODY MASS INDEX: 25.99 KG/M2 | HEART RATE: 93 BPM | OXYGEN SATURATION: 97 % | DIASTOLIC BLOOD PRESSURE: 65 MMHG | HEIGHT: 65 IN | WEIGHT: 156 LBS | SYSTOLIC BLOOD PRESSURE: 102 MMHG

## 2022-05-26 DIAGNOSIS — E87.1 HYPONATREMIA: ICD-10-CM

## 2022-05-26 DIAGNOSIS — I95.89 OTHER HYPOTENSION: ICD-10-CM

## 2022-05-26 DIAGNOSIS — E87.6 HYPOKALEMIA: Primary | ICD-10-CM

## 2022-05-26 PROCEDURE — G8427 DOCREV CUR MEDS BY ELIG CLIN: HCPCS | Performed by: INTERNAL MEDICINE

## 2022-05-26 PROCEDURE — 1111F DSCHRG MED/CURRENT MED MERGE: CPT | Performed by: INTERNAL MEDICINE

## 2022-05-26 PROCEDURE — G8417 CALC BMI ABV UP PARAM F/U: HCPCS | Performed by: INTERNAL MEDICINE

## 2022-05-26 PROCEDURE — 99213 OFFICE O/P EST LOW 20 MIN: CPT | Performed by: INTERNAL MEDICINE

## 2022-05-26 PROCEDURE — 1090F PRES/ABSN URINE INCON ASSESS: CPT | Performed by: INTERNAL MEDICINE

## 2022-05-26 PROCEDURE — 1036F TOBACCO NON-USER: CPT | Performed by: INTERNAL MEDICINE

## 2022-05-26 PROCEDURE — 1123F ACP DISCUSS/DSCN MKR DOCD: CPT | Performed by: INTERNAL MEDICINE

## 2022-05-26 NOTE — PROGRESS NOTES
[facility]        Outpatient Follow-Up note         5/26/2022 10:21 AM    Patient Name:   Gay Other  YOB: 1934  Primary Care Physician:  Amber Urena MD   [address] [facility]  [address]  Dept: [phone]  Loc: [phone]     Chief Complaint / Reason for follow-up : Follow Up of Hyponatremia and post hospital discharge      Interval History :  Patient seen and examined by me. Feels well. No cp or SOB. Eating and drinking well.      Past History :  Past Medical History:   Diagnosis Date    Actinic keratosis     history of    Arthritis     Atrial fibrillation ([address]     CAD (coronary artery disease)     history of afib    Cancer ([address]     skin    Cervical prolapse     history of    Diverticulosis     Endocervical polyp     history of    Heel spur     Hip pain     Hypertension     Nasal septal deviation     Osteoporosis     Stress incontinence     history of    Syncope     Vaginal prolapse     history of     Past Surgical History:   Procedure Laterality Date    APPENDECTOMY  1960's   Carlos Salm BLADDER REPAIR  2011    [address]   [id number]/[id number]    [facility]    COLONOSCOPY  01/26/2000    ENDOSCOPIC ULTRASOUND (LOWER) Left 07/20/2017    ENDOSCOPIC ULTRASOUND performed by Abad Rainey MD at [facility] [address] cataract    FACIAL SURGERY N/A 1/27/2021    DIVISION AND INSET/CHEEK performed by Coretta Pérez MD at [facility]  2017    left total hip relpament    MOHS SURGERY N/A 1/8/2021    MOHS REPAIR BCC NASAL TIP WITH FLAP AND GRAFT performed by Coretta Pérez MD at [place] [address] Left 8/6/2021    MOHS DEFECT REPAIR SCC DORSAL NOSE AND LEFT LOWER FOREHEAD WITH SKIN GRAFT FROM RIGHT CHEEK/EAR (PLACED ON NOSE) performed by Ralph Yin MD at [address] [facility]  1960's    RI EGD TRANSORAL BIOPSY SINGLE/MULTIPLE Left 07/16/2017    EGD BIOPSY performed by Danielle Kimble MD at [address] [facility]    RI OFFICE/OUTPT VISIT,PROCEDURE ONLY Right 06/01/2018    MOHS REPAIR BASEL CELL CARCINOMA RIGHT DORSAL NOSE performed by Ralph Yin MD at [address] [facility] [address] HIP ARTHROPLASTY Left 09/19/2017    Left Total Hip ARTHROPLASTY performed by Jaron Johnson MD at [address]  07/16/2017    UPPER GASTROINTESTINAL ENDOSCOPY Left 07/16/2017    EGD DILATION BALLOON performed by Danielle Kimble MD at [address] [facility]    [address] Left 8/28/2021    EGD BIOPSY performed by Lou Beaver MD at [facility]   [address] N/A 8/30/2021    EGD DILATION SAVORY performed by Lou Beaver MD at [address] [place] [address] N/A 4/28/2022    EGD BIOPSY performed by Lou Beaver MD at [address] [place] [address]  4/28/2022    EGD DILATION SAVORY performed by Lou Beaver MD at [place] [address] LEFT Left 4/21/2021    US BREAST NEEDLE BIOPSY LEFT 4/21/2021 [address] BREAST NEEDLE BIOPSY RIGHT Right 4/21/2021    US BREAST NEEDLE BIOPSY RIGHT 4/21/2021 [address] ULTRASOUND    US GUIDED NEEDLE LOC BREAST ADDL LEFT Left 4/21/2021    US GUIDED NEEDLE LOC BREAST ADDL LEFT 4/21/2021 [address] [facility] ULTRASOUND        Medications :     Outpatient Medications Marked as Taking for the 5/26/22 encounter (Office Visit) with Frank Powers MD   Medication Sig Dispense Refill    rivaroxaban (XARELTO) 20 MG TABS tablet Take 1 tablet by mouth daily 30 tablet 2    sodium chloride 1 g tablet Take 1 tablet by mouth 2 times daily 60 tablet 3    potassium chloride (KLOR-CON M) 20 MEQ extended release tablet Take 1 tablet by mouth daily 20 tablet 1    ondansetron (ZOFRAN ODT) 4 MG disintegrating tablet Take 1 tablet by mouth every 8 hours as needed for Nausea 20 tablet 0    propafenone (RYTHMOL) 150 MG tablet Take 1 tablet by mouth 2 times daily 60 tablet 3    Handicap Placard MISC by Does not apply route Expires 4/15/2027 1 each 0    pantoprazole (PROTONIX) 40 MG tablet Take 1 tablet by mouth once daily 90 tablet 1    metoprolol tartrate (LOPRESSOR) 50 MG tablet TAKE HALF OF A TABLET IN MORNING AND ONE AT NIGHT 60 tablet 3    Multiple Vitamins-Minerals (ICAPS AREDS 2) CAPS Take 2 tablets by mouth daily          Vitals     /65 (Site: Left Upper Arm, Position: Sitting, Cuff Size: Small Adult)   Pulse 93   Temp 97.2 °F (36.2 °C)   Ht 5' 5\" (1.651 m)   Wt 156 lb (70.8 kg)   SpO2 97%   BMI 25.96 kg/m²  Wt Readings from Last 3 Encounters:   05/26/22 156 lb (70.8 kg)   05/10/22 158 lb (71.7 kg)   05/05/22 158 lb (71.7 kg)        Physical Exam     General -- no distress  Lungs -- clear  Heart -- S1, S2 heard, JVD - no  Abdomen - soft, non-tender  Extremities -- no edema  CNS - awake and alert    Labs, Radiology and Tests    Labs -    5/22           Sodium 136           Potassium 4.5           BUN 15           Creatinine 0.84           eGFR > 60                       UPCR            UMCR                          Assessment    1. Renal -renal function stable at baseline  -Possible mild JOSSUE recently due to poor oral intake which has improved  2. Hyponatremia secondary to decreased oral intake currently on salt tablets currently doing well will discontinue and monitor  3. Hypokalemia also much improved. The KCl and monitor  4. Borderline blood pressure asymptomatic on low-dose metoprolol if she does become symptomatic may be need to consider adding some midodrine  5. meds reviewed and D/W patient and daughter  10. Follow-up in 6 months    Tests and orders placed this Encounter   No orders of the defined types were placed in this encounter.       Gio Mae M.D  [facility].

## 2022-06-09 ENCOUNTER — HOSPITAL ENCOUNTER (OUTPATIENT)
Dept: LAB | Age: 87
Discharge: HOME | End: 2022-06-09
Payer: MEDICARE

## 2022-06-09 DIAGNOSIS — E87.6 HYPOKALEMIA: ICD-10-CM

## 2022-06-09 DIAGNOSIS — I95.89 OTHER HYPOTENSION: ICD-10-CM

## 2022-06-09 DIAGNOSIS — E87.1 HYPONATREMIA: ICD-10-CM

## 2022-06-09 LAB
ANION GAP SERPL CALCULATED.3IONS-SCNC: 10 MMOL/L (ref 9–17)
CHLORIDE BLD-SCNC: 98 MMOL/L (ref 98–107)
CO2: 29 MMOL/L (ref 20–31)
POTASSIUM SERPL-SCNC: 4.2 MMOL/L (ref 3.7–5.3)
SODIUM BLD-SCNC: 137 MMOL/L (ref 135–144)

## 2022-06-09 PROCEDURE — 80051 ELECTROLYTE PANEL: CPT

## 2022-06-09 PROCEDURE — 36415 COLL VENOUS BLD VENIPUNCTURE: CPT

## 2022-06-20 ENCOUNTER — TELEPHONE (OUTPATIENT)
Dept: OBGYN | Age: 87
End: 2022-06-20

## 2022-06-27 ENCOUNTER — OFFICE VISIT (OUTPATIENT)
Dept: FAMILY MEDICINE CLINIC | Age: 87
End: 2022-06-27
Payer: MEDICARE

## 2022-06-27 VITALS
WEIGHT: 156 LBS | TEMPERATURE: 97.8 F | OXYGEN SATURATION: 96 % | HEIGHT: 65 IN | DIASTOLIC BLOOD PRESSURE: 70 MMHG | SYSTOLIC BLOOD PRESSURE: 122 MMHG | HEART RATE: 109 BPM | BODY MASS INDEX: 25.99 KG/M2

## 2022-06-27 DIAGNOSIS — E44.0 MODERATE MALNUTRITION (HCC): ICD-10-CM

## 2022-06-27 DIAGNOSIS — M54.50 ACUTE MIDLINE LOW BACK PAIN WITHOUT SCIATICA: ICD-10-CM

## 2022-06-27 DIAGNOSIS — Z00.00 MEDICARE ANNUAL WELLNESS VISIT, SUBSEQUENT: Primary | ICD-10-CM

## 2022-06-27 DIAGNOSIS — K29.50 OTHER CHRONIC GASTRITIS WITHOUT HEMORRHAGE: ICD-10-CM

## 2022-06-27 PROCEDURE — 1036F TOBACCO NON-USER: CPT | Performed by: FAMILY MEDICINE

## 2022-06-27 PROCEDURE — 99213 OFFICE O/P EST LOW 20 MIN: CPT

## 2022-06-27 PROCEDURE — G8417 CALC BMI ABV UP PARAM F/U: HCPCS | Performed by: FAMILY MEDICINE

## 2022-06-27 PROCEDURE — 1090F PRES/ABSN URINE INCON ASSESS: CPT | Performed by: FAMILY MEDICINE

## 2022-06-27 PROCEDURE — G0439 PPPS, SUBSEQ VISIT: HCPCS | Performed by: FAMILY MEDICINE

## 2022-06-27 PROCEDURE — 1123F ACP DISCUSS/DSCN MKR DOCD: CPT | Performed by: FAMILY MEDICINE

## 2022-06-27 PROCEDURE — G8427 DOCREV CUR MEDS BY ELIG CLIN: HCPCS | Performed by: FAMILY MEDICINE

## 2022-06-27 PROCEDURE — 99214 OFFICE O/P EST MOD 30 MIN: CPT | Performed by: FAMILY MEDICINE

## 2022-06-27 RX ORDER — BACLOFEN 10 MG/1
5 TABLET ORAL NIGHTLY
Qty: 30 TABLET | Refills: 1 | Status: ON HOLD | OUTPATIENT
Start: 2022-06-27 | End: 2022-10-12 | Stop reason: HOSPADM

## 2022-06-27 RX ORDER — PREDNISONE 20 MG/1
20 TABLET ORAL 2 TIMES DAILY
Qty: 10 TABLET | Refills: 0 | Status: SHIPPED | OUTPATIENT
Start: 2022-06-27 | End: 2022-07-02

## 2022-06-27 ASSESSMENT — PATIENT HEALTH QUESTIONNAIRE - PHQ9
SUM OF ALL RESPONSES TO PHQ QUESTIONS 1-9: 7
6. FEELING BAD ABOUT YOURSELF - OR THAT YOU ARE A FAILURE OR HAVE LET YOURSELF OR YOUR FAMILY DOWN: 0
8. MOVING OR SPEAKING SO SLOWLY THAT OTHER PEOPLE COULD HAVE NOTICED. OR THE OPPOSITE, BEING SO FIGETY OR RESTLESS THAT YOU HAVE BEEN MOVING AROUND A LOT MORE THAN USUAL: 0
5. POOR APPETITE OR OVEREATING: 1
3. TROUBLE FALLING OR STAYING ASLEEP: 0
2. FEELING DOWN, DEPRESSED OR HOPELESS: 1
SUM OF ALL RESPONSES TO PHQ9 QUESTIONS 1 & 2: 4
SUM OF ALL RESPONSES TO PHQ QUESTIONS 1-9: 7
10. IF YOU CHECKED OFF ANY PROBLEMS, HOW DIFFICULT HAVE THESE PROBLEMS MADE IT FOR YOU TO DO YOUR WORK, TAKE CARE OF THINGS AT HOME, OR GET ALONG WITH OTHER PEOPLE: 1
SUM OF ALL RESPONSES TO PHQ QUESTIONS 1-9: 7
9. THOUGHTS THAT YOU WOULD BE BETTER OFF DEAD, OR OF HURTING YOURSELF: 0
4. FEELING TIRED OR HAVING LITTLE ENERGY: 2
1. LITTLE INTEREST OR PLEASURE IN DOING THINGS: 3
SUM OF ALL RESPONSES TO PHQ QUESTIONS 1-9: 7
7. TROUBLE CONCENTRATING ON THINGS, SUCH AS READING THE NEWSPAPER OR WATCHING TELEVISION: 0

## 2022-06-27 ASSESSMENT — ENCOUNTER SYMPTOMS
CONSTIPATION: 0
ABDOMINAL PAIN: 0
COUGH: 0
BOWEL INCONTINENCE: 0
SHORTNESS OF BREATH: 0
DIARRHEA: 0
WHEEZING: 0
BACK PAIN: 1
CHEST TIGHTNESS: 0

## 2022-06-27 ASSESSMENT — LIFESTYLE VARIABLES: HOW OFTEN DO YOU HAVE A DRINK CONTAINING ALCOHOL: NEVER

## 2022-06-27 NOTE — PATIENT INSTRUCTIONS
Personalized Preventive Plan for Alvino Buckner - 6/27/2022  Medicare offers a range of preventive health benefits. Some of the tests and screenings are paid in full while other may be subject to a deductible, co-insurance, and/or copay. Some of these benefits include a comprehensive review of your medical history including lifestyle, illnesses that may run in your family, and various assessments and screenings as appropriate. After reviewing your medical record and screening and assessments performed today your provider may have ordered immunizations, labs, imaging, and/or referrals for you. A list of these orders (if applicable) as well as your Preventive Care list are included within your After Visit Summary for your review. Other Preventive Recommendations:    · A preventive eye exam performed by an eye specialist is recommended every 1-2 years to screen for glaucoma; cataracts, macular degeneration, and other eye disorders. · A preventive dental visit is recommended every 6 months. · Try to get at least 150 minutes of exercise per week or 10,000 steps per day on a pedometer . · Order or download the FREE \"Exercise & Physical Activity: Your Everyday Guide\" from [employer]. Call 4-873.283.5626 or search [employer] online. · You need 9610-0285 mg of calcium and 0956-6320 IU of vitamin D per day. It is possible to meet your calcium requirement with diet alone, but a vitamin D supplement is usually necessary to meet this goal.  · When exposed to the sun, use a sunscreen that protects against both UVA and UVB radiation with an SPF of 30 or greater. Reapply every 2 to 3 hours or after sweating, drying off with a towel, or swimming. · Always wear a seat belt when traveling in a car. Always wear a helmet when riding a bicycle or motorcycle.

## 2022-06-27 NOTE — PROGRESS NOTES
[address]  [address] [address]  Dept: [phone]  Dept Fax: [phone]  Loc: [phone]    Cynthia Rockwell is a 80 y.o. female who presents today for her medical conditions/complaints as notedbelow. Cynthia Rockwell is c/o of   Chief Complaint   Patient presents with    Medicare AWV    Back Pain     6 month       HPI:     Here today for her [address] and a follow up of her GERD and her back hurts. Back Pain  This is a new problem. The current episode started in the past 7 days (3 days). The problem occurs constantly. The problem has been gradually worsening since onset. The pain is present in the lumbar spine. The quality of the pain is described as aching. The pain does not radiate. The pain is at a severity of 7/10. The pain is moderate. Exacerbated by: walking. Associated symptoms include weakness (in right leg). Pertinent negatives include no abdominal pain, bladder incontinence, bowel incontinence, chest pain, dysuria, fever, leg pain, numbness, paresis, paresthesias or tingling. Risk factors include obesity and sedentary lifestyle. She has tried ice ( massaged it) for the symptoms. The treatment provided mild relief. GERD: stable; she has been doing well. No issues with abdominal pain or issues with acid reflux. She has noticed some issues with redness of her lower legs that has been developing over the past 6 months or so. She noticed it when she started the xarelto.        Past Medical History:   Diagnosis Date    Actinic keratosis     history of    Arthritis     Atrial fibrillation ([address]     CAD (coronary artery disease)     history of afib    Cancer (HCC)     skin    Cervical prolapse     history of    Diverticulosis     Endocervical polyp     history of    Heel spur     Hip pain     Hypertension     Nasal septal deviation     Osteoporosis     Stress incontinence history of    Syncope     Vaginal prolapse     history of          Social History     Tobacco Use    Smoking status: Never Smoker    Smokeless tobacco: Never Used   Substance Use Topics    Alcohol use: No     Current Outpatient Medications   Medication Sig Dispense Refill    predniSONE (DELTASONE) 20 MG tablet Take 1 tablet by mouth 2 times daily for 5 days 10 tablet 0    baclofen (LIORESAL) 10 MG tablet Take 0.5 tablets by mouth at bedtime 30 tablet 1    rivaroxaban (XARELTO) 20 MG TABS tablet Take 1 tablet by mouth daily 30 tablet 2    ondansetron (ZOFRAN ODT) 4 MG disintegrating tablet Take 1 tablet by mouth every 8 hours as needed for Nausea 20 tablet 0    propafenone (RYTHMOL) 150 MG tablet Take 1 tablet by mouth 2 times daily 60 tablet 3    Handicap Placard MISC by Does not apply route Expires 4/15/2027 1 each 0    pantoprazole (PROTONIX) 40 MG tablet Take 1 tablet by mouth once daily 90 tablet 1    metoprolol tartrate (LOPRESSOR) 50 MG tablet TAKE HALF OF A TABLET IN MORNING AND ONE AT NIGHT 60 tablet 3    Multiple Vitamins-Minerals (ICAPS AREDS 2) CAPS Take 2 tablets by mouth daily        No current facility-administered medications for this visit. Allergies   Allergen Reactions    Statins Other (See Comments)     Causes severe leg cramps     Tape Farnaz Mean Tape] Other (See Comments)     \"sticks to skin\" causes redness    Tizanidine Hcl Other (See Comments)     Having issues with her liver- elevated her enzyme level    Tramadol Other (See Comments)     \"makes me Goofy\"    Augmentin [Amoxicillin-Pot Clavulanate] Diarrhea, Nausea And Vomiting and Other (See Comments)     abd pain    Nystatin Nausea And Vomiting     Pt states \"swish and swallow\"        Subjective:     Review of Systems   Constitutional: Negative for activity change, appetite change, chills, fatigue and fever. Eyes: Negative for visual disturbance.    Respiratory: Negative for cough, chest tightness, shortness of breath and wheezing. Cardiovascular: Negative for chest pain, palpitations and leg swelling. Gastrointestinal: Negative for abdominal pain, bowel incontinence, constipation and diarrhea. Genitourinary: Negative for bladder incontinence, difficulty urinating and dysuria. Musculoskeletal: Positive for back pain. Negative for gait problem. Skin: Negative for rash. Neurological: Positive for weakness (in right leg). Negative for dizziness, tingling, syncope, light-headedness, numbness and paresthesias. Objective:      Physical Exam  Vitals and nursing note reviewed. Constitutional:       General: She is not in acute distress. Appearance: She is well-developed. HENT:      Right Ear: Tympanic membrane, ear canal and external ear normal.      Left Ear: Tympanic membrane, ear canal and external ear normal.   Eyes:      Conjunctiva/sclera: Conjunctivae normal.   Neck:      Thyroid: No thyroid mass or thyromegaly. Cardiovascular:      Rate and Rhythm: Normal rate and regular rhythm. Heart sounds: Normal heart sounds. No murmur heard. Pulmonary:      Effort: Pulmonary effort is normal. No respiratory distress. Breath sounds: Normal breath sounds. No wheezing or rales. Musculoskeletal:         General: Normal range of motion. Cervical back: Normal range of motion and neck supple. Lumbar back: Spasms present. No swelling, edema, tenderness or bony tenderness. Normal range of motion. Comments: Modified straight leg raise test was negative bilaterally   Lymphadenopathy:      Cervical: No cervical adenopathy. Skin:     General: Skin is warm and dry. Findings: No erythema or rash. Comments: Stasis dermatitis changes   Neurological:      Mental Status: She is alert and oriented to person, place, and time. Sensory: No sensory deficit.       Coordination: Coordination normal.      Gait: Gait normal.      Deep Tendon Reflexes:      Reflex Scores: Patellar reflexes are 2+ on the right side and 2+ on the left side. Psychiatric:         Mood and Affect: Mood normal.         Behavior: Behavior normal.         Thought Content: Thought content normal.         Judgment: Judgment normal.       /70   Pulse (!) 109   Temp 97.8 °F (36.6 °C)   Ht 5' 5\" (1.651 m)   Wt 156 lb (70.8 kg)   SpO2 96%   BMI 25.96 kg/m²     Assessment:       Diagnosis Orders   1. Medicare annual wellness visit, subsequent     2. Acute midline low back pain without sciatica     3. Moderate malnutrition (HCC)  Comprehensive Metabolic Panel   4. Other chronic gastritis without hemorrhage               Plan:        MWV: see other note    Back pain: new; I explained that I do not like to treat back pain with narcotics. I recommended steroids, heat, ice and back exercises. she was given back exercises to take home. I also recommended an occasional muscle relaxer at bedtime if she needs it. I also talked about the importance of good posture and staying active. Malnutrition: improving; since she has been able to eat and swallow better her nutrition is improving. I will check a protein level at her next visit. GERD/gastritis: stable; she has been doing very well on the protonix so I will continue it. Return in 6 months (on 12/27/2022) for GERD follow up. Orders Placed This Encounter   Procedures    Comprehensive Metabolic Panel     Standing Status:   Future     Standing Expiration Date:   6/27/2023     Orders Placed This Encounter   Medications    predniSONE (DELTASONE) 20 MG tablet     Sig: Take 1 tablet by mouth 2 times daily for 5 days     Dispense:  10 tablet     Refill:  0    baclofen (LIORESAL) 10 MG tablet     Sig: Take 0.5 tablets by mouth at bedtime     Dispense:  30 tablet     Refill:  1       Patientgiven educational materials - see patient instructions. Discussed use, benefit,and side effects of prescribed medications. All patient questions answered.  Ptvoiced

## 2022-06-27 NOTE — PROGRESS NOTES
Medicare Annual Wellness Visit    Alvino Buckner is here for Medicare AWV and Back Pain (6 month)    Assessment & Plan   Medicare annual wellness visit, subsequent  Acute midline low back pain without sciatica  Moderate malnutrition ([address]  -     Comprehensive Metabolic Panel; Future  Other chronic gastritis without hemorrhage      Recommendations for Preventive Services Due: see orders and patient instructions/AVS.  Recommended screening schedule for the next 5-10 years is provided to the patient in written form: see Patient Instructions/AVS.     Return in 6 months (on 12/27/2022) for GERD follow up. Subjective   The following acute and/or chronic problems were also addressed today:  Back pain, gerd    Patient's complete Health Risk Assessment and screening values have been reviewed and are found in Flowsheets. The following problems were reviewed today and where indicated follow up appointments were made and/or referrals ordered.     Positive Risk Factor Screenings with Interventions:      Depression:  PHQ-2 Score: 4  PHQ-9 Total Score: 7    Severity:1-4 = minimal depression, 5-9 = mild depression, 10-14 = moderate depression, 15-19 = moderately severe depression, 20-27 = severe depression    Depression Interventions:  · Patient declines any further evaluation/treatment for this issue            General Health and ACP:  General  In general, how would you say your health is?: Fair  In the past 7 days, have you experienced any of the following: New or Increased Pain, New or Increased Fatigue, Loneliness, Social Isolation, Stress or Anger?: (!) Yes  Select all that apply: (!) New or Increased Pain  Do you get the social and emotional support that you need?: Yes  Do you have a Living Will?: Yes    Advance Directives     Power of  Living Will ACP-Advance Directive ACP-Power of     Not on File Not on File Not on File Not on File      General Health Risk Interventions:  · Pain issues: home exercises provided, prednisone and baclofen ordered  · No Living Will: ACP documents already completed- patient asked to provide copy to the office    Health Habits/Nutrition:     Physical Activity: Inactive    Days of Exercise per Week: 0 days    Minutes of Exercise per Session: 0 min     Have you lost any weight without trying in the past 3 months?: No  Body mass index: (!) 25.96  Have you seen the dentist within the past year?: Yes    Health Habits/Nutrition Interventions:  · Nutritional issues:  educational materials for healthy, well-balanced diet provided             Objective   Vitals:    06/27/22 1018   BP: 122/70   Pulse: (!) 109   Temp: 97.8 °F (36.6 °C)   SpO2: 96%   Weight: 156 lb (70.8 kg)   Height: 5' 5\" (1.651 m)      Body mass index is 25.96 kg/m². Allergies   Allergen Reactions    Statins Other (See Comments)     Causes severe leg cramps     Tape Monica Gonsalves Tape] Other (See Comments)     \"sticks to skin\" causes redness    Tizanidine Hcl Other (See Comments)     Having issues with her liver- elevated her enzyme level    Tramadol Other (See Comments)     \"makes me Goofy\"    Augmentin [Amoxicillin-Pot Clavulanate] Diarrhea, Nausea And Vomiting and Other (See Comments)     abd pain    Nystatin Nausea And Vomiting     Pt states \"swish and swallow\"      Prior to Visit Medications    Medication Sig Taking?  Authorizing Provider   predniSONE (DELTASONE) 20 MG tablet Take 1 tablet by mouth 2 times daily for 5 days Yes Gavin Britt MD   baclofen (LIORESAL) 10 MG tablet Take 0.5 tablets by mouth at bedtime Yes Gavin Britt MD   rivaroxaban (XARELTO) 20 MG TABS tablet Take 1 tablet by mouth daily Yes Rob Garcia MD   ondansetron (ZOFRAN ODT) 4 MG disintegrating tablet Take 1 tablet by mouth every 8 hours as needed for Nausea Yes Rob Garcia MD   propafenone (RYTHMOL) 150 MG tablet Take 1 tablet by mouth 2 times daily Yes MD Derrick Pedro MISC by Does not apply route Expires 4/15/2027 Yes Amber Urena MD   pantoprazole (PROTONIX) 40 MG tablet Take 1 tablet by mouth once daily Yes Amber Urena MD   metoprolol tartrate (LOPRESSOR) 50 MG tablet TAKE HALF OF A TABLET IN MORNING AND ONE AT NIGHT Yes Susan Rodriguez MD   Multiple Vitamins-Minerals (ICAPS AREDS 2) CAPS Take 2 tablets by mouth daily  Yes Historical Provider, MD       [facility] (Including outside providers/suppliers regularly involved in providing care):   Patient Care Team:  Amber Urena MD as PCP - General (Family Medicine)  Amber Urena MD as PCP - [facility] Empaneled Provider  [facility])     Reviewed and updated this visit:  Tobacco  Allergies  Meds  Problems  Med Hx  Surg Hx  Soc Hx  Fam Hx

## 2022-07-07 ENCOUNTER — OFFICE VISIT (OUTPATIENT)
Dept: CARDIOLOGY | Age: 87
End: 2022-07-07
Payer: MEDICARE

## 2022-07-07 VITALS
WEIGHT: 157 LBS | BODY MASS INDEX: 26.16 KG/M2 | HEIGHT: 65 IN | SYSTOLIC BLOOD PRESSURE: 116 MMHG | DIASTOLIC BLOOD PRESSURE: 70 MMHG | HEART RATE: 90 BPM

## 2022-07-07 DIAGNOSIS — I48.0 PAROXYSMAL ATRIAL FIBRILLATION WITH RAPID VENTRICULAR RESPONSE (HCC): Primary | ICD-10-CM

## 2022-07-07 PROCEDURE — 99213 OFFICE O/P EST LOW 20 MIN: CPT | Performed by: INTERNAL MEDICINE

## 2022-07-07 PROCEDURE — 1090F PRES/ABSN URINE INCON ASSESS: CPT | Performed by: INTERNAL MEDICINE

## 2022-07-07 PROCEDURE — 99214 OFFICE O/P EST MOD 30 MIN: CPT | Performed by: INTERNAL MEDICINE

## 2022-07-07 PROCEDURE — 1123F ACP DISCUSS/DSCN MKR DOCD: CPT | Performed by: INTERNAL MEDICINE

## 2022-07-07 PROCEDURE — 93010 ELECTROCARDIOGRAM REPORT: CPT | Performed by: INTERNAL MEDICINE

## 2022-07-07 PROCEDURE — 1036F TOBACCO NON-USER: CPT | Performed by: INTERNAL MEDICINE

## 2022-07-07 PROCEDURE — 93005 ELECTROCARDIOGRAM TRACING: CPT | Performed by: INTERNAL MEDICINE

## 2022-07-07 PROCEDURE — G8417 CALC BMI ABV UP PARAM F/U: HCPCS | Performed by: INTERNAL MEDICINE

## 2022-07-07 PROCEDURE — G8428 CUR MEDS NOT DOCUMENT: HCPCS | Performed by: INTERNAL MEDICINE

## 2022-07-07 NOTE — PROGRESS NOTES
Today's Date: 7/7/2022  Patient Name: Rachael Carter  Patient's age: 80 y.o., 1934      HPI:   The patient is a 80 y.o.  female is in the office for follow up. She is back in atrial flutter/fibrillation with controlled ventricular response since April 2022. In April she was admitted with esophagitis, underwent EGD and esophageal dilatation. She does report fatigue and tiredness with occasional dyspnea on exertion. Otherwise denies any chest pain, palpitations, heart racing, dizziness or lightheadedness. No syncope or near syncope. Past Medical History:   has a past medical history of Actinic keratosis, Arthritis, Atrial fibrillation ([facility] CAD (coronary artery disease), Cancer ([facility] Cervical prolapse, Diverticulosis, Endocervical polyp, Heel spur, Hip pain, Hypertension, Nasal septal deviation, Osteoporosis, Stress incontinence, Syncope, and Vaginal prolapse. Past Surgical History:   has a past surgical history that includes Colonoscopy (01/26/2000); Cholecystectomy (11/1976); Upper gastrointestinal endoscopy (07/16/2017); pr egd transoral biopsy single/multiple (Left, 07/16/2017); Upper gastrointestinal endoscopy (Left, 07/16/2017); Endoscopic ultrasonography, GI (Left, 07/20/2017); eye surgery; Total hip arthroplasty (Left, 09/19/2017); bladder repair (2011); Partial hysterectomy (1960's); Appendectomy (1960's); skin biopsy; pr office/outpt visit,procedure only (Right, 06/01/2018); joint replacement (2017); Mohs surgery (N/A, 1/8/2021); Facial Surgery (N/A, 1/27/2021); US BREAST BIOPSY W LOC DEVICE 1ST LESION LEFT (Left, 4/21/2021); US PLACE BREAST LOC DEVICE EACH ADDL LEFT (Left, 4/21/2021); US BREAST BIOPSY W LOC DEVICE 1ST LESION RIGHT (Right, 4/21/2021); Hysterectomy; Mohs surgery (Left, 8/6/2021); Upper gastrointestinal endoscopy (Left, 8/28/2021); Upper gastrointestinal endoscopy (N/A, 8/30/2021);  Upper gastrointestinal endoscopy (N/A, 4/28/2022); and Upper gastrointestinal endoscopy (4/28/2022). Home Medications:    Prior to Admission medications    Medication Sig Start Date End Date Taking? Authorizing Provider   rivaroxaban (XARELTO) 20 MG TABS tablet Take 1 tablet by mouth daily 4/29/22  Yes Fabi Ramirez MD   ondansetron (ZOFRAN ODT) 4 MG disintegrating tablet Take 1 tablet by mouth every 8 hours as needed for Nausea 4/29/22  Yes Fabi Ramirez MD   propafenone (RYTHMOL) 150 MG tablet Take 1 tablet by mouth 2 times daily 4/27/22  Yes Joellen Ferrari MD   Handicap Placard MISC by Does not apply route Expires 4/15/2027 4/15/22  Yes Joellen Ferrari MD   pantoprazole (PROTONIX) 40 MG tablet Take 1 tablet by mouth once daily 3/11/22  Yes Joellen Ferrari MD   metoprolol tartrate (LOPRESSOR) 50 MG tablet TAKE HALF OF A TABLET IN MORNING AND ONE AT NIGHT  Patient taking differently: Patient is taking 1/2 tab BID 12/16/21  Yes Krystal Willard MD   Multiple Vitamins-Minerals (ICAPS AREDS 2) CAPS Take 2 tablets by mouth daily    Yes Historical Provider, MD   baclofen (LIORESAL) 10 MG tablet Take 0.5 tablets by mouth at bedtime  Patient not taking: Reported on 7/7/2022 6/27/22   Joellen Ferrari MD       Allergies:  Statins, Tape Eldonna Southerly tape], Tizanidine hcl, Tramadol, Augmentin [amoxicillin-pot clavulanate], and Nystatin    Social History:   reports that she has never smoked. She has never used smokeless tobacco. She reports that she does not drink alcohol and does not use drugs. REVIEW OF SYSTEMS:  CONSTITUTIONAL:NEGATIVE  HEENT:NEG  Cardiovascular: No chest pain, No dyspnea on exertion, No palpitations. Lower extremity edema: No  RESPIRATORY: neg  GASTROINTESTINAL:  negative  GENITOURINARY:  negative  INTEGUMENT:  negative  MUSCULOSKELETAL:  positive for  pain  NEUROLOGICAL:  negative    PHYSICAL EXAM:      /70   Pulse 90   Ht 5' 5\" (1.651 m)   Wt 157 lb (71.2 kg)   BMI 26.13 kg/m²    HEENT: PERRL, no cervical lymphadenopathy.  No masses palpable. Cardiovascular:  · The apical impulse is not displaced  · Heart  Sounds: Irregularly irregular heart rate, ranging 70-90 at rest.  · Jugular venous pulsation Normal  · The carotid upstroke is normal  · Peripheral pulses are symmetrical and full  Respiratory: Good respiratory effort. On auscultation: clear to auscultation bilaterally  Abdomen:  · No masses or tenderness  · Bowel sounds present  Extremities:  ·  No Cyanosis or Clubbing  ·  Lower extremity edema: No  Skin: Warm and dry    Cardiac data:      ECG 7/7/2022 atrial flutter with variable AV conduction, nonspecific T wave abnormality    Labs:   PT/INR:   No results for input(s): PROTIME, INR in the last 72 hours. FASTING LIPID PANEL:  Lab Results   Component Value Date/Time    HDL 57 01/19/2021 02:33 PM    TRIG 135 01/19/2021 02:33 PM       Echocardiogram 7/2017:  Normal left ventricle size and systolic function. Ejection fraction was estimated at 60%. There were no regional left ventricular wall motion abnormalities and wall thickness was within normal limits. There was mild aortic regurgitation. There was mild mitral regurgitation. There was mild tricuspid regurgitation. Nuclear stress test 7/2017:   [facility] EKG stress test is not suggestive for ischemia. This Nuclear Medicine study was negative for ischemia. Assessment:    · Atrial fibrillation/flutter, persistent since April  · MILD MR  · MILD AI  · A/C WITH DOAC  · Preserved LV systolic function  · No ischemia or infarction on nuclear stress test 2017.   · Recent history of esophagitis/esophageal stricture status postdilatation      Patient Active Problem List   Diagnosis    Trochanteric bursitis of left hip    Drug-induced constipation    Bradycardia    Esophageal stricture    Gastritis without bleeding    Lumbar radicular pain    Lumbar spine pain    Left hip pain    Arthritis of left hip    Paroxysmal atrial fibrillation with rapid ventricular response ([address]    Venous stasis dermatitis of both lower extremities    Arthritis of right hand    Vaginal erosion secondary to pessary use (HCC)    Pelvic relaxation due to uterovaginal prolapse    Postmenopausal bleeding    History of hysterectomy    Esophageal candidiasis (HCC)    Hyponatremia    Lower abdominal pain    Atrial flutter (HCC)    Presence of pessary #4 ring    Epigastric pain    Moderate malnutrition (HCC)       Recommendations:  1- Continue Lopressor 25 mg in AM and 50 mg at night. Continue Rythmol. 2-continue Rythmol and Xarelto   3-I had detailed discussion with the patient and her daughter regarding further management. Option of DCCV to restore normal sinus rhythm fo symptomatic improvement versus rate control strategies discussed. They will discuss and call us with option they would like to proceed. Medical therapy will be further adjusted based on their decision.    4-F/U in 3 months or sooner if needed       Electronically signed by Osmel Madrigal MD on 7/7/2022 at 12:20 [address] [place] [address] [facility]  [phone]

## 2022-07-08 RX ORDER — METOPROLOL TARTRATE 50 MG/1
TABLET, FILM COATED ORAL
Qty: 90 TABLET | Refills: 3 | Status: SHIPPED | OUTPATIENT
Start: 2022-07-08

## 2022-08-22 ENCOUNTER — HOSPITAL ENCOUNTER (OUTPATIENT)
Age: 87
Setting detail: OUTPATIENT SURGERY
Discharge: HOME | End: 2022-08-22
Attending: INTERNAL MEDICINE | Admitting: INTERNAL MEDICINE
Payer: MEDICARE

## 2022-08-22 ENCOUNTER — ANESTHESIA EVENT (OUTPATIENT)
Dept: ENDOSCOPY | Age: 87
End: 2022-08-22
Payer: MEDICARE

## 2022-08-22 ENCOUNTER — ANESTHESIA (OUTPATIENT)
Dept: ENDOSCOPY | Age: 87
End: 2022-08-22
Payer: MEDICARE

## 2022-08-22 VITALS
OXYGEN SATURATION: 95 % | HEIGHT: 65 IN | DIASTOLIC BLOOD PRESSURE: 86 MMHG | RESPIRATION RATE: 16 BRPM | BODY MASS INDEX: 25.69 KG/M2 | SYSTOLIC BLOOD PRESSURE: 141 MMHG | TEMPERATURE: 96.6 F | HEART RATE: 81 BPM | WEIGHT: 154.2 LBS

## 2022-08-22 PROCEDURE — 3609012700 HC EGD DILATION SAVORY: Performed by: INTERNAL MEDICINE

## 2022-08-22 PROCEDURE — 2580000003 HC RX 258: Performed by: INTERNAL MEDICINE

## 2022-08-22 PROCEDURE — 3700000001 HC ADD 15 MINUTES (ANESTHESIA): Performed by: INTERNAL MEDICINE

## 2022-08-22 PROCEDURE — 6360000002 HC RX W HCPCS: Performed by: REGISTERED NURSE

## 2022-08-22 PROCEDURE — 3700000000 HC ANESTHESIA ATTENDED CARE: Performed by: INTERNAL MEDICINE

## 2022-08-22 PROCEDURE — 2709999900 HC NON-CHARGEABLE SUPPLY: Performed by: INTERNAL MEDICINE

## 2022-08-22 PROCEDURE — 7100000010 HC PHASE II RECOVERY - FIRST 15 MIN: Performed by: INTERNAL MEDICINE

## 2022-08-22 RX ORDER — PROPOFOL 10 MG/ML
INJECTION, EMULSION INTRAVENOUS PRN
Status: DISCONTINUED | OUTPATIENT
Start: 2022-08-22 | End: 2022-08-22 | Stop reason: SDUPTHER

## 2022-08-22 RX ORDER — SODIUM CHLORIDE 9 MG/ML
INJECTION, SOLUTION INTRAVENOUS CONTINUOUS
Status: DISCONTINUED | OUTPATIENT
Start: 2022-08-22 | End: 2022-08-22 | Stop reason: HOSPADM

## 2022-08-22 RX ADMIN — SODIUM CHLORIDE: 9 INJECTION, SOLUTION INTRAVENOUS at 10:35

## 2022-08-22 RX ADMIN — PROPOFOL 80 MG: 10 INJECTION, EMULSION INTRAVENOUS at 11:23

## 2022-08-22 ASSESSMENT — PAIN SCALES - GENERAL
PAINLEVEL_OUTOF10: 0
PAINLEVEL_OUTOF10: 0

## 2022-08-22 ASSESSMENT — PAIN - FUNCTIONAL ASSESSMENT: PAIN_FUNCTIONAL_ASSESSMENT: NONE - DENIES PAIN

## 2022-08-22 NOTE — PROGRESS NOTES
Scope . EGD completed. No biopsies taken. [employer] Dilator 39, 45 and 48 cm. Tolerated well. Pictures taken.

## 2022-08-22 NOTE — ANESTHESIA PRE PROCEDURE
Department of Anesthesiology  Preprocedure Note       Name:  Joby Padilla   Age:  80 y.o.  :  1934                                          MRN:  [id number]         Date:  2022      Surgeon: Lopez Hernandes):  Navin Garcia MD    Procedure: Procedure(s):  EGD DILATION SAVORY    Medications prior to admission:   Prior to Admission medications    Medication Sig Start Date End Date Taking? Authorizing Provider   rivaroxaban (XARELTO) 20 MG TABS tablet Take 1 tablet by mouth in the morning. 22   Bruce Segura MD   metoprolol tartrate (LOPRESSOR) 50 MG tablet Patient is taking 1/2 tab BID 22   Francy Magana MD   baclofen (LIORESAL) 10 MG tablet Take 0.5 tablets by mouth at bedtime  Patient not taking: No sig reported 22   Bruce Segura MD   ondansetron (ZOFRAN ODT) 4 MG disintegrating tablet Take 1 tablet by mouth every 8 hours as needed for Nausea  Patient not taking: Reported on 2022   Yanira Quiñones MD   propafenone (RYTHMOL) 150 MG tablet Take 1 tablet by mouth 2 times daily 22   Bruce Segura MD   pantoprazole (PROTONIX) 40 MG tablet Take 1 tablet by mouth once daily 3/11/22   Bruce Segura MD   Multiple Vitamins-Minerals (ICAPS AREDS 2) CAPS Take 2 tablets by mouth daily   Patient not taking: Reported on 2022    Historical Provider, MD       Current medications:    No current facility-administered medications for this visit. No current outpatient medications on file. Facility-Administered Medications Ordered in Other Visits   Medication Dose Route Frequency Provider Last Rate Last Admin    0.9 % sodium chloride infusion   IntraVENous Continuous Navin Garcia MD 75 mL/hr at 22 1035 New Bag at 22 1035       Allergies:     Allergies   Allergen Reactions    Statins Other (See Comments)     Causes severe leg cramps     Tape Dietra Amherst Tape] Other (See Comments)     \"sticks to skin\" causes redness    Tizanidine Hcl Other (See Comments)     Having issues with her liver- elevated her enzyme level    Tramadol Other (See Comments)     \"makes me Goofy\"    Augmentin [Amoxicillin-Pot Clavulanate] Diarrhea, Nausea And Vomiting and Other (See Comments)     abd pain    Nystatin Nausea And Vomiting     Pt states \"swish and swallow\"        Problem List:    Patient Active Problem List   Diagnosis Code    Trochanteric bursitis of left hip M70.62    Drug-induced constipation K59.03    Bradycardia R00.1    Esophageal stricture K22.2    Gastritis without bleeding K29.70    Lumbar radicular pain M54.16    Lumbar spine pain M54.50    Left hip pain M25.552    Arthritis of left hip M16.12    Paroxysmal atrial fibrillation with rapid ventricular response (HCC) I48.0    Venous stasis dermatitis of both lower extremities I87.2    Arthritis of right hand M19.041    Vaginal erosion secondary to pessary use (HCC) T83.89XA, N89.8    Pelvic relaxation due to uterovaginal prolapse N81.4    Postmenopausal bleeding N95.0    History of hysterectomy Z90.710    Esophageal candidiasis (HCC) B37.81    Hyponatremia E87.1    Lower abdominal pain R10.30    Atrial flutter (HCC) I48.92    Presence of pessary #4 ring Z96.0    Epigastric pain R10.13    Moderate malnutrition (HCC) E44.0       Past Medical History:        Diagnosis Date    Actinic keratosis     history of    Arthritis     Atrial fibrillation (HCC)     Cancer (HCC)     skin    Cervical prolapse     history of    Diverticulosis     Endocervical polyp     history of    Heel spur     Hip pain     Hypertension     Nasal septal deviation     Osteoporosis     Stress incontinence     history of    Syncope     Vaginal prolapse     history of       Past Surgical History:        Procedure Laterality Date    APPENDECTOMY  1960's   [employer] BLADDER REPAIR  2011    [address]   22/3742    [facility]    COLONOSCOPY  01/26/2000    ENDOSCOPIC ULTRASOUND (LOWER) Left 07/20/2017 ENDOSCOPIC ULTRASOUND performed by Keshia Fernandez MD at /[facility] [address] [place] [address] cataract    FACIAL SURGERY N/A 1/27/2021    DIVISION AND INSET/CHEEK performed by Nayely Lemons MD at [address] [facility])      JOINT REPLACEMENT  2017    left total hip relpament    MOHS SURGERY N/A 1/8/2021    MOHS REPAIR BCC NASAL TIP WITH FLAP AND GRAFT performed by Nayely Lemons MD at [address] Left 8/6/2021    MOHS DEFECT REPAIR SCC DORSAL NOSE AND LEFT LOWER FOREHEAD WITH SKIN GRAFT FROM RIGHT CHEEK/EAR (PLACED ON NOSE) performed by Nayely Lemons MD at [address] (CERVIX NOT REMOVED)  1960's    DE EGD TRANSORAL BIOPSY SINGLE/MULTIPLE Left 07/16/2017    EGD BIOPSY performed by Keshia Fernandez MD at [facility]    DE OFFICE/OUTPT VISIT,PROCEDURE ONLY Right 06/01/2018    MOHS REPAIR BASEL CELL CARCINOMA RIGHT DORSAL NOSE performed by Nayely Lemons MD at [address] [facility] Left 09/19/2017    Left Total Hip ARTHROPLASTY performed by Blair Muro MD at [address]  07/16/2017    UPPER GASTROINTESTINAL ENDOSCOPY Left 07/16/2017    EGD DILATION BALLOON performed by Keshia Fernandez MD at [address] [facility] Left 8/28/2021    EGD BIOPSY performed by Kojo Alvarado MD at [facility] N/A 8/30/2021    EGD DILATION SAVORY performed by Kojo Alvarado MD at [address] [facility] N/A 4/28/2022    EGD BIOPSY performed by Kojo Alvarado MD at [address] [place] [address]  4/28/2022    EGD DILATION SAVORY performed by Kojo Alvarado MD at [place] [address] LEFT Left 4/21/2021    US BREAST NEEDLE BIOPSY LEFT 4/21/2021 [address] [facility] ULTRASOUND    US BREAST NEEDLE BIOPSY RIGHT Right 4/21/2021 US BREAST NEEDLE BIOPSY RIGHT 4/21/2021 [address] [facility] ULTRASOUND    US GUIDED NEEDLE LOC BREAST ADDL LEFT Left 4/21/2021    US GUIDED NEEDLE LOC BREAST ADDL LEFT 4/21/2021 [address] [facility] ULTRASOUND       Social History:    Social History     Tobacco Use    Smoking status: Never    Smokeless tobacco: Never   Substance Use Topics    Alcohol use: No                                Counseling given: Not Answered      Vital Signs (Current): There were no vitals filed for this visit. BP Readings from Last 3 Encounters:   08/22/22 (!) 149/95   07/12/22 126/86   07/07/22 116/70       NPO Status:                                                                                 BMI:   Wt Readings from Last 3 Encounters:   08/22/22 154 lb 3.2 oz (69.9 kg)   07/12/22 156 lb 12.8 oz (71.1 kg)   07/07/22 157 lb (71.2 kg)     There is no height or weight on file to calculate BMI.    CBC:   Lab Results   Component Value Date/Time    WBC 5.5 04/28/2022 03:35 AM    RBC 4.97 04/28/2022 03:35 AM    RBC 3.84 07/29/2011 05:05 AM    HGB 13.4 04/28/2022 03:35 AM    HCT 40.3 04/28/2022 03:35 AM    MCV 81.1 04/28/2022 03:35 AM    RDW 15.8 08/23/2021 01:46 PM     04/28/2022 03:35 AM       CMP:   Lab Results   Component Value Date/Time     06/09/2022 02:48 PM    K 4.2 06/09/2022 02:48 PM    K 3.9 04/24/2022 05:53 AM    CL 98 06/09/2022 02:48 PM    CO2 29 06/09/2022 02:48 PM    BUN 15 05/24/2022 10:36 AM    CREATININE 0.84 05/24/2022 10:36 AM    GFRAA >60 05/24/2022 10:36 AM    LABGLOM >60 05/24/2022 10:36 AM    LABGLOM 79 04/29/2022 05:58 AM    GLUCOSE 94 05/24/2022 10:36 AM    PROT 6.4 04/23/2022 01:00 PM    CALCIUM 9.7 05/24/2022 10:36 AM    BILITOT 0.7 04/23/2022 01:00 PM    ALKPHOS 90 04/23/2022 01:00 PM    AST 23 04/23/2022 01:00 PM    ALT 14 04/23/2022 01:00 PM       POC Tests: No results for input(s): POCGLU, POCNA, POCK, POCCL, POCBUN, POCHEMO, POCHCT in the last 72 hours.     Coags:   Lab Results   Component Value Date/Time    PROTIME 27.6 04/20/2022 12:00 AM    INR 1.17 04/28/2022 03:35 AM    APTT 41.3 07/15/2021 10:13 AM       HCG (If Applicable): No results found for: PREGTESTUR, PREGSERUM, HCG, HCGQUANT     ABGs: No results found for: PHART, PO2ART, JVF9KAT, GQV7AMO, BEART, P4AKCONY     Type & Screen (If Applicable):  Lab Results   Component Value Date    LABRH NEG 07/27/2011       Drug/Infectious Status (If Applicable):  Lab Results   Component Value Date/Time    HEPCAB Negative 07/16/2017 05:30 AM       COVID-19 Screening (If Applicable):   Lab Results   Component Value Date/Time    COVID19 NOT DETECTED 09/05/2021 09:10 AM    COVID19 Not Detected 01/21/2021 09:17 AM           Anesthesia Evaluation  Patient summary reviewed and Nursing notes reviewed no history of anesthetic complications:   Airway: Mallampati: II          Dental: normal exam         Pulmonary:Negative Pulmonary ROS and normal exam                               Cardiovascular:    (+) hypertension:, CAD:, dysrhythmias: atrial fibrillation,       ECG reviewed                        Neuro/Psych:   (+) neuromuscular disease:,             GI/Hepatic/Renal: Neg GI/Hepatic/Renal ROS  (+) GERD: well controlled,           Endo/Other: Negative Endo/Other ROS                    Abdominal:             Vascular: Other Findings:             Anesthesia Plan      MAC     ASA 3             Anesthetic plan and risks discussed with patient and child/children. Plan discussed with CRNA.     Attending anesthesiologist reviewed and agrees with [address] [place] [address] VASU Wills CRNA   8/22/2022

## 2022-08-22 NOTE — PROGRESS NOTES
Patient is in phase  taking fluids.  discussed findings, plan of care, discharge instructions with pt and .

## 2022-08-22 NOTE — DISCHARGE INSTRUCTIONS
For a week stop Xarelto in 3 days, repeat upper endoscopy with dilation as needed for dysphagia soft diet for week

## 2022-08-22 NOTE — ANESTHESIA POSTPROCEDURE EVALUATION
Department of Anesthesiology  Postprocedure Note    Patient: Pilar Ramirez  MRN: [id number]  YOB: 1934  Date of evaluation: 8/22/2022      Procedure Summary     Date: 08/22/22 Room / Location: [address] [place] [address]    Anesthesia Start: 1122 Anesthesia Stop: 1137    Procedure: EGD DILATION SAVORY Diagnosis:       Dysphagia, unspecified type      Hiatal hernia      (dysphagia unspecified type, hiatal hernia,gerd, unspecified whether esophagitis present)    Surgeons: Kojo Alvarado MD Responsible Provider: Ginny Hernandez DO    Anesthesia Type: MAC ASA Status: 3          Anesthesia Type: No value filed.     Koko Phase I: Koko Score: 10    Koko Phase II:        Anesthesia Post Evaluation    Patient location during evaluation: bedside  Patient participation: complete - patient participated  Level of consciousness: awake and alert  Airway patency: patent  Nausea & Vomiting: no nausea and no vomiting  Complications: no  Cardiovascular status: hemodynamically stable and blood pressure returned to baseline  Respiratory status: acceptable, spontaneous ventilation, nasal cannula and nonlabored ventilation  Hydration status: stable

## 2022-08-22 NOTE — H&P
[facility]     Sedation/Analgesia History & Physical    Patient: Pilar Ramirez : 1934  Med Rec#: [id number] Acc#: [id number]   Provider Performing Procedure: Kojo Alvarado MD  Primary Care Physician: Crispin Stephens MD    PRE-PROCEDURE   Full CODE [x]Yes  DNR-CCA/DNR-CC []Yes   Brief History/Pre-Procedure Diagnosis: dysphagia and a severe stricture plan for problems with dilations          MEDICAL HISTORY    []Additional information:       has a past medical history of Actinic keratosis, Arthritis, Atrial fibrillation ([facility] [address] Cancer ([facility] [address] Cervical prolapse, Diverticulosis, Endocervical polyp, Heel spur, Hip pain, Hypertension, Nasal septal deviation, Osteoporosis, Stress incontinence, Syncope, and Vaginal prolapse. SOCIAL HISTORY  Social History       Tobacco History       Smoking Status  Never      Smokeless Tobacco Use  Never              Alcohol History       Alcohol Use Status  No              Drug Use       Drug Use Status  No              Sexual Activity       Sexually Active  Not Currently Partners  Male Comment                      FAMILY HISTORY     Family History   Problem Relation Age of Onset    Colon Cancer Father     Osteoporosis Mother     Heart Disease Mother        SURGICAL HISTORY   has a past surgical history that includes Colonoscopy (2000); Cholecystectomy (1976); Upper gastrointestinal endoscopy (2017); pr egd transoral biopsy single/multiple (Left, 2017); Upper gastrointestinal endoscopy (Left, 2017); Endoscopic ultrasonography, GI (Left, 2017); eye surgery; Total hip arthroplasty (Left, 2017); bladder repair (); Partial hysterectomy ('s); Appendectomy ('s); skin biopsy; pr office/outpt visit,procedure only (Right, 2018); joint replacement (); Mohs surgery (N/A, 2021);  Facial Surgery (N/A, 2021); US BREAST BIOPSY W LOC DEVICE 1ST LESION LEFT (Left, 2021); US PLACE BREAST LOC DEVICE EACH ADDL LEFT (Left, 4/21/2021); US BREAST BIOPSY W LOC DEVICE 1ST LESION RIGHT (Right, 4/21/2021); Hysterectomy; Mohs surgery (Left, 8/6/2021); Upper gastrointestinal endoscopy (Left, 8/28/2021); Upper gastrointestinal endoscopy (N/A, 8/30/2021); Upper gastrointestinal endoscopy (N/A, 4/28/2022); and Upper gastrointestinal endoscopy (4/28/2022). Additional information:       ALLERGIES   Allergies as of 07/12/2022 - Fully Reviewed 07/12/2022   Allergen Reaction Noted    Statins Other (See Comments) 07/18/2017    Tape [adhesive tape] Other (See Comments) 06/01/2018    Tizanidine hcl Other (See Comments) 08/24/2017    Tramadol Other (See Comments) 08/22/2017    Augmentin [amoxicillin-pot clavulanate] Diarrhea, Nausea And Vomiting, and Other (See Comments) 09/13/2021    Nystatin Nausea And Vomiting 09/13/2021     Additional information:       MEDICATIONS   Coumadin Use Last 7 Days [x]No []Yes  Antiplatelet drug therapy use last 7 days  [x]No []Yes  Other anticoagulant use last 7 days  [x]No []Yes    Current Facility-Administered Medications:     0.9 % sodium chloride infusion, , IntraVENous, Continuous, Valencia Primrose, MD, Last Rate: 75 mL/hr at 08/22/22 1122, NoRateChange at 08/22/22 1122  Prior to Admission medications    Medication Sig Start Date End Date Taking?  Authorizing Provider   rivaroxaban (XARELTO) 20 MG TABS tablet Take 1 tablet by mouth in the morning. 7/25/22   Bethel Tucker MD   metoprolol tartrate (LOPRESSOR) 50 MG tablet Patient is taking 1/2 tab BID 7/8/22   Yu Bergeron MD   baclofen (LIORESAL) 10 MG tablet Take 0.5 tablets by mouth at bedtime  Patient not taking: No sig reported 6/27/22   Bethel Tucker MD   ondansetron (ZOFRAN ODT) 4 MG disintegrating tablet Take 1 tablet by mouth every 8 hours as needed for Nausea  Patient not taking: Reported on 8/22/2022 4/29/22   Yadi Cassidy MD   propafenone (RYTHMOL) 150 MG tablet Take 1 tablet by mouth 2 times daily 4/27/22   Bethel Tucker MD   pantoprazole (PROTONIX) 40 MG tablet Take 1 tablet by mouth once daily 3/11/22   Raymond Torres MD   Multiple Vitamins-Minerals (ICAPS AREDS 2) CAPS Take 2 tablets by mouth daily   Patient not taking: Reported on 8/22/2022    Historical Provider, MD     Additional information:       PHYSICAL:   Heart:  [x]Regular rate and rhythm  []Other:    Lungs:  [x]Clear    []Other:    Abdomen: [x]Soft    []Other:    Mental Status: [x]Alert & Oriented  []Other:        PLANNED PROCEDURE   [x]EGD  []Colonoscopy []Flex Sigmoid     Consent: I have discussed with the patient and/or the patient representative the indication, alternatives, and the possible risks and/or complications of the planned procedure and the anesthesia methods. The patient and/or patient representative appear to understand and agree to proceed. SEDATION  Please see anesthesia note. The medication Planned :  Planned agent:[x]Midazolam []Meperidine [x]Sublimaze []Morphine  []Diazepam  [x]Propofol     Airway Assessment:   See anesthesia no please     Monitoring and Safety: The patient will be placed on a cardiac monitor and vital signs, pulse oximetry and level of consciousness will be continuously evaluated throughout the procedure. The patient will be closely monitored until recovery from the medications is complete and the patient has returned to baseline status. Respiratory therapy will be on standby during the procedure. [x]Pre-procedure diagnostic studies complete and results available. Comment:    [x]Previous sedation/anesthesia experiences assessed. Comment:    [x]The patient is an appropriate candidate to undergo the planned procedure sedation and anesthesia. (Refer to nursing sedation/analgesia documentation record)  [x]Formulation and discussion of sedation/procedure plan, risks, and expectations with patient and/or responsible adult completed. [x]Patient examined immediately prior to the procedure.  (Refer to nursing sedation/analgesia documentation record)    Deepika Porter MD, MD   Electronically signed

## 2022-08-24 NOTE — OP NOTE
Scope  withdrawn through the antrum. Guidewire was introduced. Scope was  withdrawn. The [employer] dilator starting from size 45-French until  48-French introduced over the guidewire, led to dilation of the  esophagus. Felt resistance with dilation with size 48-French. Dilator  was withdrawn. Guidewire was withdrawn. Scope was advanced again. Seen bleeding at the level of upper esophageal sphincter indicating  significant stricture with successful dilation until size 48-French  only. No biopsy obtained. Scope was withdrawn with no immediate  complication. IMPRESSION:  1. Severe stricture at the level of upper sphincter with successful  dilation until size 48-French for 60 mm, bleeding post dilation  indicated esophageal stricture. 2.  Features of acid reflux as well as very mild gastritis, not  clinically significant. No biopsy obtained at this time. PLAN:  1. Soft diet. 2.  Start anticoagulation in two days. 3.  Follow up with GI clinic for evaluation. 4.  Repeat upper endoscopy with dilation as needed for dysphagia.         Ismael Denton M.D.    D: 08/24/2022 3:53:23       T: 08/24/2022 9:49:52     [email or web address]  Job#: [id number]     Doc#: [id number]    CC:  Flavio Kennedy Md

## 2022-09-01 RX ORDER — PROPAFENONE HYDROCHLORIDE 150 MG/1
150 TABLET, FILM COATED ORAL 2 TIMES DAILY
Qty: 180 TABLET | Refills: 1 | Status: SHIPPED | OUTPATIENT
Start: 2022-09-01 | End: 2022-11-03

## 2022-09-01 NOTE — TELEPHONE ENCOUNTER
Мария Garcia called requesting a refill of the below medication which has been pended for you:     Requested Prescriptions     Pending Prescriptions Disp Refills    propafenone (RYTHMOL) 150 MG tablet 180 tablet 1     Sig: Take 1 tablet by mouth 2 times daily       Last Appointment Date: 6/27/2022  Next Appointment Date: 12/27/2022    Allergies   Allergen Reactions    Statins Other (See Comments)     Causes severe leg cramps     Tape Karene Melvin Tape] Other (See Comments)     \"sticks to skin\" causes redness    Tizanidine Hcl Other (See Comments)     Having issues with her liver- elevated her enzyme level    Tramadol Other (See Comments)     \"makes me Goofy\"    Augmentin [Amoxicillin-Pot Clavulanate] Diarrhea, Nausea And Vomiting and Other (See Comments)     abd pain    Nystatin Nausea And Vomiting     Pt states \"swish and swallow\"

## 2022-09-13 RX ORDER — PANTOPRAZOLE SODIUM 40 MG/1
TABLET, DELAYED RELEASE ORAL
Qty: 90 TABLET | Refills: 1 | Status: SHIPPED | OUTPATIENT
Start: 2022-09-13

## 2022-09-22 ENCOUNTER — HOSPITAL ENCOUNTER (OUTPATIENT)
Dept: GENERAL RADIOLOGY | Age: 87
Discharge: HOME | End: 2022-09-24
Payer: MEDICARE

## 2022-09-22 ENCOUNTER — OFFICE VISIT (OUTPATIENT)
Dept: FAMILY MEDICINE CLINIC | Age: 87
End: 2022-09-22
Payer: MEDICARE

## 2022-09-22 ENCOUNTER — PROCEDURE VISIT (OUTPATIENT)
Dept: OBGYN | Age: 87
End: 2022-09-22
Payer: MEDICARE

## 2022-09-22 VITALS
WEIGHT: 155 LBS | SYSTOLIC BLOOD PRESSURE: 130 MMHG | OXYGEN SATURATION: 97 % | BODY MASS INDEX: 25.83 KG/M2 | HEART RATE: 96 BPM | HEIGHT: 65 IN | DIASTOLIC BLOOD PRESSURE: 88 MMHG

## 2022-09-22 VITALS
BODY MASS INDEX: 26.02 KG/M2 | OXYGEN SATURATION: 99 % | HEART RATE: 96 BPM | DIASTOLIC BLOOD PRESSURE: 84 MMHG | SYSTOLIC BLOOD PRESSURE: 128 MMHG | HEIGHT: 65 IN | WEIGHT: 156.2 LBS | RESPIRATION RATE: 16 BRPM

## 2022-09-22 DIAGNOSIS — M25.552 LEFT HIP PAIN: Primary | ICD-10-CM

## 2022-09-22 DIAGNOSIS — Z96.0 PRESENCE OF PESSARY: ICD-10-CM

## 2022-09-22 DIAGNOSIS — Z46.89 ENCOUNTER FOR PESSARY MAINTENANCE: Primary | ICD-10-CM

## 2022-09-22 DIAGNOSIS — M25.552 LEFT HIP PAIN: ICD-10-CM

## 2022-09-22 DIAGNOSIS — N81.4 PELVIC RELAXATION DUE TO UTEROVAGINAL PROLAPSE: ICD-10-CM

## 2022-09-22 PROCEDURE — 1090F PRES/ABSN URINE INCON ASSESS: CPT | Performed by: STUDENT IN AN ORGANIZED HEALTH CARE EDUCATION/TRAINING PROGRAM

## 2022-09-22 PROCEDURE — G8417 CALC BMI ABV UP PARAM F/U: HCPCS | Performed by: STUDENT IN AN ORGANIZED HEALTH CARE EDUCATION/TRAINING PROGRAM

## 2022-09-22 PROCEDURE — G8427 DOCREV CUR MEDS BY ELIG CLIN: HCPCS | Performed by: STUDENT IN AN ORGANIZED HEALTH CARE EDUCATION/TRAINING PROGRAM

## 2022-09-22 PROCEDURE — G8427 DOCREV CUR MEDS BY ELIG CLIN: HCPCS | Performed by: OBSTETRICS & GYNECOLOGY

## 2022-09-22 PROCEDURE — 99213 OFFICE O/P EST LOW 20 MIN: CPT | Performed by: STUDENT IN AN ORGANIZED HEALTH CARE EDUCATION/TRAINING PROGRAM

## 2022-09-22 PROCEDURE — 99213 OFFICE O/P EST LOW 20 MIN: CPT

## 2022-09-22 PROCEDURE — G8417 CALC BMI ABV UP PARAM F/U: HCPCS | Performed by: OBSTETRICS & GYNECOLOGY

## 2022-09-22 PROCEDURE — 1090F PRES/ABSN URINE INCON ASSESS: CPT | Performed by: OBSTETRICS & GYNECOLOGY

## 2022-09-22 PROCEDURE — 73502 X-RAY EXAM HIP UNI 2-3 VIEWS: CPT

## 2022-09-22 PROCEDURE — 1123F ACP DISCUSS/DSCN MKR DOCD: CPT | Performed by: STUDENT IN AN ORGANIZED HEALTH CARE EDUCATION/TRAINING PROGRAM

## 2022-09-22 PROCEDURE — 99212 OFFICE O/P EST SF 10 MIN: CPT | Performed by: OBSTETRICS & GYNECOLOGY

## 2022-09-22 PROCEDURE — 1036F TOBACCO NON-USER: CPT | Performed by: STUDENT IN AN ORGANIZED HEALTH CARE EDUCATION/TRAINING PROGRAM

## 2022-09-22 PROCEDURE — 1123F ACP DISCUSS/DSCN MKR DOCD: CPT | Performed by: OBSTETRICS & GYNECOLOGY

## 2022-09-22 PROCEDURE — 99213 OFFICE O/P EST LOW 20 MIN: CPT | Performed by: OBSTETRICS & GYNECOLOGY

## 2022-09-22 PROCEDURE — 1036F TOBACCO NON-USER: CPT | Performed by: OBSTETRICS & GYNECOLOGY

## 2022-09-22 NOTE — PROGRESS NOTES
[address]  [address] [address]  Dept: [phone]  Dept Fax: [phone]  Loc: [phone]      Trevor Root is a 80 y.o. female who presents today for:  Chief Complaint   Patient presents with    Leg Pain     Right hip/leg, has had surgery approx 5 yrs ago        Goals    None         HPI:     HPI  Patient is a 68-year-old female who presents to the office for evaluation of left hip pain. States that this has been ongoing for the last month or 2. States that she has had a left hip replacement around 5 years ago. States that the pain is mostly located along the outside of her left leg. She has not tried anything for the pain thus far. Denies any significant weakness of the lower extremity. States that she feels like she is sitting on something that is causing her a lot of pain.     Past Medical History:   Diagnosis Date    Actinic keratosis     history of    Arthritis     Atrial fibrillation ([address]     Cancer (HCC)     skin    Cervical prolapse     history of    Diverticulosis     Endocervical polyp     history of    Heel spur     Hip pain     Hypertension     Nasal septal deviation     Osteoporosis     Stress incontinence     history of    Syncope     Vaginal prolapse     history of      Past Surgical History:   Procedure Laterality Date    APPENDECTOMY  26's    BLADDER REPAIR  2011    [place]  29/1011    [facility]    COLONOSCOPY  01/26/2000    ENDOSCOPIC ULTRASOUND (LOWER) Left 07/20/2017    ENDOSCOPIC ULTRASOUND performed by Dax Mckeon MD at [facility] cataract    FACIAL SURGERY N/A 1/27/2021    DIVISION AND INSET/CHEEK performed by Filemon Osuna MD at [address] [place] [address] ([address] [place] [address])      JOINT REPLACEMENT  2017    left total hip relpament    MOHS SURGERY N/A 1/8/2021    MOHS REPAIR BCC NASAL TIP WITH FLAP AND GRAFT performed by Noel Dozier MD at [address] Left 8/6/2021    MOHS DEFECT REPAIR SCC DORSAL NOSE AND LEFT LOWER FOREHEAD WITH SKIN GRAFT FROM RIGHT CHEEK/EAR (PLACED ON NOSE) performed by Noel Dozier MD at [address] (CERVIX NOT REMOVED)  1960's    RI EGD TRANSORAL BIOPSY SINGLE/MULTIPLE Left 07/16/2017    EGD BIOPSY performed by Blanca Sy MD at [address] Endoscopy    RI OFFICE/OUTPT VISIT,PROCEDURE ONLY Right 06/01/2018    MOHS REPAIR BASEL CELL CARCINOMA RIGHT DORSAL NOSE performed by Noel Dozier MD at [address] Left 09/19/2017    Left Total Hip ARTHROPLASTY performed by Dirk Lopez MD at [facility]  07/16/2017    UPPER GASTROINTESTINAL ENDOSCOPY Left 07/16/2017    EGD DILATION BALLOON performed by Blanca Sy MD at [facility] Left 8/28/2021    EGD BIOPSY performed by Sugar Paz MD at [address] [address] 8/30/2021    EGD DILATION SAVORY performed by Sugar Paz MD at [address] [address] 4/28/2022    EGD BIOPSY performed by Sugar Paz MD at [facility]  4/28/2022    EGD DILATION SAVORY performed by Sugar Paz MD at [facility] N/A 8/22/2022    EGD DILATION SAVORY performed by Sugar Paz MD at [facility] LEFT Left 4/21/2021    US BREAST NEEDLE BIOPSY LEFT 4/21/2021 [address] BREAST NEEDLE BIOPSY RIGHT Right 4/21/2021    US BREAST NEEDLE BIOPSY RIGHT 4/21/2021 [facility] ULTRASOUND    US GUIDED NEEDLE LOC BREAST ADDL LEFT Left 4/21/2021    US GUIDED NEEDLE LOC BREAST ADDL LEFT 4/21/2021 [facility] ULTRASOUND     Family History   Problem Relation Age of Onset    Colon Cancer Father     Osteoporosis Mother     Heart Disease Mother      Social History     Tobacco Use    Smoking status: Never    Smokeless tobacco: Never   Substance Use Topics    Alcohol use: No      No current facility-administered medications for this visit. No current outpatient medications on file.      Facility-Administered Medications Ordered in Other Visits   Medication Dose Route Frequency Provider Last Rate Last Admin    0.9 % sodium chloride infusion   IntraVENous Continuous Tyrone Grier  mL/hr at 10/16/22 0100 New Bag at 10/16/22 0100    metoprolol tartrate (LOPRESSOR) tablet 25 mg  25 mg Oral BID Marie Marroquin MD   25 mg at 10/17/22 0901    propafenone (RYTHMOL) tablet 150 mg  150 mg Oral BID Marie Marroquin MD   150 mg at 10/17/22 0901    rivaroxaban (XARELTO) tablet 20 mg  20 mg Oral Daily with breakfast Marie Marroquin MD   20 mg at 10/17/22 0901    sodium chloride flush 0.9 % injection 10 mL  10 mL IntraVENous 2 times per day Marie Marroquin MD        sodium chloride flush 0.9 % injection 10 mL  10 mL IntraVENous PRN Marie Marroquin MD        0.9 % sodium chloride infusion   IntraVENous PRN Marie Marroquin MD        acetaminophen (TYLENOL) tablet 650 mg  650 mg Oral Q6H PRN Marie Marroquin MD   650 mg at 10/16/22 6893    Or    acetaminophen (TYLENOL) suppository 650 mg  650 mg Rectal Q6H PRN Marie Marroquin MD        promethazine (PHENERGAN) tablet 12.5 mg  12.5 mg Oral Q6H PRN Marie Marroquin MD        Or    ondansetron [facility]) injection 4 mg  4 mg IntraVENous Q6H PRN Marie Marroquin MD   4 mg at 10/16/22 0839    pantoprazole (PROTONIX) injection 40 mg  40 mg IntraVENous Daily Marie Marroquin MD   40 mg at 10/17/22 0354    0.9 % sodium chloride infusion   IntraVENous Continuous Marie Marroquin  mL/hr at 10/16/22 2053 New Bag at 10/16/22 2053    morphine (PF) injection 2 mg  2 mg IntraVENous Q4H PRN Marie Marroquin MD        cefTRIAXone (ROCEPHIN) 1,000 mg in dextrose 5 % 50 mL IVPB mini-bag  1,000 mg IntraVENous Q24H Dayna Daigle MD   Stopped at 10/17/22 6973     Allergies   Allergen Reactions    Statins Other (See Comments)     Causes severe leg cramps     Tape Kelly Cathleen Tape] Other (See Comments)     \"sticks to skin\" causes redness    Tizanidine Hcl Other (See Comments)     Having issues with her liver- elevated her enzyme level    Tramadol Other (See Comments)     \"makes me Goofy\"    Augmentin [Amoxicillin-Pot Clavulanate] Diarrhea, Nausea And Vomiting and Other (See Comments)     abd pain    Nystatin Nausea And Vomiting     Pt states \"swish and swallow\"        Health Maintenance   Topic Date Due    COVID-19 Vaccine (4 - Booster for Nielson Orlin series) 02/13/2022    DTaP/Tdap/Td vaccine (1 - Tdap) 12/21/2022 (Originally 5/7/2003)    Shingles vaccine (2 of 3) 12/21/2022 (Originally 1/4/2014)    Annual Wellness Visit (AWV)  06/28/2023    Depression Screen  10/04/2023    Flu vaccine  Completed    Pneumococcal 65+ years Vaccine  Completed    Hepatitis A vaccine  Aged Out    Hib vaccine  Aged Out    Meningococcal (ACWY) vaccine  Aged Out       Subjective:      Review of Systems   Constitutional:  Negative for chills, fatigue and fever. HENT:  Negative for ear pain, postnasal drip and trouble swallowing. Eyes:  Negative for pain and visual disturbance. Respiratory:  Negative for cough and shortness of breath. Cardiovascular:  Negative for chest pain and palpitations. Gastrointestinal:  Negative for abdominal pain, blood in stool, constipation, diarrhea, nausea and vomiting. Genitourinary:  Negative for dysuria and urgency. Musculoskeletal:  Positive for arthralgias. Skin:  Negative for rash and wound. Neurological:  Negative for dizziness and headaches. Psychiatric/Behavioral:  Negative for dysphoric mood. The patient is not nervous/anxious.       Objective:     Vitals:    09/22/22 1021   BP: 128/84   Pulse: 96   Resp: 16 SpO2: 99%   Weight: 156 lb 3.2 oz (70.9 kg)   Height: 5' 5\" (1.651 m)       Body mass index is 25.99 kg/m². Wt Readings from Last 3 Encounters:   10/15/22 150 lb (68 kg)   10/12/22 166 lb 14 oz (75.7 kg)   10/04/22 156 lb 9.6 oz (71 kg)     BP Readings from Last 3 Encounters:   10/17/22 (!) 142/86   10/12/22 117/75   10/04/22 122/78       Physical Exam  Vitals and nursing note reviewed. Constitutional:       General: She is not in acute distress. Appearance: She is well-developed. She is not diaphoretic. HENT:      Head: Normocephalic and atraumatic. Right Ear: External ear normal.      Left Ear: External ear normal.      Nose: Nose normal.   Eyes:      General: No scleral icterus. Right eye: No discharge. Left eye: No discharge. Conjunctiva/sclera: Conjunctivae normal.   Cardiovascular:      Rate and Rhythm: Normal rate and regular rhythm. Heart sounds: Normal heart sounds. No murmur heard. Pulmonary:      Effort: Pulmonary effort is normal.      Breath sounds: Normal breath sounds. Musculoskeletal:      Cervical back: Normal range of motion. Legs:       Comments: Pain along the left greater trochanter and multiple tender points along the IT band. No significant deformity of the left lower hip or extremity   Skin:     General: Skin is warm and dry. Findings: No erythema or rash. Neurological:      Mental Status: She is alert and oriented to person, place, and time. Cranial Nerves: No cranial nerve deficit. Psychiatric:         Behavior: Behavior normal.         Thought Content:  Thought content normal.         Judgment: Judgment normal.       Lab Results   Component Value Date    WBC 5.0 10/17/2022    HGB 13.6 10/17/2022    HCT 41.0 10/17/2022     10/17/2022    CHOL 149 10/09/2022    TRIG 98 10/09/2022    HDL 33 10/09/2022    LDLCALC 96 10/09/2022    AST 30 10/16/2022     (L) 10/17/2022    K 3.9 10/17/2022    CL 88 (L) 10/16/2022 CREATININE 0.8 10/16/2022    BUN 12 10/16/2022    CO2 27 10/16/2022    TSH 1.270 04/25/2022    INR 1.30 (H) 10/17/2022    LABA1C 5.5 10/09/2022    LABGLOM 68 (A) 10/16/2022    MG 1.8 10/16/2022    CALCIUM 8.4 (L) 10/16/2022    VITD25 22 (L) 10/09/2022       Imaging Results:    No results found. Assessment/Plan:     Lori Best was seen today for leg pain. Diagnoses and all orders for this visit:    Left hip pain  -     XR HIP 2-3 VW W PELVIS LEFT; Future  Likely left greater trochanter bursitis and IT band syndrome however given that patient feels like she is sitting on something we will check a left hip x-ray given her prior history of left hip replacement. We will also try exercises twice daily for the next couple of weeks for greater trochanter bursitis and IT band syndrome. We will have patient come back to the office if her symptoms fail to improve. Recommend Tylenol as needed for pain. Would like to avoid NSAIDs due to history of atrial fibrillation requiring anticoagulation with Xarelto             No follow-ups on file. Medications Prescribed:  No orders of the defined types were placed in this encounter. Future Appointments   Date Time Provider Gina Beauchamp   10/25/2022 10:45 AM Albert Leal MD [facility]   11/3/2022  1:30 PM Mel Gold MD [facility]   11/9/2022 12:30 PM VASU Bailey - CNP [facility]   12/1/2022 11:00 AM Redd Savage MD [facility] [place]   12/27/2022  1:40 PM Elliot Jacob MD [facility]       Patient given educational materials - see patient instructions. Discussed use, benefit, and sideeffects of prescribed medications. All patient questions answered. Pt voiced understanding. Reviewed health maintenance. Instructed to continue current medications, diet and exercise. Patient agreed with treatment plan. Follow up as directed.      Electronically signed by Alexa Lozano DO on 10/17/2022 at 11:53 AM

## 2022-09-22 NOTE — PROGRESS NOTES
candidiasis ([facility] 08/31/2021    Vaginal erosion secondary to pessary use ([facility] 01/21/2021    Postmenopausal bleeding 01/21/2021    History of hysterectomy 01/21/2021    Arthritis of right hand 01/13/2020    Venous stasis dermatitis of both lower extremities 12/08/2017    Paroxysmal atrial fibrillation with rapid ventricular response ([facility] 09/21/2017    Lumbar radicular pain 08/03/2017    Lumbar spine pain 08/03/2017    Left hip pain 08/03/2017    Arthritis of left hip 08/03/2017    Esophageal stricture     Gastritis without bleeding     Bradycardia     Drug-induced constipation 06/15/2017    Trochanteric bursitis of left hip 05/22/2017         Plan:  1. Return to the office 16 weeks  2. Report any vaginal bleeding or discharge  3. Abstinence  4. Annual Follow-up reviewed with patient. They will schedule appointment    The patient, Jamin Ruvalcaba is a 80 y.o. female, was seen with a total time spent of 20 minutes for the visit on this date of service by the E/M provider. The time component had both face to face and non face to face time spent in determining the total time component. Counseling and education regarding her diagnosis listed below and her options regarding those diagnoses were also included in determining her time component. Diagnosis Orders   1. Encounter for pessary maintenance        2. Pelvic relaxation due to uterovaginal prolapse        3. Presence of pessary #4 ring             The patient had her preventative health maintenance recommendations and follow-up reviewed with her at the completion of her visit.

## 2022-10-04 ENCOUNTER — OFFICE VISIT (OUTPATIENT)
Dept: FAMILY MEDICINE CLINIC | Age: 87
End: 2022-10-04
Payer: MEDICARE

## 2022-10-04 VITALS
WEIGHT: 156.6 LBS | BODY MASS INDEX: 26.09 KG/M2 | HEART RATE: 99 BPM | HEIGHT: 65 IN | DIASTOLIC BLOOD PRESSURE: 78 MMHG | OXYGEN SATURATION: 100 % | SYSTOLIC BLOOD PRESSURE: 122 MMHG

## 2022-10-04 DIAGNOSIS — Z23 NEED FOR INFLUENZA VACCINATION: ICD-10-CM

## 2022-10-04 DIAGNOSIS — M25.552 LEFT HIP PAIN: Primary | ICD-10-CM

## 2022-10-04 PROCEDURE — G8484 FLU IMMUNIZE NO ADMIN: HCPCS | Performed by: STUDENT IN AN ORGANIZED HEALTH CARE EDUCATION/TRAINING PROGRAM

## 2022-10-04 PROCEDURE — 99212 OFFICE O/P EST SF 10 MIN: CPT | Performed by: STUDENT IN AN ORGANIZED HEALTH CARE EDUCATION/TRAINING PROGRAM

## 2022-10-04 PROCEDURE — 1090F PRES/ABSN URINE INCON ASSESS: CPT | Performed by: STUDENT IN AN ORGANIZED HEALTH CARE EDUCATION/TRAINING PROGRAM

## 2022-10-04 PROCEDURE — 1123F ACP DISCUSS/DSCN MKR DOCD: CPT | Performed by: STUDENT IN AN ORGANIZED HEALTH CARE EDUCATION/TRAINING PROGRAM

## 2022-10-04 PROCEDURE — PBSHW INFLUENZA, FLUAD, (AGE 65 Y+), IM, PF, 0.5 ML: Performed by: STUDENT IN AN ORGANIZED HEALTH CARE EDUCATION/TRAINING PROGRAM

## 2022-10-04 PROCEDURE — 99213 OFFICE O/P EST LOW 20 MIN: CPT | Performed by: STUDENT IN AN ORGANIZED HEALTH CARE EDUCATION/TRAINING PROGRAM

## 2022-10-04 PROCEDURE — G0008 ADMIN INFLUENZA VIRUS VAC: HCPCS | Performed by: STUDENT IN AN ORGANIZED HEALTH CARE EDUCATION/TRAINING PROGRAM

## 2022-10-04 PROCEDURE — G8417 CALC BMI ABV UP PARAM F/U: HCPCS | Performed by: STUDENT IN AN ORGANIZED HEALTH CARE EDUCATION/TRAINING PROGRAM

## 2022-10-04 PROCEDURE — G8427 DOCREV CUR MEDS BY ELIG CLIN: HCPCS | Performed by: STUDENT IN AN ORGANIZED HEALTH CARE EDUCATION/TRAINING PROGRAM

## 2022-10-04 PROCEDURE — 1036F TOBACCO NON-USER: CPT | Performed by: STUDENT IN AN ORGANIZED HEALTH CARE EDUCATION/TRAINING PROGRAM

## 2022-10-04 ASSESSMENT — PATIENT HEALTH QUESTIONNAIRE - PHQ9
2. FEELING DOWN, DEPRESSED OR HOPELESS: 0
1. LITTLE INTEREST OR PLEASURE IN DOING THINGS: 0
SUM OF ALL RESPONSES TO PHQ QUESTIONS 1-9: 0
SUM OF ALL RESPONSES TO PHQ QUESTIONS 1-9: 0
SUM OF ALL RESPONSES TO PHQ9 QUESTIONS 1 & 2: 0
SUM OF ALL RESPONSES TO PHQ QUESTIONS 1-9: 0
SUM OF ALL RESPONSES TO PHQ QUESTIONS 1-9: 0

## 2022-10-04 ASSESSMENT — ENCOUNTER SYMPTOMS
EYE PAIN: 0
SHORTNESS OF BREATH: 0
CONSTIPATION: 0
COUGH: 0
VOMITING: 0
BLOOD IN STOOL: 0
ABDOMINAL PAIN: 0
TROUBLE SWALLOWING: 0
NAUSEA: 0
DIARRHEA: 0

## 2022-10-04 NOTE — PROGRESS NOTES
[address]  [address] [address]  Dept: [phone]  Dept Fax: [phone]  Loc: [phone]      Ole Wilkerson is a 80 y.o. female who presents today for:  Chief Complaint   Patient presents with    Leg Pain     Still hurting pretty bad        Goals    None         HPI:     HPI  Patient is an 60-year-old female who presents to the office today for evaluation of left hip pain. Seen a couple weeks ago on 9/22/2022 for left hip pain likely trochanteric bursitis at that time given some stretches as well as x-ray of the left hip completed. X-ray showing no specific osseous abnormality. She has had a left hip replacement completed in 2017. Has tried rehab exercises twice daily for the past couple of weeks without any benefit. Has tried physical therapy in the past but does not want to go to PT again at this time. Hx of back pain as well. Denies any back pain currently.        Past Medical History:   Diagnosis Date    Actinic keratosis     history of    Arthritis     Atrial fibrillation ([address]     Cancer (HCC)     skin    Cervical prolapse     history of    Diverticulosis     Endocervical polyp     history of    Heel spur     Hip pain     Hypertension     Nasal septal deviation     Osteoporosis     Stress incontinence     history of    Syncope     Vaginal prolapse     history of      Past Surgical History:   Procedure Laterality Date    APPENDECTOMY  26's    BLADDER REPAIR  2011    [place]  11/1976    [facility]    COLONOSCOPY  01/26/2000    ENDOSCOPIC ULTRASOUND (LOWER) Left 07/20/2017    ENDOSCOPIC ULTRASOUND performed by Maureen Giron MD at [facility] cataract    FACIAL SURGERY N/A 1/27/2021    DIVISION AND INSET/CHEEK performed by Mely Alva MD at [address] [place] [address] ([address] [place] [address])      JOINT REPLACEMENT  2017 left total hip relpament    MOHS SURGERY N/A 1/8/2021    MOHS REPAIR BCC NASAL TIP WITH FLAP AND GRAFT performed by Annette Gonzales MD at [address] Left 8/6/2021    MOHS DEFECT REPAIR SCC DORSAL NOSE AND LEFT LOWER FOREHEAD WITH SKIN GRAFT FROM RIGHT CHEEK/EAR (PLACED ON NOSE) performed by Annette Gonzales MD at [address] (CERVIX NOT REMOVED)  1960's    NE EGD TRANSORAL BIOPSY SINGLE/MULTIPLE Left 07/16/2017    EGD BIOPSY performed by Chiqui Marino MD at [facility] End[facility]    NE OFFICE/OUTPT VISIT,PROCEDURE ONLY Right 06/01/2018    MOHS REPAIR BASEL CELL CARCINOMA RIGHT DORSAL NOSE performed by Annette Gonzales MD at [address] Left 09/19/2017    Left Total Hip ARTHROPLASTY performed by Celia Lentz MD at [facility]  07/16/2017    UPPER GASTROINTESTINAL ENDOSCOPY Left 07/16/2017    EGD DILATION BALLOON performed by Chiqui Marino MD at [facility] Left 8/28/2021    EGD BIOPSY performed by Marielena Tam MD at [facility] N/A 8/30/2021    EGD DILATION SAVORY performed by Marielena Tam MD at [facility] N/A 4/28/2022    EGD BIOPSY performed by Marielena Tam MD at [facility]  4/28/2022    EGD DILATION SAVORY performed by Marielena Tam MD at [facility] N/A 8/22/2022    EGD DILATION SAVORY performed by Marielena Tam MD at [facility] LEFT Left 4/21/2021    US BREAST NEEDLE BIOPSY LEFT 4/21/2021 [address] BREAST NEEDLE BIOPSY RIGHT Right 4/21/2021    US BREAST NEEDLE BIOPSY RIGHT 4/21/2021 [facility]    US GUIDED NEEDLE LOC BREAST ADDL LEFT Left 4/21/2021    US GUIDED NEEDLE LOC BREAST ADDL LEFT 4/21/2021 [facility] Family History   Problem Relation Age of Onset    Colon Cancer Father     Osteoporosis Mother     Heart Disease Mother      Social History     Tobacco Use    Smoking status: Never    Smokeless tobacco: Never   Substance Use Topics    Alcohol use: No      Current Outpatient Medications   Medication Sig Dispense Refill    pantoprazole (PROTONIX) 40 MG tablet Take 1 tablet by mouth once daily 90 tablet 1    propafenone (RYTHMOL) 150 MG tablet Take 1 tablet by mouth 2 times daily 180 tablet 1    rivaroxaban (XARELTO) 20 MG TABS tablet Take 1 tablet by mouth in the morning. 90 tablet 1    metoprolol tartrate (LOPRESSOR) 50 MG tablet Patient is taking 1/2 tab BID 90 tablet 3    baclofen (LIORESAL) 10 MG tablet Take 0.5 tablets by mouth at bedtime 30 tablet 1    ondansetron (ZOFRAN ODT) 4 MG disintegrating tablet Take 1 tablet by mouth every 8 hours as needed for Nausea 20 tablet 0    Multiple Vitamins-Minerals (ICAPS AREDS 2) CAPS Take 2 tablets by mouth daily (Patient not taking: Reported on 10/4/2022)       No current facility-administered medications for this visit.      Allergies   Allergen Reactions    Statins Other (See Comments)     Causes severe leg cramps     Tape Nahomy An Tape] Other (See Comments)     \"sticks to skin\" causes redness    Tizanidine Hcl Other (See Comments)     Having issues with her liver- elevated her enzyme level    Tramadol Other (See Comments)     \"makes me Goofy\"    Augmentin [Amoxicillin-Pot Clavulanate] Diarrhea, Nausea And Vomiting and Other (See Comments)     abd pain    Nystatin Nausea And Vomiting     Pt states \"swish and swallow\"        Health Maintenance   Topic Date Due    COVID-19 Vaccine (4 - Booster for Pfizer series) 02/13/2022    DTaP/Tdap/Td vaccine (1 - Tdap) 12/21/2022 (Originally 5/7/2003)    Shingles vaccine (2 of 3) 12/21/2022 (Originally 1/4/2014)    Depression Screen  06/27/2023    Annual Wellness Visit (AWV)  06/28/2023    Flu vaccine  Completed    Pneumococcal 65+ years Vaccine  Completed    Hepatitis A vaccine  Aged Out    Hepatitis B vaccine  Aged Out    Hib vaccine  Aged Out    Meningococcal (ACWY) vaccine  Aged Out       Subjective:      Review of Systems   Constitutional:  Negative for chills, fatigue and fever. HENT:  Negative for ear pain, postnasal drip and trouble swallowing. Eyes:  Negative for pain and visual disturbance. Respiratory:  Negative for cough and shortness of breath. Cardiovascular:  Negative for chest pain and palpitations. Gastrointestinal:  Negative for abdominal pain, blood in stool, constipation, diarrhea, nausea and vomiting. Genitourinary:  Negative for dysuria and urgency. Musculoskeletal:  Positive for arthralgias. Skin:  Negative for rash and wound. Neurological:  Negative for dizziness and headaches. Psychiatric/Behavioral:  Negative for dysphoric mood. The patient is not nervous/anxious. Objective:     Vitals:    10/04/22 1049   BP: 122/78   Pulse: 99   SpO2: 100%   Weight: 156 lb 9.6 oz (71 kg)   Height: 5' 5\" (1.651 m)       Body mass index is 26.06 kg/m². Wt Readings from Last 3 Encounters:   10/04/22 156 lb 9.6 oz (71 kg)   09/22/22 155 lb (70.3 kg)   09/22/22 156 lb 3.2 oz (70.9 kg)     BP Readings from Last 3 Encounters:   10/04/22 122/78   09/22/22 130/88   09/22/22 128/84       Physical Exam  Vitals and nursing note reviewed. Constitutional:       General: She is not in acute distress. Appearance: She is well-developed. She is not diaphoretic. HENT:      Head: Normocephalic and atraumatic. Right Ear: External ear normal.      Left Ear: External ear normal.      Nose: Nose normal.   Eyes:      General: No scleral icterus. Right eye: No discharge. Left eye: No discharge. Conjunctiva/sclera: Conjunctivae normal.   Cardiovascular:      Rate and Rhythm: Normal rate and regular rhythm. Heart sounds: Normal heart sounds. No murmur heard.   Pulmonary:      Effort: Pulmonary effort is normal.      Breath sounds: Normal breath sounds. Musculoskeletal:      Cervical back: Normal range of motion. Legs:       Comments: Multiple tender points along the left lateral thigh, specifically at the greater trochanter and down the IT band   Skin:     General: Skin is warm and dry. Findings: No erythema or rash. Neurological:      Mental Status: She is alert and oriented to person, place, and time. Cranial Nerves: No cranial nerve deficit. Psychiatric:         Behavior: Behavior normal.         Thought Content: Thought content normal.         Judgment: Judgment normal.       Lab Results   Component Value Date    WBC 5.5 04/28/2022    HGB 13.4 04/28/2022    HCT 40.3 04/28/2022     04/28/2022    CHOL 212 (H) 01/19/2021    TRIG 135 01/19/2021    HDL 57 01/19/2021    AST 23 04/23/2022     06/09/2022    K 4.2 06/09/2022    CL 98 06/09/2022    CREATININE 0.84 05/24/2022    BUN 15 05/24/2022    CO2 29 06/09/2022    TSH 1.270 04/25/2022    INR 1.17 (H) 04/28/2022    LABA1C 5.5 06/07/2017    LABGLOM >60 05/24/2022    MG 1.6 04/27/2022    CALCIUM 9.7 05/24/2022       Imaging Results:    XR HIP 2-3 VW W PELVIS LEFT    Result Date: 9/23/2022  EXAMINATION: ONE XRAY VIEW OF THE PELVIS AND TWO XRAY VIEWS LEFT HIP 9/22/2022 11:32 am COMPARISON: 08/03/2017 HISTORY: ORDERING SYSTEM PROVIDED HISTORY: Left hip pain TECHNOLOGIST PROVIDED HISTORY: left hip and pelvis pain, hx of left hip replacement Reason for Exam:  left hip pain FINDINGS: The bones are osteopenic. Staples in the right upper quadrant of the abdomen. Nonspecific nonobstructive bowel gas pattern as visualized. Vascular calcifications in the abdomen and pelvis. Visualized right hip appears unremarkable. There is left total hip arthroplasty which appears in satisfactory alignment on these views. There are regional bony proliferative changes and heterotopic ossification related to the surgical procedure.  Probable pessary device in the pelvis. Degenerative changes of the visualized lower lumbar spine. No acute osseous abnormality. Left hip arthroplasty appears intact. Osteopenia. Assessment/Plan:     Kale Whyte was seen today for leg pain. Diagnoses and all orders for this visit:    Left hip pain  -     Holly Bernal MD, Orthopedic Surgery, [place]    Need for influenza vaccination  -     Influenza, FLUAD, (age 72 y+), IM, Preservative Free, 0.5 mL    Left hip pain-tender points along the left lateral thigh likely component of greater trochanter bursitis and IT band syndrome. Recommended PT for further treatment but patient declined at this time. Limited improvement in home exercises. Patient requesting referral to orthopedics as she has had a prior hip replacement on this side and would like further evaluation. Referral placed today. No follow-ups on file. Medications Prescribed:  No orders of the defined types were placed in this encounter. Future Appointments   Date Time Provider Gina Beauchamp   10/25/2022 10:45 AM Dirk Lopez MD [facility]   11/3/2022  1:30 PM [facility]   11/9/2022 12:30 PM VASU Contreras - CNP [facility]   12/1/2022 11:00 AM Lance Conroy MD [facility]   12/27/2022  1:40 PM [facility]o Jon [facility]       Patient given educational materials - see patient instructions. Discussed use, benefit, and sideeffects of prescribed medications. All patient questions answered. Pt voiced understanding. Reviewed health maintenance. Instructed to continue current medications, diet and exercise. Patient agreed with treatment plan. Follow up as directed.      Electronically signed by Roberta Sifuentes DO on 10/4/2022 at 1:38 PM

## 2022-10-08 ENCOUNTER — APPOINTMENT (OUTPATIENT)
Dept: GENERAL RADIOLOGY | Age: 87
DRG: 178 | End: 2022-10-08
Payer: MEDICARE

## 2022-10-08 ENCOUNTER — APPOINTMENT (OUTPATIENT)
Dept: CT IMAGING | Age: 87
DRG: 178 | End: 2022-10-08
Payer: MEDICARE

## 2022-10-08 ENCOUNTER — HOSPITAL ENCOUNTER (INPATIENT)
Age: 87
LOS: 4 days | Discharge: HOME HEALTH | DRG: 178 | End: 2022-10-12
Attending: STUDENT IN AN ORGANIZED HEALTH CARE EDUCATION/TRAINING PROGRAM | Admitting: HOSPITALIST
Payer: MEDICARE

## 2022-10-08 ENCOUNTER — APPOINTMENT (OUTPATIENT)
Dept: ULTRASOUND IMAGING | Age: 87
DRG: 178 | End: 2022-10-08
Payer: MEDICARE
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
## 2022-10-08 DIAGNOSIS — J96.01 ACUTE RESPIRATORY FAILURE WITH HYPOXIA (HCC): ICD-10-CM

## 2022-10-08 DIAGNOSIS — U07.1 COVID: Primary | ICD-10-CM

## 2022-10-08 DIAGNOSIS — R42 DIZZINESS: ICD-10-CM

## 2022-10-08 LAB
ALBUMIN SERPL-MCNC: 3.4 G/DL (ref 3.5–5.1)
ALP BLD-CCNC: 91 U/L (ref 38–126)
ALT SERPL-CCNC: 21 U/L (ref 11–66)
ANION GAP SERPL CALCULATED.3IONS-SCNC: 10 MEQ/L (ref 8–16)
APTT: 38.7 SECONDS (ref 22–38)
AST SERPL-CCNC: 30 U/L (ref 5–40)
BASOPHILS # BLD: 0.3 %
BASOPHILS ABSOLUTE: 0 THOU/MM3 (ref 0–0.1)
BILIRUB SERPL-MCNC: 0.5 MG/DL (ref 0.3–1.2)
BILIRUBIN DIRECT: < 0.2 MG/DL (ref 0–0.3)
BUN BLDV-MCNC: 26 MG/DL (ref 7–22)
CALCIUM SERPL-MCNC: 8.1 MG/DL (ref 8.5–10.5)
CHLORIDE BLD-SCNC: 98 MEQ/L (ref 98–111)
CO2: 24 MEQ/L (ref 23–33)
CREAT SERPL-MCNC: 0.9 MG/DL (ref 0.4–1.2)
EOSINOPHIL # BLD: 1.6 %
EOSINOPHILS ABSOLUTE: 0.1 THOU/MM3 (ref 0–0.4)
ERYTHROCYTE [DISTWIDTH] IN BLOOD BY AUTOMATED COUNT: 15 % (ref 11.5–14.5)
ERYTHROCYTE [DISTWIDTH] IN BLOOD BY AUTOMATED COUNT: 46.5 FL (ref 35–45)
FLU A ANTIGEN: NEGATIVE
FLU B ANTIGEN: NEGATIVE
GFR SERPL CREATININE-BSD FRML MDRD: 59 ML/MIN/1.73M2
GLUCOSE BLD-MCNC: 74 MG/DL (ref 70–108)
HCT VFR BLD CALC: 43.6 % (ref 37–47)
HEMOGLOBIN: 14.3 GM/DL (ref 12–16)
IMMATURE GRANS (ABS): 0.02 THOU/MM3 (ref 0–0.07)
IMMATURE GRANULOCYTES: 0.5 %
INR BLD: 1.24 (ref 0.85–1.13)
LYMPHOCYTES # BLD: 31.3 %
LYMPHOCYTES ABSOLUTE: 1.2 THOU/MM3 (ref 1–4.8)
MCH RBC QN AUTO: 27.9 PG (ref 26–33)
MCHC RBC AUTO-ENTMCNC: 32.8 GM/DL (ref 32.2–35.5)
MCV RBC AUTO: 85.2 FL (ref 81–99)
MONOCYTES # BLD: 11.8 %
MONOCYTES ABSOLUTE: 0.4 THOU/MM3 (ref 0.4–1.3)
NUCLEATED RED BLOOD CELLS: 0 /100 WBC
OSMOLALITY CALCULATION: 267.9 MOSMOL/KG (ref 275–300)
PLATELET # BLD: 100 THOU/MM3 (ref 130–400)
PMV BLD AUTO: 10.3 FL (ref 9.4–12.4)
POTASSIUM REFLEX MAGNESIUM: 4.2 MEQ/L (ref 3.5–5.2)
PRO-BNP: 922.6 PG/ML (ref 0–1800)
RBC # BLD: 5.12 MILL/MM3 (ref 4.2–5.4)
SARS-COV-2, NAAT: DETECTED
SEG NEUTROPHILS: 54.5 %
SEGMENTED NEUTROPHILS ABSOLUTE COUNT: 2.1 THOU/MM3 (ref 1.8–7.7)
SODIUM BLD-SCNC: 132 MEQ/L (ref 135–145)
TOTAL PROTEIN: 5.6 G/DL (ref 6.1–8)
TROPONIN T: < 0.01 NG/ML
WBC # BLD: 3.8 THOU/MM3 (ref 4.8–10.8)

## 2022-10-08 PROCEDURE — 72125 CT NECK SPINE W/O DYE: CPT

## 2022-10-08 PROCEDURE — 87635 SARS-COV-2 COVID-19 AMP PRB: CPT

## 2022-10-08 PROCEDURE — 36415 COLL VENOUS BLD VENIPUNCTURE: CPT

## 2022-10-08 PROCEDURE — 71045 X-RAY EXAM CHEST 1 VIEW: CPT

## 2022-10-08 PROCEDURE — 83880 ASSAY OF NATRIURETIC PEPTIDE: CPT

## 2022-10-08 PROCEDURE — 93005 ELECTROCARDIOGRAM TRACING: CPT | Performed by: STUDENT IN AN ORGANIZED HEALTH CARE EDUCATION/TRAINING PROGRAM

## 2022-10-08 PROCEDURE — 85610 PROTHROMBIN TIME: CPT

## 2022-10-08 PROCEDURE — 2580000003 HC RX 258: Performed by: STUDENT IN AN ORGANIZED HEALTH CARE EDUCATION/TRAINING PROGRAM

## 2022-10-08 PROCEDURE — 80048 BASIC METABOLIC PNL TOTAL CA: CPT

## 2022-10-08 PROCEDURE — 6370000000 HC RX 637 (ALT 250 FOR IP): Performed by: STUDENT IN AN ORGANIZED HEALTH CARE EDUCATION/TRAINING PROGRAM

## 2022-10-08 PROCEDURE — 99285 EMERGENCY DEPT VISIT HI MDM: CPT

## 2022-10-08 PROCEDURE — 87804 INFLUENZA ASSAY W/OPTIC: CPT

## 2022-10-08 PROCEDURE — 1200000003 HC TELEMETRY R&B

## 2022-10-08 PROCEDURE — 70450 CT HEAD/BRAIN W/O DYE: CPT

## 2022-10-08 PROCEDURE — 85025 COMPLETE CBC W/AUTO DIFF WBC: CPT

## 2022-10-08 PROCEDURE — 99223 1ST HOSP IP/OBS HIGH 75: CPT | Performed by: HOSPITALIST

## 2022-10-08 PROCEDURE — 76705 ECHO EXAM OF ABDOMEN: CPT

## 2022-10-08 PROCEDURE — 85730 THROMBOPLASTIN TIME PARTIAL: CPT

## 2022-10-08 PROCEDURE — 80076 HEPATIC FUNCTION PANEL: CPT

## 2022-10-08 PROCEDURE — 84484 ASSAY OF TROPONIN QUANT: CPT

## 2022-10-08 PROCEDURE — 6820000001 HC L2 TRAUMA SURGERY EVALUATION: Performed by: PHYSICIAN ASSISTANT

## 2022-10-08 RX ORDER — ONDANSETRON 4 MG/1
4 TABLET, ORALLY DISINTEGRATING ORAL EVERY 8 HOURS PRN
Status: DISCONTINUED | OUTPATIENT
Start: 2022-10-08 | End: 2022-10-12 | Stop reason: HOSPADM

## 2022-10-08 RX ORDER — SODIUM CHLORIDE 9 MG/ML
INJECTION, SOLUTION INTRAVENOUS CONTINUOUS
Status: DISCONTINUED | OUTPATIENT
Start: 2022-10-08 | End: 2022-10-12 | Stop reason: HOSPADM

## 2022-10-08 RX ORDER — SODIUM CHLORIDE 0.9 % (FLUSH) 0.9 %
5-40 SYRINGE (ML) INJECTION PRN
Status: DISCONTINUED | OUTPATIENT
Start: 2022-10-08 | End: 2022-10-08 | Stop reason: SDUPTHER

## 2022-10-08 RX ORDER — ACETAMINOPHEN 650 MG/1
650 SUPPOSITORY RECTAL EVERY 6 HOURS PRN
Status: DISCONTINUED | OUTPATIENT
Start: 2022-10-08 | End: 2022-10-08 | Stop reason: SDUPTHER

## 2022-10-08 RX ORDER — PROPAFENONE HYDROCHLORIDE 150 MG/1
150 TABLET, FILM COATED ORAL 2 TIMES DAILY
Status: DISCONTINUED | OUTPATIENT
Start: 2022-10-08 | End: 2022-10-12 | Stop reason: HOSPADM

## 2022-10-08 RX ORDER — 0.9 % SODIUM CHLORIDE 0.9 %
1000 INTRAVENOUS SOLUTION INTRAVENOUS ONCE
Status: COMPLETED | OUTPATIENT
Start: 2022-10-08 | End: 2022-10-08

## 2022-10-08 RX ORDER — ACETAMINOPHEN 325 MG/1
650 TABLET ORAL EVERY 6 HOURS PRN
Status: DISCONTINUED | OUTPATIENT
Start: 2022-10-08 | End: 2022-10-08 | Stop reason: SDUPTHER

## 2022-10-08 RX ORDER — BACLOFEN 10 MG/1
5 TABLET ORAL NIGHTLY
Status: DISCONTINUED | OUTPATIENT
Start: 2022-10-08 | End: 2022-10-08 | Stop reason: ALTCHOICE

## 2022-10-08 RX ORDER — ONDANSETRON 2 MG/ML
4 INJECTION INTRAMUSCULAR; INTRAVENOUS EVERY 6 HOURS PRN
Status: DISCONTINUED | OUTPATIENT
Start: 2022-10-08 | End: 2022-10-12 | Stop reason: HOSPADM

## 2022-10-08 RX ORDER — ACETAMINOPHEN 650 MG/1
650 SUPPOSITORY RECTAL EVERY 6 HOURS PRN
Status: DISCONTINUED | OUTPATIENT
Start: 2022-10-08 | End: 2022-10-12 | Stop reason: HOSPADM

## 2022-10-08 RX ORDER — SODIUM CHLORIDE 0.9 % (FLUSH) 0.9 %
5-40 SYRINGE (ML) INJECTION EVERY 12 HOURS SCHEDULED
Status: DISCONTINUED | OUTPATIENT
Start: 2022-10-08 | End: 2022-10-08 | Stop reason: SDUPTHER

## 2022-10-08 RX ORDER — SODIUM CHLORIDE 0.9 % (FLUSH) 0.9 %
5-40 SYRINGE (ML) INJECTION PRN
Status: DISCONTINUED | OUTPATIENT
Start: 2022-10-08 | End: 2022-10-12 | Stop reason: HOSPADM

## 2022-10-08 RX ORDER — PANTOPRAZOLE SODIUM 40 MG/1
40 TABLET, DELAYED RELEASE ORAL
Status: DISCONTINUED | OUTPATIENT
Start: 2022-10-09 | End: 2022-10-12 | Stop reason: HOSPADM

## 2022-10-08 RX ORDER — ACETAMINOPHEN 325 MG/1
650 TABLET ORAL ONCE
Status: COMPLETED | OUTPATIENT
Start: 2022-10-08 | End: 2022-10-08

## 2022-10-08 RX ORDER — SODIUM CHLORIDE 9 MG/ML
INJECTION, SOLUTION INTRAVENOUS PRN
Status: DISCONTINUED | OUTPATIENT
Start: 2022-10-08 | End: 2022-10-08 | Stop reason: SDUPTHER

## 2022-10-08 RX ORDER — SODIUM CHLORIDE 0.9 % (FLUSH) 0.9 %
5-40 SYRINGE (ML) INJECTION EVERY 12 HOURS SCHEDULED
Status: DISCONTINUED | OUTPATIENT
Start: 2022-10-08 | End: 2022-10-12 | Stop reason: HOSPADM

## 2022-10-08 RX ORDER — 0.9 % SODIUM CHLORIDE 0.9 %
1000 INTRAVENOUS SOLUTION INTRAVENOUS ONCE
Status: DISCONTINUED | OUTPATIENT
Start: 2022-10-08 | End: 2022-10-08

## 2022-10-08 RX ORDER — POLYETHYLENE GLYCOL 3350 17 G/17G
17 POWDER, FOR SOLUTION ORAL DAILY PRN
Status: DISCONTINUED | OUTPATIENT
Start: 2022-10-08 | End: 2022-10-12 | Stop reason: HOSPADM

## 2022-10-08 RX ORDER — SODIUM CHLORIDE 9 MG/ML
INJECTION, SOLUTION INTRAVENOUS PRN
Status: DISCONTINUED | OUTPATIENT
Start: 2022-10-08 | End: 2022-10-12 | Stop reason: HOSPADM

## 2022-10-08 RX ORDER — ACETAMINOPHEN 325 MG/1
650 TABLET ORAL EVERY 6 HOURS PRN
Status: DISCONTINUED | OUTPATIENT
Start: 2022-10-08 | End: 2022-10-12 | Stop reason: HOSPADM

## 2022-10-08 RX ORDER — 0.9 % SODIUM CHLORIDE 0.9 %
500 INTRAVENOUS SOLUTION INTRAVENOUS ONCE
Status: COMPLETED | OUTPATIENT
Start: 2022-10-08 | End: 2022-10-08

## 2022-10-08 RX ADMIN — SODIUM CHLORIDE 500 ML: 9 INJECTION, SOLUTION INTRAVENOUS at 13:30

## 2022-10-08 RX ADMIN — SODIUM CHLORIDE: 9 INJECTION, SOLUTION INTRAVENOUS at 18:06

## 2022-10-08 RX ADMIN — SODIUM CHLORIDE 500 ML: 9 INJECTION, SOLUTION INTRAVENOUS at 15:30

## 2022-10-08 RX ADMIN — SODIUM CHLORIDE 1000 ML: 9 INJECTION, SOLUTION INTRAVENOUS at 19:52

## 2022-10-08 RX ADMIN — ACETAMINOPHEN 650 MG: 325 TABLET ORAL at 14:11

## 2022-10-08 RX ADMIN — METOPROLOL TARTRATE 25 MG: 25 TABLET, FILM COATED ORAL at 20:03

## 2022-10-08 RX ADMIN — PROPAFENONE HYDROCHLORIDE 150 MG: 150 TABLET, FILM COATED ORAL at 20:04

## 2022-10-08 ASSESSMENT — ENCOUNTER SYMPTOMS
ABDOMINAL DISTENTION: 0
COLOR CHANGE: 0
TROUBLE SWALLOWING: 0
SINUS PAIN: 0
EYE REDNESS: 0
SORE THROAT: 0
DIARRHEA: 0
NAUSEA: 0
CHOKING: 0
SHORTNESS OF BREATH: 0
APNEA: 0
ABDOMINAL PAIN: 0
FACIAL SWELLING: 0
EYE ITCHING: 0
EYE PAIN: 0
CHEST TIGHTNESS: 0
ANAL BLEEDING: 0
COUGH: 1
RECTAL PAIN: 0
SINUS PRESSURE: 0
EYE DISCHARGE: 0
BACK PAIN: 0

## 2022-10-08 ASSESSMENT — LIFESTYLE VARIABLES
HOW MANY STANDARD DRINKS CONTAINING ALCOHOL DO YOU HAVE ON A TYPICAL DAY: PATIENT DOES NOT DRINK
HOW OFTEN DO YOU HAVE A DRINK CONTAINING ALCOHOL: NEVER

## 2022-10-08 ASSESSMENT — PAIN SCALES - GENERAL
PAINLEVEL_OUTOF10: 0
PAINLEVEL_OUTOF10: 8

## 2022-10-08 ASSESSMENT — PAIN - FUNCTIONAL ASSESSMENT: PAIN_FUNCTIONAL_ASSESSMENT: 0-10

## 2022-10-08 NOTE — ED NOTES
Oxygen to 90% when standing for orthostatic BP.  Pt became dizzy with both the sitting and standing positions     WaBoring Karan, RN  10/08/22 235 [facility], RN  10/08/22 9526

## 2022-10-08 NOTE — ED NOTES
ED to inpatient nurses report    Chief Complaint   Patient presents with    Fatigue    Dizziness      Present to ED from home  LOC: alert and orientated to name, place, date  Vital signs   Vitals:    10/08/22 1414 10/08/22 1518 10/08/22 1531 10/08/22 1645   BP:   106/64 112/66   Pulse: 85   66   Resp: 18 24  14   Temp:       SpO2: 98% 93% 95% 98%   Weight:          Oxygen Baseline 94%    Current needs required RA Bipap/Cpap No  LDAs:   Peripheral IV 10/08/22 Left Wrist (Active)   Site Assessment Clean, dry & intact 10/08/22 1347   Phlebitis Assessment No symptoms 10/08/22 1347   Infiltration Assessment 0 10/08/22 1347   Dressing Status Clean, dry & intact 10/08/22 1347     Mobility: Requires assistance * 1  Pending ED orders: NA  Present condition: stable    C-SSRS    Swallow Screening    Preferred Language: [place]     Electronically signed by Vitaly Cuevas RN on 10/8/2022 at 5:07 PM       Vitaly Cuevas RN  10/08/22 [id number]

## 2022-10-08 NOTE — ED PROVIDER NOTES
[place] ENCOUNTER          Pt Name: Katy Craig  MRN: [id number]  [place] 1934  Date of evaluation: 10/8/2022  Treating Resident Physician: Flakito Hollis MD  Supervising Physician: Alisha Shea MD     CHIEF COMPLAINT       Chief Complaint   Patient presents with    Fatigue    Dizziness     History obtained from the patient. HISTORY OF PRESENT ILLNESS    HPI  Katy Craig is a 80 y.o. female with PMHx of p A. fib, a flutter who presents to the emergency department for evaluation of fatigue, dizziness. Patient to ED due to increased dizziness and fatigue. Patient states that she started feeling generalized malaise and feeling unwell since Monday, 10/3/2022 after receiving her flu shot. Patient complained of a fever that day after the flu shot administration. Has also been having a dry cough since then. Patient states that she has no appetite and has not been eating well. This morning upon awakening, patient got up and felt dizzy and fell and hit her head against her headboard. Patient then called EMS which took her to the ED for further evaluation. Patient is on Xarelto for her A. fib. The patient has no other acute complaints at this time. REVIEW OF SYSTEMS   Review of Systems   Constitutional:  Positive for activity change, fatigue and fever. Negative for appetite change and diaphoresis. HENT:  Negative for ear pain, facial swelling, sinus pressure, sinus pain, sore throat and trouble swallowing. Eyes:  Negative for pain, discharge, redness and itching. Respiratory:  Positive for cough. Negative for apnea, choking, chest tightness and shortness of breath. Cardiovascular:  Negative for chest pain, palpitations and leg swelling. Gastrointestinal:  Negative for abdominal distention, abdominal pain, anal bleeding, diarrhea, nausea and rectal pain. Endocrine: Negative for polydipsia, polyphagia and polyuria. Genitourinary:  Negative for dysuria, flank pain, genital sores and hematuria. Musculoskeletal:  Negative for arthralgias, back pain, joint swelling, myalgias, neck pain and neck stiffness. Skin:  Negative for color change, rash and wound. Neurological:  Positive for dizziness and light-headedness. Negative for syncope, facial asymmetry, speech difficulty and headaches. Hematological:  Negative for adenopathy. Psychiatric/Behavioral:  Negative for agitation, behavioral problems, hallucinations, self-injury and suicidal ideas.         PAST MEDICAL AND SURGICAL HISTORY     Past Medical History:   Diagnosis Date    Actinic keratosis     history of    Arthritis     Atrial fibrillation ([facility] [address]     Cancer ([facility] [address]     skin    Cervical prolapse     history of    Diverticulosis     Endocervical polyp     history of    Heel spur     Hip pain     Hypertension     Nasal septal deviation     Osteoporosis     Stress incontinence     history of    Syncope     Vaginal prolapse     history of     Past Surgical History:   Procedure Laterality Date    APPENDECTOMY  26's    BLADDER REPAIR  2011    [place]  68/3061    [facility]    COLONOSCOPY  01/26/2000    ENDOSCOPIC ULTRASOUND (LOWER) Left 07/20/2017    ENDOSCOPIC ULTRASOUND performed by Thien Mcnamara MD at [facility] cataract    FACIAL SURGERY N/A 1/27/2021    DIVISION AND INSET/CHEEK performed by Tony Blas MD at [address] [facility] ([facility])      JOINT REPLACEMENT  2017    left total hip relpament    MOHS SURGERY N/A 1/8/2021    MOHS REPAIR BCC NASAL TIP WITH FLAP AND GRAFT performed by Tony Blas MD at [address] Left 8/6/2021    MOHS DEFECT REPAIR SCC DORSAL NOSE AND LEFT LOWER FOREHEAD WITH SKIN GRAFT FROM RIGHT CHEEK/EAR (PLACED ON NOSE) performed by Tony Blas MD at [address] [facility] (CERVIX NOT REMOVED)  1960's    SC EGD TRANSORAL BIOPSY SINGLE/MULTIPLE Left 07/16/2017    EGD BIOPSY performed by Trini Renae MD at [address] [facility] OFFICE/OUTPT VISIT,PROCEDURE ONLY Right 06/01/2018    MOHS REPAIR BASEL CELL CARCINOMA RIGHT DORSAL NOSE performed by Sandra Mosher MD at [address] Left 09/19/2017    Left Total Hip ARTHROPLASTY performed by Roselia Batista MD at [address]  07/16/2017    UPPER GASTROINTESTINAL ENDOSCOPY Left 07/16/2017    EGD DILATION BALLOON performed by Trini Renae MD at [facility] 8/28/2021    EGD BIOPSY performed by Richar Hi MD at [place] [address] 8/30/2021    EGD DILATION SAVORY performed by Richar Hi MD at [place] [address] N/A 4/28/2022    EGD BIOPSY performed by Richar Hi MD at [place] [address]  4/28/2022    EGD DILATION SAVORY performed by Richar Hi MD at [facility] N/A 8/22/2022    EGD DILATION SAVORY performed by Richar Hi MD at [facility] LEFT Left 4/21/2021    US BREAST NEEDLE BIOPSY LEFT 4/21/2021 [address] BREAST NEEDLE BIOPSY RIGHT Right 4/21/2021    US BREAST NEEDLE BIOPSY RIGHT 4/21/2021 [address] ULTRASOUND    US GUIDED NEEDLE LOC BREAST ADDL LEFT Left 4/21/2021    US GUIDED NEEDLE LOC BREAST ADDL LEFT 4/21/2021 [address] [facility] ULTRASOUND         MEDICATIONS     Current Facility-Administered Medications:     0.9 % sodium chloride bolus, 500 mL, IntraVENous, Once, Anastasia Burnett MD, Last Rate: 500 mL/hr at 10/08/22 1530, 500 mL at 10/08/22 1530    Current Outpatient Medications:     pantoprazole (PROTONIX) 40 MG tablet, Take 1 tablet by mouth once daily, Disp: 90 tablet, Rfl: 1    propafenone (RYTHMOL) 150 MG tablet, Take 1 tablet by mouth 2 times daily, Disp: 180 tablet, Rfl: 1    rivaroxaban (XARELTO) 20 MG TABS tablet, Take 1 tablet by mouth in the morning., Disp: 90 tablet, Rfl: 1    metoprolol tartrate (LOPRESSOR) 50 MG tablet, Patient is taking 1/2 tab BID, Disp: 90 tablet, Rfl: 3    baclofen (LIORESAL) 10 MG tablet, Take 0.5 tablets by mouth at bedtime, Disp: 30 tablet, Rfl: 1    ondansetron (ZOFRAN ODT) 4 MG disintegrating tablet, Take 1 tablet by mouth every 8 hours as needed for Nausea, Disp: 20 tablet, Rfl: 0    Multiple Vitamins-Minerals (ICAPS AREDS 2) CAPS, Take 2 tablets by mouth daily (Patient not taking: Reported on 10/4/2022), Disp: , Rfl:       SOCIAL HISTORY     Social History     Social History Narrative    Not on file     Social History     Tobacco Use    Smoking status: Never    Smokeless tobacco: Never   Vaping Use    Vaping Use: Never used   Substance Use Topics    Alcohol use: No    Drug use: No         ALLERGIES     Allergies   Allergen Reactions    Statins Other (See Comments)     Causes severe leg cramps     Tape [Adhesive Tape] Other (See Comments)     \"sticks to skin\" causes redness    Tizanidine Hcl Other (See Comments)     Having issues with her liver- elevated her enzyme level    Tramadol Other (See Comments)     \"makes me Goofy\"    Augmentin [Amoxicillin-Pot Clavulanate] Diarrhea, Nausea And Vomiting and Other (See Comments)     abd pain    Nystatin Nausea And Vomiting     Pt states \"swish and swallow\"          FAMILY HISTORY     Family History   Problem Relation Age of Onset    Colon Cancer Father     Osteoporosis Mother     Heart Disease Mother          PREVIOUS RECORDS   Previous records reviewed: I reviewed the patient's past medical records including relevant labs, imaging and procedures.   Patient last seen by family medicine on 10/4/2022 for hip pain    PHYSICAL EXAM     ED Triage Vitals   BP Temp Temp src Heart Rate Resp SpO2 Height Weight   10/08/22 1312 10/08/22 1311 -- 10/08/22 1311 10/08/22 1311 10/08/22 1311 -- 10/08/22 1311   (!) 97/58 97.6 °F (36.4 °C)  79 16 97 %  150 lb (68 kg)     Initial vital signs and nursing assessment reviewed and abnormal from hypotension . Body mass index is 24.96 kg/m². Pulsoximetry is normal per my interpretation. Additional Vital Signs:  Vitals:    10/08/22 1531   BP: 106/64   Pulse:    Resp:    Temp:    SpO2: 95%       Physical Exam  Constitutional:       General: She is not in acute distress. Appearance: She is ill-appearing. She is not diaphoretic. HENT:      Head: Normocephalic and atraumatic. Right Ear: External ear normal.      Left Ear: External ear normal.      Nose: Nose normal. No congestion or rhinorrhea. Mouth/Throat:      Mouth: Mucous membranes are moist.      Pharynx: Posterior oropharyngeal erythema present. No oropharyngeal exudate. Eyes:      General: No scleral icterus. Extraocular Movements: Extraocular movements intact. Conjunctiva/sclera: Conjunctivae normal.      Pupils: Pupils are equal, round, and reactive to light. Cardiovascular:      Rate and Rhythm: Normal rate. Rhythm irregular. Pulses: Normal pulses. Heart sounds: Normal heart sounds. Pulmonary:      Effort: Pulmonary effort is normal. No respiratory distress. Breath sounds: Normal breath sounds. Chest:      Chest wall: No tenderness. Abdominal:      General: Bowel sounds are normal. There is no distension. Palpations: Abdomen is soft. Tenderness: There is no abdominal tenderness. There is no guarding or rebound. Musculoskeletal:         General: No swelling, tenderness or deformity. Normal range of motion. Cervical back: Normal range of motion and neck supple. No rigidity or tenderness. Skin:     General: Skin is warm and dry. Capillary Refill: Capillary refill takes less than 2 seconds. Coloration: Skin is pale. Skin is not jaundiced. Findings: No bruising, erythema, lesion or rash.    Neurological:      General: No focal deficit present. Mental Status: She is alert and oriented to person, place, and time. Psychiatric:         Mood and Affect: Mood normal.         Behavior: Behavior normal.         Thought Content: Thought content normal.     ED RESULTS   Laboratory results:  Labs Reviewed   COVID-19, RAPID - Abnormal; Notable for the following components:       Result Value    SARS-CoV-2, NAAT DETECTED (*)     All other components within normal limits   CBC WITH AUTO DIFFERENTIAL - Abnormal; Notable for the following components:    WBC 3.8 (*)     RDW-CV 15.0 (*)     RDW-SD 46.5 (*)     Platelets 622 (*)     All other components within normal limits   BASIC METABOLIC PANEL W/ REFLEX TO MG FOR LOW K - Abnormal; Notable for the following components:    Sodium 132 (*)     BUN 26 (*)     Calcium 8.1 (*)     All other components within normal limits   HEPATIC FUNCTION PANEL - Abnormal; Notable for the following components:    Albumin 3.4 (*)     Total Protein 5.6 (*)     All other components within normal limits   GLOMERULAR FILTRATION RATE, ESTIMATED - Abnormal; Notable for the following components:    Est, Glom Filt Rate 59 (*)     All other components within normal limits   OSMOLALITY - Abnormal; Notable for the following components:    Osmolality Calc 267.9 (*)     All other components within normal limits   RAPID INFLUENZA A/B ANTIGENS   TROPONIN   BRAIN NATRIURETIC PEPTIDE   ANION GAP       Radiologic studies results:  CT HEAD WO CONTRAST   Final Result   1. No mass effect or acute hemorrhage. 2. Chronic periventricular small vessel ischemic changes and cerebral atrophy. **This report has been created using voice recognition software. It may contain minor errors which are inherent in voice recognition technology. **      Final report electronically signed by Dr. Haven Carbone on 10/8/2022 2:14 PM      CT CERVICAL SPINE WO CONTRAST   Final Result   1.  There is a nondisplaced fracture through the right T1 transverse process seen on axial image 46. This is also seen on coronal series image 17. **This report has been created using voice recognition software. It may contain minor errors which are inherent in voice recognition technology. **      Final report electronically signed by Dr. Ani Garcia on 10/8/2022 2:29 PM      XR CHEST PORTABLE   Final Result      No acute intrathoracic process. **This report has been created using voice recognition software. It may contain minor errors which are inherent in voice recognition technology. **      Final report electronically signed by Dr. Jayson Lopez on 10/8/2022 1:46 PM          ED Medications administered this visit:   Medications   0.9 % sodium chloride bolus (500 mLs IntraVENous New Bag 10/8/22 1530)   acetaminophen (TYLENOL) tablet 650 mg (650 mg Oral Given 10/8/22 1411)   0.9 % sodium chloride bolus (0 mLs IntraVENous Stopped 10/8/22 1414)         ED COURSE     ED Course as of 10/08/22 1624   Sat Oct 08, 2022   1316 BP(!): 97/58 [EL]   1348 SARS-CoV-2, NAAT(!!): DETECTED [EL]   1410 XR CHEST PORTABLE  No acute intrathoracic process. [EL]   1417 CT HEAD WO CONTRAST  IMPRESSION:  1. No mass effect or acute hemorrhage. 2. Chronic periventricular small vessel ischemic changes and cerebral atrophy. [EL]   3360 CT CERVICAL SPINE WO CONTRAST  IMPRESSION:  1. There is a nondisplaced fracture through the right T1 transverse process seen on axial image 46. This is also seen on coronal series image 17. [EL]      ED Course User Index  [EL] Jaja Delgadillo MD       MEDICAL DECISION MAKING   Given the patient's above chief complaint and findings on history and physical examination, I thought it was appropriate to consider the following emergency medical conditions:  Trauma, intracranial hemorrhage,  Although some of these diagnoses are unlikely they were considered in my medical decision making.     88F pmh pAFIB, AF cc of fatigue dizziness and generalized malaise x5 days. Had sxs after her flu shot. Felt dizzy today and fell hitting her head on a headboard on anticoagulation for her A. fib. She appears ill on PE. Currently in a flutter. CT head neg, CT neck showing TP fx at T1. Covid positive. Pt had positive orthostatics. SPO2 90% on ambulation. Will reach out to spine regarding the TP fracture. Patient will need admission for acute hypoxic failure secondary to COVID. MEDICATION CHANGES     New Prescriptions    No medications on file         FINAL DISPOSITION     Final diagnoses:   COVID   Acute respiratory failure with hypoxia ([facility] [address]   Dizziness   Fall from bed, initial encounter     Condition: condition: fair  Dispo: Admit to med/surg floor    This transcription was electronically signed. Parts of this transcriptions may have been dictated by use of voice recognition software and electronically transcribed, and parts may have been transcribed with the assistance of an ED scribe. The transcription may contain errors not detected in proofreading. Please refer to my supervising physician's documentation if my documentation differs.     Electronically Signed: Emmanuel Gallardo MD, 10/08/22, 4:24 PM         Stephanie Quiros MD  Resident  10/08/22 1822

## 2022-10-08 NOTE — PLAN OF CARE
Problem: Discharge Planning  Goal: Discharge to home or other facility with appropriate resources  Outcome: Progressing  Flowsheets (Taken 10/8/2022 1800)  Discharge to home or other facility with appropriate resources:   Identify barriers to discharge with patient and caregiver   Arrange for needed discharge resources and transportation as appropriate   Identify discharge learning needs (meds, wound care, etc)   Arrange for interpreters to assist at discharge as needed     Problem: Safety - Adult  Goal: Free from fall injury  Outcome: Progressing     Problem: ABCDS Injury Assessment  Goal: Absence of physical injury  Outcome: Progressing   Pt admitted via stretcher from ED, pt alert and oriented. Denies pain or discomfort.  at bedside for evaluations.

## 2022-10-08 NOTE — ED TRIAGE NOTES
Pt to ED due to increased dizziness and fatigue. Pt states that she has been feeling bad since Monday 10/3/22 after she got the flu shot. Pt states that she has no appetite. Pt mentions that she fell this morning due to the dizziness and hit her head. No sign of head trauma. Pt is [id number]. EKG done.

## 2022-10-09 ENCOUNTER — APPOINTMENT (OUTPATIENT)
Dept: CT IMAGING | Age: 87
DRG: 178 | End: 2022-10-09
Payer: MEDICARE

## 2022-10-09 LAB
ANION GAP SERPL CALCULATED.3IONS-SCNC: 10 MEQ/L (ref 8–16)
AVERAGE GLUCOSE: 105 MG/DL (ref 70–126)
BASOPHILS # BLD: 0 %
BASOPHILS ABSOLUTE: 0 THOU/MM3 (ref 0–0.1)
BUN BLDV-MCNC: 15 MG/DL (ref 7–22)
CALCIUM SERPL-MCNC: 7.6 MG/DL (ref 8.5–10.5)
CHLORIDE BLD-SCNC: 104 MEQ/L (ref 98–111)
CHOLESTEROL, TOTAL: 149 MG/DL (ref 100–199)
CO2: 22 MEQ/L (ref 23–33)
CREAT SERPL-MCNC: 0.8 MG/DL (ref 0.4–1.2)
EKG ATRIAL RATE: 220 BPM
EKG ATRIAL RATE: 80 BPM
EKG P AXIS: -84 DEGREES
EKG Q-T INTERVAL: 394 MS
EKG Q-T INTERVAL: 400 MS
EKG QRS DURATION: 94 MS
EKG QRS DURATION: 96 MS
EKG QTC CALCULATION (BAZETT): 461 MS
EKG QTC CALCULATION (BAZETT): 462 MS
EKG R AXIS: 89 DEGREES
EKG R AXIS: 93 DEGREES
EKG T AXIS: 55 DEGREES
EKG T AXIS: 66 DEGREES
EKG VENTRICULAR RATE: 80 BPM
EKG VENTRICULAR RATE: 83 BPM
EOSINOPHIL # BLD: 2.8 %
EOSINOPHILS ABSOLUTE: 0.1 THOU/MM3 (ref 0–0.4)
ERYTHROCYTE [DISTWIDTH] IN BLOOD BY AUTOMATED COUNT: 14.9 % (ref 11.5–14.5)
ERYTHROCYTE [DISTWIDTH] IN BLOOD BY AUTOMATED COUNT: 46 FL (ref 35–45)
FOLATE: > 20 NG/ML (ref 4.8–24.2)
GFR SERPL CREATININE-BSD FRML MDRD: 68 ML/MIN/1.73M2
GLUCOSE BLD-MCNC: 62 MG/DL (ref 70–108)
HAV IGM SER IA-ACNC: NEGATIVE
HBA1C MFR BLD: 5.5 % (ref 4.4–6.4)
HCT VFR BLD CALC: 41.6 % (ref 37–47)
HDLC SERPL-MCNC: 33 MG/DL
HEMOGLOBIN: 13.5 GM/DL (ref 12–16)
HEPATITIS B CORE IGM ANTIBODY: NEGATIVE
HEPATITIS B SURFACE ANTIGEN: NEGATIVE
HEPATITIS C ANTIBODY: NEGATIVE
IMMATURE GRANS (ABS): 0.01 THOU/MM3 (ref 0–0.07)
IMMATURE GRANULOCYTES: 0.3 %
LDL CHOLESTEROL CALCULATED: 96 MG/DL
LYMPHOCYTES # BLD: 35.7 %
LYMPHOCYTES ABSOLUTE: 1.1 THOU/MM3 (ref 1–4.8)
MCH RBC QN AUTO: 27.5 PG (ref 26–33)
MCHC RBC AUTO-ENTMCNC: 32.5 GM/DL (ref 32.2–35.5)
MCV RBC AUTO: 84.7 FL (ref 81–99)
MONOCYTES # BLD: 10.2 %
MONOCYTES ABSOLUTE: 0.3 THOU/MM3 (ref 0.4–1.3)
NUCLEATED RED BLOOD CELLS: 0 /100 WBC
PLATELET # BLD: 92 THOU/MM3 (ref 130–400)
PMV BLD AUTO: 10.1 FL (ref 9.4–12.4)
POTASSIUM REFLEX MAGNESIUM: 4.3 MEQ/L (ref 3.5–5.2)
RBC # BLD: 4.91 MILL/MM3 (ref 4.2–5.4)
REVIEWED BY: NORMAL
SCAN OF BLOOD SMEAR: NORMAL
SEG NEUTROPHILS: 51 %
SEGMENTED NEUTROPHILS ABSOLUTE COUNT: 1.6 THOU/MM3 (ref 1.8–7.7)
SMEAR REVIEW: NORMAL
SODIUM BLD-SCNC: 136 MEQ/L (ref 135–145)
TRIGL SERPL-MCNC: 98 MG/DL (ref 0–199)
VITAMIN B-12: 1420 PG/ML (ref 211–911)
VITAMIN D 25-HYDROXY: 22 NG/ML (ref 30–100)
WBC # BLD: 3.2 THOU/MM3 (ref 4.8–10.8)

## 2022-10-09 PROCEDURE — 80061 LIPID PANEL: CPT

## 2022-10-09 PROCEDURE — 6370000000 HC RX 637 (ALT 250 FOR IP): Performed by: HOSPITALIST

## 2022-10-09 PROCEDURE — 83036 HEMOGLOBIN GLYCOSYLATED A1C: CPT

## 2022-10-09 PROCEDURE — 80048 BASIC METABOLIC PNL TOTAL CA: CPT

## 2022-10-09 PROCEDURE — 85025 COMPLETE CBC W/AUTO DIFF WBC: CPT

## 2022-10-09 PROCEDURE — 72128 CT CHEST SPINE W/O DYE: CPT

## 2022-10-09 PROCEDURE — 82746 ASSAY OF FOLIC ACID SERUM: CPT

## 2022-10-09 PROCEDURE — 1200000003 HC TELEMETRY R&B

## 2022-10-09 PROCEDURE — 36415 COLL VENOUS BLD VENIPUNCTURE: CPT

## 2022-10-09 PROCEDURE — 93010 ELECTROCARDIOGRAM REPORT: CPT | Performed by: INTERNAL MEDICINE

## 2022-10-09 PROCEDURE — 87389 HIV-1 AG W/HIV-1&-2 AB AG IA: CPT

## 2022-10-09 PROCEDURE — 99233 SBSQ HOSP IP/OBS HIGH 50: CPT | Performed by: HOSPITALIST

## 2022-10-09 PROCEDURE — 82306 VITAMIN D 25 HYDROXY: CPT

## 2022-10-09 PROCEDURE — 80074 ACUTE HEPATITIS PANEL: CPT

## 2022-10-09 PROCEDURE — 2580000003 HC RX 258: Performed by: STUDENT IN AN ORGANIZED HEALTH CARE EDUCATION/TRAINING PROGRAM

## 2022-10-09 PROCEDURE — 72131 CT LUMBAR SPINE W/O DYE: CPT

## 2022-10-09 PROCEDURE — 82607 VITAMIN B-12: CPT

## 2022-10-09 PROCEDURE — 6370000000 HC RX 637 (ALT 250 FOR IP): Performed by: STUDENT IN AN ORGANIZED HEALTH CARE EDUCATION/TRAINING PROGRAM

## 2022-10-09 RX ADMIN — PANTOPRAZOLE SODIUM 40 MG: 40 TABLET, DELAYED RELEASE ORAL at 06:45

## 2022-10-09 RX ADMIN — SODIUM CHLORIDE: 9 INJECTION, SOLUTION INTRAVENOUS at 04:11

## 2022-10-09 RX ADMIN — RIVAROXABAN 20 MG: 20 TABLET, FILM COATED ORAL at 17:25

## 2022-10-09 RX ADMIN — SODIUM CHLORIDE: 9 INJECTION, SOLUTION INTRAVENOUS at 14:26

## 2022-10-09 RX ADMIN — ACETAMINOPHEN 650 MG: 325 TABLET ORAL at 10:19

## 2022-10-09 RX ADMIN — METOPROLOL TARTRATE 25 MG: 25 TABLET, FILM COATED ORAL at 10:18

## 2022-10-09 RX ADMIN — PROPAFENONE HYDROCHLORIDE 150 MG: 150 TABLET, FILM COATED ORAL at 19:24

## 2022-10-09 RX ADMIN — PROPAFENONE HYDROCHLORIDE 150 MG: 150 TABLET, FILM COATED ORAL at 10:18

## 2022-10-09 RX ADMIN — METOPROLOL TARTRATE 25 MG: 25 TABLET, FILM COATED ORAL at 19:24

## 2022-10-09 ASSESSMENT — PAIN DESCRIPTION - DESCRIPTORS: DESCRIPTORS: ACHING

## 2022-10-09 ASSESSMENT — PAIN - FUNCTIONAL ASSESSMENT: PAIN_FUNCTIONAL_ASSESSMENT: ACTIVITIES ARE NOT PREVENTED

## 2022-10-09 ASSESSMENT — PAIN DESCRIPTION - ONSET: ONSET: ON-GOING

## 2022-10-09 ASSESSMENT — PAIN DESCRIPTION - PAIN TYPE: TYPE: ACUTE PAIN

## 2022-10-09 ASSESSMENT — PAIN DESCRIPTION - LOCATION
LOCATION: HEAD
LOCATION: HEAD

## 2022-10-09 ASSESSMENT — PAIN DESCRIPTION - ORIENTATION: ORIENTATION: ANTERIOR

## 2022-10-09 ASSESSMENT — PAIN SCALES - GENERAL
PAINLEVEL_OUTOF10: 1
PAINLEVEL_OUTOF10: 2

## 2022-10-09 ASSESSMENT — PAIN DESCRIPTION - FREQUENCY: FREQUENCY: CONTINUOUS

## 2022-10-09 NOTE — PROGRESS NOTES
Hospitalist Progress Note      Patient:  Lucy Courser    Unit/Bed:8B-24/024-A  YOB: 1934  MRN: [id number]   Acct: [de-identified]   PCP: Jeanie Root MD  Date of Admission: 10/8/2022    Assessment/Plan:    1. Orthostatic Dizziness. Likely 2/2 hypovolemia 2/2 dehydration/decreasd Po intake 2/2 COVID infection. Positive orthostatic vitals in ED resolved w/ IVF. Consider d/c'ing IVF when adequate PO intake. Echocardiogram pending. Fall precautions. Hold parameters for home BB       2. Fall Secondary to Orthostatic Dizziness. Fall on the morning of 10/8/2022. Patient denies any loss of consciousness, chest pain, palpitations, and shortness of breath. However, affirms that she is on Xarelto and affirms head trauma. CT of the head without contrast on 10/8/2022 demonstrated no mass-effect or acute hemorrhage but did demonstrate chronic periventricular small vessel ischemic changes and cerebral atrophy most likely consistent with age. Maintain on fall precautions and monitor clinically. PT/OT will follow. 3.  COVID-19. Probable cause of patient's upset stomach, nausea, poor appetite, and malaise. No objective hypoxemia noted. Supportive care only. 4.  Questionable small T1 TP# Fracture:  Small fracture noted on CT of the cervical spine without contrast.  No tenderness including midline cervical tenderness. Noted pending Zacarias Khoury consult. Ok to remove C-collar. WBAT/AAT. PT to see  5. Hx of Paroxysmal Fibrillation/Flutter. Noted on history and on previous EKGs. Estimated [id number] score of 4. Estimated HAS-BLED score of 1. On DOAC  NSR on home metoprolol tartrate 25 mg twice daily and propafenone 150 mg twice daily. Follow-up with cardiology as an outpatient. 6.  Other. On Protonix for unclear reasons presumably related to gastroesophageal reflux disease. Maintain on PPI QD and PCP to follow-up as an outpatient.     8.  hx of Benign Essential Hypertension. Well-controlled on home meds. CCM and monitor  9. Osteoporosis and Vit D deficiency:  Noted on history. Vitamin D levels low; started on vit D  Consider anti-resorptive therapy OP. Follow-up with PCP as an outpatient. 10.  Leukopenia,mild:  NYD etiology. Likely related to COVID-19. Noted leukopenia in the past. Not on any immunosuppressive therapy. See below for further management. Monitor with daily CBC. 11.  Thrombocytopenia, mild, chronic  Unclear cause, possibly woresening related to COVID-19. Peripheral smear pending. Abdominal Ultrasound showing isolated splenomegaly w/o hepatomegaly. Hep/HIV swerology and serum LDH sent. Pt would methodically need CT chest and A/P and likely Bone Marrow Biopsy OP. Will inform Pt and have follow up w/ Heme OP if she wishes to pursue further W/U. Code status; Full code; palliative care to see    Chief Complaint: Fatigue, dizziness and fall    Initial H and P:-    Admitted for evaluation and management of dizzness and fall likely secondary to COVID 19. Subjective (past 24 hours):   Feeling tired,fatigued. C.o C-collar bothering her. Denies CP/worsening SOB/fever/chills/cough/sputum/presyncope/palpitation/GI or  symptoms. Past medical history, family history, social history and allergies reviewed again and is unchanged since admission. ROS (All review of systems completed. Pertinent positives noted.  Otherwise All other systems reviewed and negative.)     Medications:  Reviewed    Infusion Medications    sodium chloride 100 mL/hr at 10/09/22 0411    sodium chloride       Scheduled Medications    metoprolol tartrate  25 mg Oral BID    pantoprazole  40 mg Oral QAM AC    propafenone  150 mg Oral BID    sodium chloride flush  5-40 mL IntraVENous 2 times per day     PRN Meds: sodium chloride flush, sodium chloride, ondansetron **OR** ondansetron, polyethylene glycol, acetaminophen **OR** acetaminophen      Intake/Output Summary (Last 24 hours) at 10/9/2022 0940  Last data filed at 10/9/2022 5892  Gross per 24 hour   Intake 590 ml   Output --   Net 590 ml       Diet:  Diet NPO Exceptions are: Ice Chips, Sips of Water with Meds    Physical Exam:  /86   Pulse (!) 106   Temp 97.4 °F (36.3 °C) (Oral)   Resp 18   Ht 5' 5\" (1.651 m)   Wt 156 lb 3 oz (70.8 kg)   SpO2 96%   BMI 25.99 kg/m²   General appearance: Looking ill but not toxic. A&)x 3. No apparent distress, appears stated age and cooperative. HEENT: Pupils equal, round, and reactive to light. Conjunctivae/corneas clear. No midline cervical/thoracic tenderness  Neck: Supple, with full range of motion. No jugular venous distention. Trachea midline. Respiratory:  Normal respiratory effort. Clear to auscultation, bilaterally without Rales/Wheezes/Rhonchi. Cardiovascular: Regular rate and rhythm with normal S1/S2 without murmurs, rubs or gallops. Abdomen: Soft, non-tender, non-distended with normal bowel sounds. Musculoskeletal: passive and active ROM x 4 extremities. Skin: Skin color, texture, turgor normal.  No rashes or lesions. Neurologic:  Neurovascularly intact without any focal sensory/motor deficits. Cranial nerves: II-XII intact, grossly non-focal.  Psychiatric: Alert and oriented, thought content appropriate, normal insight  Capillary Refill: Brisk,< 3 seconds   Peripheral Pulses: +2 palpable, equal bilaterally     Labs:   Recent Labs     10/08/22  1427 10/09/22  0551   WBC 3.8* 3.2*   HGB 14.3 13.5   HCT 43.6 41.6   * 92*     Recent Labs     10/08/22  1427 10/09/22  0551   * 136   K 4.2 4.3   CL 98 104   CO2 24 22*   BUN 26* 15   CREATININE 0.9 0.8   CALCIUM 8.1* 7.6*     Recent Labs     10/08/22  1427   AST 30   ALT 21   BILIDIR <0.2   BILITOT 0.5   ALKPHOS 91     Recent Labs     10/08/22  1942   INR 1.24*     No results for input(s): Lea Nichols in the last 72 hours.     Microbiology:    Blood culture #1:   Lab Results   Component Value Date/Time    BC No growth-preliminary No growth 09/05/2021 07:38 PM       Blood culture #2:No results found for: Shiv Rosario    Organism:  Lab Results   Component Value Date/Time    ORG Escherichia coli 05/05/2022 03:24 PM       No results found for: LABGRAM    MRSA culture only:No results found for: [facility]    Urine culture:   Lab Results   Component Value Date/Time    LABURIN No growth-preliminary 05/05/2022 03:24 PM    LABURIN Roslyn count: >100,000 CFU/mL 05/05/2022 03:24 PM       Respiratory culture: No results found for: CULTRESP    Aerobic and Anaerobic :  No results found for: LABAERO  No results found for: LABANAE    Urinalysis:      Lab Results   Component Value Date/Time    NITRU Positive 05/05/2022 02:47 PM    WBCUA 10-15 09/04/2021 04:37 PM    BACTERIA NONE SEEN 09/04/2021 04:37 PM    RBCUA 0-2 09/04/2021 04:37 PM    BLOODU Large 05/05/2022 02:47 PM    SPECGRAV 1.025 05/05/2022 02:47 PM    GLUCOSEU Negative 05/05/2022 02:47 PM       Radiology:  US LIVER SPLEEN   Final Result   Impression:   Splenomegaly. This document has been electronically signed by: Miguel Angel Canales MD on    10/08/2022 11:27 PM      CT HEAD WO CONTRAST   Final Result   1. No mass effect or acute hemorrhage. 2. Chronic periventricular small vessel ischemic changes and cerebral atrophy. **This report has been created using voice recognition software. It may contain minor errors which are inherent in voice recognition technology. **      Final report electronically signed by Dr. Ailyn Marie on 10/8/2022 2:14 PM      CT CERVICAL SPINE WO CONTRAST   Final Result   1. There is a nondisplaced fracture through the right T1 transverse process seen on axial image 46. This is also seen on coronal series image 17. **This report has been created using voice recognition software. It may contain minor errors which are inherent in voice recognition technology. **      Final report electronically signed by Dr. Adalid Tracy Inocencia on 10/8/2022 2:29 PM      XR CHEST PORTABLE   Final Result      No acute intrathoracic process. **This report has been created using voice recognition software. It may contain minor errors which are inherent in voice recognition technology. **      Final report electronically signed by Dr. Bart Osorio on 10/8/2022 1:46 PM      CT THORACIC SPINE WO CONTRAST    (Results Pending)   CT LUMBAR SPINE WO CONTRAST    (Results Pending)     CT HEAD WO CONTRAST    Result Date: 10/8/2022  PROCEDURE: CT HEAD WO CONTRAST CLINICAL INFORMATION: Fall TECHNIQUE: CT scan of the head was performed from the vertex to the skull base without use of intravenous contrast. Axial images as well as coronal and sagittal reconstructions were obtained. All CT scans at this facility use dose modulation, iterative reconstruction, and/or weight-based dosing when appropriate to reduce radiation dose to as low as reasonably achievable. COMPARISON: CT head 8/28/2021 FINDINGS: There is no mass effect, midline shift or acute hemorrhage. Ventricles and sulci are prominent. There is diffuse periventricular white matter hypoattenuation. The nasal septum is deviated. There is a mucous retention cyst or polyp in the right maxillary sinus. Visualized orbits and mastoid air cells are unremarkable. 1. No mass effect or acute hemorrhage. 2. Chronic periventricular small vessel ischemic changes and cerebral atrophy. **This report has been created using voice recognition software. It may contain minor errors which are inherent in voice recognition technology. ** Final report electronically signed by Dr. Bart Osorio on 10/8/2022 2:14 PM    CT CERVICAL SPINE WO CONTRAST    Result Date: 10/8/2022  PROCEDURE: CT CERVICAL SPINE WO CONTRAST CLINICAL INFORMATION: fall, neck pain COMPARISON: No prior study.  TECHNIQUE:  Axial CT images were obtained through the cervical spine without contrast. Coronal and sagittal reformatted images were rendered. All CT scans at this facility use dose modulation, iterative reconstruction, and/or weight-based dosing when appropriate to reduce radiation dose to as low as reasonably achievable. FINDINGS: There is normal alignment at the craniocervical junction. There is no prevertebral soft tissue swelling. The spinous processes appear intact. The facets align normally. There is straightening of the normal cervical lordosis. The dens interval appears normal. The dens appears intact. The visualized lung apices appear unremarkable. There is a nondisplaced fracture through the right T1 transverse process seen on axial image 46. This is also seen on coronal series image 17. No other acute fractures identified. No loss of vertebral body height is demonstrated. Multilevel uncovertebral and facet hypertrophy is seen. There is mild to moderate neuroforaminal narrowing at C4-C5. There is moderate left neuroforaminal narrowing at C5-C6. 1. There is a nondisplaced fracture through the right T1 transverse process seen on axial image 46. This is also seen on coronal series image 17. **This report has been created using voice recognition software. It may contain minor errors which are inherent in voice recognition technology. ** Final report electronically signed by Dr. Susannah Closs on 10/8/2022 2:29 PM    US LIVER SPLEEN    Result Date: 10/8/2022  US abdomen limited, with color and pulsed Doppler of the portal vein: Comparison: None Findings: The visualized pancreas, aorta, and inferior vena cava are unremarkable. Liver normal size. Right lobe measures 15.2 cm length. No bile duct dilatation. Common duct 7.1 mm diameter. The gallbladder is surgically absent. No sonographic Jean sign. Main portal vein antegrade The spleen measures 13.6 x 14.1 cm. No free fluid in the RIGHT upper quadrant Right kidney normal, 8.0 cm in length. An exophytic 2 cm simple renal cyst is present. Impression: Splenomegaly.  This document has been electronically signed by: Tabitha Olivier MD on 10/08/2022 11:27 PM    XR CHEST PORTABLE    Result Date: 10/8/2022  PROCEDURE: XR CHEST PORTABLE CLINICAL INFORMATION: Fall, fatigue TECHNIQUE: [facility] chest radiograph. COMPARISON: [facility] chest radiograph 9/5/2021 FINDINGS: Cardiac silhouette is at the upper limits of normal in size. There are no lung consolidations. Degenerative and scoliotic changes in the thoracic spine are poorly visualized. No acute intrathoracic process. **This report has been created using voice recognition software. It may contain minor errors which are inherent in voice recognition technology. ** Final report electronically signed by Dr. Bart Osorio on 10/8/2022 1:46 PM    With RN in room, patient was updated about and agreed upon the treatment plan, all the questions and concerns were addressed. Patient was seen in PPE (PERSONAL PROTECTIVE EQUIPMENT). Patient was interviewed in Strict Airborne and Droplet Precautions.     Electronically signed by Francisco J Vaughn MD on 10/9/2022 at 9:40 AM

## 2022-10-09 NOTE — PLAN OF CARE
Problem: Discharge Planning  Goal: Discharge to home or other facility with appropriate resources  10/9/2022 0201 by Ulices Gerardo RN  Outcome: Progressing  Flowsheets (Taken 10/8/2022 1957 by Zoila Urena RN)  Discharge to home or other facility with appropriate resources:   Identify barriers to discharge with patient and caregiver   Identify discharge learning needs (meds, wound care, etc)   Refer to discharge planning if patient needs post-hospital services based on physician order or complex needs related to functional status, cognitive ability or social support system   Arrange for needed discharge resources and transportation as appropriate   Arrange for interpreters to assist at discharge as needed  10/8/2022 1909 by Zoila Urena RN  Outcome: Progressing  Flowsheets (Taken 10/8/2022 1800)  Discharge to home or other facility with appropriate resources:   Identify barriers to discharge with patient and caregiver   Arrange for needed discharge resources and transportation as appropriate   Identify discharge learning needs (meds, wound care, etc)   Arrange for interpreters to assist at discharge as needed     Problem: Safety - Adult  Goal: Free from fall injury  10/9/2022 0201 by Ulices Gerardo RN  Outcome: Progressing  Flowsheets (Taken 10/9/2022 0201)  Free From Fall Injury:   Instruct family/caregiver on patient safety   Based on caregiver fall risk screen, instruct family/caregiver to ask for assistance with transferring infant if caregiver noted to have fall risk factors  10/8/2022 1909 by Zoila Urena RN  Outcome: Progressing     Problem: ABCDS Injury Assessment  Goal: Absence of physical injury  10/9/2022 0201 by Ulices Gerardo RN  Outcome: Progressing  Flowsheets (Taken 10/9/2022 0201)  Absence of Physical Injury: Implement safety measures based on patient assessment  10/8/2022 1909 by Zoila Urena RN  Outcome: Progressing

## 2022-10-09 NOTE — PLAN OF CARE
Problem: Discharge Planning  Goal: Discharge to home or other facility with appropriate resources  10/9/2022 0840 by Ozzy Cabrales RN  Outcome: Progressing  Flowsheets (Taken 10/8/2022 1957 by Rhonda Mcclure RN)  Discharge to home or other facility with appropriate resources:   Identify barriers to discharge with patient and caregiver   Identify discharge learning needs (meds, wound care, etc)   Refer to discharge planning if patient needs post-hospital services based on physician order or complex needs related to functional status, cognitive ability or social support system   Arrange for needed discharge resources and transportation as appropriate   Arrange for interpreters to assist at discharge as needed  Note: Discharge to home pending ortho     Problem: Safety - Adult  Goal: Free from fall injury  10/9/2022 0840 by Ozzy Cabrales RN  Outcome: Progressing  Flowsheets (Taken 10/9/2022 0201 by Kylah Bowles, RN)  Free From Fall Injury:   Instruct family/caregiver on patient safety   Based on caregiver fall risk screen, instruct family/caregiver to ask for assistance with transferring infant if caregiver noted to have fall risk factors  Note: Stand by assist. Bed alarm is on. Call light is with in reach. Problem: ABCDS Injury Assessment  Goal: Absence of physical injury  10/9/2022 0840 by Ozzy Cabrales RN  Outcome: Progressing  Flowsheets (Taken 10/9/2022 0201 by Kylah Bowles, RN)  Absence of Physical Injury: Implement safety measures based on patient assessment  Note: Free from injury. Problem: Respiratory - Adult  Goal: Achieves optimal ventilation and oxygenation  Outcome: Progressing     Problem: Infection - Adult  Goal: Absence of infection at discharge  Outcome: Progressing  Flowsheets (Taken 10/9/2022 0840)  Absence of infection at discharge: Assess and monitor for signs and symptoms of infection  Note: COVID isolation     Care plan reviewed with patient.   Patient verbalize understanding of the plan of care and contribute to goal setting.

## 2022-10-09 NOTE — H&P
Hospitalist H&P    Patient:  Jennifer Villa    MRN: [id number]    Unit/Bed:8B-24/024-A    Acct: [de-identified]    YOB: 1934    PCP: Ivan Geronimo MD    Date of Admission: 10/8/2022      Assessment and Plan:     1. Orthostatic Dizziness. Since 10/3/2022 worsened by movement with sitting or standing position quickly. Poor p.o. intake since 10/3/2022 secondary to general illness from COVID-19. Positive orthostatic vitals. Echocardiogram to rule out valvular or other cardiac pathology. Fall precautions. Hold parameters for Lopressor. Volume resuscitation and then reevaluate. 2.  Fall Secondary to Orthostatic Dizziness. Fall on the morning of 10/8/2022. Patient denies any loss of consciousness, chest pain, palpitations, and shortness of breath. However, affirms that she is on Xarelto and affirms head trauma. CT of the head without contrast on 10/8/2022 demonstrated no mass-effect or acute hemorrhage but did demonstrate chronic periventricular small vessel ischemic changes and cerebral atrophy most likely consistent with age. Maintain on fall precautions and monitor clinically. PT/OT will follow. 3.  COVID-19. Probable cause of patient's upset stomach, nausea, poor appetite, and malaise. No objective hypoxemia noted. Supportive care only. 4.  T1 Fracture, unspecified. Small fracture noted on CT of the cervical spine without contrast.  However, tenderness primarily noted over approximately the T6 area. Obtain CT thoracic and lumbar spine without contrast and then reevaluate. Apply cervical collar. Hold Xarelto until orthospine has evaluated for potential surgery. Orthospine will evaluate. 5.  Paroxysmal Fibrillation/Flutter. Noted on history and on previous EKGs. Denies chest pain and palpitations. Shortness of breath secondary to COVID-19 as above. Estimated [id number] score of 4. Estimated HAS-BLED score of 1. Holding anticoagulation as above.   Maintain on metoprolol tartrate 25 mg twice daily and propafenone 150 mg twice daily. Follow-up with cardiology as an outpatient. 6.  Other. On Protonix for unclear reasons presumably related to gastroesophageal reflux disease. Maintain on Protonix and PCP to follow-up as an outpatient. 7.  Screening. Lipid panel and hemoglobin A1c pending. 8.  Benign Essential Hypertension. Very slightly low blood pressures as an inpatient. Maintain on propafenone and Lopressor with hold parameters as above. Intensify regimen as necessary. 9.  Osteoporosis, unspecified. Noted on history. Vitamin D level. Follow-up with PCP as an outpatient. 10.  Leukopenia, unspecified. Unclear cause, possibly related to COVID-19. Monitor with daily CBC. 11.  Thrombocytopenia, unspecified. Unclear cause, possibly related to COVID-19. Peripheral smear to rule out platelet clumping. Ultrasound the liver and spleen to evaluate for hepatomegaly or splenomegaly as possible causes of thrombocytopenia. Monitor with daily CBC. CC: Fatigue; dizziness; traumatic fall    Opening Statement:  Patient is an 80-year-old female with a past medical history including hypertension, osteoporosis, previous episode of syncope, diverticulosis, skin cancer, and atrial fibrillation on Xarelto who presents with a chief complaint of fatigue, dizziness, and traumatic fall and who we are managing primarily for orthostatic dizziness and traumatic fall with T1 fracture. HPI/Summary Hospital course:   Patient states that she has been feeling unwell since 10/3/2022 when she got her flu vaccination. Has been having upset stomach, nausea, generalized malaise, and orthostatic dizziness which had been progressively worsening. Affirms poor oral intake. On the morning of 10/8/2022 patient went to use the restroom. When she tried to go from the restroom back to her bed, the patient became dizzy and fell.   Patient denied any related shortness of breath, palpitations, or chest pain. Patient denied any loss of consciousness. However, the patient affirms that she hit her head and \"hit all over my body. \"  Therefore, patient presented to the emergency room. Denies current fevers, current chills, headache, chest pain, palpitations, abdominal pain, dysuria, and hematuria. Denies any focal weakness. Affirms ongoing fatigue, dry cough, shortness of breath, and dizziness. Patient states she does not feel much better than previously. Affirms medical history as described above. Affirms multiple allergies. States that she takes her medication consistently. Denies any recent illnesses, injuries, trauma, or hospitalizations prior to Monday. Never smoker. Does not drink alcohol. No illicit drug use. Rapid influenza A and influenza B antigens negative. However, COVID-19 test positive. Patient was given fluid resuscitation in the emergency room. CT of the head without contrast on 10/8/2022 demonstrated no mass-effect or acute hemorrhage but did demonstrate chronic periventricular small vessel ischemic changes and cerebral atrophy most likely consistent with age. CT of the cervical spine without contrast on 10/8/2022 demonstrated a small nondisplaced fracture through the right T1 transverse process. Therefore, ED provider contacted orthospine who recommended a c-collar. Notably, no tenderness over the T1 process but significant tenderness over approximately the T6 process. Therefore, CT thoracic and lumbar spine ordered which are pending. Orthostatic dizziness treated with further fluid resuscitation. Echocardiogram to rule out intracardiac pathology. COVID-19 treated with supportive care. ROS (Review of systems completed. Pertinent positives noted.  Otherwise ROS is negative)    PMH:  Per HPI and       Diagnosis Date    Actinic keratosis     history of    Arthritis     Atrial fibrillation ([facility] [address]     Cancer ([facility] [address]     skin    Cervical prolapse     history of    Diverticulosis     Endocervical polyp     history of    Heel spur     Hip pain     Hypertension     Nasal septal deviation     Osteoporosis     Stress incontinence     history of    Syncope     Vaginal prolapse     history of     SHX:        Procedure Laterality Date    APPENDECTOMY  26's    BLADDER REPAIR  2011    [place]  12/2390    [facility]    COLONOSCOPY  01/26/2000    ENDOSCOPIC ULTRASOUND (LOWER) Left 07/20/2017    ENDOSCOPIC ULTRASOUND performed by Harris Cruz MD at [facility] cataract    FACIAL SURGERY N/A 1/27/2021    DIVISION AND INSET/CHEEK performed by Tony Vallejo MD at [address] [facility] ([address] [facility])      JOINT REPLACEMENT  2017    left total hip relpament    MOHS SURGERY N/A 1/8/2021    MOHS REPAIR BCC NASAL TIP WITH FLAP AND GRAFT performed by Tony Vallejo MD at [address] Left 8/6/2021    MOHS DEFECT REPAIR SCC DORSAL NOSE AND LEFT LOWER FOREHEAD WITH SKIN GRAFT FROM RIGHT CHEEK/EAR (PLACED ON NOSE) performed by Tony Vallejo MD at [address] (CERVIX NOT REMOVED)  1960's    [place] EGD TRANSORAL BIOPSY SINGLE/MULTIPLE Left 07/16/2017    EGD BIOPSY performed by Harris Cruz MD at [facility]    [place] OFFICE/OUTPT VISIT,PROCEDURE ONLY Right 06/01/2018    MOHS REPAIR BASEL CELL CARCINOMA RIGHT DORSAL NOSE performed by Tony Vallejo MD at [address] Left 09/19/2017    Left Total Hip ARTHROPLASTY performed by Edda Ken MD at [address]  07/16/2017    UPPER GASTROINTESTINAL ENDOSCOPY Left 07/16/2017    EGD DILATION BALLOON performed by Harris Cruz MD at [address] Left 8/28/2021    EGD BIOPSY performed by Calos Maya MD at [address] [facility] ENDOSCOPY N/A 8/30/2021    EGD DILATION SAVORY performed by Emily Vincent MD at [facility] [address] 4/28/2022    EGD BIOPSY performed by Emily Vincent MD at [facility] [address]  4/28/2022    EGD DILATION SAVORY performed by Emily Vincent MD at [facility] [address] 8/22/2022    EGD DILATION SAVORY performed by Emily Vincent MD at [facility] LEFT Left 4/21/2021    US BREAST NEEDLE BIOPSY LEFT 4/21/2021 [address] BREAST NEEDLE BIOPSY RIGHT Right 4/21/2021    US BREAST NEEDLE BIOPSY RIGHT 4/21/2021 [facility] ULTRASOUND    US GUIDED NEEDLE LOC BREAST ADDL LEFT Left 4/21/2021    US GUIDED NEEDLE LOC BREAST ADDL LEFT 4/21/2021 MD ULTRASOUND     FHX:       Problem Relation Age of Onset    Colon Cancer Father     Osteoporosis Mother     Heart Disease Mother      Allergies: Statins, Tape [adhesive tape], Tizanidine hcl, Tramadol, Augmentin [amoxicillin-pot clavulanate], and Nystatin  Medications:     sodium chloride 100 mL/hr at 10/08/22 1806    sodium chloride        metoprolol tartrate  25 mg Oral BID    [START ON 10/9/2022] pantoprazole  40 mg Oral QAM AC    propafenone  150 mg Oral BID    sodium chloride flush  5-40 mL IntraVENous 2 times per day    sodium chloride  1,000 mL IntraVENous Once       Vital Signs:   BP (!) 117/59   Pulse 76   Temp 97.9 °F (36.6 °C) (Oral)   Resp 16   Ht 5' 5\" (1.651 m)   Wt 156 lb 3 oz (70.8 kg)   SpO2 93%   BMI 25.99 kg/m²      Intake/Output Summary (Last 24 hours) at 10/8/2022 2124  Last data filed at 10/8/2022 2009  Gross per 24 hour   Intake 560 ml   Output --   Net 560 ml        Physical Exam  Constitutional:       General: She is not in acute distress. Appearance: Normal appearance. She is normal weight. She is not toxic-appearing. Comments: Patient is awake, alert, and in no acute distress.   Capable answering questions and following commands. Bruises noted over the the nose. No tenderness noted over the T1 area. However, tenderness noted over approximately the T6 area. HENT:      Head: Normocephalic. Right Ear: External ear normal.      Left Ear: External ear normal.      Mouth/Throat:      Mouth: Mucous membranes are moist.      Pharynx: Oropharynx is clear. Posterior oropharyngeal erythema present. Eyes:      General: No scleral icterus. Pupils: Pupils are equal, round, and reactive to light. Cardiovascular:      Rate and Rhythm: Normal rate. Rhythm irregular. Pulses: Normal pulses. Heart sounds: Normal heart sounds. Pulmonary:      Effort: Pulmonary effort is normal. No respiratory distress. Breath sounds: Normal breath sounds. No wheezing or rales. Abdominal:      General: Abdomen is flat. There is no distension. Palpations: Abdomen is soft. Tenderness: There is no abdominal tenderness. There is no guarding. Musculoskeletal:         General: Normal range of motion. Right lower leg: No edema. Left lower leg: No edema. Skin:     General: Skin is warm and dry. Capillary Refill: Capillary refill takes less than 2 seconds. Neurological:      Mental Status: She is alert. Psychiatric:         Mood and Affect: Mood normal.         Behavior: Behavior normal.         Thought Content: Thought content normal.         Judgment: Judgment normal.        Data: (All radiographs, tracings, PFTs, and imaging are personally viewed and interpreted unless otherwise noted). Imaging as above. Electrolyte panel notable only for slightly low sodium 132, elevated BUN of 26, creatinine 0.9 which is a proximal within baseline, estimated GFR 59, glucose of 74, and slightly low serum calcium of 8.1. proBNP within normal range. Troponin T within the normal range. Liver panel notable only for slightly low albumin of 3.4 and slightly low total protein of 5.6.   CBC notable for slightly low white blood cell count 3.8, normal hemoglobin, and a slightly low platelet count of 884. INR slightly elevated at 1.24. aPTT slightly elevated at 38.7. COVID-19 test positive. Rapid Influenza A and B test negative. Chest x-ray demonstrated no obvious acute pathology. EKG demonstrated atrial flutter with variable A-V block and nonspecific ST abnormality.       Electronically signed by Stephen Harvey MD, MPH, [facility] on 10/8/2022 at 9:24 PM   Supervised by Dr. Tovar Learn

## 2022-10-10 LAB
ANION GAP SERPL CALCULATED.3IONS-SCNC: 13 MEQ/L (ref 8–16)
BASOPHILS # BLD: 0.3 %
BASOPHILS ABSOLUTE: 0 THOU/MM3 (ref 0–0.1)
BUN BLDV-MCNC: 13 MG/DL (ref 7–22)
CALCIUM SERPL-MCNC: 8.2 MG/DL (ref 8.5–10.5)
CHLORIDE BLD-SCNC: 99 MEQ/L (ref 98–111)
CO2: 22 MEQ/L (ref 23–33)
CREAT SERPL-MCNC: 0.8 MG/DL (ref 0.4–1.2)
EOSINOPHIL # BLD: 2.9 %
EOSINOPHILS ABSOLUTE: 0.1 THOU/MM3 (ref 0–0.4)
ERYTHROCYTE [DISTWIDTH] IN BLOOD BY AUTOMATED COUNT: 15 % (ref 11.5–14.5)
ERYTHROCYTE [DISTWIDTH] IN BLOOD BY AUTOMATED COUNT: 45.5 FL (ref 35–45)
GFR SERPL CREATININE-BSD FRML MDRD: 68 ML/MIN/1.73M2
GLUCOSE BLD-MCNC: 85 MG/DL (ref 70–108)
HCT VFR BLD CALC: 45.9 % (ref 37–47)
HEMOGLOBIN: 14.9 GM/DL (ref 12–16)
IMMATURE GRANS (ABS): 0.02 THOU/MM3 (ref 0–0.07)
IMMATURE GRANULOCYTES: 0.5 %
LD: 204 U/L (ref 100–190)
LV EF: 55 %
LVEF MODALITY: NORMAL
LYMPHOCYTES # BLD: 32.8 %
LYMPHOCYTES ABSOLUTE: 1.2 THOU/MM3 (ref 1–4.8)
MCH RBC QN AUTO: 27.4 PG (ref 26–33)
MCHC RBC AUTO-ENTMCNC: 32.5 GM/DL (ref 32.2–35.5)
MCV RBC AUTO: 84.4 FL (ref 81–99)
MONOCYTES # BLD: 7.3 %
MONOCYTES ABSOLUTE: 0.3 THOU/MM3 (ref 0.4–1.3)
NUCLEATED RED BLOOD CELLS: 0 /100 WBC
PLATELET # BLD: 108 THOU/MM3 (ref 130–400)
PMV BLD AUTO: 10.3 FL (ref 9.4–12.4)
POTASSIUM SERPL-SCNC: 3.8 MEQ/L (ref 3.5–5.2)
RBC # BLD: 5.44 MILL/MM3 (ref 4.2–5.4)
SEG NEUTROPHILS: 56.2 %
SEGMENTED NEUTROPHILS ABSOLUTE COUNT: 2.1 THOU/MM3 (ref 1.8–7.7)
SODIUM BLD-SCNC: 134 MEQ/L (ref 135–145)
WBC # BLD: 3.8 THOU/MM3 (ref 4.8–10.8)

## 2022-10-10 PROCEDURE — 36415 COLL VENOUS BLD VENIPUNCTURE: CPT

## 2022-10-10 PROCEDURE — 2580000003 HC RX 258: Performed by: STUDENT IN AN ORGANIZED HEALTH CARE EDUCATION/TRAINING PROGRAM

## 2022-10-10 PROCEDURE — 80048 BASIC METABOLIC PNL TOTAL CA: CPT

## 2022-10-10 PROCEDURE — 93306 TTE W/DOPPLER COMPLETE: CPT

## 2022-10-10 PROCEDURE — 97535 SELF CARE MNGMENT TRAINING: CPT

## 2022-10-10 PROCEDURE — 6370000000 HC RX 637 (ALT 250 FOR IP): Performed by: HOSPITALIST

## 2022-10-10 PROCEDURE — 99233 SBSQ HOSP IP/OBS HIGH 50: CPT | Performed by: HOSPITALIST

## 2022-10-10 PROCEDURE — 97163 PT EVAL HIGH COMPLEX 45 MIN: CPT

## 2022-10-10 PROCEDURE — 83615 LACTATE (LD) (LDH) ENZYME: CPT

## 2022-10-10 PROCEDURE — 1200000003 HC TELEMETRY R&B

## 2022-10-10 PROCEDURE — 97166 OT EVAL MOD COMPLEX 45 MIN: CPT

## 2022-10-10 PROCEDURE — 6370000000 HC RX 637 (ALT 250 FOR IP): Performed by: STUDENT IN AN ORGANIZED HEALTH CARE EDUCATION/TRAINING PROGRAM

## 2022-10-10 PROCEDURE — 85025 COMPLETE CBC W/AUTO DIFF WBC: CPT

## 2022-10-10 PROCEDURE — 97530 THERAPEUTIC ACTIVITIES: CPT

## 2022-10-10 RX ADMIN — METOPROLOL TARTRATE 25 MG: 25 TABLET, FILM COATED ORAL at 21:06

## 2022-10-10 RX ADMIN — SODIUM CHLORIDE: 9 INJECTION, SOLUTION INTRAVENOUS at 20:56

## 2022-10-10 RX ADMIN — PROPAFENONE HYDROCHLORIDE 150 MG: 150 TABLET, FILM COATED ORAL at 21:06

## 2022-10-10 RX ADMIN — METOPROLOL TARTRATE 25 MG: 25 TABLET, FILM COATED ORAL at 08:22

## 2022-10-10 RX ADMIN — SODIUM CHLORIDE: 9 INJECTION, SOLUTION INTRAVENOUS at 10:12

## 2022-10-10 RX ADMIN — RIVAROXABAN 20 MG: 20 TABLET, FILM COATED ORAL at 18:24

## 2022-10-10 RX ADMIN — PANTOPRAZOLE SODIUM 40 MG: 40 TABLET, DELAYED RELEASE ORAL at 06:08

## 2022-10-10 RX ADMIN — PROPAFENONE HYDROCHLORIDE 150 MG: 150 TABLET, FILM COATED ORAL at 08:22

## 2022-10-10 ASSESSMENT — PAIN SCALES - GENERAL: PAINLEVEL_OUTOF10: 0

## 2022-10-10 NOTE — PROGRESS NOTES
Echo completed at bedside. Dr. Mandy Dillard to read. Pt calling stating she has not had her menstrual cycle since 4/21/18.  Pt took a pregnancy test one month ago and it was negative.  Pt took another pregnancy test this morning and it was negative.   Pt is trying to achieve pregnancy.  Per standing order, Provera 10mg BID x 5 days provided.   Informed patient if menstrual cycles would continue to be irregular to call the office back to schedule an appointment.   All questions answered.

## 2022-10-10 NOTE — CASE COMMUNICATION
10/10/22, 7:50 AM EDT      DISCHARGE PLANNING EVALUATION    Kenny Vazquez  Admitted: 10/8/2022  Hospital Day: 2    Location: -24/024-A Reason for admit: Dizziness [R42]  Acute respiratory failure with hypoxia ([facility] [address] [J96.01]  Fall from bed, initial encounter [W06. XXXA]  COVID [U07.1]  COVID-19 [U07.1]    Past Medical History:   Diagnosis Date    Actinic keratosis     history of    Arthritis     Atrial fibrillation (HCC)     Cancer (HCC)     skin    Cervical prolapse     history of    Diverticulosis     Endocervical polyp     history of    Heel spur     Hip pain     Hypertension     Nasal septal deviation     Osteoporosis     Stress incontinence     history of    Syncope     Vaginal prolapse     history of       Procedure: No.    Barriers to Discharge: Admitted from ER with fatigue and dizziness as well as a fall. Found Covid + and with ortho hypotension. T1 transverse process fx noted. Pt is on anticoag for A-fib. Isolation for Covid. Ortho consulted. PT/OT. Echo ordered. Palliative Care consulted. Afebrile. O2 at 1L/NC with sat 94%. PCP: Mike Peralta MD    Readmission Risk              Risk of Unplanned Readmission:  15         Patient's Healthcare Decision Maker: Legal Next of Kin    Patient Goals/Plan/Treatment Preferences: Met with Brigetteanmol Sanders today. She is from home with spouse and has no current services. She uses no DME at present. She has a  PCP, she drives, is insured and no issues getting medications. CM will cont to follow for needs.      Transportation/Food Security/Housekeeping Addressed: no issues identified

## 2022-10-10 NOTE — PLAN OF CARE
Problem: Discharge Planning  Goal: Discharge to home or other facility with appropriate resources  Outcome: Progressing  Flowsheets (Taken 10/8/2022 1957 by Jose Carmen RN)  Discharge to home or other facility with appropriate resources:   Identify barriers to discharge with patient and caregiver   Identify discharge learning needs (meds, wound care, etc)   Refer to discharge planning if patient needs post-hospital services based on physician order or complex needs related to functional status, cognitive ability or social support system   Arrange for needed discharge resources and transportation as appropriate   Arrange for interpreters to assist at discharge as needed     Problem: Safety - Adult  Goal: Free from fall injury  Outcome: Progressing  Flowsheets (Taken 10/9/2022 0201)  Free From Fall Injury:   Instruct family/caregiver on patient safety   Based on caregiver fall risk screen, instruct family/caregiver to ask for assistance with transferring infant if caregiver noted to have fall risk factors     Problem: ABCDS Injury Assessment  Goal: Absence of physical injury  Outcome: Progressing  Flowsheets (Taken 10/9/2022 0201)  Absence of Physical Injury: Implement safety measures based on patient assessment     Problem: Respiratory - Adult  Goal: Achieves optimal ventilation and oxygenation  Outcome: Progressing  Flowsheets (Taken 10/10/2022 0159)  Achieves optimal ventilation and oxygenation:   Assess for changes in respiratory status   Assess for changes in mentation and behavior   Position to facilitate oxygenation and minimize respiratory effort   Oxygen supplementation based on oxygen saturation or arterial blood gases   Initiate smoking cessation protocol as indicated   Encourage broncho-pulmonary hygiene including cough, deep breathe, incentive spirometry   Respiratory therapy support as indicated   Assess and instruct to report shortness of breath or any respiratory difficulty   Assess the need for suctioning and aspirate as needed     Problem: Infection - Adult  Goal: Absence of infection at discharge  Outcome: Progressing  Flowsheets (Taken 10/9/2022 0840 by Luis Holloway RN)  Absence of infection at discharge: Assess and monitor for signs and symptoms of infection     Problem: Pain  Goal: Verbalizes/displays adequate comfort level or baseline comfort level  Outcome: Progressing  Flowsheets (Taken 10/10/2022 0159)  Verbalizes/displays adequate comfort level or baseline comfort level:   Encourage patient to monitor pain and request assistance   Assess pain using appropriate pain scale   Administer analgesics based on type and severity of pain and evaluate response   Implement non-pharmacological measures as appropriate and evaluate response   Consider cultural and social influences on pain and pain management   Notify Licensed Independent Practitioner if interventions unsuccessful or patient reports new pain

## 2022-10-10 NOTE — CONSULTS
Inpatient Consultation    Renata Pulliam (1934)  10/10/2022    Reason for Consult:  T1 TP fracture  Requesting Physician: She Suero MD    CHIEF COMPLAINT:  Dizziness, fatigue, fall    History Obtained From:  patient and EMR    HISTORY OF PRESENT ILLNESS:                The patient is a 80 y.o. female who presents with above chief complaint. Patient presented to the ED on 10/8 with complaints of fatigue and dizziness. She stood up and felt dizzy and hit her head on the headboard. CT scan showed concern for a T1 right TP fracture. Patient denies having any neck pain or mid back pain. She does complain of a slight headache right now located in her left temporal region. She denies radicular complaints into the upper or lower extremities. She denies numbness or tingling. No bowel or bladder incontinence. C-collar has been removed.      Past Medical History:        Diagnosis Date    Actinic keratosis     history of    Arthritis     Atrial fibrillation ([address]     Cancer (HCC)     skin    Cervical prolapse     history of    Diverticulosis     Endocervical polyp     history of    Heel spur     Hip pain     Hypertension     Nasal septal deviation     Osteoporosis     Stress incontinence     history of    Syncope     Vaginal prolapse     history of     Past Surgical History:        Procedure Laterality Date    APPENDECTOMY  26's    BLADDER REPAIR  2011    [place]  42/0361    [facility]    COLONOSCOPY  01/26/2000    ENDOSCOPIC ULTRASOUND (LOWER) Left 07/20/2017    ENDOSCOPIC ULTRASOUND performed by Vikash Willson MD at [facility] cataract    FACIAL SURGERY N/A 1/27/2021    DIVISION AND INSET/CHEEK performed by Loi Mohr MD at [address] [place] [address] ([address] [place] [address])      JOINT REPLACEMENT  2017    left total hip relpament    MOHS SURGERY N/A 1/8/2021    MOHS REPAIR BCC NASAL TIP WITH FLAP AND GRAFT performed by Marian Lorenzana Mary Burkitt, MD at [address] Left 8/6/2021    MOHS DEFECT REPAIR SCC DORSAL NOSE AND LEFT LOWER FOREHEAD WITH SKIN GRAFT FROM RIGHT CHEEK/EAR (PLACED ON NOSE) performed by Mary Brown MD at [address] (CERVIX NOT REMOVED)  1960's    NE EGD TRANSORAL BIOPSY SINGLE/MULTIPLE Left 07/16/2017    EGD BIOPSY performed by Dakota Fisher MD at [facility]    NE OFFICE/OUTPT VISIT,PROCEDURE ONLY Right 06/01/2018    MOHS REPAIR BASEL CELL CARCINOMA RIGHT DORSAL NOSE performed by Mary Brown MD at [address] Left 09/19/2017    Left Total Hip ARTHROPLASTY performed by Chalino Granados MD at [address]  07/16/2017    UPPER GASTROINTESTINAL ENDOSCOPY Left 07/16/2017    EGD DILATION BALLOON performed by Dakota Fisher MD at [facility] 1 Left 8/28/2021    EGD BIOPSY performed by Huy Conner MD at [place] [address] 8/30/2021    EGD DILATION SAVORY performed by Huy Conner MD at [place] [address] 4/28/2022    EGD BIOPSY performed by Huy Conner MD at [place] [address]  4/28/2022    EGD DILATION SAVORY performed by Huy Conner MD at [facility] 1 N/A 8/22/2022    EGD DILATION SAVORY performed by Huy Conner MD at [facility] LEFT Left 4/21/2021    US BREAST NEEDLE BIOPSY LEFT 4/21/2021 [address] BREAST NEEDLE BIOPSY RIGHT Right 4/21/2021    US BREAST NEEDLE BIOPSY RIGHT 4/21/2021 [facility] ULTRASOUND    US GUIDED NEEDLE LOC BREAST ADDL LEFT Left 4/21/2021    US GUIDED NEEDLE LOC BREAST ADDL LEFT 4/21/2021 [facility] ULTRASOUND     Current Medications:   Current Facility-Administered Medications: rivaroxaban (XARELTO) tablet 20 mg, 20 mg, Oral, Daily  0.9 % sodium chloride infusion, , IntraVENous, Continuous  metoprolol tartrate (LOPRESSOR) tablet 25 mg, 25 mg, Oral, BID  pantoprazole (PROTONIX) tablet 40 mg, 40 mg, Oral, QAM AC  propafenone (RYTHMOL) tablet 150 mg, 150 mg, Oral, BID  sodium chloride flush 0.9 % injection 5-40 mL, 5-40 mL, IntraVENous, 2 times per day  sodium chloride flush 0.9 % injection 5-40 mL, 5-40 mL, IntraVENous, PRN  0.9 % sodium chloride infusion, , IntraVENous, PRN  ondansetron (ZOFRAN-ODT) disintegrating tablet 4 mg, 4 mg, Oral, Q8H PRN **OR** ondansetron (ZOFRAN) injection 4 mg, 4 mg, IntraVENous, Q6H PRN  polyethylene glycol (GLYCOLAX) packet 17 g, 17 g, Oral, Daily PRN  acetaminophen (TYLENOL) tablet 650 mg, 650 mg, Oral, Q6H PRN **OR** acetaminophen (TYLENOL) suppository 650 mg, 650 mg, Rectal, Q6H PRN  Allergies:  Statins, Tape [adhesive tape], Tizanidine hcl, Tramadol, Augmentin [amoxicillin-pot clavulanate], and Nystatin    Social History:   TOBACCO:   reports that she has never smoked. She has never used smokeless tobacco.  ETOH:   reports no history of alcohol use. DRUGS:   reports no history of drug use. Family History:       Problem Relation Age of Onset    Colon Cancer Father     Osteoporosis Mother     Heart Disease Mother        REVIEW OF SYSTEMS:    CONSTITUTIONAL:  (+) fatigue, dizziness  RESPIRATORY:  negative for cough and shortness of breath  CARDIOVASCULAR:  negative for chest pain and palpitations  GASTROINTESTINAL:  negative for nausea, vomiting, bowel incontinence  GENITOURINARY:  negative for frequency and urinary incontinence  MUSCULOSKELETAL:  negative for back pain, neck pain  NEUROLOGICAL:  (+) headaches, negative for N/T  BEHAVIOR/PSYCH:  negative for depressed mood, anxiety    PHYSICAL EXAM:      CONSTITUTIONAL:  awake, alert, cooperative, no apparent distress, and appears stated age, patient resting comfortably in bed  HEAD: atraumatic, normocephalic  LUNGS:  No respiratory distress.  CTA bilaterally per H&P  CARDIOVASCULAR:  regular rate, irregular rhythm no murmur per H&P  ABDOMEN:  Soft, non-distended, non-tender  MUSCULOSKELETAL:  5/5 muscle strength bilateral UE and bilateral pedal push/pull, full cervical ROM without any significant pain. No significant ttp over midline cervical spine/upper thoracic spine  NEUROLOGIC:  Awake, alert, oriented to name, place and time. Sensory intact bilateral LE    DATA:    CBC:   Lab Results   Component Value Date/Time    WBC 3.2 10/09/2022 05:51 AM    RBC 4.91 10/09/2022 05:51 AM    RBC 3.84 07/29/2011 05:05 AM    HGB 13.5 10/09/2022 05:51 AM    HCT 41.6 10/09/2022 05:51 AM    MCV 84.7 10/09/2022 05:51 AM    MCH 27.5 10/09/2022 05:51 AM    MCHC 32.5 10/09/2022 05:51 AM    RDW 15.8 08/23/2021 01:46 PM    PLT 92 10/09/2022 05:51 AM    MPV 10.1 10/09/2022 05:51 AM     WBC:    Lab Results   Component Value Date/Time    WBC 3.2 10/09/2022 05:51 AM     Hemoglobin/Hematocrit:    Lab Results   Component Value Date/Time    HGB 13.5 10/09/2022 05:51 AM    HCT 41.6 10/09/2022 05:51 AM     CMP:    Lab Results   Component Value Date/Time     10/09/2022 05:51 AM    K 4.3 10/09/2022 05:51 AM     10/09/2022 05:51 AM    CO2 22 10/09/2022 05:51 AM    BUN 15 10/09/2022 05:51 AM    CREATININE 0.8 10/09/2022 05:51 AM    GFRAA >60 05/24/2022 10:36 AM    LABGLOM 68 10/09/2022 05:51 AM    GLUCOSE 62 10/09/2022 05:51 AM    PROT 5.6 10/08/2022 02:27 PM    LABALBU 3.4 10/08/2022 02:27 PM    CALCIUM 7.6 10/09/2022 05:51 AM    BILITOT 0.5 10/08/2022 02:27 PM    ALKPHOS 91 10/08/2022 02:27 PM    AST 30 10/08/2022 02:27 PM    ALT 21 10/08/2022 02:27 PM         Radiology:   CT Cervical Spine  Impression   1. There is a nondisplaced fracture through the right T1 transverse process seen on axial image 46. This is also seen on coronal series image 17. CT Thoracic and Lumbar Spine  Impression       T spine:   1. No acute fracture or malalignment. L-spine:   1.  No acute fracture or malalignment. Degenerative changes of the lower lumbar spine as described above. IMPRESSION/RECOMMENDATIONS:    Assessment:   1) Right T1 transverse process fracture    Plan:  1) Discussed w/ Dr. Celia Odonnell. Patient has no significant TTP over the upper thoracic spine. Okay to hold off on wearing c-collar. Activity as tolerated. If patient develops any significant neck pain/upper thoracic pain, she is to wear c-collar. Otherwise nothing to do. Follow up with Dr. Celia Odonnell as needed.      Sandra Nieto

## 2022-10-10 NOTE — PROGRESS NOTES
[address] [address]  INPATIENT PHYSICAL THERAPY  EVALUATION  [facility] MED SURG 8B - 8B-24/024-A    Time In: 3683  Time Out: 0830  Timed Code Treatment Minutes: 8 Minutes  Minutes: 16          Date: 10/10/2022  Patient Name: Jennifer Villa,  Gender:  female        MRN: [id number]  : 1934  (80 y.o.)      Referring Practitioner: Dr. Tyrone Song  Diagnosis: dizziness  Additional Pertinent Hx: admit with above diagnosis, +COVID-19, fall secondary to orthostatic hypotension, nondisplaced T1 fax-ok per ortho to remove c collar     Restrictions/Precautions:  Restrictions/Precautions: Fall Risk  Position Activity Restriction  Other position/activity restrictions: monitor BP, Droplet +    Subjective:  Chart Reviewed: Yes  Patient assessed for rehabilitation services?: Yes  Subjective: pt c/o lightheaded/dizziness and required max encouragement for inc activity with RN present    General:                Pain: no c/o pain    Vitals:   BP  Sit  146/103  Std  128/98   O2 on room air with O2 sat at 96%    Social/Functional History:    Lives With: Spouse  Type of Home: House  Home Layout: Two level, Bed/Bath upstairs, 1/2 bath on main level  Home Access: Stairs to enter with rails  Entrance Stairs - Number of Steps: 1 with grab bar  Home Equipment: Laruth Commander, 4 wheeled, Walker, standard (uses 4WW on first floor and std walker on second floor of home)       Ambulation Assistance: Independent  Transfer Assistance: Independent               OBJECTIVE:  Range of Motion:  Bilateral Lower Extremity: WFL    Strength:  Bilateral Lower Extremity: >/=3/5, generalized weakness    Balance:  Static Sitting Balance:  Contact Guard Assistance, to encourage pt to remain sitting while RN checking pt vitals, pt c/o dizziness/lightheadedness, no room spinning  Static Standing Balance: Minimal Assistance, BUE HHA, tolerated around 2 min while checking vitals    Bed Mobility:  Rolling to Right: Moderate Assistance   Supine to Sit: Moderate Assistance  Sit to Supine: Moderate Assistance   Scooting: Moderate Assistance  HOB up 30 degrees  Transfers:  Sit to Stand: Minimal Assistance  Stand to Sit:Minimal Assistance  HHA  Ambulation:  Minimal Assistance  Distance: 4 steps  Surface: Level Tile  Device:Hand-Held Assist  Gait Deviations: Forward Flexed Posture, Slow Deborah, Decreased Step Length Bilaterally, Narrow Base of Support, and Unsteady Gait    Exercise:  Patient was guided in 1 set(s) 5-10 reps of exercise to both lower extremities. Ankle pumps, Glut sets, and Quad sets. Exercises were completed for increased independence with functional mobility. Functional Outcome Measures: Completed  AM-PAC Inpatient Mobility Raw Score : 14  AM-PAC Inpatient T-Scale Score : 38.1    ASSESSMENT:  Activity Tolerance:  Patient tolerance of  treatment: poor. Treatment Initiated: Treatment and education initiated within context of evaluation. Evaluation time included review of current medical information, gathering information related to past medical, social and functional history, completion of standardized testing, formal and informal observation of tasks, assessment of data and development of plan of care and goals. Treatment time included skilled education and facilitation of tasks to increase safety and independence with functional mobility for improved independence and quality of life. Assessment: Body Structures, Functions, Activity Limitations Requiring Skilled Therapeutic Intervention: Decreased functional mobility , Decreased endurance, Decreased tolerance to work activity, Decreased strength, Decreased balance  Assessment: pt c/o feeling lightheaded/dizzy with +COVID-19Fran is a 80 y.o. female that presents with dizziness. she is ind prior to admission now requiring assist for basic mobility.  Pt demonstrates a decrease in baseline by way of bed mobility, transfers and ambulation secondary to decreased activity tolerance, strength, fatigue, and balance deficits. Pt will benefit from skilled PT services throughout admission and beyond hospital discharge for improvements in functional mobility and in order to decrease fall risk and return pt to PLOF. Therapy Prognosis: Good    Requires PT Follow-Up: Yes    Discharge Recommendations:  Discharge Recommendations: Continue to assess pending progress    Patient Education:      . Patient Education  Education Given To: Patient  Education Provided: Role of Therapy, Plan of Care, Home Exercise Program  Education Method: Verbal, Demonstration  Barriers to Learning: Cognition  Education Outcome: Verbalized understanding, Continued education needed       Equipment Recommendations: Other: cont to assess needs    Plan:  Specific Instructions for Next Treatment: therex and mobility  General Plan:  (5X C)  Specific Instructions for Next Treatment: therex and mobility    Goals:  Patient Goals : feel better  Short Term Goals  Time Frame for Short Term Goals: by discharge  Short Term Goal 1: bed mobility with SBA to get in/out of bed  Short Term Goal 2: transfer with SBA to get in/out of chairs  Short Term Goal 3: amb >10'x1 with walker and CGA to walk safely in home  Short Term Goal 4: negotiate flight of steps with HR and CGA to get to second floor bed/bathroom safely  Long Term Goals  Time Frame for Long Term Goals : no LTGs set secondary to short ELOS    Following session, patient left in safe position with all fall risk precautions in place.

## 2022-10-10 NOTE — PROGRESS NOTES
Hospitalist Progress Note      Patient:  Eneida Moon    Unit/Bed:8B-24/024-A  YOB: 1934  MRN: [id number]   Acct: [de-identified]   PCP: Christiana Connell MD  Date of Admission: 10/8/2022    Assessment/Plan:    1. Orthostatic Dizziness. Likely 2/2 hypovolemia 2/2 dehydration/decreasd Po intake 2/2 COVID infection. Positive orthostatic vitals in ED resolved w/ IVF. Consider d/c'ing IVF when adequate PO intake. Echocardiogram pending. Fall precautions. Hold parameters for home BB    10/10: reported dizziness w/ standing and mildly (if at all) +ve orthostatic VS after walking w/ physio likely neurocardiogenic + /- inadequate Po intake. Continuing IVF. 2.  Fall Secondary to Orthostatic Dizziness. Fall on the morning of 10/8/2022. Patient denies any loss of consciousness, chest pain, palpitations, and shortness of breath. However, affirms that she is on Xarelto and affirms head trauma. CT of the head without contrast on 10/8/2022 demonstrated no mass-effect or acute hemorrhage but did demonstrate chronic periventricular small vessel ischemic changes and cerebral atrophy most likely consistent with age. Maintain on fall precautions and monitor clinically. PT/OT will follow. 3.  COVID-19. Probable cause of patient's upset stomach, nausea, poor appetite, and malaise. No objective hypoxemia noted. Supportive care only. 4.  Questionable small T1 TP# Fracture:  Small fracture noted on CT of the cervical spine without contrast.  No tenderness including midline cervical tenderness. Non-op. Ok to remove C-collar. WBAT/AAT. PT to see  5. Hx of Paroxysmal Fibrillation/Flutter. Noted on history and on previous EKGs. Estimated [id number] score of 4. Estimated HAS-BLED score of 1. On DOAC  NSR on home metoprolol tartrate 25 mg twice daily and propafenone 150 mg twice daily. Follow-up with cardiology as an outpatient. 6.  Other.   On PPI presumably related to gastroesophageal reflux disease. Maintain on PPI QD and PCP to follow-up as an outpatient. 8.  hx of Benign Essential Hypertension. Well-controlled on home meds. CCM and monitor  9. Osteoporosis and Vit D deficiency:  Noted on history. Vitamin D levels low; started on vit D  Consider anti-resorptive therapy OP. Follow-up with PCP as an outpatient. 10.  Leukopenia,mild:  NYD etiology. Likely related to COVID-19. Noted leukopenia in the past. Not on any immunosuppressive therapy. See below for further management. Monitor with daily CBC. 11.  Thrombocytopenia, mild, chronic  Unclear cause, possibly woresening related to COVID-19. Peripheral smear pending. Abdominal Ultrasound showing isolated splenomegaly w/o hepatomegaly. Hep/HIV swerology and serum LDH sent. Pt would methodically need CT chest and A/P and likely Bone Marrow Biopsy OP. Pt and daughter were informed. They wish to hold off on any further W/U at this time; they understand they would need a Heme/Onc referral if they wished to pursue further W/U. Jennifer Holly 12. Malnutrition w/ mild hypoalbuminemia: Albumin 3.4. Dietician consulted and pt placed on nutritional supplements      Code status; Full code; per d/w palliative care, pt wishes to be Limited x4. Epic was updated. Chief Complaint: Fatigue, dizziness and fall    Initial H and P:-    Admitted for evaluation and management of dizzness and fall likely secondary to COVID 19. Subjective (past 24 hours):   Feeling tired,fatigued. Felt dizzy after standing up from sitting position after working w/ physio. No new issues/events. Denies CP/worsening SOB/fever/chills/cough/sputum/presyncope/palpitation/GI or  symptoms. Past medical history, family history, social history and allergies reviewed again and is unchanged since admission. ROS (All review of systems completed. Pertinent positives noted.  Otherwise All other systems reviewed and negative.) Medications:  Reviewed    Infusion Medications    sodium chloride 100 mL/hr at 10/10/22 0615    sodium chloride       Scheduled Medications    rivaroxaban  20 mg Oral Daily    metoprolol tartrate  25 mg Oral BID    pantoprazole  40 mg Oral QAM AC    propafenone  150 mg Oral BID    sodium chloride flush  5-40 mL IntraVENous 2 times per day     PRN Meds: sodium chloride flush, sodium chloride, ondansetron **OR** ondansetron, polyethylene glycol, acetaminophen **OR** acetaminophen      Intake/Output Summary (Last 24 hours) at 10/10/2022 0741  Last data filed at 10/10/2022 0615  Gross per 24 hour   Intake 3904.31 ml   Output --   Net 3904.31 ml         Diet:  Diet NPO Exceptions are: [employer], Sips of Water with Meds    Physical Exam:  /72   Pulse 97   Temp 97.8 °F (36.6 °C) (Oral)   Resp 16   Ht 5' 5\" (1.651 m)   Wt 170 lb 9.6 oz (77.4 kg)   SpO2 94%   BMI 28.39 kg/m²   General appearance: Looking ill but not toxic. A&)x 3. No apparent distress, appears stated age and cooperative. HEENT: Pupils equal, round, and reactive to light. Conjunctivae/corneas clear. No midline cervical/thoracic tenderness  Neck: Supple, with full range of motion. No jugular venous distention. Trachea midline. Respiratory:  Normal respiratory effort. Clear to auscultation, bilaterally without Rales/Wheezes/Rhonchi. Cardiovascular: Regular rate and rhythm with normal S1/S2 without murmurs, rubs or gallops. Abdomen: Soft, non-tender, non-distended with normal bowel sounds. Musculoskeletal: passive and active ROM x 4 extremities. Skin: Skin color, texture, turgor normal.  No rashes or lesions. Neurologic:  Neurovascularly intact without any focal sensory/motor deficits.  Cranial nerves: II-XII intact, grossly non-focal.  Psychiatric: Alert and oriented, thought content appropriate, normal insight  Capillary Refill: Brisk,< 3 seconds   Peripheral Pulses: +2 palpable, equal bilaterally     Labs:   Recent Labs 10/08/22  1427 10/09/22  0551   WBC 3.8* 3.2*   HGB 14.3 13.5   HCT 43.6 41.6   * 92*       Recent Labs     10/08/22  1427 10/09/22  0551   * 136   K 4.2 4.3   CL 98 104   CO2 24 22*   BUN 26* 15   CREATININE 0.9 0.8   CALCIUM 8.1* 7.6*       Recent Labs     10/08/22  1427   AST 30   ALT 21   BILIDIR <0.2   BILITOT 0.5   ALKPHOS 91       Recent Labs     10/08/22  1942   INR 1.24*       No results for input(s): Ashley Garcia in the last 72 hours. Microbiology:    Blood culture #1:   Lab Results   Component Value Date/Time    BC No growth-preliminary No growth 09/05/2021 07:38 PM       Blood culture #2:No results found for: Russ Chetande    Organism:  Lab Results   Component Value Date/Time    ORG Escherichia coli 05/05/2022 03:24 PM       No results found for: LABGRAM    MRSA culture only:No results found for: [facility]    Urine culture:   Lab Results   Component Value Date/Time    LABURIN No growth-preliminary 05/05/2022 03:24 PM    LABURIN [place] count: >100,000 CFU/mL 05/05/2022 03:24 PM       Respiratory culture: No results found for: CULTRESP    Aerobic and Anaerobic :  No results found for: LABAERO  No results found for: LABANAE    Urinalysis:      Lab Results   Component Value Date/Time    NITRU Positive 05/05/2022 02:47 PM    WBCUA 10-15 09/04/2021 04:37 PM    BACTERIA NONE SEEN 09/04/2021 04:37 PM    RBCUA 0-2 09/04/2021 04:37 PM    BLOODU Large 05/05/2022 02:47 PM    SPECGRAV 1.025 05/05/2022 02:47 PM    GLUCOSEU Negative 05/05/2022 02:47 PM       Radiology:  CT THORACIC SPINE WO CONTRAST   Final Result      T spine:   1. No acute fracture or malalignment. L-spine:   1. No acute fracture or malalignment. Degenerative changes of the lower lumbar spine as described above. **This report has been created using voice recognition software. It may contain minor errors which are inherent in voice recognition technology. **      Final report electronically signed by Dr. Herlinda Joya on 10/9/2022 10:48 AM      CT LUMBAR SPINE WO CONTRAST   Final Result      T spine:   1. No acute fracture or malalignment. L-spine:   1. No acute fracture or malalignment. Degenerative changes of the lower lumbar spine as described above. **This report has been created using voice recognition software. It may contain minor errors which are inherent in voice recognition technology. **      Final report electronically signed by Dr. Agueda Lee on 10/9/2022 10:48 AM      US LIVER SPLEEN   Final Result   Impression:   Splenomegaly. This document has been electronically signed by: Venkata Anaya MD on    10/08/2022 11:27 PM      CT HEAD WO CONTRAST   Final Result   1. No mass effect or acute hemorrhage. 2. Chronic periventricular small vessel ischemic changes and cerebral atrophy. **This report has been created using voice recognition software. It may contain minor errors which are inherent in voice recognition technology. **      Final report electronically signed by Dr. Timoteo Hoover on 10/8/2022 2:14 PM      CT CERVICAL SPINE WO CONTRAST   Final Result   1. There is a nondisplaced fracture through the right T1 transverse process seen on axial image 46. This is also seen on coronal series image 17. **This report has been created using voice recognition software. It may contain minor errors which are inherent in voice recognition technology. **      Final report electronically signed by Dr. Agueda Lee on 10/8/2022 2:29 PM      XR CHEST PORTABLE   Final Result      No acute intrathoracic process. **This report has been created using voice recognition software. It may contain minor errors which are inherent in voice recognition technology. **      Final report electronically signed by Dr. Timoteo Hoover on 10/8/2022 1:46 PM        CT HEAD WO CONTRAST    Result Date: 10/8/2022  PROCEDURE: CT HEAD WO CONTRAST CLINICAL INFORMATION: Fall TECHNIQUE: CT scan of the head was performed from the vertex to the skull base without use of intravenous contrast. Axial images as well as coronal and sagittal reconstructions were obtained. All CT scans at this facility use dose modulation, iterative reconstruction, and/or weight-based dosing when appropriate to reduce radiation dose to as low as reasonably achievable. COMPARISON: CT head 8/28/2021 FINDINGS: There is no mass effect, midline shift or acute hemorrhage. Ventricles and sulci are prominent. There is diffuse periventricular white matter hypoattenuation. The nasal septum is deviated. There is a mucous retention cyst or polyp in the right maxillary sinus. Visualized orbits and mastoid air cells are unremarkable. 1. No mass effect or acute hemorrhage. 2. Chronic periventricular small vessel ischemic changes and cerebral atrophy. **This report has been created using voice recognition software. It may contain minor errors which are inherent in voice recognition technology. ** Final report electronically signed by Dr. Tolu Dixon on 10/8/2022 2:14 PM    CT CERVICAL SPINE WO CONTRAST    Result Date: 10/8/2022  PROCEDURE: CT CERVICAL SPINE WO CONTRAST CLINICAL INFORMATION: fall, neck pain COMPARISON: No prior study. TECHNIQUE:  Axial CT images were obtained through the cervical spine without contrast. Coronal and sagittal reformatted images were rendered. All CT scans at this facility use dose modulation, iterative reconstruction, and/or weight-based dosing when appropriate to reduce radiation dose to as low as reasonably achievable. FINDINGS: There is normal alignment at the craniocervical junction. There is no prevertebral soft tissue swelling. The spinous processes appear intact. The facets align normally. There is straightening of the normal cervical lordosis. The dens interval appears normal. The dens appears intact. The visualized lung apices appear unremarkable.  There is a nondisplaced fracture through the right T1 transverse process seen on axial image 46. This is also seen on coronal series image 17. No other acute fractures identified. No loss of vertebral body height is demonstrated. Multilevel uncovertebral and facet hypertrophy is seen. There is mild to moderate neuroforaminal narrowing at C4-C5. There is moderate left neuroforaminal narrowing at C5-C6. 1. There is a nondisplaced fracture through the right T1 transverse process seen on axial image 46. This is also seen on coronal series image 17. **This report has been created using voice recognition software. It may contain minor errors which are inherent in voice recognition technology. ** Final report electronically signed by Dr. Alyssa Peters on 10/8/2022 2:29 PM    US LIVER SPLEEN    Result Date: 10/8/2022  US abdomen limited, with color and pulsed Doppler of the portal vein: Comparison: None Findings: The visualized pancreas, aorta, and inferior vena cava are unremarkable. Liver normal size. Right lobe measures 15.2 cm length. No bile duct dilatation. Common duct 7.1 mm diameter. The gallbladder is surgically absent. No sonographic Jean sign. Main portal vein antegrade The spleen measures 13.6 x 14.1 cm. No free fluid in the RIGHT upper quadrant Right kidney normal, 8.0 cm in length. An exophytic 2 cm simple renal cyst is present. Impression: Splenomegaly. This document has been electronically signed by: Viviana Lobo MD on 10/08/2022 11:27 PM    XR CHEST PORTABLE    Result Date: 10/8/2022  PROCEDURE: XR CHEST PORTABLE CLINICAL INFORMATION: Fall, fatigue TECHNIQUE: [facility] chest radiograph. COMPARISON: [facility] chest radiograph 9/5/2021 FINDINGS: Cardiac silhouette is at the upper limits of normal in size. There are no lung consolidations. Degenerative and scoliotic changes in the thoracic spine are poorly visualized. No acute intrathoracic process. **This report has been created using voice recognition software.  It may contain minor errors which are inherent in voice recognition technology. ** Final report electronically signed by Dr. Alexandria Verde on 10/8/2022 1:46 PM    With RN in room, patient and daughter (on the phone) were  updated about and agreed upon the treatment plan, all the questions and concerns were addressed. Patient was seen in PPE (PERSONAL PROTECTIVE EQUIPMENT). Patient was interviewed in Strict Airborne and Droplet Precautions. More than 30 minutes of time spent on counseling.        Electronically signed by Harleen Jarrett MD on 10/10/2022 at 7:41 AM

## 2022-10-10 NOTE — PROGRESS NOTES
Initial Evaluation          Patient:   Katy Craig  YOB: 1934  Age:  80 y.o. Room:  [facility]/024-  MRN:  [id number]   Acct: [de-identified]    Date of Admission:  10/8/2022  1:04 PM  Date of Service:  10/10/2022  Completed By:  Hugo Quiles RN                 Reason for Palliative Care Evaluation:-             [x] Code Status Discussion              [] Goals of Care              [] Pain/Symptom Management               [] Emotional Support              [] Other:                   Current Issues:-  []  Pain  [x]  Fatigue  []  Nausea  []  Anxiety  []  Depression  [x]  Shortness of Breath  []  Constipation  []  Appetite  []  Other:             Advance Directives:-NONE  [] [place] DNR Form  [] Living Will  [] Medical POA             Current Code Status:-  [x] Full Resuscitation  [] DNR-Comfort Care-Arrest  [] DNR-Comfort Care       [] Limited Resuscitation             [] No CPR            [] No shock            [] No ET intubation/reintubation            [] No resuscitative medications            [] Other limitation:  Code status changed to LIMITED x4 after consultation            Palliative Performance Status:          [x] 60%  Ambulation reduced; Significant disease;Can't do hobbies/housework; intake normal or reduced; occasional assist; LOC full/confusion        [] 50%  Mainly sit/lie; Extensive disease; Can't do any work; Considerable assist; intake normal or reduced; LOC full/confusion        [] 40%  Mainly in bed; Extensive disease; Mainly assist; intake normal or reduced; LOC full/confusion         [] 30%  Bed Bound; Extensive disease; Total care; intake reduced; LOC full/confusion        [] 20%  Bed Bound; Extensive disease; Total care; intake minimal; Drowsy/coma        [] 10%  Bed Bound; Extensive disease;  Total care; Mouth care only; Drowsy/coma        [] 0  Death        Goals of care evaluation:-        The patient goals of care are to provide comfort care/supportive services/palliation & relieve suffering:  Goals of care discussed with:  [x] Patient independently  [] Patient and Family  [] Family or Healthcare DPOA independently  [] Unable to discuss with patient, family/DPOA not present         Family/Patient Discussion:  Spoke with Burke Hamlin at bedside. She is A/O x4. She c/o weakness, diarrhea, and loss of appetite. She does not appear to be in any respiratory distress and is on room air. Discussed advance directives. She stated she and her  have 1 child. Her  and daughter would make healthcare decisions for her if needed. Discussed code status with explanation given for intubation and CPR. Burke Hamlin stated she does not want to be resuscitated. She stated \"If it's my time to go, let me go\". She hopes to return home soon with her . Plan/Follow-Up:  Updated primary nurse Sohan Gregorio RN on patients wishes. Perfect Serve message sent to Dr. Sherlyn Xavier. Order received to change code status to LIMITED x4.         Electronically signed by Pat Esquivel RN on 10/10/2022 at 2:28 PM           Palliative Care Office: [phone]

## 2022-10-10 NOTE — PROGRESS NOTES
[facility] Lam 60  INPATIENT OCCUPATIONAL THERAPY  STR MED SURG 8B  EVALUATION    Time:    Time In: 9147  Time Out: 1153  Timed Code Treatment Minutes: 14 Minutes  Minutes: 26          Date: 10/10/2022  Patient Name: Renata Pulliam,   Gender: female      MRN: [id number]  : 1934  (80 y.o.)  Referring Practitioner: Dr. Salvador Carter  Diagnosis: dizziness  Additional Pertinent Hx: 80 y.o. female with PMHx of p A. fib, a flutter who presents to the emergency department for evaluation of fatigue, dizziness. Patient to ED due to increased dizziness and fatigue. Patient states that she started feeling generalized malaise and feeling unwell since Monday, 10/3/2022 after receiving her flu shot. Patient complained of a fever that day after the flu shot administration. Has also been having a dry cough since then. Patient states that she has no appetite and has not been eating well. This morning upon awakening, patient got up and felt dizzy and fell and hit her head against her headboard. Patient then called EMS which took her to the ED for further evaluation. Patient is on Xarelto for her A. fib. CT of head negative. CT of cervical spine showing a nondisplaced fracture through the right T1 transverse process.     Restrictions/Precautions:  Restrictions/Precautions: Fall Risk  Position Activity Restriction  Other position/activity restrictions: monitor BP, Droplet +    Subjective  Chart Reviewed: Yes, Orders, Progress Notes, History and Physical  Patient assessed for rehabilitation services?: Yes         Pain: 0  /10:      Vitals:  Pt with no complaints of dizziness during mobility     Social/Functional History:  Lives With: Spouse  Type of Home: House  Home Layout: Two level, Bed/Bath upstairs, 1/2 bath on main level  Home Access: Stairs to enter with rails  Entrance Stairs - Number of Steps: 1 with grab bar  Home Equipment: Melvia Crofts, 4 wheeled, Walker, standard (uses 4WW on first floor and std walker on second floor of home)   Bathroom Shower/Tub: Walk-in shower  Bathroom Toilet: Handicap height  Bathroom Equipment: Shower chair  Bathroom Accessibility: Accessible       ADL Assistance: Independent  Ambulation Assistance: Independent  Transfer Assistance: Independent          Additional Comments: Pt indep with ADL tasks PTA    VISION:WNL    HEARING:  WNL    COGNITION: WNL    RANGE OF MOTION:  Bilateral Upper Extremity:  WNL    STRENGTH:  Bilateral Upper Extremity:  WNL    SENSATION:   WFL    ADL:   Grooming: Contact Guard Assistance. Standing to wash hands at sink   [address]ile[address] Toilet Transfer: Contact Guard Assistance. .  LB dressing: max A to don new depends     BALANCE:  Standing Balance: Contact Guard Assistance. BED MOBILITY:  Supine to Sit: Stand By Assistance    Sit to Supine: Stand By Assistance      TRANSFERS:  Sit to Stand:  [address] Form eOB with no complaints of dizziness   Stand to Sit: Contact Guard Assistance. Onto EOB     FUNCTIONAL MOBILITY:  Assistive Device: IV pole  Assist Level:  Contact Guard Assistance. Distance: To and from bathroom  Pt with  no complaints of dizziness or SOB during        Activity Tolerance:  Patient tolerance of  treatment: fair. Assessment:  Assessment: Pt admitted with increased dizziness and malaise. Pt requiring increased need for assistance during aDL asks. Pt would benefit from further skilled oT Services to icnrese her endurance, educate on energy conservation tech and increase her indep with her ADL tasks  Performance deficits / Impairments: Decreased functional mobility , Decreased endurance, Decreased ADL status, Decreased balance, Decreased strength, Decreased safe awareness  Prognosis: Fair  REQUIRES OT FOLLOW-UP: Yes  Decision Making: Medium Complexity    Treatment Initiated: Treatment and education initiated within context of evaluation.   Evaluation time included review of current medical information, gathering information related to past medical, social and functional history, completion of standardized testing, formal and informal observation of tasks, assessment of data and development of plan of care and goals. Treatment time included skilled education and facilitation of tasks to increase safety and independence with ADL's for improved functional independence and quality of life. Discharge Recommendations:  Home with assist PRN, Home with Home health OT    Patient Education:     Patient Education  Education Given To: Patient  Education Provided: Role of Therapy, Plan of Care  Education Method: Verbal  Barriers to Learning: None  Education Outcome: Continued education needed    Equipment Recommendations: Other: continue to assess    Plan:  Times Per Week: 5x  Current Treatment Recommendations: Strengthening, Balance training, Self-Care / ADL, Safety education & training, Functional mobility training, Endurance training, Patient/Caregiver education & training, Equipment evaluation, education, & procurement. See long-term goal time frame for expected duration of plan of care. If no long-term goals established, a short length of stay is anticipated. Goals:  Patient goals : feel better to return home  Short Term Goals  Time Frame for Short Term Goals: by discharge  Short Term Goal 1: Pt to complete BADL tasks with SBA and no cues for safety  Short Term Goal 2: Pt to be educated on energy conservation tech and demo/verbalize 1-2 tech during ADL tsks with min cues  Short Term Goal 3: Pt to increase endurance to navigate HH distances using AD with CGA and no LOB or increased dizziness for ocmpletion of ADL tasks         Following session, patient left in safe position with all fall risk precautions in place.

## 2022-10-10 NOTE — PLAN OF CARE
Problem: Discharge Planning  Goal: Discharge to home or other facility with appropriate resources  10/10/2022 1059 by Joanne Nelson RN  Outcome: Progressing  Flowsheets (Taken 10/8/2022 1957 by Jose Carmen RN)  Discharge to home or other facility with appropriate resources:   Identify barriers to discharge with patient and caregiver   Identify discharge learning needs (meds, wound care, etc)   Refer to discharge planning if patient needs post-hospital services based on physician order or complex needs related to functional status, cognitive ability or social support system   Arrange for needed discharge resources and transportation as appropriate   Arrange for interpreters to assist at discharge as needed  Note: Discharge to home with      Problem: Safety - Adult  Goal: Free from fall injury  10/10/2022 1059 by Joanne Nelson RN  Outcome: Progressing  Flowsheets (Taken 10/9/2022 0201 by Jf Vale RN)  Free From Fall Injury:   Instruct family/caregiver on patient safety   Based on caregiver fall risk screen, instruct family/caregiver to ask for assistance with transferring infant if caregiver noted to have fall risk factors  Note: Bed alarm is on. Call light is with in reach. Problem: ABCDS Injury Assessment  Goal: Absence of physical injury  10/10/2022 1059 by Joanne Nelson RN  Outcome: Progressing  Flowsheets (Taken 10/9/2022 0201 by Jf Vale RN)  Absence of Physical Injury: Implement safety measures based on patient assessment  Note: Bed alarm. Call light is with in reach.       Problem: Respiratory - Adult  Goal: Achieves optimal ventilation and oxygenation  10/10/2022 1059 by Joanne Nelson RN  Outcome: Progressing  Flowsheets (Taken 10/10/2022 0159 by Jf Vale RN)  Achieves optimal ventilation and oxygenation:   Assess for changes in respiratory status   Assess for changes in mentation and behavior   Position to facilitate oxygenation and minimize respiratory effort   Oxygen supplementation based on oxygen saturation or arterial blood gases   Initiate smoking cessation protocol as indicated   Encourage broncho-pulmonary hygiene including cough, deep breathe, incentive spirometry   Respiratory therapy support as indicated   Assess and instruct to report shortness of breath or any respiratory difficulty   Assess the need for suctioning and aspirate as needed  Note: Room air. + COVID     Problem: Infection - Adult  Goal: Absence of infection at discharge  10/10/2022 1059 by Brandon Mak RN  Outcome: Progressing  Flowsheets (Taken 10/9/2022 0840)  Absence of infection at discharge: Assess and monitor for signs and symptoms of infection  Note: COVID iso     Problem: Pain  Goal: Verbalizes/displays adequate comfort level or baseline comfort level  10/10/2022 1059 by Brandon Mak RN  Outcome: Progressing  Flowsheets (Taken 10/10/2022 0159 by Annette Almeida RN)  Verbalizes/displays adequate comfort level or baseline comfort level:   Encourage patient to monitor pain and request assistance   Assess pain using appropriate pain scale   Administer analgesics based on type and severity of pain and evaluate response   Implement non-pharmacological measures as appropriate and evaluate response   Consider cultural and social influences on pain and pain management   Notify Licensed Independent Practitioner if interventions unsuccessful or patient reports new pain  Note: Denies pain     Problem: Skin/Tissue Integrity  Goal: Absence of new skin breakdown  Description: 1. Monitor for areas of redness and/or skin breakdown  2. Assess vascular access sites hourly  3. Every 4-6 hours minimum:  Change oxygen saturation probe site  4. Every 4-6 hours:  If on nasal continuous positive airway pressure, respiratory therapy assess nares and determine need for appliance change or resting period. Outcome: Progressing  Note: Skin intact     Care plan reviewed with patient.  Patient verbalize understanding of the plan of care and contribute to goal setting.

## 2022-10-11 ENCOUNTER — TELEPHONE (OUTPATIENT)
Dept: FAMILY MEDICINE CLINIC | Age: 87
End: 2022-10-11

## 2022-10-11 LAB
ANION GAP SERPL CALCULATED.3IONS-SCNC: 15 MEQ/L (ref 8–16)
ATYPICAL LYMPHOCYTES: ABNORMAL %
BASOPHILS # BLD: 0.4 %
BASOPHILS ABSOLUTE: 0 THOU/MM3 (ref 0–0.1)
BUN BLDV-MCNC: 7 MG/DL (ref 7–22)
CALCIUM SERPL-MCNC: 7.9 MG/DL (ref 8.5–10.5)
CHLORIDE BLD-SCNC: 103 MEQ/L (ref 98–111)
CO2: 21 MEQ/L (ref 23–33)
CREAT SERPL-MCNC: 0.7 MG/DL (ref 0.4–1.2)
EOSINOPHIL # BLD: 3.3 %
EOSINOPHILS ABSOLUTE: 0.1 THOU/MM3 (ref 0–0.4)
ERYTHROCYTE [DISTWIDTH] IN BLOOD BY AUTOMATED COUNT: 14.8 % (ref 11.5–14.5)
ERYTHROCYTE [DISTWIDTH] IN BLOOD BY AUTOMATED COUNT: 44.1 FL (ref 35–45)
GFR SERPL CREATININE-BSD FRML MDRD: 79 ML/MIN/1.73M2
GLUCOSE BLD-MCNC: 75 MG/DL (ref 70–108)
HCT VFR BLD CALC: 40.5 % (ref 37–47)
HEMOGLOBIN: 13.5 GM/DL (ref 12–16)
HIV AG/AB: NONREACTIVE
IMMATURE GRANS (ABS): 0.01 THOU/MM3 (ref 0–0.07)
IMMATURE GRANULOCYTES: 0.4 %
LYMPHOCYTES # BLD: 36.2 %
LYMPHOCYTES ABSOLUTE: 1 THOU/MM3 (ref 1–4.8)
MCH RBC QN AUTO: 27.4 PG (ref 26–33)
MCHC RBC AUTO-ENTMCNC: 33.3 GM/DL (ref 32.2–35.5)
MCV RBC AUTO: 82.2 FL (ref 81–99)
MONOCYTES # BLD: 9.1 %
MONOCYTES ABSOLUTE: 0.3 THOU/MM3 (ref 0.4–1.3)
NUCLEATED RED BLOOD CELLS: 0 /100 WBC
PLATELET # BLD: 95 THOU/MM3 (ref 130–400)
PLATELET ESTIMATE: ADEQUATE
PMV BLD AUTO: 11 FL (ref 9.4–12.4)
POTASSIUM SERPL-SCNC: 3.7 MEQ/L (ref 3.5–5.2)
RBC # BLD: 4.93 MILL/MM3 (ref 4.2–5.4)
SCAN OF BLOOD SMEAR: NORMAL
SEG NEUTROPHILS: 50.6 %
SEGMENTED NEUTROPHILS ABSOLUTE COUNT: 1.4 THOU/MM3 (ref 1.8–7.7)
SODIUM BLD-SCNC: 139 MEQ/L (ref 135–145)
WBC # BLD: 2.8 THOU/MM3 (ref 4.8–10.8)

## 2022-10-11 PROCEDURE — 6370000000 HC RX 637 (ALT 250 FOR IP): Performed by: HOSPITALIST

## 2022-10-11 PROCEDURE — 2580000003 HC RX 258: Performed by: STUDENT IN AN ORGANIZED HEALTH CARE EDUCATION/TRAINING PROGRAM

## 2022-10-11 PROCEDURE — 99233 SBSQ HOSP IP/OBS HIGH 50: CPT | Performed by: HOSPITALIST

## 2022-10-11 PROCEDURE — 2580000003 HC RX 258: Performed by: HOSPITALIST

## 2022-10-11 PROCEDURE — 80048 BASIC METABOLIC PNL TOTAL CA: CPT

## 2022-10-11 PROCEDURE — 36415 COLL VENOUS BLD VENIPUNCTURE: CPT

## 2022-10-11 PROCEDURE — 6360000002 HC RX W HCPCS: Performed by: STUDENT IN AN ORGANIZED HEALTH CARE EDUCATION/TRAINING PROGRAM

## 2022-10-11 PROCEDURE — 97535 SELF CARE MNGMENT TRAINING: CPT

## 2022-10-11 PROCEDURE — 1200000003 HC TELEMETRY R&B

## 2022-10-11 PROCEDURE — 85025 COMPLETE CBC W/AUTO DIFF WBC: CPT

## 2022-10-11 PROCEDURE — 6370000000 HC RX 637 (ALT 250 FOR IP): Performed by: STUDENT IN AN ORGANIZED HEALTH CARE EDUCATION/TRAINING PROGRAM

## 2022-10-11 PROCEDURE — 97110 THERAPEUTIC EXERCISES: CPT

## 2022-10-11 RX ADMIN — SODIUM CHLORIDE: 9 INJECTION, SOLUTION INTRAVENOUS at 20:37

## 2022-10-11 RX ADMIN — METOPROLOL TARTRATE 25 MG: 25 TABLET, FILM COATED ORAL at 07:37

## 2022-10-11 RX ADMIN — SODIUM CHLORIDE: 9 INJECTION, SOLUTION INTRAVENOUS at 06:20

## 2022-10-11 RX ADMIN — RIVAROXABAN 20 MG: 20 TABLET, FILM COATED ORAL at 20:35

## 2022-10-11 RX ADMIN — ONDANSETRON 4 MG: 2 INJECTION INTRAMUSCULAR; INTRAVENOUS at 03:24

## 2022-10-11 RX ADMIN — METOPROLOL TARTRATE 25 MG: 25 TABLET, FILM COATED ORAL at 20:35

## 2022-10-11 RX ADMIN — PROPAFENONE HYDROCHLORIDE 150 MG: 150 TABLET, FILM COATED ORAL at 20:35

## 2022-10-11 RX ADMIN — PANTOPRAZOLE SODIUM 40 MG: 40 TABLET, DELAYED RELEASE ORAL at 06:18

## 2022-10-11 RX ADMIN — ONDANSETRON 4 MG: 2 INJECTION INTRAMUSCULAR; INTRAVENOUS at 11:23

## 2022-10-11 RX ADMIN — ACETAMINOPHEN 650 MG: 325 TABLET ORAL at 16:20

## 2022-10-11 RX ADMIN — PROPAFENONE HYDROCHLORIDE 150 MG: 150 TABLET, FILM COATED ORAL at 07:37

## 2022-10-11 ASSESSMENT — PAIN DESCRIPTION - ORIENTATION
ORIENTATION: MID

## 2022-10-11 ASSESSMENT — PAIN DESCRIPTION - FREQUENCY
FREQUENCY: CONTINUOUS

## 2022-10-11 ASSESSMENT — PAIN DESCRIPTION - DESCRIPTORS
DESCRIPTORS: DISCOMFORT
DESCRIPTORS: ACHING

## 2022-10-11 ASSESSMENT — PAIN - FUNCTIONAL ASSESSMENT
PAIN_FUNCTIONAL_ASSESSMENT: ACTIVITIES ARE NOT PREVENTED

## 2022-10-11 ASSESSMENT — PAIN DESCRIPTION - ONSET
ONSET: ON-GOING

## 2022-10-11 ASSESSMENT — PAIN DESCRIPTION - PAIN TYPE
TYPE: ACUTE PAIN

## 2022-10-11 ASSESSMENT — PAIN SCALES - GENERAL
PAINLEVEL_OUTOF10: 8
PAINLEVEL_OUTOF10: 8
PAINLEVEL_OUTOF10: 4
PAINLEVEL_OUTOF10: 8

## 2022-10-11 ASSESSMENT — PAIN DESCRIPTION - LOCATION
LOCATION: ABDOMEN

## 2022-10-11 NOTE — PROGRESS NOTES
[id number]  Reviewed chart for SBIRT per trauma service. Attempted, pt with clinical staff at this time. DEANN to re-attempt.

## 2022-10-11 NOTE — PROGRESS NOTES
[facility] 8B  Occupational Therapy  Daily Note  Time:   Time In:   Time Out:   Timed Code Treatment Minutes: 16 Minutes  Minutes: 16          Date: 10/11/2022  Patient Name: Aric Wilder,   Gender: female      Room: 8B-24/024-A  MRN: [id number]  : 1934  (80 y.o.)  Referring Practitioner: Dr. Arvind Shelby  Diagnosis: dizziness  Additional Pertinent Hx: 80 y.o. female with PMHx of p A. fib, a flutter who presents to the emergency department for evaluation of fatigue, dizziness. Patient to ED due to increased dizziness and fatigue. Patient states that she started feeling generalized malaise and feeling unwell since Monday, 10/3/2022 after receiving her flu shot. Patient complained of a fever that day after the flu shot administration. Has also been having a dry cough since then. Patient states that she has no appetite and has not been eating well. This morning upon awakening, patient got up and felt dizzy and fell and hit her head against her headboard. Patient then called EMS which took her to the ED for further evaluation. Patient is on Xarelto for her A. fib. CT of head negative. CT of cervical spine showing a nondisplaced fracture through the right T1 transverse process. Restrictions/Precautions:  Restrictions/Precautions: Fall Risk  Position Activity Restriction  Other position/activity restrictions: monitor BP, Droplet +     SUBJECTIVE: Pt in bed upon arrival. Nurse approved Tx. Pt required encouragement. Constant c/o of not feeling well and fatigue. PAIN: does not report pain     Vitals: Vitals not assessed per clinical judgement, see nursing flowsheet    COGNITION: Slow Processing and Decreased Insight    ADL:   Grooming: Contact Guard Assistance. Standing at sink demo'ing 1-2 hand release. Demo'd unsteadiness, engaged in oral care with encouragement. Evelin Chin BALANCE:  Sitting Balance:  Stand By Assistance.  EOB 0 LOB   Standing Balance: Contact Guard Assistance. Slight unsteadiness. BED MOBILITY:  Supine to Sit: Stand By Assistance, with rail, with increased time for completion    Sit to Supine: Stand By Assistance, with head of bed raised, with rail, with increased time for completion      TRANSFERS:  Sit to Stand:  Contact Guard Assistance. Stand to Sit: Contact Guard Assistance. FUNCTIONAL MOBILITY:  Assistive Device: None  Assist Level:  Contact Guard Assistance. Distance: To and from bathroom  Use of IV pole. ASSESSMENT:     Activity Tolerance:  Patient tolerance of  treatment: good. Discharge Recommendations: Continue to assess pending progress  Equipment Recommendations: Other: continue to assess  Plan: Times Per Week: 5x  Current Treatment Recommendations: Strengthening, Balance training, Self-Care / ADL, Safety education & training, Functional mobility training, Endurance training, Patient/Caregiver education & training, Equipment evaluation, education, & procurement    Patient Education  Patient Education: Role of OT and Plan of Care    Goals  Short Term Goals  Time Frame for Short Term Goals: by discharge  Short Term Goal 1: Pt to complete BADL tasks with SBA and no cues for safety  Short Term Goal 2: Pt to be educated on energy conservation tech and demo/verbalize 1-2 tech during ADL tsks with min cues  Short Term Goal 3: Pt to increase endurance to navigate HH distances using AD with CGA and no LOB or increased dizziness for ocmpletion of ADL tasks    Following session, patient left in safe position with all fall risk precautions in place.

## 2022-10-11 NOTE — CARE COORDINATION
10/11/22, 11:56 AM EDT    DISCHARGE ON GOING EVALUATION    [facility] day: 3  Location: -24/024-A Reason for admit: Dizziness [R42]  Acute respiratory failure with hypoxia ([address] [J96.01]  Fall from bed, initial encounter [W06. XXXA]  COVID [U07.1]  COVID-19 [U07.1]   Procedure: No.  Barriers to Discharge: Cont to tx Covid sx. Remains weak and nauseated with diarrhea. Afebrile and has been weaned to room air with sat 93%. Palliative Care consulted and has seen. Code status changed to Limited x 4. PCP: Olena Arteaga MD  Readmission Risk Score: 16.2%  Patient Goals/Plan/Treatment Preferences: From home with spouse. Follow for possible needs.

## 2022-10-11 NOTE — PLAN OF CARE
Problem: Discharge Planning  Goal: Discharge to home or other facility with appropriate resources  Outcome: Progressing  Flowsheets (Taken 10/8/2022 1957 by Neo Camarillo RN)  Discharge to home or other facility with appropriate resources:   Identify barriers to discharge with patient and caregiver   Identify discharge learning needs (meds, wound care, etc)   Refer to discharge planning if patient needs post-hospital services based on physician order or complex needs related to functional status, cognitive ability or social support system   Arrange for needed discharge resources and transportation as appropriate   Arrange for interpreters to assist at discharge as needed     Problem: Safety - Adult  Goal: Free from fall injury  Outcome: Progressing  Flowsheets (Taken 10/9/2022 0201)  Free From Fall Injury:   Instruct family/caregiver on patient safety   Based on caregiver fall risk screen, instruct family/caregiver to ask for assistance with transferring infant if caregiver noted to have fall risk factors     Problem: ABCDS Injury Assessment  Goal: Absence of physical injury  Outcome: Progressing  Flowsheets (Taken 10/9/2022 0201)  Absence of Physical Injury: Implement safety measures based on patient assessment     Problem: Respiratory - Adult  Goal: Achieves optimal ventilation and oxygenation  Outcome: Progressing  Flowsheets (Taken 10/10/2022 0159)  Achieves optimal ventilation and oxygenation:   Assess for changes in respiratory status   Assess for changes in mentation and behavior   Position to facilitate oxygenation and minimize respiratory effort   Oxygen supplementation based on oxygen saturation or arterial blood gases   Initiate smoking cessation protocol as indicated   Encourage broncho-pulmonary hygiene including cough, deep breathe, incentive spirometry   Respiratory therapy support as indicated   Assess and instruct to report shortness of breath or any respiratory difficulty   Assess the need for suctioning and aspirate as needed     Problem: Infection - Adult  Goal: Absence of infection at discharge  Outcome: Progressing  Flowsheets (Taken 10/9/2022 0840 by Brandon Mak RN)  Absence of infection at discharge: Assess and monitor for signs and symptoms of infection     Problem: Pain  Goal: Verbalizes/displays adequate comfort level or baseline comfort level  Outcome: Progressing  Flowsheets (Taken 10/10/2022 0159)  Verbalizes/displays adequate comfort level or baseline comfort level:   Encourage patient to monitor pain and request assistance   Assess pain using appropriate pain scale   Administer analgesics based on type and severity of pain and evaluate response   Implement non-pharmacological measures as appropriate and evaluate response   Consider cultural and social influences on pain and pain management   Notify Licensed Independent Practitioner if interventions unsuccessful or patient reports new pain     Problem: Skin/Tissue Integrity  Goal: Absence of new skin breakdown  Description: 1. Monitor for areas of redness and/or skin breakdown  2. Assess vascular access sites hourly  3. Every 4-6 hours minimum:  Change oxygen saturation probe site  4. Every 4-6 hours:  If on nasal continuous positive airway pressure, respiratory therapy assess nares and determine need for appliance change or resting period.   Outcome: Progressing  Note: Maintain optimal skin integrity

## 2022-10-11 NOTE — PLAN OF CARE
Problem: Discharge Planning  Goal: Discharge to home or other facility with appropriate resources  10/11/2022 0850 by Radha Leung RN  Outcome: Progressing  Flowsheets (Taken 10/11/2022 0847)  Discharge to home or other facility with appropriate resources: Identify barriers to discharge with patient and caregiver  Note: Discharge barriers identified with patient.  and social work following. Problem: Safety - Adult  Goal: Free from fall injury  10/11/2022 0850 by Radha Leung RN  Outcome: Progressing  Flowsheets (Taken 10/11/2022 0847)  Free From Fall Injury: Instruct family/caregiver on patient safety  Note: Patient absent of falls this shift, fall band intact, bed alarm set, falling star magnet in place. Problem: ABCDS Injury Assessment  Goal: Absence of physical injury  10/11/2022 0850 by Radha Leung RN  Outcome: Progressing  Flowsheets (Taken 10/11/2022 0847)  Absence of Physical Injury: Implement safety measures based on patient assessment  Note: Patient absent of physical injury. Problem: Respiratory - Adult  Goal: Achieves optimal ventilation and oxygenation  10/11/2022 0850 by Radha Leung RN  Outcome: Progressing  Flowsheets (Taken 10/11/2022 0847)  Achieves optimal ventilation and oxygenation:   Assess for changes in respiratory status   Assess for changes in mentation and behavior   Oxygen supplementation based on oxygen saturation or arterial blood gases  Note: Patient has adequate oxygenation this shift. Patient has 1 liters this shift. Weaned off to room air. PRN breathing treatments given as needed. Incentive spirometer and acapella at bedside.       Problem: Infection - Adult  Goal: Absence of infection at discharge  10/11/2022 0850 by Radha Leung RN  Outcome: Progressing  Flowsheets (Taken 10/11/2022 0847)  Absence of infection at discharge:   Assess and monitor for signs and symptoms of infection   Monitor lab/diagnostic results   Monitor all insertion sites i.e., indwelling lines, tubes and drains  Note: In isolation for covid. Proper ppe being utilized. Labs monitored. Problem: Pain  Goal: Verbalizes/displays adequate comfort level or baseline comfort level  10/11/2022 0850 by Rosita De Jesus RN  Outcome: Progressing  Flowsheets (Taken 10/11/2022 0847)  Verbalizes/displays adequate comfort level or baseline comfort level:   Encourage patient to monitor pain and request assistance   Assess pain using appropriate pain scale   Implement non-pharmacological measures as appropriate and evaluate response  Note: Patient voices pain 8/10. Patients pain goal is 0/10. PRN pain medications given as ordered. Patients pain goal is a 0. Non-pharmacological interventions include: rest and repositioning. Problem: Skin/Tissue Integrity  Goal: Absence of new skin breakdown  Description: 1. Monitor for areas of redness and/or skin breakdown  2. Assess vascular access sites hourly  3. Every 4-6 hours minimum:  Change oxygen saturation probe site  4. Every 4-6 hours:  If on nasal continuous positive airway pressure, respiratory therapy assess nares and determine need for appliance change or resting period. 10/11/2022 0850 by Rosita De Jesus, RN  Outcome: Progressing  Note: Skin integrity intact. Patient educated to frequently turn in bed to reduce pressure ulcers. Care plan reviewed with patient. Patient verbalize understanding of the plan of care and contribute to goal setting.

## 2022-10-11 NOTE — PROGRESS NOTES
[address] [address]  INPATIENT PHYSICAL THERAPY  DAILY NOTE  [facility] 8B - 8B-24/024-A    Time In: 1150  Time Out: 1213  Timed Code Treatment Minutes: 23 Minutes  Minutes: 23          Date: 10/11/2022  Patient Name: Angeli Mancera,  Gender:  female        MRN: [id number]  : 1934  (80 y.o.)     Referring Practitioner: Dr. Pasquale Jj  Diagnosis: dizziness  Additional Pertinent Hx: admit with above diagnosis, +COVID-19, fall secondary to orthostatic hypotension, nondisplaced T1 fax-ok per ortho to remove c collar     Prior Level of Function:  Lives With: Spouse  Type of Home: House  Home Layout: Two level, Bed/Bath upstairs, 1/2 bath on main level  Home Access: Stairs to enter with rails  Entrance Stairs - Number of Steps: 1 with grab bar  Home Equipment: Yaritza Jerel, 4 wheeled, Walker, standard (uses 4WW on first floor and std walker on second floor of home)   Bathroom Shower/Tub: Walk-in shower  Bathroom Toilet: Handicap height  Bathroom Equipment: Shower chair  Bathroom Accessibility: Accessible    ADL Assistance: Independent  Ambulation Assistance: Independent  Transfer Assistance: Independent  Additional Comments: Pt indep with ADL tasks PTA    Restrictions/Precautions:  Restrictions/Precautions: Fall Risk  Position Activity Restriction  Other position/activity restrictions: monitor BP, Droplet +     SUBJECTIVE: pt required encouragement with c/o stomach pain-RN and Dr. Ricarda Hoang in and aware    PAIN: not rated in stomach      Vitals: Oxygen: on room air   93%    OBJECTIVE:  Bed Mobility:  Not Tested    Transfers:  Sit to Stand: Minimal Assistance  [facility]  Pt impulsive to sit back down in chair with c/o fatigue  Ambulation:  Minimal Assistance, to CGA  Distance: 3 steps  Surface: Level Tile  Device:Hand-Held Assist  Gait Deviations:   Forward Flexed Posture, Slow Deborah, Decreased Step Length Bilaterally, Decreased Heel Strike Bilaterally, and Unsteady Gait        Exercise:  Patient was guided in 1 set(s) 5-10 reps of exercise to both lower extremities. Glut sets, Seated marches, Seated heel/toe raises, Long arc quads, and Standing heel/toe raises. Pt required rest breaks after each exercise due to fatigue. Exercises were completed for increased independence with functional mobility. Functional Outcome Measures: Completed  AM-PAC Inpatient Mobility Raw Score : 15  AM-PAC Inpatient T-Scale Score : 39.45    ASSESSMENT:  Assessment: Patient progressing toward established goals. and pt c/o stomach pain and fatigue. PT encouraged pt to inc activity throughout the day and to stay up in chair for lunch. Activity Tolerance:  Patient tolerance of  treatment: fair. -     Equipment Recommendations: Other: cont to assess needs  Discharge Recommendations: Continue to assess pending progress  Plan: Specific Instructions for Next Treatment: therex and mobility  General Plan:  (5X C)  Specific Instructions for Next Treatment: therex and mobility    Patient Education  Patient Education: Home Exercise Program, Transfers    Goals:  Patient Goals : feel better  Short Term Goals  Time Frame for Short Term Goals: by discharge  Short Term Goal 1: bed mobility with SBA to get in/out of bed  Short Term Goal 2: transfer with SBA to get in/out of chairs  Short Term Goal 3: amb >10'x1 with walker and CGA to walk safely in home  Short Term Goal 4: negotiate flight of steps with HR and CGA to get to second floor bed/bathroom safely  Long Term Goals  Time Frame for Long Term Goals : no LTGs set secondary to short ELOS    Following session, patient left in safe position with all fall risk precautions in place.

## 2022-10-11 NOTE — PROGRESS NOTES
Hospitalist Progress Note      Patient:  Remington Matthew    Unit/Bed:8B-24/024-A  YOB: 1934  MRN: [id number]   Acct: [de-identified]   PCP: Pastor Inessa MD  Date of Admission: 10/8/2022    Assessment/Plan:    1. Orthostatic Dizziness. Likely 2/2 hypovolemia 2/2 dehydration/decreasd Po intake 2/2 COVID infection. Positive orthostatic vitals in ED resolved w/ IVF. Consider d/c'ing IVF when adequate PO intake. Echocardiogram pending. Fall precautions. Hold parameters for home BB    10/10: reported dizziness w/ standing and mildly (if at all) +ve orthostatic VS after walking w/ physio likely neurocardiogenic + /- inadequate Po intake. Continuing IVF. 10/11:  +ve Othrtosis resolved. Reduced IVF for maintenance only. RN aware to d/c as Po intake improves. 2.  Fall Secondary to Orthostatic Dizziness. Fall on the morning of 10/8/2022. Patient denies any loss of consciousness, chest pain, palpitations, and shortness of breath. However, affirms that she is on Xarelto and affirms head trauma. CT of the head without contrast on 10/8/2022 demonstrated no mass-effect or acute hemorrhage but did demonstrate chronic periventricular small vessel ischemic changes and cerebral atrophy most likely consistent with age. Maintain on fall precautions and monitor clinically. PT/OT will follow. 3.  COVID-19. Probable cause of patient's upset stomach, nausea, poor appetite, and malaise. No objective hypoxemia noted. Supportive care only. 4.  Questionable small T1 TP# Fracture:  Small fracture noted on CT of the cervical spine without contrast.  No tenderness including midline cervical tenderness. Non-op. Ok to remove C-collar. WBAT/AAT. PT to see  5. Hx of Paroxysmal Fibrillation/Flutter. Noted on history and on previous EKGs. Estimated [id number] score of 4. Estimated HAS-BLED score of 1.   On DOAC  NSR on home metoprolol tartrate 25 mg twice non-distended with normal bowel sounds. Musculoskeletal: passive and active ROM x 4 extremities. Skin: Skin color, texture, turgor normal.  No rashes or lesions. Neurologic:  Neurovascularly intact without any focal sensory/motor deficits. Cranial nerves: II-XII intact, grossly non-focal.  Psychiatric: Alert and oriented, thought content appropriate, normal insight  Capillary Refill: Brisk,< 3 seconds   Peripheral Pulses: +2 palpable, equal bilaterally     Labs:   Recent Labs     10/09/22  0551 10/10/22  1333 10/11/22  0613   WBC 3.2* 3.8* 2.8*   HGB 13.5 14.9 13.5   HCT 41.6 45.9 40.5   PLT 92* 108* 95*       Recent Labs     10/09/22  0551 10/10/22  1333 10/11/22  0613    134* 139   K 4.3 3.8 3.7    99 103   CO2 22* 22* 21*   BUN 15 13 7   CREATININE 0.8 0.8 0.7   CALCIUM 7.6* 8.2* 7.9*       Recent Labs     10/08/22  1427   AST 30   ALT 21   BILIDIR <0.2   BILITOT 0.5   ALKPHOS 91       Recent Labs     10/08/22  1942   INR 1.24*       No results for input(s): Lea Nichols in the last 72 hours.     Microbiology:    Blood culture #1:   Lab Results   Component Value Date/Time    BC No growth-preliminary No growth 09/05/2021 07:38 PM       Blood culture #2:No results found for: Maribel Martinez    Organism:  Lab Results   Component Value Date/Time    ORG Escherichia coli 05/05/2022 03:24 PM       No results found for: LABGRAM    MRSA culture only:No results found for: [facility]    Urine culture:   Lab Results   Component Value Date/Time    LABURIN No growth-preliminary 05/05/2022 03:24 PM    LABURIN [place] count: >100,000 CFU/mL 05/05/2022 03:24 PM       Respiratory culture: No results found for: CULTRESP    Aerobic and Anaerobic :  No results found for: LABAERO  No results found for: LABANAE    Urinalysis:      Lab Results   Component Value Date/Time    NITRU Positive 05/05/2022 02:47 PM    WBCUA 10-15 09/04/2021 04:37 PM    BACTERIA NONE SEEN 09/04/2021 04:37 PM    RBCUA 0-2 09/04/2021 04:37 PM    BLOODU Large 05/05/2022 02:47 PM    SPECGRAV 1.025 05/05/2022 02:47 PM    GLUCOSEU Negative 05/05/2022 02:47 PM       Radiology:  CT THORACIC SPINE WO CONTRAST   Final Result      T spine:   1. No acute fracture or malalignment. L-spine:   1. No acute fracture or malalignment. Degenerative changes of the lower lumbar spine as described above. **This report has been created using voice recognition software. It may contain minor errors which are inherent in voice recognition technology. **      Final report electronically signed by Dr. Chuyita Alamo on 10/9/2022 10:48 AM      CT LUMBAR SPINE WO CONTRAST   Final Result      T spine:   1. No acute fracture or malalignment. L-spine:   1. No acute fracture or malalignment. Degenerative changes of the lower lumbar spine as described above. **This report has been created using voice recognition software. It may contain minor errors which are inherent in voice recognition technology. **      Final report electronically signed by Dr. Chuyita Alamo on 10/9/2022 10:48 AM      US LIVER SPLEEN   Final Result   Impression:   Splenomegaly. This document has been electronically signed by: Wei Santiago MD on    10/08/2022 11:27 PM      CT HEAD WO CONTRAST   Final Result   1. No mass effect or acute hemorrhage. 2. Chronic periventricular small vessel ischemic changes and cerebral atrophy. **This report has been created using voice recognition software. It may contain minor errors which are inherent in voice recognition technology. **      Final report electronically signed by Dr. Rowan Macedo on 10/8/2022 2:14 PM      CT CERVICAL SPINE WO CONTRAST   Final Result   1. There is a nondisplaced fracture through the right T1 transverse process seen on axial image 46. This is also seen on coronal series image 17. **This report has been created using voice recognition software.   It may contain minor errors which are inherent in voice recognition technology. **      Final report electronically signed by Dr. Kate Segura on 10/8/2022 2:29 PM      XR CHEST PORTABLE   Final Result      No acute intrathoracic process. **This report has been created using voice recognition software. It may contain minor errors which are inherent in voice recognition technology. **      Final report electronically signed by Dr. Abril Rosales on 10/8/2022 1:46 PM        CT HEAD WO CONTRAST    Result Date: 10/8/2022  PROCEDURE: CT HEAD WO CONTRAST CLINICAL INFORMATION: Fall TECHNIQUE: CT scan of the head was performed from the vertex to the skull base without use of intravenous contrast. Axial images as well as coronal and sagittal reconstructions were obtained. All CT scans at this facility use dose modulation, iterative reconstruction, and/or weight-based dosing when appropriate to reduce radiation dose to as low as reasonably achievable. COMPARISON: CT head 8/28/2021 FINDINGS: There is no mass effect, midline shift or acute hemorrhage. Ventricles and sulci are prominent. There is diffuse periventricular white matter hypoattenuation. The nasal septum is deviated. There is a mucous retention cyst or polyp in the right maxillary sinus. Visualized orbits and mastoid air cells are unremarkable. 1. No mass effect or acute hemorrhage. 2. Chronic periventricular small vessel ischemic changes and cerebral atrophy. **This report has been created using voice recognition software. It may contain minor errors which are inherent in voice recognition technology. ** Final report electronically signed by Dr. Abril Rosales on 10/8/2022 2:14 PM    CT CERVICAL SPINE WO CONTRAST    Result Date: 10/8/2022  PROCEDURE: CT CERVICAL SPINE WO CONTRAST CLINICAL INFORMATION: fall, neck pain COMPARISON: No prior study. TECHNIQUE:  Axial CT images were obtained through the cervical spine without contrast. Coronal and sagittal reformatted images were rendered.  All CT scans at this facility use dose modulation, iterative reconstruction, and/or weight-based dosing when appropriate to reduce radiation dose to as low as reasonably achievable. FINDINGS: There is normal alignment at the craniocervical junction. There is no prevertebral soft tissue swelling. The spinous processes appear intact. The facets align normally. There is straightening of the normal cervical lordosis. The dens interval appears normal. The dens appears intact. The visualized lung apices appear unremarkable. There is a nondisplaced fracture through the right T1 transverse process seen on axial image 46. This is also seen on coronal series image 17. No other acute fractures identified. No loss of vertebral body height is demonstrated. Multilevel uncovertebral and facet hypertrophy is seen. There is mild to moderate neuroforaminal narrowing at C4-C5. There is moderate left neuroforaminal narrowing at C5-C6. 1. There is a nondisplaced fracture through the right T1 transverse process seen on axial image 46. This is also seen on coronal series image 17. **This report has been created using voice recognition software. It may contain minor errors which are inherent in voice recognition technology. ** Final report electronically signed by Dr. Rfaael Omalley on 10/8/2022 2:29 PM    US LIVER SPLEEN    Result Date: 10/8/2022  US abdomen limited, with color and pulsed Doppler of the portal vein: Comparison: None Findings: The visualized pancreas, aorta, and inferior vena cava are unremarkable. Liver normal size. Right lobe measures 15.2 cm length. No bile duct dilatation. Common duct 7.1 mm diameter. The gallbladder is surgically absent. No sonographic Jean sign. Main portal vein antegrade The spleen measures 13.6 x 14.1 cm. No free fluid in the RIGHT upper quadrant Right kidney normal, 8.0 cm in length. An exophytic 2 cm simple renal cyst is present. Impression: Splenomegaly.  This document has been electronically signed by: Tabitha Olivier MD on 10/08/2022 11:27 PM    XR CHEST PORTABLE    Result Date: 10/8/2022  PROCEDURE: XR CHEST PORTABLE CLINICAL INFORMATION: Fall, fatigue TECHNIQUE: [facility] chest radiograph. COMPARISON: [facility] chest radiograph 9/5/2021 FINDINGS: Cardiac silhouette is at the upper limits of normal in size. There are no lung consolidations. Degenerative and scoliotic changes in the thoracic spine are poorly visualized. No acute intrathoracic process. **This report has been created using voice recognition software. It may contain minor errors which are inherent in voice recognition technology. ** Final report electronically signed by Dr. Kaylene Laguerre on 10/8/2022 1:46 PM    With RN in room, patient was updated about and agreed upon the treatment plan, all the questions and concerns were addressed. Patient was seen in PPE (PERSONAL PROTECTIVE EQUIPMENT). Patient was interviewed in Strict Airborne and Droplet Precautions. More than 30 minutes of time spent on counseling.        Electronically signed by Ronnie Nunez MD on 10/11/2022 at 8:37 AM

## 2022-10-11 NOTE — TELEPHONE ENCOUNTER
[facility] follow up/dod 10-11/Covid, please advise of appt date and time when doing transcare call.

## 2022-10-12 ENCOUNTER — TELEPHONE (OUTPATIENT)
Dept: FAMILY MEDICINE CLINIC | Age: 87
End: 2022-10-12

## 2022-10-12 VITALS
BODY MASS INDEX: 27.81 KG/M2 | WEIGHT: 166.88 LBS | TEMPERATURE: 97.7 F | HEIGHT: 65 IN | OXYGEN SATURATION: 93 % | SYSTOLIC BLOOD PRESSURE: 117 MMHG | HEART RATE: 96 BPM | DIASTOLIC BLOOD PRESSURE: 75 MMHG | RESPIRATION RATE: 17 BRPM

## 2022-10-12 LAB
ANION GAP SERPL CALCULATED.3IONS-SCNC: 13 MEQ/L (ref 8–16)
BASOPHILS # BLD: 0 %
BASOPHILS ABSOLUTE: 0 THOU/MM3 (ref 0–0.1)
BUN BLDV-MCNC: 5 MG/DL (ref 7–22)
CALCIUM SERPL-MCNC: 8.1 MG/DL (ref 8.5–10.5)
CHLORIDE BLD-SCNC: 102 MEQ/L (ref 98–111)
CO2: 23 MEQ/L (ref 23–33)
CREAT SERPL-MCNC: 0.7 MG/DL (ref 0.4–1.2)
EOSINOPHIL # BLD: 4.4 %
EOSINOPHILS ABSOLUTE: 0.1 THOU/MM3 (ref 0–0.4)
ERYTHROCYTE [DISTWIDTH] IN BLOOD BY AUTOMATED COUNT: 14.7 % (ref 11.5–14.5)
ERYTHROCYTE [DISTWIDTH] IN BLOOD BY AUTOMATED COUNT: 44.2 FL (ref 35–45)
GFR SERPL CREATININE-BSD FRML MDRD: 79 ML/MIN/1.73M2
GLUCOSE BLD-MCNC: 100 MG/DL (ref 70–108)
HCT VFR BLD CALC: 41.5 % (ref 37–47)
HEMOGLOBIN: 14.1 GM/DL (ref 12–16)
IMMATURE GRANS (ABS): 0.01 THOU/MM3 (ref 0–0.07)
IMMATURE GRANULOCYTES: 0.4 %
LYMPHOCYTES # BLD: 42.5 %
LYMPHOCYTES ABSOLUTE: 1.1 THOU/MM3 (ref 1–4.8)
MCH RBC QN AUTO: 27.9 PG (ref 26–33)
MCHC RBC AUTO-ENTMCNC: 34 GM/DL (ref 32.2–35.5)
MCV RBC AUTO: 82 FL (ref 81–99)
MONOCYTES # BLD: 12.3 %
MONOCYTES ABSOLUTE: 0.3 THOU/MM3 (ref 0.4–1.3)
NUCLEATED RED BLOOD CELLS: 0 /100 WBC
PLATELET # BLD: 109 THOU/MM3 (ref 130–400)
PMV BLD AUTO: 10.7 FL (ref 9.4–12.4)
POTASSIUM SERPL-SCNC: 3.7 MEQ/L (ref 3.5–5.2)
RBC # BLD: 5.06 MILL/MM3 (ref 4.2–5.4)
SEG NEUTROPHILS: 40.4 %
SEGMENTED NEUTROPHILS ABSOLUTE COUNT: 1 THOU/MM3 (ref 1.8–7.7)
SODIUM BLD-SCNC: 138 MEQ/L (ref 135–145)
WBC # BLD: 2.5 THOU/MM3 (ref 4.8–10.8)

## 2022-10-12 PROCEDURE — 36415 COLL VENOUS BLD VENIPUNCTURE: CPT

## 2022-10-12 PROCEDURE — 6370000000 HC RX 637 (ALT 250 FOR IP): Performed by: STUDENT IN AN ORGANIZED HEALTH CARE EDUCATION/TRAINING PROGRAM

## 2022-10-12 PROCEDURE — 80048 BASIC METABOLIC PNL TOTAL CA: CPT

## 2022-10-12 PROCEDURE — 99238 HOSP IP/OBS DSCHRG MGMT 30/<: CPT | Performed by: PHYSICIAN ASSISTANT

## 2022-10-12 PROCEDURE — 6360000002 HC RX W HCPCS: Performed by: STUDENT IN AN ORGANIZED HEALTH CARE EDUCATION/TRAINING PROGRAM

## 2022-10-12 PROCEDURE — 85025 COMPLETE CBC W/AUTO DIFF WBC: CPT

## 2022-10-12 RX ADMIN — METOPROLOL TARTRATE 25 MG: 25 TABLET, FILM COATED ORAL at 09:48

## 2022-10-12 RX ADMIN — PROPAFENONE HYDROCHLORIDE 150 MG: 150 TABLET, FILM COATED ORAL at 09:48

## 2022-10-12 RX ADMIN — PANTOPRAZOLE SODIUM 40 MG: 40 TABLET, DELAYED RELEASE ORAL at 05:53

## 2022-10-12 RX ADMIN — ONDANSETRON 4 MG: 2 INJECTION INTRAMUSCULAR; INTRAVENOUS at 09:55

## 2022-10-12 ASSESSMENT — PAIN DESCRIPTION - FREQUENCY: FREQUENCY: INTERMITTENT

## 2022-10-12 ASSESSMENT — PAIN DESCRIPTION - ORIENTATION: ORIENTATION: MID

## 2022-10-12 ASSESSMENT — PAIN SCALES - GENERAL
PAINLEVEL_OUTOF10: 1
PAINLEVEL_OUTOF10: 3

## 2022-10-12 ASSESSMENT — PAIN DESCRIPTION - ONSET: ONSET: ON-GOING

## 2022-10-12 ASSESSMENT — PAIN DESCRIPTION - PAIN TYPE: TYPE: ACUTE PAIN

## 2022-10-12 ASSESSMENT — PAIN - FUNCTIONAL ASSESSMENT: PAIN_FUNCTIONAL_ASSESSMENT: ACTIVITIES ARE NOT PREVENTED

## 2022-10-12 ASSESSMENT — PAIN DESCRIPTION - DESCRIPTORS: DESCRIPTORS: ACHING

## 2022-10-12 ASSESSMENT — PAIN DESCRIPTION - LOCATION: LOCATION: ABDOMEN

## 2022-10-12 NOTE — CARE COORDINATION
10/12/22, 2:12 PM EDT    DISCHARGE PLANNING EVALUATION    Spoke with patients daughter NINI PÉREZ on the phone. [facility] is upset that patient is being discharged, wants her to go to [facility]. Alexia Dougherty RN has explained to her that patient is refusing ECF wants to go home, patient is alert and oriented. Confirmed with [facility] that patient is able to makes own decisions, we can not makes her go to [facility] if she does not want to. Discussed home with  (also has covid) and HH, nursing, aide, PT & OT. [facility] would be ok with HH, preference is [address], declined [place] list. Her preference remains ECF for patient. Advised this sw will talk with patient and get back to her. Spoke with patient regarding discharge plan. Patient is alert and oriented. She is refusing ECF but is agreeable to [place]. Her preference is also [address]. Declined HH list.    Dennie Eves, advised above. [facility] will  patient around 3:30 PM.    Spoke with Agus Meza at [facility], referral made. 10/12/22, 2:19 PM EDT    Patient goals/plan/ treatment preferences discussed by  and . Patient goals/plan/ treatment preferences reviewed with patient/ family. Patient/ family verbalize understanding of discharge plan and are in agreement with goal/plan/treatment preferences. Understanding was demonstrated using the teach back method. AVS provided by RN at time of discharge, which includes all necessary medical information pertaining to the patients current course of illness, treatment, post-discharge goals of care, and treatment preferences. Services At/After Discharge: Home Health, Aide services, Nursing service, OT, and PT       IMM Letter  IMM Letter given to Patient/Family/Significant other/Guardian/POA/by[de-identified] Abril DELACRUZ Case Manager.   IMM Letter date given[de-identified] 10/12/22  IMM Letter time given[de-identified] [id number]

## 2022-10-12 NOTE — PROGRESS NOTES
Discharge teaching and instructions for diagnosis/procedure of COVID 19 completed with patient using teachback method. AVS reviewed. Patient voiced understanding regarding prescriptions, follow up appointments, and care of self at home.  Discharged in a wheelchair to  home home health with support per family

## 2022-10-12 NOTE — DISCHARGE SUMMARY
Hospital Medicine Discharge Summary      Patient Identification:   Tisa Lennox   : 1934  MRN: [id number]   Account: [de-identified]      Patient's PCP: Riki Bowser MD    Admit Date: 10/8/2022     Discharge Date:   10/12/22    Admitting Physician: Mango Hancock MD     Discharge Physician: Radha Velasquez PA-C     Tisa Lennox is a 80 y.o. female admitted to [address] [place] [address] on 10/8/2022. HPI On Admission From H&P:    \"Patient states that she has been feeling unwell since 10/3/2022 when she got her flu vaccination. Has been having upset stomach, nausea, generalized malaise, and orthostatic dizziness which had been progressively worsening. Affirms poor oral intake. On the morning of 10/8/2022 patient went to use the restroom. When she tried to go from the restroom back to her bed, the patient became dizzy and fell. Patient denied any related shortness of breath, palpitations, or chest pain. Patient denied any loss of consciousness. However, the patient affirms that she hit her head and \"hit all over my body. \"  Therefore, patient presented to the emergency room. Denies current fevers, current chills, headache, chest pain, palpitations, abdominal pain, dysuria, and hematuria. Denies any focal weakness. Affirms ongoing fatigue, dry cough, shortness of breath, and dizziness. Patient states she does not feel much better than previously. Affirms medical history as described above. Affirms multiple allergies. States that she takes her medication consistently. Denies any recent illnesses, injuries, trauma, or hospitalizations prior to Monday. Never smoker. Does not drink alcohol. No illicit drug use. Rapid influenza A and influenza B antigens negative. However, COVID-19 test positive. Patient was given fluid resuscitation in the emergency room.     CT of the head without contrast on 10/8/2022 demonstrated no mass-effect or acute hemorrhage but did demonstrate chronic periventricular small vessel ischemic changes and cerebral atrophy most likely consistent with age. CT of the cervical spine without contrast on 10/8/2022 demonstrated a small nondisplaced fracture through the right T1 transverse process. Therefore, ED provider contacted orthospine who recommended a c-collar. Notably, no tenderness over the T1 process but significant tenderness over approximately the T6 process. Therefore, CT thoracic and lumbar spine ordered which are pending. Orthostatic dizziness treated with further fluid resuscitation. Echocardiogram to rule out intracardiac pathology. COVID-19 treated with supportive care. \"      Assessment/Plan With Discharge Diagnoses:    \"1. Orthostatic Dizziness. Likely 2/2 hypovolemia 2/2 dehydration/decreasd Po intake 2/2 COVID infection. Positive orthostatic vitals in ED resolved w/ IVF. Consider d/c'ing IVF when adequate PO intake. Echocardiogram pending. Fall precautions. Hold parameters for home BB     10/10: reported dizziness w/ standing and mildly (if at all) +ve orthostatic VS after walking w/ physio likely neurocardiogenic + /- inadequate Po intake. Continuing IVF. 10/11:  +ve Othrtosis resolved. Reduced IVF for maintenance only. RN aware to d/c as Po intake improves. 2.  Fall Secondary to Orthostatic Dizziness. Fall on the morning of 10/8/2022. Patient denies any loss of consciousness, chest pain, palpitations, and shortness of breath. However, affirms that she is on Xarelto and affirms head trauma. CT of the head without contrast on 10/8/2022 demonstrated no mass-effect or acute hemorrhage but did demonstrate chronic periventricular small vessel ischemic changes and cerebral atrophy most likely consistent with age. Maintain on fall precautions and monitor clinically. PT/OT will follow. 3.  COVID-19. Probable cause of patient's upset stomach, nausea, poor appetite, and malaise.   No objective hypoxemia noted.  Supportive care only. 4.  Questionable small T1 TP# Fracture:  Small fracture noted on CT of the cervical spine without contrast.  No tenderness including midline cervical tenderness. Non-op. Ok to remove C-collar. WBAT/AAT. PT to see  5. Hx of Paroxysmal Fibrillation/Flutter. Noted on history and on previous EKGs. Estimated [id number] score of 4. Estimated HAS-BLED score of 1. On DOAC  NSR on home metoprolol tartrate 25 mg twice daily and propafenone 150 mg twice daily. Follow-up with cardiology as an outpatient. 6.  Other. On PPI  presumably related to gastroesophageal reflux disease. Maintain on PPI QD and PCP to follow-up as an outpatient. 8.  hx of Benign Essential Hypertension. Well-controlled on home meds. CCM and monitor  9. Osteoporosis and Vit D deficiency:  Noted on history. Vitamin D levels low; started on vit D  Consider anti-resorptive therapy OP. Follow-up with PCP as an outpatient. 10.  Leukopenia,mild:  NYD etiology. Likely related to COVID-19. Noted leukopenia in the past. Not on any immunosuppressive therapy. See below for further management. Relatively stable. Monitor with daily CBC. 11.  Thrombocytopenia, mild, chronic  Unclear cause, possibly woresening related to COVID-19. Peripheral smear pending. Abdominal Ultrasound showing isolated splenomegaly w/o hepatomegaly. Hep/HIV swerology and serum LDH sent. Pt would methodically need CT chest and A/P and likely Bone Marrow Biopsy OP. Pt and daughter were informed. They wish to hold off on any further W/U at this time; they understand they would need a Heme/Onc referral if they wished to pursue further W/U. .     10/11: Relatively stable. Monitor     12. Malnutrition w/ mild hypoalbuminemia: Albumin 3.4. Dietician consulted and pt placed on nutritional supplements     13. Diarrhea: likely 2/2 Covid; non-foul smelling.  Will monitor for now; consider GI panel/ C diff toxin if clinical status changes or diarrhea persists/worsens        Code status; Full code; per d/w palliative care, pt wishes to be Limited x4. Epic was updated. \"      Patient discharged in stable condition. Exam:     Vitals:  Vitals:    10/12/22 0258 10/12/22 0553 10/12/22 0600 10/12/22 0941   BP:    129/86   Pulse: 97   95   Resp:    18   Temp:    97.6 °F (36.4 °C)   TempSrc:    Oral   SpO2:    95%   Weight:  166 lb 14.4 oz (75.7 kg) 166 lb 14 oz (75.7 kg)    Height:         Weight: Weight: 166 lb 14 oz (75.7 kg)     24 hour intake/output:  Intake/Output Summary (Last 24 hours) at 10/12/2022 1044  Last data filed at 10/12/2022 0600  Gross per 24 hour   Intake 1867.66 ml   Output --   Net 1867.66 ml         Labs: For convenience and continuity at follow-up the following most recent labs are provided:    CBC:    Lab Results   Component Value Date/Time    WBC 2.5 10/12/2022 05:49 AM    HGB 14.1 10/12/2022 05:49 AM    HCT 41.5 10/12/2022 05:49 AM     10/12/2022 05:49 AM       Renal:    Lab Results   Component Value Date/Time     10/12/2022 05:49 AM    K 3.7 10/12/2022 05:49 AM    K 4.3 10/09/2022 05:51 AM     10/12/2022 05:49 AM    CO2 23 10/12/2022 05:49 AM    BUN 5 10/12/2022 05:49 AM    CREATININE 0.7 10/12/2022 05:49 AM    CALCIUM 8.1 10/12/2022 05:49 AM         Significant Diagnostic Studies    Radiology:   CT THORACIC SPINE WO CONTRAST   Final Result      T spine:   1. No acute fracture or malalignment. L-spine:   1. No acute fracture or malalignment. Degenerative changes of the lower lumbar spine as described above. **This report has been created using voice recognition software. It may contain minor errors which are inherent in voice recognition technology. **      Final report electronically signed by Dr. Janes Ford on 10/9/2022 10:48 AM      CT LUMBAR SPINE WO CONTRAST   Final Result      T spine:   1. No acute fracture or malalignment. L-spine:   1. No acute fracture or malalignment.  Degenerative changes of the lower lumbar spine as described above. **This report has been created using voice recognition software. It may contain minor errors which are inherent in voice recognition technology. **      Final report electronically signed by Dr. Lauren Anderson on 10/9/2022 10:48 AM      US LIVER SPLEEN   Final Result   Impression:   Splenomegaly. This document has been electronically signed by: Cherie Corbett MD on    10/08/2022 11:27 PM      CT HEAD WO CONTRAST   Final Result   1. No mass effect or acute hemorrhage. 2. Chronic periventricular small vessel ischemic changes and cerebral atrophy. **This report has been created using voice recognition software. It may contain minor errors which are inherent in voice recognition technology. **      Final report electronically signed by Dr. Woo Chi on 10/8/2022 2:14 PM      CT CERVICAL SPINE WO CONTRAST   Final Result   1. There is a nondisplaced fracture through the right T1 transverse process seen on axial image 46. This is also seen on coronal series image 17. **This report has been created using voice recognition software. It may contain minor errors which are inherent in voice recognition technology. **      Final report electronically signed by Dr. Lauren Anderson on 10/8/2022 2:29 PM      XR CHEST PORTABLE   Final Result      No acute intrathoracic process. **This report has been created using voice recognition software. It may contain minor errors which are inherent in voice recognition technology. **      Final report electronically signed by Dr. Woo Chi on 10/8/2022 1:46 PM             Consults:     IP CONSULT TO ORTHOPEDIC SURGERY  PALLIATIVE CARE EVAL    Disposition: Home  Condition at Discharge: Stable    Code Status:  Limited     Patient Instructions:    Discharge lab work: Activity: activity as tolerated  Diet: ADULT DIET;  Dysphagia - Soft and Bite Sized  ADULT ORAL NUTRITION SUPPLEMENT; Breakfast, Lunch, Dinner; Standard High Calorie/High Protein Oral Supplement      Follow-up visits:   Heather Mahoney, [address]  [phone]    Follow up in 1 week(s)  Office will contact patient for a 1 week follow up appointment. Discharge Medications:        Medication List        CONTINUE taking these medications      metoprolol tartrate 50 MG tablet  Commonly known as: LOPRESSOR  Patient is taking 1/2 tab BID     ondansetron 4 MG disintegrating tablet  Commonly known as: Zofran ODT  Take 1 tablet by mouth every 8 hours as needed for Nausea     pantoprazole 40 MG tablet  Commonly known as: PROTONIX  Take 1 tablet by mouth once daily     propafenone 150 MG tablet  Commonly known as: RYTHMOL  Take 1 tablet by mouth 2 times daily     rivaroxaban 20 MG Tabs tablet  Commonly known as: XARELTO  Take 1 tablet by mouth in the morning. STOP taking these medications      baclofen 10 MG tablet  Commonly known as: LIORESAL     ICaps Areds 2 Caps                 Time Spent on discharge is 18 minutes in the discharge plan. Thank you Heather Mahoney MD for the opportunity to be involved in this patient's care.       Signed:    Electronically signed by Rob Mariscal PA-C on 10/12/22 at 10:44 AM EDT

## 2022-10-12 NOTE — CARE COORDINATION
10/12/22, 9:22 AM EDT    DISCHARGE ON GOING EVALUATION    [facility] day: 4  Location: -24/024-A Reason for admit: Dizziness [R42]  Acute respiratory failure with hypoxia ([address] [J96.01]  Fall from bed, initial encounter [W06. XXXA]  COVID [U07.1]  COVID-19 [U07.1]   Procedure: No.  Barriers to Discharge: Remains afebrile and on room air. PT/OT following. Tolerating fair, continues with fatigue and feeling unwell. IVF cont at 50/hr. PCP: Clemencia Mckenzie MD  Readmission Risk Score: 15.2%  Patient Goals/Plan/Treatment Preferences: Plans return home with spouse. SW following for discharge needs.

## 2022-10-12 NOTE — PLAN OF CARE
Problem: Discharge Planning  Goal: Discharge to home or other facility with appropriate resources  10/12/2022 1111 by Rosa Roche RN  Outcome: Completed  Flowsheets (Taken 10/12/2022 1059)  Discharge to home or other facility with appropriate resources: Identify barriers to discharge with patient and caregiver  Note: Patient expected to be discharged to home with      Problem: Safety - Adult  Goal: Free from fall injury  10/12/2022 1111 by Rosa Roche RN  Outcome: Completed  Flowsheets (Taken 10/12/2022 1059)  Free From Fall Injury: Instruct family/caregiver on patient safety  Note: No injuries noted at this time. Educated on fall prevention. Bed/chair alarms on. Bed/chair wheels locked. Call light in reach. Falling star program in place. Problem: ABCDS Injury Assessment  Goal: Absence of physical injury  10/12/2022 1111 by Rosa Roche RN  Outcome: Completed  Flowsheets (Taken 10/12/2022 1059)  Absence of Physical Injury: Implement safety measures based on patient assessment  Note: No injuries noted at this time. Free from falls. Call light in reach. Falling star program in place. Bed/chair alarms on. Bed/chair wheels locked. Problem: Respiratory - Adult  Goal: Achieves optimal ventilation and oxygenation  10/12/2022 1111 by Rosa Roche RN  Outcome: Completed  Flowsheets (Taken 10/12/2022 1059)  Achieves optimal ventilation and oxygenation: Assess for changes in respiratory status  Note: Respiratory status within normal limits. Lung sounds clear, unlabored.  Patient has frequent moist, productive cough     Problem: Infection - Adult  Goal: Absence of infection at discharge  10/12/2022 1111 by Rosa Roche RN  Outcome: Completed     Problem: Pain  Goal: Verbalizes/displays adequate comfort level or baseline comfort level  10/12/2022 1111 by Rosa Roche RN  Outcome: Completed  Flowsheets (Taken 10/12/2022 1111)  Verbalizes/displays adequate comfort level or baseline comfort level: Encourage patient to monitor pain and request assistance  Note: Educated on pain rating scale. Patient states abdominal pain 4/10. Pain goal is to have no pain. Patient educated on PRN pain medications. Problem: Skin/Tissue Integrity  Goal: Absence of new skin breakdown  Description: 1. Monitor for areas of redness and/or skin breakdown  2. Assess vascular access sites hourly  3. Every 4-6 hours minimum:  Change oxygen saturation probe site  4. Every 4-6 hours:  If on nasal continuous positive airway pressure, respiratory therapy assess nares and determine need for appliance change or resting period. 10/12/2022 1111 by Shasha Krause RN  Outcome: Completed  Note: No new skin breakdown noted at this time. Patient educated on turning every 2 hours. Pt voices understanding     Care plan reviewed with patient. Patient  verbalize understanding of the plan of care and contribute to goal setting.

## 2022-10-12 NOTE — TELEPHONE ENCOUNTER
Daughter Carolyn Bedollar back to let you know they were just notified that pt is being discharged from [facility] today and she has some questions, Tyler Ochoa refuses to give any more information, please call at above number.

## 2022-10-12 NOTE — TELEPHONE ENCOUNTER
Spoke to Piotr Villatoro, daughter. Pt is still admitted and plans are to be admitted to [facility]. Will call if has any questions or concerns.

## 2022-10-12 NOTE — TELEPHONE ENCOUNTER
Daughter wanted to make us aware her mother is getting discharged today and they are going to set her up with [facility] for rehab and nursing care as her mom refused rehab at nursing home. Advised daughter she would need a hospital fup and daughter stated she is going back to [place] [facility]) and her father has Sonlai Andersen currently so she will have to do a virtual appt. She states they did one with her father and she thinks it will be easy enough for her mom to do. [address] Transitions Initial Follow Up Call    Outreach made within 2 business days of discharge: Yes    Patient: Kenny Vazquez Patient : 1934   MRN: [id number]  Reason for Admission: fall/COVID  Discharge Date: 10/12/2022       Spoke with: patients daughter    Discharge department/facility: [facility] Interactive Patient Contact:  Was patient able to fill all prescriptions: Yes  Was patient instructed to bring all medications to the follow-up visit: Yes  Is patient taking all medications as directed in the discharge summary?  Yes  Does patient understand their discharge instructions: Yes  Does patient have questions or concerns that need addressed prior to 7-14 day follow up office visit: no    Scheduled appointment with PCP within 7-14 days    Follow Up  Future Appointments   Date Time Provider Gina Beauchamp   10/17/2022 11:20 AM MD WILL Fernández [facility]   10/25/2022 10:45 AM MD KATIE Juárez [facility]   11/3/2022  1:30 PM MD PETAR Reyna [facility]   2022 12:30 PM VASU Abraham - CNP [facility]   2022 11:00 AM Cee Ang MD [facility] [employer] P - [facility] [place]   2022  1:40 PM [facility]       Baldo Huff CMA

## 2022-10-12 NOTE — PLAN OF CARE
Problem: Discharge Planning  Goal: Discharge to home or other facility with appropriate resources  Outcome: Progressing  Flowsheets (Taken 10/11/2022 0847 by Rodney Darby RN)  Discharge to home or other facility with appropriate resources: Identify barriers to discharge with patient and caregiver     Problem: Safety - Adult  Goal: Free from fall injury  Outcome: Progressing  Flowsheets (Taken 10/11/2022 0847 by Rodney Darby RN)  Free From Fall Injury: Instruct family/caregiver on patient safety     Problem: ABCDS Injury Assessment  Goal: Absence of physical injury  Outcome: Progressing  Flowsheets (Taken 10/11/2022 0847 by Rodney Darby RN)  Absence of Physical Injury: Implement safety measures based on patient assessment     Problem: Respiratory - Adult  Goal: Achieves optimal ventilation and oxygenation  Outcome: Progressing  Flowsheets (Taken 10/12/2022 0049)  Achieves optimal ventilation and oxygenation:   Assess for changes in respiratory status   Assess for changes in mentation and behavior   Position to facilitate oxygenation and minimize respiratory effort   Respiratory therapy support as indicated   Assess and instruct to report shortness of breath or any respiratory difficulty   Assess the need for suctioning and aspirate as needed   Encourage broncho-pulmonary hygiene including cough, deep breathe, incentive spirometry   Initiate smoking cessation protocol as indicated   Oxygen supplementation based on oxygen saturation or arterial blood gases     Problem: Infection - Adult  Goal: Absence of infection at discharge  Outcome: Progressing  Flowsheets (Taken 10/11/2022 0847 by Rodney Darby RN)  Absence of infection at discharge:   Assess and monitor for signs and symptoms of infection   Monitor lab/diagnostic results   Monitor all insertion sites i.e., indwelling lines, tubes and drains     Problem: Pain  Goal: Verbalizes/displays adequate comfort level or baseline comfort level  Outcome: Progressing  Flowsheets (Taken 10/11/2022 7723 by Micki Vigil RN)  Verbalizes/displays adequate comfort level or baseline comfort level:   Encourage patient to monitor pain and request assistance   Assess pain using appropriate pain scale   Implement non-pharmacological measures as appropriate and evaluate response     Problem: Skin/Tissue Integrity  Goal: Absence of new skin breakdown  Description: 1. Monitor for areas of redness and/or skin breakdown  2. Assess vascular access sites hourly  3. Every 4-6 hours minimum:  Change oxygen saturation probe site  4. Every 4-6 hours:  If on nasal continuous positive airway pressure, respiratory therapy assess nares and determine need for appliance change or resting period.   Outcome: Progressing  Note: Maintain optimal skin integrity

## 2022-10-13 ENCOUNTER — TELEPHONE (OUTPATIENT)
Dept: FAMILY MEDICINE CLINIC | Age: 87
End: 2022-10-13

## 2022-10-13 NOTE — TELEPHONE ENCOUNTER
Francie Donald from [facility] calling stating she talked to Temitope Oconnor in Admissions at [facility] and she told pt we just need to write an order to admit pt staing PT IS CLEARED FROM ISOLATION BASED PRECAUTIONS DUE TO Rosemary Prophet told Francie Donald if we send this note they will have a bed for pt on Saturday.

## 2022-10-13 NOTE — TELEPHONE ENCOUNTER
Spoke to [employer], Admissions at [facility]. She was unaware of any request of admission for pt. Gave Cherry contact information to speak to pt/daughter. Cherry also wanted to know if pt was agreeable to admission or is this a family request. Does not want pt to be admitted and not know the situation. Per Cherry, she will reach out after reviewing admission/discharge and contact family. She has no available beds ASAP, soonest would be Mon. Attempted to reach Emerita , unable to reach via mobile or home phone. Did speak to . Inquired if pt was aware and agreeable to the admission.  voiced she is agreeable. Informed him of all information and [place] will reach out to family on status.

## 2022-10-13 NOTE — TELEPHONE ENCOUNTER
Called and confirmed with Mario Mcclelland at [facility]. Pt will be admitted on Sat to the [facility]. Order will need to state the above mentioned \"phrase\" for admission. Last H&P with pcp to be signed and faxed to [facility] [phone].

## 2022-10-13 NOTE — TELEPHONE ENCOUNTER
Shanon Silverio with [facility] called regarding patient. She states she was recently discharged from the hospital and is not eating well, is not able to care for herself, her  cannot provide the care she needs, and her daughter will be leaving soon, so she is not able to care for her either. They do not believe home health is enough, so they are looking at admitting her to The [address] [place] [address]. They would like to speak with Dr. Mary Camp nurse regarding this. Patient is scheduled for [facility] 10/17/22, but they believe patient needs to be admitted to SNF ASAP. Please advise.

## 2022-10-15 ENCOUNTER — HOSPITAL ENCOUNTER (INPATIENT)
Age: 87
LOS: 4 days | Discharge: NURSING, REHAB OR LONG-TERM CARE | DRG: 640 | End: 2022-10-20
Attending: EMERGENCY MEDICINE | Admitting: HOSPITALIST
Payer: MEDICARE

## 2022-10-15 DIAGNOSIS — R51.9 NONINTRACTABLE HEADACHE, UNSPECIFIED CHRONICITY PATTERN, UNSPECIFIED HEADACHE TYPE: ICD-10-CM

## 2022-10-15 DIAGNOSIS — R11.2 INTRACTABLE NAUSEA AND VOMITING: ICD-10-CM

## 2022-10-15 DIAGNOSIS — E87.1 HYPONATREMIA: Primary | ICD-10-CM

## 2022-10-15 DIAGNOSIS — R82.81 PYURIA: ICD-10-CM

## 2022-10-15 PROCEDURE — 96372 THER/PROPH/DIAG INJ SC/IM: CPT

## 2022-10-15 PROCEDURE — 99285 EMERGENCY DEPT VISIT HI MDM: CPT

## 2022-10-15 PROCEDURE — 96365 THER/PROPH/DIAG IV INF INIT: CPT

## 2022-10-15 PROCEDURE — 96375 TX/PRO/DX INJ NEW DRUG ADDON: CPT

## 2022-10-15 ASSESSMENT — PAIN SCALES - GENERAL: PAINLEVEL_OUTOF10: 8

## 2022-10-15 ASSESSMENT — PAIN DESCRIPTION - LOCATION: LOCATION: ABDOMEN;HEAD

## 2022-10-15 ASSESSMENT — PAIN DESCRIPTION - PAIN TYPE: TYPE: ACUTE PAIN

## 2022-10-15 ASSESSMENT — PAIN - FUNCTIONAL ASSESSMENT: PAIN_FUNCTIONAL_ASSESSMENT: 0-10

## 2022-10-16 ENCOUNTER — APPOINTMENT (OUTPATIENT)
Dept: CT IMAGING | Age: 87
DRG: 640 | End: 2022-10-16
Payer: MEDICARE

## 2022-10-16 ENCOUNTER — APPOINTMENT (OUTPATIENT)
Dept: GENERAL RADIOLOGY | Age: 87
DRG: 640 | End: 2022-10-16
Payer: MEDICARE

## 2022-10-16 PROBLEM — R51.9 PERSISTENT HEADACHES: Status: ACTIVE | Noted: 2022-10-16

## 2022-10-16 PROBLEM — E87.1 ACUTE HYPONATREMIA: Status: ACTIVE | Noted: 2022-10-16

## 2022-10-16 LAB
ALBUMIN SERPL-MCNC: 3 G/DL (ref 3.5–5.1)
ALP BLD-CCNC: 96 U/L (ref 38–126)
ALT SERPL-CCNC: 20 U/L (ref 11–66)
ANION GAP SERPL CALCULATED.3IONS-SCNC: 10 MEQ/L (ref 8–16)
ANION GAP SERPL CALCULATED.3IONS-SCNC: 10 MEQ/L (ref 8–16)
APTT: 35.9 SECONDS (ref 22–38)
AST SERPL-CCNC: 30 U/L (ref 5–40)
BACTERIA: ABNORMAL /HPF
BASOPHILS # BLD: 0.3 %
BASOPHILS ABSOLUTE: 0 THOU/MM3 (ref 0–0.1)
BILIRUB SERPL-MCNC: 0.6 MG/DL (ref 0.3–1.2)
BILIRUBIN URINE: NEGATIVE
BLOOD, URINE: NEGATIVE
BUN BLDV-MCNC: 12 MG/DL (ref 7–22)
BUN BLDV-MCNC: 14 MG/DL (ref 7–22)
CALCIUM SERPL-MCNC: 8.4 MG/DL (ref 8.5–10.5)
CALCIUM SERPL-MCNC: 8.6 MG/DL (ref 8.5–10.5)
CASTS 2: ABNORMAL /LPF
CASTS UA: ABNORMAL /LPF
CHARACTER, URINE: CLEAR
CHLORIDE BLD-SCNC: 87 MEQ/L (ref 98–111)
CHLORIDE BLD-SCNC: 88 MEQ/L (ref 98–111)
CO2: 26 MEQ/L (ref 23–33)
CO2: 27 MEQ/L (ref 23–33)
COLOR: YELLOW
CREAT SERPL-MCNC: 0.7 MG/DL (ref 0.4–1.2)
CREAT SERPL-MCNC: 0.8 MG/DL (ref 0.4–1.2)
CRYSTALS, UA: ABNORMAL
EOSINOPHIL # BLD: 3.6 %
EOSINOPHILS ABSOLUTE: 0.1 THOU/MM3 (ref 0–0.4)
EPITHELIAL CELLS, UA: ABNORMAL /HPF
ERYTHROCYTE [DISTWIDTH] IN BLOOD BY AUTOMATED COUNT: 14.4 % (ref 11.5–14.5)
ERYTHROCYTE [DISTWIDTH] IN BLOOD BY AUTOMATED COUNT: 41.2 FL (ref 35–45)
GFR SERPL CREATININE-BSD FRML MDRD: 68 ML/MIN/1.73M2
GFR SERPL CREATININE-BSD FRML MDRD: 79 ML/MIN/1.73M2
GLUCOSE BLD-MCNC: 116 MG/DL (ref 70–108)
GLUCOSE BLD-MCNC: 90 MG/DL (ref 70–108)
GLUCOSE URINE: NEGATIVE MG/DL
HCT VFR BLD CALC: 41.2 % (ref 37–47)
HEMOGLOBIN: 14.2 GM/DL (ref 12–16)
IMMATURE GRANS (ABS): 0.09 THOU/MM3 (ref 0–0.07)
IMMATURE GRANULOCYTES: 2.3 %
INR BLD: 1.03 (ref 0.85–1.13)
KETONES, URINE: NEGATIVE
LEUKOCYTE ESTERASE, URINE: ABNORMAL
LIPASE: 66.2 U/L (ref 5.6–51.3)
LYMPHOCYTES # BLD: 32.7 %
LYMPHOCYTES ABSOLUTE: 1.3 THOU/MM3 (ref 1–4.8)
MAGNESIUM: 1.8 MG/DL (ref 1.6–2.4)
MAGNESIUM: 1.8 MG/DL (ref 1.6–2.4)
MCH RBC QN AUTO: 27.1 PG (ref 26–33)
MCHC RBC AUTO-ENTMCNC: 34.5 GM/DL (ref 32.2–35.5)
MCV RBC AUTO: 78.6 FL (ref 81–99)
MISCELLANEOUS 2: ABNORMAL
MONOCYTES # BLD: 10.7 %
MONOCYTES ABSOLUTE: 0.4 THOU/MM3 (ref 0.4–1.3)
NITRITE, URINE: NEGATIVE
NUCLEATED RED BLOOD CELLS: 0 /100 WBC
OSMOLALITY CALCULATION: 249.2 MOSMOL/KG (ref 275–300)
OSMOLALITY URINE: 495 MOSMOL/KG (ref 250–750)
OSMOLALITY: 263 MOSMOL/KG (ref 275–295)
PH UA: 6.5 (ref 5–9)
PLATELET # BLD: 170 THOU/MM3 (ref 130–400)
PMV BLD AUTO: 10.3 FL (ref 9.4–12.4)
POTASSIUM REFLEX MAGNESIUM: 3.4 MEQ/L (ref 3.5–5.2)
POTASSIUM REFLEX MAGNESIUM: 3.5 MEQ/L (ref 3.5–5.2)
PRO-BNP: 1478 PG/ML (ref 0–1800)
PROTEIN UA: NEGATIVE
RBC # BLD: 5.24 MILL/MM3 (ref 4.2–5.4)
RBC URINE: ABNORMAL /HPF
RENAL EPITHELIAL, UA: ABNORMAL
SEG NEUTROPHILS: 50.4 %
SEGMENTED NEUTROPHILS ABSOLUTE COUNT: 2 THOU/MM3 (ref 1.8–7.7)
SODIUM BLD-SCNC: 123 MEQ/L (ref 135–145)
SODIUM BLD-SCNC: 125 MEQ/L (ref 135–145)
SODIUM BLD-SCNC: 126 MEQ/L (ref 135–145)
SODIUM BLD-SCNC: 126 MEQ/L (ref 135–145)
SODIUM URINE: 70 MEQ/L
SPECIFIC GRAVITY, URINE: 1.01 (ref 1–1.03)
TOTAL PROTEIN: 5.8 G/DL (ref 6.1–8)
TROPONIN T: < 0.01 NG/ML
UROBILINOGEN, URINE: 0.2 EU/DL (ref 0–1)
WBC # BLD: 3.9 THOU/MM3 (ref 4.8–10.8)
WBC UA: > 100 /HPF
YEAST: ABNORMAL

## 2022-10-16 PROCEDURE — 84295 ASSAY OF SERUM SODIUM: CPT

## 2022-10-16 PROCEDURE — 85730 THROMBOPLASTIN TIME PARTIAL: CPT

## 2022-10-16 PROCEDURE — 99223 1ST HOSP IP/OBS HIGH 75: CPT | Performed by: HOSPITALIST

## 2022-10-16 PROCEDURE — 87086 URINE CULTURE/COLONY COUNT: CPT

## 2022-10-16 PROCEDURE — 96372 THER/PROPH/DIAG INJ SC/IM: CPT

## 2022-10-16 PROCEDURE — 85025 COMPLETE CBC W/AUTO DIFF WBC: CPT

## 2022-10-16 PROCEDURE — 1200000000 HC SEMI PRIVATE

## 2022-10-16 PROCEDURE — 74177 CT ABD & PELVIS W/CONTRAST: CPT

## 2022-10-16 PROCEDURE — 6370000000 HC RX 637 (ALT 250 FOR IP): Performed by: HOSPITALIST

## 2022-10-16 PROCEDURE — 71045 X-RAY EXAM CHEST 1 VIEW: CPT

## 2022-10-16 PROCEDURE — 83935 ASSAY OF URINE OSMOLALITY: CPT

## 2022-10-16 PROCEDURE — 81001 URINALYSIS AUTO W/SCOPE: CPT

## 2022-10-16 PROCEDURE — 80053 COMPREHEN METABOLIC PANEL: CPT

## 2022-10-16 PROCEDURE — 83880 ASSAY OF NATRIURETIC PEPTIDE: CPT

## 2022-10-16 PROCEDURE — 36415 COLL VENOUS BLD VENIPUNCTURE: CPT

## 2022-10-16 PROCEDURE — 84300 ASSAY OF URINE SODIUM: CPT

## 2022-10-16 PROCEDURE — 96365 THER/PROPH/DIAG IV INF INIT: CPT

## 2022-10-16 PROCEDURE — 1200000003 HC TELEMETRY R&B

## 2022-10-16 PROCEDURE — 2580000003 HC RX 258: Performed by: HOSPITALIST

## 2022-10-16 PROCEDURE — 96375 TX/PRO/DX INJ NEW DRUG ADDON: CPT

## 2022-10-16 PROCEDURE — 84484 ASSAY OF TROPONIN QUANT: CPT

## 2022-10-16 PROCEDURE — 83690 ASSAY OF LIPASE: CPT

## 2022-10-16 PROCEDURE — C9113 INJ PANTOPRAZOLE SODIUM, VIA: HCPCS | Performed by: HOSPITALIST

## 2022-10-16 PROCEDURE — 83930 ASSAY OF BLOOD OSMOLALITY: CPT

## 2022-10-16 PROCEDURE — 6360000002 HC RX W HCPCS: Performed by: EMERGENCY MEDICINE

## 2022-10-16 PROCEDURE — 6360000002 HC RX W HCPCS: Performed by: HOSPITALIST

## 2022-10-16 PROCEDURE — 93005 ELECTROCARDIOGRAM TRACING: CPT | Performed by: EMERGENCY MEDICINE

## 2022-10-16 PROCEDURE — 85610 PROTHROMBIN TIME: CPT

## 2022-10-16 PROCEDURE — 6360000004 HC RX CONTRAST MEDICATION: Performed by: HOSPITALIST

## 2022-10-16 PROCEDURE — 70450 CT HEAD/BRAIN W/O DYE: CPT

## 2022-10-16 PROCEDURE — 2580000003 HC RX 258: Performed by: EMERGENCY MEDICINE

## 2022-10-16 PROCEDURE — 83735 ASSAY OF MAGNESIUM: CPT

## 2022-10-16 PROCEDURE — 99223 1ST HOSP IP/OBS HIGH 75: CPT | Performed by: INTERNAL MEDICINE

## 2022-10-16 RX ORDER — ONDANSETRON 2 MG/ML
4 INJECTION INTRAMUSCULAR; INTRAVENOUS EVERY 6 HOURS PRN
Status: DISCONTINUED | OUTPATIENT
Start: 2022-10-16 | End: 2022-10-20 | Stop reason: HOSPADM

## 2022-10-16 RX ORDER — MORPHINE SULFATE 2 MG/ML
2 INJECTION, SOLUTION INTRAMUSCULAR; INTRAVENOUS EVERY 4 HOURS PRN
Status: DISCONTINUED | OUTPATIENT
Start: 2022-10-16 | End: 2022-10-20 | Stop reason: HOSPADM

## 2022-10-16 RX ORDER — ONDANSETRON 2 MG/ML
4 INJECTION INTRAMUSCULAR; INTRAVENOUS ONCE
Status: COMPLETED | OUTPATIENT
Start: 2022-10-16 | End: 2022-10-16

## 2022-10-16 RX ORDER — PROMETHAZINE HYDROCHLORIDE 25 MG/ML
25 INJECTION, SOLUTION INTRAMUSCULAR; INTRAVENOUS ONCE
Status: COMPLETED | OUTPATIENT
Start: 2022-10-16 | End: 2022-10-16

## 2022-10-16 RX ORDER — PROMETHAZINE HYDROCHLORIDE 25 MG/1
12.5 TABLET ORAL EVERY 6 HOURS PRN
Status: DISCONTINUED | OUTPATIENT
Start: 2022-10-16 | End: 2022-10-20 | Stop reason: HOSPADM

## 2022-10-16 RX ORDER — PROPAFENONE HYDROCHLORIDE 150 MG/1
150 TABLET, FILM COATED ORAL 2 TIMES DAILY
Status: DISCONTINUED | OUTPATIENT
Start: 2022-10-16 | End: 2022-10-20 | Stop reason: HOSPADM

## 2022-10-16 RX ORDER — SODIUM CHLORIDE 9 MG/ML
INJECTION, SOLUTION INTRAVENOUS CONTINUOUS
Status: DISCONTINUED | OUTPATIENT
Start: 2022-10-16 | End: 2022-10-17

## 2022-10-16 RX ORDER — ACETAMINOPHEN 325 MG/1
650 TABLET ORAL EVERY 6 HOURS PRN
Status: DISCONTINUED | OUTPATIENT
Start: 2022-10-16 | End: 2022-10-20 | Stop reason: HOSPADM

## 2022-10-16 RX ORDER — SODIUM CHLORIDE 9 MG/ML
INJECTION, SOLUTION INTRAVENOUS PRN
Status: DISCONTINUED | OUTPATIENT
Start: 2022-10-16 | End: 2022-10-20 | Stop reason: HOSPADM

## 2022-10-16 RX ORDER — MORPHINE SULFATE 2 MG/ML
2 INJECTION, SOLUTION INTRAMUSCULAR; INTRAVENOUS ONCE
Status: COMPLETED | OUTPATIENT
Start: 2022-10-16 | End: 2022-10-16

## 2022-10-16 RX ORDER — SODIUM CHLORIDE 0.9 % (FLUSH) 0.9 %
10 SYRINGE (ML) INJECTION PRN
Status: DISCONTINUED | OUTPATIENT
Start: 2022-10-16 | End: 2022-10-20 | Stop reason: HOSPADM

## 2022-10-16 RX ORDER — SODIUM CHLORIDE 9 MG/ML
INJECTION, SOLUTION INTRAVENOUS CONTINUOUS
Status: DISCONTINUED | OUTPATIENT
Start: 2022-10-16 | End: 2022-10-18

## 2022-10-16 RX ORDER — PANTOPRAZOLE SODIUM 40 MG/10ML
40 INJECTION, POWDER, LYOPHILIZED, FOR SOLUTION INTRAVENOUS DAILY
Status: DISCONTINUED | OUTPATIENT
Start: 2022-10-16 | End: 2022-10-20 | Stop reason: HOSPADM

## 2022-10-16 RX ORDER — POTASSIUM CHLORIDE 7.45 MG/ML
10 INJECTION INTRAVENOUS
Status: COMPLETED | OUTPATIENT
Start: 2022-10-16 | End: 2022-10-16

## 2022-10-16 RX ORDER — SODIUM CHLORIDE 0.9 % (FLUSH) 0.9 %
10 SYRINGE (ML) INJECTION EVERY 12 HOURS SCHEDULED
Status: DISCONTINUED | OUTPATIENT
Start: 2022-10-16 | End: 2022-10-20 | Stop reason: HOSPADM

## 2022-10-16 RX ADMIN — POTASSIUM CHLORIDE 10 MEQ: 7.46 INJECTION, SOLUTION INTRAVENOUS at 09:38

## 2022-10-16 RX ADMIN — IOPAMIDOL 80 ML: 755 INJECTION, SOLUTION INTRAVENOUS at 04:14

## 2022-10-16 RX ADMIN — POTASSIUM CHLORIDE 10 MEQ: 7.46 INJECTION, SOLUTION INTRAVENOUS at 06:57

## 2022-10-16 RX ADMIN — ONDANSETRON 4 MG: 2 INJECTION INTRAMUSCULAR; INTRAVENOUS at 01:02

## 2022-10-16 RX ADMIN — METOPROLOL TARTRATE 25 MG: 25 TABLET, FILM COATED ORAL at 08:21

## 2022-10-16 RX ADMIN — SODIUM CHLORIDE: 9 INJECTION, SOLUTION INTRAVENOUS at 01:00

## 2022-10-16 RX ADMIN — POTASSIUM CHLORIDE 10 MEQ: 7.46 INJECTION, SOLUTION INTRAVENOUS at 08:12

## 2022-10-16 RX ADMIN — SODIUM CHLORIDE: 9 INJECTION, SOLUTION INTRAVENOUS at 12:02

## 2022-10-16 RX ADMIN — PROMETHAZINE HYDROCHLORIDE 25 MG: 25 INJECTION INTRAMUSCULAR; INTRAVENOUS at 02:55

## 2022-10-16 RX ADMIN — POTASSIUM CHLORIDE 10 MEQ: 7.46 INJECTION, SOLUTION INTRAVENOUS at 05:38

## 2022-10-16 RX ADMIN — RIVAROXABAN 20 MG: 20 TABLET, FILM COATED ORAL at 08:21

## 2022-10-16 RX ADMIN — PROPAFENONE HYDROCHLORIDE 150 MG: 150 TABLET, FILM COATED ORAL at 20:51

## 2022-10-16 RX ADMIN — PROPAFENONE HYDROCHLORIDE 150 MG: 150 TABLET, FILM COATED ORAL at 08:21

## 2022-10-16 RX ADMIN — METOPROLOL TARTRATE 25 MG: 25 TABLET, FILM COATED ORAL at 20:52

## 2022-10-16 RX ADMIN — ACETAMINOPHEN 650 MG: 325 TABLET ORAL at 08:21

## 2022-10-16 RX ADMIN — SODIUM CHLORIDE: 9 INJECTION, SOLUTION INTRAVENOUS at 05:43

## 2022-10-16 RX ADMIN — SODIUM CHLORIDE: 9 INJECTION, SOLUTION INTRAVENOUS at 20:53

## 2022-10-16 RX ADMIN — MORPHINE SULFATE 2 MG: 2 INJECTION, SOLUTION INTRAMUSCULAR; INTRAVENOUS at 01:01

## 2022-10-16 RX ADMIN — CEFTRIAXONE SODIUM 1000 MG: 1 INJECTION, POWDER, FOR SOLUTION INTRAMUSCULAR; INTRAVENOUS at 03:02

## 2022-10-16 RX ADMIN — PANTOPRAZOLE SODIUM 40 MG: 40 INJECTION, POWDER, FOR SOLUTION INTRAVENOUS at 05:40

## 2022-10-16 RX ADMIN — ONDANSETRON 4 MG: 2 INJECTION INTRAMUSCULAR; INTRAVENOUS at 08:39

## 2022-10-16 ASSESSMENT — PAIN - FUNCTIONAL ASSESSMENT
PAIN_FUNCTIONAL_ASSESSMENT: 0-10
PAIN_FUNCTIONAL_ASSESSMENT: NONE - DENIES PAIN
PAIN_FUNCTIONAL_ASSESSMENT: 0-10

## 2022-10-16 ASSESSMENT — PAIN SCALES - GENERAL
PAINLEVEL_OUTOF10: 5
PAINLEVEL_OUTOF10: 2

## 2022-10-16 NOTE — PLAN OF CARE
Patient was admitted early this a.m. (10/16) due to fatigue. Patient states that she has had body aches, nausea, body aches and confusion the last few days. Patient sodium was normal on discharge about a week ago and today sodium 123, 125, 126. Nephrology consulted to help with hyponatremia.     Electronically signed by EVERETTE Hanna on 10/16/2022 at 2:25 PM

## 2022-10-16 NOTE — ED NOTES
Pt resting comfortably in bed at this time. Pt now rating pain 2/10. Provider at bedside assessing pt.       Teja Jimenez RN  10/16/22 [id number]

## 2022-10-16 NOTE — ED PROVIDER NOTES
[address] ENCOUNTER      PATIENT NAME: Katy Craig  MRN: [id number]  : 1934  [place]: 10/15/2022  PROVIDER: Jovan Freire MD      CHIEF COMPLAINT       Chief Complaint   Patient presents with    Nausea    Headache       Patient is seen and evaluated in a timely fashion. Nurses Notes are reviewed and I agree except as noted in the HPI. HISTORY OF PRESENT ILLNESS    Katy Craig is a 80 y.o. female who presents to Emergency Department with Nausea and Headache     Nausea vomiting and diarrhea with headache a couple days ago. Admitted by hospitalist a week ago after fall on 10/8/2022. Head CT was negative then. She was also tested positive for COVID a week ago. She lives at home with her . She walks with a walker at  home. She complains headache and abdominal pain since discharge. No more falls since she was discharged. No chest pain. No shortness of breath. No urinary symptoms. She has been taking Zofran and Tylenol which did not help. This HPI was provided by patient and daughter. REVIEW OF SYSTEMS   Ten-point review of systems is negative except those documented in above HPI including constitutional, HEENT, respiratory, cardiovascular, gastrointestinal, genitourinary, musculoskeletal, skin, neurological, hematological and behavioral.      PAST MEDICAL HISTORY    has a past medical history of Actinic keratosis, Arthritis, Atrial fibrillation ([address] Cancer (HCC), Cervical prolapse, Diverticulosis, Endocervical polyp, Heel spur, Hip pain, Hypertension, Nasal septal deviation, Osteoporosis, Stress incontinence, Syncope, and Vaginal prolapse. SURGICAL HISTORY      has a past surgical history that includes Colonoscopy (2000); Cholecystectomy (1976); Upper gastrointestinal endoscopy (2017); pr egd transoral biopsy single/multiple (Left, 2017); Upper gastrointestinal endoscopy (Left, 2017);  Endoscopic ultrasonography, GI (Left, 2017); eye surgery; Total hip arthroplasty (Left, 2017); bladder repair (); Partial hysterectomy (); Appendectomy (); skin biopsy; pr office/outpt visit,procedure only (Right, 2018); joint replacement (); Mohs surgery (N/A, 2021); Facial Surgery (N/A, 2021); US BREAST BIOPSY W LOC DEVICE 1ST LESION LEFT (Left, 2021); US PLACE BREAST LOC DEVICE EACH ADDL LEFT (Left, 2021); US BREAST BIOPSY W LOC DEVICE 1ST LESION RIGHT (Right, 2021); Hysterectomy; Mohs surgery (Left, 2021); Upper gastrointestinal endoscopy (Left, 2021); Upper gastrointestinal endoscopy (N/A, 2021); Upper gastrointestinal endoscopy (N/A, 2022); Upper gastrointestinal endoscopy (2022); and Upper gastrointestinal endoscopy (N/A, 2022). CURRENT MEDICATIONS       Previous Medications    METOPROLOL TARTRATE (LOPRESSOR) 50 MG TABLET    Patient is taking 1/2 tab BID    ONDANSETRON (ZOFRAN ODT) 4 MG DISINTEGRATING TABLET    Take 1 tablet by mouth every 8 hours as needed for Nausea    PANTOPRAZOLE (PROTONIX) 40 MG TABLET    Take 1 tablet by mouth once daily    PROPAFENONE (RYTHMOL) 150 MG TABLET    Take 1 tablet by mouth 2 times daily    RIVAROXABAN (XARELTO) 20 MG TABS TABLET    Take 1 tablet by mouth in the morning. ALLERGIES     is allergic to statins, tape [adhesive tape], tizanidine hcl, tramadol, augmentin [amoxicillin-pot clavulanate], and nystatin. FAMILY HISTORY     She indicated that her mother is . She indicated that her father is . family history includes Colon Cancer in her father; Heart Disease in her mother; Osteoporosis in her mother. SOCIAL HISTORY      reports that she has never smoked. She has never used smokeless tobacco. She reports that she does not drink alcohol and does not use drugs. PHYSICAL EXAM      height is 5' 5\" (1.651 m) and weight is 150 lb (68 kg). Her oral temperature is 97.9 °F (36.6 °C). Her blood pressure is 118/73 and her pulse is 60. Her respiration is 16 and oxygen saturation is 95%. Physical Exam  Vitals and nursing note reviewed. Constitutional:       Appearance: She is well-developed. She is not diaphoretic. HENT:      Head: Normocephalic and atraumatic. Nose: Nose normal.   Eyes:      General: No scleral icterus. Right eye: No discharge. Left eye: No discharge. Conjunctiva/sclera: Conjunctivae normal.      Pupils: Pupils are equal, round, and reactive to light. Neck:      Vascular: No JVD. Trachea: No tracheal deviation. Cardiovascular:      Rate and Rhythm: Normal rate and regular rhythm. Heart sounds: Normal heart sounds. No murmur heard. No friction rub. No gallop. Pulmonary:      Effort: Pulmonary effort is normal. No respiratory distress. Breath sounds: Normal breath sounds. No stridor. No wheezing or rales. Chest:      Chest wall: No tenderness. Abdominal:      General: Bowel sounds are normal. There is no distension. Palpations: Abdomen is soft. There is no mass. Tenderness: There is no abdominal tenderness. There is no guarding or rebound. Hernia: No hernia is present. Comments: Epigastric tenderness   Musculoskeletal:         General: No tenderness or deformity. Cervical back: Normal range of motion and neck supple. Lymphadenopathy:      Cervical: No cervical adenopathy. Skin:     General: Skin is warm and dry. Capillary Refill: Capillary refill takes less than 2 seconds. Coloration: Skin is not pale. Findings: No erythema or rash. Neurological:      Mental Status: She is alert and oriented to person, place, and time. Cranial Nerves: No cranial nerve deficit. Sensory: No sensory deficit. Motor: No abnormal muscle tone.       Coordination: Coordination normal.      Deep Tendon Reflexes: Reflexes normal.   Psychiatric:         Behavior: Behavior normal. Thought Content: Thought content normal.         Judgment: Judgment normal.     ANCILLARY TEST RESULTS   EKG:    Interpreted by me  Atrial flutter with variable AV block  VR 68 bpm, QRS duration 92 ms  QTc 497 ms  No ST elevation or acute T wave    LAB RESULTS:  Results for orders placed or performed during the hospital encounter of 10/15/22   CBC with Auto Differential   Result Value Ref Range    WBC 3.9 (L) 4.8 - 10.8 thou/mm3    RBC 5.24 4.20 - 5.40 mill/mm3    Hemoglobin 14.2 12.0 - 16.0 gm/dl    Hematocrit 41.2 37.0 - 47.0 %    MCV 78.6 (L) 81.0 - 99.0 fL    MCH 27.1 26.0 - 33.0 pg    MCHC 34.5 32.2 - 35.5 gm/dl    RDW-CV 14.4 11.5 - 14.5 %    RDW-SD 41.2 35.0 - 45.0 fL    Platelets 657 770 - 410 thou/mm3    MPV 10.3 9.4 - 12.4 fL    Seg Neutrophils 50.4 %    Lymphocytes 32.7 %    Monocytes 10.7 %    Eosinophils 3.6 %    Basophils 0.3 %    Immature Granulocytes 2.3 %    Segs Absolute 2.0 1.8 - 7.7 thou/mm3    Lymphocytes Absolute 1.3 1.0 - 4.8 thou/mm3    Monocytes Absolute 0.4 0.4 - 1.3 thou/mm3    Eosinophils Absolute 0.1 0.0 - 0.4 thou/mm3    Basophils Absolute 0.0 0.0 - 0.1 thou/mm3    Immature Grans (Abs) 0.09 (H) 0.00 - 0.07 thou/mm3    nRBC 0 /100 wbc   Comprehensive Metabolic Panel w/ Reflex to MG   Result Value Ref Range    Glucose 116 (H) 70 - 108 mg/dL    Creatinine 0.7 0.4 - 1.2 mg/dL    BUN 14 7 - 22 mg/dL    Sodium 123 (L) 135 - 145 meq/L    Potassium reflex Magnesium 3.4 (L) 3.5 - 5.2 meq/L    Chloride 87 (L) 98 - 111 meq/L    CO2 26 23 - 33 meq/L    Calcium 8.6 8.5 - 10.5 mg/dL    AST 30 5 - 40 U/L    Alkaline Phosphatase 96 38 - 126 U/L    Total Protein 5.8 (L) 6.1 - 8.0 g/dL    Albumin 3.0 (L) 3.5 - 5.1 g/dL    Total Bilirubin 0.6 0.3 - 1.2 mg/dL    ALT 20 11 - 66 U/L   Troponin   Result Value Ref Range    Troponin T < 0.010 ng/ml   Brain Natriuretic Peptide   Result Value Ref Range    Pro-BNP 1478.0 0.0 - 1800.0 pg/mL   APTT   Result Value Ref Range    aPTT 35.9 22.0 - 38.0 seconds Protime-INR   Result Value Ref Range    INR 1.03 0.85 - 1.13   Anion Gap   Result Value Ref Range    Anion Gap 10.0 8.0 - 16.0 meq/L   Glomerular Filtration Rate, Estimated   Result Value Ref Range    Est, Glom Filt Rate 79 (A) ml/min/1.73m2   Magnesium   Result Value Ref Range    Magnesium 1.8 1.6 - 2.4 mg/dL   Osmolality   Result Value Ref Range    Osmolality Calc 249.2 (L) 275.0 - 300.0 mOsmol/kg   Urine with Reflexed Micro   Result Value Ref Range    Glucose, Ur NEGATIVE NEGATIVE mg/dl    Bilirubin Urine NEGATIVE NEGATIVE    Ketones, Urine NEGATIVE NEGATIVE    Specific Gravity, Urine 1.010 1.002 - 1.030    Blood, Urine NEGATIVE NEGATIVE    pH, UA 6.5 5.0 - 9.0    Protein, UA NEGATIVE NEGATIVE    Urobilinogen, Urine 0.2 0.0 - 1.0 eu/dl    Nitrite, Urine NEGATIVE NEGATIVE    Leukocyte Esterase, Urine MODERATE (A) NEGATIVE    Color, UA YELLOW STRAW-YELLOW    Character, Urine CLEAR CLEAR-SL CLOUD    RBC, UA 0-2 0-2/hpf /hpf    WBC, UA > 100 0-4/hpf /hpf    Epithelial Cells, UA 0-2 3-5/hpf /hpf    Bacteria, UA NONE SEEN FEW/NONE SEEN /hpf    Casts UA NONE SEEN NONE SEEN /lpf    Crystals, UA NONE SEEN NONE SEEN    Renal Epithelial, UA NONE SEEN NONE SEEN    Yeast, UA NONE SEEN NONE SEEN    CASTS 2 NONE SEEN NONE SEEN /lpf    MISCELLANEOUS 2 NONE SEEN    Sodium, urine, random   Result Value Ref Range    Sodium, Ur 70 meq/l   Lipase   Result Value Ref Range    Lipase 66.2 (H) 5.6 - 51.3 U/L   EKG Emergency   Result Value Ref Range    Ventricular Rate 68 BPM    Atrial Rate 220 BPM    QRS Duration 92 ms    Q-T Interval 468 ms    QTc Calculation (Bazett) 497 ms    P Axis 59 degrees    R Axis 96 degrees    T Axis 56 degrees       RADIOLOGY REPORTS  CT HEAD WO CONTRAST   Final Result   Impression:   Negative for acute intracranial abnormality.       This document has been electronically signed by: Chari Betancourt MD on    10/16/2022 02:04 AM      All CTs at this facility use dose modulation techniques and iterative reconstructions, and/or weight-based dosing   when appropriate to reduce radiation to a low as reasonably achievable. XR CHEST PORTABLE   Final Result      No acute pulmonary disease. Cardiomegaly. This document has been electronically signed by: Agata Hong MD on    10/16/2022 01:49 AM      CT ABDOMEN PELVIS W IV CONTRAST Additional Contrast? None    (Results Pending)       MEDICAL DECISION MAKING (MDM) AND ED COURSE   Patient is seen and evaluated at 12:40 AM EDT. DDx: Concussion, delayed ICH, dehydration, electrolyte imbalance, ACS, UTI, pneumonia    New and acute hyponatremia (Na 123). This seems due to dehydration. Buster Naval is pending. NS infusion is given. UA shows significant pyuria, IV Rocephin given in ED. Labs otherwise at base lnes. CT head and CXR have no acute changes. Headache seems part of post concussion vs metabolic derangement. Her N/V is poorly controlled in ED. CT A/P is pending. Admission is warranted. Discussed and admitted to Hospitalist.     Consult  Hospitalist    Procedures  None    Medications   0.9 % sodium chloride infusion ( IntraVENous New Bag 10/16/22 0100)   promethazine (PHENERGAN) injection 25 mg (has no administration in time range)   cefTRIAXone (ROCEPHIN) 1,000 mg in dextrose 5 % 50 mL IVPB mini-bag (has no administration in time range)   ondansetron [facility]) injection 4 mg (4 mg IntraVENous Given 10/16/22 0102)   morphine (PF) injection 2 mg (2 mg IntraVENous Given 10/16/22 0101)       Vitals:    10/15/22 2345 10/16/22 0102 10/16/22 0245 10/16/22 0336   BP: 137/74 112/75 118/73    Pulse: 71 69 59 60   Resp: 14 17 16 16   Temp: 97.9 °F (36.6 °C)      TempSrc: Oral      SpO2: 99% 91% 93% 95%   Weight: 150 lb (68 kg)      Height: 5' 5\" (1.651 m)          FINAL IMPRESSION AND DISPOSITION      1. Hyponatremia    2. Pyuria    3. Intractable nausea and vomiting    4.  Nonintractable headache, unspecified chronicity pattern, unspecified headache type DISPOSITION Admitted 10/16/2022 03:48:57 AM      PATIENT REFERRED TO:  No follow-up provider specified.   DISCHARGE MEDICATIONS:  New Prescriptions    No medications on file     (Please note that portions of this note were completed with a voice recognition program.  Efforts were made to edit the dictations but occasionally words aremis-transcribed.)  MD Cayla Montalvo MD  10/16/22 9994

## 2022-10-16 NOTE — H&P
Hospitalist - History & Physical      Patient: Gume Cannon    Unit/Bed:20/020A  YOB: 1934  MRN: [id number]   Acct: [de-identified]   PCP: Lilli Iqbal MD    Date of Service: Pt seen/examined on 10/16/22  and Admitted to Inpatient with expected LOS greater than two midnights due to medical therapy. Chief Complaint: Persistent headache, diarrhea, abdominal pain x1 week    Assessment and Plan:-    # Persistent headache: Likely due to chronic hyponatremia. IV morphine as needed. #  Chronic moderate hyponatremia: Likely due to diarrhea and vomiting. Serum sodium 123, osmolality 249.2. NPO. Subcu promethazine as needed. Gentle IVF normal saline. Monitor serum sodium closely. Fluid input and output chart. # Diarrhea: Etiology unclear. Stool molecular panel, C. difficile antigen pending    # Abdominal pain: Etiology unclear. IV morphine as needed. CT abdomen and pelvis with and without contrast pending. # Possible UTI: Empiric IV Rocephin. Urine culture pending. # Mild hypokalemia: Likely due to diarrhea. Replete with IV potassium chloride. Check magnesium and phosphorus. # Paroxysmal atrial fibrillation: Presently in sinus rhythm. Resume Eliquis, metoprolol, propafenone. #Generalized weakness: Likely due to diarrhea, hyponatremia with poor oral intake. Consult PT and OT. # Recently diagnosed COVID 19 infection: Not oxygen requiring. Dispo: likely SNF at discharge    History Of Present Illness:    20-year-old female with paroxysmal atrial fibrillation on Eliquis, recently diagnosed COVID-19 infection without oxygen requirement, presented with headache, diarrhea, and epigastric abdominal discomfort that had persisted for the past 1 week. Reported associated generalized weakness, loss of appetite, persistent nausea with one episode of vomiting. Described headache as global, persistent, unresponsive to over-the-counter analgesics.   Said stools about 4 times per day, watery and nonbloody. While abdominal pain was localized in the subxiphoid area, 10/10 intensity, continuous, nonradiating, without known aggravators, relieved to 3/10 intensity following IV morphine administered in the ED. No fever, dysuria, shortness of breath, dizziness, chest pain or cough. Of note patient was recently hospitalized at this facility (10/8/ - 12/2022) for fall secondary to orthostatic hypotension and COVID-19 infection. Evaluation in the ED remarkable for WBC 3.9, sodium 123, potassium 3.4, chloride 87, serum osmolality 249.2, glucose 116. Urinalysis suggestive of UTI. She was managed with IVF normal saline, IV Rocephin x1 dose prior to admission to the medical unit.       Past Medical History:        Diagnosis Date    Actinic keratosis     history of    Arthritis     Atrial fibrillation ([address]     Cancer (HCC)     skin    Cervical prolapse     history of    Diverticulosis     Endocervical polyp     history of    Heel spur     Hip pain     Hypertension     Nasal septal deviation     Osteoporosis     Stress incontinence     history of    Syncope     Vaginal prolapse     history of       Past Surgical History:        Procedure Laterality Date    APPENDECTOMY  26's    BLADDER REPAIR  2011    [place]  24/9241    [facility]    COLONOSCOPY  01/26/2000    ENDOSCOPIC ULTRASOUND (LOWER) Left 07/20/2017    ENDOSCOPIC ULTRASOUND performed by Lg Carmen MD at [facility] cataract    FACIAL SURGERY N/A 1/27/2021    DIVISION AND INSET/CHEEK performed by Amrik Estrada MD at [facility] ([address] [facility])      JOINT REPLACEMENT  2017    left total hip relpament    MOHS SURGERY N/A 1/8/2021    MOHS REPAIR BCC NASAL TIP WITH FLAP AND GRAFT performed by Amrik Estrada MD at [address] Left 8/6/2021    MOHS DEFECT REPAIR SCC DORSAL NOSE AND LEFT LOWER FOREHEAD WITH SKIN GRAFT FROM RIGHT CHEEK/EAR (PLACED ON NOSE) performed by Amrik Estrada MD at [address] (CERVIX NOT REMOVED)  1960's    ME EGD TRANSORAL BIOPSY SINGLE/MULTIPLE Left 07/16/2017    EGD BIOPSY performed by Lg Carmen MD at [address] Endoscopy    ME OFFICE/OUTPT VISIT,PROCEDURE ONLY Right 06/01/2018    MOHS REPAIR BASEL CELL CARCINOMA RIGHT DORSAL NOSE performed by Amrik Estrada MD at [address] Left 09/19/2017    Left Total Hip ARTHROPLASTY performed by Mary Livingston MD at [facility]  07/16/2017    UPPER GASTROINTESTINAL ENDOSCOPY Left 07/16/2017    EGD DILATION BALLOON performed by Lg Carmen MD at [place] [address] Left 8/28/2021    EGD BIOPSY performed by Shona Cox MD at [place] [address] 8/30/2021    EGD DILATION SAVORY performed by Shona Cox MD at [facility] N/A 4/28/2022    EGD BIOPSY performed by Shona Cox MD at [place] [address]  4/28/2022    EGD DILATION SAVORY performed by Shona Cox MD at [facility] N/A 8/22/2022    EGD DILATION SAVORY performed by Shona Cox MD at [facility] LEFT Left 4/21/2021    US BREAST NEEDLE BIOPSY LEFT 4/21/2021 [address] BREAST NEEDLE BIOPSY RIGHT Right 4/21/2021    US BREAST NEEDLE BIOPSY RIGHT 4/21/2021 [facility] ULTRASOUND    US GUIDED NEEDLE LOC BREAST ADDL LEFT Left 4/21/2021    US GUIDED NEEDLE LOC BREAST ADDL LEFT 4/21/2021 [facility] ULTRASOUND       Home Medications:   No current facility-administered medications on file prior to encounter.      Current Outpatient Medications on File Prior to Encounter   Medication Sig Dispense Refill    pantoprazole (PROTONIX) 40 MG tablet Take 1 tablet by mouth once daily 90 tablet 1 propafenone (RYTHMOL) 150 MG tablet Take 1 tablet by mouth 2 times daily 180 tablet 1    rivaroxaban (XARELTO) 20 MG TABS tablet Take 1 tablet by mouth in the morning. 90 tablet 1    metoprolol tartrate (LOPRESSOR) 50 MG tablet Patient is taking 1/2 tab BID 90 tablet 3    ondansetron (ZOFRAN ODT) 4 MG disintegrating tablet Take 1 tablet by mouth every 8 hours as needed for Nausea 20 tablet 0       Allergies:    Statins, Tape [adhesive tape], Tizanidine hcl, Tramadol, Augmentin [amoxicillin-pot clavulanate], and Nystatin    Social History:    reports that she has never smoked. She has never used smokeless tobacco. She reports that she does not drink alcohol and does not use drugs. Family History:       Problem Relation Age of Onset    Colon Cancer Father     Osteoporosis Mother     Heart Disease Mother        Diet:  No diet orders on file    Review of systems:   Pertinent positives as noted in the HPI. All other systems reviewed and negative. PHYSICAL EXAM:  /73   Pulse 60   Temp 97.9 °F (36.6 °C) (Oral)   Resp 16   Ht 5' 5\" (1.651 m)   Wt 150 lb (68 kg)   SpO2 95%   BMI 24.96 kg/m²   General appearance: Lethargic elderly female, no apparent distress, appears stated age and cooperative. HEENT: Normal cephalic, atraumatic without obvious deformity. Pupils equal, round, and reactive to light. Extra ocular muscles intact. Conjunctivae/corneas clear. Dry oral mucosa noted. Neck: Supple, with full range of motion. No jugular venous distention. Trachea midline. Respiratory:  Normal respiratory effort. Clear to auscultation, bilaterally without Rales/Wheezes/Rhonchi. Cardiovascular: Regular rate and rhythm with normal S1/S2 without murmurs, rubs or gallops. Abdomen: Soft, non-tender, non-distended with normal bowel sounds. Musculoskeletal:  No clubbing, cyanosis or edema bilaterally. Skin: Skin color, texture, turgor normal.  No rashes or lesions.   Neurologic:  Neurovascularly intact without any focal sensory/motor deficits. Cranial nerves: II-XII intact, grossly non-focal.  Psychiatric: Alert and oriented, thought content appropriate, normal insight  Capillary Refill: Brisk,< 3 seconds   Peripheral Pulses: +2 palpable, equal bilaterally     Labs:   Recent Labs     10/16/22  0003   WBC 3.9*   HGB 14.2   HCT 41.2        Recent Labs     10/16/22  0003   *   K 3.4*   CL 87*   CO2 26   BUN 14   CREATININE 0.7   CALCIUM 8.6     Recent Labs     10/16/22  0003   AST 30   ALT 20   BILITOT 0.6   ALKPHOS 96     Recent Labs     10/16/22  0045   INR 1.03     No results for input(s): Radha Cuadra in the last 72 hours. Urinalysis:    Lab Results   Component Value Date/Time    NITRU NEGATIVE 10/16/2022 02:11 AM    WBCUA > 100 10/16/2022 02:11 AM    BACTERIA NONE SEEN 10/16/2022 02:11 AM    RBCUA 0-2 10/16/2022 02:11 AM    BLOODU NEGATIVE 10/16/2022 02:11 AM    SPECGRAV 1.025 05/05/2022 02:47 PM    GLUCOSEU NEGATIVE 10/16/2022 02:11 AM       Radiology:   CT HEAD WO CONTRAST   Final Result   Impression:   Negative for acute intracranial abnormality. This document has been electronically signed by: Jeff Renee MD on    10/16/2022 02:04 AM      All CTs at this facility use dose modulation techniques and iterative    reconstructions, and/or weight-based dosing   when appropriate to reduce radiation to a low as reasonably achievable. XR CHEST PORTABLE   Final Result      No acute pulmonary disease. Cardiomegaly. This document has been electronically signed by: Yovani Willard MD on    10/16/2022 01:49 AM      CT ABDOMEN PELVIS W IV CONTRAST Additional Contrast? None    (Results Pending)     CT HEAD WO CONTRAST    Result Date: 10/16/2022  CT Head without contrast Indication: Headache. Technique: CT head without contrast. Coronal and sagittal reformations. Comparison: CT/SR - CT HEAD WO CONTRAST - 10/08/2022 01:52 PM EDT Findings: The ventricular system is midline in position.  No acute major vessel vascular territory infarct. No acute intraparenchymal hemorrhage. No parenchymal mass lesions. Cerebral atrophy. Hypodensities in the cerebral white matter, likely chronic small vessel ischemic changes. No abnormal extra-axial masses or fluid collections. The basal cisterns and foramen magnum are patent. Vascular calcifications. The calvarium is intact. Small mucous retention cyst/polyp right maxillary sinus. Prior bilateral lens replacements. Impression: Negative for acute intracranial abnormality. This document has been electronically signed by: Becky Escalante MD on 10/16/2022 02:04 AM All CTs at this facility use dose modulation techniques and iterative reconstructions, and/or weight-based dosing when appropriate to reduce radiation to a low as reasonably achievable. XR CHEST PORTABLE    Result Date: 10/16/2022  Chest one view COMPARISON: 10/8/22 FINDINGS: No pulmonary consolidation, pleural effusion or pneumothorax is seen. The cardiac silhouette is enlarged. No acute pulmonary disease. Cardiomegaly.  This document has been electronically signed by: Carmen Colunga MD on 10/16/2022 01:49 AM        EKG: Atrial flut w/variable AV block    Electronically signed by Je Carcamo MD on 10/16/2022 at 4:28 AM

## 2022-10-16 NOTE — ED NOTES
Pt updated on POC at this time. Pt verbalized understanding. Family at bedside. Call light in reach.       Eduardo Middleton RN  10/16/22 1877

## 2022-10-16 NOTE — ED NOTES
ED to inpatient nurses report    Chief Complaint   Patient presents with    Nausea    Headache      Present to ED from home  LOC: alert and orientated to name, place, date  Vital signs   Vitals:    10/15/22 2345 10/16/22 0102 10/16/22 0245 10/16/22 0336   BP: 137/74 112/75 118/73    Pulse: 71 69 59 60   Resp: 14 17 16 16   Temp: 97.9 °F (36.6 °C)      TempSrc: Oral      SpO2: 99% 91% 93% 95%   Weight: 150 lb (68 kg)      Height: 5' 5\" (1.651 m)         Oxygen Baseline none    Current needs required none Bipap/Cpap No  LDAs:   Peripheral IV 10/16/22 Right Antecubital (Active)   Site Assessment Clean, dry & intact 10/16/22 0336   Line Status Infusing 10/16/22 0336   Phlebitis Assessment No symptoms 10/16/22 0336   Infiltration Assessment 0 10/16/22 0336   Dressing Status Clean, dry & intact 10/16/22 0336     Mobility: Requires assistance * 1  Pending ED orders: none  Present condition: stable    C-SSRS Risk of Suicide: No Risk  Swallow Screening  pass  Preferred Language: [place]     Electronically signed by Dilia Kohler RN on 10/16/2022 at 3:51 AM       Dilia Kohler RN  10/16/22 [id number]

## 2022-10-16 NOTE — ED TRIAGE NOTES
Pt comes to ED by EMS with c/o nausea and a headache. PT states that symptoms began \"a couple days ago. \" Pt reports that symptoms worsened tonight. PT reports multiple episodes of emesis this evening. PT reports taking zofran at home at 2200 and vomited after. PT last took tylenol for headache at 1000 today. Pt states that she has been having diarrhea for approximately \"one week. \" Pt reports that she was recently admitted for a fall that occurred on 10/8. Pt tested positive for covid on 10/8. Pt on telemetry.

## 2022-10-16 NOTE — CONSULTS
[facility]    [address], [address]  [facility]  10/16/2022 1:14 PM    Pt Name:    Renata Pulliam  MRN:     [id number]   [id number]  YOB: 1934  Admit Date:    10/15/2022 11:41 PM  Primary Care Physician:  Amanda Bernstein MD        Reason for Consult:  Hyponatremia    History:   The patient is a 80 y.o. female seen in nephrology consult for hyponatremia. She was just recently admitted for orthostatic hypotension and + COVID. Sodium levels were normal then but she does have a history of hyponatremia in the past.  Additional history includes pAFib, HTN, osteoporosis and as below. She presents to the hospital with  headache, diarrhea, and abdominal pain with nausea and some vomiting. Ongoing for about the past week. Labs in ED showed sodium of 123 with hypokalemia 3.4.  UA suggestive of UTI and CT showed colitis. Nephrology was consulted for management of her hyponatremia.     Past Medical History:  Past Medical History:   Diagnosis Date    Actinic keratosis     history of    Arthritis     Atrial fibrillation ([address]     Cancer (HCC)     skin    Cervical prolapse     history of    Diverticulosis     Endocervical polyp     history of    Heel spur     Hip pain     Hypertension     Nasal septal deviation     Osteoporosis     Stress incontinence     history of    Syncope     Vaginal prolapse     history of       Past Surgical History:  Past Surgical History:   Procedure Laterality Date    APPENDECTOMY  26's    BLADDER REPAIR  2011    [place]  11/1976    [facility]    COLONOSCOPY  01/26/2000    ENDOSCOPIC ULTRASOUND (LOWER) Left 07/20/2017    ENDOSCOPIC ULTRASOUND performed by Vikash Willson MD at [facility] cataract    FACIAL SURGERY N/A 1/27/2021    DIVISION AND INSET/CHEEK performed by Loi Mohr MD at [address] [place] [address] ([address] [place] [address] REPLACEMENT  2017    left total hip relpament    MOHS SURGERY N/A 1/8/2021    MOHS REPAIR BCC NASAL TIP WITH FLAP AND GRAFT performed by Eugenio Anthony MD at [address] Left 8/6/2021    MOHS DEFECT REPAIR SCC DORSAL NOSE AND LEFT LOWER FOREHEAD WITH SKIN GRAFT FROM RIGHT CHEEK/EAR (PLACED ON NOSE) performed by Eugenio Anthony MD at [address] (CERVIX NOT REMOVED)  1960's    RI EGD TRANSORAL BIOPSY SINGLE/MULTIPLE Left 07/16/2017    EGD BIOPSY performed by Ruth Gibson MD at [facility]    RI OFFICE/OUTPT VISIT,PROCEDURE ONLY Right 06/01/2018    MOHS REPAIR BASEL CELL CARCINOMA RIGHT DORSAL NOSE performed by Eugeino Anthony MD at [address] Left 09/19/2017    Left Total Hip ARTHROPLASTY performed by Noah Coello MD at [address] [facility] [address]  07/16/2017    UPPER GASTROINTESTINAL ENDOSCOPY Left 07/16/2017    EGD DILATION BALLOON performed by Ruth Gibson MD at [address] [place] [address] Left 8/28/2021    EGD BIOPSY performed by Monica Craig MD at [address] [place] [address] N/A 8/30/2021    EGD DILATION SAVORY performed by Monica Craig MD at [address] [place] [address] N/A 4/28/2022    EGD BIOPSY performed by Monica Craig MD at [address] [place] [address]  4/28/2022    EGD DILATION SAVORY performed by Monica Craig MD at [address] [place] [address] N/A 8/22/2022    EGD DILATION SAVORY performed by Monica Craig MD at [facility] LEFT Left 4/21/2021    US BREAST NEEDLE BIOPSY LEFT 4/21/2021 [address] BREAST NEEDLE BIOPSY RIGHT Right 4/21/2021    US BREAST NEEDLE BIOPSY RIGHT 4/21/2021 [facility] ULTRASOUND    US GUIDED NEEDLE LOC BREAST ADDL LEFT Left 4/21/2021    US GUIDED NEEDLE LOC BREAST ADDL LEFT 4/21/2021 [facility] ULTRASOUND       Family History:  Family History   Problem Relation Age of Onset    Colon Cancer Father     Osteoporosis Mother     Heart Disease Mother        Social History:  Social History     Socioeconomic History    Marital status:      Spouse name: Not on file    Number of children: Not on file    Years of education: Not on file    Highest education level: Not on file   Occupational History    Not on file   Tobacco Use    Smoking status: Never    Smokeless tobacco: Never   Vaping Use    Vaping Use: Never used   Substance and Sexual Activity    Alcohol use: No    Drug use: No    Sexual activity: Not Currently     Partners: Male     Comment:    Other Topics Concern    Not on file   Social History Narrative    Not on file     Social Determinants of Health     Financial Resource Strain: Low Risk     Difficulty of Paying Living Expenses: Not hard at all   Food Insecurity: No Food Insecurity    Worried About Running Out of Food in the Last Year: Never true    [address] in the Last Year: Never true   Transportation Needs: No Transportation Needs    Lack of Transportation (Medical): No    Lack of Transportation (Non-Medical):  No   Physical Activity: Inactive    Days of Exercise per Week: 0 days    Minutes of Exercise per Session: 0 min   Stress: No Stress Concern Present    Feeling of Stress : Not at all   Social Connections: Unknown    Frequency of Communication with Friends and Family: Never    Frequency of Social Gatherings with Friends and Family: Patient refused    Attends [employer] Services: Patient refused    Active Member of Clubs or Organizations: Patient refused    Attends Club or Organization Meetings: Patient refused    Marital Status:    Intimate Partner Violence: Not At Risk    Fear of Current or Ex-Partner: No    Emotionally Abused: No    Physically Abused: No    Sexually Abused: No   Housing Stability: Low Risk     Unable to Pay for Housing in the Last Year: No    Number of Places Lived in the Last Year: 1    Unstable Housing in the Last Year: No       Home Meds:  Prior to Admission medications    Medication Sig Start Date End Date Taking? Authorizing Provider   pantoprazole (PROTONIX) 40 MG tablet Take 1 tablet by mouth once daily 9/13/22   Matt Patiño MD   propafenone [facility]) 150 MG tablet Take 1 tablet by mouth 2 times daily 9/1/22 11/30/22  Matt Patiño MD   rivaroxaban (XARELTO) 20 MG TABS tablet Take 1 tablet by mouth in the morning. 7/25/22   Matt Patiño MD   metoprolol tartrate (LOPRESSOR) 50 MG tablet Patient is taking 1/2 tab BID 7/8/22   Jacob Giraldo MD   ondansetron (ZOFRAN ODT) 4 MG disintegrating tablet Take 1 tablet by mouth every 8 hours as needed for Nausea 4/29/22   Misa Colin MD       Review of Systems:  Constitutional: no fever or chills  Head: No headaches  Eyes: no blurry vision, no discharge  Ears: no ear pain or hearing changes  Nose: no runny nose or epistaxis  Respiratory: no shortness of breath or cough or sputum production  Cardiovascular: no chest pain, no edema  GI: +abdominal pain + nausea, vomiting, diarrhea  : denies any hematuria, no flank pain  Skin: no rash  Musculoskeletal: no joint pain, moves all ext  Neuro: no tremor, no slurred speech  Psychiatric: stable mood, no depression or insomnia    Current Meds:  Infusion:    sodium chloride 100 mL/hr at 10/16/22 0100    sodium chloride      sodium chloride 100 mL/hr at 10/16/22 1202     Meds:    metoprolol tartrate  25 mg Oral BID    propafenone  150 mg Oral BID    rivaroxaban  20 mg Oral Daily with breakfast    sodium chloride flush  10 mL IntraVENous 2 times per day    pantoprazole  40 mg IntraVENous Daily    [START ON 10/17/2022] cefTRIAXone (ROCEPHIN) IV  1,000 mg IntraVENous Q24H     Meds prn: sodium chloride flush, sodium chloride, acetaminophen **OR** acetaminophen, promethazine **OR** ondansetron, morphine     Allergies/Intolerances:   ALLERGIES: Statins, Tape Monica Seaman tape], Tizanidine hcl, Tramadol, Augmentin [amoxicillin-pot clavulanate], and Nystatin    24HR INTAKE/OUTPUT:  No intake or output data in the 24 hours ending 10/16/22 1314  No intake/output data recorded. No intake/output data recorded. Admission weight: 150 lb (68 kg)  Wt Readings from Last 3 Encounters:   10/15/22 150 lb (68 kg)   10/12/22 166 lb 14 oz (75.7 kg)   10/04/22 156 lb 9.6 oz (71 kg)     Body mass index is 24.96 kg/m². Physical Examination:  VITALS:  BP (!) 143/70   Pulse 76   Temp 98.1 °F (36.7 °C) (Oral)   Resp 18   Ht 5' 5\" (1.651 m)   Wt 150 lb (68 kg)   SpO2 91%   BMI 24.96 kg/m²   Weight:   Wt Readings from Last 3 Encounters:   10/15/22 150 lb (68 kg)   10/12/22 166 lb 14 oz (75.7 kg)   10/04/22 156 lb 9.6 oz (71 kg)     Constitutional and General Appearance: alert and cooperative with exam, appears comfortable, no distress  Eyes: no icteric sclera, no pallor conjunctiva, no discharge seen from either eye  Ears and Nose: normal external appearance of left and right ear and nose. No active drainage from nose. Oral: dry oral mucus membranes  Neck: No jugular venous distention, appears symmetric, good ROM  Lungs: Air entry B/L, no crackles or rales, no use of accessory muscles  Heart: regular rate, S1, S2  Extremities:no LE edema  GI: soft, non-tender, no guarding  Skin: no rash seen on exposed extremities  Musculo: moves all extremities  Neuro: no slurred speech, no facial drooping, no asterixis  Psychiatric: Awake, alert, Oriented    Lab Data  CBC:   Recent Labs     10/16/22  0003   WBC 3.9*   HGB 14.2   HCT 41.2        BMP:  Recent Labs     10/16/22  0003 10/16/22  0515   * 125*   K 3.4* 3.5   CL 87* 88*   CO2 26 27   BUN 14 12   CREATININE 0.7 0.8   GLUCOSE 116* 90   CALCIUM 8.6 8.4*   MG 1.8 1.8     PTH: @PTH@  TSH: No results for input(s): TSH in the last 72 hours. HgBa1c: No results for input(s): LABA1C in the last 72 hours.   Hepatic:   Recent Labs 10/16/22  0003   LABALBU 3.0*   AST 30   ALT 20   BILITOT 0.6   ALKPHOS 96     ABGs: No results found for: PHART, PO2ART, [id number]  Troponin: No results for input(s): TROPONINI in the last 72 hours. BNP: No results for input(s): BNP in the last 72 hours. Old labs reviewed. Impression and Plan:  Hyponatremia likely due to volume depletion from vomiting and diarrhea. Currently no 0.9 @ 100 ml/hour. Trend sodium q 6 hours and will adjust accordingly. Goal increase by about 6 meq in 24 hours  Hypokalemia, improved  Diarrhea  Colitis  Possible UTI  COVID-19 +  PAfib    Thank you for allowing me to participate in the care of this patient. Please feel free to call me if you have any questions.      Electronically signed by Anahy Mccullough DO on 10/16/22 at 1:14 PM EDT    [facility]

## 2022-10-17 LAB
ANION GAP SERPL CALCULATED.3IONS-SCNC: 10 MEQ/L (ref 8–16)
BASOPHILS # BLD: 0.4 %
BASOPHILS ABSOLUTE: 0 THOU/MM3 (ref 0–0.1)
BUN BLDV-MCNC: 6 MG/DL (ref 7–22)
CALCIUM SERPL-MCNC: 8.1 MG/DL (ref 8.5–10.5)
CHLORIDE BLD-SCNC: 93 MEQ/L (ref 98–111)
CO2: 24 MEQ/L (ref 23–33)
CREAT SERPL-MCNC: 0.7 MG/DL (ref 0.4–1.2)
EKG ATRIAL RATE: 220 BPM
EKG P AXIS: 59 DEGREES
EKG Q-T INTERVAL: 468 MS
EKG QRS DURATION: 92 MS
EKG QTC CALCULATION (BAZETT): 497 MS
EKG R AXIS: 96 DEGREES
EKG T AXIS: 56 DEGREES
EKG VENTRICULAR RATE: 68 BPM
EOSINOPHIL # BLD: 2.8 %
EOSINOPHILS ABSOLUTE: 0.1 THOU/MM3 (ref 0–0.4)
ERYTHROCYTE [DISTWIDTH] IN BLOOD BY AUTOMATED COUNT: 14.6 % (ref 11.5–14.5)
ERYTHROCYTE [DISTWIDTH] IN BLOOD BY AUTOMATED COUNT: 42.8 FL (ref 35–45)
GFR SERPL CREATININE-BSD FRML MDRD: 79 ML/MIN/1.73M2
GLUCOSE BLD-MCNC: 81 MG/DL (ref 70–108)
HCT VFR BLD CALC: 41 % (ref 37–47)
HEMOGLOBIN: 13.6 GM/DL (ref 12–16)
IMMATURE GRANS (ABS): 0.08 THOU/MM3 (ref 0–0.07)
IMMATURE GRANULOCYTES: 1.6 %
INR BLD: 1.3 (ref 0.85–1.13)
LACTIC ACID: 0.7 MMOL/L (ref 0.5–2)
LYMPHOCYTES # BLD: 25 %
LYMPHOCYTES ABSOLUTE: 1.3 THOU/MM3 (ref 1–4.8)
MCH RBC QN AUTO: 27 PG (ref 26–33)
MCHC RBC AUTO-ENTMCNC: 33.2 GM/DL (ref 32.2–35.5)
MCV RBC AUTO: 81.3 FL (ref 81–99)
MONOCYTES # BLD: 11.7 %
MONOCYTES ABSOLUTE: 0.6 THOU/MM3 (ref 0.4–1.3)
NUCLEATED RED BLOOD CELLS: 0 /100 WBC
ORGANISM: ABNORMAL
PLATELET # BLD: 187 THOU/MM3 (ref 130–400)
PMV BLD AUTO: 9.4 FL (ref 9.4–12.4)
POTASSIUM SERPL-SCNC: 3.6 MEQ/L (ref 3.5–5.2)
POTASSIUM SERPL-SCNC: 3.9 MEQ/L (ref 3.5–5.2)
RBC # BLD: 5.04 MILL/MM3 (ref 4.2–5.4)
SEG NEUTROPHILS: 58.5 %
SEGMENTED NEUTROPHILS ABSOLUTE COUNT: 2.9 THOU/MM3 (ref 1.8–7.7)
SODIUM BLD-SCNC: 125 MEQ/L (ref 135–145)
SODIUM BLD-SCNC: 126 MEQ/L (ref 135–145)
SODIUM BLD-SCNC: 127 MEQ/L (ref 135–145)
SODIUM BLD-SCNC: 128 MEQ/L (ref 135–145)
URINE CULTURE REFLEX: ABNORMAL
WBC # BLD: 5 THOU/MM3 (ref 4.8–10.8)

## 2022-10-17 PROCEDURE — 1200000003 HC TELEMETRY R&B

## 2022-10-17 PROCEDURE — 97116 GAIT TRAINING THERAPY: CPT

## 2022-10-17 PROCEDURE — 6370000000 HC RX 637 (ALT 250 FOR IP): Performed by: HOSPITALIST

## 2022-10-17 PROCEDURE — 85025 COMPLETE CBC W/AUTO DIFF WBC: CPT

## 2022-10-17 PROCEDURE — 97162 PT EVAL MOD COMPLEX 30 MIN: CPT

## 2022-10-17 PROCEDURE — 97535 SELF CARE MNGMENT TRAINING: CPT

## 2022-10-17 PROCEDURE — C9113 INJ PANTOPRAZOLE SODIUM, VIA: HCPCS | Performed by: HOSPITALIST

## 2022-10-17 PROCEDURE — 99232 SBSQ HOSP IP/OBS MODERATE 35: CPT | Performed by: PHYSICIAN ASSISTANT

## 2022-10-17 PROCEDURE — 80048 BASIC METABOLIC PNL TOTAL CA: CPT

## 2022-10-17 PROCEDURE — 2580000003 HC RX 258: Performed by: HOSPITALIST

## 2022-10-17 PROCEDURE — 85610 PROTHROMBIN TIME: CPT

## 2022-10-17 PROCEDURE — 6370000000 HC RX 637 (ALT 250 FOR IP): Performed by: INTERNAL MEDICINE

## 2022-10-17 PROCEDURE — 36415 COLL VENOUS BLD VENIPUNCTURE: CPT

## 2022-10-17 PROCEDURE — 6360000002 HC RX W HCPCS: Performed by: HOSPITALIST

## 2022-10-17 PROCEDURE — 84132 ASSAY OF SERUM POTASSIUM: CPT

## 2022-10-17 PROCEDURE — 97166 OT EVAL MOD COMPLEX 45 MIN: CPT

## 2022-10-17 PROCEDURE — 99232 SBSQ HOSP IP/OBS MODERATE 35: CPT | Performed by: INTERNAL MEDICINE

## 2022-10-17 PROCEDURE — 83605 ASSAY OF LACTIC ACID: CPT

## 2022-10-17 PROCEDURE — 1200000000 HC SEMI PRIVATE

## 2022-10-17 PROCEDURE — 84295 ASSAY OF SERUM SODIUM: CPT

## 2022-10-17 PROCEDURE — 93010 ELECTROCARDIOGRAM REPORT: CPT | Performed by: INTERNAL MEDICINE

## 2022-10-17 RX ORDER — SODIUM CHLORIDE 1000 MG
1 TABLET, SOLUBLE MISCELLANEOUS
Status: DISPENSED | OUTPATIENT
Start: 2022-10-17 | End: 2022-10-18

## 2022-10-17 RX ADMIN — PROPAFENONE HYDROCHLORIDE 150 MG: 150 TABLET, FILM COATED ORAL at 09:01

## 2022-10-17 RX ADMIN — PROPAFENONE HYDROCHLORIDE 150 MG: 150 TABLET, FILM COATED ORAL at 20:12

## 2022-10-17 RX ADMIN — CEFTRIAXONE SODIUM 1000 MG: 1 INJECTION, POWDER, FOR SOLUTION INTRAMUSCULAR; INTRAVENOUS at 03:54

## 2022-10-17 RX ADMIN — SODIUM CHLORIDE 1 G: 1 TABLET ORAL at 16:25

## 2022-10-17 RX ADMIN — METOPROLOL TARTRATE 25 MG: 25 TABLET, FILM COATED ORAL at 20:12

## 2022-10-17 RX ADMIN — METOPROLOL TARTRATE 25 MG: 25 TABLET, FILM COATED ORAL at 09:01

## 2022-10-17 RX ADMIN — PANTOPRAZOLE SODIUM 40 MG: 40 INJECTION, POWDER, FOR SOLUTION INTRAVENOUS at 03:54

## 2022-10-17 RX ADMIN — SODIUM CHLORIDE, PRESERVATIVE FREE 10 ML: 5 INJECTION INTRAVENOUS at 20:13

## 2022-10-17 RX ADMIN — RIVAROXABAN 20 MG: 20 TABLET, FILM COATED ORAL at 17:26

## 2022-10-17 ASSESSMENT — ENCOUNTER SYMPTOMS
CONSTIPATION: 0
SHORTNESS OF BREATH: 0
TROUBLE SWALLOWING: 0
NAUSEA: 0
BLOOD IN STOOL: 0
EYE PAIN: 0
COUGH: 0
ABDOMINAL PAIN: 0
DIARRHEA: 0
VOMITING: 0

## 2022-10-17 NOTE — CARE COORDINATION
Case Management Assessment  Initial Evaluation    Date/Time of Evaluation: 10/17/2022 3:52 PM  Assessment Completed by: Rin Telles RN    If patient is discharged prior to next notation, then this note serves as note for discharge by case management. Patient Name: Dave Alford                   YOB: 1934  Diagnosis: Acute hyponatremia [E87.1]  Hyponatremia [E87.1]  Pyuria [R82.81]  Intractable nausea and vomiting [R11.2]  Nonintractable headache, unspecified chronicity pattern, unspecified headache type [R51.9]  Persistent headaches [R51.9]                   Date / Time: 10/15/2022 11:41 PM    Patient Admission Status: Inpatient     Current PCP: Yecenia Zeng MD  PCP verified by ? Yes    Chart Reviewed: Yes      Patient Orientation: Alert and Oriented    Patient Cognition: Alert  History Provided by: Patient    Hospitalization in the last 30 days (Readmission):  Yes    If yes, Readmission Assessment in  Navigator will be completed. Advance Directives:     Code Status: Full Code     Primary Decision Chucho Bergman - [phone]    Discharge Planning  Patient lives with: Spouse/Significant Other Type of Home: House   Primary Caregiver: Self  Patient Support Systems include:     Current Financial resources: Medicare (verified primary and secondary insurance with patient)  Current community resources:  (none)  Current services prior to admission: Durable Medical Equipment (walker)   Type of Home Care services:  None    ADLS  Prior functional level: Independent in ADLs/IADLs  Current functional level: Independent in ADLs/IADLs      Family can provide assistance at DC: Other (comment) ( currently has COVID)  Would you like Case Management to discuss the discharge plan with any other family members/significant others, and if so, who?  Yes ( and daughter)  Plans to Return to Present Housing: Unknown at present  Other Identified Issues/Barriers to RETURNING to current housing: see below  Potential Assistance needed at discharge: Joselito Lynch  Patient expects to discharge to: Skilled nursing facility (requests to go to [facility])  Plan for transportation at discharge: Self    Financial  Payor: MEDICARE / Plan: MEDICARE PART A AND B / Product Type: *No Product type* /     Does insurance require precert for SNF: No    Potential assistance Purchasing Medications: No  Meds-to-Beds request: Yes      [facility] [address], [address] [facility] [address] [phone] - F [phone]  [address] [facility] [address]  Phone: [phone] Fax: [phone]    [address] [facility], [address] [facility] [phone] Lovena Manner [phone]  [address] [place] [address]  Phone: [phone] Fax: [phone]    [address] [facility] [facility] - [place] [address], [facility] [address]  [address] [address]  Phone: [phone] Fax: [phone]    [address] [place] [address] [phone] Lovena Manner [phone]  [address] [facility] [address]  Phone: [phone] Fax: [phone]      Factors facilitating achievement of predicted outcomes: Family support    Barriers to discharge: Medical complications    Additional Case Management Notes: Admit from ER with headache and nausea. Consult to nephrology for hyponatremia, likely due to volume depletion. UA  shows moderate leukocytes and mixed growth in culture. Regular diet. GI panel ordered. PT & OT to see. Receivin.9 NaCl at 100 ml/hr, IV Rocephin, IV Protonix. PRN pain and nausea control. 10/16/22 00:03 10/16/22 05:15 10/16/22 13:26 10/16/22 20:49 10/17/22 00:56   Sodium 123 (L) 125 (L) 126 (L) 126 (L) 126 (L)     10/16 CT Abd/Plv: Diffuse mucosal thickening of the colon.  Regions of mucosal enhancement of the colon with intraluminal fluid greatest involving the sigmoid and extending to the level of the distal rectum. Overall findings are most consistent with moderate to severe changes of infectious versus inflammatory colitis. Extensive colonic diverticulosis of the sigmoid   colon. Small volume free fluid within the lower pelvis/cul-de-sac likely physiologic or reactive. Trace bilateral pleural effusions. Interstitial densities and minimal consolidation of the left lung base may represent infectious or inflammatory process. Pulmonary nodule within the right lung base measuring 13 mm on image 6   of series 2, finding is stable in size when compared to prior examination dated September 4, 2021. Recommend 12 months interval follow-up to ensure   stability for 2 years. Splenomegaly. The Plan for Transition of Care is related to the following treatment goals of Acute hyponatremia [E87.1]  Hyponatremia [E87.1]  Pyuria [R82.81]  Intractable nausea and vomiting [R11.2]  Nonintractable headache, unspecified chronicity pattern, unspecified headache type [R51.9]  Persistent headaches [R51.9]    Patient Goals/Plan/Treatment Preferences: From home with spouse. Requesting SNF placement. SW consulted. Transportation/Food Security/Housekeeping Addressed:  No issues identified.      Aliya Hoang RN  Case Management Department

## 2022-10-17 NOTE — PROGRESS NOTES
Comprehensive Nutrition Assessment    Type and Reason for Visit:  Initial, Positive Nutrition Screen (weight loss, poor po)    Nutrition Recommendations/Plan:   ONS: Ensure Enlive TID  Diet as per Physician   Consider MVI and Probiotic when able to tolerate     Malnutrition Assessment:  Malnutrition Status: Moderate malnutrition (10/17/22 7927)    Context:  Acute Illness     Findings of the 6 clinical characteristics of malnutrition:  Energy Intake:  75% or less of estimated energy requirements for 7 or more days  Weight Loss:  No significant weight loss     Body Fat Loss:  Mild body fat loss Orbital   Muscle Mass Loss:  Mild muscle mass loss Temples (temporalis)  Fluid Accumulation:  Unable to assess     Strength:  Not Performed    Nutrition Assessment:     Pt. moderately malnourished AEB criteria as listed above. At risk for further nutrition compromise r/t admit with hyponatremia, recent COVID and underlying medical condition (atrial fib, HTN, esophageal dilation). Nutrition Related Findings:    Pt. Report/Treatments/Miscellaneous: Reports poor appetite and intake of only bites of each meal for the past month due to feeling ill and having stomach ache, reports 3 meals daily and drinks ONS PTA typically daily, acceptance of Ensure Enlive  GI Status: reports stomach ache and nausea, diarrhea, vomit x 1 after feeling dizzy after taking shower, reports had trouble in past with swallowing and thus esophagus dilated, denies difficulty swallowing at present  Pertinent Labs: Sodium 127, Potassium 3.6, BUN 6, Creatinine 0.7, glucose 81  Pertinent Meds: rocephin, sodium chloride tab, prn zofran      Wound Type: None       Current Nutrition Intake & Therapies:    Average Meal Intake: 1-25%  Average Supplements Intake: %  ADULT DIET;  Regular  ADULT ORAL NUTRITION SUPPLEMENT; Breakfast, Lunch, Dinner; Standard High Calorie/High Protein Oral Supplement    Anthropometric Measures:  Height: 5' 5\" (165.1 cm)  Ideal Body Weight (IBW): 125 lbs (57 kg)    Admission Body Weight: 152 lb (68.9 kg) (at doctor office on day of admission per patient report)  Current Body Weight: 158 lb 3.2 oz (71.8 kg) (10/17; bedscale weight obtained by writer; +1 pitting BLE edema),    Current BMI (kg/m2): 26.3  Usual Body Weight: 160 lb (72.6 kg) (160# per patient report; per EMR: 10/12/22 (day of discharge) 166# 14oz, 9/22/22 156# 3oz, 5/5/22 158# 6oz, 9/8/21 157# 8oz)  % Weight Change (Calculated): -1.1                    BMI Categories: Overweight (BMI 25.0-29. 9)    Estimated Daily Nutrient Needs:  Energy Requirements Based On: Kcal/kg  Weight Used for Energy Requirements: Current (72kgm on 10/17)  Energy (kcal/day): 8528-7957 kcals (20-25)  Weight Used for Protein Requirements: Ideal (57kgm)  Protein (g/day): 68-86 grams (1.2-1.5)     Fluid (ml/day): as per Physician    Nutrition Diagnosis:   Moderate malnutrition, In context of acute illness or injury related to inadequate protein-energy intake as evidenced by Criteria as identified in malnutrition assessment    Nutrition Interventions:   Food and/or Nutrient Delivery: Continue Current Diet, Start Oral Nutrition Supplement  Nutrition Education/Counseling: Education initiated  Coordination of Nutrition Care: Continue to monitor while inpatient       Goals:     Goals: PO intake 75% or greater, by next RD assessment       Nutrition Monitoring and Evaluation:      Food/Nutrient Intake Outcomes: Food and Nutrient Intake, Supplement Intake  Physical Signs/Symptoms Outcomes: Biochemical Data, Fluid Status or Edema, Meal Time Behavior, Nutrition Focused Physical Findings, Skin, Weight, GI Status    Discharge Planning:     Too soon to determine     Vicki Stafford RD, LD  Contact: [phone]

## 2022-10-17 NOTE — PROGRESS NOTES
Hospitalist Progress Note      Patient:  Fran Uribe    Unit/Bed:7K-23/023-A  YOB: 1934  MRN: [id number]   Acct: [de-identified]   PCP: Olena Arteaga MD  Date of Admission: 10/15/2022    Assessment/Plan:    Hyponatremia, acute on chronic: Nephrology consulted. Sodium 123, 125, 126, 126, 128. IVF, Fluid restriction & salt tabs started today. Possible UTI: Empiric IV Rocephin. Urine culture showed mixed growth, we will continue ABX for 3 days. Mild hypokalemia: Likely due to diarrhea. Resolved. Paroxysmal atrial fibrillation: Presently in sinus rhythm. Resume Eliquis, metoprolol, propafenone  Generalized weakness: Likely due to diarrhea, hyponatremia with poor oral intake. Consult PT and OT. Recently diagnosed COVID 19 infection: Out of isolation now. Chief Complaint: Persistent HA     Initial H and P:-    Initial H&P \"80year-old female with paroxysmal atrial fibrillation on Eliquis, recently diagnosed COVID-19 infection without oxygen requirement, presented with headache, diarrhea, and epigastric abdominal discomfort that had persisted for the past 1 week. Reported associated generalized weakness, loss of appetite, persistent nausea with one episode of vomiting. Described headache as global, persistent, unresponsive to over-the-counter analgesics. Said stools about 4 times per day, watery and nonbloody. While abdominal pain was localized in the subxiphoid area, 10/10 intensity, continuous, nonradiating, without known aggravators, relieved to 3/10 intensity following IV morphine administered in the ED. No fever, dysuria, shortness of breath, dizziness, chest pain or cough. Of note patient was recently hospitalized at this facility (10/8/ - 12/2022) for fall secondary to orthostatic hypotension and COVID-19 infection.     Evaluation in the ED remarkable for WBC 3.9, sodium 123, potassium 3.4, chloride 87, serum osmolality 249.2, glucose 116. Urinalysis suggestive of UTI. She was managed with IVF normal saline, IV Rocephin x1 dose prior to admission to the medical unit. \"     Subjective (past 24 hours):   No acute events overnight. Pt states that her body aches are worse today. Pt and this provider spoke about the importance of sitting in the chair today. Pt has no other issues or concerns at this time. Past medical history, family history, social history and allergies reviewed again and is unchanged since admission. ROS (All review of systems completed. Pertinent positives noted. Otherwise All other systems reviewed and negative.)     Medications:  Reviewed    Infusion Medications    sodium chloride      sodium chloride 100 mL/hr at 10/16/22 2053     Scheduled Medications    sodium chloride  1 g Oral TID WC    metoprolol tartrate  25 mg Oral BID    propafenone  150 mg Oral BID    rivaroxaban  20 mg Oral Daily with breakfast    sodium chloride flush  10 mL IntraVENous 2 times per day    pantoprazole  40 mg IntraVENous Daily    cefTRIAXone (ROCEPHIN) IV  1,000 mg IntraVENous Q24H     PRN Meds: sodium chloride flush, sodium chloride, acetaminophen **OR** acetaminophen, promethazine **OR** ondansetron, morphine    No intake or output data in the 24 hours ending 10/17/22 1400    Diet:  ADULT DIET; Regular  ADULT ORAL NUTRITION SUPPLEMENT; Breakfast, Lunch, Dinner; Standard High Calorie/High Protein Oral Supplement    Physical Exam:  /77   Pulse 88   Temp 97.8 °F (36.6 °C) (Oral)   Resp 10   Ht 5' 5\" (1.651 m)   Wt 150 lb (68 kg)   SpO2 93%   BMI 24.96 kg/m²   General appearance: No apparent distress, appears stated age and cooperative. Very fatigued   HEENT: Pupils equal, round, and reactive to light. Conjunctivae/corneas clear. Neck: Supple, with full range of motion. No jugular venous distention. Trachea midline. Respiratory:  Normal respiratory effort on RA.  Clear to auscultation, bilaterally without Rales/Wheezes/Rhonchi. Cardiovascular: Regular rate and rhythm with normal S1/S2 without murmurs, rubs or gallops. Abdomen: Soft, non-tender, non-distended with normal bowel sounds. Musculoskeletal: passive and active ROM x 4 extremities. Skin: Skin color, texture, turgor normal.  No rashes or lesions. Neurologic:  Neurovascularly intact without any focal sensory/motor deficits. Cranial nerves: II-XII intact, grossly non-focal.  Psychiatric: Alert and oriented  Capillary Refill: Brisk,< 3 seconds   Peripheral Pulses: +2 palpable, equal bilaterally     Labs:   Recent Labs     10/16/22  0003 10/17/22  0056   WBC 3.9* 5.0   HGB 14.2 13.6   HCT 41.2 41.0    187     Recent Labs     10/16/22  0003 10/16/22  0515 10/16/22  1326 10/16/22  2049 10/17/22  0056 10/17/22  0739   * 125*   < > 126* 126* 128*   K 3.4* 3.5  --   --  3.9  --    CL 87* 88*  --   --   --   --    CO2 26 27  --   --   --   --    BUN 14 12  --   --   --   --    CREATININE 0.7 0.8  --   --   --   --    CALCIUM 8.6 8.4*  --   --   --   --     < > = values in this interval not displayed. Recent Labs     10/16/22  0003   AST 30   ALT 20   BILITOT 0.6   ALKPHOS 96     Recent Labs     10/16/22  0045 10/17/22  0056   INR 1.03 1.30*     No results for input(s): Ashley Garcia in the last 72 hours.     Microbiology:    Blood culture #1:   Lab Results   Component Value Date/Time    BC No growth-preliminary No growth 09/05/2021 07:38 PM       Blood culture #2:No results found for: Russ Wooten    Organism:  Lab Results   Component Value Date/Time    ORG Mixed Growth 10/16/2022 02:11 AM       No results found for: LABGRAM    MRSA culture only:No results found for: [facility]    Urine culture:   Lab Results   Component Value Date/Time    LABURIN No growth-preliminary 05/05/2022 03:24 PM    LABURIN Lucile count: >100,000 CFU/mL 05/05/2022 03:24 PM       Respiratory culture: No results found for: CULTRESP    Aerobic and Anaerobic :  No results found for: LABAERO  No results found for: LABANAE    Urinalysis:      Lab Results   Component Value Date/Time    NITRU NEGATIVE 10/16/2022 02:11 AM    WBCUA > 100 10/16/2022 02:11 AM    BACTERIA NONE SEEN 10/16/2022 02:11 AM    RBCUA 0-2 10/16/2022 02:11 AM    BLOODU NEGATIVE 10/16/2022 02:11 AM    SPECGRAV 1.025 05/05/2022 02:47 PM    GLUCOSEU NEGATIVE 10/16/2022 02:11 AM       Radiology:  CT ABDOMEN PELVIS W IV CONTRAST Additional Contrast? None   Final Result   1. Diffuse mucosal thickening of the colon. Regions of mucosal enhancement    of the colon with intraluminal fluid greatest involving the sigmoid and    extending to the level of the distal rectum. Overall findings are most    consistent with moderate to severe changes of infectious versus    inflammatory colitis. Extensive colonic diverticulosis of the sigmoid    colon. 2. Small volume free fluid within the lower pelvis/cul-de-sac likely    physiologic or reactive. 3. Trace bilateral pleural effusions. Interstitial densities and minimal    consolidation of the left lung base may represent infectious or    inflammatory process. 4. Pulmonary nodule within the right lung base measuring 13 mm on image 6    of series 2, finding is stable in size when compared to prior examination    dated September 4, 2021. Recommend 12 months interval follow-up to ensure    stability for 2 years. 5. Splenomegaly. 6. Innumerable chronic findings as above. This document has been electronically signed by: Francisca Bailey DO on    10/16/2022 05:27 AM      All CTs at this facility use dose modulation techniques and iterative    reconstructions, and/or weight-based dosing   when appropriate to reduce radiation to a low as reasonably achievable. CT HEAD WO CONTRAST   Final Result   Impression:   Negative for acute intracranial abnormality.       This document has been electronically signed by: Emily Nicole MD on    10/16/2022 02:04 AM      All CTs at this facility use dose modulation techniques and iterative    reconstructions, and/or weight-based dosing   when appropriate to reduce radiation to a low as reasonably achievable. XR CHEST PORTABLE   Final Result      No acute pulmonary disease. Cardiomegaly. This document has been electronically signed by: Elizabeth Heath MD on    10/16/2022 01:49 AM        CT HEAD WO CONTRAST    Result Date: 10/16/2022  CT Head without contrast Indication: Headache. Technique: CT head without contrast. Coronal and sagittal reformations. Comparison: CT/SR - CT HEAD WO CONTRAST - 10/08/2022 01:52 PM EDT Findings: The ventricular system is midline in position. No acute major vessel vascular territory infarct. No acute intraparenchymal hemorrhage. No parenchymal mass lesions. Cerebral atrophy. Hypodensities in the cerebral white matter, likely chronic small vessel ischemic changes. No abnormal extra-axial masses or fluid collections. The basal cisterns and foramen magnum are patent. Vascular calcifications. The calvarium is intact. Small mucous retention cyst/polyp right maxillary sinus. Prior bilateral lens replacements. Impression: Negative for acute intracranial abnormality. This document has been electronically signed by: Fabiana Graves MD on 10/16/2022 02:04 AM All CTs at this facility use dose modulation techniques and iterative reconstructions, and/or weight-based dosing when appropriate to reduce radiation to a low as reasonably achievable. CT ABDOMEN PELVIS W IV CONTRAST Additional Contrast? None    Result Date: 10/16/2022  CT abdomen and pelvis with contrast Comparison: September 4, 2021 Findings: Partially visualized cardiac apex is normal in size. Subsegmental atelectatic change involving the bilateral lung bases. Pulmonary nodule within the right lung base measuring 13 mm on image 6 of series 2, finding is stable in size when compared to prior examination dated September 4, 2021.  Recommend 12 months interval follow-up to ensure stability for 2 years. Trace bilateral pleural effusions. Interstitial densities and minimal consolidation of the left lung base may represent infectious or inflammatory process. The stomach is nondistended. Atherosclerosis of the abdominal aorta extending into the iliac vasculature. The liver is unremarkable. Mild dilatation of the intrahepatic and neck hepatic biliary ducts likely on the basis of prior cholecystectomy. Advanced pancreatic atrophy. Splenomegaly. Bilateral adrenal glands are normal. Cortical cyst of the superior right kidney. No nephrolithiasis or hydronephrosis Portions of the bladder not visualized due to extensive metallic beam hardening artifact. The partially visualized urinary bladder is unremarkable. Pessary device. Hysterectomy. Small volume free fluid within the lower pelvis likely physiologic or reactive. Surgical changes of the ventral midline abdominal wall likely from hernia repair. Diffuse mucosal thickening of the colon. Regions of mucosal enhancement of the colon with intraluminal fluid greatest involving the sigmoid and extending to the level of the distal rectum. Overall findings are most consistent with moderate to severe changes of infectious versus inflammatory colitis. Extensive colonic diverticulosis of the sigmoid colon. No bowel obstruction, pneumoperitoneum, or pneumatosis. The appendix is not identified with certainty. The bones are intact. Left hip arthroplasty resulting in significant metallic beam hardening artifact partially obscuring visualization of the surrounding structures. Spondylosis of the lumbar spine. 1. Diffuse mucosal thickening of the colon. Regions of mucosal enhancement of the colon with intraluminal fluid greatest involving the sigmoid and extending to the level of the distal rectum. Overall findings are most consistent with moderate to severe changes of infectious versus inflammatory colitis. Extensive colonic diverticulosis of the sigmoid colon. 2. Small volume free fluid within the lower pelvis/cul-de-sac likely physiologic or reactive. 3. Trace bilateral pleural effusions. Interstitial densities and minimal consolidation of the left lung base may represent infectious or inflammatory process. 4. Pulmonary nodule within the right lung base measuring 13 mm on image 6 of series 2, finding is stable in size when compared to prior examination dated September 4, 2021. Recommend 12 months interval follow-up to ensure stability for 2 years. 5. Splenomegaly. 6. Innumerable chronic findings as above. This document has been electronically signed by: Corinne Nova DO on 10/16/2022 05:27 AM All CTs at this facility use dose modulation techniques and iterative reconstructions, and/or weight-based dosing when appropriate to reduce radiation to a low as reasonably achievable. XR CHEST PORTABLE    Result Date: 10/16/2022  Chest one view COMPARISON: 10/8/22 FINDINGS: No pulmonary consolidation, pleural effusion or pneumothorax is seen. The cardiac silhouette is enlarged. No acute pulmonary disease. Cardiomegaly.  This document has been electronically signed by: Eliz Kaplan MD on 10/16/2022 01:49 AM      Electronically signed by EVERETTE Baker on 10/17/2022 at 2:00 PM

## 2022-10-17 NOTE — PROGRESS NOTES
Davina Lam 60  INPATIENT OCCUPATIONAL THERAPY  [facility] ORTHOPEDICS 7K  EVALUATION    Time:    Time In: 4659  Time Out: 1450  Timed Code Treatment Minutes: 15 Minutes  Minutes: 30          Date: 10/17/2022  Patient Name: Abhishek Castillo,   Gender: female      MRN: [id number]  : 1934  (80 y.o.)  Referring Practitioner: Dr. Willie Tenorio  Diagnosis: acute hyponatremia  Additional Pertinent Hx: 80 y.o. female who presents to Emergency Department with Nausea and Headache     Nausea vomiting and diarrhea with headache a couple days ago. Admitted by hospitalist a week ago after fall on 10/8/2022. Head CT was negative then. She was also tested positive for COVID a week ago. She lives at home with her . She walks with a walker at  home. She complains headache and abdominal pain since discharge. No more falls since she was discharged    Restrictions/Precautions:  Restrictions/Precautions: Fall Risk    Subjective  Chart Reviewed: Yes, Orders, Progress Notes, History and Physical  Patient assessed for rehabilitation services?: Yes    Subjective: Pt lyingi n bed requiring encouragement ot participate d/t not feeling well    Pain: 0 /10:      Vitals: Vitals not assessed per clinical judgement, see nursing flowsheet    Social/Functional History:  Lives With: Spouse  Type of Home: House  Home Layout: Two level, Bed/Bath upstairs, 1/2 bath on main level  Home Access: Stairs to enter with rails  Entrance Stairs - Number of Steps: 1 step with grab bar   Bathroom Shower/Tub: Walk-in shower, Shower chair with back  Bathroom Toilet: Handicap height       ADL Assistance: Independent  Ambulation Assistance: Independent  Transfer Assistance: Independent          Additional Comments: per pt was having difficulty with home mobility following recent hospital admi and was looking into SNF for rehab.  Pt stating she was very fatigued after completing her ADL tasks including showering    VISION:WNL corrected with glasses    HEARING:  WNL    COGNITION: WNL    RANGE OF MOTION:  Bilateral Upper Extremity:  WNL    STRENGTH:  Bilateral Upper Extremity:  WNL    SENSATION:   WFL    ADL:   Grooming: Contact Guard Assistance. Standing at sink to wash hands with education on walker safety to keep in front of him at all times   Toileting: [employer]. During hygiene in standing and clothing management   Toilet Transfer: [employer]. Maryam Perdomo BALANCE:  Standing Balance: Contact Guard Assistance. With no UE support during ADL asks     BED MOBILITY:  Supine to Sit: Stand By Assistance    Sit to Supine: Stand By Assistance      TRANSFERS:  Sit to Stand:  [employer]. From EOB   Stand to Sit: Contact Guard Assistance. Onto eOB     FUNCTIONAL MOBILITY:  Assistive Device: Rolling Walker  Assist Level:  Contact Guard Assistance. Distance: To and from bathroom  Slow pace with no LOB during          Activity Tolerance:  Patient tolerance of  treatment: fair. Assessment:  Assessment: Pt admitted with increased weakness and nausea and vomiting as well. Pt demo decreased endruance to complete her ADL tasks and mobility at OF requiring further skilled OT services to increase her endurance and indepw ith ADL tasks  Performance deficits / Impairments: Decreased functional mobility , Decreased safe awareness, Decreased balance, Decreased ADL status, Decreased endurance, Decreased strength  Prognosis: Fair  REQUIRES OT FOLLOW-UP: Yes  Decision Making: Medium Complexity    Treatment Initiated: Treatment and education initiated within context of evaluation. Evaluation time included review of current medical information, gathering information related to past medical, social and functional history, completion of standardized testing, formal and informal observation of tasks, assessment of data and development of plan of care and goals.   Treatment time included skilled education and facilitation of tasks to increase safety and independence with ADL's for improved functional independence and quality of life. Discharge Recommendations:  [address], Patient would benefit from continued therapy after discharge    Patient Education:     Patient Education  Education Given To: Patient  Education Provided: Role of Therapy, Plan of Care  Education Method: Verbal  Barriers to Learning: None  Education Outcome: Continued education needed    Equipment Recommendations: Other: defer to discharge environment    Plan:  Times Per Week: 3-5x  Current Treatment Recommendations: Strengthening, Patient/Caregiver education & training, Self-Care / ADL, Balance training, Functional mobility training, Endurance training, Safety education & training, Equipment evaluation, education, & procurement. See long-term goal time frame for expected duration of plan of care. If no long-term goals established, a short length of stay is anticipated. Goals:  Patient goals : get stronger after a rehab stay  Short Term Goals  Time Frame for Short Term Goals: by discharge  Short Term Goal 1: pt to complete BADL routine with SBA and no cues for safety  Short Term Goal 2: pt to icnrease endurance to navigate HH distances using aD with no rest breaks in oprep for completing her ADL tasks and simple homemaking tasks  Short Term Goal 3: Pt to dmeo dyanmic standing balance > 5 min with no UE support and SBA in prep for showering tasks and other ADL asks         Following session, patient left in safe position with all fall risk precautions in place.

## 2022-10-17 NOTE — PLAN OF CARE
Problem: Pain  Goal: Verbalizes/displays adequate comfort level or baseline comfort level  Outcome: Progressing  Flowsheets (Taken 10/17/2022 0312)  Verbalizes/displays adequate comfort level or baseline comfort level:   Encourage patient to monitor pain and request assistance   Assess pain using appropriate pain scale   Administer analgesics based on type and severity of pain and evaluate response   Implement non-pharmacological measures as appropriate and evaluate response     Problem: Discharge Planning  Goal: Discharge to home or other facility with appropriate resources  Recent Flowsheet Documentation  Taken 10/16/2022 2045 by Fran Brown RN  Discharge to home or other facility with appropriate resources: Identify barriers to discharge with patient and caregiver

## 2022-10-17 NOTE — PLAN OF CARE
Problem: Discharge Planning  Goal: Discharge to home or other facility with appropriate resources  Outcome: Progressing  Flowsheets (Taken 10/17/2022 0845)  Discharge to home or other facility with appropriate resources:   Identify barriers to discharge with patient and caregiver   Arrange for needed discharge resources and transportation as appropriate     Problem: Pain  Goal: Verbalizes/displays adequate comfort level or baseline comfort level  10/17/2022 1607 by Michelle Sanchez RN  Outcome: Progressing  Flowsheets (Taken 10/17/2022 0845)  Verbalizes/displays adequate comfort level or baseline comfort level:   Encourage patient to monitor pain and request assistance   Assess pain using appropriate pain scale     Problem: Safety - Adult  Goal: Free from fall injury  10/17/2022 1607 by Michelle Sanchez RN  Outcome: Progressing  Call light within reach. Bed alarm on. Problem: Nutrition Deficit:  Goal: Optimize nutritional status  Outcome: Progressing  Pt has good po intake today. Care plan reviewed with patient. Patient verbalize understanding of the plan of care and contribute to goal setting. [place]

## 2022-10-17 NOTE — PROGRESS NOTES
[address] [address]  INPATIENT PHYSICAL THERAPY  EVALUATION  [facility] ORTHOPEDICS 7K - 7K-23/023-A    Time In: 5933  Time Out: 1059  Timed Code Treatment Minutes: 8 Minutes  Minutes: 15          Date: 10/17/2022  Patient Name: Gertrudis Magallon,  Gender:  female        MRN: [id number]  : 1934  (80 y.o.)      Referring Practitioner: Dr. Victor Hugo Aguirre  Diagnosis: acute hyponatremia  Additional Pertinent Hx: admit with above diagnosis, COVID-19+, orthostatic hypotension, diarrhea, colitis, a fib     Restrictions/Precautions:  Restrictions/Precautions: Fall Risk    Subjective:  Chart Reviewed: Yes  Patient assessed for rehabilitation services?: Yes  Subjective: pt request use of bedside commode and then agreeable for OOB to chair, pt stated feeling very tired    General:        Hearing: Within functional limits       Pain: no c/o pain    Vitals: Vitals not assessed per clinical judgement, see nursing flowsheet    Social/Functional History:    Lives With: Spouse  Type of Home: House  Home Layout: Two level, Bed/Bath upstairs, 1/2 bath on main level  Home Access: Stairs to enter with rails  Entrance Stairs - Number of Steps: 1 step with grab bar                   Ambulation Assistance: Independent  Transfer Assistance: Independent          Additional Comments: per pt was having difficulty with home mobility following recent hospital admi and was looking into SNF for rehab    OBJECTIVE:  Range of Motion:  Bilateral Lower Extremity: WFL    Strength:  Bilateral Lower Extremity: WFL, generalized weakness    Balance:  Static Sitting Balance:  Stand By Assistance  Static Standing Balance: Contact Guard Assistance, with RW    Bed Mobility:  Rolling to Left: Contact Guard Assistance   Supine to Sit: Contact Guard Assistance  Scooting: Contact Guard Assistance  HOB up 20 degrees, use BR  Transfers:  Sit to Stand: Contact Guard Assistance  Stand to [address] [facility] for hand placement, inc time to complete  Ambulation:  Minimal Assistance, to CGA  Distance: 3'x1, 5'x1  Surface: Level Tile  Device:Rolling Walker  Gait Deviations: Forward Flexed Posture, Slow Deborah, Decreased Step Length Bilaterally, Narrow Base of Support, and Unsteady Gait, fatigued quickly        Functional Outcome Measures: Completed  AM-PAC Inpatient Mobility without Stair Climbing Raw Score : 15  AM-PAC Inpatient without Stair Climbing T-Scale Score : 43.03    ASSESSMENT:  Activity Tolerance:  Patient tolerance of  treatment: fair. -      Treatment Initiated: Treatment and education initiated within context of evaluation. Evaluation time included review of current medical information, gathering information related to past medical, social and functional history, completion of standardized testing, formal and informal observation of tasks, assessment of data and development of plan of care and goals. Treatment time included skilled education and facilitation of tasks to increase safety and independence with functional mobility for improved independence and quality of life. Assessment: Body Structures, Functions, Activity Limitations Requiring Skilled Therapeutic Intervention: Decreased functional mobility , Decreased strength, Decreased endurance, Decreased tolerance to work activity, Decreased balance  Assessment: pt with generalized weakness, deconditioning, dec balance, use of walker and inc assist for safe mobility, recommend cont PT to inc pt functional mobility  Therapy Prognosis: Good    Requires PT Follow-Up: Yes    Discharge Recommendations:  Discharge Recommendations: Continue to assess pending progress, Subacute/Skilled Nursing Facility, Patient would benefit from continued therapy after discharge    Patient Education:      .     Patient Education  Education Given To: Patient  Education Provided: Role of Therapy, Plan of Care  Education Method: Verbal  Education Outcome: Verbalized understanding, Continued education needed       Equipment Recommendations:  Equipment Needed: No    Plan:  Specific Instructions for Next Treatment: therex and mobility  General Plan:  (3-5X GM)  Specific Instructions for Next Treatment: therex and mobility    Goals:  Patient Goals : feel better  Short Term Goals  Time Frame for Short Term Goals: by discharge  Short Term Goal 1: bed mobility with SBA to get in/out of bed  Short Term Goal 2: transfe with SBA to get in/out of chairs  Short Term Goal 3: amb 25'x1 with AD and CGA to walk safely in room  Long Term Goals  Time Frame for Long Term Goals : no LTGs set secondary to short ELOS    Following session, patient left in safe position with all fall risk precautions in place.

## 2022-10-17 NOTE — CARE COORDINATION
10/17/22 1115   Readmission Assessment   Number of Days since last admission? 1-7 days   Previous Disposition Home with Home Health   Who is being Interviewed Patient   What was the patient's/caregiver's perception as to why they think they needed to return back to the hospital? Other (Comment)  (emesis)   Did you visit your Primary Care Physician after you left the hospital, before you returned this time? No  (only out of hospital 2 days)   Why weren't you able to visit your PCP? Other (Comment)  (only discharged for 2 days)   Did you see a specialist, such as Cardiac, Pulmonary, Orthopedic Physician, etc. after you left the hospital? Other (Comment)   Who advised the patient to return to the hospital? Self-referral   Does the patient report anything that got in the way of taking their medications? No   In our efforts to provide the best possible care to you and others like you, can you think of anything that we could have done to help you after you left the hospital the first time, so that you might not have needed to return so soon?  Other (Comment)  (\"kept me longer\")

## 2022-10-18 LAB
ADENOVIRUS F 40 41 PCR: NOT DETECTED
ANION GAP SERPL CALCULATED.3IONS-SCNC: 11 MEQ/L (ref 8–16)
ASTROVIRUS PCR: NOT DETECTED
BUN BLDV-MCNC: 6 MG/DL (ref 7–22)
CALCIUM SERPL-MCNC: 8.3 MG/DL (ref 8.5–10.5)
CAMPYLOBACTER PCR: NOT DETECTED
CHLORIDE BLD-SCNC: 92 MEQ/L (ref 98–111)
CLOSTRIDIUM DIFFICILE, PCR: NOT DETECTED
CO2: 23 MEQ/L (ref 23–33)
CREAT SERPL-MCNC: 0.7 MG/DL (ref 0.4–1.2)
CRYPTOSPORIDIUM PCR: NOT DETECTED
CYCLOSPORA CAYETANENSIS PCR: NOT DETECTED
E COLI 0157 PCR: NORMAL
E COLI ENTEROAGGREGATIVE PCR: NOT DETECTED
E COLI ENTEROPATHOGENIC PCR: NOT DETECTED
E COLI ENTEROTOXIGENIC PCR: NOT DETECTED
E COLI SHIGA LIKE TOXIN PCR: NOT DETECTED
E COLI SHIGELLA/ENTEROINVASIVE PCR: NOT DETECTED
E HISTOLYTICA GI FILM ARRAY: NOT DETECTED
GFR SERPL CREATININE-BSD FRML MDRD: > 60 ML/MIN/1.73M2
GIARDIA LAMBLIA PCR: NOT DETECTED
GLUCOSE BLD-MCNC: 94 MG/DL (ref 70–108)
NOROVIRUS GI GII PCR: NOT DETECTED
PLESIOMONAS SHIGELLOIDES PCR: NOT DETECTED
POTASSIUM SERPL-SCNC: 3.8 MEQ/L (ref 3.5–5.2)
ROTAVIRUS A PCR: NOT DETECTED
SALMONELLA PCR: NOT DETECTED
SAPOVIRUS PCR: NOT DETECTED
SODIUM BLD-SCNC: 124 MEQ/L (ref 135–145)
SODIUM BLD-SCNC: 125 MEQ/L (ref 135–145)
SODIUM BLD-SCNC: 126 MEQ/L (ref 135–145)
SODIUM BLD-SCNC: 126 MEQ/L (ref 135–145)
VIBRIO CHOLERAE PCR: NOT DETECTED
VIBRIO PCR: NOT DETECTED
YERSINIA ENTEROCOLITICA PCR: NOT DETECTED

## 2022-10-18 PROCEDURE — 6370000000 HC RX 637 (ALT 250 FOR IP): Performed by: INTERNAL MEDICINE

## 2022-10-18 PROCEDURE — 6360000002 HC RX W HCPCS: Performed by: HOSPITALIST

## 2022-10-18 PROCEDURE — 2500000003 HC RX 250 WO HCPCS: Performed by: PHYSICIAN ASSISTANT

## 2022-10-18 PROCEDURE — 1200000000 HC SEMI PRIVATE

## 2022-10-18 PROCEDURE — 87507 IADNA-DNA/RNA PROBE TQ 12-25: CPT

## 2022-10-18 PROCEDURE — C9113 INJ PANTOPRAZOLE SODIUM, VIA: HCPCS | Performed by: HOSPITALIST

## 2022-10-18 PROCEDURE — 36415 COLL VENOUS BLD VENIPUNCTURE: CPT

## 2022-10-18 PROCEDURE — 99232 SBSQ HOSP IP/OBS MODERATE 35: CPT | Performed by: INTERNAL MEDICINE

## 2022-10-18 PROCEDURE — 99232 SBSQ HOSP IP/OBS MODERATE 35: CPT | Performed by: PHYSICIAN ASSISTANT

## 2022-10-18 PROCEDURE — 84295 ASSAY OF SERUM SODIUM: CPT

## 2022-10-18 PROCEDURE — 2580000003 HC RX 258: Performed by: HOSPITALIST

## 2022-10-18 PROCEDURE — 6370000000 HC RX 637 (ALT 250 FOR IP): Performed by: HOSPITALIST

## 2022-10-18 PROCEDURE — 80048 BASIC METABOLIC PNL TOTAL CA: CPT

## 2022-10-18 PROCEDURE — 1200000003 HC TELEMETRY R&B

## 2022-10-18 RX ORDER — SODIUM CHLORIDE 1000 MG
2 TABLET, SOLUBLE MISCELLANEOUS EVERY 8 HOURS
Status: COMPLETED | OUTPATIENT
Start: 2022-10-18 | End: 2022-10-18

## 2022-10-18 RX ADMIN — RIVAROXABAN 20 MG: 20 TABLET, FILM COATED ORAL at 21:01

## 2022-10-18 RX ADMIN — SODIUM CHLORIDE, PRESERVATIVE FREE 10 ML: 5 INJECTION INTRAVENOUS at 08:27

## 2022-10-18 RX ADMIN — METOPROLOL TARTRATE 25 MG: 25 TABLET, FILM COATED ORAL at 08:23

## 2022-10-18 RX ADMIN — CEFTRIAXONE SODIUM 1000 MG: 1 INJECTION, POWDER, FOR SOLUTION INTRAMUSCULAR; INTRAVENOUS at 02:49

## 2022-10-18 RX ADMIN — PANTOPRAZOLE SODIUM 40 MG: 40 INJECTION, POWDER, FOR SOLUTION INTRAVENOUS at 05:51

## 2022-10-18 RX ADMIN — PROPAFENONE HYDROCHLORIDE 150 MG: 150 TABLET, FILM COATED ORAL at 08:26

## 2022-10-18 RX ADMIN — SODIUM CHLORIDE 2 G: 1 TABLET ORAL at 21:06

## 2022-10-18 RX ADMIN — PROPAFENONE HYDROCHLORIDE 150 MG: 150 TABLET, FILM COATED ORAL at 21:01

## 2022-10-18 RX ADMIN — METOPROLOL TARTRATE 25 MG: 25 TABLET, FILM COATED ORAL at 21:01

## 2022-10-18 RX ADMIN — SODIUM CHLORIDE 2 G: 1 TABLET ORAL at 13:10

## 2022-10-18 RX ADMIN — Medication 5.8 MG: at 23:55

## 2022-10-18 RX ADMIN — SODIUM CHLORIDE, PRESERVATIVE FREE 10 ML: 5 INJECTION INTRAVENOUS at 21:06

## 2022-10-18 ASSESSMENT — PAIN SCALES - GENERAL
PAINLEVEL_OUTOF10: 0

## 2022-10-18 ASSESSMENT — PAIN DESCRIPTION - LOCATION: LOCATION: THROAT

## 2022-10-18 ASSESSMENT — PAIN DESCRIPTION - ORIENTATION: ORIENTATION: INNER

## 2022-10-18 NOTE — PROGRESS NOTES
Hospitalist Progress Note      Patient:  Onel Jiang    Unit/Bed:7K-23/023-A  YOB: 1934  MRN: [id number]   Acct: [de-identified]   PCP: Comfort Mccullough MD  Date of Admission: 10/15/2022    Assessment/Plan:    Hyponatremia, acute on chronic: Nephrology consulted. Sodium 123, 125, 126, 126, 128 trended down to 124 yesterday and back to 126. Fluid restriction & salt tabs today. IVF stopped. Possible UTI: Empiric IV Rocephin. Urine culture showed mixed growth, we will continue ABX for 3 days, ABX stopped. Mild hypokalemia: Likely due to diarrhea. Resolved. Paroxysmal atrial fibrillation: Presently in sinus rhythm. Resume Eliquis, metoprolol, propafenone  Generalized weakness: Likely due to diarrhea, hyponatremia with poor oral intake. Consult PT and OT. Recently diagnosed COVID 19 infection: Out of isolation now. Dispo: Hopeful for DC 24-48 hours     Chief Complaint: Persistent HA     Initial H and P:-    Initial H&P \"80year-old female with paroxysmal atrial fibrillation on Eliquis, recently diagnosed COVID-19 infection without oxygen requirement, presented with headache, diarrhea, and epigastric abdominal discomfort that had persisted for the past 1 week. Reported associated generalized weakness, loss of appetite, persistent nausea with one episode of vomiting. Described headache as global, persistent, unresponsive to over-the-counter analgesics. Said stools about 4 times per day, watery and nonbloody. While abdominal pain was localized in the subxiphoid area, 10/10 intensity, continuous, nonradiating, without known aggravators, relieved to 3/10 intensity following IV morphine administered in the ED. No fever, dysuria, shortness of breath, dizziness, chest pain or cough. Of note patient was recently hospitalized at this facility (10/8/ - 12/2022) for fall secondary to orthostatic hypotension and COVID-19 infection.     Evaluation in the ED remarkable for WBC 3.9, sodium 123, potassium 3.4, chloride 87, serum osmolality 249.2, glucose 116. Urinalysis suggestive of UTI. She was managed with IVF normal saline, IV Rocephin x1 dose prior to admission to the medical unit. \"     10/17: No acute events overnight. Pt states that her body aches are worse today. Pt and this provider spoke about the importance of sitting in the chair today. Pt has no other issues or concerns at this time. Subjective (past 24 hours):   No acute events overnight. Pt states that she is still tired but agreeable to showering and sitting in the chair. Pt is more conversational today. Past medical history, family history, social history and allergies reviewed again and is unchanged since admission. ROS (All review of systems completed. Pertinent positives noted. Otherwise All other systems reviewed and negative.)     Medications:  Reviewed    Infusion Medications    sodium chloride       Scheduled Medications    sodium chloride  2 g Oral Q8H    metoprolol tartrate  25 mg Oral BID    propafenone  150 mg Oral BID    rivaroxaban  20 mg Oral Daily with breakfast    sodium chloride flush  10 mL IntraVENous 2 times per day    pantoprazole  40 mg IntraVENous Daily    cefTRIAXone (ROCEPHIN) IV  1,000 mg IntraVENous Q24H     PRN Meds: menthol, menthol, sodium chloride flush, sodium chloride, acetaminophen **OR** acetaminophen, promethazine **OR** ondansetron, morphine      Intake/Output Summary (Last 24 hours) at 10/18/2022 1619  Last data filed at 10/18/2022 1356  Gross per 24 hour   Intake 720 ml   Output 550 ml   Net 170 ml       Diet:  ADULT ORAL NUTRITION SUPPLEMENT; Breakfast, Lunch, Dinner; Standard High Calorie/High Protein Oral Supplement  ADULT DIET;  Regular; 1500 ml    Physical Exam:  /77   Pulse 80   Temp 98.3 °F (36.8 °C) (Oral)   Resp 16   Ht 5' 5\" (1.651 m)   Wt 161 lb 3.2 oz (73.1 kg)   SpO2 95%   BMI 26.83 kg/m²   General appearance: No apparent distress, appears stated age and cooperative. Very fatigued   HEENT: Pupils equal, round, and reactive to light. Conjunctivae/corneas clear. Neck: Supple, with full range of motion. No jugular venous distention. Trachea midline. Respiratory:  Normal respiratory effort on RA. Clear to auscultation, bilaterally without Rales/Wheezes/Rhonchi. Cardiovascular: Regular rate and rhythm with normal S1/S2 without murmurs, rubs or gallops. Abdomen: Soft, non-tender, non-distended with normal bowel sounds. Musculoskeletal: passive and active ROM x 4 extremities. Skin: Skin color, texture, turgor normal.  No rashes or lesions. Neurologic:  Neurovascularly intact without any focal sensory/motor deficits. Cranial nerves: II-XII intact, grossly non-focal.  Psychiatric: Alert and oriented  Capillary Refill: Brisk,< 3 seconds   Peripheral Pulses: +2 palpable, equal bilaterally     Labs:   Recent Labs     10/16/22  0003 10/17/22  0056   WBC 3.9* 5.0   HGB 14.2 13.6   HCT 41.2 41.0    187     Recent Labs     10/16/22  0515 10/16/22  1326 10/17/22  0056 10/17/22  0739 10/17/22  1433 10/17/22  1938 10/18/22  0045 10/18/22  0639 10/18/22  1409   *   < > 126*   < > 127*   < > 124* 126* 126*   K 3.5  --  3.9  --  3.6  --   --  3.8  --    CL 88*  --   --   --  93*  --   --  92*  --    CO2 27  --   --   --  24  --   --  23  --    BUN 12  --   --   --  6*  --   --  6*  --    CREATININE 0.8  --   --   --  0.7  --   --  0.7  --    CALCIUM 8.4*  --   --   --  8.1*  --   --  8.3*  --     < > = values in this interval not displayed. Recent Labs     10/16/22  0003   AST 30   ALT 20   BILITOT 0.6   ALKPHOS 96     Recent Labs     10/16/22  0045 10/17/22  0056   INR 1.03 1.30*     No results for input(s): Arnoldo Bunny in the last 72 hours.     Microbiology:    Blood culture #1:   Lab Results   Component Value Date/Time    BC No growth-preliminary No growth 09/05/2021 07:38 PM       Blood culture #2:No results found for: BLOODCULT2    Organism:  Lab Results   Component Value Date/Time    ORG Mixed Growth 10/16/2022 02:11 AM       No results found for: LABGRAM    MRSA culture only:No results found for: [facility]    Urine culture:   Lab Results   Component Value Date/Time    LABURIN No growth-preliminary 05/05/2022 03:24 PM    LABURIN Tovey count: >100,000 CFU/mL 05/05/2022 03:24 PM       Respiratory culture: No results found for: CULTRESP    Aerobic and Anaerobic :  No results found for: LABAERO  No results found for: LABANAE    Urinalysis:      Lab Results   Component Value Date/Time    NITRU NEGATIVE 10/16/2022 02:11 AM    WBCUA > 100 10/16/2022 02:11 AM    BACTERIA NONE SEEN 10/16/2022 02:11 AM    RBCUA 0-2 10/16/2022 02:11 AM    BLOODU NEGATIVE 10/16/2022 02:11 AM    SPECGRAV 1.025 05/05/2022 02:47 PM    GLUCOSEU NEGATIVE 10/16/2022 02:11 AM       Radiology:  CT ABDOMEN PELVIS W IV CONTRAST Additional Contrast? None   Final Result   1. Diffuse mucosal thickening of the colon. Regions of mucosal enhancement    of the colon with intraluminal fluid greatest involving the sigmoid and    extending to the level of the distal rectum. Overall findings are most    consistent with moderate to severe changes of infectious versus    inflammatory colitis. Extensive colonic diverticulosis of the sigmoid    colon. 2. Small volume free fluid within the lower pelvis/cul-de-sac likely    physiologic or reactive. 3. Trace bilateral pleural effusions. Interstitial densities and minimal    consolidation of the left lung base may represent infectious or    inflammatory process. 4. Pulmonary nodule within the right lung base measuring 13 mm on image 6    of series 2, finding is stable in size when compared to prior examination    dated September 4, 2021. Recommend 12 months interval follow-up to ensure    stability for 2 years. 5. Splenomegaly. 6. Innumerable chronic findings as above.       This document has been electronically signed by: Cheryl Portillo DO on    10/16/2022 05:27 AM      All CTs at this facility use dose modulation techniques and iterative    reconstructions, and/or weight-based dosing   when appropriate to reduce radiation to a low as reasonably achievable. CT HEAD WO CONTRAST   Final Result   Impression:   Negative for acute intracranial abnormality. This document has been electronically signed by: Gilbert Thompson MD on    10/16/2022 02:04 AM      All CTs at this facility use dose modulation techniques and iterative    reconstructions, and/or weight-based dosing   when appropriate to reduce radiation to a low as reasonably achievable. XR CHEST PORTABLE   Final Result      No acute pulmonary disease. Cardiomegaly.       This document has been electronically signed by: Kelsey Gutierrez MD on    10/16/2022 01:49 AM        Electronically signed by Saddie Sandhoff, PA on 10/18/2022 at 4:19 PM

## 2022-10-18 NOTE — PROGRESS NOTES
[facility]   Nephrology progress note  10/18/2022, 11:15 AM      Pt Name:    Ole Wilkerson  MRN:     [id number]     YOB: 1934  Admit Date:    10/15/2022 11:41 PM    Chief Complaint: Nephrology following for hyponatremia. Subjective:  Patient seen and examined  No chest pain or shortness of breath  Feels okay eating and drinking better  No other complaints    Objective:  24HR INTAKE/OUTPUT:    Intake/Output Summary (Last 24 hours) at 10/18/2022 1115  Last data filed at 10/18/2022 0929  Gross per 24 hour   Intake 560 ml   Output 550 ml   Net 10 ml      Admission weight: 150 lb (68 kg)  Wt Readings from Last 3 Encounters:   10/18/22 161 lb 3.2 oz (73.1 kg)   10/12/22 166 lb 14 oz (75.7 kg)   10/04/22 156 lb 9.6 oz (71 kg)        Vitals :   Vitals:    10/17/22 2000 10/18/22 0315 10/18/22 0324 10/18/22 0823   BP: 124/62 135/69  117/65   Pulse: 63 94  83   Resp: 18 18  16   Temp: 98.2 °F (36.8 °C) 97.9 °F (36.6 °C)  97.8 °F (36.6 °C)   TempSrc: Oral Oral     SpO2: 97% 92%  93%   Weight:   161 lb 3.2 oz (73.1 kg)    Height:           Physical examination  General Appearance:  Well developed.  No distress  Mouth/Throat:  Oral mucosa moist  Neck:  Supple, no JVD  Lungs:  Breath sounds: clear  Heart[de-identified]  S1,S2 heard  Abdomen:  Soft, non - tender  Musculoskeletal:  Edema -no edema noted    Medications:  Infusion:    sodium chloride      sodium chloride 100 mL/hr at 10/16/22 2053     Meds:    metoprolol tartrate  25 mg Oral BID    propafenone  150 mg Oral BID    rivaroxaban  20 mg Oral Daily with breakfast    sodium chloride flush  10 mL IntraVENous 2 times per day    pantoprazole  40 mg IntraVENous Daily    cefTRIAXone (ROCEPHIN) IV  1,000 mg IntraVENous Q24H       Lab Data :  CBC:   Recent Labs     10/16/22  0003 10/17/22  0056   WBC 3.9* 5.0   HGB 14.2 13.6   HCT 41.2 41.0    187       CMP:  Recent Labs     10/16/22  0003 10/16/22  0515 10/16/22  1326 10/17/22  0056 10/17/22  0739 10/17/22  1433 10/17/22  1938 10/18/22  0045 10/18/22  0639   * 125*   < > 126*   < > 127* 125* 124* 126*   K 3.4* 3.5  --  3.9  --  3.6  --   --  3.8   CL 87* 88*  --   --   --  93*  --   --  92*   CO2 26 27  --   --   --  24  --   --  23   BUN 14 12  --   --   --  6*  --   --  6*   CREATININE 0.7 0.8  --   --   --  0.7  --   --  0.7   GLUCOSE 116* 90  --   --   --  81  --   --  94   CALCIUM 8.6 8.4*  --   --   --  8.1*  --   --  8.3*   MG 1.8 1.8  --   --   --   --   --   --   --     < > = values in this interval not displayed. Hepatic:   Recent Labs     10/16/22  0003   LABALBU 3.0*   AST 30   ALT 20   BILITOT 0.6   ALKPHOS 96       Assessment and Plan:  Renal -renal function appears to be stable at baseline  Hyponatremia most likely due to volume depletion from vomiting and diarrhea currently on normal saline at 100 mL/h and sodium fluctuating and at 126  Previously she was on salt tablets 2 g 3 times daily. We will discontinue the IV fluids continue fluid restriction of 1500 mL and restart salt tablets. Diarrhea  Possible urinary tract infection on antibiotics  COVID-19 positive  Hx of paroxysmal atrial fibrillation  Meds reviewed and discussed with patient    Ruth Trivedi MD  [facility]    This report has been created using voice recognition software.  It may contain minor errors which are inherent in voice recognition technology

## 2022-10-18 NOTE — PROGRESS NOTES
Patient had head to toe assessment. No open areas noted. IV site noted on left arm. No s/s of infection noted. Patent et intact. Patient able to move upper and lower extremities. Transfers to chair, commode,bed and ambulates with walker. Patient ambulated into shower with walker. Skin remains intact. Buttocks is red et blanchable. Denies any pain. Tolerated all medication orally without difficulty. Poor nurtritional intake less than 50% for breakfast and lunch. Encouraged resident to drink Ensure Plus supplements. IV site changed r/t dislodgement. IV site on right lower extremity patent et intact. Patient able to voice all needs. Denies any pain or discomfort.  Reported off to [facility]

## 2022-10-18 NOTE — CARE COORDINATION
DISCHARGE/PLANNING EVALUATION  10/18/22, 2:55 PM EDT    Reason for Referral: ecf   Mental Status: alert, oriented   Decision Making:  makes own decisions with support from her daughter   Family/Social/Home Environment:  Noe Hopkins lives at home with her . She has a two floor home with steps to the bedroom. Her  is not able to help with care, children are not able to provide care at home. Current Services including food security, transportation and housekeeping: no current services, no food security concerns, has been able to manage housekeeping and arrange for transportation   Current Equipment:   Payment Source:medicare   Concerns or Barriers to Discharge: requesting ecf   Post-acute [facility]) provider list was provided to patient. Patient was informed of their freedom to choose [facility] provider. Discussed and offered to show the patient the relevant [facility] Providers quality and resource use measures on Medicare Compare web site via computer based on patient's goals of care and treatment preferences. Questions regarding selection process were answered. Declined list, had planned for [facility]. Teach Back Method used with daughter  regarding care plan and discharge plan  Patient and daughter verbalize understanding of the plan of care and contribute to goal setting. Patient goals, treatment preferences and discharge plan: spoke with daughter, Nataly Barnes, confirmed plan for [facility]. Spoke with Carlos of [facility], bed will be available for patient at discharge .       Electronically signed by MIKE Reyna on 10/18/2022 at 2:55 PM

## 2022-10-18 NOTE — PLAN OF CARE
Problem: Discharge Planning  Goal: Discharge to home or other facility with appropriate resources  10/17/2022 2211 by Smitha Mulligan RN  Outcome: Progressing  Flowsheets (Taken 10/17/2022 2211)  Discharge to home or other facility with appropriate resources: Identify barriers to discharge with patient and caregiver     Problem: Pain  Goal: Verbalizes/displays adequate comfort level or baseline comfort level  10/17/2022 2211 by Smitha Mulligan RN  Outcome: Progressing  Flowsheets (Taken 10/17/2022 2211)  Verbalizes/displays adequate comfort level or baseline comfort level:   Encourage patient to monitor pain and request assistance   Assess pain using appropriate pain scale     Problem: Safety - Adult  Goal: Free from fall injury  10/17/2022 2211 by Smitha Mulligan RN  Outcome: Progressing  Flowsheets (Taken 10/17/2022 2211)  Free From Fall Injury: Instruct family/caregiver on patient safety     Problem: Nutrition Deficit:  Goal: Optimize nutritional status  10/17/2022 2211 by Smitha Mulligan RN  Outcome: Progressing  Flowsheets (Taken 10/17/2022 2211)  Nutrient intake appropriate for improving, restoring, or maintaining nutritional needs:   Assess nutritional status and recommend course of action   Monitor oral intake, labs, and treatment plans     Problem: Skin/Tissue Integrity  Goal: Absence of new skin breakdown  Description: 1. Monitor for areas of redness and/or skin breakdown  2. Assess vascular access sites hourly  3. Every 4-6 hours minimum:  Change oxygen saturation probe site  4. Every 4-6 hours:  If on nasal continuous positive airway pressure, respiratory therapy assess nares and determine need for appliance change or resting period. 10/17/2022 2211 by Smitha Mulligan RN  Outcome: Progressing  Note: No new skin breakdown noted this shift. Patient able to reposition self. Education on the importance of turning and repositioning was given. Patient verbalizes understanding. Will monitor. Problem: Metabolic/Fluid and Electrolytes - Adult  Goal: Electrolytes maintained within normal limits  10/17/2022 2211 by Robinson Martin RN  Outcome: Progressing  Flowsheets (Taken 10/17/2022 2211)  Electrolytes maintained within normal limits:   Monitor labs and assess patient for signs and symptoms of electrolyte imbalances   Administer electrolyte replacement as ordered   Care plan reviewed with patient. Patient verbalizes understanding of the plan of care and contribute to goal setting.

## 2022-10-18 NOTE — PROGRESS NOTES
Patient daughter Blu Mckeon called to get update on patient. She stated that she is concerned about her mothers decline in mental health. She also expressed concern about patient having a bed at the [facility], as patient had a bed prior to hospital admission.  Will notify [facility] and [facility]

## 2022-10-19 LAB
ANION GAP SERPL CALCULATED.3IONS-SCNC: 10 MEQ/L (ref 8–16)
BUN BLDV-MCNC: 6 MG/DL (ref 7–22)
CALCIUM SERPL-MCNC: 8.5 MG/DL (ref 8.5–10.5)
CHLORIDE BLD-SCNC: 93 MEQ/L (ref 98–111)
CO2: 25 MEQ/L (ref 23–33)
CREAT SERPL-MCNC: 0.7 MG/DL (ref 0.4–1.2)
GFR SERPL CREATININE-BSD FRML MDRD: > 60 ML/MIN/1.73M2
GLUCOSE BLD-MCNC: 97 MG/DL (ref 70–108)
POTASSIUM SERPL-SCNC: 3.9 MEQ/L (ref 3.5–5.2)
SARS-COV-2, NAAT: DETECTED
SODIUM BLD-SCNC: 127 MEQ/L (ref 135–145)
SODIUM BLD-SCNC: 128 MEQ/L (ref 135–145)
SODIUM BLD-SCNC: 128 MEQ/L (ref 135–145)

## 2022-10-19 PROCEDURE — 36415 COLL VENOUS BLD VENIPUNCTURE: CPT

## 2022-10-19 PROCEDURE — 6360000002 HC RX W HCPCS: Performed by: INTERNAL MEDICINE

## 2022-10-19 PROCEDURE — 6360000002 HC RX W HCPCS: Performed by: HOSPITALIST

## 2022-10-19 PROCEDURE — C9113 INJ PANTOPRAZOLE SODIUM, VIA: HCPCS | Performed by: HOSPITALIST

## 2022-10-19 PROCEDURE — 6370000000 HC RX 637 (ALT 250 FOR IP): Performed by: HOSPITALIST

## 2022-10-19 PROCEDURE — 84295 ASSAY OF SERUM SODIUM: CPT

## 2022-10-19 PROCEDURE — 2580000003 HC RX 258: Performed by: HOSPITALIST

## 2022-10-19 PROCEDURE — 99232 SBSQ HOSP IP/OBS MODERATE 35: CPT | Performed by: INTERNAL MEDICINE

## 2022-10-19 PROCEDURE — 97530 THERAPEUTIC ACTIVITIES: CPT

## 2022-10-19 PROCEDURE — 97535 SELF CARE MNGMENT TRAINING: CPT

## 2022-10-19 PROCEDURE — 87635 SARS-COV-2 COVID-19 AMP PRB: CPT

## 2022-10-19 PROCEDURE — 1200000000 HC SEMI PRIVATE

## 2022-10-19 PROCEDURE — 80048 BASIC METABOLIC PNL TOTAL CA: CPT

## 2022-10-19 RX ORDER — FUROSEMIDE 10 MG/ML
20 INJECTION INTRAMUSCULAR; INTRAVENOUS ONCE
Status: COMPLETED | OUTPATIENT
Start: 2022-10-19 | End: 2022-10-19

## 2022-10-19 RX ORDER — SODIUM CHLORIDE 1000 MG
2 TABLET, SOLUBLE MISCELLANEOUS 3 TIMES DAILY
Qty: 90 TABLET | Refills: 0 | DISCHARGE
Start: 2022-10-19 | End: 2022-11-10

## 2022-10-19 RX ADMIN — METOPROLOL TARTRATE 25 MG: 25 TABLET, FILM COATED ORAL at 09:00

## 2022-10-19 RX ADMIN — SODIUM CHLORIDE, PRESERVATIVE FREE 10 ML: 5 INJECTION INTRAVENOUS at 20:52

## 2022-10-19 RX ADMIN — PROPAFENONE HYDROCHLORIDE 150 MG: 150 TABLET, FILM COATED ORAL at 09:00

## 2022-10-19 RX ADMIN — METOPROLOL TARTRATE 25 MG: 25 TABLET, FILM COATED ORAL at 20:52

## 2022-10-19 RX ADMIN — ACETAMINOPHEN 650 MG: 325 TABLET ORAL at 09:34

## 2022-10-19 RX ADMIN — PROPAFENONE HYDROCHLORIDE 150 MG: 150 TABLET, FILM COATED ORAL at 20:52

## 2022-10-19 RX ADMIN — SODIUM CHLORIDE, PRESERVATIVE FREE 10 ML: 5 INJECTION INTRAVENOUS at 09:01

## 2022-10-19 RX ADMIN — ACETAMINOPHEN 650 MG: 325 TABLET ORAL at 20:52

## 2022-10-19 RX ADMIN — FUROSEMIDE 20 MG: 10 INJECTION, SOLUTION INTRAMUSCULAR; INTRAVENOUS at 09:32

## 2022-10-19 RX ADMIN — PANTOPRAZOLE SODIUM 40 MG: 40 INJECTION, POWDER, FOR SOLUTION INTRAVENOUS at 06:39

## 2022-10-19 RX ADMIN — RIVAROXABAN 20 MG: 20 TABLET, FILM COATED ORAL at 18:19

## 2022-10-19 ASSESSMENT — PAIN SCALES - GENERAL
PAINLEVEL_OUTOF10: 2
PAINLEVEL_OUTOF10: 5
PAINLEVEL_OUTOF10: 2
PAINLEVEL_OUTOF10: 0
PAINLEVEL_OUTOF10: 6

## 2022-10-19 ASSESSMENT — PAIN DESCRIPTION - PAIN TYPE: TYPE: ACUTE PAIN

## 2022-10-19 ASSESSMENT — PAIN - FUNCTIONAL ASSESSMENT
PAIN_FUNCTIONAL_ASSESSMENT: ACTIVITIES ARE NOT PREVENTED
PAIN_FUNCTIONAL_ASSESSMENT: 0-10
PAIN_FUNCTIONAL_ASSESSMENT: ACTIVITIES ARE NOT PREVENTED

## 2022-10-19 ASSESSMENT — PAIN DESCRIPTION - LOCATION: LOCATION: HEAD

## 2022-10-19 ASSESSMENT — PAIN DESCRIPTION - DIRECTION: RADIATING_TOWARDS: FOREHEAD

## 2022-10-19 ASSESSMENT — PAIN DESCRIPTION - DESCRIPTORS
DESCRIPTORS: ACHING
DESCRIPTORS: ACHING

## 2022-10-19 ASSESSMENT — PAIN DESCRIPTION - ORIENTATION: ORIENTATION: ANTERIOR

## 2022-10-19 ASSESSMENT — PAIN DESCRIPTION - ONSET: ONSET: ON-GOING

## 2022-10-19 ASSESSMENT — PAIN DESCRIPTION - FREQUENCY: FREQUENCY: INTERMITTENT

## 2022-10-19 NOTE — PROGRESS NOTES
Physician Progress Note      Sarah Gabriel  CSN #:                  [id number]  :                       1934  ADMIT DATE:       10/15/2022 11:41 PM  [address] [place] DATE:  Asad Quach  PROVIDER #:        [facility]          QUERY TEXT:    Pt admitted with Hyponatremia. Noted documentation of Malnutrition Status: Moderate malnutrition (10/17/22 6674) by dietician consultant. If possible,   please document in progress notes and discharge summary:    The medical record reflects the following:  Risk Factors: Per dietitian, pt meets ASPEN criteria for  Moderate   malnutrition. Clinical Indicators: Nutrition Assessment:  Pt. moderately malnourished AEB   criteria as listed above. At risk for further nutrition compromise r/t admit   with hyponatremia, recent COVID and underlying medical condition (atrial fib,   HTN, esophageal dilation). Treatment: dietician consult: Continue Current Diet, Start Oral Nutrition   Supplement    Thank you. Please call if you have any questions. (P) [phone]. Signed   by Darron Carver RN Clinical , CRCR  Options provided:  -- Moderate Protein Calorie Malnutrition confirmed  -- Moderate Protein Calorie Malnutrition ruled out  -- Other - I will add my own diagnosis  -- Disagree - Not applicable / Not valid  -- Disagree - Clinically unable to determine / Unknown  -- Refer to Clinical Documentation Reviewer    PROVIDER RESPONSE TEXT:    The diagnosis of Moderate Protein Calorie Malnutrition was confirmed.     Query created by: Shasha Valdez on 10/19/2022 8:45 AM      Electronically signed by:  Bettye GAVIRIA 10/19/2022 10:53 AM

## 2022-10-19 NOTE — PLAN OF CARE
Problem: Discharge Planning  Goal: Discharge to home or other facility with appropriate resources  Outcome: Progressing  Flowsheets (Taken 10/19/2022 0006)  Discharge to home or other facility with appropriate resources:   Identify barriers to discharge with patient and caregiver   Identify discharge learning needs (meds, wound care, etc)  Note: Planning to be discharged to [facility] once stable. Problem: Pain  Goal: Verbalizes/displays adequate comfort level or baseline comfort level  Outcome: Progressing  Flowsheets (Taken 10/19/2022 0006)  Verbalizes/displays adequate comfort level or baseline comfort level:   Encourage patient to monitor pain and request assistance   Administer analgesics based on type and severity of pain and evaluate response   Assess pain using appropriate pain scale   Implement non-pharmacological measures as appropriate and evaluate response  Note: Patient taking pain medication on MAR as needed to control pain. Use of non-pharmacologic pain management including ice/repositioning. Patient pain goal is 3. Problem: Safety - Adult  Goal: Free from fall injury  Outcome: Progressing  Flowsheets (Taken 10/19/2022 0006)  Free From Fall Injury: Instruct family/caregiver on patient safety  Note: Patient up with staff assistance. Able to use call light. Patient has remained free of falls during this shift. Appropriate fall prevention measures in place. Problem: Nutrition Deficit:  Goal: Optimize nutritional status  Outcome: Progressing  Flowsheets (Taken 10/19/2022 0006)  Nutrient intake appropriate for improving, restoring, or maintaining nutritional needs:   Assess nutritional status and recommend course of action   Monitor oral intake, labs, and treatment plans     Problem: Skin/Tissue Integrity  Goal: Absence of new skin breakdown  Description: 1. Monitor for areas of redness and/or skin breakdown  2. Assess vascular access sites hourly  3.   Every 4-6 hours minimum: Change oxygen saturation probe site  4. Every 4-6 hours:  If on nasal continuous positive airway pressure, respiratory therapy assess nares and determine need for appliance change or resting period. Outcome: Progressing  Note: Assess skin every 4 hours. Problem: ABCDS Injury Assessment  Goal: Absence of physical injury  Outcome: Progressing  Flowsheets (Taken 10/19/2022 0006)  Absence of Physical Injury: Implement safety measures based on patient assessment  Note: Patient up with staff assistance. Able to use call light. Patient has remained free of falls during this shift. Appropriate fall prevention measures in place. Problem: Chronic Conditions and Co-morbidities  Goal: Patient's chronic conditions and co-morbidity symptoms are monitored and maintained or improved  Outcome: Progressing  Flowsheets (Taken 10/19/2022 0006)  Care Plan - Patient's Chronic Conditions and Co-Morbidity Symptoms are Monitored and Maintained or Improved:   Monitor and assess patient's chronic conditions and comorbid symptoms for stability, deterioration, or improvement   Collaborate with multidisciplinary team to address chronic and comorbid conditions and prevent exacerbation or deterioration     Problem: Infection - Adult  Goal: Absence of infection at discharge  Outcome: Progressing  Flowsheets (Taken 10/19/2022 0006)  Absence of infection at discharge:   Assess and monitor for signs and symptoms of infection   Monitor lab/diagnostic results   Monitor all insertion sites i.e., indwelling lines, tubes and drains     Problem: Infection - Adult  Goal: Absence of infection during hospitalization  Outcome: Progressing  Flowsheets (Taken 10/19/2022 0006)  Absence of infection during hospitalization:   Assess and monitor for signs and symptoms of infection   Monitor lab/diagnostic results   Monitor all insertion sites i.e., indwelling lines, tubes and drains     Care plan reviewed with patient.   Patient verbalized understanding of the plan of care and contribute to goal setting.

## 2022-10-19 NOTE — CARE COORDINATION
10/19/22, 1:57 PM EDT    DISCHARGE PLANNING EVALUATION    Spoke with Zuleima Rivera of [facility], facility can accept tomorrow.   [facility] transport arranged for 10:00

## 2022-10-19 NOTE — DISCHARGE INSTR - COC
Continuity of Care Form    Patient Name: Hugo Dunlap   :  1934  MRN:  [id number]    Admit date:  10/15/2022  Discharge date:  10/20/2022    Code Status Order: Full Code   Advance Directives:     Admitting Physician:  Hodan Benitez MD  PCP: Radha Kapoor MD    Discharging Nurse: [facility] Unit/Room#: 7K-23/023-A  Discharging Unit Phone Number: [phone]    Emergency Contact:   Extended Emergency Contact Information  Primary Emergency Contact: Nick Calabrese  Address: [place] [address], [address] [place] [address] [address] [facility] Phone: [phone]  Relation: Spouse  Secondary Emergency Contact: Cassy Hernandez  Address: [address] [place] [address] Kip Au Phone: [phone]  Mobile Phone: [phone]  Relation: Child    Past Surgical History:  Past Surgical History:   Procedure Laterality Date    APPENDECTOMY  26's    BLADDER REPAIR      Yvonneshhugo      [facility]    COLONOSCOPY  2000    ENDOSCOPIC ULTRASOUND (LOWER) Left 2017    ENDOSCOPIC ULTRASOUND performed by Thien Mcnamara MD at [facility] cataract    FACIAL SURGERY N/A 2021    DIVISION AND INSET/CHEEK performed by Tony Blas MD at [facility] ([facility])      JOINT REPLACEMENT  2017    left total hip relpament    MOHS SURGERY N/A 2021    MOHS REPAIR BCC NASAL TIP WITH FLAP AND GRAFT performed by Tony Blas MD at [address] Left 2021    MOHS DEFECT REPAIR SCC DORSAL NOSE AND LEFT LOWER FOREHEAD WITH SKIN GRAFT FROM RIGHT CHEEK/EAR (PLACED ON NOSE) performed by Tony Blas MD at [address] (CERVIX NOT REMOVED)  1960's    SC EGD TRANSORAL BIOPSY SINGLE/MULTIPLE Left 2017    EGD BIOPSY performed by Thien Mcnamara MD at [facility]    SC OFFICE/OUTPT VISIT,PROCEDURE ONLY Right 06/01/2018    MOHS REPAIR BASEL CELL CARCINOMA RIGHT DORSAL NOSE performed by Papa Ordaz MD at [address] Left 09/19/2017    Left Total Hip ARTHROPLASTY performed by Eulogio Carrel, MD at [address]  07/16/2017    UPPER GASTROINTESTINAL ENDOSCOPY Left 07/16/2017    EGD DILATION BALLOON performed by Violetta Irwin MD at [facility] Left 8/28/2021    EGD BIOPSY performed by Harper Bird MD at [place] [address] 8/30/2021    EGD DILATION SAVORY performed by Harper Bird MD at [place] [address] 4/28/2022    EGD BIOPSY performed by Harper Bird MD at [place] [address]  4/28/2022    EGD DILATION SAVORY performed by Harper Bird MD at [facility] 1 N/A 8/22/2022    EGD DILATION SAVORY performed by Harper Bird MD at [facility] LEFT Left 4/21/2021    US BREAST NEEDLE BIOPSY LEFT 4/21/2021 [address] BREAST NEEDLE BIOPSY RIGHT Right 4/21/2021    US BREAST NEEDLE BIOPSY RIGHT 4/21/2021 [facility] ULTRASOUND    US GUIDED NEEDLE LOC BREAST ADDL LEFT Left 4/21/2021    US GUIDED NEEDLE LOC BREAST ADDL LEFT 4/21/2021 [facility] ULTRASOUND       Immunization History:   Immunization History   Administered Date(s) Administered    COVID-19, PFIZER PURPLE top, DILUTE for use, (age 15 y+), 30mcg/0.3mL 01/30/2021, 02/20/2021, 10/13/2021    COVID-19, US Vaccine, Vaccine Unspecified 10/13/2021, 10/13/2021    Influenza Virus Vaccine 10/28/2011    Influenza, FLUAD, (age 72 y+), Adjuvanted, 0.5mL 12/17/2020, 12/21/2021, 10/04/2022    Influenza, High Dose (Fluzone 65 yrs and older) 10/19/2017, 12/10/2018    Influenza, Triv, inactivated, subunit, adjuvanted, IM (Fluad 65 yrs and older) 01/13/2020    Pneumococcal Conjugate 13-valent (Xefzcym27) 06/07/2017    Pneumococcal Conjugate 7-valent (Prevnar7) 05/06/2003    Pneumococcal Polysaccharide (Cujfptrwv48) 06/08/2018    Td, unspecified formulation 05/06/2003    Zoster Live (Zostavax) 11/09/2013       Active Problems:  Patient Active Problem List   Diagnosis Code    Trochanteric bursitis of left hip M70.62    Drug-induced constipation K59.03    Bradycardia R00.1    Esophageal stricture K22.2    Gastritis without bleeding K29.70    Lumbar radicular pain M54.16    Lumbar spine pain M54.50    Left hip pain M25.552    Arthritis of left hip M16.12    Paroxysmal atrial fibrillation with rapid ventricular response (HCC) I48.0    Venous stasis dermatitis of both lower extremities I87.2    Arthritis of right hand M19.041    Vaginal erosion secondary to pessary use (HCC) T83.89XA, N89.8    Pelvic relaxation due to uterovaginal prolapse N81.4    Postmenopausal bleeding N95.0    History of hysterectomy Z90.710    Esophageal candidiasis (HCC) B37.81    Hyponatremia E87.1    Lower abdominal pain R10.30    Atrial flutter (HCC) I48.92    Presence of pessary #4 ring with support Z96.0    Epigastric pain R10.13    Moderate malnutrition (HCC) E44.0    COVID U07.1    Persistent headaches R51.9       Isolation/Infection:   Isolation            No Isolation          Patient Infection Status       Infection Onset Added Last Indicated Last Indicated By Review Planned Expiration Resolved Resolved By    None active    Resolved    C-diff Rule Out 10/16/22 10/16/22 10/18/22 Gastrointestinal Panel, Molecular (Ordered)   10/18/22 Rule-Out Test Resulted    COVID-19 10/08/22 10/08/22 10/08/22 COVID-19, Rapid   10/17/22 Marleny Romero RN    S/S 10/3, + 10/8    COVID-19 (Rule Out) 10/08/22 10/08/22 10/08/22 COVID-19, Rapid (Ordered)   10/08/22 Rule-Out Test Resulted    COVID-19 (Rule Out) 09/05/21 09/05/21 09/05/21 COVID-19, Rapid (Ordered)   09/05/21 Rule-Out Test Resulted    COVID-19 (Rule Out) 01/21/21 01/21/21 01/21/21 Covid-19 Ambulatory (Ordered)   01/23/21 Rule-Out Test Resulted    COVID-19 (Rule Out) 01/04/21 01/04/21 01/04/21 COVID-19 (Ordered)   01/05/21 Rule-Out Test Resulted            Nurse Assessment:  Last Vital Signs: BP (!) 113/55   Pulse 75   Temp 97.7 °F (36.5 °C) (Oral)   Resp 18   Ht 5' 5\" (1.651 m)   Wt 161 lb 3.2 oz (73.1 kg)   SpO2 93%   BMI 26.83 kg/m²     Last documented pain score (0-10 scale): Pain Level: 0  Last Weight:   Wt Readings from Last 1 Encounters:   10/19/22 161 lb 3.2 oz (73.1 kg)     Mental Status:  oriented and alert    IV Access:  - None    Nursing Mobility/ADLs:  Walking   Assisted  Transfer  Assisted  Bathing  Assisted  Dressing  Assisted  Toileting  Assisted  Feeding  Independent  Med Admin  Assisted  Med Delivery   whole    Wound Care Documentation and Therapy:  Incision 06/01/18 Face Right (Active)   Number of days: 6546       Incision 08/06/21 Nose (Active)   Number of days: 439        Elimination:  Continence: Bowel: Yes  Bladder: Yes  Urinary Catheter: None   Colostomy/Ileostomy/Ileal Conduit: No       Date of Last BM: 10/20/2022    Intake/Output Summary (Last 24 hours) at 10/19/2022 1246  Last data filed at 10/19/2022 1122  Gross per 24 hour   Intake 240 ml   Output 400 ml   Net -160 ml     I/O last 3 completed shifts: In: 5 [P.O.:710; I.V.:10]  Out: 550 [Urine:550]    Safety Concerns:     None    Impairments/Disabilities:      None    Nutrition Therapy:  Current Nutrition Therapy:   - Oral Diet:  General    Routes of Feeding: Oral  Liquids: Thin Liquids  Daily Fluid Restriction: yes - amount 1500mL  Last Modified Barium Swallow with Video (Video Swallowing Test): not done    Treatments at the Time of Hospital Discharge:   Respiratory Treatments: NA  Oxygen Therapy:  is not on home oxygen therapy.   Ventilator:    - No ventilator support    Rehab Therapies: Physical Therapy and Occupational Therapy  Weight Bearing Status/Restrictions: No weight bearing restrictions  Other Medical Equipment (for information only, NOT a DME order):  walker  Other Treatments: NA    Patient's personal belongings (please select all that are sent with patient):  Glasses    RN SIGNATURE:  Electronically signed by Dick Lou on 10/20/22 at 8:03 AM EDT    CASE MANAGEMENT/SOCIAL WORK SECTION    Inpatient Status Date: 10/16/22    Readmission Risk Assessment Score:  Readmission Risk              Risk of Unplanned Readmission:  16           Discharging to Facility/ Agency   Name: [facility]  Address: [address] [place] [address]    Phone: [phone]  Fax: [phone]     Dialysis Facility (if applicable)   Name:  Address:  Dialysis Schedule:  Phone:  Fax:    / signature: Electronically signed by MIKE Thomas on 10/19/22 at 2:00 PM EDT    PHYSICIAN SECTION    Prognosis: Fair    Condition at Discharge: Stable    Rehab Potential (if transferring to Rehab): Fair    Recommended Labs or Other Treatments After Discharge: BMP in 1 week     Physician Certification: I certify the above information and transfer of Tamar Patten  is necessary for the continuing treatment of the diagnosis listed and that she requires [facility] for less 30 days.      Update Admission H&P: No change in H&P    PHYSICIAN SIGNATURE:  Electronically signed by EVERETTE Bradford on 10/19/22 at 1:35 PM EDT

## 2022-10-19 NOTE — PLAN OF CARE
Problem: Discharge Planning  Goal: Discharge to home or other facility with appropriate resources  10/19/2022 1145 by Caitlyn Greenberg RN  Outcome: Progressing  Flowsheets (Taken 10/19/2022 1145)  Discharge to home or other facility with appropriate resources:   Identify barriers to discharge with patient and caregiver   Identify discharge learning needs (meds, wound care, etc)   Refer to discharge planning if patient needs post-hospital services based on physician order or complex needs related to functional status, cognitive ability or social support system   Arrange for needed discharge resources and transportation as appropriate     Problem: Pain  Goal: Verbalizes/displays adequate comfort level or baseline comfort level  10/19/2022 1145 by Caitlyn Greenberg RN  Outcome: Progressing  Flowsheets (Taken 10/19/2022 1145)  Verbalizes/displays adequate comfort level or baseline comfort level:   Encourage patient to monitor pain and request assistance   Administer analgesics based on type and severity of pain and evaluate response   Consider cultural and social influences on pain and pain management   Assess pain using appropriate pain scale   Implement non-pharmacological measures as appropriate and evaluate response   Notify Licensed Independent Practitioner if interventions unsuccessful or patient reports new pain     Problem: Nutrition Deficit:  Goal: Optimize nutritional status  10/19/2022 1145 by Caitlyn Greenberg RN  Outcome: Progressing  Flowsheets (Taken 10/19/2022 1145)  Nutrient intake appropriate for improving, restoring, or maintaining nutritional needs:   Assess nutritional status and recommend course of action   Monitor oral intake, labs, and treatment plans   Recommend appropriate diets, oral nutritional supplements, and vitamin/mineral supplements     Problem: Metabolic/Fluid and Electrolytes - Adult  Goal: Electrolytes maintained within normal limits  10/19/2022 1145 by Caitlyn Greenberg RN  Outcome: Progressing  Flowsheets (Taken 10/19/2022 1145)  Electrolytes maintained within normal limits:   Monitor labs and assess patient for signs and symptoms of electrolyte imbalances   Monitor response to electrolyte replacements, including repeat lab results as appropriate   Instruct patient on fluid and nutrition restrictions as appropriate   Fluid restriction as ordered     Problem: Chronic Conditions and Co-morbidities  Goal: Patient's chronic conditions and co-morbidity symptoms are monitored and maintained or improved  10/19/2022 1145 by Fidel Jenkins RN  Outcome: Progressing  Flowsheets (Taken 10/19/2022 1145)  Care Plan - Patient's Chronic Conditions and Co-Morbidity Symptoms are Monitored and Maintained or Improved:   Monitor and assess patient's chronic conditions and comorbid symptoms for stability, deterioration, or improvement   Collaborate with multidisciplinary team to address chronic and comorbid conditions and prevent exacerbation or deterioration   Update acute care plan with appropriate goals if chronic or comorbid symptoms are exacerbated and prevent overall improvement and discharge     Problem: Safety - Adult  Goal: Free from fall injury  10/19/2022 1145 by Fidel Jenkins RN  Outcome: Adequate for Discharge  Flowsheets (Taken 10/19/2022 1145)  Free From Fall Injury: Instruct family/caregiver on patient safety     Problem: Skin/Tissue Integrity  Goal: Absence of new skin breakdown  Description: 1. Monitor for areas of redness and/or skin breakdown  2. Assess vascular access sites hourly  3. Every 4-6 hours minimum:  Change oxygen saturation probe site  4. Every 4-6 hours:  If on nasal continuous positive airway pressure, respiratory therapy assess nares and determine need for appliance change or resting period. 10/19/2022 1145 by Fidel Jenkins RN  Outcome: Adequate for Discharge  Note: No skin breakdown noted to patient this shift.  Patient able to reposition herself in bed in order to prevent skin breakdown. Problem: ABCDS Injury Assessment  Goal: Absence of physical injury  10/19/2022 1145 by Vinny Hanna RN  Outcome: Adequate for Discharge  Flowsheets (Taken 10/19/2022 1145)  Absence of Physical Injury: Implement safety measures based on patient assessment     Problem: Infection - Adult  Goal: Absence of infection at discharge  10/19/2022 1145 by Vinny Hanna RN  Outcome: Adequate for Discharge  Flowsheets (Taken 10/19/2022 1145)  Absence of infection at discharge:   Assess and monitor for signs and symptoms of infection   Monitor lab/diagnostic results   Monitor all insertion sites i.e., indwelling lines, tubes and drains     Problem: Infection - Adult  Goal: Absence of infection during hospitalization  10/19/2022 1145 by Vinny Hanna RN  Outcome: Adequate for Discharge  Flowsheets (Taken 10/19/2022 1145)  Absence of infection during hospitalization:   Assess and monitor for signs and symptoms of infection   Monitor lab/diagnostic results   Monitor all insertion sites i.e., indwelling lines, tubes and drains   Instruct and encourage patient and family to use good hand hygiene technique     Care plan reviewed with patient. Patient verbalizes understanding of plan of care and contributes to goal setting.

## 2022-10-19 NOTE — PROGRESS NOTES
[facility]   Nephrology progress note  10/19/2022, 11:17 AM      Pt Name:    Amrik Parrish  MRN:     [id number]     YOB: 1934  Admit Date:    10/15/2022 11:41 PM    Chief Complaint: Nephrology following for hyponatremia. Subjective:  Patient seen and examined  No chest pain or shortness of breath  Feels okay eating and drinking better  No other complaints    Objective:  24HR INTAKE/OUTPUT:    Intake/Output Summary (Last 24 hours) at 10/19/2022 1117  Last data filed at 10/18/2022 1356  Gross per 24 hour   Intake 240 ml   Output --   Net 240 ml        Admission weight: 150 lb (68 kg)  Wt Readings from Last 3 Encounters:   10/19/22 161 lb 3.2 oz (73.1 kg)   10/12/22 166 lb 14 oz (75.7 kg)   10/04/22 156 lb 9.6 oz (71 kg)        Vitals :   Vitals:    10/18/22 2115 10/19/22 0330 10/19/22 0600 10/19/22 0849   BP: 124/76 115/69  118/67   Pulse: 82 90  (!) 105   Resp: 17 18  16   Temp: 98.2 °F (36.8 °C) 98.1 °F (36.7 °C)  97.7 °F (36.5 °C)   TempSrc: Oral Oral  Oral   SpO2: 96% 96%  92%   Weight:   161 lb 3.2 oz (73.1 kg)    Height:           Physical examination  General Appearance:  Well developed.  No distress  Mouth/Throat:  Oral mucosa moist  Neck:  Supple, no JVD  Lungs:  Breath sounds: clear  Heart[de-identified]  S1,S2 heard  Abdomen:  Soft, non - tender  Musculoskeletal:  Edema -no edema noted    Medications:  Infusion:    sodium chloride       Meds:    metoprolol tartrate  25 mg Oral BID    propafenone  150 mg Oral BID    rivaroxaban  20 mg Oral Daily with breakfast    sodium chloride flush  10 mL IntraVENous 2 times per day    pantoprazole  40 mg IntraVENous Daily       Lab Data :  CBC:   Recent Labs     10/17/22  0056   WBC 5.0   HGB 13.6   HCT 41.0          CMP:  Recent Labs     10/17/22  1433 10/17/22  1938 10/18/22  0639 10/18/22  1409 10/18/22  1857 10/19/22  0100 10/19/22  0659   *   < > 126*   < > 125* 127* 128*   K 3.6  --  3.8  --   --   --  3.9   CL 93*  --  92*  -- --   --  93*   CO2 24  --  23  --   --   --  25   BUN 6*  --  6*  --   --   --  6*   CREATININE 0.7  --  0.7  --   --   --  0.7   GLUCOSE 81  --  94  --   --   --  97   CALCIUM 8.1*  --  8.3*  --   --   --  8.5    < > = values in this interval not displayed. Hepatic:   No results for input(s): LABALBU, AST, ALT, ALB, BILITOT, ALKPHOS in the last 72 hours. Assessment and Plan:  Renal -renal function appears to be stable at baseline  Hyponatremia most likely due to volume depletion from vomiting and diarr Previously she was on salt tablets 2 g 3 times daily. Currently on salt tablets and sodium is getting better currently at 128. .  We will give a dose of Lasix 20 mg IV  Diarrhea  Possible urinary tract infection on antibiotics  COVID-19 positive  Hx of paroxysmal atrial fibrillation  Meds reviewed and discussed with patient    Carol Pelayo MD  [facility]    This report has been created using voice recognition software.  It may contain minor errors which are inherent in voice recognition technology

## 2022-10-19 NOTE — PROGRESS NOTES
[facility]  Occupational Therapy  Daily Note  Time:   Time In: 6431  Time Out: 2327  Timed Code Treatment Minutes: 27 Minutes  Minutes: 27          Date: 10/19/2022  Patient Name: Fran Uribe,   Gender: female      Room: -23/023-A  MRN: [id number]  : 1934  (80 y.o.)  Referring Practitioner: Dr. Khadijah Sim  Diagnosis: acute hyponatremia  Additional Pertinent Hx: 80 y.o. female who presents to Emergency Department with Nausea and Headache     Nausea vomiting and diarrhea with headache a couple days ago. Admitted by hospitalist a week ago after fall on 10/8/2022. Head CT was negative then. She was also tested positive for COVID a week ago. She lives at home with her . She walks with a walker at  home. She complains headache and abdominal pain since discharge. No more falls since she was discharged    Restrictions/Precautions:  Restrictions/Precautions: Fall Risk     SUBJECTIVE: Patient sleeping in bed upon arrival with minimal verbal stimulation to alert; agreeable to therapy this date. Patient c/o dizziness with vitals retrieved upon returning to bedside chair, session terminated and returned to bed. PAIN: c/o headache with dizziness, nursing present end of session    Vitals: Blood Pressure: 110/62  Oxygen: 93% RA  Heart Rate: 90bpm    COGNITION: Decreased Problem Solving and Decreased Safety Awareness    ADL:   Grooming: Minimal Assistance. Thoroughness to brush hair  Bathing: Stand By Assistance. Standing for UB, seated for LB, requiring increased time  Upper Extremity Dressing: Minimal Assistance. Untie/tie gown  Lower Extremity Dressing: Stand By Assistance, X 1, with set-up, and with increased time for completion. Doff/geraldo B sock  Toileting: Stand By Assistance. Toilet Transfer: Stand By Assistance. Standard toilet . BALANCE:  Sitting Balance:  Stand By Assistance. Standing Balance: Stand By Assistance.  RW, no LOB    BED MOBILITY:  Supine to Sit: Stand By Assistance, X 1, with head of bed raised, with rail, with increased time for completion    Sit to Supine: Minimal Assistance, X 1, with head of bed raised, without rail, with increased time for completion LB management  Scooting: Dependent hercules    TRANSFERS:  Sit to Stand:  Stand By Assistance, X 1, cues for hand placement. Stand to Sit: Stand By Assistance, X 1, with increased time for completion, cues for hand placement. FUNCTIONAL MOBILITY:  Assistive Device: Rolling Walker  Assist Level:  Stand By Assistance, X 1, and with increased time for completion. Distance: To and from bathroom  Slow pace, no LOB     ASSESSMENT:     Activity Tolerance:  Patient tolerance of  treatment: good. Discharge Recommendations: Subacute/skilled nursing facility  Equipment Recommendations: Other: defer to discharge environment  Plan: Times Per Week: 3-5x  Current Treatment Recommendations: Strengthening, Patient/Caregiver education & training, Self-Care / ADL, Balance training, Functional mobility training, Endurance training, Safety education & training, Equipment evaluation, education, & procurement    Patient Education  Patient Education: Role of OT, Plan of Care, ADL's, IADL's, Home Safety, Importance of Increasing Activity, and Assistive Device Safety    Goals  Short Term Goals  Time Frame for Short Term Goals: by discharge  Short Term Goal 1: pt to complete BADL routine with SBA and no cues for safety  Short Term Goal 2: pt to icnrease endurance to navigate HH distances using aD with no rest breaks in oprep for completing her ADL tasks and simple homemaking tasks  Short Term Goal 3: Pt to dmeo dyanmic standing balance > 5 min with no UE support and SBA in prep for showering tasks and other ADL asks    Following session, patient left in safe position with all fall risk precautions in place.

## 2022-10-19 NOTE — FLOWSHEET NOTE
2828- Dr. Zhou Telugu states patient is okay for discharge from his standpoint. Follow up in office in 2 weeks with BMP. Maintain 1500 ml fluid restriction. 2 salt tabs TID. No other orders given at this time.

## 2022-10-19 NOTE — PROGRESS NOTES
[facility]  INPATIENT PHYSICAL THERAPY  DAILY NOTE  [facility] ORTHOPEDICS 7K - 7K-23/023-A    Time In: 7980  Time Out: 1038  Timed Code Treatment Minutes: 13 Minutes  Minutes: 13          Date: 10/19/2022  Patient Name: Madelyn Haley,  Gender:  female        MRN: [id number]  : 1934  (80 y.o.)     Referring Practitioner: Dr. Celine Zamora  Diagnosis: acute hyponatremia  Additional Pertinent Hx: admit with above diagnosis, COVID-19+, orthostatic hypotension, diarrhea, colitis, a fib     Prior Level of Function:  Lives With: Spouse  Type of Home: House  Home Layout: Two level, Bed/Bath upstairs, 1/2 bath on main level  Home Access: Stairs to enter with rails  Entrance Stairs - Number of Steps: 1 step with grab bar   Bathroom Shower/Tub: Walk-in shower, Shower chair with back  Bathroom Toilet: Handicap height    ADL Assistance: Independent  Ambulation Assistance: Independent  Transfer Assistance: Independent  Additional Comments: per pt was having difficulty with home mobility following recent hospital admi and was looking into SNF for rehab. Pt stating she was very fatigued after completing her ADL tasks including showering    Restrictions/Precautions:  Restrictions/Precautions: Fall Risk     SUBJECTIVE: RN approved session. Patient in bed upon arrival and agreeable to therapy. Pt needed max cues for participation. PAIN: Not Rated    Vitals: Vitals not assessed per clinical judgement, see nursing flowsheet    OBJECTIVE:  Bed Mobility:  Rolling to Left: Stand By Assistance   Supine to Sit: Stand By Assistance, with verbal cues , with increased time for completion  Scooting: Supervision    Transfers:  Sit to Stand: Stand By Assistance, with increased time for completion, cues for hand placement, x1 trial from EOB  Stand to Sit:Stand By Assistance    Ambulation:  Stand By Assistance  Distance: 4 ft  Surface: Level Tile  Device:Rolling Walker  Gait Deviations:   Forward Flexed Posture, Decreased Step Length Bilaterally, and Decreased Heel Strike Bilaterally    Balance:  Dynamic Sitting Balance: Stand By Assistance, While performing exercises without back support    Exercise:  Patient was guided in 1 set(s) 12 reps of exercise to both lower extremities. Seated marches, Seated heel/toe raises, Long arc quads, and Seated isometric hip adduction. Exercises were completed for increased independence with functional mobility. Functional Outcome Measures: Completed  AM-PAC Inpatient Mobility without Stair Climbing Raw Score : 15  AM-PAC Inpatient without Stair Climbing T-Scale Score : 43.03    ASSESSMENT:  Assessment: Patient progressing toward established goals. Activity Tolerance:  Patient tolerance of  treatment: good. Equipment Recommendations:Equipment Needed: No  Discharge Recommendations: Subacte/Skilled Nursing Facility and Patient would benefit from continued PT at discharge  Plan: Specific Instructions for Next Treatment: therex and mobility  General Plan:  (3-5X GM)  Specific Instructions for Next Treatment: therex and mobility    Patient Education  Patient Education: Plan of Care, Bed Mobility, Transfers, Gait, Up in Chair for All Meals, Verbal Exercise Instruction    Goals:  Patient Goals : feel better  Short Term Goals  Time Frame for Short Term Goals: by discharge  Short Term Goal 1: bed mobility with SBA to get in/out of bed  Short Term Goal 2: transfe with SBA to get in/out of chairs  Short Term Goal 3: amb 25'x1 with AD and CGA to walk safely in room  Long Term Goals  Time Frame for Long Term Goals : no LTGs set secondary to short ELOS    Following session, patient left in safe position with all fall risk precautions in place.

## 2022-10-20 VITALS
BODY MASS INDEX: 26.86 KG/M2 | WEIGHT: 161.2 LBS | HEART RATE: 91 BPM | SYSTOLIC BLOOD PRESSURE: 106 MMHG | DIASTOLIC BLOOD PRESSURE: 63 MMHG | HEIGHT: 65 IN | OXYGEN SATURATION: 94 % | TEMPERATURE: 97.7 F | RESPIRATION RATE: 16 BRPM

## 2022-10-20 LAB
ANION GAP SERPL CALCULATED.3IONS-SCNC: 11 MEQ/L (ref 8–16)
BUN BLDV-MCNC: 7 MG/DL (ref 7–22)
CALCIUM SERPL-MCNC: 8.6 MG/DL (ref 8.5–10.5)
CHLORIDE BLD-SCNC: 94 MEQ/L (ref 98–111)
CO2: 24 MEQ/L (ref 23–33)
CREAT SERPL-MCNC: 0.7 MG/DL (ref 0.4–1.2)
GFR SERPL CREATININE-BSD FRML MDRD: > 60 ML/MIN/1.73M2
GLUCOSE BLD-MCNC: 94 MG/DL (ref 70–108)
POTASSIUM SERPL-SCNC: 3.8 MEQ/L (ref 3.5–5.2)
SODIUM BLD-SCNC: 129 MEQ/L (ref 135–145)

## 2022-10-20 PROCEDURE — 99232 SBSQ HOSP IP/OBS MODERATE 35: CPT | Performed by: INTERNAL MEDICINE

## 2022-10-20 PROCEDURE — 99239 HOSP IP/OBS DSCHRG MGMT >30: CPT | Performed by: PHYSICIAN ASSISTANT

## 2022-10-20 PROCEDURE — 36415 COLL VENOUS BLD VENIPUNCTURE: CPT

## 2022-10-20 PROCEDURE — 6370000000 HC RX 637 (ALT 250 FOR IP): Performed by: INTERNAL MEDICINE

## 2022-10-20 PROCEDURE — 6370000000 HC RX 637 (ALT 250 FOR IP): Performed by: HOSPITALIST

## 2022-10-20 PROCEDURE — 6360000002 HC RX W HCPCS: Performed by: HOSPITALIST

## 2022-10-20 PROCEDURE — 97530 THERAPEUTIC ACTIVITIES: CPT

## 2022-10-20 PROCEDURE — 97535 SELF CARE MNGMENT TRAINING: CPT

## 2022-10-20 PROCEDURE — C9113 INJ PANTOPRAZOLE SODIUM, VIA: HCPCS | Performed by: HOSPITALIST

## 2022-10-20 PROCEDURE — 80048 BASIC METABOLIC PNL TOTAL CA: CPT

## 2022-10-20 RX ORDER — FUROSEMIDE 10 MG/ML
20 INJECTION INTRAMUSCULAR; INTRAVENOUS ONCE
Status: DISCONTINUED | OUTPATIENT
Start: 2022-10-20 | End: 2022-10-20

## 2022-10-20 RX ORDER — SODIUM CHLORIDE 1000 MG
2 TABLET, SOLUBLE MISCELLANEOUS
Status: DISCONTINUED | OUTPATIENT
Start: 2022-10-20 | End: 2022-10-20 | Stop reason: HOSPADM

## 2022-10-20 RX ORDER — FUROSEMIDE 20 MG/1
20 TABLET ORAL ONCE
Status: COMPLETED | OUTPATIENT
Start: 2022-10-20 | End: 2022-10-20

## 2022-10-20 RX ADMIN — Medication 5.8 MG: at 06:33

## 2022-10-20 RX ADMIN — SODIUM CHLORIDE 2 G: 1 TABLET ORAL at 09:33

## 2022-10-20 RX ADMIN — PANTOPRAZOLE SODIUM 40 MG: 40 INJECTION, POWDER, FOR SOLUTION INTRAVENOUS at 06:31

## 2022-10-20 RX ADMIN — PROPAFENONE HYDROCHLORIDE 150 MG: 150 TABLET, FILM COATED ORAL at 09:33

## 2022-10-20 RX ADMIN — FUROSEMIDE 20 MG: 20 TABLET ORAL at 10:12

## 2022-10-20 RX ADMIN — ACETAMINOPHEN 650 MG: 325 TABLET ORAL at 09:32

## 2022-10-20 ASSESSMENT — PAIN DESCRIPTION - ORIENTATION: ORIENTATION: RIGHT

## 2022-10-20 ASSESSMENT — PAIN - FUNCTIONAL ASSESSMENT: PAIN_FUNCTIONAL_ASSESSMENT: ACTIVITIES ARE NOT PREVENTED

## 2022-10-20 ASSESSMENT — PAIN SCALES - GENERAL
PAINLEVEL_OUTOF10: 5
PAINLEVEL_OUTOF10: 2

## 2022-10-20 ASSESSMENT — PAIN DESCRIPTION - DESCRIPTORS: DESCRIPTORS: ACHING

## 2022-10-20 ASSESSMENT — PAIN DESCRIPTION - LOCATION: LOCATION: HAND

## 2022-10-20 NOTE — PROGRESS NOTES
[facility]   Nephrology progress note  10/20/2022, 8:24 AM      Pt Name:    Jennifer Villa  MRN:     [id number]     YOB: 1934  Admit Date:    10/15/2022 11:41 PM    Chief Complaint: Nephrology following for hyponatremia. Subjective:  Patient seen and examined  No chest pain or shortness of breath  Feels okay eating and drinking better  No other complaints    Objective:  24HR INTAKE/OUTPUT:    Intake/Output Summary (Last 24 hours) at 10/20/2022 0824  Last data filed at 10/20/2022 0647  Gross per 24 hour   Intake 660 ml   Output 400 ml   Net 260 ml        Admission weight: 150 lb (68 kg)  Wt Readings from Last 3 Encounters:   10/19/22 161 lb 3.2 oz (73.1 kg)   10/12/22 166 lb 14 oz (75.7 kg)   10/04/22 156 lb 9.6 oz (71 kg)        Vitals :   Vitals:    10/19/22 1524 10/19/22 2047 10/20/22 0005 10/20/22 0630   BP: 109/61 130/63 128/66 (!) 142/75   Pulse: 79 76 81 78   Resp: 16 16 16 16   Temp: 97.8 °F (36.6 °C) 98.1 °F (36.7 °C) 98 °F (36.7 °C) 97.9 °F (36.6 °C)   TempSrc: Oral Oral Oral Oral   SpO2: 91% 94% 97% 94%   Weight:       Height:           Physical examination  General Appearance:  Well developed. No distress  Mouth/Throat:  Oral mucosa moist  Neck:  Supple, no JVD  Lungs:  Breath sounds: clear  Heart[de-identified]  S1,S2 heard  Abdomen:  Soft, non - tender  Musculoskeletal:  Edema -no edema noted    Medications:  Infusion:    sodium chloride       Meds:    metoprolol tartrate  25 mg Oral BID    propafenone  150 mg Oral BID    rivaroxaban  20 mg Oral Daily with breakfast    sodium chloride flush  10 mL IntraVENous 2 times per day    pantoprazole  40 mg IntraVENous Daily       Lab Data :  CBC:   No results for input(s): WBC, HGB, HCT, PLT in the last 72 hours.     CMP:  Recent Labs     10/18/22  0639 10/18/22  1409 10/19/22  0659 10/19/22  1200 10/20/22  0700   *   < > 128* 128* 129*   K 3.8  --  3.9  --  3.8   CL 92*  --  93*  --  94*   CO2 23  --  25  --  24   BUN 6*  --  6*  -- 7   CREATININE 0.7  --  0.7  --  0.7   GLUCOSE 94  --  97  --  94   CALCIUM 8.3*  --  8.5  --  8.6    < > = values in this interval not displayed. Hepatic:   No results for input(s): LABALBU, AST, ALT, ALB, BILITOT, ALKPHOS in the last 72 hours. Assessment and Plan:  Renal -renal function appears to be stable at baseline  Hyponatremia most likely due to volume depletion from vomiting and diarr Previously she was on salt tablets 2 g 3 times daily. Currently on salt tablets and sodium is getting better currently at 129. .  We will give another dose of Lasix 20 mg IV. Sodium 2 grams TID  Diarrhea  Possible urinary tract infection on antibiotics  COVID-19 positive  Hx of paroxysmal atrial fibrillation  Meds reviewed and discussed with patient    Douglas Cabrera MD  [facility]    This report has been created using voice recognition software.  It may contain minor errors which are inherent in voice recognition technology

## 2022-10-20 NOTE — PLAN OF CARE
Problem: Discharge Planning  Goal: Discharge to home or other facility with appropriate resources  Outcome: Progressing  Flowsheets (Taken 10/20/2022 0502)  Discharge to home or other facility with appropriate resources:   Identify barriers to discharge with patient and caregiver   Arrange for needed discharge resources and transportation as appropriate   Refer to discharge planning if patient needs post-hospital services based on physician order or complex needs related to functional status, cognitive ability or social support system     Problem: Pain  Goal: Verbalizes/displays adequate comfort level or baseline comfort level  Outcome: Progressing  Flowsheets (Taken 10/20/2022 0502)  Verbalizes/displays adequate comfort level or baseline comfort level:   Encourage patient to monitor pain and request assistance   Assess pain using appropriate pain scale   Administer analgesics based on type and severity of pain and evaluate response   Implement non-pharmacological measures as appropriate and evaluate response     Problem: Safety - Adult  Goal: Free from fall injury  Outcome: Progressing  Flowsheets  Taken 10/20/2022 0502  Free From Fall Injury: Instruct family/caregiver on patient safety  Taken 10/20/2022 0500  Free From Fall Injury: Instruct family/caregiver on patient safety     Problem: Nutrition Deficit:  Goal: Optimize nutritional status  Outcome: Progressing  Flowsheets (Taken 10/20/2022 0502)  Nutrient intake appropriate for improving, restoring, or maintaining nutritional needs:   Assess nutritional status and recommend course of action   Monitor oral intake, labs, and treatment plans     Problem: Skin/Tissue Integrity  Goal: Absence of new skin breakdown  Description: 1. Monitor for areas of redness and/or skin breakdown  2. Assess vascular access sites hourly  3. Every 4-6 hours minimum:  Change oxygen saturation probe site  4.   Every 4-6 hours:  If on nasal continuous positive airway pressure, respiratory therapy assess nares and determine need for appliance change or resting period. Outcome: Progressing  Note: See flowsheets for skin integrity documentation.      Problem: Metabolic/Fluid and Electrolytes - Adult  Goal: Electrolytes maintained within normal limits  Outcome: Progressing  Flowsheets (Taken 10/20/2022 0502)  Electrolytes maintained within normal limits: Monitor labs and assess patient for signs and symptoms of electrolyte imbalances     Problem: Infection - Adult  Goal: Absence of infection at discharge  Outcome: Progressing  Flowsheets (Taken 10/20/2022 0502)  Absence of infection at discharge:   Assess and monitor for signs and symptoms of infection   Monitor lab/diagnostic results     Problem: Infection - Adult  Goal: Absence of infection during hospitalization  Outcome: Progressing  Flowsheets (Taken 10/20/2022 0502)  Absence of infection during hospitalization:   Assess and monitor for signs and symptoms of infection   Monitor lab/diagnostic results     Problem: ABCDS Injury Assessment  Goal: Absence of physical injury  Outcome: Progressing  Flowsheets  Taken 10/20/2022 0502  Absence of Physical Injury: Implement safety measures based on patient assessment  Taken 10/20/2022 0500  Absence of Physical Injury: Implement safety measures based on patient assessment     Problem: Chronic Conditions and Co-morbidities  Goal: Patient's chronic conditions and co-morbidity symptoms are monitored and maintained or improved  Outcome: Progressing  Flowsheets (Taken 10/20/2022 0502)  Care Plan - Patient's Chronic Conditions and Co-Morbidity Symptoms are Monitored and Maintained or Improved:   Monitor and assess patient's chronic conditions and comorbid symptoms for stability, deterioration, or improvement   Update acute care plan with appropriate goals if chronic or comorbid symptoms are exacerbated and prevent overall improvement and discharge   Collaborate with multidisciplinary team to address chronic and comorbid conditions and prevent exacerbation or deterioration    Care plan reviewed with patient. Patient verbalizes understanding of the plan of care and contribute to goal setting.

## 2022-10-20 NOTE — PROGRESS NOTES
[address] [facility]  Occupational Therapy  Daily Note  Time:   Time In: 6516  Time Out: 5907  Timed Code Treatment Minutes: 23 Minutes  Minutes: 23          Date: 10/20/2022  Patient Name: Dave Alford,   Gender: female      Room: -23/023-A  MRN: [id number]  : 1934  (80 y.o.)  Referring Practitioner: Dr. Sage Delgadillo  Diagnosis: acute hyponatremia  Additional Pertinent Hx: 80 y.o. female who presents to Emergency Department with Nausea and Headache     Nausea vomiting and diarrhea with headache a couple days ago. Admitted by hospitalist a week ago after fall on 10/8/2022. Head CT was negative then. She was also tested positive for COVID a week ago. She lives at home with her . She walks with a walker at  home. She complains headache and abdominal pain since discharge. No more falls since she was discharged    Restrictions/Precautions:  Restrictions/Precautions: Fall Risk     SUBJECTIVE: Nurse Ninfa Perez ok'd session, In bed upon arrival, agreeable to OT session, cooperative, and flat affect    PAIN: 5/10: R hand from IV removal    Vitals: Nurse checked vitals prior to session    COGNITION: Slow Processing and Inattention    ADL:   Grooming: Stand By Assistance. Completed oral care and hair care standing sinkside . BALANCE:  Standing Balance: Stand By Assistance. X 4 minutes with 0 UE support. Patient reported feeling lightheaded,  Nurse took BP,  BP stable    BED MOBILITY:  Supine to Sit: Stand By Assistance HOB elevated, used bedrail    TRANSFERS:  Sit to Stand:  Contact Guard Assistance. EOB  Stand to Sit: Contact Guard Assistance. Bedside chair    FUNCTIONAL MOBILITY:  Assistive Device: Rolling Walker  Assist Level:  Contact Guard Assistance. Distance: To and from bathroom  Slow pace     ASSESSMENT:     Activity Tolerance:  Patient tolerance of  treatment: good. Discharge Recommendations: ECF with OT  Equipment Recommendations:  Other: defer to discharge environment  Plan: Times Per Week: 3-5x  Current Treatment Recommendations: Strengthening, Patient/Caregiver education & training, Self-Care / ADL, Balance training, Functional mobility training, Endurance training, Safety education & training, Equipment evaluation, education, & procurement    Patient Education  Patient Education: ADL's    Goals  Short Term Goals  Time Frame for Short Term Goals: by discharge  Short Term Goal 1: pt to complete BADL routine with SBA and no cues for safety  Short Term Goal 2: pt to icnrease endurance to navigate HH distances using aD with no rest breaks in oprep for completing her ADL tasks and simple homemaking tasks  Short Term Goal 3: Pt to dmeo dyanmic standing balance > 5 min with no UE support and SBA in prep for showering tasks and other ADL asks    Following session, patient left in safe position with all fall risk precautions in place.

## 2022-10-20 NOTE — CARE COORDINATION
10/20/22, 8:59 AM EDT    DISCHARGE PLANNING EVALUATION    Spoke with daughter, Marce Vaca. She is requesting ambulance transport, rather than wheelchair. Discussed medicare criteria,daughter states they will pay for cost of ambulance if not covered. Arranged with [facility] for 1030. Confirmed plan with Patel Pedro of [facility] and with daughter. Call made to , message left. Daughter shares she will contact him as well. 10/20/22, 9:21 AM EDT    Patient goals/plan/ treatment preferences discussed by  and . Patient goals/plan/ treatment preferences reviewed with patient/ family. Patient/ family verbalize understanding of discharge plan and are in agreement with goal/plan/treatment preferences. Understanding was demonstrated using the teach back method. AVS provided by RN at time of discharge, which includes all necessary medical information pertaining to the patients current course of illness, treatment, post-discharge goals of care, and treatment preferences. Services At/After Discharge: [facility] (SNF) and In ambulance       IMM Letter  IMM Letter given to Patient/Family/Significant other/Guardian/POA/by[de-identified] CM:  Chapis Diaz RN  IMM Letter date given[de-identified] 10/19/22  IMM Letter time given[de-identified] [phone]

## 2022-10-20 NOTE — PROGRESS NOTES
Patient transported to [address] [place] [address] at this time via [facility]. Report given to [facility]. Daughter aware of discharge.

## 2022-10-20 NOTE — PROGRESS NOTES
Patient was working with therapy brushing her teeth at the sink when  patient stated she felt more light headed then dizzy after standing for 4 minutes. Therapy assisted patient to sit on the commode. BP checked and 106/63 Pulse 93. After a few minutes patient was then assisted back to recliner. Patient is now resting with her feet elevated in recliner and eyes closed.

## 2022-10-25 NOTE — DISCHARGE SUMMARY
Hospitalist Discharge Summary        Patient: Eneida Moon  YOB: 1934  MRN: [id number]   Acct: [de-identified]    Primary Care Physician: Christiana Connell MD    Admit date  10/15/2022    Discharge date: 10/20/2022 10:35 AM    Chief Complaint on presentation :-  Persistent HA     Discharge Assessment and Plan:-   Hyponatremia, acute on chronic: Nephrology consulted. Sodium went to 129; nephrology okay with DC with salt tabs. BMP at the [facility] [place], operated by [facility]. Possible UTI: Empiric IV Rocephin. Urine culture showed mixed growth, we will continue ABX for 3 days, ABX stopped prior to DC. Mild hypokalemia: Likely due to diarrhea. Resolved. Paroxysmal atrial fibrillation: Presently in sinus rhythm. Resume Eliquis, metoprolol, propafenone  Generalized weakness: Likely due to diarrhea, hyponatremia with poor oral intake. Consult PT and OT. Recently diagnosed COVID 19 infection: Out of isolation now. Initial H and P and Hospital course:-  Initial H&P \"80year-old female with paroxysmal atrial fibrillation on Eliquis, recently diagnosed COVID-19 infection without oxygen requirement, presented with headache, diarrhea, and epigastric abdominal discomfort that had persisted for the past 1 week. Reported associated generalized weakness, loss of appetite, persistent nausea with one episode of vomiting. Described headache as global, persistent, unresponsive to over-the-counter analgesics. Said stools about 4 times per day, watery and nonbloody. While abdominal pain was localized in the subxiphoid area, 10/10 intensity, continuous, nonradiating, without known aggravators, relieved to 3/10 intensity following IV morphine administered in the ED. No fever, dysuria, shortness of breath, dizziness, chest pain or cough. Of note patient was recently hospitalized at this facility (10/8/ - 12/2022) for fall secondary to orthostatic hypotension and COVID-19 infection.     Evaluation in the ED remarkable for WBC 3.9, sodium 123, potassium 3.4, chloride 87, serum osmolality 249.2, glucose 116. Urinalysis suggestive of UTI. She was managed with IVF normal saline, IV Rocephin x1 dose prior to admission to the medical unit. \"      10/17: No acute events overnight. Pt states that her body aches are worse today. Pt and this provider spoke about the importance of sitting in the chair today. Pt has no other issues or concerns at this time. 10/18: No acute events overnight. Pt states that she is still tired but agreeable to showering and sitting in the chair. Pt is more conversational today. On the day of discharge, it was explained to the patient that it was very important to follow up with his PCP to have continued care. Appointments were made and information was given. Physical Exam:-  General appearance: No apparent distress, appears stated age and cooperative. Very fatigued   HEENT: Pupils equal, round, and reactive to light. Conjunctivae/corneas clear. Neck: Supple, with full range of motion. No jugular venous distention. Trachea midline. Respiratory:  Normal respiratory effort on RA. Clear to auscultation, bilaterally without Rales/Wheezes/Rhonchi. Cardiovascular: Regular rate and rhythm with normal S1/S2 without murmurs, rubs or gallops. Abdomen: Soft, non-tender, non-distended with normal bowel sounds. Musculoskeletal: passive and active ROM x 4 extremities. Skin: Skin color, texture, turgor normal.  No rashes or lesions. Neurologic:  Neurovascularly intact without any focal sensory/motor deficits. Cranial nerves: II-XII intact, grossly non-focal.  Psychiatric: Alert and oriented  Capillary Refill: Brisk,< 3 seconds   Peripheral Pulses: +2 palpable, equal bilaterally     Vitals:   No data found.   Weight:   Weight: 161 lb 3.2 oz (73.1 kg)   24 hour intake/output:   No intake or output data in the 24 hours ending 10/25/22 1106    Discharge Medications:-      Medication List        START taking these medications sodium chloride 1 g tablet  Take 2 tablets by mouth 3 times daily            CONTINUE taking these medications      metoprolol tartrate 50 MG tablet  Commonly known as: LOPRESSOR  Patient is taking 1/2 tab BID     ondansetron 4 MG disintegrating tablet  Commonly known as: Zofran ODT  Take 1 tablet by mouth every 8 hours as needed for Nausea     pantoprazole 40 MG tablet  Commonly known as: PROTONIX  Take 1 tablet by mouth once daily     propafenone 150 MG tablet  Commonly known as: RYTHMOL  Take 1 tablet by mouth 2 times daily     rivaroxaban 20 MG Tabs tablet  Commonly known as: XARELTO  Take 1 tablet by mouth in the morning. Where to Get Your Medications        Information about where to get these medications is not yet available    Ask your nurse or doctor about these medications  sodium chloride 1 g tablet          Labs :-  No results found for this or any previous visit (from the past 72 hour(s)).      Microbiology:    Blood culture #1:   Lab Results   Component Value Date/Time    BC No growth-preliminary No growth 09/05/2021 07:38 PM       Blood culture #2:No results found for: Isabel Moffett    Organism:  No results found for: LABGRAM    MRSA culture only:No results found for: [facility]    Urine culture:   Lab Results   Component Value Date/Time    LABURIN No growth-preliminary 05/05/2022 03:24 PM    LABURIN [place] count: >100,000 CFU/mL 05/05/2022 03:24 PM     Lab Results   Component Value Date/Time    ORG Mixed Growth 10/16/2022 02:11 AM        Respiratory culture: No results found for: CULTRESP    Aerobic and Anaerobic :  No results found for: LABAERO  No results found for: TASIA    Urinalysis:      Lab Results   Component Value Date/Time    NITRU NEGATIVE 10/16/2022 02:11 AM    WBCUA > 100 10/16/2022 02:11 AM    BACTERIA NONE SEEN 10/16/2022 02:11 AM    RBCUA 0-2 10/16/2022 02:11 AM    BLOODU NEGATIVE 10/16/2022 02:11 AM    SPECGRAV 1.025 05/05/2022 02:47 PM    GLUCOSEU NEGATIVE 10/16/2022 02:11 AM       Radiology:-  CT HEAD WO CONTRAST    Result Date: 10/16/2022  CT Head without contrast Indication: Headache. Technique: CT head without contrast. Coronal and sagittal reformations. Comparison: CT/SR - CT HEAD WO CONTRAST - 10/08/2022 01:52 PM EDT Findings: The ventricular system is midline in position. No acute major vessel vascular territory infarct. No acute intraparenchymal hemorrhage. No parenchymal mass lesions. Cerebral atrophy. Hypodensities in the cerebral white matter, likely chronic small vessel ischemic changes. No abnormal extra-axial masses or fluid collections. The basal cisterns and foramen magnum are patent. Vascular calcifications. The calvarium is intact. Small mucous retention cyst/polyp right maxillary sinus. Prior bilateral lens replacements. Impression: Negative for acute intracranial abnormality. This document has been electronically signed by: Adi Bacon MD on 10/16/2022 02:04 AM All CTs at this facility use dose modulation techniques and iterative reconstructions, and/or weight-based dosing when appropriate to reduce radiation to a low as reasonably achievable. CT ABDOMEN PELVIS W IV CONTRAST Additional Contrast? None    Result Date: 10/16/2022  CT abdomen and pelvis with contrast Comparison: September 4, 2021 Findings: Partially visualized cardiac apex is normal in size. Subsegmental atelectatic change involving the bilateral lung bases. Pulmonary nodule within the right lung base measuring 13 mm on image 6 of series 2, finding is stable in size when compared to prior examination dated September 4, 2021. Recommend 12 months interval follow-up to ensure stability for 2 years. Trace bilateral pleural effusions. Interstitial densities and minimal consolidation of the left lung base may represent infectious or inflammatory process. The stomach is nondistended. Atherosclerosis of the abdominal aorta extending into the iliac vasculature. The liver is unremarkable.  Mild dilatation of the intrahepatic and neck hepatic biliary ducts likely on the basis of prior cholecystectomy. Advanced pancreatic atrophy. Splenomegaly. Bilateral adrenal glands are normal. Cortical cyst of the superior right kidney. No nephrolithiasis or hydronephrosis Portions of the bladder not visualized due to extensive metallic beam hardening artifact. The partially visualized urinary bladder is unremarkable. Pessary device. Hysterectomy. Small volume free fluid within the lower pelvis likely physiologic or reactive. Surgical changes of the ventral midline abdominal wall likely from hernia repair. Diffuse mucosal thickening of the colon. Regions of mucosal enhancement of the colon with intraluminal fluid greatest involving the sigmoid and extending to the level of the distal rectum. Overall findings are most consistent with moderate to severe changes of infectious versus inflammatory colitis. Extensive colonic diverticulosis of the sigmoid colon. No bowel obstruction, pneumoperitoneum, or pneumatosis. The appendix is not identified with certainty. The bones are intact. Left hip arthroplasty resulting in significant metallic beam hardening artifact partially obscuring visualization of the surrounding structures. Spondylosis of the lumbar spine. 1. Diffuse mucosal thickening of the colon. Regions of mucosal enhancement of the colon with intraluminal fluid greatest involving the sigmoid and extending to the level of the distal rectum. Overall findings are most consistent with moderate to severe changes of infectious versus inflammatory colitis. Extensive colonic diverticulosis of the sigmoid colon. 2. Small volume free fluid within the lower pelvis/cul-de-sac likely physiologic or reactive. 3. Trace bilateral pleural effusions. Interstitial densities and minimal consolidation of the left lung base may represent infectious or inflammatory process.  4. Pulmonary nodule within the right lung base measuring 13 mm on image 6 of series 2, finding is stable in size when compared to prior examination dated September 4, 2021. Recommend 12 months interval follow-up to ensure stability for 2 years. 5. Splenomegaly. 6. Innumerable chronic findings as above. This document has been electronically signed by: Nori Rod DO on 10/16/2022 05:27 AM All CTs at this facility use dose modulation techniques and iterative reconstructions, and/or weight-based dosing when appropriate to reduce radiation to a low as reasonably achievable. XR CHEST PORTABLE    Result Date: 10/16/2022  Chest one view COMPARISON: 10/8/22 FINDINGS: No pulmonary consolidation, pleural effusion or pneumothorax is seen. The cardiac silhouette is enlarged. No acute pulmonary disease. Cardiomegaly.  This document has been electronically signed by: Pawel Aguilera MD on 10/16/2022 01:49 AM       Follow-up scheduled after discharge :-    in the next few days with Yecenia Zeng MD    Consultations during this hospital stay:-  [] NONE [] Cardiology  [] Nephrology  [] Hemo onco   [] GI   [] ID  [] Endocrine  [] Pulm    [] Neuro    [] Psych   [] Urology  [] ENT   [] G SURGERY   []Ortho    []CV surg    [] Palliative  [] Hospice [] Pain management   []    []TCU   [] PT/OT  OTHERS:-    Disposition: SNF  Condition at Discharge: Stable    Time Spent:- 40 minutes    Electronically signed by EVERETTE Delarosa on 10/25/22 at 11:06 AM EDT   Discharging Hospitalist

## 2022-10-26 NOTE — PROGRESS NOTES
Physician Progress Note      PATIENT:               Ming Stewart  CSN #:                  [id number]  :                       1934  ADMIT DATE:       10/15/2022 11:41 PM  [address] [place] DATE:        10/20/2022 10:35 AM  RESPONDING  PROVIDER #:        Xenia GAVIRIA          QUERY TEXT:    Patient presented with persistent nausea, vomiting and diarrhea?and was   diagnosed with hyponatremia. ? COVID-19 test was positive one week prior per   documentation on a previous admission. ? ? Patient was in isolation for the first   two days of this admission. ? ? After study, please clarify the significance   of the positive COVID-19 test:    The medical record reflects the following:  Risk Factors: presented with persistent nausea, vomiting and diarrhea?and was   diagnosed with hyponatremia. Clinical Indicators: ? COVID-19 test was positive one week prior per   documentation on a previous admission. ? ? Patient was in isolation for the first   two days of this admission. ?  Treatment: Isolation    Thank you. Please call if you have any questions. P) [phone]. Signed   by Cady Prado RN Clinical , CRCR  Options provided:  -- Positive Covid test result without an active current Covid-19 infection  -- Positive test with active infection, present on admission  -- Other - I will add my own diagnosis  -- Disagree - Not applicable / Not valid  -- Disagree - Clinically unable to determine / Unknown  -- Refer to Clinical Documentation Reviewer    PROVIDER RESPONSE TEXT:    Patient is testing positive for Covid without an active Covid-19 infection.     Query created by: Deepti Villegas on 10/26/2022 7:39 AM      Electronically signed by:  Fransico GAVIRIA 10/26/2022 4:23 PM

## 2022-10-31 ENCOUNTER — TELEPHONE (OUTPATIENT)
Dept: FAMILY MEDICINE CLINIC | Age: 87
End: 2022-10-31

## 2022-11-01 NOTE — TELEPHONE ENCOUNTER
Pt discharged from [address] [place] [address] discharge to home on 11/1/2022. Pt declines follow up. Has upcoming appts with Cardio on 11/3, Nephro 11/10 and Gastro 11/11/2022. Pt did have questions on continuing Eliquis vs Xarelto. Pt advised to ask Cardio at their appt. Pt voiced understanding and agreed.

## 2022-11-02 ENCOUNTER — CARE COORDINATION (OUTPATIENT)
Dept: CASE MANAGEMENT | Age: 87
End: 2022-11-02

## 2022-11-02 DIAGNOSIS — E87.1 HYPONATREMIA: Primary | ICD-10-CM

## 2022-11-02 PROCEDURE — 1111F DSCHRG MED/CURRENT MED MERGE: CPT | Performed by: FAMILY MEDICINE

## 2022-11-02 NOTE — CARE COORDINATION
[facility] Discharge Call    2022    Patient: Jhonathan Urrutia Patient : 1934   MRN: <[id number]>  Reason for Admission: Hyponatremia  Discharge Date: 10/20/22 RARS: Readmission Risk Score: 14.6    Acute Care Course: Patient admitted to [facility] 10/8-10/12 for COVID 19. She re-admitted 10/15-10/20 for Hyponatremia    HFU made: Cardiology 11/3      Sig Hx: UTI, PAF, Generalized weakness    DME: Rollator    Conversation: Patient states she is doing pretty good. Denies sob, pain, fever, cough, congestion, chills, n/v, swelling, bowel or urinary concerns. She states her sense of taste has returned. Appetite good. Medications reviewed. Patient ambulates using a rollator. She is able to sponge bath and dress self. Patient and Spouse prepare meals. [facility] is scheduled to visit today. Cardiology f/u 11/3. PCP f/u . She declined assistance to move appointment closer. States she prefer not to move appointment closer. Agreeable to future calls. Follow up plan:  Will hand off to [facility]         Discharge Facility: The [facility] of [address] [facility] Acute Facility Transition    Post Acute Facility: The [address] you have all of your prescriptions and are they filled?: Yes   Have you scheduled your follow up appointment?: Yes (Comment: Cardiology 11/3/22)         Do you feel like you have everything you need to keep you well at home?: Yes   Care Transitions Interventions   Home Care Waiver: Declined        Transportation Support: Declined      DME Assistance: Declined     Senior Services: Declined             Future Appointments   Date Time Provider Gina Beauchamp   11/3/2022  1:30 PM MD PETAR Donovan [facility]   11/10/2022 10:00 AM Amira Desouza MD [facility]   2022 10:00 AM VASU Valle - CNP [facility]   11/15/2022 10:45 AM MD KATIE Smith [facility]   2022 11:00 AM Amira Desouza MD [facility]   2022  1:40 PM Mike Peralta MD [facility]   Transitions of Care Initial Call    Was this an external facility discharge? Yes, 22  [facility] [address] to be reviewed by the provider   Additional needs identified to be addressed with provider: No  none             Method of communication with provider : none    Advance Care Planning:   Does patient have an Advance Directive: reviewed and current, reviewed and needs to be updated, not on file; education provided, not on file, patient declined education, decision maker updated, and referral to internal ACP facilitator. Care Transition Nurse contacted the patient by telephone to perform post hospital discharge assessment. Verified name and  with patient as identifiers. Provided introduction to self, and explanation of the CTN role. CTN reviewed discharge instructions, medical action plan and red flags with patient who verbalized understanding. Patient given an opportunity to ask questions and does not have any further questions or concerns at this time. Were discharge instructions available to patient? Yes. Reviewed appropriate site of care based on symptoms and resources available to patient including: PCP  Specialist  Home health  When to call 911. The patient agrees to contact the PCP office for questions related to their healthcare. Medication reconciliation was performed with patient, who verbalizes understanding of administration of home medications. Advised obtaining a 90-day supply of all daily and as-needed medications. Was patient discharged with a pulse oximeter? no    CTN provided contact information. Plan for follow-up call in 3-5 days based on severity of symptoms and risk factors. Plan for next call: symptom management-Continued progress, new or worsening concerns.        Sandra Jernigan RN

## 2022-11-03 ENCOUNTER — TELEPHONE (OUTPATIENT)
Dept: FAMILY MEDICINE CLINIC | Age: 87
End: 2022-11-03

## 2022-11-03 ENCOUNTER — OFFICE VISIT (OUTPATIENT)
Dept: CARDIOLOGY | Age: 87
End: 2022-11-03
Payer: MEDICARE

## 2022-11-03 VITALS
SYSTOLIC BLOOD PRESSURE: 136 MMHG | DIASTOLIC BLOOD PRESSURE: 82 MMHG | HEIGHT: 65 IN | BODY MASS INDEX: 25.72 KG/M2 | WEIGHT: 154.4 LBS | HEART RATE: 99 BPM

## 2022-11-03 DIAGNOSIS — I48.92 ATRIAL FLUTTER, UNSPECIFIED TYPE (HCC): ICD-10-CM

## 2022-11-03 DIAGNOSIS — E87.1 LOW SODIUM LEVELS: Primary | ICD-10-CM

## 2022-11-03 DIAGNOSIS — I48.0 PAROXYSMAL ATRIAL FIBRILLATION WITH RAPID VENTRICULAR RESPONSE (HCC): Primary | ICD-10-CM

## 2022-11-03 DIAGNOSIS — R00.1 BRADYCARDIA: ICD-10-CM

## 2022-11-03 PROCEDURE — 93005 ELECTROCARDIOGRAM TRACING: CPT | Performed by: INTERNAL MEDICINE

## 2022-11-03 PROCEDURE — G8484 FLU IMMUNIZE NO ADMIN: HCPCS | Performed by: INTERNAL MEDICINE

## 2022-11-03 PROCEDURE — 1123F ACP DISCUSS/DSCN MKR DOCD: CPT | Performed by: INTERNAL MEDICINE

## 2022-11-03 PROCEDURE — 93010 ELECTROCARDIOGRAM REPORT: CPT | Performed by: INTERNAL MEDICINE

## 2022-11-03 PROCEDURE — 1036F TOBACCO NON-USER: CPT | Performed by: INTERNAL MEDICINE

## 2022-11-03 PROCEDURE — 99212 OFFICE O/P EST SF 10 MIN: CPT | Performed by: INTERNAL MEDICINE

## 2022-11-03 PROCEDURE — G8417 CALC BMI ABV UP PARAM F/U: HCPCS | Performed by: INTERNAL MEDICINE

## 2022-11-03 PROCEDURE — 99214 OFFICE O/P EST MOD 30 MIN: CPT | Performed by: INTERNAL MEDICINE

## 2022-11-03 PROCEDURE — G8427 DOCREV CUR MEDS BY ELIG CLIN: HCPCS | Performed by: INTERNAL MEDICINE

## 2022-11-03 PROCEDURE — 1090F PRES/ABSN URINE INCON ASSESS: CPT | Performed by: INTERNAL MEDICINE

## 2022-11-03 PROCEDURE — 1111F DSCHRG MED/CURRENT MED MERGE: CPT | Performed by: INTERNAL MEDICINE

## 2022-11-03 NOTE — PROGRESS NOTES
Today's Date: 11/3/2022  Patient Name: Nika Kim  Patient's age: 80 y.o., 1934    CC: follow up for Pafox AF/AFL    HPI:   The patient is a 80 y.o.  female is in the office for follow up. Was diagnosed with COVID in 10/2022. Was then admitted. Was then sent to a NH for rehab. Still feels a bit weak since her covid infection. Otherwise no cp, sob, orthopnea, pnd, le edema, palpitations. Using walker for ambulation. Past Medical History:   has a past medical history of Actinic keratosis, Arthritis, Atrial fibrillation ([address] Cancer ([facility] [address] Cervical prolapse, Diverticulosis, Endocervical polyp, Heel spur, Hip pain, Hypertension, Nasal septal deviation, Osteoporosis, Stress incontinence, Syncope, and Vaginal prolapse. Past Surgical History:   has a past surgical history that includes Colonoscopy (01/26/2000); Cholecystectomy (11/1976); Upper gastrointestinal endoscopy (07/16/2017); pr egd transoral biopsy single/multiple (Left, 07/16/2017); Upper gastrointestinal endoscopy (Left, 07/16/2017); Endoscopic ultrasonography, GI (Left, 07/20/2017); eye surgery; Total hip arthroplasty (Left, 09/19/2017); bladder repair (2011); Partial hysterectomy (1960's); Appendectomy (1960's); skin biopsy; pr office/outpt visit,procedure only (Right, 06/01/2018); joint replacement (2017); Mohs surgery (N/A, 1/8/2021); Facial Surgery (N/A, 1/27/2021); US BREAST BIOPSY W LOC DEVICE 1ST LESION LEFT (Left, 4/21/2021); US PLACE BREAST LOC DEVICE EACH ADDL LEFT (Left, 4/21/2021); US BREAST BIOPSY W LOC DEVICE 1ST LESION RIGHT (Right, 4/21/2021); Hysterectomy; Mohs surgery (Left, 8/6/2021); Upper gastrointestinal endoscopy (Left, 8/28/2021); Upper gastrointestinal endoscopy (N/A, 8/30/2021); Upper gastrointestinal endoscopy (N/A, 4/28/2022); Upper gastrointestinal endoscopy (4/28/2022); and Upper gastrointestinal endoscopy (N/A, 8/22/2022).      Home Medications:    Prior to Admission medications    Medication Sig Start Date End Date Taking? Authorizing Provider   sodium chloride 1 g tablet Take 2 tablets by mouth 3 times daily 10/19/22  Yes EVERETTE Schrader Che   pantoprazole (PROTONIX) 40 MG tablet Take 1 tablet by mouth once daily 9/13/22  Yes Jeanie Root MD   propafenone (RYTHMOL) 150 MG tablet Take 1 tablet by mouth 2 times daily 9/1/22 11/30/22 Yes Jeanie Root MD   rivaroxaban (XARELTO) 20 MG TABS tablet Take 1 tablet by mouth in the morning. 7/25/22  Yes Jeanie Root MD   metoprolol tartrate (LOPRESSOR) 50 MG tablet Patient is taking 1/2 tab BID 7/8/22  Yes Gerson Walsh MD   ondansetron (ZOFRAN ODT) 4 MG disintegrating tablet Take 1 tablet by mouth every 8 hours as needed for Nausea 4/29/22  Yes Ginny Way MD       Allergies:  Statins, Tape Terie Gear tape], Tizanidine hcl, Tramadol, Augmentin [amoxicillin-pot clavulanate], and Nystatin    Social History:   reports that she has never smoked. She has never used smokeless tobacco. She reports that she does not drink alcohol and does not use drugs. REVIEW OF SYSTEMS:    Constitutional: there has been no unanticipated weight loss. There's been No change in energy level, No change in activity level. Eyes: No visual changes or diplopia. No scleral icterus. ENT: No Headaches, hearing loss or vertigo. No mouth sores or sore throat. Cardiovascular: AS HPI  Respiratory: AS HPI  Gastrointestinal: No abdominal pain, appetite loss, blood in stools. No change in bowel or bladder habits. Genitourinary: No dysuria, trouble voiding, or hematuria. Musculoskeletal:  No gait disturbance, No weakness or joint complaints. Integumentary: No rash or pruritis. Neurological: No headache, diplopia, change in muscle strength, numbness or tingling. No change in gait, balance, coordination, mood, affect, memory, mentation, behavior. Psychiatric: No new anxiety or depression. Endocrine: No temperature intolerance.  No excessive thirst, fluid intake, or urination. No tremor. Hematologic/Lymphatic: No abnormal bruising or bleeding, blood clots or swollen lymph nodes. Allergic/Immunologic: No nasal congestion or hives. PHYSICAL EXAM:      /82   Pulse 99   Ht 5' 5\" (1.651 m)   Wt 154 lb 6.4 oz (70 kg)   BMI 25.69 kg/m²    HEENT: PERRL, no cervical lymphadenopathy. No masses palpable. Cardiovascular: The apical impulse is not displaced  Heart  Sounds: Irregularly irregular heart rate, ranging 70-90 at rest.  Jugular venous pulsation Normal  The carotid upstroke is normal  Peripheral pulses are symmetrical and full  Respiratory: Good respiratory effort. On auscultation: clear to auscultation bilaterally  Abdomen:  No masses or tenderness  Bowel sounds present  Extremities:   No Cyanosis or Clubbing   Lower extremity edema: No  Skin: Warm and dry    LABS    Lab Results   Component Value Date    CHOL 149 10/09/2022    TRIG 98 10/09/2022    HDL 33 10/09/2022    LDLCHOLESTEROL 128 01/19/2021    LDLCALC 96 10/09/2022    VLDL NOT REPORTED 01/19/2021    CHOLHDLRATIO 3.7 01/19/2021       Lab Results   Component Value Date     (L) 10/20/2022    K 3.8 10/20/2022    CL 94 (L) 10/20/2022    CO2 24 10/20/2022    BUN 7 10/20/2022    CREATININE 0.7 10/20/2022    GLUCOSE 94 10/20/2022    CALCIUM 8.6 10/20/2022    PROT 5.8 (L) 10/16/2022    LABALBU 3.0 (L) 10/16/2022    BILITOT 0.6 10/16/2022    ALKPHOS 96 10/16/2022    AST 30 10/16/2022    ALT 20 10/16/2022    LABGLOM >60 10/20/2022    GFRAA >60 05/24/2022    GLOB 2.3 09/22/2017       Cardiac data:      ECG:  Atrial flutter-fibrillation   Low voltage in precordial leads. Nonspecific TW    Echocardiogram 7/2017:  Normal left ventricle size and systolic function. Ejection fraction was estimated at 60%. There were no regional left ventricular wall motion abnormalities and wall thickness was within normal limits. There was mild aortic regurgitation. There was mild mitral regurgitation.    There was mild tricuspid regurgitation. Nuclear stress test 7/2017:   Alida Gory EKG stress test is not suggestive for ischemia. This Nuclear Medicine study was negative for ischemia. Echo 10/22:  Ejection fraction is visually estimated at 55%. Overall left ventricular function is normal.   Aortic valve appears tricuspid. Thickened aortic valve leaflets noted. Aortic valve leaflets are Moderately calcified. Mild aortic regurgitation is noted. Assessment:    Atrial fibrillation/flutter, persistent since April  MILD MR  MILD AI  A/C WITH DOAC  Preserved LV systolic function on Echo 10/22  No ischemia or infarction on nuclear stress test 2017. Recent history of esophagitis/esophageal stricture status postdilatation  Recent COVID on 10/22 - requiring hospitalizationand then NH for rehab    Recommendations:  - pt and  both want to pursue rate control strategy   - stop rythmol  - add cardizem 30 bid  - continue BB  - continue xarelto    The patient is to continue heart healthy diet, weight loss and exercise as tolerated. Patient's medications and side effects were discussed. Medication refills were provided if needed. Follow up appointment timing was discussed. All questions and concerns were addressed to patient's satisfaction. The patient is to follow up in 6 months or sooner if necessary. Thank you for allowing me to participate in the care of this patient, please do not hesitate to call if you have any questions. Jordan Nicole DO, [facility] [place], [address], [address], [address] [facility]  [facility] [place]  [phone]

## 2022-11-03 NOTE — CARE COORDINATION
[address] [facility] Discharge Call    11/3/2022    Patient: Victor Hugo Martinez Patient : 1934   MRN: <[id number]>  Reason for Admission:   Discharge Date: 10/20/22 RARS: Readmission Risk Score: 14.6    Warm hand off to [address] Transition    Post Acute Facility: The [address] [place] [address] Road you have all of your prescriptions and are they filled?: Yes   Have you scheduled your follow up appointment?: Yes (Comment: Cardiology 11/3/22)         Do you feel like you have everything you need to keep you well at home?: Yes   Care Transitions Interventions   Home Care Waiver: Declined        Transportation Support: Declined      DME Assistance: Declined     Senior Services: Declined             Future Appointments   Date Time Provider Gina Beauchamp   11/3/2022  1:40 PM [facility]   11/10/2022 10:00 AM Mancil Boas, MD [address] [place] [address] - [place]   2022 10:00 AM VASU Hopkins - CNP [facility]   11/15/2022 10:45 AM Sergio Perez MD [facility]   2022 11:00 AM Mancil Boas, MD [facility] [place]   2022  1:40 PM Cindy Garcia MD [facility]       Elpidio Lawrence RN

## 2022-11-03 NOTE — TELEPHONE ENCOUNTER
Patient requesting OV for home health orders. Seeing [facility]. Needs visit within the next 3 weeks. Also requesting order for sodium level.

## 2022-11-04 RX ORDER — ONDANSETRON 4 MG/1
4 TABLET, ORALLY DISINTEGRATING ORAL EVERY 8 HOURS PRN
Qty: 20 TABLET | Refills: 0 | Status: SHIPPED | OUTPATIENT
Start: 2022-11-04

## 2022-11-04 NOTE — TELEPHONE ENCOUNTER
Patient was discharged from [address] [place] [address] 10/20/2022 with hyponatremia, weakness, and Covid. She then went to [facility] for rehab and discharged from there 11/01/2022. Patient needs a face to face appt for [facility] Nurses to continue care. I did schedule her a virtual visit 11/11/2022 with you. She needs an order faxed over to [facility] to recheck her Potassium and Sodium. This was ordered and faxed and also needs script for Zofran sent to [facility] for some residual nausea.

## 2022-11-07 ENCOUNTER — CARE COORDINATION (OUTPATIENT)
Dept: CARE COORDINATION | Age: 87
End: 2022-11-07

## 2022-11-07 ENCOUNTER — TELEPHONE (OUTPATIENT)
Dept: FAMILY MEDICINE CLINIC | Age: 87
End: 2022-11-07
Payer: MEDICARE

## 2022-11-07 DIAGNOSIS — I25.10 ATHEROSCLEROSIS OF NATIVE CORONARY ARTERY OF NATIVE HEART, UNSPECIFIED WHETHER ANGINA PRESENT: ICD-10-CM

## 2022-11-07 DIAGNOSIS — I10 ESSENTIAL (PRIMARY) HYPERTENSION: ICD-10-CM

## 2022-11-07 DIAGNOSIS — I48.0 PAROXYSMAL ATRIAL FIBRILLATION (HCC): ICD-10-CM

## 2022-11-07 DIAGNOSIS — S22.010D: Primary | ICD-10-CM

## 2022-11-07 DIAGNOSIS — I95.1 ORTHOSTATIC HYPOTENSION: ICD-10-CM

## 2022-11-07 PROCEDURE — G0180 MD CERTIFICATION HHA PATIENT: HCPCS | Performed by: FAMILY MEDICINE

## 2022-11-07 SDOH — HEALTH STABILITY: PHYSICAL HEALTH: ON AVERAGE, HOW MANY DAYS PER WEEK DO YOU ENGAGE IN MODERATE TO STRENUOUS EXERCISE (LIKE A BRISK WALK)?: 3 DAYS

## 2022-11-07 SDOH — SOCIAL STABILITY: SOCIAL NETWORK: HOW OFTEN DO YOU ATTENT MEETINGS OF THE CLUB OR ORGANIZATION YOU BELONG TO?: NEVER

## 2022-11-07 SDOH — SOCIAL STABILITY: SOCIAL NETWORK: HOW OFTEN DO YOU GET TOGETHER WITH FRIENDS OR RELATIVES?: MORE THAN THREE TIMES A WEEK

## 2022-11-07 SDOH — SOCIAL STABILITY: SOCIAL NETWORK: HOW OFTEN DO YOU ATTEND CHURCH OR RELIGIOUS SERVICES?: MORE THAN 4 TIMES PER YEAR

## 2022-11-07 SDOH — SOCIAL STABILITY: SOCIAL NETWORK
DO YOU BELONG TO ANY CLUBS OR ORGANIZATIONS SUCH AS CHURCH GROUPS UNIONS, FRATERNAL OR ATHLETIC GROUPS, OR SCHOOL GROUPS?: NO

## 2022-11-07 SDOH — SOCIAL STABILITY: SOCIAL NETWORK: ARE YOU MARRIED, WIDOWED, DIVORCED, SEPARATED, NEVER MARRIED, OR LIVING WITH A PARTNER?: MARRIED

## 2022-11-07 SDOH — SOCIAL STABILITY: SOCIAL NETWORK
IN A TYPICAL WEEK, HOW MANY TIMES DO YOU TALK ON THE PHONE WITH FAMILY, FRIENDS, OR NEIGHBORS?: MORE THAN THREE TIMES A WEEK

## 2022-11-07 SDOH — HEALTH STABILITY: PHYSICAL HEALTH: ON AVERAGE, HOW MANY MINUTES DO YOU ENGAGE IN EXERCISE AT THIS LEVEL?: 60 MIN

## 2022-11-07 NOTE — CARE COORDINATION
Ambulatory Care Coordination Note  11/7/2022    ACC: Kadie Capone, RN    Lawrence was referred for ACM from [facility]N    Lawrence has: Esophageal stricture- had [facility], gastritis,  on medications and follows with gastroenterology     PAF, Atrial Flutter- on medication and follows with cardiology     Hyponatremia- on medications and follows with PCP and nephrology     CKD stage 3- on medications and follows with nephrology      Apt with ortho- left hip pain     [address] and PT    Plan of Care : Enrolled in ACM    Continue assessments, education and support    SDOH completed    Medications reviewed 11/7/2022    [facility]- N/A    [facility] OF [facility]. decision maker is up to date and they have completed forms at home- aware to bring in for EMR    Covid vaccinated- H/O Covid 10/8/2022    Review clinic, Urgent Care, and My Chart is used by daughter    Care Team updated    Apt nephrology 11/10/2022    Apt PCP- VV 11/11/2022    Apt ortho 11/15/2022    Apt gastro- was 11/11- spouse to reschedule    F/U on Cardiazem, sodium level, and Icaps- Areds    11/7/2022- spoke with Lawrence and then spouse- Darcy Lange. She was having trouble hearing. Darcy Lange was given this writer's contact information. He reviewed upcoming apts. [facility] care- nursing once a week, OT has been in and no furtherr services, PT will be in pm. She is ambulating with walker. They did see cardiology- he ordered Cardiazem and to stop Rythmol. She has not started yet. They are waiting to discuss with daughter who is a retired nurse. They are going to discuss with PCP restarting ICaps Areds. This was d/c'd at hospital discharge. She will be having another sodium level drawn in the near future. She has esophagus stretched a few months ago- it has helped. They are scheduled for F/U. She is ambulating with a walker. She is scheduled with ortho for hip pain.      General Assessment    Do you have any symptoms that are causing concern?: Yes  Progression since Onset: Unchanged  Reported Symptoms: Pain (Comment: referred to ortho for hip pain)          Offered patient enrollment in the Remote Patient Monitoring (RPM) program for in-home monitoring: NA. Ambulatory Care Coordination Assessment    Care Coordination Protocol  Referral from Primary Care Provider: No  Week 1 - Initial Assessment     Do you have all of your prescriptions and are they filled?: Yes  Barriers to medication adherence: None  Are you able to afford your medications?: Yes  How often do you have trouble taking your medications the way you have been told to take them?: I always take them as prescribed. Do you have Home O2 Therapy?: No      Ability to seek help/take action for Emergent Urgent situations i.e. fire, crime, inclement weather or health crisis. : Independent  Ability to ambulate to restroom: Independent  Ability handle personal hygeine needs (bathing/dressing/grooming): Independent  Ability to manage Medications: Independent  Ability to prepare Food Preparation: Dependent  Ability to maintain home (clean home, laundry): Dependent  Ability to drive and/or has transportation: Dependent  Ability to do shopping: Needs Assistance  Ability to manage finances: Needs Assistance  Is patient able to live independently?: Yes     Current Housing: Private Residence        Per the Fall Risk Screening, did the patient have 2 or more falls or 1 fall with injury in the past year?: Yes  How often do you think you are about to fall and you do NOT fall?  For example, you grab something to stabilize yourself or hold onto a wall/furniture?: Rarely  Use of a Mobility Aid: Yes  Difficulty walking/impaired gait: Yes  Issues with feet or shoes like numbness, edema, shoes not fitting: No  Changes in vision, poor vision or poor lighting in environment: Yes  Dizziness: Yes  Other Fall Risk: No     Frequent urination at night?: Yes  Do you use rails/bars?: Yes  Do you have a non-slip tub mat?: Yes     Are you experiencing loss of meaning?: No  Are you experiencing loss of hope and peace?: No     Thinking about your patient's physical health needs, are there any symptoms or problems (risk indicators) you are unsure about that require further investigation?: No identified areas of uncertainly or problems already being investigated   Are the patients physical health problems impacting on their mental well-being?: No identified areas of concern   Are there any problems with your patients lifestyle behaviors (alcohol, drugs, diet, exercise) that are impacting on physical or mental well-being?: No identified areas of concern   Do you have any other concerns about your patients mental well-being? How would you rate their severity and impact on the patient?: No identified areas of concern   How would you rate their home environment in terms of safety and stability (including domestic violence, insecure housing, neighbor harassment)?: Consistently safe, supportive, stable, no identified problems   How do daily activities impact on the patient's well-being? (include current or anticipated unemployment, work, caregiving, access to transportation or other): No identified problems or perceived positive benefits   How would you rate their social network (family, work, friends)?: Good participation with social networks   How would you rate their financial resources (including ability to afford all required medical care)?: Financially secure, resources adequate, no identified problems   How wells does the patient now understand their health and well-being (symptoms, signs or risk factors) and what they need to do to manage their health?: Reasonable to good understanding and already engages in managing health or is willing to undertake better management   How well do you think your patient can engage in healthcare discussions?  (Barriers include language, deafness, aphasia, alcohol or drug problems, learning difficulties, concentration): Clear and open communication, no identified barriers   Do other services need to be involved to help this patient?: Other care/services not required at this time   Are current services involved with this patient well-coordinated? (Include coordination with other services you are now recommendation): All required care/services in place and well-coordinated   Suggested Interventions and [place]: Completed (Comment: Sonali Muñozfabiana Nichols)   Occupational Therapy: Completed   Physical Therapy: Completed         Set up/Review Goals, Set up/Review an Education Plan              Prior to Admission medications    Medication Sig Start Date End Date Taking?  Authorizing Provider   ondansetron (ZOFRAN ODT) 4 MG disintegrating tablet Take 1 tablet by mouth every 8 hours as needed for Nausea 11/4/22  Yes Noah Florence MD   sodium chloride 1 g tablet Take 2 tablets by mouth 3 times daily 10/19/22  Yes Saddie Sandhoff, PA   pantoprazole (PROTONIX) 40 MG tablet Take 1 tablet by mouth once daily 9/13/22  Yes Noah Florence MD   rivaroxaban (XARELTO) 20 MG TABS tablet Take 1 tablet by mouth in the morning. 7/25/22  Yes Noah Florence MD   metoprolol tartrate (LOPRESSOR) 50 MG tablet Patient is taking 1/2 tab BID 7/8/22  Yes Debbie Acuña MD   dilTIAZem (CARDIZEM) 30 MG tablet Take 1 tablet by mouth in the morning and at bedtime  Patient not taking: Reported on 11/7/2022 11/3/22   [facility], DO       Future Appointments   Date Time Provider Gina Beauchamp   11/10/2022 10:00 AM MD SAMUEL Purdy [facility] [place]   11/11/2022 10:00 AM VASU Borja - CNP [facility]   11/11/2022 11:20 AM MD WILL Beatty [facility]   11/15/2022 10:45 AM MD KATIE Cedeño [facility]   12/1/2022 11:00 AM Ryan Chatterjee [facility]   12/27/2022  1:40 PM MD WILL Beatty [facility]   5/4/2023  2:30 PM [facility]

## 2022-11-07 NOTE — TELEPHONE ENCOUNTER
Home health plan of care reviewed and certification completed 11/7/22 on patient for service dates 11/2/22-12/31/22. Verified current medications. Physician time spent on activities to coordinate service, documenting, medical decision making, review of reports/treatment plans/test results is 15 minutes.

## 2022-11-07 NOTE — ACP (ADVANCE CARE PLANNING)
Advance Care Planning   Healthcare Decision Maker:    Primary Decision Maker: James Feliciano - [phone]    Click here to complete Healthcare Decision Makers including selection of the Healthcare Decision Maker Relationship (ie \"Primary\"). Today we discussed ACP documentation-   they have completed forms- aware to bring in for EMR.

## 2022-11-08 ENCOUNTER — HOSPITAL ENCOUNTER (OUTPATIENT)
Age: 87
Setting detail: SPECIMEN
Discharge: HOME | End: 2022-11-08
Payer: MEDICARE

## 2022-11-08 DIAGNOSIS — E87.1 LOW SODIUM LEVELS: ICD-10-CM

## 2022-11-08 PROCEDURE — 36415 COLL VENOUS BLD VENIPUNCTURE: CPT

## 2022-11-08 PROCEDURE — 80048 BASIC METABOLIC PNL TOTAL CA: CPT

## 2022-11-09 LAB
ANION GAP SERPL CALCULATED.3IONS-SCNC: 19 MEQ/L (ref 8–16)
BUN BLDV-MCNC: 19 MG/DL (ref 7–22)
CALCIUM SERPL-MCNC: 8.8 MG/DL (ref 8.5–10.5)
CHLORIDE BLD-SCNC: 95 MEQ/L (ref 98–111)
CO2: 25 MEQ/L (ref 23–33)
CREAT SERPL-MCNC: 0.9 MG/DL (ref 0.4–1.2)
GFR SERPL CREATININE-BSD FRML MDRD: > 60 ML/MIN/1.73M2
GLUCOSE BLD-MCNC: 110 MG/DL (ref 70–108)
POTASSIUM SERPL-SCNC: 4.4 MEQ/L (ref 3.5–5.2)
SODIUM BLD-SCNC: 139 MEQ/L (ref 135–145)

## 2022-11-10 ENCOUNTER — OFFICE VISIT (OUTPATIENT)
Dept: NEPHROLOGY | Age: 87
End: 2022-11-10
Payer: MEDICARE

## 2022-11-10 VITALS
BODY MASS INDEX: 25.63 KG/M2 | DIASTOLIC BLOOD PRESSURE: 78 MMHG | OXYGEN SATURATION: 96 % | WEIGHT: 154 LBS | HEART RATE: 102 BPM | SYSTOLIC BLOOD PRESSURE: 128 MMHG

## 2022-11-10 DIAGNOSIS — E87.1 HYPONATREMIA: Primary | ICD-10-CM

## 2022-11-10 LAB — GFR SERPL CREATININE-BSD FRML MDRD: > 60 ML/MIN/1.73M2

## 2022-11-10 PROCEDURE — G8427 DOCREV CUR MEDS BY ELIG CLIN: HCPCS | Performed by: INTERNAL MEDICINE

## 2022-11-10 PROCEDURE — G8417 CALC BMI ABV UP PARAM F/U: HCPCS | Performed by: INTERNAL MEDICINE

## 2022-11-10 PROCEDURE — G8484 FLU IMMUNIZE NO ADMIN: HCPCS | Performed by: INTERNAL MEDICINE

## 2022-11-10 PROCEDURE — 1123F ACP DISCUSS/DSCN MKR DOCD: CPT | Performed by: INTERNAL MEDICINE

## 2022-11-10 PROCEDURE — 99213 OFFICE O/P EST LOW 20 MIN: CPT | Performed by: INTERNAL MEDICINE

## 2022-11-10 PROCEDURE — 1090F PRES/ABSN URINE INCON ASSESS: CPT | Performed by: INTERNAL MEDICINE

## 2022-11-10 PROCEDURE — 1111F DSCHRG MED/CURRENT MED MERGE: CPT | Performed by: INTERNAL MEDICINE

## 2022-11-10 PROCEDURE — 1036F TOBACCO NON-USER: CPT | Performed by: INTERNAL MEDICINE

## 2022-11-10 RX ORDER — SODIUM CHLORIDE 1000 MG
1 TABLET, SOLUBLE MISCELLANEOUS 2 TIMES DAILY
Qty: 90 TABLET | Refills: 3 | Status: SHIPPED | OUTPATIENT
Start: 2022-11-10

## 2022-11-10 NOTE — PROGRESS NOTES
[facility]        Outpatient Follow-Up note         11/10/2022 10:12 AM    Patient Name:   Madelyn Haley  YOB: 1934  Primary Care Physician:  Toya Burris MD   [address] [facility]  [address]  Dept: [phone]  Loc: [phone]     Chief Complaint / Reason for follow-up : Follow Up of Hyponatremia and post hospital discharge      Interval History :  Patient seen and examined by me. Feels well. No cp or SOB. Eating and drinking well.      Past History :  Past Medical History:   Diagnosis Date    Actinic keratosis     history of    Arthritis     Atrial fibrillation ([address]     Cancer ([facility] [address]     skin    Cervical prolapse     history of    Diverticulosis     Endocervical polyp     history of    Heel spur     Hip pain     Hypertension     Nasal septal deviation     Osteoporosis     Stress incontinence     history of    Syncope     Vaginal prolapse     history of     Past Surgical History:   Procedure Laterality Date    APPENDECTOMY  26's    BLADDER REPAIR  2011    [place]  [id number]    [facility]    COLONOSCOPY  01/26/2000    ENDOSCOPIC ULTRASOUND (LOWER) Left 07/20/2017    ENDOSCOPIC ULTRASOUND performed by Bianca Marie MD at [facility] cataract    FACIAL SURGERY N/A 1/27/2021    DIVISION AND INSET/CHEEK performed by Gabby Mendez MD at [address] [place] [address] ([facility])      JOINT REPLACEMENT  2017    left total hip relpament    MOHS SURGERY N/A 1/8/2021    MOHS REPAIR BCC NASAL TIP WITH FLAP AND GRAFT performed by Gabby Mendez MD at [address] [facility] [address] Left 8/6/2021    MOHS DEFECT REPAIR SCC DORSAL NOSE AND LEFT LOWER FOREHEAD WITH SKIN GRAFT FROM RIGHT CHEEK/EAR (PLACED ON NOSE) performed by Gabby Mendez MD at [facility] (XARELTO) 20 MG TABS tablet Take 1 tablet by mouth in the morning. 90 tablet 1    metoprolol tartrate (LOPRESSOR) 50 MG tablet Patient is taking 1/2 tab BID 90 tablet 3       Vitals     /78 (Site: Left Upper Arm, Position: Sitting, Cuff Size: Medium Adult)   Pulse (!) 102   Wt 154 lb (69.9 kg)   SpO2 96%   BMI 25.63 kg/m²  Wt Readings from Last 3 Encounters:   11/10/22 154 lb (69.9 kg)   11/03/22 154 lb 6.4 oz (70 kg)   10/19/22 161 lb 3.2 oz (73.1 kg)        Physical Exam     General -- no distress  Lungs -- clear  Heart -- S1, S2 heard, JVD - no  Abdomen - soft, non-tender  Extremities -- no edema  CNS - awake and alert    Labs, Radiology and Tests    Labs -    5/22 11/22          Sodium 136 139          Potassium 4.5 4.4          BUN 15 019          Creatinine 0.84 0.9          eGFR > 60 > 60                      UPCR            UMCR                          Assessment    Renal -renal function stable at baseline  Hyponatremia secondary to decreased oral intake currently on salt tablets currently doing well will restart salt tabs 1 BID . 50 ounces of liquids  Hypokalemia also much improved. Essential Hypertension - running well.  meds reviewed and D/W patient and   Follow-up in 12 months    Tests and orders placed this Encounter   No orders of the defined types were placed in this encounter. Carol Pelayo M.D  [facility].

## 2022-11-11 ENCOUNTER — TELEMEDICINE (OUTPATIENT)
Dept: FAMILY MEDICINE CLINIC | Age: 87
End: 2022-11-11

## 2022-11-11 DIAGNOSIS — R53.1 GENERALIZED WEAKNESS: ICD-10-CM

## 2022-11-11 DIAGNOSIS — Z09 HOSPITAL DISCHARGE FOLLOW-UP: ICD-10-CM

## 2022-11-11 DIAGNOSIS — E87.1 HYPONATREMIA: Primary | ICD-10-CM

## 2022-11-11 ASSESSMENT — ENCOUNTER SYMPTOMS
NAUSEA: 0
BLOOD IN STOOL: 0
COUGH: 0
WHEEZING: 0
DIARRHEA: 0
CONSTIPATION: 0
CHEST TIGHTNESS: 0
SHORTNESS OF BREATH: 0
ABDOMINAL PAIN: 0

## 2022-11-11 NOTE — PROGRESS NOTES
Post-Discharge Transitional Care Follow Up      Philip Goddard   YOB: 1934    Date of Office Visit:  11/11/2022  Date of Hospital Admission: 10/15/22  Date of Hospital Discharge: 10/20/22  Date of skilled rehab discharge: 11/1/2022  Readmission Risk Score(high >=14%. Medium >=10%):Readmission Risk Score: 14.6      Care management risk score Rising risk (score 2-5) and Complex Care (Scores >=6): No Risk Score On File     Non face to face  following discharge, date last encounter closed (first attempt may have been earlier): yes     Call initiated 2 business days of discharge: yes     Below is the assessment and plan developed based on review of pertinent history, physical exam, labs, studies, and medications. 1. Hyponatremia  Improving; her most recent sodium was 139. She will stay on salt tablets per the nephrologist. She swill see nephrology again in a year. 2. Generalized weakness  Improving; she is feeling a lot better since going through rehab and now doing home health. She will continue home health until they feel she has met her goals. I encouraged her to continue doing her home exercises. Hospital discharge follow-up      Medical Decision Making: moderate complexity  Return if symptoms worsen or fail to improve, for keep appointment as scheduled. On this date 11/11/2022 I have spent 35 minutes reviewing previous notes, test results and face to face with the patient discussing the diagnosis and importance of compliance with the treatment plan as well as documenting on the day of the visit. Subjective:   HPI Here today for a hospital follow up. She was in the hospital  with dizziness and a fall on 10/8-10/12 and discharged home. She then came back to the hospital on 10/15-10/20 with hyponatremia, diarrhea, weakness and a UTI. After the second hospitalization she was admitted to [facility] for rehab. She was in rehab for about 12 days.  She can tell that she is getting stronger. She is using her walker and she is doing her home exercises. She has not had any issues with falls since getting home. No issues with shortness of breath, she is not coughing and no chest pain. She is not having any more diarrhea. She is eating well. She is not getting lightheaded or dizziness since being home. She still needs a walker most of the time so she is borrowing one from [employer] to use in the upstairs of her home. She is doing PT twice a week and she graduated from OT already. Inpatient course: Discharge summary reviewed- see chart. Interval history/Current status: improving    Patient Active Problem List   Diagnosis    Trochanteric bursitis of left hip    Drug-induced constipation    Bradycardia    Esophageal stricture    Gastritis without bleeding    Lumbar radicular pain    Lumbar spine pain    Left hip pain    Arthritis of left hip    Paroxysmal atrial fibrillation with rapid ventricular response (HCC)    Venous stasis dermatitis of both lower extremities    Arthritis of right hand    Vaginal erosion secondary to pessary use (HCC)    Pelvic relaxation due to uterovaginal prolapse    Postmenopausal bleeding    History of hysterectomy    Esophageal candidiasis (HCC)    Hyponatremia    Lower abdominal pain    Atrial flutter (HCC)    Presence of pessary #4 ring with support    Epigastric pain    Moderate malnutrition (HCC)    COVID    Persistent headaches       Medications listed as started at the time of discharge from hospital  Cannot display discharge medications since this is not an admission.           Medications marked \"taking\" at this time  Outpatient Medications Marked as Taking for the 11/11/22 encounter (Telemedicine) with Emani Fierro MD   Medication Sig Dispense Refill    Multiple Vitamins-Minerals (PRESERVISION AREDS PO) Take by mouth      sodium chloride 1 g tablet Take 1 tablet by mouth in the morning and at bedtime 90 tablet 3    vitamin E 1000 units capsule Take 1,000 Units by mouth daily      pantoprazole (PROTONIX) 40 MG tablet Take 1 tablet by mouth once daily 90 tablet 3    rivaroxaban (XARELTO) 20 MG TABS tablet Take 1 tablet by mouth in the morning. 90 tablet 1    metoprolol tartrate (LOPRESSOR) 50 MG tablet Patient is taking 1/2 tab BID 90 tablet 3        Medications patient taking as of now reconciled against medications ordered at time of hospital discharge: Yes    Review of Systems   Constitutional:  Negative for activity change, appetite change, chills, fatigue and fever. Eyes:  Negative for visual disturbance. Respiratory:  Negative for cough, chest tightness, shortness of breath and wheezing. Cardiovascular:  Negative for chest pain, palpitations and leg swelling. Gastrointestinal:  Negative for abdominal pain, blood in stool, constipation, diarrhea and nausea. Genitourinary:  Negative for difficulty urinating. Musculoskeletal:  Positive for arthralgias. Skin:  Negative for rash and wound. Neurological:  Positive for weakness. Negative for dizziness, syncope, light-headedness and headaches. Psychiatric/Behavioral:  Negative for dysphoric mood and sleep disturbance. The patient is not nervous/anxious.       Objective:    Patient-Reported Vitals  Patient-Reported Weight: 154lbs  Patient-Reported Height: 5 5      Physical Exam  [INSTRUCTIONS:  \"[x]\" Indicates a positive item  \"[]\" Indicates a negative item  -- DELETE ALL ITEMS NOT EXAMINED]    Constitutional: [x] Appears well-developed and well-nourished [x] No apparent distress      [] Abnormal -     Mental status: [x] Alert and awake  [x] Oriented to person/place/time [x] Able to follow commands    [] Abnormal -     Eyes:   EOM    [x]  Normal    [] Abnormal -   Sclera  [x]  Normal    [] Abnormal -          Discharge [x]  None visible   [] Abnormal -     HENT: [x] Normocephalic, atraumatic  [] Abnormal -   [x] Mouth/Throat: Mucous membranes are moist    External Ears [x] Normal  [] Abnormal -    Neck: [x] No visualized mass [] Abnormal -     Pulmonary/Chest: [x] Respiratory effort normal   [x] No visualized signs of difficulty breathing or respiratory distress        [] Abnormal -      Musculoskeletal:   [x] Normal gait with no signs of ataxia         [x] Normal range of motion of neck        [] Abnormal -     Neurological:        [x] No Facial Asymmetry (Cranial nerve 7 motor function) (limited exam due to video visit)          [x] No gaze palsy        [] Abnormal -          Skin:        [x] No significant exanthematous lesions or discoloration noted on facial skin         [] Abnormal -            Psychiatric:       [x] Normal Affect [] Abnormal -        [x] No Hallucinations    Other pertinent observable physical exam findings:-      Hugo Dunlap, was evaluated through a synchronous (real-time) audio-video encounter. The patient (or guardian if applicable) is aware that this is a billable service, which includes applicable co-pays. This Virtual Visit was conducted with patient's (and/or legal guardian's) consent. The visit was conducted pursuant to the emergency declaration under the [facility], [address] [place] [address] [facility] authority and the [place] and [employer] General Act. Patient identification was verified, and a caregiver was present when appropriate. The patient was located at Home: [address] [place], [place] [id number]. Provider was located at [facility] ([address] [place] [address]): [address] [place] [address]  [address] [facility] Juan Carlos Bridges. An electronic signature was used to authenticate this note.   --Radha Kapoor MD

## 2022-11-15 ENCOUNTER — TELEPHONE (OUTPATIENT)
Dept: FAMILY MEDICINE CLINIC | Age: 87
End: 2022-11-15
Payer: MEDICARE

## 2022-11-15 DIAGNOSIS — I10 ESSENTIAL (PRIMARY) HYPERTENSION: ICD-10-CM

## 2022-11-15 DIAGNOSIS — I95.1 ORTHOSTATIC HYPOTENSION: ICD-10-CM

## 2022-11-15 DIAGNOSIS — I48.0 PAROXYSMAL ATRIAL FIBRILLATION (HCC): ICD-10-CM

## 2022-11-15 DIAGNOSIS — S22.010D: Primary | ICD-10-CM

## 2022-11-15 DIAGNOSIS — I25.10 ATHEROSCLEROSIS OF NATIVE CORONARY ARTERY OF NATIVE HEART, UNSPECIFIED WHETHER ANGINA PRESENT: ICD-10-CM

## 2022-11-15 PROCEDURE — G0180 MD CERTIFICATION HHA PATIENT: HCPCS | Performed by: FAMILY MEDICINE

## 2022-11-15 NOTE — TELEPHONE ENCOUNTER
[address] [place] [address] reviewed and certification completed for patient Javier Ronna  service dates 11/2/2022 to 12/31/2022. Verified current medications. Physicians time spent on activities to coordinate service, documenting, medical decision making, review of reports/treatment plans/test results is 15 minutes.

## 2022-11-16 ENCOUNTER — CARE COORDINATION (OUTPATIENT)
Dept: CARE COORDINATION | Age: 87
End: 2022-11-16

## 2022-11-16 NOTE — CARE COORDINATION
Ambulatory Care Coordination Note  11/16/2022    ACC: Lorena Sibley, RN    Lawrence was referred for ACM from CTN     Lawrence has:           Esophageal stricture- [facility] [place], gastritis,  on medications and follows with gastroenterology                                      PAF, Atrial Flutter- on medication and follows with cardiology                                      Hyponatremia- on medications and follows with PCP and nephrology                                      CKD stage 3- on medications and follows with nephrology                                       Apt with ortho- left hip pain      [address] completed 11/15/2022 and PT continues     Plan of Care : Continue assessments, education and support                          SDOH completed                          Medications reviewed 11/7/2022                          RPM- N/A                          [facility]. decision maker is up to date and they have completed forms at home- aware to bring in for EMR                          Covid vaccinated- H/O Covid 10/8/2022                          Reviewed clinic, Urgent Care, and My Chart is used by daughter                          Care Team updated                          Apt nephrology 11/10/2022 completed- F/U 1 year                          Apt PCP- VV 11/11/2022 completed- F/U 12/27/2022                          Apt ortho 11/15/2022- cancelled- no concerns at this time                          Apt gastro- was 11/11 completed    11/16/2022- spoke with spouse Linda Casas and Elbert. Reviewed Urgent Care hours and they voiced understanding. PT in home today. Nursing has signed off. She is getting better daily per Linda Casas. They both are recovering from food poisoning- they are feeling better today. She continues to use walker.      General Assessment    Do you have any symptoms that are causing concern?: Yes  Progression since Onset: Rapidly Improving  Reported Symptoms: Nausea, Vomiting, Other (Comment: Food poisoning)                                  Offered patient enrollment in the Remote Patient Monitoring (RPM) program for in-home monitoring: NA. Lab Results       None            Care Coordination Interventions    Referral from Primary Care Provider: No  Suggested Interventions and [address]: Completed (Comment: [address])  Occupational Therapy: Completed  Physical Therapy: Completed          Goals Addressed    None         Prior to Admission medications    Medication Sig Start Date End Date Taking?  Authorizing Provider   sodium chloride 1 g tablet Take 1 tablet by mouth in the morning and at bedtime 11/10/22   Ibis Fierro MD   vitamin E 1000 units capsule Take 1,000 Units by mouth daily    Historical Provider, MD   pantoprazole (PROTONIX) 40 MG tablet Take 1 tablet by mouth once daily 11/10/22   VASU Del Toro CNP   ondansetron (ZOFRAN ODT) 4 MG disintegrating tablet Take 1 tablet by mouth every 8 hours as needed for Nausea 11/4/22   Radha Kapoor MD   dilTIAZem (CARDIZEM) 30 MG tablet Take 1 tablet by mouth in the morning and at bedtime  Patient not taking: No sig reported 11/3/22   Hakan Fisher DO   rivaroxaban (XARELTO) 20 MG TABS tablet Take 1 tablet by mouth in the morning. 7/25/22   Radha Kapoor MD   metoprolol tartrate (LOPRESSOR) 50 MG tablet Patient is taking 1/2 tab BID 7/8/22   Elbert Cadet MD       Future Appointments   Date Time Provider Gina Beauchamp   12/27/2022  1:40 PM Radha Kapoor MD [facility]   5/4/2023  2:30 PM Geovanna Diamond MD [facility]   11/9/2023 10:20 AM [facility] [place]

## 2022-11-22 ENCOUNTER — CARE COORDINATION (OUTPATIENT)
Dept: CARE COORDINATION | Age: 87
End: 2022-11-22

## 2022-11-22 NOTE — CARE COORDINATION
Ambulatory Care Coordination Note  11/22/2022    ACC: Mathieu Gonzalez, RN    Lawrence was referred for ACM from CTN     Lawrence has:           Esophageal stricture- had it stretched 3 months ago in [facility] [place], gastritis,  on medications and follows with gastroenterology                                      PAF, Atrial Flutter- on medication and follows with cardiology                                      Hyponatremia- on medications and follows with PCP and nephrology                                      CKD stage 3- on medications and follows with nephrology                                       Apt with ortho- left hip pain      [facility]- completed     Plan of Care : Continue assessments, education and support                          SDOH completed                          Medications reviewed 11/7/2022                          RPM- N/A                          [facility] [place], [facility]. decision maker is up to date and they have completed forms at home- aware to bring in for EMR                          Covid vaccinated- H/O Covid 10/8/2022                          Reviewed clinic, Urgent Care, and My Chart is used by daughter                          Care Team updated                          Apt nephrology 11/10/2022 completed- F/U 1 year                          Apt PCP- VV 11/11/2022 completed- F/U 12/27/2022                          Apt ortho 11/15/2022- cancelled- no concerns at this time                          Apt gastro- was 11/11 completed     11/22/2022- 9:25 am spoke with Erika Wilcox. She stated she is doing well. She is amb with walker. PT completed. Bowels are moving well. No gastric issues. General Assessment    Do you have any symptoms that are causing concern?: No        Offered patient enrollment in the Remote Patient Monitoring (RPM) program for in-home monitoring: NA.     Lab Results       None            Care Coordination Interventions    Referral from Primary Care Provider: No  Suggested Interventions and [employer]  Fall Risk Prevention: Completed  Home Health Services: Completed (Comment: [facility])  Occupational Therapy: Completed  Physical Therapy: Completed          Goals Addressed                   This Visit's Progress     Conditions and Symptoms   On track     I will schedule office visits, as directed by my provider. I will keep my appointment or reschedule if I have to cancel. I will notify my provider of any barriers to my plan of care. I will notify my provider of any symptoms that indicate a worsening of my condition. Barriers: lack of support and lack of education  Plan for overcoming my barriers: Care Coordination Intervention and [facility] Care  Confidence: 10/10  Anticipated Goal Completion Date: 2/7/2023                Prior to Admission medications    Medication Sig Start Date End Date Taking?  Authorizing Provider   sodium chloride 1 g tablet Take 1 tablet by mouth in the morning and at bedtime 11/10/22   Kelly Redding MD   vitamin E 1000 units capsule Take 1,000 Units by mouth daily    Historical Provider, MD   pantoprazole (PROTONIX) 40 MG tablet Take 1 tablet by mouth once daily 11/10/22   VASU Finley CNP   ondansetron (ZOFRAN ODT) 4 MG disintegrating tablet Take 1 tablet by mouth every 8 hours as needed for Nausea 11/4/22   Michael Huerta MD   dilTIAZem (CARDIZEM) 30 MG tablet Take 1 tablet by mouth in the morning and at bedtime  Patient not taking: No sig reported 11/3/22   Hakan Fisher DO   rivaroxaban (XARELTO) 20 MG TABS tablet Take 1 tablet by mouth in the morning. 7/25/22   Michael Huerta MD   metoprolol tartrate (LOPRESSOR) 50 MG tablet Patient is taking 1/2 tab BID 7/8/22   Enmanuel David MD       Future Appointments   Date Time Provider Gina Beauchamp   12/27/2022  1:40 PM [facility]   5/4/2023  2:30 PM Charley Houston MD DCAGEM [facility]   11/9/2023 10:20 AM [facility] [address]

## 2022-12-01 ENCOUNTER — CARE COORDINATION (OUTPATIENT)
Dept: CARE COORDINATION | Age: 87
End: 2022-12-01

## 2022-12-01 NOTE — CARE COORDINATION
Ambulatory Care Coordination Note  12/1/2022    ACC: Ward Garduno, RN    Lawrence was referred for ACM from CTN     Lawrence has:           Esophageal stricture- [facility] [place], gastritis,  on medications and follows with gastroenterology                                      PAF, Atrial Flutter- on medication and follows with cardiology                                      Hyponatremia- on medications and follows with PCP and nephrology                                      CKD stage 3- on medications and follows with nephrology                                       Apt with ortho- left hip pain      [facility]- completed     Plan of Care : Continue assessments, education and support                          SDOH completed                          Medications reviewed 11/7/2022                          RPM- N/A                          [facility] OF [place], [facility]. decision maker is up to date and they have completed forms at home- aware to bring in for EMR                          Covid vaccinated- H/O Covid 10/8/2022                          Reviewed clinic, Urgent Care, and My Chart is used by daughter                          Care Team updated                          Apt nephrology 11/10/2022 completed- F/U 1 year                          Apt PCP- VV 11/11/2022 completed- F/U 12/27/2022                          Apt ortho 11/15/2022- cancelled- no concerns at this time                          Apt gastro- was 11/11 completed     12/1/2022- 10:16 am spoke with Misa Amezcua. She continues with walker usage most of the time. She denied any stomach issues, bowels moving good. General Assessment    Do you have any symptoms that are causing concern?: No          Offered patient enrollment in the Remote Patient Monitoring (RPM) program for in-home monitoring: NA.     Lab Results       None            Care Coordination Interventions    Referral from Primary Care Provider: No  Suggested Interventions and [facility]  Fall Risk Prevention: Completed  Home Health Services: Completed (Comment: [facility])  Occupational Therapy: Completed  Physical Therapy: Completed          Goals Addressed    None         Prior to Admission medications    Medication Sig Start Date End Date Taking?  Authorizing Provider   sodium chloride 1 g tablet Take 1 tablet by mouth in the morning and at bedtime 11/10/22   Kelly Redding MD   vitamin E 1000 units capsule Take 1,000 Units by mouth daily    Historical Provider, MD   pantoprazole (PROTONIX) 40 MG tablet Take 1 tablet by mouth once daily 11/10/22   VASU Finley CNP   ondansetron (ZOFRAN ODT) 4 MG disintegrating tablet Take 1 tablet by mouth every 8 hours as needed for Nausea 11/4/22   Michael Huerta MD   dilTIAZem (CARDIZEM) 30 MG tablet Take 1 tablet by mouth in the morning and at bedtime  Patient not taking: No sig reported 11/3/22   Hakan Fisher DO   rivaroxaban (XARELTO) 20 MG TABS tablet Take 1 tablet by mouth in the morning. 7/25/22   Michael Huerta MD   metoprolol tartrate (LOPRESSOR) 50 MG tablet Patient is taking 1/2 tab BID 7/8/22   Enmanuel David MD       Future Appointments   Date Time Provider Gina Beauchamp   12/27/2022  1:40 PM [facility]   5/4/2023  2:30 PM Charley Houston MD [email or web address]   11/9/2023 10:20 AM Kelly Redding [facility] [place]

## 2022-12-14 ENCOUNTER — CARE COORDINATION (OUTPATIENT)
Dept: CARE COORDINATION | Age: 87
End: 2022-12-14

## 2022-12-14 NOTE — CARE COORDINATION
Ambulatory Care Coordination Note  12/14/2022    ACC: Femi Gil, RN    Lawrence was referred for ACM from CTN     Lawrence has:           Esophageal stricture- had it stretched 3 months ago in [address], gastritis,  on medications and follows with gastroenterology                                      PAF, Atrial Flutter- on medication and follows with cardiology                                      Hyponatremia- on medications and follows with PCP and nephrology                                      CKD stage 3- on medications and follows with nephrology                                       Apt with ortho- left hip pain      [facility]- completed     Plan of Care : Continue assessments, education and support                          SDOH completed                          Medications reviewed 11/7/2022                          RPM- N/A                          [facility] OF [place], [facility]. decision maker is up to date and they have completed forms at home- aware to bring in for EMR                          Covid vaccinated- H/O Covid 10/8/2022                          Reviewed clinic, Urgent Care, and My Chart is used by daughter                          Care Team updated                          Apt nephrology 11/10/2022 completed- F/U 1 year                          Apt PCP- VV 11/11/2022 completed- F/U 12/27/2022                          Apt ortho 11/15/2022- cancelled- no concerns at this time                          Apt gastro- was 11/11 completed     12/14/2022- 10:19 am spoke with Lawrence. She is able to get around without walker in home. She denied any concerns. She is scheduled wit PCP 12/27/2022. Reviewed holiday hours for the clinic and Urgent Care- she voiced understanding. General Assessment    Do you have any symptoms that are causing concern?: No          Offered patient enrollment in the Remote Patient Monitoring (RPM) program for in-home monitoring: NA.     Lab Results       None            Care Coordination Interventions    Referral from Primary Care Provider: No  Suggested Interventions and Community Resources  Fall Risk Prevention: Completed  Home Health Services: Completed (Comment: [facility])  Occupational Therapy: Completed  Physical Therapy: Completed          Goals Addressed                   This Visit's Progress     Conditions and Symptoms   On track     I will schedule office visits, as directed by my provider. I will keep my appointment or reschedule if I have to cancel. I will notify my provider of any barriers to my plan of care. I will notify my provider of any symptoms that indicate a worsening of my condition. Barriers: lack of support and lack of education  Plan for overcoming my barriers: Care Coordination Intervention and [facility] Care  Confidence: 10/10  Anticipated Goal Completion Date: 2/7/2023                Prior to Admission medications    Medication Sig Start Date End Date Taking?  Authorizing Provider   sodium chloride 1 g tablet Take 1 tablet by mouth in the morning and at bedtime 11/10/22   Nadege Guardado MD   vitamin E 1000 units capsule Take 1,000 Units by mouth daily    Historical Provider, MD   pantoprazole (PROTONIX) 40 MG tablet Take 1 tablet by mouth once daily 11/10/22   VASU Villarreal CNP   ondansetron (ZOFRAN ODT) 4 MG disintegrating tablet Take 1 tablet by mouth every 8 hours as needed for Nausea 11/4/22   Toya Burris MD   dilTIAZem (CARDIZEM) 30 MG tablet Take 1 tablet by mouth in the morning and at bedtime  Patient not taking: No sig reported 11/3/22   Hakan Fisher DO   rivaroxaban (XARELTO) 20 MG TABS tablet Take 1 tablet by mouth in the morning. 7/25/22   Toya Burris MD   metoprolol tartrate (LOPRESSOR) 50 MG tablet Patient is taking 1/2 tab BID 7/8/22   Nolene Najjar, MD       Future Appointments   Date Time Provider Gina Beauchamp   12/27/2022  1:40 PM Toya Burris MD [facility]   5/4/2023  2:30 PM MD Dusty Edouard [facility]   11/9/2023 10:20 AM Jarek Benites MD [place] [address]

## 2022-12-27 ENCOUNTER — TELEPHONE (OUTPATIENT)
Dept: FAMILY MEDICINE CLINIC | Age: 87
End: 2022-12-27

## 2022-12-27 ENCOUNTER — HOSPITAL ENCOUNTER (OUTPATIENT)
Age: 87
Discharge: HOME | End: 2022-12-27
Payer: MEDICARE

## 2022-12-27 ENCOUNTER — OFFICE VISIT (OUTPATIENT)
Dept: FAMILY MEDICINE CLINIC | Age: 87
End: 2022-12-27
Payer: MEDICARE

## 2022-12-27 VITALS
TEMPERATURE: 98.4 F | OXYGEN SATURATION: 97 % | WEIGHT: 159 LBS | SYSTOLIC BLOOD PRESSURE: 122 MMHG | HEART RATE: 53 BPM | BODY MASS INDEX: 26.49 KG/M2 | HEIGHT: 65 IN | DIASTOLIC BLOOD PRESSURE: 68 MMHG

## 2022-12-27 DIAGNOSIS — E87.1 HYPONATREMIA: ICD-10-CM

## 2022-12-27 DIAGNOSIS — N95.2 VAGINAL ATROPHY: ICD-10-CM

## 2022-12-27 DIAGNOSIS — E87.1 HYPONATREMIA: Primary | ICD-10-CM

## 2022-12-27 DIAGNOSIS — R00.2 PALPITATIONS: ICD-10-CM

## 2022-12-27 LAB
ANION GAP SERPL CALCULATED.3IONS-SCNC: 11 MMOL/L (ref 9–17)
BUN BLDV-MCNC: 25 MG/DL (ref 8–23)
BUN/CREAT BLD: 22 (ref 9–20)
CALCIUM SERPL-MCNC: 9.2 MG/DL (ref 8.6–10.4)
CHLORIDE BLD-SCNC: 103 MMOL/L (ref 98–107)
CO2: 25 MMOL/L (ref 20–31)
CREAT SERPL-MCNC: 1.16 MG/DL (ref 0.5–0.9)
GFR SERPL CREATININE-BSD FRML MDRD: 45 ML/MIN/1.73M2
GLUCOSE BLD-MCNC: 93 MG/DL (ref 70–99)
POTASSIUM SERPL-SCNC: 4.6 MMOL/L (ref 3.7–5.3)
SODIUM BLD-SCNC: 139 MMOL/L (ref 135–144)

## 2022-12-27 PROCEDURE — 1090F PRES/ABSN URINE INCON ASSESS: CPT | Performed by: FAMILY MEDICINE

## 2022-12-27 PROCEDURE — G8417 CALC BMI ABV UP PARAM F/U: HCPCS | Performed by: FAMILY MEDICINE

## 2022-12-27 PROCEDURE — 80048 BASIC METABOLIC PNL TOTAL CA: CPT

## 2022-12-27 PROCEDURE — 1036F TOBACCO NON-USER: CPT | Performed by: FAMILY MEDICINE

## 2022-12-27 PROCEDURE — 1123F ACP DISCUSS/DSCN MKR DOCD: CPT | Performed by: FAMILY MEDICINE

## 2022-12-27 PROCEDURE — G8427 DOCREV CUR MEDS BY ELIG CLIN: HCPCS | Performed by: FAMILY MEDICINE

## 2022-12-27 PROCEDURE — 93010 ELECTROCARDIOGRAM REPORT: CPT | Performed by: FAMILY MEDICINE

## 2022-12-27 PROCEDURE — 99214 OFFICE O/P EST MOD 30 MIN: CPT | Performed by: FAMILY MEDICINE

## 2022-12-27 PROCEDURE — 99213 OFFICE O/P EST LOW 20 MIN: CPT

## 2022-12-27 PROCEDURE — G8484 FLU IMMUNIZE NO ADMIN: HCPCS | Performed by: FAMILY MEDICINE

## 2022-12-27 PROCEDURE — 36415 COLL VENOUS BLD VENIPUNCTURE: CPT

## 2022-12-27 PROCEDURE — 93005 ELECTROCARDIOGRAM TRACING: CPT | Performed by: FAMILY MEDICINE

## 2022-12-27 RX ORDER — ESTRADIOL 0.1 MG/G
1 CREAM VAGINAL WEEKLY
Qty: 42.5 G | Refills: 3 | Status: SHIPPED | OUTPATIENT
Start: 2022-12-27

## 2022-12-27 ASSESSMENT — ENCOUNTER SYMPTOMS
WHEEZING: 0
ABDOMINAL PAIN: 0
CHEST TIGHTNESS: 0
COUGH: 0
NAUSEA: 0
DIARRHEA: 0
CONSTIPATION: 0
SHORTNESS OF BREATH: 0

## 2022-12-27 NOTE — PROGRESS NOTES
GUM[address]  [address] [address]  Dept: [phone]  Dept Fax: [phone]  Loc: [phone]    Angeli Mancera is a 80 y.o. female who presents today for her medical conditions/complaints as noted below. Angeli Mancera is c/o of   Chief Complaint   Patient presents with    Gastroesophageal Reflux     6 month    Discuss Labs     To continue Sodium chloride tabs BID? (139, 11/22)    Other     Wakes up periodically with \"heart racing\" in the morning, last episode this morning-is weak and fatigued after         HPI:     HPI Here today for a follow up of her GERD and hyponatremia. She has noticed that she has been having some heart racing in the middle of the night. She wakes up with it and then she gets up and it lasts for several hours. She does not get any chest pain or shortness of breath with it. She gets a little wobbly with it, but she is not falling. She had an episode of it this morning. After this happens she feels weak and wobbly. The weakness seems to last most of the rest of the day. She started taking the diltiazem two weeks ago. She did not call cardio to let them know this is happening. 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 She has not noticed these palpitations during the day. Her GERD has been well controlled. No issues with chest pain or burning. She does tend to have a sore throat in the morning. She does not have a lot of phlegm in her throat, it just hurts. She does not snore. She is currently taking 2 sodium tabs a day and would rather not have to take them. She was having bad leg pain that magically resolved with covid. She has also been having problems with irritation and vaginal bleeding when her pessary is changed.  She wonders if there is something she can do to help that because she is due to have her pessary changed in a month. Past Medical History:   Diagnosis Date    Actinic keratosis     history of    Arthritis     Atrial fibrillation (HCC)     Cancer (HCC)     skin    Cervical prolapse     history of    Diverticulosis     Endocervical polyp     history of    Heel spur     Hip pain     Hypertension     Nasal septal deviation     Osteoporosis     Stress incontinence     history of    Syncope     Vaginal prolapse     history of          Social History     Tobacco Use    Smoking status: Never    Smokeless tobacco: Never   Substance Use Topics    Alcohol use: No     Current Outpatient Medications   Medication Sig Dispense Refill    estradiol (ESTRACE VAGINAL) 0.1 MG/GM vaginal cream Place 1 g vaginally once a week 42.5 g 3    sodium chloride 1 g tablet Take 1 tablet by mouth in the morning and at bedtime 90 tablet 3    vitamin E 1000 units capsule Take 1,000 Units by mouth daily      pantoprazole (PROTONIX) 40 MG tablet Take 1 tablet by mouth once daily 90 tablet 3    ondansetron (ZOFRAN ODT) 4 MG disintegrating tablet Take 1 tablet by mouth every 8 hours as needed for Nausea 20 tablet 0    dilTIAZem (CARDIZEM) 30 MG tablet Take 1 tablet by mouth in the morning and at bedtime 180 tablet 3    rivaroxaban (XARELTO) 20 MG TABS tablet Take 1 tablet by mouth in the morning. 90 tablet 1    metoprolol tartrate (LOPRESSOR) 50 MG tablet Patient is taking 1/2 tab BID 90 tablet 3     No current facility-administered medications for this visit.           Allergies   Allergen Reactions    Statins Other (See Comments)     Causes severe leg cramps     Tape [Adhesive Tape] Other (See Comments)     \"sticks to skin\" causes redness    Tizanidine Hcl Other (See Comments)     Having issues with her liver- elevated her enzyme level    Tramadol Other (See Comments)     \"makes me Goofy\"    Augmentin [Amoxicillin-Pot Clavulanate] Diarrhea, Nausea And Vomiting and Other (See Comments)     abd pain    Nystatin Nausea And Vomiting Pt states \"swish and swallow\"        Subjective:     Review of Systems   Constitutional:  Negative for activity change, appetite change, chills, fatigue and fever. Eyes:  Negative for visual disturbance. Respiratory:  Negative for cough, chest tightness, shortness of breath and wheezing. Cardiovascular:  Positive for palpitations. Negative for chest pain and leg swelling. Gastrointestinal:  Negative for abdominal pain, constipation, diarrhea and nausea. Genitourinary:  Negative for difficulty urinating. Neurological:  Negative for dizziness, syncope, weakness, light-headedness and headaches. Psychiatric/Behavioral:  Negative for decreased concentration, dysphoric mood and sleep disturbance. The patient is not nervous/anxious. Objective:      Physical Exam  Vitals and nursing note reviewed. Constitutional:       General: She is not in acute distress. Appearance: She is well-developed. Eyes:      Conjunctiva/sclera: Conjunctivae normal.   Neck:      Thyroid: No thyromegaly. Cardiovascular:      Rate and Rhythm: Normal rate and regular rhythm. Heart sounds: Normal heart sounds. No murmur heard. Pulmonary:      Effort: Pulmonary effort is normal. No respiratory distress. Breath sounds: Normal breath sounds. No wheezing. Musculoskeletal:      Cervical back: Normal range of motion and neck supple. Lymphadenopathy:      Cervical: No cervical adenopathy. Skin:     General: Skin is warm and dry. Findings: No erythema or rash. Neurological:      Mental Status: She is alert and oriented to person, place, and time. Psychiatric:         Mood and Affect: Mood normal.         Behavior: Behavior normal.         Thought Content: Thought content normal.     /68   Pulse 53   Temp 98.4 °F (36.9 °C)   Ht 5' 5\" (1.651 m)   Wt 159 lb (72.1 kg)   SpO2 97%   BMI 26.46 kg/m²     Assessment:       Diagnosis Orders   1. Hyponatremia  Basic Metabolic Panel      2.  Palpitations EKG 12 Lead - Clinic Performed      3. Vaginal atrophy                  Plan:       Hyponatremia: stable; she has been doing well on the salt tablets but would like to stop them. I told her that if her sodium is still normal today she will need to stay on them. If it is a little high I will cut her down to 1 tab and recheck her level in a month. Palpitations: new; unclear of the cause. Her EKG is a little different then last time too so I sent a message to cardiology to ask for guidance. I'm not sure if it has to do with her diltizem or not. Vaginal atrophy: new; I recommended vaginal estrogen to help with the dryness and friable tissue. Return in about 3 months (around 3/27/2023) for gerd follow up and hyponatremia. Orders Placed This Encounter   Procedures    Basic Metabolic Panel     Standing Status:   Future     Number of Occurrences:   1     Standing Expiration Date:   12/27/2023    EKG 12 Lead - Clinic Performed     Order Specific Question:   Reason for Exam?     Answer: Tachycardia     Orders Placed This Encounter   Medications    estradiol (ESTRACE VAGINAL) 0.1 MG/GM vaginal cream     Sig: Place 1 g vaginally once a week     Dispense:  42.5 g     Refill:  3         Patientgiven educational materials - see patient instructions. Discussed use, benefit,and side effects of prescribed medications. All patient questions answered. Ptvoiced understanding. Reviewed health maintenance. Instructed to continue currentmedications, diet and exercise. Patient agreed with treatment plan. Follow up asdirected.      Electronically signed by Patti Kirk MD on 12/27/2022 at 7:22 PM

## 2022-12-27 NOTE — TELEPHONE ENCOUNTER
When I saw Mustapha Dubois today for her appointment she mentioned that she is having some palpitations and racing heart that is present when she wakes up in the morning. This has been occurring for the past few days. It is not happening every day. Once it happens it takes several hours for it to resolve and it leaves her feeling very tired an weak. No chest pain or shortness of breath with it. 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 I did an EKG in the office that is a little different from her previous EKG. I wasn't sure what the next best step was (holter vs something else) so I am deferring to you. Please have your office call her with suggestions. Thanks!   Dr Simmons

## 2022-12-28 RX ORDER — AMIODARONE HYDROCHLORIDE 200 MG/1
100 TABLET ORAL 2 TIMES DAILY
Qty: 180 TABLET | Refills: 3 | Status: SHIPPED | OUTPATIENT
Start: 2022-12-28 | End: 2023-01-27 | Stop reason: SDUPTHER

## 2022-12-28 NOTE — TELEPHONE ENCOUNTER
Patient notified per M Phillip to start taking amiodarone and to follow up with office in 1 -2 weeks. Pt verbalized understanding and scheduled for f/u appt on 1/11/23 with NP. Pt had no further questions at the time.

## 2022-12-28 NOTE — TELEPHONE ENCOUNTER
Reviewed ECG having Afib and PVCs. Sent amiodarone script to [facility].  Have her pick it up and scheduled to see us in 1-2 weeks

## 2023-01-04 ENCOUNTER — CARE COORDINATION (OUTPATIENT)
Dept: CARE COORDINATION | Age: 88
End: 2023-01-04

## 2023-01-04 NOTE — CARE COORDINATION
Ambulatory Care Coordination Note  1/4/2023    ACC: Juma Morales, NUBIA    Lawrence was referred for ACM from CTN     Lawrence has:           Esophageal stricture- [facility], gastritis,  on medications and follows with gastroenterology                                      PAF, Atrial Flutter- on medication and follows with cardiology                                      Hyponatremia- on medications and follows with PCP and nephrology                                      CKD stage 3- on medications and follows with nephrology                                       Apt with ortho- left hip pain      [facility]- completed     Plan of Care : Continue assessments, education and support                          SDOH completed                          Medications reviewed 11/7/2022                          RPM- N/A                          [facility] [place], [facility]. decision maker is up to date and they have completed forms at home- aware to bring in for EMR                          Covid vaccinated- H/O Covid 10/8/2022                          Reviewed clinic, Urgent Care, and My Chart is used by daughter                          Care Team updated    Apt GYN 1/26/2023    Apt cardiology 1/11/2023, 5/4/2023                          Apt nephrology 11/9/2023                          Apt PCP- 4/6/2023                          Apt ortho 11/15/2022- cancelled- no concerns at this time                          Apt gastro- was 11/11 completed    1/4/2023- 11:26 am spoke with Asad Arias. She has started amiodarone and it has helped. She denied palpitations at this time. We reviewed apts and she was in agreement. General Assessment    Do you have any symptoms that are causing concern?: No          Offered patient enrollment in the Remote Patient Monitoring (RPM) program for in-home monitoring: NA.     Lab Results       None            Care Coordination Interventions    Referral from Primary Care Provider: No  Suggested Interventions and Community Resources  Fall Risk Prevention: Completed  Home Health Services: Completed (Comment: [facility])  Occupational Therapy: Completed  Physical Therapy: Completed          Goals Addressed                   This Visit's Progress     Conditions and Symptoms   On track     I will schedule office visits, as directed by my provider. I will keep my appointment or reschedule if I have to cancel. I will notify my provider of any barriers to my plan of care. I will notify my provider of any symptoms that indicate a worsening of my condition. Barriers: lack of support and lack of education  Plan for overcoming my barriers: Care Coordination Intervention and [facility] Care  Confidence: 10/10  Anticipated Goal Completion Date: 2/7/2023                Prior to Admission medications    Medication Sig Start Date End Date Taking?  Authorizing Provider   amiodarone (CORDARONE) 200 MG tablet Take 0.5 tablets by mouth 2 times daily 12/28/22   Aristeo Fisher DO   estradiol (ESTRACE VAGINAL) 0.1 MG/GM vaginal cream Place 1 g vaginally once a week 12/27/22   Lex Pederson MD   sodium chloride 1 g tablet Take 1 tablet by mouth in the morning and at bedtime 11/10/22   Ayla Cabrales MD   vitamin E 1000 units capsule Take 1,000 Units by mouth daily    Historical Provider, MD   pantoprazole (PROTONIX) 40 MG tablet Take 1 tablet by mouth once daily 11/10/22   Claudette Sidles, APRN - CNP   ondansetron (ZOFRAN ODT) 4 MG disintegrating tablet Take 1 tablet by mouth every 8 hours as needed for Nausea 11/4/22   Lex Pederson MD   dilTIAZem (CARDIZEM) 30 MG tablet Take 1 tablet by mouth in the morning and at bedtime 11/3/22   Hakan Fisher DO   rivaroxaban (XARELTO) 20 MG TABS tablet Take 1 tablet by mouth in the morning. 7/25/22   Lex Pederson MD   metoprolol tartrate (LOPRESSOR) 50 MG tablet Patient is taking 1/2 tab BID 7/8/22   Piter Kim MD       Future Appointments   Date Time Provider Department [place]   2023  2:00 PM VASU Mendez - CNP [facility]   2023 11:15 AM DO ARRON [facility]   2023  3:00 PM Josse Vick MD [facility]   2023  2:30 PM Nakul Kaplan MD [facility]   2023 10:20 AM [facility]ys[facility]els [facility] [place] [place]

## 2023-01-11 ENCOUNTER — OFFICE VISIT (OUTPATIENT)
Dept: CARDIOLOGY | Age: 88
End: 2023-01-11
Payer: MEDICARE

## 2023-01-11 VITALS
WEIGHT: 154.2 LBS | HEART RATE: 79 BPM | SYSTOLIC BLOOD PRESSURE: 108 MMHG | BODY MASS INDEX: 25.66 KG/M2 | DIASTOLIC BLOOD PRESSURE: 56 MMHG

## 2023-01-11 DIAGNOSIS — I48.0 PAROXYSMAL ATRIAL FIBRILLATION WITH RAPID VENTRICULAR RESPONSE (HCC): Primary | ICD-10-CM

## 2023-01-11 PROCEDURE — 99214 OFFICE O/P EST MOD 30 MIN: CPT | Performed by: NURSE PRACTITIONER

## 2023-01-11 PROCEDURE — 93010 ELECTROCARDIOGRAM REPORT: CPT | Performed by: NURSE PRACTITIONER

## 2023-01-11 PROCEDURE — 93005 ELECTROCARDIOGRAM TRACING: CPT | Performed by: NURSE PRACTITIONER

## 2023-01-11 PROCEDURE — 1123F ACP DISCUSS/DSCN MKR DOCD: CPT | Performed by: NURSE PRACTITIONER

## 2023-01-11 NOTE — PROGRESS NOTES
Today's Date: 1/11/2023  Patient Name: Tisa Lennox  Patient's age: 80 y.o., 9/1/1934    CC: follow up for Pafox AF/AFL    HPI:   The patient is a 80 y.o.  female is in the office for follow up. Pt denies any CP or sob. Pt states that she is having much less palpitations. She is taking all of her medications. She continues to feel that she would like to continue with medical therapy     Past Medical History:   has a past medical history of Actinic keratosis, Arthritis, Atrial fibrillation ([facility] [address] Cancer ([facility] [address] Cervical prolapse, Diverticulosis, Endocervical polyp, Heel spur, Hip pain, Hypertension, Nasal septal deviation, Osteoporosis, Stress incontinence, Syncope, and Vaginal prolapse. Past Surgical History:   has a past surgical history that includes Colonoscopy (01/26/2000); Cholecystectomy (11/1976); Upper gastrointestinal endoscopy (07/16/2017); pr egd transoral biopsy single/multiple (Left, 07/16/2017); Upper gastrointestinal endoscopy (Left, 07/16/2017); Endoscopic ultrasonography, GI (Left, 07/20/2017); eye surgery; Total hip arthroplasty (Left, 09/19/2017); bladder repair (2011); Partial hysterectomy (1960's); Appendectomy (1960's); skin biopsy; pr office/outpt visit,procedure only (Right, 06/01/2018); joint replacement (2017); Mohs surgery (N/A, 1/8/2021); Facial Surgery (N/A, 1/27/2021); US BREAST BIOPSY W LOC DEVICE 1ST LESION LEFT (Left, 4/21/2021); US PLACE BREAST LOC DEVICE EACH ADDL LEFT (Left, 4/21/2021); US BREAST BIOPSY W LOC DEVICE 1ST LESION RIGHT (Right, 4/21/2021); Hysterectomy; Mohs surgery (Left, 8/6/2021); Upper gastrointestinal endoscopy (Left, 8/28/2021); Upper gastrointestinal endoscopy (N/A, 8/30/2021); Upper gastrointestinal endoscopy (N/A, 4/28/2022); Upper gastrointestinal endoscopy (4/28/2022); and Upper gastrointestinal endoscopy (N/A, 8/22/2022).      Home Medications:    Prior to Admission medications    Medication Sig Start Date End Date Taking? Authorizing Provider   amiodarone (CORDARONE) 200 MG tablet Take 0.5 tablets by mouth 2 times daily 12/28/22   Aristeo Fisher DO   estradiol (ESTRACE VAGINAL) 0.1 MG/GM vaginal cream Place 1 g vaginally once a week 12/27/22   Michael Huerta MD   sodium chloride 1 g tablet Take 1 tablet by mouth in the morning and at bedtime 11/10/22   Kelly Redding MD   vitamin E 1000 units capsule Take 1,000 Units by mouth daily    Historical Provider, MD   pantoprazole (PROTONIX) 40 MG tablet Take 1 tablet by mouth once daily 11/10/22   VASU Finley CNP   ondansetron (ZOFRAN ODT) 4 MG disintegrating tablet Take 1 tablet by mouth every 8 hours as needed for Nausea 11/4/22   Michael Huerta MD   dilTIAZem (CARDIZEM) 30 MG tablet Take 1 tablet by mouth in the morning and at bedtime 11/3/22   Hakan Fisher DO   rivaroxaban (XARELTO) 20 MG TABS tablet Take 1 tablet by mouth in the morning. 7/25/22   Michael Huerta MD   metoprolol tartrate (LOPRESSOR) 50 MG tablet Patient is taking 1/2 tab BID 7/8/22   Enmanuel David MD       Allergies:  Statins, Tape Tere Buttery tape], Tizanidine hcl, Tramadol, Augmentin [amoxicillin-pot clavulanate], and Nystatin    Social History:   reports that she has never smoked. She has never used smokeless tobacco. She reports that she does not drink alcohol and does not use drugs. REVIEW OF SYSTEMS:    Constitutional: there has been no unanticipated weight loss. There's been No change in energy level, No change in activity level. Eyes: No visual changes or diplopia. No scleral icterus. ENT: No Headaches, hearing loss or vertigo. No mouth sores or sore throat. Cardiovascular: AS HPI  Respiratory: AS HPI  Gastrointestinal: No abdominal pain, appetite loss, blood in stools. No change in bowel or bladder habits. Genitourinary: No dysuria, trouble voiding, or hematuria. Musculoskeletal:  No gait disturbance, No weakness or joint complaints.   Integumentary: No rash or pruritis. Neurological: No headache, diplopia, change in muscle strength, numbness or tingling. No change in gait, balance, coordination, mood, affect, memory, mentation, behavior. Psychiatric: No new anxiety or depression. Endocrine: No temperature intolerance. No excessive thirst, fluid intake, or urination. No tremor. Hematologic/Lymphatic: No abnormal bruising or bleeding, blood clots or swollen lymph nodes. Allergic/Immunologic: No nasal congestion or hives. PHYSICAL EXAM:      There were no vitals taken for this visit. HEENT: PERRL, no cervical lymphadenopathy. No masses palpable. Cardiovascular: The apical impulse is not displaced  Heart  Sounds: Irregularly irregular heart rate, ranging 70-90 at rest. Aflutter   Jugular venous pulsation Normal  The carotid upstroke is normal  Peripheral pulses are symmetrical and full  Respiratory: Good respiratory effort. On auscultation: clear to auscultation bilaterally  Abdomen:  No masses or tenderness  Bowel sounds present  Extremities:   No Cyanosis or Clubbing   Lower extremity edema: No  Skin: Warm and dry    LABS    Lab Results   Component Value Date    CHOL 149 10/09/2022    TRIG 98 10/09/2022    HDL 33 10/09/2022    LDLCHOLESTEROL 128 01/19/2021    LDLCALC 96 10/09/2022    VLDL NOT REPORTED 01/19/2021    CHOLHDLRATIO 3.7 01/19/2021       Lab Results   Component Value Date     12/27/2022    K 4.6 12/27/2022     12/27/2022    CO2 25 12/27/2022    BUN 25 (H) 12/27/2022    CREATININE 1.16 (H) 12/27/2022    GLUCOSE 93 12/27/2022    CALCIUM 9.2 12/27/2022    PROT 5.8 (L) 10/16/2022    LABALBU 3.0 (L) 10/16/2022    BILITOT 0.6 10/16/2022    ALKPHOS 96 10/16/2022    AST 30 10/16/2022    ALT 20 10/16/2022    LABGLOM 45 (L) 12/27/2022    GFRAA >60 05/24/2022    GLOB 2.3 09/22/2017       Cardiac data:      ECG:  Atrial flutter-fibrillation   Low voltage in precordial leads.  Nonspecific TW    Echocardiogram 7/2017:  Normal left ventricle size and systolic function. Ejection fraction was estimated at 60%. There were no regional left ventricular wall motion abnormalities and wall thickness was within normal limits. There was mild aortic regurgitation. There was mild mitral regurgitation. There was mild tricuspid regurgitation. Nuclear stress test 7/2017:   [facility] EKG stress test is not suggestive for ischemia. This Nuclear Medicine study was negative for ischemia. Echo 10/22:  Ejection fraction is visually estimated at 55%. Overall left ventricular function is normal.   Aortic valve appears tricuspid. Thickened aortic valve leaflets noted. Aortic valve leaflets are Moderately calcified. Mild aortic regurgitation is noted. Assessment:    Atrial fibrillation/flutter, persistent since April  MILD MR  MILD AI  A/C WITH DOAC  Preserved LV systolic function on Echo 10/22  No ischemia or infarction on nuclear stress test 2017. Recent history of esophagitis/esophageal stricture status postdilatation  Recent COVID on 10/22 - requiring hospitalizationand then NH for rehab    Recommendations:  -  remains aflutter. Rate stable. -  Continue amio, cardizem and BB   -  continue xarelto  -  Discussed with pt regarding possible CV to get into NSR. Pt declining at this time and would like to continue with medications only     The patient is to continue heart healthy diet, weight loss and exercise as tolerated. Patient's medications and side effects were discussed. Medication refills were provided if needed. Follow up appointment timing was discussed. All questions and concerns were addressed to patient's satisfaction. The patient is to follow up in 6 months or sooner if necessary. Thank you for allowing me to participate in the care of this patient, please do not hesitate to call if you have any questions. Junior Cerrato, [address] [facility]  [facility]. [facility]  [phone]

## 2023-01-17 ENCOUNTER — CARE COORDINATION (OUTPATIENT)
Dept: CARE COORDINATION | Age: 88
End: 2023-01-17

## 2023-01-17 NOTE — CARE COORDINATION
Ambulatory Care Coordination Note  1/17/2023    ACC: Jaron Young, RN        Lawrence was referred for ACM from [facility]     Lawrence has:           Esophageal stricture- had it stretched 3 months ago in [facility] [place], gastritis,  on medications and follows with gastroenterology                                      PAF, Atrial Flutter- on medication and follows with cardiology                                      Hyponatremia- on medications and follows with PCP and nephrology                                      CKD stage 3- on medications and follows with nephrology                                       Apt with ortho- left hip pain      Sonali [facility] [address]- completed     Plan of Care : Continue assessments, education and support                          SDOH completed                          Medications reviewed                           RPM- N/A                          HC decision maker is up to date and they have completed forms at home- aware to bring in for EMR                          Covid vaccinated- H/O Covid 10/8/2022                          Reviewed clinic, Urgent Care, and My Chart is used by daughter                          Care Team updated                          Apt GYN 1/26/2023                          Apt cardiology 7/13//2023                          Apt nephrology 11/9/2023                          Apt PCP- 4/6/2023                          Apt ortho 11/15/2022- cancelled- no concerns at this time                          Apt gastro- was 11/11 completed    1/17/2023- 2:56 pm spoke with Lawrence. She denied any palpitations. Saw cardiology for F/U- f/u 6 months. She has been out for a walk today- safety reviewed and she voiced understanding. Reviewed upcoming apt and in agreement. General Assessment    Do you have any symptoms that are causing concern?: No          Offered patient enrollment in the Remote Patient Monitoring (RPM) program for in-home monitoring: NA.     Lab Results       None Care Coordination Interventions    Referral from Primary Care Provider: No  Suggested Interventions and Community Resources  Fall Risk Prevention: Completed  Home Health Services: Completed (Comment: [facility])  Occupational Therapy: Completed  Physical Therapy: Completed          Goals Addressed                   This Visit's Progress     Conditions and Symptoms   On track     I will schedule office visits, as directed by my provider. I will keep my appointment or reschedule if I have to cancel. I will notify my provider of any barriers to my plan of care. I will notify my provider of any symptoms that indicate a worsening of my condition. Barriers: lack of support and lack of education  Plan for overcoming my barriers: Care Coordination Intervention and [facility] Care  Confidence: 10/10  Anticipated Goal Completion Date: 2/7/2023                Prior to Admission medications    Medication Sig Start Date End Date Taking?  Authorizing Provider   amiodarone (CORDARONE) 200 MG tablet Take 0.5 tablets by mouth 2 times daily 12/28/22   Aristeo Fisher DO   estradiol (ESTRACE VAGINAL) 0.1 MG/GM vaginal cream Place 1 g vaginally once a week 12/27/22   Brenda Qureshi MD   sodium chloride 1 g tablet Take 1 tablet by mouth in the morning and at bedtime 11/10/22   Raf Rosa MD   vitamin E 1000 units capsule Take 1,000 Units by mouth daily    Historical Provider, MD   pantoprazole (PROTONIX) 40 MG tablet Take 1 tablet by mouth once daily 11/10/22   VASU Kimball - CNP   ondansetron (ZOFRAN ODT) 4 MG disintegrating tablet Take 1 tablet by mouth every 8 hours as needed for Nausea 11/4/22   Brenda Qureshi MD   dilTIAZem (CARDIZEM) 30 MG tablet Take 1 tablet by mouth in the morning and at bedtime 11/3/22   Hakan Fisher DO   rivaroxaban (XARELTO) 20 MG TABS tablet Take 1 tablet by mouth in the morning. 7/25/22   Brenda Qureshi MD   metoprolol tartrate (LOPRESSOR) 50 MG tablet Patient is taking 1/2 tab BID 22   Rae Maher MD       Future Appointments   Date Time Provider Gina Beauchamp   2023 11:15 AM DO ARRON Kay [facility] [place]   2023  3:00 PM [facility]   [facility]  2:15 PM [facility] [place]   2023 10:20 AM [facility] [place] [facility]

## 2023-01-27 RX ORDER — PANTOPRAZOLE SODIUM 40 MG/1
TABLET, DELAYED RELEASE ORAL
Qty: 90 TABLET | Refills: 3 | OUTPATIENT
Start: 2023-01-27

## 2023-01-27 RX ORDER — METOPROLOL TARTRATE 50 MG/1
TABLET, FILM COATED ORAL
Qty: 90 TABLET | Refills: 3 | Status: SHIPPED | OUTPATIENT
Start: 2023-01-27

## 2023-01-27 RX ORDER — METOPROLOL TARTRATE 50 MG/1
TABLET, FILM COATED ORAL
Qty: 90 TABLET | Refills: 3 | OUTPATIENT
Start: 2023-01-27

## 2023-01-27 RX ORDER — AMIODARONE HYDROCHLORIDE 200 MG/1
100 TABLET ORAL 2 TIMES DAILY
Qty: 180 TABLET | Refills: 3 | OUTPATIENT
Start: 2023-01-27

## 2023-01-27 RX ORDER — AMIODARONE HYDROCHLORIDE 200 MG/1
100 TABLET ORAL 2 TIMES DAILY
Qty: 180 TABLET | Refills: 3 | Status: SHIPPED | OUTPATIENT
Start: 2023-01-27

## 2023-01-27 NOTE — TELEPHONE ENCOUNTER
Pt pharmacy changed.     Shelbi Albarran [phone]    Last Appt:  1/11/2023  Next Appt:   7/13/2023  Med verified in Epic

## 2023-01-27 NOTE — TELEPHONE ENCOUNTER
Skeet Runner called requesting a refill of the below medication which has been pended for you:     Requested Prescriptions     Pending Prescriptions Disp Refills    rivaroxaban (XARELTO) 20 MG TABS tablet 90 tablet 1     Sig: Take 1 tablet by mouth daily     Refused Prescriptions Disp Refills    amiodarone (CORDARONE) 200 MG tablet 180 tablet 3     Sig: Take 0.5 tablets by mouth 2 times daily     Refused By: Norbert Isabel     Reason for Refusal: Not the prescriber of this medication    dilTIAZem (CARDIZEM) 30 MG tablet 180 tablet 3     Sig: Take 1 tablet by mouth in the morning and at bedtime     Refused By: Norbert Isabel     Reason for Refusal: Not the prescriber of this medication    pantoprazole (PROTONIX) 40 MG tablet 90 tablet 3     Sig: Take 1 tablet by mouth once daily     Refused By: Norbert Isabel     Reason for Refusal: Not the prescriber of this medication    metoprolol tartrate (LOPRESSOR) 50 MG tablet 90 tablet 3     Sig: Patient is taking 1/2 tab BID     Refused By: Norbert Isabel     Reason for Refusal: Not the prescriber of this medication       Last Appointment Date: 12/27/2022  Next Appointment Date: 4/6/2023    Allergies   Allergen Reactions    Statins Other (See Comments)     Causes severe leg cramps     Tape [Adhesive Tape] Other (See Comments)     \"sticks to skin\" causes redness    Tizanidine Hcl Other (See Comments)     Having issues with her liver- elevated her enzyme level    Tramadol Other (See Comments)     \"makes me Goofy\"    Augmentin [Amoxicillin-Pot Clavulanate] Diarrhea, Nausea And Vomiting and Other (See Comments)     abd pain    Nystatin Nausea And Vomiting     Pt states \"swish and swallow\"

## 2023-01-30 RX ORDER — PANTOPRAZOLE SODIUM 40 MG/1
TABLET, DELAYED RELEASE ORAL
Qty: 90 TABLET | Refills: 3 | OUTPATIENT
Start: 2023-01-30

## 2023-01-30 RX ORDER — METOPROLOL TARTRATE 50 MG/1
TABLET, FILM COATED ORAL
Qty: 90 TABLET | Refills: 3 | OUTPATIENT
Start: 2023-01-30

## 2023-02-01 RX ORDER — PANTOPRAZOLE SODIUM 40 MG/1
TABLET, DELAYED RELEASE ORAL
Qty: 90 TABLET | Refills: 3 | Status: SHIPPED | OUTPATIENT
Start: 2023-02-01

## 2023-02-01 NOTE — TELEPHONE ENCOUNTER
Kale Whyte called requesting a refill of the below medication which has been pended for you: patient switching to different pharmacy    Requested Prescriptions     Pending Prescriptions Disp Refills    pantoprazole (PROTONIX) 40 MG tablet 90 tablet 3     Sig: Take 1 tablet by mouth once daily       Last Appointment Date: 12/27/2022  Next Appointment Date: 4/6/2023    Allergies   Allergen Reactions    Statins Other (See Comments)     Causes severe leg cramps     Tape [Adhesive Tape] Other (See Comments)     \"sticks to skin\" causes redness    Tizanidine Hcl Other (See Comments)     Having issues with her liver- elevated her enzyme level    Tramadol Other (See Comments)     \"makes me Goofy\"    Augmentin [Amoxicillin-Pot Clavulanate] Diarrhea, Nausea And Vomiting and Other (See Comments)     abd pain    Nystatin Nausea And Vomiting     Pt states \"swish and swallow\"

## 2023-02-02 ENCOUNTER — CARE COORDINATION (OUTPATIENT)
Dept: CARE COORDINATION | Age: 88
End: 2023-02-02

## 2023-02-02 NOTE — CARE COORDINATION
Ambulatory Care Coordination Note  2/2/2023    ACC: Ade Hernández, NUBIA        Lawrence was referred for ACM from [facility]     Lawrence has:           Esophageal stricture- had it stretched 3 months ago in [facility] [place], gastritis,  on medications and follows with gastroenterology                                      PAF, Atrial Flutter- on medication and follows with cardiology                                      Hyponatremia- on medications and follows with PCP and nephrology                                      CKD stage 3- on medications and follows with nephrology                                       Apt with ortho- left hip pain      [facility]- completed     Plan of Care : Completion Of ACM    2/2/2023- 3:35 pm spoke with Lawrence. She is doing well. She has apts scheduled. Education completed. She is in agreement to this writer no longer calling. She has contact information and will reach out if needed. Lab Results       None            Care Coordination Interventions    Referral from Primary Care Provider: No  Suggested Interventions and Community Resources  Fall Risk Prevention: Completed  Home Health Services: Completed (Comment: [facility])  Occupational Therapy: Completed  Physical Therapy: Completed          Goals Addressed                   This Visit's Progress     Conditions and Symptoms   On track     I will schedule office visits, as directed by my provider. I will keep my appointment or reschedule if I have to cancel. I will notify my provider of any barriers to my plan of care. I will notify my provider of any symptoms that indicate a worsening of my condition. Barriers: lack of support and lack of education  Plan for overcoming my barriers: Care Coordination Intervention and [facility]  Confidence: 10/10  Anticipated Goal Completion Date: 2/7/2023                Prior to Admission medications    Medication Sig Start Date End Date Taking?  Authorizing Provider pantoprazole (PROTONIX) 40 MG tablet Take 1 tablet by mouth once daily 23   Patti Kirk MD   metoprolol tartrate (LOPRESSOR) 50 MG tablet Patient is taking 1/2 tab BID 23   Dona Martínez MD   amiodarone (CORDARONE) 200 MG tablet Take 0.5 tablets by mouth 2 times daily 23   Dona Martínez MD   dilTIAZem (CARDIZEM) 30 MG tablet Take 1 tablet by mouth in the morning and at bedtime 23   Dona Martínez MD   rivaroxaban (XARELTO) 20 MG TABS tablet Take 1 tablet by mouth daily 23   Dona Martínez MD   estradiol (ESTRACE VAGINAL) 0.1 MG/GM vaginal cream Place 1 g vaginally once a week 22   Patti Kirk MD   sodium chloride 1 g tablet Take 1 tablet by mouth in the morning and at bedtime 11/10/22   Carol Pelayo MD   vitamin E 1000 units capsule Take 1,000 Units by mouth daily    Historical Provider, MD   ondansetron (ZOFRAN ODT) 4 MG disintegrating tablet Take 1 tablet by mouth every 8 hours as needed for Nausea 22   Patti Kikr MD       Future Appointments   Date Time Provider Gina Beauchamp   2023 12:00 PM DO [facility]   2023  3:00 PM Patti Kirk MD [facility]   2023  2:15 PM [facility]   2023 10:20 AM [facility] [place] [facility]

## 2023-02-09 ENCOUNTER — PROCEDURE VISIT (OUTPATIENT)
Dept: OBGYN | Age: 88
End: 2023-02-09
Payer: MEDICARE

## 2023-02-09 VITALS
BODY MASS INDEX: 25.16 KG/M2 | HEART RATE: 59 BPM | OXYGEN SATURATION: 96 % | WEIGHT: 151 LBS | SYSTOLIC BLOOD PRESSURE: 106 MMHG | HEIGHT: 65 IN | DIASTOLIC BLOOD PRESSURE: 60 MMHG

## 2023-02-09 DIAGNOSIS — N81.4 PELVIC RELAXATION DUE TO UTEROVAGINAL PROLAPSE: ICD-10-CM

## 2023-02-09 DIAGNOSIS — Z96.0 PRESENCE OF PESSARY: ICD-10-CM

## 2023-02-09 DIAGNOSIS — Z46.89 ENCOUNTER FOR PESSARY MAINTENANCE: Primary | ICD-10-CM

## 2023-02-09 PROCEDURE — 99212 OFFICE O/P EST SF 10 MIN: CPT | Performed by: OBSTETRICS & GYNECOLOGY

## 2023-02-09 PROCEDURE — 1123F ACP DISCUSS/DSCN MKR DOCD: CPT | Performed by: OBSTETRICS & GYNECOLOGY

## 2023-02-09 PROCEDURE — 99213 OFFICE O/P EST LOW 20 MIN: CPT | Performed by: OBSTETRICS & GYNECOLOGY

## 2023-02-09 NOTE — PROGRESS NOTES
Kiki Romero  2023  12:12 PM          Kiki Romero is a 80 y.o. female       The patient was seen. She has no chief complaints today. She has been fitted for a # 4; ring type pessary. She states all of her symptoms that she had prior to the pessary have been relieved with its use. She denies any vaginal bleeding. She denies to any vaginal discharge or odor. Her bowels are regular and her voiding pattern is normal.     not currently breastfeeding. Chaperone for Intimate Exam  Chaperone was offered and accepted as part of the rooming process. Chaperone: Cherry        Abdomen: Soft and non-tender; good bowel sounds; no guarding, rebound or rigidity; no CVA tenderness bilaterally. Extremities: No calf tenderness bilaterally. DTR 2/4 bilaterally. No edema. Perineum/Speculum: There is not any signs of infection; The vaginal vault is without any signs of erythema or erosion. There is no vaginal discharge or odor appreciated. The pessary was cleansed and replaced without any problems and the patient tolerated the procedure well. T.O.S. Ointment was placed with the pessary to decrease discharge/odor. Assessment:   Diagnosis Orders   1. Encounter for pessary maintenance        2. Pelvic relaxation due to uterovaginal prolapse        3.  Presence of pessary #4 ring          Chief Complaint   Patient presents with    Procedure     Pessary      Patient Active Problem List    Diagnosis Date Noted    Presence of pessary #4 ring with support 10/13/2021     Priority: High    Lower abdominal pain 2021     Priority: High    Pelvic relaxation due to uterovaginal prolapse 2021     Priority: High    Persistent headaches 10/16/2022     Priority: Medium    COVID 10/08/2022     Priority: Medium    Moderate malnutrition ([address] 2022     Priority: Medium    Epigastric pain 2022     Priority: Medium    Atrial flutter ([address] 2021    Hyponatremia 2021    Esophageal candidiasis ([facility] 08/31/2021    Vaginal erosion secondary to pessary use ([facility] 01/21/2021    Postmenopausal bleeding 01/21/2021    History of hysterectomy 01/21/2021    Arthritis of right hand 01/13/2020    Venous stasis dermatitis of both lower extremities 12/08/2017    Paroxysmal atrial fibrillation with rapid ventricular response ([facility] 09/21/2017    Lumbar radicular pain 08/03/2017    Lumbar spine pain 08/03/2017    Left hip pain 08/03/2017    Arthritis of left hip 08/03/2017    Esophageal stricture     Gastritis without bleeding     Bradycardia     Drug-induced constipation 06/15/2017    Trochanteric bursitis of left hip 05/22/2017         Plan:  1. Return to the office 10-12 weeks  2. Report any vaginal bleeding or discharge  3. Abstinence  4. Annual Follow-up reviewed with patient. They will schedule appointment    The patient, Jigar Uriostegui is a 80 y.o. female, was seen with a total time spent of 20 minutes for the visit on this date of service by the E/M provider. The time component had both face to face and non face to face time spent in determining the total time component. Counseling and education regarding her diagnosis listed below and her options regarding those diagnoses were also included in determining her time component. Diagnosis Orders   1. Encounter for pessary maintenance        2. Pelvic relaxation due to uterovaginal prolapse        3. Presence of pessary #4 ring             The patient had her preventative health maintenance recommendations and follow-up reviewed with her at the completion of her visit.

## 2023-04-06 ENCOUNTER — OFFICE VISIT (OUTPATIENT)
Dept: FAMILY MEDICINE CLINIC | Age: 88
End: 2023-04-06
Payer: MEDICARE

## 2023-04-06 VITALS
TEMPERATURE: 97.3 F | BODY MASS INDEX: 25.83 KG/M2 | HEART RATE: 73 BPM | DIASTOLIC BLOOD PRESSURE: 62 MMHG | SYSTOLIC BLOOD PRESSURE: 130 MMHG | OXYGEN SATURATION: 96 % | HEIGHT: 65 IN | WEIGHT: 155 LBS

## 2023-04-06 DIAGNOSIS — L98.9 SKIN LESION OF CHEEK: Primary | ICD-10-CM

## 2023-04-06 DIAGNOSIS — K29.50 OTHER CHRONIC GASTRITIS WITHOUT HEMORRHAGE: ICD-10-CM

## 2023-04-06 DIAGNOSIS — E27.40 UNSPECIFIED ADRENOCORTICAL INSUFFICIENCY (HCC): ICD-10-CM

## 2023-04-06 DIAGNOSIS — E44.0 MODERATE MALNUTRITION (HCC): ICD-10-CM

## 2023-04-06 DIAGNOSIS — E87.1 HYPONATREMIA: ICD-10-CM

## 2023-04-06 PROCEDURE — 99213 OFFICE O/P EST LOW 20 MIN: CPT

## 2023-04-06 PROCEDURE — 99214 OFFICE O/P EST MOD 30 MIN: CPT | Performed by: FAMILY MEDICINE

## 2023-04-06 PROCEDURE — 1123F ACP DISCUSS/DSCN MKR DOCD: CPT | Performed by: FAMILY MEDICINE

## 2023-04-06 RX ORDER — VIT A/VIT C/VIT E/ZINC/COPPER 2148-113
2 TABLET ORAL 2 TIMES DAILY
COMMUNITY

## 2023-04-06 ASSESSMENT — PATIENT HEALTH QUESTIONNAIRE - PHQ9
SUM OF ALL RESPONSES TO PHQ QUESTIONS 1-9: 0
SUM OF ALL RESPONSES TO PHQ QUESTIONS 1-9: 0
SUM OF ALL RESPONSES TO PHQ9 QUESTIONS 1 & 2: 0
SUM OF ALL RESPONSES TO PHQ QUESTIONS 1-9: 0
1. LITTLE INTEREST OR PLEASURE IN DOING THINGS: 0
SUM OF ALL RESPONSES TO PHQ QUESTIONS 1-9: 0
2. FEELING DOWN, DEPRESSED OR HOPELESS: 0

## 2023-04-06 ASSESSMENT — ENCOUNTER SYMPTOMS
BLOOD IN STOOL: 0
ROS SKIN COMMENTS: SKIN LESION

## 2023-04-06 NOTE — PROGRESS NOTES
(PRESERVISION AREDS) TABS Take 2 tablets by mouth in the morning and at bedtime      pantoprazole (PROTONIX) 40 MG tablet Take 1 tablet by mouth once daily 90 tablet 3    metoprolol tartrate (LOPRESSOR) 50 MG tablet Patient is taking 1/2 tab BID 90 tablet 3    amiodarone (CORDARONE) 200 MG tablet Take 0.5 tablets by mouth 2 times daily 180 tablet 3    dilTIAZem (CARDIZEM) 30 MG tablet Take 1 tablet by mouth in the morning and at bedtime 180 tablet 3    rivaroxaban (XARELTO) 20 MG TABS tablet Take 1 tablet by mouth daily 90 tablet 3    sodium chloride 1 g tablet Take 1 tablet by mouth in the morning and at bedtime 90 tablet 3    ondansetron (ZOFRAN ODT) 4 MG disintegrating tablet Take 1 tablet by mouth every 8 hours as needed for Nausea 20 tablet 0     No current facility-administered medications for this visit. Allergies   Allergen Reactions    Statins Other (See Comments)     Causes severe leg cramps     Tape [Adhesive Tape] Other (See Comments)     \"sticks to skin\" causes redness    Tizanidine Hcl Other (See Comments)     Having issues with her liver- elevated her enzyme level    Tramadol Other (See Comments)     \"makes me Goofy\"    Augmentin [Amoxicillin-Pot Clavulanate] Diarrhea, Nausea And Vomiting and Other (See Comments)     abd pain    Nystatin Nausea And Vomiting     Pt states \"swish and swallow\"        Subjective:     Review of Systems   Constitutional:  Negative for activity change, appetite change and chills. Eyes:  Negative for visual disturbance. Cardiovascular:  Negative for palpitations and leg swelling. Gastrointestinal:  Negative for blood in stool. Skin:         Skin lesion   Neurological:  Negative for dizziness, syncope, weakness and light-headedness. Psychiatric/Behavioral:  Negative for dysphoric mood and sleep disturbance. The patient is nervous/anxious (occasionally). Objective:      Physical Exam  Vitals and nursing note reviewed.    Constitutional:       General: She

## 2023-04-24 ENCOUNTER — HOSPITAL ENCOUNTER (OUTPATIENT)
Age: 88
Discharge: HOME | End: 2023-04-24
Payer: MEDICARE

## 2023-04-24 DIAGNOSIS — E87.1 HYPONATREMIA: ICD-10-CM

## 2023-04-24 DIAGNOSIS — E44.0 MODERATE MALNUTRITION (HCC): ICD-10-CM

## 2023-04-24 LAB
ALBUMIN SERPL-MCNC: 4.4 G/DL (ref 3.5–5.2)
ALBUMIN/GLOBULIN RATIO: 1.8 (ref 1–2.5)
ALP SERPL-CCNC: 99 U/L (ref 35–104)
ALT SERPL-CCNC: 21 U/L (ref 5–33)
ANION GAP SERPL CALCULATED.3IONS-SCNC: 13 MMOL/L (ref 9–17)
AST SERPL-CCNC: 25 U/L
BILIRUB SERPL-MCNC: 0.4 MG/DL (ref 0.3–1.2)
BUN SERPL-MCNC: 37 MG/DL (ref 8–23)
BUN/CREAT BLD: 34 (ref 9–20)
CALCIUM SERPL-MCNC: 9.1 MG/DL (ref 8.6–10.4)
CHLORIDE SERPL-SCNC: 99 MMOL/L (ref 98–107)
CO2 SERPL-SCNC: 28 MMOL/L (ref 20–31)
CREAT SERPL-MCNC: 1.09 MG/DL (ref 0.5–0.9)
GFR SERPL CREATININE-BSD FRML MDRD: 49 ML/MIN/1.73M2
GLUCOSE SERPL-MCNC: 108 MG/DL (ref 70–99)
POTASSIUM SERPL-SCNC: 4.1 MMOL/L (ref 3.7–5.3)
PROT SERPL-MCNC: 6.8 G/DL (ref 6.4–8.3)
SODIUM SERPL-SCNC: 140 MMOL/L (ref 135–144)

## 2023-04-24 PROCEDURE — 80053 COMPREHEN METABOLIC PANEL: CPT

## 2023-04-24 PROCEDURE — 36415 COLL VENOUS BLD VENIPUNCTURE: CPT

## 2023-05-17 ENCOUNTER — TELEPHONE (OUTPATIENT)
Dept: FAMILY MEDICINE CLINIC | Age: 88
End: 2023-05-17

## 2023-05-17 DIAGNOSIS — Z01.818 PRE-OP EXAMINATION: Primary | ICD-10-CM

## 2023-05-17 NOTE — TELEPHONE ENCOUNTER
Pt scheduled for 5/23 at 0800 for pre-op clearance with Dr Zion Solis. Ordered labs, EKG, Chest Xray for pt to complete prior to appt. Pt voiced understanding.

## 2023-05-17 NOTE — TELEPHONE ENCOUNTER
Patient having skin cancer removal at  Dermotology. Patient needs CBC, BMP, CMP, EKG and chest xray. Do you want to see for pre op physical? Patient was just seen 4/6/23.  Fax number [phone]

## 2023-05-22 ENCOUNTER — HOSPITAL ENCOUNTER (OUTPATIENT)
Age: 88
Discharge: HOME | End: 2023-05-22
Payer: MEDICARE

## 2023-05-22 ENCOUNTER — HOSPITAL ENCOUNTER (OUTPATIENT)
Dept: GENERAL RADIOLOGY | Age: 88
Discharge: HOME | End: 2023-05-24
Payer: MEDICARE

## 2023-05-22 ENCOUNTER — HOSPITAL ENCOUNTER (OUTPATIENT)
Dept: NON INVASIVE DIAGNOSTICS | Age: 88
Discharge: HOME | End: 2023-05-22
Payer: MEDICARE

## 2023-05-22 DIAGNOSIS — Z01.818 PRE-OP EXAMINATION: ICD-10-CM

## 2023-05-22 LAB
ANION GAP SERPL CALCULATED.3IONS-SCNC: 10 MMOL/L (ref 9–17)
BASOPHILS # BLD: 0.04 K/UL (ref 0–0.2)
BASOPHILS NFR BLD: 1 % (ref 0–2)
BUN SERPL-MCNC: 27 MG/DL (ref 8–23)
BUN/CREAT SERPL: 26 (ref 9–20)
CALCIUM SERPL-MCNC: 9.1 MG/DL (ref 8.6–10.4)
CHLORIDE SERPL-SCNC: 102 MMOL/L (ref 98–107)
CO2 SERPL-SCNC: 29 MMOL/L (ref 20–31)
CREAT SERPL-MCNC: 1.02 MG/DL (ref 0.5–0.9)
EOSINOPHIL # BLD: 0.06 K/UL (ref 0–0.44)
EOSINOPHILS RELATIVE PERCENT: 1 % (ref 1–4)
ERYTHROCYTE [DISTWIDTH] IN BLOOD BY AUTOMATED COUNT: 15.6 % (ref 11.8–14.4)
GFR SERPL CREATININE-BSD FRML MDRD: 53 ML/MIN/1.73M2
GLUCOSE SERPL-MCNC: 97 MG/DL (ref 70–99)
HCT VFR BLD AUTO: 45.2 % (ref 36.3–47.1)
HGB BLD-MCNC: 14.3 G/DL (ref 11.9–15.1)
IMM GRANULOCYTES # BLD AUTO: 0.04 K/UL (ref 0–0.3)
IMM GRANULOCYTES NFR BLD: 1 %
LYMPHOCYTES # BLD: 22 % (ref 24–43)
LYMPHOCYTES NFR BLD: 1.47 K/UL (ref 1.1–3.7)
MCH RBC QN AUTO: 27.9 PG (ref 25.2–33.5)
MCHC RBC AUTO-ENTMCNC: 31.6 G/DL (ref 25.2–33.5)
MCV RBC AUTO: 88.3 FL (ref 82.6–102.9)
MONOCYTES NFR BLD: 0.64 K/UL (ref 0.1–1.2)
MONOCYTES NFR BLD: 10 % (ref 3–12)
NEUTROPHILS NFR BLD: 66 % (ref 36–65)
NEUTS SEG NFR BLD: 4.35 K/UL (ref 1.5–8.1)
NRBC AUTOMATED: 0 PER 100 WBC
PLATELET # BLD AUTO: ABNORMAL K/UL (ref 138–453)
PLATELET, FLUORESCENCE: 134 K/UL (ref 138–453)
PLATELETS.RETICULATED NFR BLD AUTO: 1.6 % (ref 1.1–10.3)
POTASSIUM SERPL-SCNC: 4.9 MMOL/L (ref 3.7–5.3)
RBC # BLD AUTO: 5.12 M/UL (ref 3.95–5.11)
SODIUM SERPL-SCNC: 141 MMOL/L (ref 135–144)
WBC OTHER # BLD: 6.6 K/UL (ref 3.5–11.3)

## 2023-05-22 PROCEDURE — 80048 BASIC METABOLIC PNL TOTAL CA: CPT

## 2023-05-22 PROCEDURE — 85025 COMPLETE CBC W/AUTO DIFF WBC: CPT

## 2023-05-22 PROCEDURE — 71046 X-RAY EXAM CHEST 2 VIEWS: CPT

## 2023-05-22 PROCEDURE — 93005 ELECTROCARDIOGRAM TRACING: CPT

## 2023-05-22 PROCEDURE — 36415 COLL VENOUS BLD VENIPUNCTURE: CPT

## 2023-05-23 ENCOUNTER — OFFICE VISIT (OUTPATIENT)
Dept: FAMILY MEDICINE CLINIC | Age: 88
End: 2023-05-23
Payer: MEDICARE

## 2023-05-23 VITALS
SYSTOLIC BLOOD PRESSURE: 130 MMHG | OXYGEN SATURATION: 98 % | HEART RATE: 84 BPM | BODY MASS INDEX: 26.16 KG/M2 | TEMPERATURE: 97.5 F | DIASTOLIC BLOOD PRESSURE: 80 MMHG | HEIGHT: 65 IN | WEIGHT: 157 LBS

## 2023-05-23 DIAGNOSIS — D68.69 SECONDARY HYPERCOAGULABLE STATE (HCC): ICD-10-CM

## 2023-05-23 DIAGNOSIS — Z01.818 PRE-OP EXAM: ICD-10-CM

## 2023-05-23 DIAGNOSIS — I48.0 PAROXYSMAL ATRIAL FIBRILLATION WITH RAPID VENTRICULAR RESPONSE (HCC): Primary | ICD-10-CM

## 2023-05-23 DIAGNOSIS — L98.9 SKIN LESION: ICD-10-CM

## 2023-05-23 DIAGNOSIS — N18.30 STAGE 3 CHRONIC KIDNEY DISEASE, UNSPECIFIED WHETHER STAGE 3A OR 3B CKD (HCC): ICD-10-CM

## 2023-05-23 LAB
EKG ATRIAL RATE: 75 BPM
EKG P AXIS: 87 DEGREES
EKG P-R INTERVAL: 184 MS
EKG Q-T INTERVAL: 418 MS
EKG QRS DURATION: 76 MS
EKG QTC CALCULATION (BAZETT): 466 MS
EKG R AXIS: 89 DEGREES
EKG T AXIS: 22 DEGREES
EKG VENTRICULAR RATE: 75 BPM

## 2023-05-23 PROCEDURE — 99214 OFFICE O/P EST MOD 30 MIN: CPT | Performed by: FAMILY MEDICINE

## 2023-05-23 PROCEDURE — 1123F ACP DISCUSS/DSCN MKR DOCD: CPT | Performed by: FAMILY MEDICINE

## 2023-05-23 RX ORDER — SODIUM CHLORIDE 1000 MG
1 TABLET, SOLUBLE MISCELLANEOUS 2 TIMES DAILY
Qty: 90 TABLET | Refills: 3 | Status: SHIPPED | OUTPATIENT
Start: 2023-05-23

## 2023-05-23 ASSESSMENT — ENCOUNTER SYMPTOMS
CONSTIPATION: 0
NAUSEA: 0
ABDOMINAL PAIN: 0
CHEST TIGHTNESS: 0
WHEEZING: 0
DIARRHEA: 0
SHORTNESS OF BREATH: 0
COUGH: 0

## 2023-05-23 NOTE — PROGRESS NOTES
Immature Granulocytes 05/22/2023 1 (H)  0 % Final    Segs Absolute 05/22/2023 4.35  1.50 - 8.10 k/uL Final    Absolute Lymph # 05/22/2023 1.47  1.10 - 3.70 k/uL Final    Absolute Mono # 05/22/2023 0.64  0.10 - 1.20 k/uL Final    Absolute Eos # 05/22/2023 0.06  0.00 - 0.44 k/uL Final    Basophils Absolute 05/22/2023 0.04  0.00 - 0.20 k/uL Final    Absolute Immature Granulocyte 05/22/2023 0.04  0.00 - 0.30 k/uL Final    Glucose 05/22/2023 97  70 - 99 mg/dL Final    BUN 05/22/2023 27 (H)  8 - 23 mg/dL Final    Creatinine 05/22/2023 1.02 (H)  0.50 - 0.90 mg/dL Final    Est, Glom Filt Rate 05/22/2023 53 (L)  >60 mL/min/1.73m2 Final    Bun/Cre Ratio 05/22/2023 26 (H)  9 - 20 Final    Calcium 05/22/2023 9.1  8.6 - 10.4 mg/dL Final    Sodium 05/22/2023 141  135 - 144 mmol/L Final    Potassium 05/22/2023 4.9  3.7 - 5.3 mmol/L Final    Chloride 05/22/2023 102  98 - 107 mmol/L Final    CO2 05/22/2023 29  20 - 31 mmol/L Final    Anion Gap 05/22/2023 10  9 - 17 mmol/L Final    Platelet, Immature Fraction 05/22/2023 1.6  1.1 - 10.3 % Final    Platelet, Fluorescence 05/22/2023 134 (L)  138 - 453 k/uL Final     XR CHEST (2 VW)    Result Date: 5/22/2023  EXAMINATION: TWO XRAY VIEWS OF THE CHEST 5/22/2023 12:41 pm COMPARISON: 07/15/2017 HISTORY: ORDERING SYSTEM PROVIDED HISTORY: Pre-op examination TECHNOLOGIST PROVIDED HISTORY: Reason for Exam: Pre-op testing; no current chest complaints FINDINGS: The lungs are without acute focal process. There is no effusion or pneumothorax. The cardiomediastinal silhouette is stable. The osseous structures are stable. No acute process. Plan:       She is completely asymptomatic with her afib/flutter and her recent EKG showed sinus rhythm. She follows with caridiolgy regularly and they have not had any concerns. She is able to do 4 METS of activity without issue. No issues with lightheadedness and her electrolytes have been normal as long as she takes sodium tabs.  I told her to

## 2023-06-16 ENCOUNTER — HOSPITAL ENCOUNTER (OUTPATIENT)
Age: 88
Setting detail: OUTPATIENT SURGERY
Discharge: HOME | End: 2023-06-16
Attending: SPECIALIST | Admitting: SPECIALIST
Payer: MEDICARE

## 2023-06-16 VITALS
DIASTOLIC BLOOD PRESSURE: 67 MMHG | OXYGEN SATURATION: 96 % | BODY MASS INDEX: 25.49 KG/M2 | TEMPERATURE: 97 F | HEART RATE: 118 BPM | WEIGHT: 153 LBS | HEIGHT: 65 IN | SYSTOLIC BLOOD PRESSURE: 100 MMHG | RESPIRATION RATE: 18 BRPM

## 2023-06-16 DIAGNOSIS — C44.310 BASAL CELL CARCINOMA OF FACE: Primary | ICD-10-CM

## 2023-06-16 DIAGNOSIS — C44.319 BASAL CELL CARCINOMA (BCC) OF RIGHT CHEEK: ICD-10-CM

## 2023-06-16 PROCEDURE — 7100000010 HC PHASE II RECOVERY - FIRST 15 MIN: Performed by: SPECIALIST

## 2023-06-16 PROCEDURE — 3600000012 HC SURGERY LEVEL 2 ADDTL 15MIN: Performed by: SPECIALIST

## 2023-06-16 PROCEDURE — 7100000011 HC PHASE II RECOVERY - ADDTL 15 MIN: Performed by: SPECIALIST

## 2023-06-16 PROCEDURE — 3700000001 HC ADD 15 MINUTES (ANESTHESIA): Performed by: SPECIALIST

## 2023-06-16 PROCEDURE — 6370000000 HC RX 637 (ALT 250 FOR IP): Performed by: SPECIALIST

## 2023-06-16 PROCEDURE — 3700000000 HC ANESTHESIA ATTENDED CARE: Performed by: SPECIALIST

## 2023-06-16 PROCEDURE — 2500000003 HC RX 250 WO HCPCS: Performed by: SPECIALIST

## 2023-06-16 PROCEDURE — 3600000002 HC SURGERY LEVEL 2 BASE: Performed by: SPECIALIST

## 2023-06-16 PROCEDURE — 2709999900 HC NON-CHARGEABLE SUPPLY: Performed by: SPECIALIST

## 2023-06-16 PROCEDURE — 88305 TISSUE EXAM BY PATHOLOGIST: CPT

## 2023-06-16 RX ORDER — LIDOCAINE HYDROCHLORIDE AND EPINEPHRINE 10; 10 MG/ML; UG/ML
INJECTION, SOLUTION INFILTRATION; PERINEURAL PRN
Status: DISCONTINUED | OUTPATIENT
Start: 2023-06-16 | End: 2023-06-16 | Stop reason: ALTCHOICE

## 2023-06-16 RX ORDER — GINSENG 100 MG
CAPSULE ORAL PRN
Status: DISCONTINUED | OUTPATIENT
Start: 2023-06-16 | End: 2023-06-16 | Stop reason: ALTCHOICE

## 2023-06-16 RX ORDER — SODIUM CHLORIDE 9 MG/ML
INJECTION, SOLUTION INTRAVENOUS CONTINUOUS
Status: DISCONTINUED | OUTPATIENT
Start: 2023-06-16 | End: 2023-06-16 | Stop reason: HOSPADM

## 2023-06-16 RX ORDER — ACETAMINOPHEN AND CODEINE PHOSPHATE 300; 30 MG/1; MG/1
1 TABLET ORAL EVERY 4 HOURS PRN
Qty: 12 TABLET | Refills: 0 | Status: SHIPPED | OUTPATIENT
Start: 2023-06-16 | End: 2023-06-19

## 2023-06-16 RX ORDER — ACETAMINOPHEN 500 MG
TABLET ORAL
Status: DISCONTINUED
Start: 2023-06-16 | End: 2023-06-16 | Stop reason: HOSPADM

## 2023-06-16 RX ORDER — ACETAMINOPHEN 500 MG
1000 TABLET ORAL ONCE
Status: COMPLETED | OUTPATIENT
Start: 2023-06-16 | End: 2023-06-16

## 2023-06-16 RX ADMIN — ACETAMINOPHEN 1000 MG: 500 TABLET ORAL at 10:34

## 2023-06-16 ASSESSMENT — PAIN DESCRIPTION - LOCATION: LOCATION: FACE

## 2023-06-16 ASSESSMENT — PAIN - FUNCTIONAL ASSESSMENT
PAIN_FUNCTIONAL_ASSESSMENT: 0-10
PAIN_FUNCTIONAL_ASSESSMENT: PREVENTS OR INTERFERES SOME ACTIVE ACTIVITIES AND ADLS

## 2023-06-16 ASSESSMENT — PAIN DESCRIPTION - DESCRIPTORS: DESCRIPTORS: ACHING

## 2023-06-16 ASSESSMENT — PAIN DESCRIPTION - ORIENTATION: ORIENTATION: RIGHT

## 2023-06-16 ASSESSMENT — PAIN SCALES - GENERAL: PAINLEVEL_OUTOF10: 6

## 2023-06-16 NOTE — DISCHARGE INSTRUCTIONS
POST OPERATIVE INSTRUCTION SHEET  SKIN TUMOR/LESION REMOVAL          Activity:    No strenuous activity for 48 hours  No activity that stresses the suture closure/incision  Regular diet  ABSOLUTELY NO NICOTINE OF ANY TYPE    Wound Care:  Leave yellow dressing on cheek in place. Do not remove yellow dressing and do not get wet. Leave band aid on nose in place for 24 hours. After 24 hours, you may remove band aid and leave biopsy site open to air. Once the band aid is removed, you should gently wash with soap and water using fingertips then apply Bacitracin 3 times a day for 4 days. DO NOT USE BACITRACIN LONGER THAN FOUR DAYS. Leave pressure dressing on chest in place for 48 hours. Once pressure dressing is removed, you should gently wash incision with soap and water, using fingertips only. You should then apply Bacitracin three times a day for four days. DO NOT USE BACITRACIN LONGER THAN FOUR DAYS. Keep incision covered with a dry dressing while using Bacitracin. Recommend sleeping in a recliner or with head elevated for next 2-3 nights. Limitations:  No swimming, hot tub, sauna or soaking in a bathtub    Prescriptions: Take exactly as prescribed  Tylenol # 3 as needed for pain  Complete antibiotic as prescribed    Follow-Up:  June 22, 2023 @ 11:15 am      Notify our office if you experience any of the following:   Develop a fever (temperature is greater than 100.5F)   Develop redness greater than 1 cm around incision or red streaks up extremity   Have any excess bleeding/ increased drainage or swelling at the incision site    *Note:  Your pathology results will be reviewed with you at your scheduled follow-up appointment. *A prescription for Tylenol #3 has been sent to your pharmacy  *You may resume Xarelto on Sunday, if held pre operatively and if medically able to hold that long. *A prescription for Diflucan has been sent to your pharmacy.  You only need to take your antibiotic for two

## 2023-06-16 NOTE — OP NOTE
Operative Note    Patient name: Charlie Bolden             Medical Record Number: [id number]    Primary Care Physician: Evelyne Felty, MD     1934    Date of Procedure: 2023    Pre-operative Diagnosis:  1.  5cm2 defect of right upper cheek s/p MOHS for basal cell carcinoma       2. Suspicious 1cm left nasal tip lesion    Post-operative Diagnosis: Same    Procedure Performed:  1. Full thickness skin graft (5 cm2) repair of right upper cheek (CPT 14678)      2.  1cm shave biopsy of left nasal tip (CPT 42494)    Surgeons/Assistants: MD Ko Gutiérrez PA-C    Estimated Blood Loss: 3ml     Complications: none immediately appreciated    Procedure: With the patient lying in the supine position and under adequate anesthesia per the anesthesia team.   Local anesthesia consisting of 17 ml of 1% Lidocaine 1:100,000 with epinephrine solution of the donor site of the right infraclavicular area as well as the right cheek defect and left nasal tip lesion. The area was prepped and draped in the standard surgical fashion. The patient has very thin skin and a 5cm2 defect which could not be closed primarily, therefore a full thickness skin graft was taken. It was defatted and secured in position with a 3-0 silk suture tie and then a 5-0 fast absorbable suture was placed in a simple running fashion. A Bacitracin Xeroform and moist cotton ball tie over bolster was secured using the tails of the silk sutures. The donor site was closed with a 3-0 Monocryl suture placed in interrupted buried fashion and then skin staples with Bacitracin and bulky sterile dressings. The left nasal tip shave biopsy was performed. Bacitracin was applied as dressing of nose. The patient tolerated the procedure well and remained hemodynamically stable throughout the procedure and was quite comfortable throughout the operative course.     Clinical staging for cancer cases:  Ct  Cn  Cm    Franklin Sanchez

## 2023-06-16 NOTE — PROGRESS NOTES
0329: Patient arrived to Phase II recovery via chair. Awake and alert. No report received from OR staff.   0149: VSS, patient states tip of nose is tender. Band aid present. Cotton ball dressing to R cheek clean, dry and intact.

## 2023-06-16 NOTE — H&P
[address] [address]  History and Physical Update    Pt Name: Keyana Willingham  MRN: [id number]  YOB: 1934  Date of evaluation: 6/16/2023    I have examined the patient and reviewed the H&P/Consult and there are no changes to the patient or plans.       Erna Garcia MD  Electronically signed 6/16/2023 at 8:47 AM

## 2023-06-16 NOTE — PROGRESS NOTES
4589: Patient arrived to Phase II recovery via chair. Awake and alert. No report received from OR staff.   6564: VSS, patient states tip of nose is tender. Band aid present. Cotton ball dressing to R cheek clean, dry and intact. Pressure dressing noted to R chest-intact. Snack and drink provided. Call light placed within reach. 1015: Daughter present in room. Patient provided more drink per request.  9850 1142: Tylenol provided for pain 6/10. More snack and drink provided. Call light in reach and daughter present in room. 1042: Discharge instructions reviewed with patient and patient's daughter. AVS provided for discharge. IV removed and patient dressed with daughter's assistance. 1050: Pressure dressing to R chest not intact. New dressing applied with foam tape without disturbing underlying gauze to which no drainage noted. Patient tolerated well. 1055: Patient ambulatory to bathroom and voided without difficulty. 1102: Patient ambulatory to vehicle and discharged home in stable condition with daughter.

## 2023-06-20 ENCOUNTER — APPOINTMENT (OUTPATIENT)
Dept: GENERAL RADIOLOGY | Age: 88
DRG: 641 | End: 2023-06-20
Payer: MEDICARE

## 2023-06-20 ENCOUNTER — APPOINTMENT (OUTPATIENT)
Dept: CT IMAGING | Age: 88
DRG: 641 | End: 2023-06-20
Payer: MEDICARE

## 2023-06-20 ENCOUNTER — TELEPHONE (OUTPATIENT)
Dept: FAMILY MEDICINE CLINIC | Age: 88
End: 2023-06-20

## 2023-06-20 ENCOUNTER — HOSPITAL ENCOUNTER (INPATIENT)
Age: 88
LOS: 3 days | Discharge: HOME | DRG: 641 | End: 2023-06-26
Attending: EMERGENCY MEDICINE | Admitting: INTERNAL MEDICINE
Payer: MEDICARE

## 2023-06-20 DIAGNOSIS — E87.1 HYPONATREMIA: Primary | ICD-10-CM

## 2023-06-20 LAB
ALBUMIN SERPL BCG-MCNC: 3.7 G/DL (ref 3.5–5.1)
ALP SERPL-CCNC: 199 U/L (ref 38–126)
ALT SERPL W/O P-5'-P-CCNC: 97 U/L (ref 11–66)
ANION GAP SERPL CALC-SCNC: 9 MEQ/L (ref 8–16)
AST SERPL-CCNC: 59 U/L (ref 5–40)
BASOPHILS ABSOLUTE: 0 THOU/MM3 (ref 0–0.1)
BASOPHILS NFR BLD AUTO: 0.5 %
BILIRUB SERPL-MCNC: 0.8 MG/DL (ref 0.3–1.2)
BILIRUB UR QL STRIP.AUTO: NEGATIVE
BUN SERPL-MCNC: 13 MG/DL (ref 7–22)
CALCIUM SERPL-MCNC: 8.7 MG/DL (ref 8.5–10.5)
CHARACTER UR: CLEAR
CHLORIDE 24H UR-SRATE: 72 MEQ/L
CHLORIDE SERPL-SCNC: 88 MEQ/L (ref 98–111)
CO2 SERPL-SCNC: 25 MEQ/L (ref 23–33)
COLOR: YELLOW
CREAT SERPL-MCNC: 0.9 MG/DL (ref 0.4–1.2)
DEPRECATED RDW RBC AUTO: 48.9 FL (ref 35–45)
EOSINOPHIL NFR BLD AUTO: 1.4 %
EOSINOPHILS ABSOLUTE: 0.1 THOU/MM3 (ref 0–0.4)
ERYTHROCYTE [DISTWIDTH] IN BLOOD BY AUTOMATED COUNT: 15.9 % (ref 11.5–14.5)
FLUAV RNA RESP QL NAA+PROBE: NOT DETECTED
FLUBV RNA RESP QL NAA+PROBE: NOT DETECTED
GFR SERPL CREATININE-BSD FRML MDRD: > 60 ML/MIN/1.73M2
GLUCOSE SERPL-MCNC: 82 MG/DL (ref 70–108)
GLUCOSE UR QL STRIP.AUTO: NEGATIVE MG/DL
HCT VFR BLD AUTO: 43.6 % (ref 37–47)
HGB BLD-MCNC: 14.4 GM/DL (ref 12–16)
HGB UR QL STRIP.AUTO: NEGATIVE
IMM GRANULOCYTES # BLD AUTO: 0.04 THOU/MM3 (ref 0–0.07)
IMM GRANULOCYTES NFR BLD AUTO: 1 %
KETONES UR QL STRIP.AUTO: 15
LIPASE SERPL-CCNC: 28 U/L (ref 5.6–51.3)
LYMPHOCYTES ABSOLUTE: 1.2 THOU/MM3 (ref 1–4.8)
LYMPHOCYTES NFR BLD AUTO: 27.6 %
MCH RBC QN AUTO: 28.1 PG (ref 26–33)
MCHC RBC AUTO-ENTMCNC: 33 GM/DL (ref 32.2–35.5)
MCV RBC AUTO: 85.2 FL (ref 81–99)
MONOCYTES ABSOLUTE: 0.6 THOU/MM3 (ref 0.4–1.3)
MONOCYTES NFR BLD AUTO: 13.3 %
NEUTROPHILS NFR BLD AUTO: 56.2 %
NITRITE UR QL STRIP: NEGATIVE
NRBC BLD AUTO-RTO: 0 /100 WBC
OSMOLALITY SERPL CALC.SUM OF ELEC: 245.1 MOSMOL/KG (ref 275–300)
OSMOLALITY UR: 467 MOSMOL/KG (ref 250–750)
PH UR STRIP.AUTO: 7 [PH] (ref 5–9)
PLATELET # BLD AUTO: 69 THOU/MM3 (ref 130–400)
PMV BLD AUTO: 12.4 FL (ref 9.4–12.4)
POTASSIUM SERPL-SCNC: 4.6 MEQ/L (ref 3.5–5.2)
POTASSIUM UR-SCNC: 46.4 MEQ/L
PROT SERPL-MCNC: 6.3 G/DL (ref 6.1–8)
PROT UR STRIP.AUTO-MCNC: NEGATIVE MG/DL
RBC # BLD AUTO: 5.12 MILL/MM3 (ref 4.2–5.4)
SARS-COV-2 RNA RESP QL NAA+PROBE: NOT DETECTED
SCAN OF BLOOD SMEAR: NORMAL
SEGMENTED NEUTROPHILS ABSOLUTE COUNT: 2.4 THOU/MM3 (ref 1.8–7.7)
SODIUM SERPL-SCNC: 121 MEQ/L (ref 135–145)
SODIUM SERPL-SCNC: 122 MEQ/L (ref 135–145)
SODIUM SERPL-SCNC: 123 MEQ/L (ref 135–145)
SODIUM UR-SCNC: 102 MEQ/L
SP GR UR REFRACT.AUTO: 1.01 (ref 1–1.03)
UROBILINOGEN, URINE: 1 EU/DL (ref 0–1)
VARIANT LYMPHS BLD QL SMEAR: ABNORMAL %
WBC # BLD AUTO: 4.2 THOU/MM3 (ref 4.8–10.8)
WBC #/AREA URNS HPF: NEGATIVE /[HPF]

## 2023-06-20 PROCEDURE — 84300 ASSAY OF URINE SODIUM: CPT

## 2023-06-20 PROCEDURE — 96372 THER/PROPH/DIAG INJ SC/IM: CPT

## 2023-06-20 PROCEDURE — 2580000003 HC RX 258

## 2023-06-20 PROCEDURE — 6370000000 HC RX 637 (ALT 250 FOR IP): Performed by: PHYSICIAN ASSISTANT

## 2023-06-20 PROCEDURE — G0378 HOSPITAL OBSERVATION PER HR: HCPCS

## 2023-06-20 PROCEDURE — 83935 ASSAY OF URINE OSMOLALITY: CPT

## 2023-06-20 PROCEDURE — 84133 ASSAY OF URINE POTASSIUM: CPT

## 2023-06-20 PROCEDURE — 93005 ELECTROCARDIOGRAM TRACING: CPT | Performed by: EMERGENCY MEDICINE

## 2023-06-20 PROCEDURE — 80053 COMPREHEN METABOLIC PANEL: CPT

## 2023-06-20 PROCEDURE — P9612 CATHETERIZE FOR URINE SPEC: HCPCS

## 2023-06-20 PROCEDURE — 99223 1ST HOSP IP/OBS HIGH 75: CPT | Performed by: PHYSICIAN ASSISTANT

## 2023-06-20 PROCEDURE — 85025 COMPLETE CBC W/AUTO DIFF WBC: CPT

## 2023-06-20 PROCEDURE — 96374 THER/PROPH/DIAG INJ IV PUSH: CPT

## 2023-06-20 PROCEDURE — 81003 URINALYSIS AUTO W/O SCOPE: CPT

## 2023-06-20 PROCEDURE — 71046 X-RAY EXAM CHEST 2 VIEWS: CPT

## 2023-06-20 PROCEDURE — 6360000002 HC RX W HCPCS

## 2023-06-20 PROCEDURE — 93010 ELECTROCARDIOGRAM REPORT: CPT | Performed by: INTERNAL MEDICINE

## 2023-06-20 PROCEDURE — 82436 ASSAY OF URINE CHLORIDE: CPT

## 2023-06-20 PROCEDURE — 36415 COLL VENOUS BLD VENIPUNCTURE: CPT

## 2023-06-20 PROCEDURE — 2580000003 HC RX 258: Performed by: PHYSICIAN ASSISTANT

## 2023-06-20 PROCEDURE — 96361 HYDRATE IV INFUSION ADD-ON: CPT

## 2023-06-20 PROCEDURE — 70450 CT HEAD/BRAIN W/O DYE: CPT

## 2023-06-20 PROCEDURE — 87636 SARSCOV2 & INF A&B AMP PRB: CPT

## 2023-06-20 PROCEDURE — 83690 ASSAY OF LIPASE: CPT

## 2023-06-20 PROCEDURE — 99285 EMERGENCY DEPT VISIT HI MDM: CPT

## 2023-06-20 PROCEDURE — 84295 ASSAY OF SERUM SODIUM: CPT

## 2023-06-20 RX ORDER — SODIUM CHLORIDE 0.9 % (FLUSH) 0.9 %
5-40 SYRINGE (ML) INJECTION PRN
Status: DISCONTINUED | OUTPATIENT
Start: 2023-06-20 | End: 2023-06-26 | Stop reason: HOSPADM

## 2023-06-20 RX ORDER — DICYCLOMINE HYDROCHLORIDE 10 MG/ML
20 INJECTION INTRAMUSCULAR ONCE
Status: COMPLETED | OUTPATIENT
Start: 2023-06-20 | End: 2023-06-20

## 2023-06-20 RX ORDER — ACETAMINOPHEN 650 MG/1
650 SUPPOSITORY RECTAL EVERY 6 HOURS PRN
Status: DISCONTINUED | OUTPATIENT
Start: 2023-06-20 | End: 2023-06-26 | Stop reason: HOSPADM

## 2023-06-20 RX ORDER — PANTOPRAZOLE SODIUM 40 MG/1
40 TABLET, DELAYED RELEASE ORAL
Status: DISCONTINUED | OUTPATIENT
Start: 2023-06-21 | End: 2023-06-26 | Stop reason: HOSPADM

## 2023-06-20 RX ORDER — 0.9 % SODIUM CHLORIDE 0.9 %
1000 INTRAVENOUS SOLUTION INTRAVENOUS ONCE
Status: DISCONTINUED | OUTPATIENT
Start: 2023-06-20 | End: 2023-06-20

## 2023-06-20 RX ORDER — SODIUM CHLORIDE 9 MG/ML
INJECTION, SOLUTION INTRAVENOUS PRN
Status: DISCONTINUED | OUTPATIENT
Start: 2023-06-20 | End: 2023-06-26 | Stop reason: HOSPADM

## 2023-06-20 RX ORDER — POLYETHYLENE GLYCOL 3350 17 G/17G
17 POWDER, FOR SOLUTION ORAL DAILY PRN
Status: DISCONTINUED | OUTPATIENT
Start: 2023-06-20 | End: 2023-06-26 | Stop reason: HOSPADM

## 2023-06-20 RX ORDER — SODIUM CHLORIDE 9 MG/ML
INJECTION, SOLUTION INTRAVENOUS CONTINUOUS
Status: ACTIVE | OUTPATIENT
Start: 2023-06-20 | End: 2023-06-21

## 2023-06-20 RX ORDER — ONDANSETRON 2 MG/ML
4 INJECTION INTRAMUSCULAR; INTRAVENOUS ONCE
Status: COMPLETED | OUTPATIENT
Start: 2023-06-20 | End: 2023-06-20

## 2023-06-20 RX ORDER — AMIODARONE HYDROCHLORIDE 200 MG/1
100 TABLET ORAL 2 TIMES DAILY
Status: DISCONTINUED | OUTPATIENT
Start: 2023-06-20 | End: 2023-06-22

## 2023-06-20 RX ORDER — SODIUM CHLORIDE 0.9 % (FLUSH) 0.9 %
5-40 SYRINGE (ML) INJECTION EVERY 12 HOURS SCHEDULED
Status: DISCONTINUED | OUTPATIENT
Start: 2023-06-20 | End: 2023-06-26 | Stop reason: HOSPADM

## 2023-06-20 RX ORDER — SODIUM CHLORIDE 1 G/1
1 TABLET ORAL 2 TIMES DAILY
Status: DISCONTINUED | OUTPATIENT
Start: 2023-06-20 | End: 2023-06-22

## 2023-06-20 RX ORDER — ACETAMINOPHEN 325 MG/1
650 TABLET ORAL EVERY 6 HOURS PRN
Status: DISCONTINUED | OUTPATIENT
Start: 2023-06-20 | End: 2023-06-26 | Stop reason: HOSPADM

## 2023-06-20 RX ORDER — ONDANSETRON 4 MG/1
4 TABLET, ORALLY DISINTEGRATING ORAL EVERY 8 HOURS PRN
Status: DISCONTINUED | OUTPATIENT
Start: 2023-06-20 | End: 2023-06-26 | Stop reason: HOSPADM

## 2023-06-20 RX ORDER — ONDANSETRON 2 MG/ML
4 INJECTION INTRAMUSCULAR; INTRAVENOUS EVERY 6 HOURS PRN
Status: DISCONTINUED | OUTPATIENT
Start: 2023-06-20 | End: 2023-06-26 | Stop reason: HOSPADM

## 2023-06-20 RX ADMIN — DICYCLOMINE HYDROCHLORIDE 20 MG: 10 INJECTION, SOLUTION INTRAMUSCULAR at 12:52

## 2023-06-20 RX ADMIN — SODIUM CHLORIDE 1000 ML: 9 INJECTION, SOLUTION INTRAVENOUS at 12:51

## 2023-06-20 RX ADMIN — DILTIAZEM HYDROCHLORIDE 30 MG: 30 TABLET, FILM COATED ORAL at 21:47

## 2023-06-20 RX ADMIN — RIVAROXABAN 15 MG: 15 TABLET, FILM COATED ORAL at 21:45

## 2023-06-20 RX ADMIN — SODIUM CHLORIDE 1 G: 1 TABLET ORAL at 21:45

## 2023-06-20 RX ADMIN — SODIUM CHLORIDE, PRESERVATIVE FREE 10 ML: 5 INJECTION INTRAVENOUS at 23:44

## 2023-06-20 RX ADMIN — AMIODARONE HYDROCHLORIDE 100 MG: 200 TABLET ORAL at 21:45

## 2023-06-20 RX ADMIN — METOPROLOL TARTRATE 25 MG: 25 TABLET, FILM COATED ORAL at 21:45

## 2023-06-20 RX ADMIN — ONDANSETRON 4 MG: 2 INJECTION INTRAMUSCULAR; INTRAVENOUS at 12:50

## 2023-06-20 RX ADMIN — SODIUM CHLORIDE: 9 INJECTION, SOLUTION INTRAVENOUS at 18:32

## 2023-06-20 ASSESSMENT — PAIN DESCRIPTION - LOCATION
LOCATION: FACE

## 2023-06-20 ASSESSMENT — PAIN DESCRIPTION - FREQUENCY: FREQUENCY: CONTINUOUS

## 2023-06-20 ASSESSMENT — PAIN DESCRIPTION - ORIENTATION
ORIENTATION: RIGHT
ORIENTATION: RIGHT

## 2023-06-20 ASSESSMENT — PAIN - FUNCTIONAL ASSESSMENT
PAIN_FUNCTIONAL_ASSESSMENT: 0-10
PAIN_FUNCTIONAL_ASSESSMENT: 0-10

## 2023-06-20 ASSESSMENT — PAIN SCALES - GENERAL
PAINLEVEL_OUTOF10: 7
PAINLEVEL_OUTOF10: 2
PAINLEVEL_OUTOF10: 6

## 2023-06-20 NOTE — TELEPHONE ENCOUNTER
Left message for Mary Aguilar, (had communication form on file), advised to contact surgeons office. Although he is in surgery for next two days, a note could be taken for him; or someone on call in his place could address.

## 2023-06-20 NOTE — TELEPHONE ENCOUNTER
Daughter Giuseppe Ingram calling stating pt had a Moh's procedure last Friday, pt was given an antibiotic by the surgeon and finished it on Sunday, pt was also given 3 doses of Diflucan to take every other day and pt has had 2 doses and has 1 dose left, pt is not eating, not drinking, has had some vomiting, Colleen did contact surgeons office and they told pt he is surgery the next 2 days and to contact PCP. Giuseppe Ingram states this has happened in the past and [facility] has taken care of this issue for pt, please advise Giuseppe Ingram at above number.

## 2023-06-21 LAB
ALBUMIN SERPL BCG-MCNC: 3.5 G/DL (ref 3.5–5.1)
ALP SERPL-CCNC: 175 U/L (ref 38–126)
ALT SERPL W/O P-5'-P-CCNC: 72 U/L (ref 11–66)
ANION GAP SERPL CALC-SCNC: 10 MEQ/L (ref 8–16)
AST SERPL-CCNC: 41 U/L (ref 5–40)
BASOPHILS ABSOLUTE: 0 THOU/MM3 (ref 0–0.1)
BASOPHILS NFR BLD AUTO: 0.9 %
BILIRUB CONJ SERPL-MCNC: < 0.2 MG/DL (ref 0–0.3)
BILIRUB SERPL-MCNC: 0.7 MG/DL (ref 0.3–1.2)
BUN SERPL-MCNC: 9 MG/DL (ref 7–22)
CALCIUM SERPL-MCNC: 8 MG/DL (ref 8.5–10.5)
CHLORIDE SERPL-SCNC: 93 MEQ/L (ref 98–111)
CO2 SERPL-SCNC: 22 MEQ/L (ref 23–33)
CREAT SERPL-MCNC: 0.9 MG/DL (ref 0.4–1.2)
DEPRECATED RDW RBC AUTO: 48.3 FL (ref 35–45)
EOSINOPHIL NFR BLD AUTO: 2.1 %
EOSINOPHILS ABSOLUTE: 0.1 THOU/MM3 (ref 0–0.4)
ERYTHROCYTE [DISTWIDTH] IN BLOOD BY AUTOMATED COUNT: 15.7 % (ref 11.5–14.5)
GFR SERPL CREATININE-BSD FRML MDRD: > 60 ML/MIN/1.73M2
GLUCOSE SERPL-MCNC: 74 MG/DL (ref 70–108)
HCT VFR BLD AUTO: 41.7 % (ref 37–47)
HGB BLD-MCNC: 13.7 GM/DL (ref 12–16)
IMM GRANULOCYTES # BLD AUTO: 0.05 THOU/MM3 (ref 0–0.07)
IMM GRANULOCYTES NFR BLD AUTO: 1.5 %
LYMPHOCYTES ABSOLUTE: 1 THOU/MM3 (ref 1–4.8)
LYMPHOCYTES NFR BLD AUTO: 28.6 %
MCH RBC QN AUTO: 27.9 PG (ref 26–33)
MCHC RBC AUTO-ENTMCNC: 32.9 GM/DL (ref 32.2–35.5)
MCV RBC AUTO: 84.9 FL (ref 81–99)
MONOCYTES ABSOLUTE: 0.5 THOU/MM3 (ref 0.4–1.3)
MONOCYTES NFR BLD AUTO: 14.2 %
NEUTROPHILS NFR BLD AUTO: 52.7 %
NRBC BLD AUTO-RTO: 0 /100 WBC
PLATELET # BLD AUTO: 77 THOU/MM3 (ref 130–400)
PMV BLD AUTO: 11.1 FL (ref 9.4–12.4)
POTASSIUM SERPL-SCNC: 4.2 MEQ/L (ref 3.5–5.2)
PROT SERPL-MCNC: 5.2 G/DL (ref 6.1–8)
RBC # BLD AUTO: 4.91 MILL/MM3 (ref 4.2–5.4)
SEGMENTED NEUTROPHILS ABSOLUTE COUNT: 1.8 THOU/MM3 (ref 1.8–7.7)
SODIUM SERPL-SCNC: 123 MEQ/L (ref 135–145)
SODIUM SERPL-SCNC: 123 MEQ/L (ref 135–145)
SODIUM SERPL-SCNC: 124 MEQ/L (ref 135–145)
SODIUM SERPL-SCNC: 125 MEQ/L (ref 135–145)
SODIUM SERPL-SCNC: 125 MEQ/L (ref 135–145)
TSH SERPL DL<=0.005 MIU/L-ACNC: 1.7 UIU/ML (ref 0.4–4.2)
WBC # BLD AUTO: 3.4 THOU/MM3 (ref 4.8–10.8)

## 2023-06-21 PROCEDURE — 82248 BILIRUBIN DIRECT: CPT

## 2023-06-21 PROCEDURE — 99232 SBSQ HOSP IP/OBS MODERATE 35: CPT | Performed by: PHYSICIAN ASSISTANT

## 2023-06-21 PROCEDURE — G0378 HOSPITAL OBSERVATION PER HR: HCPCS

## 2023-06-21 PROCEDURE — 93005 ELECTROCARDIOGRAM TRACING: CPT

## 2023-06-21 PROCEDURE — 93005 ELECTROCARDIOGRAM TRACING: CPT | Performed by: PHYSICIAN ASSISTANT

## 2023-06-21 PROCEDURE — 85025 COMPLETE CBC W/AUTO DIFF WBC: CPT

## 2023-06-21 PROCEDURE — 6370000000 HC RX 637 (ALT 250 FOR IP): Performed by: PHYSICIAN ASSISTANT

## 2023-06-21 PROCEDURE — 2580000003 HC RX 258: Performed by: PHYSICIAN ASSISTANT

## 2023-06-21 PROCEDURE — 84295 ASSAY OF SERUM SODIUM: CPT

## 2023-06-21 PROCEDURE — 80053 COMPREHEN METABOLIC PANEL: CPT

## 2023-06-21 PROCEDURE — 84443 ASSAY THYROID STIM HORMONE: CPT

## 2023-06-21 PROCEDURE — 36415 COLL VENOUS BLD VENIPUNCTURE: CPT

## 2023-06-21 RX ORDER — GINSENG 100 MG
CAPSULE ORAL 2 TIMES DAILY
Status: DISCONTINUED | OUTPATIENT
Start: 2023-06-21 | End: 2023-06-26 | Stop reason: HOSPADM

## 2023-06-21 RX ADMIN — SODIUM CHLORIDE 1 G: 1 TABLET ORAL at 21:53

## 2023-06-21 RX ADMIN — SODIUM CHLORIDE 1 G: 1 TABLET ORAL at 08:31

## 2023-06-21 RX ADMIN — RIVAROXABAN 15 MG: 15 TABLET, FILM COATED ORAL at 08:31

## 2023-06-21 RX ADMIN — METOPROLOL TARTRATE 25 MG: 25 TABLET, FILM COATED ORAL at 08:31

## 2023-06-21 RX ADMIN — ACETAMINOPHEN 650 MG: 325 TABLET ORAL at 14:21

## 2023-06-21 RX ADMIN — METOPROLOL TARTRATE 25 MG: 25 TABLET, FILM COATED ORAL at 21:53

## 2023-06-21 RX ADMIN — PANTOPRAZOLE SODIUM 40 MG: 40 TABLET, DELAYED RELEASE ORAL at 06:41

## 2023-06-21 RX ADMIN — DILTIAZEM HYDROCHLORIDE 30 MG: 30 TABLET, FILM COATED ORAL at 08:31

## 2023-06-21 RX ADMIN — AMIODARONE HYDROCHLORIDE 100 MG: 200 TABLET ORAL at 08:37

## 2023-06-21 RX ADMIN — BACITRACIN: 500 OINTMENT TOPICAL at 18:35

## 2023-06-21 RX ADMIN — SODIUM CHLORIDE: 9 INJECTION, SOLUTION INTRAVENOUS at 06:46

## 2023-06-21 RX ADMIN — ONDANSETRON 4 MG: 4 TABLET, ORALLY DISINTEGRATING ORAL at 14:22

## 2023-06-21 RX ADMIN — AMIODARONE HYDROCHLORIDE 100 MG: 200 TABLET ORAL at 21:53

## 2023-06-21 RX ADMIN — DILTIAZEM HYDROCHLORIDE 30 MG: 30 TABLET, FILM COATED ORAL at 21:53

## 2023-06-21 ASSESSMENT — PAIN SCALES - GENERAL: PAINLEVEL_OUTOF10: 6

## 2023-06-21 ASSESSMENT — PAIN DESCRIPTION - LOCATION: LOCATION: ABDOMEN

## 2023-06-21 ASSESSMENT — PAIN DESCRIPTION - DESCRIPTORS: DESCRIPTORS: CRAMPING

## 2023-06-22 ENCOUNTER — APPOINTMENT (OUTPATIENT)
Dept: CT IMAGING | Age: 88
DRG: 641 | End: 2023-06-22
Payer: MEDICARE

## 2023-06-22 PROBLEM — I10 ESSENTIAL HYPERTENSION: Status: ACTIVE | Noted: 2023-06-22

## 2023-06-22 LAB
ANION GAP SERPL CALC-SCNC: 16 MEQ/L (ref 8–16)
BUN SERPL-MCNC: 7 MG/DL (ref 7–22)
CALCIUM SERPL-MCNC: 8.3 MG/DL (ref 8.5–10.5)
CHLORIDE SERPL-SCNC: 90 MEQ/L (ref 98–111)
CO2 SERPL-SCNC: 20 MEQ/L (ref 23–33)
CREAT SERPL-MCNC: 0.8 MG/DL (ref 0.4–1.2)
EKG ATRIAL RATE: 182 BPM
EKG P AXIS: 69 DEGREES
EKG Q-T INTERVAL: 488 MS
EKG QRS DURATION: 94 MS
EKG QTC CALCULATION (BAZETT): 479 MS
EKG R AXIS: 89 DEGREES
EKG T AXIS: 46 DEGREES
EKG VENTRICULAR RATE: 58 BPM
GFR SERPL CREATININE-BSD FRML MDRD: > 60 ML/MIN/1.73M2
GLUCOSE SERPL-MCNC: 78 MG/DL (ref 70–108)
MAGNESIUM SERPL-MCNC: 2.1 MG/DL (ref 1.6–2.4)
POTASSIUM SERPL-SCNC: 4.3 MEQ/L (ref 3.5–5.2)
SODIUM SERPL-SCNC: 124 MEQ/L (ref 135–145)
SODIUM SERPL-SCNC: 126 MEQ/L (ref 135–145)
SODIUM SERPL-SCNC: 126 MEQ/L (ref 135–145)

## 2023-06-22 PROCEDURE — 6370000000 HC RX 637 (ALT 250 FOR IP): Performed by: INTERNAL MEDICINE

## 2023-06-22 PROCEDURE — 74178 CT ABD&PLV WO CNTR FLWD CNTR: CPT

## 2023-06-22 PROCEDURE — 93010 ELECTROCARDIOGRAM REPORT: CPT | Performed by: INTERNAL MEDICINE

## 2023-06-22 PROCEDURE — 83735 ASSAY OF MAGNESIUM: CPT

## 2023-06-22 PROCEDURE — 6370000000 HC RX 637 (ALT 250 FOR IP)

## 2023-06-22 PROCEDURE — G0378 HOSPITAL OBSERVATION PER HR: HCPCS

## 2023-06-22 PROCEDURE — 36415 COLL VENOUS BLD VENIPUNCTURE: CPT

## 2023-06-22 PROCEDURE — 6370000000 HC RX 637 (ALT 250 FOR IP): Performed by: PHYSICIAN ASSISTANT

## 2023-06-22 PROCEDURE — 93005 ELECTROCARDIOGRAM TRACING: CPT | Performed by: INTERNAL MEDICINE

## 2023-06-22 PROCEDURE — 97129 THER IVNTJ 1ST 15 MIN: CPT

## 2023-06-22 PROCEDURE — 80048 BASIC METABOLIC PNL TOTAL CA: CPT

## 2023-06-22 PROCEDURE — 92523 SPEECH SOUND LANG COMPREHEN: CPT

## 2023-06-22 PROCEDURE — 84295 ASSAY OF SERUM SODIUM: CPT

## 2023-06-22 PROCEDURE — 6360000004 HC RX CONTRAST MEDICATION: Performed by: PHYSICIAN ASSISTANT

## 2023-06-22 PROCEDURE — 99232 SBSQ HOSP IP/OBS MODERATE 35: CPT | Performed by: PHYSICIAN ASSISTANT

## 2023-06-22 PROCEDURE — 2580000003 HC RX 258: Performed by: PHYSICIAN ASSISTANT

## 2023-06-22 PROCEDURE — 99222 1ST HOSP IP/OBS MODERATE 55: CPT | Performed by: INTERNAL MEDICINE

## 2023-06-22 RX ORDER — DICYCLOMINE HCL 20 MG
20 TABLET ORAL
Status: DISCONTINUED | OUTPATIENT
Start: 2023-06-22 | End: 2023-06-23

## 2023-06-22 RX ORDER — SODIUM CHLORIDE 9 MG/ML
INJECTION, SOLUTION INTRAVENOUS CONTINUOUS
Status: DISCONTINUED | OUTPATIENT
Start: 2023-06-22 | End: 2023-06-23

## 2023-06-22 RX ORDER — SODIUM CHLORIDE 1 G/1
2 TABLET ORAL 2 TIMES DAILY
Status: DISCONTINUED | OUTPATIENT
Start: 2023-06-22 | End: 2023-06-23

## 2023-06-22 RX ORDER — PROMETHAZINE HYDROCHLORIDE 25 MG/ML
6.25 INJECTION, SOLUTION INTRAMUSCULAR; INTRAVENOUS EVERY 6 HOURS PRN
Status: DISCONTINUED | OUTPATIENT
Start: 2023-06-22 | End: 2023-06-24

## 2023-06-22 RX ADMIN — IOPAMIDOL 80 ML: 755 INJECTION, SOLUTION INTRAVENOUS at 15:42

## 2023-06-22 RX ADMIN — BACITRACIN: 500 OINTMENT TOPICAL at 04:00

## 2023-06-22 RX ADMIN — SODIUM CHLORIDE 2 G: 1 TABLET ORAL at 21:15

## 2023-06-22 RX ADMIN — METOPROLOL TARTRATE 25 MG: 25 TABLET, FILM COATED ORAL at 20:57

## 2023-06-22 RX ADMIN — RIVAROXABAN 15 MG: 15 TABLET, FILM COATED ORAL at 08:01

## 2023-06-22 RX ADMIN — PANTOPRAZOLE SODIUM 40 MG: 40 TABLET, DELAYED RELEASE ORAL at 07:47

## 2023-06-22 RX ADMIN — ONDANSETRON 4 MG: 4 TABLET, ORALLY DISINTEGRATING ORAL at 08:01

## 2023-06-22 RX ADMIN — METOPROLOL TARTRATE 25 MG: 25 TABLET, FILM COATED ORAL at 07:49

## 2023-06-22 RX ADMIN — DICYCLOMINE HYDROCHLORIDE 20 MG: 20 TABLET ORAL at 17:15

## 2023-06-22 RX ADMIN — BACITRACIN: 500 OINTMENT TOPICAL at 21:15

## 2023-06-22 RX ADMIN — SODIUM CHLORIDE 2 G: 1 TABLET ORAL at 15:46

## 2023-06-22 RX ADMIN — BACITRACIN: 500 OINTMENT TOPICAL at 09:50

## 2023-06-22 RX ADMIN — ONDANSETRON 4 MG: 4 TABLET, ORALLY DISINTEGRATING ORAL at 01:05

## 2023-06-22 RX ADMIN — SODIUM CHLORIDE 1 G: 1 TABLET ORAL at 08:01

## 2023-06-22 RX ADMIN — SODIUM CHLORIDE, PRESERVATIVE FREE 10 ML: 5 INJECTION INTRAVENOUS at 08:05

## 2023-06-22 RX ADMIN — DILTIAZEM HYDROCHLORIDE 30 MG: 30 TABLET, FILM COATED ORAL at 07:49

## 2023-06-22 RX ADMIN — DICYCLOMINE HYDROCHLORIDE 20 MG: 20 TABLET ORAL at 20:57

## 2023-06-22 RX ADMIN — AMIODARONE HYDROCHLORIDE 100 MG: 200 TABLET ORAL at 07:48

## 2023-06-22 RX ADMIN — SODIUM CHLORIDE: 9 INJECTION, SOLUTION INTRAVENOUS at 11:01

## 2023-06-22 RX ADMIN — DICYCLOMINE HYDROCHLORIDE 20 MG: 20 TABLET ORAL at 11:32

## 2023-06-22 RX ADMIN — DILTIAZEM HYDROCHLORIDE 30 MG: 30 TABLET, FILM COATED ORAL at 20:57

## 2023-06-22 ASSESSMENT — PAIN DESCRIPTION - ORIENTATION
ORIENTATION: MID

## 2023-06-22 ASSESSMENT — PAIN - FUNCTIONAL ASSESSMENT
PAIN_FUNCTIONAL_ASSESSMENT: ACTIVITIES ARE NOT PREVENTED
PAIN_FUNCTIONAL_ASSESSMENT: ACTIVITIES ARE NOT PREVENTED

## 2023-06-22 ASSESSMENT — PAIN SCALES - GENERAL
PAINLEVEL_OUTOF10: 5
PAINLEVEL_OUTOF10: 3
PAINLEVEL_OUTOF10: 10

## 2023-06-22 ASSESSMENT — PAIN DESCRIPTION - DESCRIPTORS
DESCRIPTORS: ACHING;CRAMPING
DESCRIPTORS: DISCOMFORT
DESCRIPTORS: ACHING;DISCOMFORT

## 2023-06-22 ASSESSMENT — ENCOUNTER SYMPTOMS
VOMITING: 1
NAUSEA: 1

## 2023-06-22 ASSESSMENT — PAIN DESCRIPTION - LOCATION
LOCATION: ABDOMEN

## 2023-06-23 PROBLEM — I48.91 ATRIAL FIBRILLATION WITH SLOW VENTRICULAR RESPONSE (HCC): Status: ACTIVE | Noted: 2023-06-23

## 2023-06-23 PROBLEM — R11.2 NAUSEA AND VOMITING: Status: ACTIVE | Noted: 2023-06-23

## 2023-06-23 PROBLEM — I48.19 PERSISTENT ATRIAL FIBRILLATION (HCC): Status: ACTIVE | Noted: 2023-06-23

## 2023-06-23 PROBLEM — R42 DIZZINESS: Status: ACTIVE | Noted: 2023-06-23

## 2023-06-23 LAB
ANION GAP SERPL CALC-SCNC: 13 MEQ/L (ref 8–16)
BASOPHILS ABSOLUTE: 0 THOU/MM3 (ref 0–0.1)
BASOPHILS NFR BLD AUTO: 0.8 %
BUN SERPL-MCNC: 7 MG/DL (ref 7–22)
CALCIUM SERPL-MCNC: 7.7 MG/DL (ref 8.5–10.5)
CHLORIDE SERPL-SCNC: 90 MEQ/L (ref 98–111)
CO2 SERPL-SCNC: 21 MEQ/L (ref 23–33)
CREAT SERPL-MCNC: 0.8 MG/DL (ref 0.4–1.2)
DEPRECATED RDW RBC AUTO: 47.4 FL (ref 35–45)
EKG ATRIAL RATE: 44 BPM
EKG ATRIAL RATE: 44 BPM
EKG ATRIAL RATE: 54 BPM
EKG P AXIS: 73 DEGREES
EKG P AXIS: 83 DEGREES
EKG P AXIS: 86 DEGREES
EKG P-R INTERVAL: 168 MS
EKG P-R INTERVAL: 178 MS
EKG P-R INTERVAL: 218 MS
EKG Q-T INTERVAL: 470 MS
EKG Q-T INTERVAL: 476 MS
EKG Q-T INTERVAL: 554 MS
EKG QRS DURATION: 94 MS
EKG QRS DURATION: 96 MS
EKG QRS DURATION: 98 MS
EKG QTC CALCULATION (BAZETT): 406 MS
EKG QTC CALCULATION (BAZETT): 445 MS
EKG QTC CALCULATION (BAZETT): 473 MS
EKG R AXIS: 84 DEGREES
EKG R AXIS: 95 DEGREES
EKG R AXIS: 98 DEGREES
EKG T AXIS: 58 DEGREES
EKG T AXIS: 7 DEGREES
EKG T AXIS: 87 DEGREES
EKG VENTRICULAR RATE: 44 BPM
EKG VENTRICULAR RATE: 44 BPM
EKG VENTRICULAR RATE: 54 BPM
EOSINOPHIL NFR BLD AUTO: 1.9 %
EOSINOPHILS ABSOLUTE: 0.1 THOU/MM3 (ref 0–0.4)
ERYTHROCYTE [DISTWIDTH] IN BLOOD BY AUTOMATED COUNT: 15.9 % (ref 11.5–14.5)
GFR SERPL CREATININE-BSD FRML MDRD: > 60 ML/MIN/1.73M2
GLUCOSE BLD STRIP.AUTO-MCNC: 92 MG/DL (ref 70–108)
GLUCOSE SERPL-MCNC: 102 MG/DL (ref 70–108)
HCT VFR BLD AUTO: 37.8 % (ref 37–47)
HGB BLD-MCNC: 12.7 GM/DL (ref 12–16)
IMM GRANULOCYTES # BLD AUTO: 0.06 THOU/MM3 (ref 0–0.07)
IMM GRANULOCYTES NFR BLD AUTO: 1.7 %
LYMPHOCYTES ABSOLUTE: 1 THOU/MM3 (ref 1–4.8)
LYMPHOCYTES NFR BLD AUTO: 26.7 %
MCH RBC QN AUTO: 27.7 PG (ref 26–33)
MCHC RBC AUTO-ENTMCNC: 33.6 GM/DL (ref 32.2–35.5)
MCV RBC AUTO: 82.4 FL (ref 81–99)
MONOCYTES ABSOLUTE: 0.5 THOU/MM3 (ref 0.4–1.3)
MONOCYTES NFR BLD AUTO: 15 %
NEUTROPHILS NFR BLD AUTO: 53.9 %
NRBC BLD AUTO-RTO: 0 /100 WBC
PHOSPHATE SERPL-MCNC: 2.9 MG/DL (ref 2.4–4.7)
PLATELET # BLD AUTO: 97 THOU/MM3 (ref 130–400)
PMV BLD AUTO: 11 FL (ref 9.4–12.4)
POTASSIUM SERPL-SCNC: 3.9 MEQ/L (ref 3.5–5.2)
RBC # BLD AUTO: 4.59 MILL/MM3 (ref 4.2–5.4)
SEGMENTED NEUTROPHILS ABSOLUTE COUNT: 1.9 THOU/MM3 (ref 1.8–7.7)
SODIUM SERPL-SCNC: 124 MEQ/L (ref 135–145)
SODIUM SERPL-SCNC: 126 MEQ/L (ref 135–145)
WBC # BLD AUTO: 3.6 THOU/MM3 (ref 4.8–10.8)

## 2023-06-23 PROCEDURE — 99233 SBSQ HOSP IP/OBS HIGH 50: CPT | Performed by: INTERNAL MEDICINE

## 2023-06-23 PROCEDURE — 6370000000 HC RX 637 (ALT 250 FOR IP): Performed by: INTERNAL MEDICINE

## 2023-06-23 PROCEDURE — 6370000000 HC RX 637 (ALT 250 FOR IP): Performed by: PHYSICIAN ASSISTANT

## 2023-06-23 PROCEDURE — 36415 COLL VENOUS BLD VENIPUNCTURE: CPT

## 2023-06-23 PROCEDURE — 97130 THER IVNTJ EA ADDL 15 MIN: CPT

## 2023-06-23 PROCEDURE — 85025 COMPLETE CBC W/AUTO DIFF WBC: CPT

## 2023-06-23 PROCEDURE — 6370000000 HC RX 637 (ALT 250 FOR IP)

## 2023-06-23 PROCEDURE — 99232 SBSQ HOSP IP/OBS MODERATE 35: CPT | Performed by: NURSE PRACTITIONER

## 2023-06-23 PROCEDURE — 80048 BASIC METABOLIC PNL TOTAL CA: CPT

## 2023-06-23 PROCEDURE — 84295 ASSAY OF SERUM SODIUM: CPT

## 2023-06-23 PROCEDURE — 82948 REAGENT STRIP/BLOOD GLUCOSE: CPT

## 2023-06-23 PROCEDURE — 93010 ELECTROCARDIOGRAM REPORT: CPT | Performed by: INTERNAL MEDICINE

## 2023-06-23 PROCEDURE — 2580000003 HC RX 258: Performed by: PHYSICIAN ASSISTANT

## 2023-06-23 PROCEDURE — 99232 SBSQ HOSP IP/OBS MODERATE 35: CPT | Performed by: INTERNAL MEDICINE

## 2023-06-23 PROCEDURE — 6360000002 HC RX W HCPCS: Performed by: PHYSICIAN ASSISTANT

## 2023-06-23 PROCEDURE — 97129 THER IVNTJ 1ST 15 MIN: CPT

## 2023-06-23 PROCEDURE — 1200000000 HC SEMI PRIVATE

## 2023-06-23 PROCEDURE — 84100 ASSAY OF PHOSPHORUS: CPT

## 2023-06-23 RX ORDER — METOCLOPRAMIDE 10 MG/1
5 TABLET ORAL
Status: DISCONTINUED | OUTPATIENT
Start: 2023-06-23 | End: 2023-06-24

## 2023-06-23 RX ORDER — SODIUM CHLORIDE 1 G/1
2 TABLET ORAL EVERY 8 HOURS SCHEDULED
Status: DISCONTINUED | OUTPATIENT
Start: 2023-06-23 | End: 2023-06-26 | Stop reason: HOSPADM

## 2023-06-23 RX ADMIN — DICYCLOMINE HYDROCHLORIDE 20 MG: 20 TABLET ORAL at 05:23

## 2023-06-23 RX ADMIN — METOCLOPRAMIDE 5 MG: 10 TABLET ORAL at 07:32

## 2023-06-23 RX ADMIN — PANTOPRAZOLE SODIUM 40 MG: 40 TABLET, DELAYED RELEASE ORAL at 05:12

## 2023-06-23 RX ADMIN — SODIUM CHLORIDE 2 G: 1 TABLET ORAL at 21:12

## 2023-06-23 RX ADMIN — DILTIAZEM HYDROCHLORIDE 30 MG: 30 TABLET, FILM COATED ORAL at 07:32

## 2023-06-23 RX ADMIN — METOPROLOL TARTRATE 25 MG: 25 TABLET, FILM COATED ORAL at 07:34

## 2023-06-23 RX ADMIN — METOPROLOL TARTRATE 25 MG: 25 TABLET, FILM COATED ORAL at 21:27

## 2023-06-23 RX ADMIN — BACITRACIN: 500 OINTMENT TOPICAL at 07:33

## 2023-06-23 RX ADMIN — METOCLOPRAMIDE 5 MG: 10 TABLET ORAL at 11:16

## 2023-06-23 RX ADMIN — ACETAMINOPHEN 650 MG: 325 TABLET ORAL at 21:28

## 2023-06-23 RX ADMIN — Medication 15 G: at 21:26

## 2023-06-23 RX ADMIN — METOCLOPRAMIDE 5 MG: 10 TABLET ORAL at 16:34

## 2023-06-23 RX ADMIN — DILTIAZEM HYDROCHLORIDE 30 MG: 30 TABLET, FILM COATED ORAL at 21:27

## 2023-06-23 RX ADMIN — SODIUM CHLORIDE 2 G: 1 TABLET ORAL at 07:33

## 2023-06-23 RX ADMIN — BACITRACIN: 500 OINTMENT TOPICAL at 21:27

## 2023-06-23 RX ADMIN — ONDANSETRON 4 MG: 2 INJECTION INTRAMUSCULAR; INTRAVENOUS at 21:50

## 2023-06-23 RX ADMIN — SODIUM CHLORIDE, PRESERVATIVE FREE 10 ML: 5 INJECTION INTRAVENOUS at 07:34

## 2023-06-23 RX ADMIN — RIVAROXABAN 15 MG: 15 TABLET, FILM COATED ORAL at 07:34

## 2023-06-23 ASSESSMENT — PAIN DESCRIPTION - DESCRIPTORS: DESCRIPTORS: ACHING;TIGHTNESS

## 2023-06-23 ASSESSMENT — PAIN - FUNCTIONAL ASSESSMENT: PAIN_FUNCTIONAL_ASSESSMENT: ACTIVITIES ARE NOT PREVENTED

## 2023-06-23 ASSESSMENT — PAIN SCALES - GENERAL
PAINLEVEL_OUTOF10: 6
PAINLEVEL_OUTOF10: 6

## 2023-06-23 ASSESSMENT — PAIN DESCRIPTION - LOCATION
LOCATION: ABDOMEN
LOCATION: ABDOMEN

## 2023-06-24 LAB
ANION GAP SERPL CALC-SCNC: 14 MEQ/L (ref 8–16)
BUN SERPL-MCNC: 13 MG/DL (ref 7–22)
CALCIUM SERPL-MCNC: 8.5 MG/DL (ref 8.5–10.5)
CHLORIDE SERPL-SCNC: 93 MEQ/L (ref 98–111)
CO2 SERPL-SCNC: 22 MEQ/L (ref 23–33)
CREAT SERPL-MCNC: 0.7 MG/DL (ref 0.4–1.2)
GFR SERPL CREATININE-BSD FRML MDRD: > 60 ML/MIN/1.73M2
GLUCOSE SERPL-MCNC: 84 MG/DL (ref 70–108)
POTASSIUM SERPL-SCNC: 3.6 MEQ/L (ref 3.5–5.2)
SODIUM SERPL-SCNC: 125 MEQ/L (ref 135–145)
SODIUM SERPL-SCNC: 127 MEQ/L (ref 135–145)
SODIUM SERPL-SCNC: 129 MEQ/L (ref 135–145)

## 2023-06-24 PROCEDURE — 99232 SBSQ HOSP IP/OBS MODERATE 35: CPT | Performed by: INTERNAL MEDICINE

## 2023-06-24 PROCEDURE — 6370000000 HC RX 637 (ALT 250 FOR IP): Performed by: PHYSICIAN ASSISTANT

## 2023-06-24 PROCEDURE — 36415 COLL VENOUS BLD VENIPUNCTURE: CPT

## 2023-06-24 PROCEDURE — 6370000000 HC RX 637 (ALT 250 FOR IP): Performed by: INTERNAL MEDICINE

## 2023-06-24 PROCEDURE — 6360000002 HC RX W HCPCS: Performed by: INTERNAL MEDICINE

## 2023-06-24 PROCEDURE — 80048 BASIC METABOLIC PNL TOTAL CA: CPT

## 2023-06-24 PROCEDURE — 1200000000 HC SEMI PRIVATE

## 2023-06-24 PROCEDURE — 2580000003 HC RX 258: Performed by: PHYSICIAN ASSISTANT

## 2023-06-24 PROCEDURE — 84295 ASSAY OF SERUM SODIUM: CPT

## 2023-06-24 PROCEDURE — 6370000000 HC RX 637 (ALT 250 FOR IP)

## 2023-06-24 RX ORDER — DICYCLOMINE HYDROCHLORIDE 10 MG/1
20 CAPSULE ORAL 3 TIMES DAILY PRN
Status: DISCONTINUED | OUTPATIENT
Start: 2023-06-24 | End: 2023-06-26 | Stop reason: HOSPADM

## 2023-06-24 RX ORDER — ONDANSETRON 2 MG/ML
4 INJECTION INTRAMUSCULAR; INTRAVENOUS
Status: DISCONTINUED | OUTPATIENT
Start: 2023-06-24 | End: 2023-06-25

## 2023-06-24 RX ADMIN — PANTOPRAZOLE SODIUM 40 MG: 40 TABLET, DELAYED RELEASE ORAL at 06:26

## 2023-06-24 RX ADMIN — SODIUM CHLORIDE 20 ML: 9 INJECTION, SOLUTION INTRAVENOUS at 02:25

## 2023-06-24 RX ADMIN — RIVAROXABAN 15 MG: 15 TABLET, FILM COATED ORAL at 08:42

## 2023-06-24 RX ADMIN — DILTIAZEM HYDROCHLORIDE 30 MG: 30 TABLET, FILM COATED ORAL at 08:41

## 2023-06-24 RX ADMIN — SODIUM CHLORIDE 2 G: 1 TABLET ORAL at 06:26

## 2023-06-24 RX ADMIN — SODIUM CHLORIDE, PRESERVATIVE FREE 10 ML: 5 INJECTION INTRAVENOUS at 08:44

## 2023-06-24 RX ADMIN — METOPROLOL TARTRATE 25 MG: 25 TABLET, FILM COATED ORAL at 21:59

## 2023-06-24 RX ADMIN — SODIUM CHLORIDE 2 G: 1 TABLET ORAL at 13:04

## 2023-06-24 RX ADMIN — ONDANSETRON 4 MG: 2 INJECTION INTRAMUSCULAR; INTRAVENOUS at 16:33

## 2023-06-24 RX ADMIN — SODIUM CHLORIDE 2 G: 1 TABLET ORAL at 21:59

## 2023-06-24 RX ADMIN — ONDANSETRON 4 MG: 2 INJECTION INTRAMUSCULAR; INTRAVENOUS at 10:57

## 2023-06-24 RX ADMIN — METOCLOPRAMIDE 5 MG: 10 TABLET ORAL at 06:26

## 2023-06-24 RX ADMIN — Medication 15 G: at 08:40

## 2023-06-24 RX ADMIN — ONDANSETRON 4 MG: 4 TABLET, ORALLY DISINTEGRATING ORAL at 08:40

## 2023-06-24 RX ADMIN — METOPROLOL TARTRATE 25 MG: 25 TABLET, FILM COATED ORAL at 08:42

## 2023-06-24 RX ADMIN — DILTIAZEM HYDROCHLORIDE 30 MG: 30 TABLET, FILM COATED ORAL at 21:59

## 2023-06-24 RX ADMIN — SODIUM CHLORIDE, PRESERVATIVE FREE 10 ML: 5 INJECTION INTRAVENOUS at 22:11

## 2023-06-24 RX ADMIN — BACITRACIN: 500 OINTMENT TOPICAL at 08:42

## 2023-06-24 ASSESSMENT — PAIN DESCRIPTION - LOCATION: LOCATION: ABDOMEN

## 2023-06-24 ASSESSMENT — PAIN SCALES - GENERAL
PAINLEVEL_OUTOF10: 0
PAINLEVEL_OUTOF10: 3

## 2023-06-25 LAB
ANION GAP SERPL CALC-SCNC: 9 MEQ/L (ref 8–16)
BUN SERPL-MCNC: 6 MG/DL (ref 7–22)
CALCIUM SERPL-MCNC: 8.5 MG/DL (ref 8.5–10.5)
CHLORIDE SERPL-SCNC: 97 MEQ/L (ref 98–111)
CO2 SERPL-SCNC: 26 MEQ/L (ref 23–33)
CREAT SERPL-MCNC: 0.8 MG/DL (ref 0.4–1.2)
GFR SERPL CREATININE-BSD FRML MDRD: > 60 ML/MIN/1.73M2
GLUCOSE SERPL-MCNC: 83 MG/DL (ref 70–108)
POTASSIUM SERPL-SCNC: 4 MEQ/L (ref 3.5–5.2)
SODIUM SERPL-SCNC: 132 MEQ/L (ref 135–145)

## 2023-06-25 PROCEDURE — 2580000003 HC RX 258: Performed by: PHYSICIAN ASSISTANT

## 2023-06-25 PROCEDURE — 99232 SBSQ HOSP IP/OBS MODERATE 35: CPT | Performed by: INTERNAL MEDICINE

## 2023-06-25 PROCEDURE — 6370000000 HC RX 637 (ALT 250 FOR IP)

## 2023-06-25 PROCEDURE — 6370000000 HC RX 637 (ALT 250 FOR IP): Performed by: INTERNAL MEDICINE

## 2023-06-25 PROCEDURE — 6360000002 HC RX W HCPCS: Performed by: INTERNAL MEDICINE

## 2023-06-25 PROCEDURE — 80048 BASIC METABOLIC PNL TOTAL CA: CPT

## 2023-06-25 PROCEDURE — 1200000000 HC SEMI PRIVATE

## 2023-06-25 PROCEDURE — 6370000000 HC RX 637 (ALT 250 FOR IP): Performed by: PHYSICIAN ASSISTANT

## 2023-06-25 PROCEDURE — 36415 COLL VENOUS BLD VENIPUNCTURE: CPT

## 2023-06-25 RX ORDER — METOCLOPRAMIDE HYDROCHLORIDE 5 MG/ML
5 INJECTION INTRAMUSCULAR; INTRAVENOUS
Status: DISCONTINUED | OUTPATIENT
Start: 2023-06-25 | End: 2023-06-26 | Stop reason: HOSPADM

## 2023-06-25 RX ORDER — METOCLOPRAMIDE HYDROCHLORIDE 5 MG/ML
10 INJECTION INTRAMUSCULAR; INTRAVENOUS
Status: DISCONTINUED | OUTPATIENT
Start: 2023-06-25 | End: 2023-06-25

## 2023-06-25 RX ADMIN — Medication 15 G: at 08:21

## 2023-06-25 RX ADMIN — SODIUM CHLORIDE, PRESERVATIVE FREE 10 ML: 5 INJECTION INTRAVENOUS at 08:16

## 2023-06-25 RX ADMIN — SODIUM CHLORIDE 2 G: 1 TABLET ORAL at 20:17

## 2023-06-25 RX ADMIN — SODIUM CHLORIDE 2 G: 1 TABLET ORAL at 07:06

## 2023-06-25 RX ADMIN — BACITRACIN: 500 OINTMENT TOPICAL at 20:24

## 2023-06-25 RX ADMIN — SODIUM CHLORIDE, PRESERVATIVE FREE 10 ML: 5 INJECTION INTRAVENOUS at 08:17

## 2023-06-25 RX ADMIN — DILTIAZEM HYDROCHLORIDE 30 MG: 30 TABLET, FILM COATED ORAL at 20:16

## 2023-06-25 RX ADMIN — METOPROLOL TARTRATE 25 MG: 25 TABLET, FILM COATED ORAL at 08:20

## 2023-06-25 RX ADMIN — BACITRACIN: 500 OINTMENT TOPICAL at 08:18

## 2023-06-25 RX ADMIN — DILTIAZEM HYDROCHLORIDE 30 MG: 30 TABLET, FILM COATED ORAL at 08:19

## 2023-06-25 RX ADMIN — RIVAROXABAN 20 MG: 20 TABLET, FILM COATED ORAL at 08:19

## 2023-06-25 RX ADMIN — PANTOPRAZOLE SODIUM 40 MG: 40 TABLET, DELAYED RELEASE ORAL at 07:07

## 2023-06-25 RX ADMIN — ONDANSETRON 4 MG: 2 INJECTION INTRAMUSCULAR; INTRAVENOUS at 07:01

## 2023-06-25 RX ADMIN — METOCLOPRAMIDE 5 MG: 5 INJECTION, SOLUTION INTRAMUSCULAR; INTRAVENOUS at 15:53

## 2023-06-25 RX ADMIN — DICYCLOMINE HYDROCHLORIDE 20 MG: 10 CAPSULE ORAL at 08:16

## 2023-06-25 RX ADMIN — METOCLOPRAMIDE 5 MG: 5 INJECTION, SOLUTION INTRAMUSCULAR; INTRAVENOUS at 11:40

## 2023-06-25 RX ADMIN — SODIUM CHLORIDE, PRESERVATIVE FREE 5 ML: 5 INJECTION INTRAVENOUS at 20:28

## 2023-06-25 RX ADMIN — METOPROLOL TARTRATE 25 MG: 25 TABLET, FILM COATED ORAL at 20:17

## 2023-06-25 RX ADMIN — SODIUM CHLORIDE 2 G: 1 TABLET ORAL at 13:39

## 2023-06-25 RX ADMIN — SODIUM CHLORIDE, PRESERVATIVE FREE 10 ML: 5 INJECTION INTRAVENOUS at 07:09

## 2023-06-25 ASSESSMENT — PAIN DESCRIPTION - ORIENTATION: ORIENTATION: MID

## 2023-06-25 ASSESSMENT — PAIN - FUNCTIONAL ASSESSMENT: PAIN_FUNCTIONAL_ASSESSMENT: PREVENTS OR INTERFERES SOME ACTIVE ACTIVITIES AND ADLS

## 2023-06-25 ASSESSMENT — PAIN DESCRIPTION - LOCATION: LOCATION: ABDOMEN

## 2023-06-25 ASSESSMENT — PAIN SCALES - GENERAL
PAINLEVEL_OUTOF10: 0
PAINLEVEL_OUTOF10: 0

## 2023-06-25 ASSESSMENT — PAIN DESCRIPTION - DESCRIPTORS: DESCRIPTORS: ACHING

## 2023-06-26 ENCOUNTER — CLINICAL DOCUMENTATION ONLY (OUTPATIENT)
Facility: CLINIC | Age: 88
End: 2023-06-26

## 2023-06-26 VITALS
TEMPERATURE: 97.6 F | HEART RATE: 62 BPM | RESPIRATION RATE: 16 BRPM | SYSTOLIC BLOOD PRESSURE: 114 MMHG | OXYGEN SATURATION: 90 % | BODY MASS INDEX: 25.86 KG/M2 | HEIGHT: 65 IN | WEIGHT: 155.2 LBS | DIASTOLIC BLOOD PRESSURE: 54 MMHG

## 2023-06-26 LAB
ANION GAP SERPL CALC-SCNC: 12 MEQ/L (ref 8–16)
BASOPHILS ABSOLUTE: 0 THOU/MM3 (ref 0–0.1)
BASOPHILS NFR BLD AUTO: 0.8 %
BUN SERPL-MCNC: 14 MG/DL (ref 7–22)
CALCIUM SERPL-MCNC: 8.8 MG/DL (ref 8.5–10.5)
CHLORIDE SERPL-SCNC: 96 MEQ/L (ref 98–111)
CO2 SERPL-SCNC: 26 MEQ/L (ref 23–33)
CREAT SERPL-MCNC: 1 MG/DL (ref 0.4–1.2)
DEPRECATED RDW RBC AUTO: 48.2 FL (ref 35–45)
EOSINOPHIL NFR BLD AUTO: 3.3 %
EOSINOPHILS ABSOLUTE: 0.1 THOU/MM3 (ref 0–0.4)
ERYTHROCYTE [DISTWIDTH] IN BLOOD BY AUTOMATED COUNT: 15.7 % (ref 11.5–14.5)
GFR SERPL CREATININE-BSD FRML MDRD: 54 ML/MIN/1.73M2
GLUCOSE SERPL-MCNC: 83 MG/DL (ref 70–108)
HCT VFR BLD AUTO: 41.9 % (ref 37–47)
HGB BLD-MCNC: 13.7 GM/DL (ref 12–16)
IMM GRANULOCYTES # BLD AUTO: 0.04 THOU/MM3 (ref 0–0.07)
IMM GRANULOCYTES NFR BLD AUTO: 1 %
LYMPHOCYTES ABSOLUTE: 1.3 THOU/MM3 (ref 1–4.8)
LYMPHOCYTES NFR BLD AUTO: 32.6 %
MCH RBC QN AUTO: 27.7 PG (ref 26–33)
MCHC RBC AUTO-ENTMCNC: 32.7 GM/DL (ref 32.2–35.5)
MCV RBC AUTO: 84.8 FL (ref 81–99)
MONOCYTES ABSOLUTE: 0.5 THOU/MM3 (ref 0.4–1.3)
MONOCYTES NFR BLD AUTO: 12.9 %
NEUTROPHILS NFR BLD AUTO: 49.4 %
NRBC BLD AUTO-RTO: 0 /100 WBC
PLATELET # BLD AUTO: 100 THOU/MM3 (ref 130–400)
PMV BLD AUTO: 10.7 FL (ref 9.4–12.4)
POTASSIUM SERPL-SCNC: 4.1 MEQ/L (ref 3.5–5.2)
RBC # BLD AUTO: 4.94 MILL/MM3 (ref 4.2–5.4)
SEGMENTED NEUTROPHILS ABSOLUTE COUNT: 1.9 THOU/MM3 (ref 1.8–7.7)
SODIUM SERPL-SCNC: 134 MEQ/L (ref 135–145)
WBC # BLD AUTO: 3.9 THOU/MM3 (ref 4.8–10.8)

## 2023-06-26 PROCEDURE — 85025 COMPLETE CBC W/AUTO DIFF WBC: CPT

## 2023-06-26 PROCEDURE — 36415 COLL VENOUS BLD VENIPUNCTURE: CPT

## 2023-06-26 PROCEDURE — 6360000002 HC RX W HCPCS: Performed by: INTERNAL MEDICINE

## 2023-06-26 PROCEDURE — 6370000000 HC RX 637 (ALT 250 FOR IP): Performed by: INTERNAL MEDICINE

## 2023-06-26 PROCEDURE — 80048 BASIC METABOLIC PNL TOTAL CA: CPT

## 2023-06-26 PROCEDURE — 2580000003 HC RX 258: Performed by: PHYSICIAN ASSISTANT

## 2023-06-26 PROCEDURE — 97129 THER IVNTJ 1ST 15 MIN: CPT

## 2023-06-26 PROCEDURE — 6370000000 HC RX 637 (ALT 250 FOR IP): Performed by: PHYSICIAN ASSISTANT

## 2023-06-26 PROCEDURE — 97130 THER IVNTJ EA ADDL 15 MIN: CPT

## 2023-06-26 PROCEDURE — 99232 SBSQ HOSP IP/OBS MODERATE 35: CPT | Performed by: INTERNAL MEDICINE

## 2023-06-26 PROCEDURE — 6370000000 HC RX 637 (ALT 250 FOR IP)

## 2023-06-26 PROCEDURE — 99239 HOSP IP/OBS DSCHRG MGMT >30: CPT | Performed by: INTERNAL MEDICINE

## 2023-06-26 RX ADMIN — Medication 15 G: at 08:06

## 2023-06-26 RX ADMIN — METOPROLOL TARTRATE 25 MG: 25 TABLET, FILM COATED ORAL at 08:06

## 2023-06-26 RX ADMIN — SODIUM CHLORIDE 2 G: 1 TABLET ORAL at 06:36

## 2023-06-26 RX ADMIN — BACITRACIN: 500 OINTMENT TOPICAL at 08:06

## 2023-06-26 RX ADMIN — DILTIAZEM HYDROCHLORIDE 30 MG: 30 TABLET, FILM COATED ORAL at 08:06

## 2023-06-26 RX ADMIN — RIVAROXABAN 20 MG: 20 TABLET, FILM COATED ORAL at 08:06

## 2023-06-26 RX ADMIN — METOCLOPRAMIDE 5 MG: 5 INJECTION, SOLUTION INTRAMUSCULAR; INTRAVENOUS at 06:37

## 2023-06-26 RX ADMIN — SODIUM CHLORIDE, PRESERVATIVE FREE 10 ML: 5 INJECTION INTRAVENOUS at 08:06

## 2023-06-26 RX ADMIN — PANTOPRAZOLE SODIUM 40 MG: 40 TABLET, DELAYED RELEASE ORAL at 06:40

## 2023-06-26 ASSESSMENT — PAIN SCALES - GENERAL: PAINLEVEL_OUTOF10: 0

## 2023-06-28 ENCOUNTER — HOSPITAL ENCOUNTER (OUTPATIENT)
Age: 88
Discharge: HOME | End: 2023-06-28
Payer: MEDICARE

## 2023-06-28 DIAGNOSIS — E87.1 HYPONATREMIA: ICD-10-CM

## 2023-06-28 LAB
ANION GAP SERPL CALCULATED.3IONS-SCNC: 10 MMOL/L (ref 9–17)
BUN SERPL-MCNC: 16 MG/DL (ref 8–23)
BUN/CREAT SERPL: 18 (ref 9–20)
CALCIUM SERPL-MCNC: 9.1 MG/DL (ref 8.6–10.4)
CHLORIDE SERPL-SCNC: 93 MMOL/L (ref 98–107)
CO2 SERPL-SCNC: 27 MMOL/L (ref 20–31)
CREAT SERPL-MCNC: 0.88 MG/DL (ref 0.5–0.9)
GFR SERPL CREATININE-BSD FRML MDRD: >60 ML/MIN/1.73M2
GLUCOSE SERPL-MCNC: 106 MG/DL (ref 70–99)
POTASSIUM SERPL-SCNC: 4.1 MMOL/L (ref 3.7–5.3)
SODIUM SERPL-SCNC: 130 MMOL/L (ref 135–144)

## 2023-06-28 PROCEDURE — 36415 COLL VENOUS BLD VENIPUNCTURE: CPT

## 2023-06-28 PROCEDURE — 80048 BASIC METABOLIC PNL TOTAL CA: CPT

## 2023-06-29 ENCOUNTER — HOSPITAL ENCOUNTER (EMERGENCY)
Age: 88
Discharge: HOME | End: 2023-06-29
Attending: EMERGENCY MEDICINE
Payer: MEDICARE

## 2023-06-29 ENCOUNTER — TELEPHONE (OUTPATIENT)
Dept: FAMILY MEDICINE CLINIC | Age: 88
End: 2023-06-29

## 2023-06-29 ENCOUNTER — OFFICE VISIT (OUTPATIENT)
Dept: PRIMARY CARE CLINIC | Age: 88
End: 2023-06-29
Payer: MEDICARE

## 2023-06-29 VITALS
HEART RATE: 58 BPM | SYSTOLIC BLOOD PRESSURE: 136 MMHG | BODY MASS INDEX: 26.86 KG/M2 | RESPIRATION RATE: 16 BRPM | TEMPERATURE: 97.4 F | HEIGHT: 65 IN | OXYGEN SATURATION: 93 % | WEIGHT: 161.2 LBS | DIASTOLIC BLOOD PRESSURE: 85 MMHG

## 2023-06-29 VITALS — OXYGEN SATURATION: 95 % | BODY MASS INDEX: 26.26 KG/M2 | WEIGHT: 157.8 LBS

## 2023-06-29 DIAGNOSIS — R11.2 NAUSEA AND VOMITING, UNSPECIFIED VOMITING TYPE: Primary | ICD-10-CM

## 2023-06-29 DIAGNOSIS — R11.0 NAUSEA: Primary | ICD-10-CM

## 2023-06-29 LAB
ALBUMIN SERPL-MCNC: 3.6 G/DL (ref 3.5–5.2)
ALBUMIN/GLOB SERPL: 1.4 {RATIO} (ref 1–2.5)
ALP SERPL-CCNC: 102 U/L (ref 35–104)
ALT SERPL-CCNC: 19 U/L (ref 5–33)
ANION GAP SERPL CALCULATED.3IONS-SCNC: 10 MMOL/L (ref 9–17)
AST SERPL-CCNC: 26 U/L
BASOPHILS # BLD: 0.04 K/UL (ref 0–0.2)
BASOPHILS NFR BLD: 1 % (ref 0–2)
BILIRUB SERPL-MCNC: 0.6 MG/DL (ref 0.3–1.2)
BUN SERPL-MCNC: 18 MG/DL (ref 8–23)
BUN/CREAT SERPL: 18 (ref 9–20)
CALCIUM SERPL-MCNC: 9.3 MG/DL (ref 8.6–10.4)
CHLORIDE SERPL-SCNC: 94 MMOL/L (ref 98–107)
CO2 SERPL-SCNC: 26 MMOL/L (ref 20–31)
CREAT SERPL-MCNC: 0.99 MG/DL (ref 0.5–0.9)
EOSINOPHIL # BLD: 0.18 K/UL (ref 0–0.44)
EOSINOPHILS RELATIVE PERCENT: 4 % (ref 1–4)
ERYTHROCYTE [DISTWIDTH] IN BLOOD BY AUTOMATED COUNT: 14.9 % (ref 11.8–14.4)
GFR SERPL CREATININE-BSD FRML MDRD: 55 ML/MIN/1.73M2
GLUCOSE SERPL-MCNC: 95 MG/DL (ref 70–99)
HCT VFR BLD AUTO: 38.9 % (ref 36.3–47.1)
HGB BLD-MCNC: 13.2 G/DL (ref 11.9–15.1)
IMM GRANULOCYTES # BLD AUTO: 0.04 K/UL (ref 0–0.3)
IMM GRANULOCYTES NFR BLD: 1 %
LYMPHOCYTES # BLD: 29 % (ref 24–43)
LYMPHOCYTES NFR BLD: 1.18 K/UL (ref 1.1–3.7)
MCH RBC QN AUTO: 28 PG (ref 25.2–33.5)
MCHC RBC AUTO-ENTMCNC: 33.9 G/DL (ref 25.2–33.5)
MCV RBC AUTO: 82.4 FL (ref 82.6–102.9)
MONOCYTES NFR BLD: 0.51 K/UL (ref 0.1–1.2)
MONOCYTES NFR BLD: 12 % (ref 3–12)
NEUTROPHILS NFR BLD: 53 % (ref 36–65)
NEUTS SEG NFR BLD: 2.17 K/UL (ref 1.5–8.1)
NRBC BLD-RTO: 0 PER 100 WBC
PLATELET # BLD AUTO: 141 K/UL (ref 138–453)
PMV BLD AUTO: 9 FL (ref 8.1–13.5)
POTASSIUM SERPL-SCNC: 4.1 MMOL/L (ref 3.7–5.3)
PROT SERPL-MCNC: 6.1 G/DL (ref 6.4–8.3)
RBC # BLD AUTO: 4.72 M/UL (ref 3.95–5.11)
RBC # BLD: ABNORMAL 10*6/UL
SODIUM SERPL-SCNC: 130 MMOL/L (ref 135–144)
WBC OTHER # BLD: 4.1 K/UL (ref 3.5–11.3)

## 2023-06-29 PROCEDURE — 96374 THER/PROPH/DIAG INJ IV PUSH: CPT

## 2023-06-29 PROCEDURE — 96375 TX/PRO/DX INJ NEW DRUG ADDON: CPT

## 2023-06-29 PROCEDURE — 2500000003 HC RX 250 WO HCPCS: Performed by: EMERGENCY MEDICINE

## 2023-06-29 PROCEDURE — 99284 EMERGENCY DEPT VISIT MOD MDM: CPT

## 2023-06-29 PROCEDURE — 2580000003 HC RX 258: Performed by: EMERGENCY MEDICINE

## 2023-06-29 PROCEDURE — 80053 COMPREHEN METABOLIC PANEL: CPT

## 2023-06-29 PROCEDURE — 99999 PR OFFICE/OUTPT VISIT,PROCEDURE ONLY: CPT | Performed by: NURSE PRACTITIONER

## 2023-06-29 PROCEDURE — 6360000002 HC RX W HCPCS: Performed by: EMERGENCY MEDICINE

## 2023-06-29 PROCEDURE — 85027 COMPLETE CBC AUTOMATED: CPT

## 2023-06-29 PROCEDURE — A4216 STERILE WATER/SALINE, 10 ML: HCPCS | Performed by: EMERGENCY MEDICINE

## 2023-06-29 PROCEDURE — 99211 OFF/OP EST MAY X REQ PHY/QHP: CPT | Performed by: NURSE PRACTITIONER

## 2023-06-29 RX ORDER — PROMETHAZINE HYDROCHLORIDE 25 MG/1
25 TABLET ORAL 3 TIMES DAILY PRN
Qty: 12 TABLET | Refills: 0 | Status: ON HOLD | OUTPATIENT
Start: 2023-06-29 | End: 2023-07-06

## 2023-06-29 RX ORDER — ONDANSETRON 2 MG/ML
4 INJECTION INTRAMUSCULAR; INTRAVENOUS ONCE
Status: COMPLETED | OUTPATIENT
Start: 2023-06-29 | End: 2023-06-29

## 2023-06-29 RX ORDER — 0.9 % SODIUM CHLORIDE 0.9 %
1000 INTRAVENOUS SOLUTION INTRAVENOUS ONCE
Status: COMPLETED | OUTPATIENT
Start: 2023-06-29 | End: 2023-06-29

## 2023-06-29 RX ADMIN — FAMOTIDINE 20 MG: 10 INJECTION, SOLUTION INTRAVENOUS at 16:50

## 2023-06-29 RX ADMIN — ONDANSETRON 4 MG: 2 INJECTION INTRAMUSCULAR; INTRAVENOUS at 16:49

## 2023-06-29 RX ADMIN — SODIUM CHLORIDE 1000 ML: 9 INJECTION, SOLUTION INTRAVENOUS at 16:46

## 2023-06-29 ASSESSMENT — ENCOUNTER SYMPTOMS
NAUSEA: 1
DIARRHEA: 0
VOMITING: 1
SORE THROAT: 0
SHORTNESS OF BREATH: 0

## 2023-06-29 ASSESSMENT — LIFESTYLE VARIABLES
HOW OFTEN DO YOU HAVE A DRINK CONTAINING ALCOHOL: NEVER
HOW MANY STANDARD DRINKS CONTAINING ALCOHOL DO YOU HAVE ON A TYPICAL DAY: PATIENT DOES NOT DRINK

## 2023-06-29 ASSESSMENT — PAIN - FUNCTIONAL ASSESSMENT
PAIN_FUNCTIONAL_ASSESSMENT: NONE - DENIES PAIN
PAIN_FUNCTIONAL_ASSESSMENT: NONE - DENIES PAIN

## 2023-06-29 NOTE — TELEPHONE ENCOUNTER
Pt calling stating she got home from the hospital on Monday and on Tuesday she started being achy all over, vomited last night, no temp, just feels terrible, please call with recommendations.

## 2023-06-29 NOTE — TELEPHONE ENCOUNTER
She needs to come into UC or ER to be seen and have her blood work rechecked and to make sure she has not developed an infection

## 2023-07-03 ENCOUNTER — TELEPHONE (OUTPATIENT)
Dept: FAMILY MEDICINE CLINIC | Age: 88
End: 2023-07-03

## 2023-07-03 DIAGNOSIS — I48.19 PERSISTENT ATRIAL FIBRILLATION (HCC): Primary | ICD-10-CM

## 2023-07-03 DIAGNOSIS — I10 ESSENTIAL HYPERTENSION: ICD-10-CM

## 2023-07-03 DIAGNOSIS — E87.1 HYPONATREMIA: ICD-10-CM

## 2023-07-03 NOTE — TELEPHONE ENCOUNTER
Patient  called wanting the nurse to place orders for patient to have home care.  states that patient is always tired and she is keeping food down but he wants her sodium checked again. Please advise.

## 2023-07-03 NOTE — TELEPHONE ENCOUNTER
Pt would like order sent to [facility]. Is aware due to holiday, she may or may not hear from them today or tomorrow. Pt voiced understanding. Faxed referral with Demographics, Ins card, and Hospital visits.

## 2023-07-06 ENCOUNTER — HOSPITAL ENCOUNTER (INPATIENT)
Age: 88
LOS: 4 days | Discharge: NURSING, REHAB OR LONG-TERM CARE | DRG: 644 | End: 2023-07-13
Attending: STUDENT IN AN ORGANIZED HEALTH CARE EDUCATION/TRAINING PROGRAM | Admitting: STUDENT IN AN ORGANIZED HEALTH CARE EDUCATION/TRAINING PROGRAM
Payer: MEDICARE

## 2023-07-06 DIAGNOSIS — R53.1 GENERALIZED WEAKNESS: ICD-10-CM

## 2023-07-06 DIAGNOSIS — E87.1 HYPONATREMIA: Primary | ICD-10-CM

## 2023-07-06 DIAGNOSIS — E27.1 ADRENAL INSUFFICIENCY (ADDISON'S DISEASE) (HCC): ICD-10-CM

## 2023-07-06 PROBLEM — E22.2 SIADH (SYNDROME OF INAPPROPRIATE ADH PRODUCTION) (HCC): Status: ACTIVE | Noted: 2023-07-06

## 2023-07-06 LAB
ALBUMIN SERPL BCG-MCNC: 3.7 G/DL (ref 3.5–5.1)
ALP SERPL-CCNC: 101 U/L (ref 38–126)
ALT SERPL W/O P-5'-P-CCNC: 14 U/L (ref 11–66)
ANION GAP SERPL CALC-SCNC: 18 MEQ/L (ref 8–16)
AST SERPL-CCNC: 24 U/L (ref 5–40)
BASOPHILS ABSOLUTE: 0.1 THOU/MM3 (ref 0–0.1)
BASOPHILS NFR BLD AUTO: 1.1 %
BILIRUB SERPL-MCNC: 0.5 MG/DL (ref 0.3–1.2)
BILIRUB UR QL STRIP: NEGATIVE
BUN SERPL-MCNC: 15 MG/DL (ref 7–22)
CALCIUM SERPL-MCNC: 9 MG/DL (ref 8.5–10.5)
CHARACTER UR: CLEAR
CHLORIDE SERPL-SCNC: 88 MEQ/L (ref 98–111)
CO2 SERPL-SCNC: 20 MEQ/L (ref 23–33)
COLOR UR: YELLOW
CREAT SERPL-MCNC: 0.8 MG/DL (ref 0.4–1.2)
DEPRECATED RDW RBC AUTO: 43.7 FL (ref 35–45)
EOSINOPHIL NFR BLD AUTO: 6.8 %
EOSINOPHILS ABSOLUTE: 0.4 THOU/MM3 (ref 0–0.4)
ERYTHROCYTE [DISTWIDTH] IN BLOOD BY AUTOMATED COUNT: 14.9 % (ref 11.5–14.5)
GFR SERPL CREATININE-BSD FRML MDRD: > 60 ML/MIN/1.73M2
GLUCOSE SERPL-MCNC: 91 MG/DL (ref 70–108)
GLUCOSE UR QL STRIP.AUTO: NEGATIVE MG/DL
HCT VFR BLD AUTO: 41.5 % (ref 37–47)
HGB BLD-MCNC: 13.9 GM/DL (ref 12–16)
HGB UR QL STRIP.AUTO: NEGATIVE
IMM GRANULOCYTES # BLD AUTO: 0.09 THOU/MM3 (ref 0–0.07)
IMM GRANULOCYTES NFR BLD AUTO: 1.6 %
KETONES UR QL STRIP.AUTO: NEGATIVE
LEUKOCYTE ESTERASE UR QL STRIP.AUTO: NEGATIVE
LYMPHOCYTES ABSOLUTE: 1.5 THOU/MM3 (ref 1–4.8)
LYMPHOCYTES NFR BLD AUTO: 27.9 %
MAGNESIUM SERPL-MCNC: 2 MG/DL (ref 1.6–2.4)
MCH RBC QN AUTO: 27 PG (ref 26–33)
MCHC RBC AUTO-ENTMCNC: 33.5 GM/DL (ref 32.2–35.5)
MCV RBC AUTO: 80.7 FL (ref 81–99)
MONOCYTES ABSOLUTE: 0.6 THOU/MM3 (ref 0.4–1.3)
MONOCYTES NFR BLD AUTO: 11.1 %
NEUTROPHILS NFR BLD AUTO: 51.5 %
NITRITE UR QL STRIP.AUTO: NEGATIVE
NRBC BLD AUTO-RTO: 0 /100 WBC
OSMOLALITY SERPL CALC.SUM OF ELEC: 253.8 MOSMOL/KG (ref 275–300)
OSMOLALITY UR: 383 MOSMOL/KG (ref 250–750)
PH UR STRIP.AUTO: 5.5 [PH] (ref 5–9)
PLATELET # BLD AUTO: 168 THOU/MM3 (ref 130–400)
PMV BLD AUTO: 10.6 FL (ref 9.4–12.4)
POTASSIUM SERPL-SCNC: 4.1 MEQ/L (ref 3.5–5.2)
PROT SERPL-MCNC: 6.2 G/DL (ref 6.1–8)
PROT UR STRIP.AUTO-MCNC: NEGATIVE MG/DL
RBC # BLD AUTO: 5.14 MILL/MM3 (ref 4.2–5.4)
SEGMENTED NEUTROPHILS ABSOLUTE COUNT: 2.8 THOU/MM3 (ref 1.8–7.7)
SODIUM SERPL-SCNC: 124 MEQ/L (ref 135–145)
SODIUM SERPL-SCNC: 126 MEQ/L (ref 135–145)
SODIUM UR-SCNC: 85 MEQ/L
SP GR UR REFRACT.AUTO: 1.02 (ref 1–1.03)
TROPONIN T: < 0.01 NG/ML
TSH SERPL DL<=0.005 MIU/L-ACNC: 2.74 UIU/ML (ref 0.4–4.2)
UROBILINOGEN UR QL STRIP.AUTO: 0.2 EU/DL (ref 0–1)
WBC # BLD AUTO: 5.5 THOU/MM3 (ref 4.8–10.8)

## 2023-07-06 PROCEDURE — 84484 ASSAY OF TROPONIN QUANT: CPT

## 2023-07-06 PROCEDURE — 6370000000 HC RX 637 (ALT 250 FOR IP): Performed by: INTERNAL MEDICINE

## 2023-07-06 PROCEDURE — 99285 EMERGENCY DEPT VISIT HI MDM: CPT

## 2023-07-06 PROCEDURE — 83735 ASSAY OF MAGNESIUM: CPT

## 2023-07-06 PROCEDURE — 2580000003 HC RX 258: Performed by: STUDENT IN AN ORGANIZED HEALTH CARE EDUCATION/TRAINING PROGRAM

## 2023-07-06 PROCEDURE — G0378 HOSPITAL OBSERVATION PER HR: HCPCS

## 2023-07-06 PROCEDURE — 85025 COMPLETE CBC W/AUTO DIFF WBC: CPT

## 2023-07-06 PROCEDURE — 80053 COMPREHEN METABOLIC PANEL: CPT

## 2023-07-06 PROCEDURE — 99223 1ST HOSP IP/OBS HIGH 75: CPT | Performed by: PHYSICIAN ASSISTANT

## 2023-07-06 PROCEDURE — 84295 ASSAY OF SERUM SODIUM: CPT

## 2023-07-06 PROCEDURE — 84443 ASSAY THYROID STIM HORMONE: CPT

## 2023-07-06 PROCEDURE — 93005 ELECTROCARDIOGRAM TRACING: CPT | Performed by: STUDENT IN AN ORGANIZED HEALTH CARE EDUCATION/TRAINING PROGRAM

## 2023-07-06 PROCEDURE — 6370000000 HC RX 637 (ALT 250 FOR IP): Performed by: PHYSICIAN ASSISTANT

## 2023-07-06 PROCEDURE — 83935 ASSAY OF URINE OSMOLALITY: CPT

## 2023-07-06 PROCEDURE — 81003 URINALYSIS AUTO W/O SCOPE: CPT

## 2023-07-06 PROCEDURE — 84300 ASSAY OF URINE SODIUM: CPT

## 2023-07-06 PROCEDURE — 2580000003 HC RX 258: Performed by: PHYSICIAN ASSISTANT

## 2023-07-06 PROCEDURE — 36415 COLL VENOUS BLD VENIPUNCTURE: CPT

## 2023-07-06 RX ORDER — SODIUM CHLORIDE 1 G/1
1 TABLET ORAL 2 TIMES DAILY
Status: DISCONTINUED | OUTPATIENT
Start: 2023-07-06 | End: 2023-07-06

## 2023-07-06 RX ORDER — ACETAMINOPHEN 325 MG/1
650 TABLET ORAL EVERY 6 HOURS PRN
Status: DISCONTINUED | OUTPATIENT
Start: 2023-07-06 | End: 2023-07-13 | Stop reason: HOSPADM

## 2023-07-06 RX ORDER — ACETAMINOPHEN 650 MG/1
650 SUPPOSITORY RECTAL EVERY 6 HOURS PRN
Status: DISCONTINUED | OUTPATIENT
Start: 2023-07-06 | End: 2023-07-13 | Stop reason: HOSPADM

## 2023-07-06 RX ORDER — SODIUM CHLORIDE 0.9 % (FLUSH) 0.9 %
5-40 SYRINGE (ML) INJECTION PRN
Status: DISCONTINUED | OUTPATIENT
Start: 2023-07-06 | End: 2023-07-13 | Stop reason: HOSPADM

## 2023-07-06 RX ORDER — SODIUM CHLORIDE 9 MG/ML
INJECTION, SOLUTION INTRAVENOUS PRN
Status: DISCONTINUED | OUTPATIENT
Start: 2023-07-06 | End: 2023-07-13 | Stop reason: HOSPADM

## 2023-07-06 RX ORDER — SODIUM CHLORIDE, SODIUM LACTATE, POTASSIUM CHLORIDE, AND CALCIUM CHLORIDE .6; .31; .03; .02 G/100ML; G/100ML; G/100ML; G/100ML
1000 INJECTION, SOLUTION INTRAVENOUS ONCE
Status: COMPLETED | OUTPATIENT
Start: 2023-07-06 | End: 2023-07-06

## 2023-07-06 RX ORDER — POLYETHYLENE GLYCOL 3350 17 G/17G
17 POWDER, FOR SOLUTION ORAL DAILY PRN
Status: DISCONTINUED | OUTPATIENT
Start: 2023-07-06 | End: 2023-07-13 | Stop reason: HOSPADM

## 2023-07-06 RX ORDER — SODIUM CHLORIDE 0.9 % (FLUSH) 0.9 %
5-40 SYRINGE (ML) INJECTION EVERY 12 HOURS SCHEDULED
Status: DISCONTINUED | OUTPATIENT
Start: 2023-07-06 | End: 2023-07-13 | Stop reason: HOSPADM

## 2023-07-06 RX ORDER — ONDANSETRON 4 MG/1
4 TABLET, ORALLY DISINTEGRATING ORAL EVERY 8 HOURS PRN
Status: DISCONTINUED | OUTPATIENT
Start: 2023-07-06 | End: 2023-07-06

## 2023-07-06 RX ORDER — SODIUM CHLORIDE 1 G/1
2 TABLET ORAL
Status: DISCONTINUED | OUTPATIENT
Start: 2023-07-06 | End: 2023-07-10

## 2023-07-06 RX ORDER — ONDANSETRON 2 MG/ML
4 INJECTION INTRAMUSCULAR; INTRAVENOUS EVERY 6 HOURS PRN
Status: DISCONTINUED | OUTPATIENT
Start: 2023-07-06 | End: 2023-07-06

## 2023-07-06 RX ORDER — PANTOPRAZOLE SODIUM 40 MG/1
40 TABLET, DELAYED RELEASE ORAL
Status: DISCONTINUED | OUTPATIENT
Start: 2023-07-07 | End: 2023-07-13 | Stop reason: HOSPADM

## 2023-07-06 RX ADMIN — RIVAROXABAN 15 MG: 15 TABLET, FILM COATED ORAL at 15:57

## 2023-07-06 RX ADMIN — DILTIAZEM HYDROCHLORIDE 30 MG: 30 TABLET, FILM COATED ORAL at 21:45

## 2023-07-06 RX ADMIN — SODIUM CHLORIDE, PRESERVATIVE FREE 10 ML: 5 INJECTION INTRAVENOUS at 21:48

## 2023-07-06 RX ADMIN — SODIUM CHLORIDE, POTASSIUM CHLORIDE, SODIUM LACTATE AND CALCIUM CHLORIDE 1000 ML: 600; 310; 30; 20 INJECTION, SOLUTION INTRAVENOUS at 12:16

## 2023-07-06 RX ADMIN — Medication 15 G: at 15:58

## 2023-07-06 RX ADMIN — SODIUM CHLORIDE 2 G: 1 TABLET ORAL at 19:02

## 2023-07-06 RX ADMIN — METOPROLOL TARTRATE 25 MG: 25 TABLET, FILM COATED ORAL at 21:45

## 2023-07-06 ASSESSMENT — PAIN - FUNCTIONAL ASSESSMENT
PAIN_FUNCTIONAL_ASSESSMENT: NONE - DENIES PAIN
PAIN_FUNCTIONAL_ASSESSMENT: NONE - DENIES PAIN

## 2023-07-07 LAB
ANION GAP SERPL CALC-SCNC: 11 MEQ/L (ref 8–16)
ANION GAP SERPL CALC-SCNC: 12 MEQ/L (ref 8–16)
BASOPHILS ABSOLUTE: 0 THOU/MM3 (ref 0–0.1)
BASOPHILS NFR BLD AUTO: 1 %
BUN SERPL-MCNC: 20 MG/DL (ref 7–22)
BUN SERPL-MCNC: 27 MG/DL (ref 7–22)
CALCIUM SERPL-MCNC: 8.4 MG/DL (ref 8.5–10.5)
CALCIUM SERPL-MCNC: 8.4 MG/DL (ref 8.5–10.5)
CHLORIDE SERPL-SCNC: 91 MEQ/L (ref 98–111)
CHLORIDE SERPL-SCNC: 91 MEQ/L (ref 98–111)
CK SERPL-CCNC: 45 U/L (ref 30–135)
CO2 SERPL-SCNC: 22 MEQ/L (ref 23–33)
CO2 SERPL-SCNC: 23 MEQ/L (ref 23–33)
CORTIS SERPL-MCNC: 1.13 UG/DL
CORTIS SERPL-MCNC: 1.56 UG/DL
CORTISOL COLLECTION INFO: NORMAL
CORTISOL COLLECTION INFO: NORMAL
CREAT SERPL-MCNC: 0.7 MG/DL (ref 0.4–1.2)
CREAT SERPL-MCNC: 0.7 MG/DL (ref 0.4–1.2)
CRP SERPL-MCNC: 2.03 MG/DL (ref 0–1)
DEPRECATED RDW RBC AUTO: 43.8 FL (ref 35–45)
EOSINOPHIL NFR BLD AUTO: 7.5 %
EOSINOPHILS ABSOLUTE: 0.4 THOU/MM3 (ref 0–0.4)
ERYTHROCYTE [DISTWIDTH] IN BLOOD BY AUTOMATED COUNT: 14.8 % (ref 11.5–14.5)
ERYTHROCYTE [SEDIMENTATION RATE] IN BLOOD BY WESTERGREN METHOD: 25 MM/HR (ref 0–20)
GFR SERPL CREATININE-BSD FRML MDRD: > 60 ML/MIN/1.73M2
GFR SERPL CREATININE-BSD FRML MDRD: > 60 ML/MIN/1.73M2
GLUCOSE SERPL-MCNC: 143 MG/DL (ref 70–108)
GLUCOSE SERPL-MCNC: 85 MG/DL (ref 70–108)
HCT VFR BLD AUTO: 39.3 % (ref 37–47)
HGB BLD-MCNC: 13.2 GM/DL (ref 12–16)
IMM GRANULOCYTES # BLD AUTO: 0.08 THOU/MM3 (ref 0–0.07)
IMM GRANULOCYTES NFR BLD AUTO: 1.7 %
LYMPHOCYTES ABSOLUTE: 1.5 THOU/MM3 (ref 1–4.8)
LYMPHOCYTES NFR BLD AUTO: 30.6 %
MCH RBC QN AUTO: 27.3 PG (ref 26–33)
MCHC RBC AUTO-ENTMCNC: 33.6 GM/DL (ref 32.2–35.5)
MCV RBC AUTO: 81.4 FL (ref 81–99)
MONOCYTES ABSOLUTE: 0.6 THOU/MM3 (ref 0.4–1.3)
MONOCYTES NFR BLD AUTO: 11.9 %
NEUTROPHILS NFR BLD AUTO: 47.3 %
NRBC BLD AUTO-RTO: 0 /100 WBC
PLATELET # BLD AUTO: 170 THOU/MM3 (ref 130–400)
PMV BLD AUTO: 9.6 FL (ref 9.4–12.4)
POTASSIUM SERPL-SCNC: 4.1 MEQ/L (ref 3.5–5.2)
POTASSIUM SERPL-SCNC: 4.2 MEQ/L (ref 3.5–5.2)
RBC # BLD AUTO: 4.83 MILL/MM3 (ref 4.2–5.4)
SEGMENTED NEUTROPHILS ABSOLUTE COUNT: 2.3 THOU/MM3 (ref 1.8–7.7)
SODIUM SERPL-SCNC: 124 MEQ/L (ref 135–145)
SODIUM SERPL-SCNC: 124 MEQ/L (ref 135–145)
SODIUM SERPL-SCNC: 126 MEQ/L (ref 135–145)
WBC # BLD AUTO: 4.8 THOU/MM3 (ref 4.8–10.8)

## 2023-07-07 PROCEDURE — 6370000000 HC RX 637 (ALT 250 FOR IP): Performed by: INTERNAL MEDICINE

## 2023-07-07 PROCEDURE — 97166 OT EVAL MOD COMPLEX 45 MIN: CPT

## 2023-07-07 PROCEDURE — 80048 BASIC METABOLIC PNL TOTAL CA: CPT

## 2023-07-07 PROCEDURE — 84295 ASSAY OF SERUM SODIUM: CPT

## 2023-07-07 PROCEDURE — 86140 C-REACTIVE PROTEIN: CPT

## 2023-07-07 PROCEDURE — G0378 HOSPITAL OBSERVATION PER HR: HCPCS

## 2023-07-07 PROCEDURE — 93010 ELECTROCARDIOGRAM REPORT: CPT | Performed by: INTERNAL MEDICINE

## 2023-07-07 PROCEDURE — 97530 THERAPEUTIC ACTIVITIES: CPT

## 2023-07-07 PROCEDURE — 6370000000 HC RX 637 (ALT 250 FOR IP)

## 2023-07-07 PROCEDURE — 82533 TOTAL CORTISOL: CPT

## 2023-07-07 PROCEDURE — 97162 PT EVAL MOD COMPLEX 30 MIN: CPT

## 2023-07-07 PROCEDURE — 97110 THERAPEUTIC EXERCISES: CPT

## 2023-07-07 PROCEDURE — 2580000003 HC RX 258: Performed by: PHYSICIAN ASSISTANT

## 2023-07-07 PROCEDURE — 6360000002 HC RX W HCPCS: Performed by: STUDENT IN AN ORGANIZED HEALTH CARE EDUCATION/TRAINING PROGRAM

## 2023-07-07 PROCEDURE — 82550 ASSAY OF CK (CPK): CPT

## 2023-07-07 PROCEDURE — 93005 ELECTROCARDIOGRAM TRACING: CPT

## 2023-07-07 PROCEDURE — 82024 ASSAY OF ACTH: CPT

## 2023-07-07 PROCEDURE — 99233 SBSQ HOSP IP/OBS HIGH 50: CPT | Performed by: STUDENT IN AN ORGANIZED HEALTH CARE EDUCATION/TRAINING PROGRAM

## 2023-07-07 PROCEDURE — 6370000000 HC RX 637 (ALT 250 FOR IP): Performed by: PHYSICIAN ASSISTANT

## 2023-07-07 PROCEDURE — 85651 RBC SED RATE NONAUTOMATED: CPT

## 2023-07-07 PROCEDURE — 36415 COLL VENOUS BLD VENIPUNCTURE: CPT

## 2023-07-07 PROCEDURE — 85025 COMPLETE CBC W/AUTO DIFF WBC: CPT

## 2023-07-07 RX ORDER — PROMETHAZINE HYDROCHLORIDE 25 MG/1
25 TABLET ORAL EVERY 6 HOURS PRN
Status: DISCONTINUED | OUTPATIENT
Start: 2023-07-07 | End: 2023-07-13 | Stop reason: HOSPADM

## 2023-07-07 RX ORDER — KETOROLAC TROMETHAMINE 30 MG/ML
15 INJECTION, SOLUTION INTRAMUSCULAR; INTRAVENOUS ONCE
Status: COMPLETED | OUTPATIENT
Start: 2023-07-07 | End: 2023-07-07

## 2023-07-07 RX ORDER — COSYNTROPIN 0.25 MG/ML
250 INJECTION, POWDER, FOR SOLUTION INTRAMUSCULAR; INTRAVENOUS ONCE
Status: COMPLETED | OUTPATIENT
Start: 2023-07-08 | End: 2023-07-08

## 2023-07-07 RX ADMIN — DILTIAZEM HYDROCHLORIDE 30 MG: 30 TABLET, FILM COATED ORAL at 19:48

## 2023-07-07 RX ADMIN — Medication 15 G: at 09:00

## 2023-07-07 RX ADMIN — METOPROLOL TARTRATE 12.5 MG: 25 TABLET, FILM COATED ORAL at 19:48

## 2023-07-07 RX ADMIN — PANTOPRAZOLE SODIUM 40 MG: 40 TABLET, DELAYED RELEASE ORAL at 05:35

## 2023-07-07 RX ADMIN — SODIUM CHLORIDE, PRESERVATIVE FREE 10 ML: 5 INJECTION INTRAVENOUS at 09:00

## 2023-07-07 RX ADMIN — DILTIAZEM HYDROCHLORIDE 30 MG: 30 TABLET, FILM COATED ORAL at 09:00

## 2023-07-07 RX ADMIN — SODIUM CHLORIDE 2 G: 1 TABLET ORAL at 12:09

## 2023-07-07 RX ADMIN — SODIUM CHLORIDE 2 G: 1 TABLET ORAL at 16:55

## 2023-07-07 RX ADMIN — RIVAROXABAN 15 MG: 15 TABLET, FILM COATED ORAL at 09:00

## 2023-07-07 RX ADMIN — KETOROLAC TROMETHAMINE 15 MG: 30 INJECTION, SOLUTION INTRAMUSCULAR; INTRAVENOUS at 10:50

## 2023-07-07 RX ADMIN — SODIUM CHLORIDE 2 G: 1 TABLET ORAL at 08:59

## 2023-07-07 RX ADMIN — METOPROLOL TARTRATE 25 MG: 25 TABLET, FILM COATED ORAL at 09:00

## 2023-07-07 RX ADMIN — SODIUM CHLORIDE, PRESERVATIVE FREE 10 ML: 5 INJECTION INTRAVENOUS at 19:48

## 2023-07-08 LAB
ACTH PLAS-MCNC: 6.5 PG/ML (ref 7.2–63.3)
ANION GAP SERPL CALC-SCNC: 12 MEQ/L (ref 8–16)
ANION GAP SERPL CALC-SCNC: 9 MEQ/L (ref 8–16)
ANION GAP SERPL CALC-SCNC: 9 MEQ/L (ref 8–16)
BUN SERPL-MCNC: 19 MG/DL (ref 7–22)
BUN SERPL-MCNC: 26 MG/DL (ref 7–22)
BUN SERPL-MCNC: 30 MG/DL (ref 7–22)
CALCIUM SERPL-MCNC: 8.6 MG/DL (ref 8.5–10.5)
CALCIUM SERPL-MCNC: 8.6 MG/DL (ref 8.5–10.5)
CALCIUM SERPL-MCNC: 8.8 MG/DL (ref 8.5–10.5)
CHLORIDE SERPL-SCNC: 93 MEQ/L (ref 98–111)
CHLORIDE SERPL-SCNC: 93 MEQ/L (ref 98–111)
CHLORIDE SERPL-SCNC: 95 MEQ/L (ref 98–111)
CO2 SERPL-SCNC: 23 MEQ/L (ref 23–33)
CO2 SERPL-SCNC: 24 MEQ/L (ref 23–33)
CO2 SERPL-SCNC: 24 MEQ/L (ref 23–33)
CORTIS SERPL-MCNC: 1.81 UG/DL
CORTIS SERPL-MCNC: 12.17 UG/DL
CORTIS SERPL-MCNC: 8.92 UG/DL
CORTISOL COLLECTION INFO: NORMAL
CREAT SERPL-MCNC: 0.7 MG/DL (ref 0.4–1.2)
CREAT SERPL-MCNC: 0.8 MG/DL (ref 0.4–1.2)
CREAT SERPL-MCNC: 0.9 MG/DL (ref 0.4–1.2)
DEPRECATED RDW RBC AUTO: 45.3 FL (ref 35–45)
ERYTHROCYTE [DISTWIDTH] IN BLOOD BY AUTOMATED COUNT: 15 % (ref 11.5–14.5)
GFR SERPL CREATININE-BSD FRML MDRD: > 60 ML/MIN/1.73M2
GLUCOSE SERPL-MCNC: 116 MG/DL (ref 70–108)
GLUCOSE SERPL-MCNC: 138 MG/DL (ref 70–108)
GLUCOSE SERPL-MCNC: 92 MG/DL (ref 70–108)
HCT VFR BLD AUTO: 41.4 % (ref 37–47)
HGB BLD-MCNC: 13.5 GM/DL (ref 12–16)
MAGNESIUM SERPL-MCNC: 1.9 MG/DL (ref 1.6–2.4)
MCH RBC QN AUTO: 27.4 PG (ref 26–33)
MCHC RBC AUTO-ENTMCNC: 32.6 GM/DL (ref 32.2–35.5)
MCV RBC AUTO: 84.1 FL (ref 81–99)
PLATELET # BLD AUTO: 185 THOU/MM3 (ref 130–400)
PMV BLD AUTO: 9.2 FL (ref 9.4–12.4)
POTASSIUM SERPL-SCNC: 4 MEQ/L (ref 3.5–5.2)
POTASSIUM SERPL-SCNC: 4.1 MEQ/L (ref 3.5–5.2)
POTASSIUM SERPL-SCNC: 4.5 MEQ/L (ref 3.5–5.2)
RBC # BLD AUTO: 4.92 MILL/MM3 (ref 4.2–5.4)
SODIUM SERPL-SCNC: 126 MEQ/L (ref 135–145)
SODIUM SERPL-SCNC: 128 MEQ/L (ref 135–145)
SODIUM SERPL-SCNC: 128 MEQ/L (ref 135–145)
URATE SERPL-MCNC: 2.2 MG/DL (ref 2.4–5.7)
WBC # BLD AUTO: 5.8 THOU/MM3 (ref 4.8–10.8)

## 2023-07-08 PROCEDURE — 6370000000 HC RX 637 (ALT 250 FOR IP)

## 2023-07-08 PROCEDURE — 6370000000 HC RX 637 (ALT 250 FOR IP): Performed by: INTERNAL MEDICINE

## 2023-07-08 PROCEDURE — 6370000000 HC RX 637 (ALT 250 FOR IP): Performed by: PHYSICIAN ASSISTANT

## 2023-07-08 PROCEDURE — 80048 BASIC METABOLIC PNL TOTAL CA: CPT

## 2023-07-08 PROCEDURE — 6360000002 HC RX W HCPCS

## 2023-07-08 PROCEDURE — 36415 COLL VENOUS BLD VENIPUNCTURE: CPT

## 2023-07-08 PROCEDURE — G0378 HOSPITAL OBSERVATION PER HR: HCPCS

## 2023-07-08 PROCEDURE — 85027 COMPLETE CBC AUTOMATED: CPT

## 2023-07-08 PROCEDURE — 6370000000 HC RX 637 (ALT 250 FOR IP): Performed by: STUDENT IN AN ORGANIZED HEALTH CARE EDUCATION/TRAINING PROGRAM

## 2023-07-08 PROCEDURE — 83735 ASSAY OF MAGNESIUM: CPT

## 2023-07-08 PROCEDURE — 2580000003 HC RX 258: Performed by: PHYSICIAN ASSISTANT

## 2023-07-08 PROCEDURE — 84550 ASSAY OF BLOOD/URIC ACID: CPT

## 2023-07-08 PROCEDURE — 93005 ELECTROCARDIOGRAM TRACING: CPT | Performed by: STUDENT IN AN ORGANIZED HEALTH CARE EDUCATION/TRAINING PROGRAM

## 2023-07-08 PROCEDURE — 99232 SBSQ HOSP IP/OBS MODERATE 35: CPT | Performed by: STUDENT IN AN ORGANIZED HEALTH CARE EDUCATION/TRAINING PROGRAM

## 2023-07-08 PROCEDURE — 82533 TOTAL CORTISOL: CPT

## 2023-07-08 PROCEDURE — 99232 SBSQ HOSP IP/OBS MODERATE 35: CPT | Performed by: INTERNAL MEDICINE

## 2023-07-08 RX ORDER — SCOLOPAMINE TRANSDERMAL SYSTEM 1 MG/1
1 PATCH, EXTENDED RELEASE TRANSDERMAL
Status: DISCONTINUED | OUTPATIENT
Start: 2023-07-08 | End: 2023-07-13

## 2023-07-08 RX ORDER — POLYETHYLENE GLYCOL 3350 17 G/17G
17 POWDER, FOR SOLUTION ORAL ONCE
Status: COMPLETED | OUTPATIENT
Start: 2023-07-08 | End: 2023-07-08

## 2023-07-08 RX ORDER — SENNA AND DOCUSATE SODIUM 50; 8.6 MG/1; MG/1
1 TABLET, FILM COATED ORAL ONCE
Status: COMPLETED | OUTPATIENT
Start: 2023-07-08 | End: 2023-07-08

## 2023-07-08 RX ORDER — HYDROCORTISONE 5 MG/1
5 TABLET ORAL 3 TIMES DAILY
Status: DISCONTINUED | OUTPATIENT
Start: 2023-07-08 | End: 2023-07-10

## 2023-07-08 RX ADMIN — SODIUM CHLORIDE 2 G: 1 TABLET ORAL at 09:31

## 2023-07-08 RX ADMIN — DILTIAZEM HYDROCHLORIDE 30 MG: 30 TABLET, FILM COATED ORAL at 09:31

## 2023-07-08 RX ADMIN — DILTIAZEM HYDROCHLORIDE 30 MG: 30 TABLET, FILM COATED ORAL at 19:52

## 2023-07-08 RX ADMIN — PANTOPRAZOLE SODIUM 40 MG: 40 TABLET, DELAYED RELEASE ORAL at 09:31

## 2023-07-08 RX ADMIN — SODIUM CHLORIDE, PRESERVATIVE FREE 10 ML: 5 INJECTION INTRAVENOUS at 19:52

## 2023-07-08 RX ADMIN — SODIUM CHLORIDE 2 G: 1 TABLET ORAL at 12:35

## 2023-07-08 RX ADMIN — HYDROCORTISONE 5 MG: 5 TABLET ORAL at 15:05

## 2023-07-08 RX ADMIN — SODIUM CHLORIDE 2 G: 1 TABLET ORAL at 16:28

## 2023-07-08 RX ADMIN — Medication 15 G: at 09:31

## 2023-07-08 RX ADMIN — SODIUM CHLORIDE, PRESERVATIVE FREE 10 ML: 5 INJECTION INTRAVENOUS at 08:05

## 2023-07-08 RX ADMIN — METOPROLOL TARTRATE 12.5 MG: 25 TABLET, FILM COATED ORAL at 09:32

## 2023-07-08 RX ADMIN — DOCUSATE SODIUM 50 MG AND SENNOSIDES 8.6 MG 1 TABLET: 8.6; 5 TABLET, FILM COATED ORAL at 09:31

## 2023-07-08 RX ADMIN — COSYNTROPIN 250 MCG: 0.25 INJECTION, POWDER, LYOPHILIZED, FOR SOLUTION INTRAMUSCULAR; INTRAVENOUS at 08:05

## 2023-07-08 RX ADMIN — RIVAROXABAN 15 MG: 15 TABLET, FILM COATED ORAL at 09:31

## 2023-07-08 RX ADMIN — METOPROLOL TARTRATE 12.5 MG: 25 TABLET, FILM COATED ORAL at 21:40

## 2023-07-08 RX ADMIN — PROMETHAZINE HYDROCHLORIDE 25 MG: 25 TABLET ORAL at 08:12

## 2023-07-08 RX ADMIN — HYDROCORTISONE 5 MG: 5 TABLET ORAL at 19:52

## 2023-07-08 RX ADMIN — POLYETHYLENE GLYCOL 3350 17 G: 17 POWDER, FOR SOLUTION ORAL at 12:35

## 2023-07-09 ENCOUNTER — APPOINTMENT (OUTPATIENT)
Dept: MRI IMAGING | Age: 88
DRG: 644 | End: 2023-07-09
Payer: MEDICARE

## 2023-07-09 PROBLEM — E27.1 ADRENAL INSUFFICIENCY (ADDISON'S DISEASE) (HCC): Status: ACTIVE | Noted: 2023-07-09

## 2023-07-09 LAB
ANION GAP SERPL CALC-SCNC: 10 MEQ/L (ref 8–16)
ANION GAP SERPL CALC-SCNC: 9 MEQ/L (ref 8–16)
ANION GAP SERPL CALC-SCNC: 9 MEQ/L (ref 8–16)
BUN SERPL-MCNC: 17 MG/DL (ref 7–22)
BUN SERPL-MCNC: 18 MG/DL (ref 7–22)
BUN SERPL-MCNC: 20 MG/DL (ref 7–22)
CALCIUM SERPL-MCNC: 8.9 MG/DL (ref 8.5–10.5)
CALCIUM SERPL-MCNC: 9 MG/DL (ref 8.5–10.5)
CALCIUM SERPL-MCNC: 9.1 MG/DL (ref 8.5–10.5)
CHLORIDE SERPL-SCNC: 101 MEQ/L (ref 98–111)
CHLORIDE SERPL-SCNC: 98 MEQ/L (ref 98–111)
CHLORIDE SERPL-SCNC: 98 MEQ/L (ref 98–111)
CO2 SERPL-SCNC: 24 MEQ/L (ref 23–33)
CO2 SERPL-SCNC: 25 MEQ/L (ref 23–33)
CO2 SERPL-SCNC: 25 MEQ/L (ref 23–33)
CREAT SERPL-MCNC: 0.8 MG/DL (ref 0.4–1.2)
EKG Q-T INTERVAL: 490 MS
EKG Q-T INTERVAL: 498 MS
EKG Q-T INTERVAL: 598 MS
EKG QRS DURATION: 102 MS
EKG QRS DURATION: 118 MS
EKG QRS DURATION: 92 MS
EKG QTC CALCULATION (BAZETT): 476 MS
EKG QTC CALCULATION (BAZETT): 488 MS
EKG QTC CALCULATION (BAZETT): 606 MS
EKG R AXIS: 110 DEGREES
EKG R AXIS: 114 DEGREES
EKG R AXIS: 115 DEGREES
EKG T AXIS: 47 DEGREES
EKG T AXIS: 61 DEGREES
EKG T AXIS: 82 DEGREES
EKG VENTRICULAR RATE: 57 BPM
EKG VENTRICULAR RATE: 58 BPM
EKG VENTRICULAR RATE: 62 BPM
GFR SERPL CREATININE-BSD FRML MDRD: > 60 ML/MIN/1.73M2
GLUCOSE SERPL-MCNC: 104 MG/DL (ref 70–108)
GLUCOSE SERPL-MCNC: 107 MG/DL (ref 70–108)
GLUCOSE SERPL-MCNC: 118 MG/DL (ref 70–108)
MAGNESIUM SERPL-MCNC: 2.1 MG/DL (ref 1.6–2.4)
POTASSIUM SERPL-SCNC: 4.1 MEQ/L (ref 3.5–5.2)
POTASSIUM SERPL-SCNC: 4.5 MEQ/L (ref 3.5–5.2)
POTASSIUM SERPL-SCNC: 5 MEQ/L (ref 3.5–5.2)
PROLACTIN SERPL-MCNC: 55.8 NG/ML
SODIUM SERPL-SCNC: 132 MEQ/L (ref 135–145)
SODIUM SERPL-SCNC: 133 MEQ/L (ref 135–145)
SODIUM SERPL-SCNC: 134 MEQ/L (ref 135–145)
TSH SERPL DL<=0.005 MIU/L-ACNC: 1.79 UIU/ML (ref 0.4–4.2)

## 2023-07-09 PROCEDURE — G0378 HOSPITAL OBSERVATION PER HR: HCPCS

## 2023-07-09 PROCEDURE — 2580000003 HC RX 258: Performed by: PHYSICIAN ASSISTANT

## 2023-07-09 PROCEDURE — 70551 MRI BRAIN STEM W/O DYE: CPT

## 2023-07-09 PROCEDURE — 84443 ASSAY THYROID STIM HORMONE: CPT

## 2023-07-09 PROCEDURE — 80048 BASIC METABOLIC PNL TOTAL CA: CPT

## 2023-07-09 PROCEDURE — 6370000000 HC RX 637 (ALT 250 FOR IP): Performed by: INTERNAL MEDICINE

## 2023-07-09 PROCEDURE — 83735 ASSAY OF MAGNESIUM: CPT

## 2023-07-09 PROCEDURE — 84146 ASSAY OF PROLACTIN: CPT

## 2023-07-09 PROCEDURE — 6370000000 HC RX 637 (ALT 250 FOR IP)

## 2023-07-09 PROCEDURE — 93010 ELECTROCARDIOGRAM REPORT: CPT | Performed by: INTERNAL MEDICINE

## 2023-07-09 PROCEDURE — 36415 COLL VENOUS BLD VENIPUNCTURE: CPT

## 2023-07-09 PROCEDURE — 99232 SBSQ HOSP IP/OBS MODERATE 35: CPT | Performed by: INTERNAL MEDICINE

## 2023-07-09 PROCEDURE — 6370000000 HC RX 637 (ALT 250 FOR IP): Performed by: PHYSICIAN ASSISTANT

## 2023-07-09 PROCEDURE — 1200000003 HC TELEMETRY R&B

## 2023-07-09 PROCEDURE — 6370000000 HC RX 637 (ALT 250 FOR IP): Performed by: STUDENT IN AN ORGANIZED HEALTH CARE EDUCATION/TRAINING PROGRAM

## 2023-07-09 PROCEDURE — 99233 SBSQ HOSP IP/OBS HIGH 50: CPT | Performed by: STUDENT IN AN ORGANIZED HEALTH CARE EDUCATION/TRAINING PROGRAM

## 2023-07-09 RX ADMIN — HYDROCORTISONE 5 MG: 5 TABLET ORAL at 14:26

## 2023-07-09 RX ADMIN — HYDROCORTISONE 5 MG: 5 TABLET ORAL at 10:51

## 2023-07-09 RX ADMIN — DILTIAZEM HYDROCHLORIDE 30 MG: 30 TABLET, FILM COATED ORAL at 10:51

## 2023-07-09 RX ADMIN — HYDROCORTISONE 5 MG: 5 TABLET ORAL at 21:20

## 2023-07-09 RX ADMIN — SODIUM CHLORIDE, PRESERVATIVE FREE 10 ML: 5 INJECTION INTRAVENOUS at 10:52

## 2023-07-09 RX ADMIN — SODIUM CHLORIDE, PRESERVATIVE FREE 10 ML: 5 INJECTION INTRAVENOUS at 21:20

## 2023-07-09 RX ADMIN — DILTIAZEM HYDROCHLORIDE 30 MG: 30 TABLET, FILM COATED ORAL at 21:20

## 2023-07-09 RX ADMIN — SODIUM CHLORIDE 2 G: 1 TABLET ORAL at 10:51

## 2023-07-09 RX ADMIN — RIVAROXABAN 15 MG: 15 TABLET, FILM COATED ORAL at 10:51

## 2023-07-09 RX ADMIN — PANTOPRAZOLE SODIUM 40 MG: 40 TABLET, DELAYED RELEASE ORAL at 05:39

## 2023-07-09 RX ADMIN — SODIUM CHLORIDE 2 G: 1 TABLET ORAL at 18:18

## 2023-07-09 RX ADMIN — METOPROLOL TARTRATE 12.5 MG: 25 TABLET, FILM COATED ORAL at 21:20

## 2023-07-09 RX ADMIN — Medication 15 G: at 14:26

## 2023-07-09 ASSESSMENT — PAIN SCALES - GENERAL: PAINLEVEL_OUTOF10: 0

## 2023-07-10 ENCOUNTER — APPOINTMENT (OUTPATIENT)
Dept: MRI IMAGING | Age: 88
DRG: 644 | End: 2023-07-10
Payer: MEDICARE

## 2023-07-10 LAB
ANION GAP SERPL CALC-SCNC: 12 MEQ/L (ref 8–16)
BUN SERPL-MCNC: 28 MG/DL (ref 7–22)
CALCIUM SERPL-MCNC: 9 MG/DL (ref 8.5–10.5)
CHLORIDE SERPL-SCNC: 97 MEQ/L (ref 98–111)
CO2 SERPL-SCNC: 24 MEQ/L (ref 23–33)
CREAT SERPL-MCNC: 0.8 MG/DL (ref 0.4–1.2)
DEPRECATED RDW RBC AUTO: 47.1 FL (ref 35–45)
ERYTHROCYTE [DISTWIDTH] IN BLOOD BY AUTOMATED COUNT: 15.2 % (ref 11.5–14.5)
FOLLICLE STIMULATING HORMONE: 3.8 MIU/ML (ref 25.8–134.8)
GFR SERPL CREATININE-BSD FRML MDRD: > 60 ML/MIN/1.73M2
GLUCOSE SERPL-MCNC: 97 MG/DL (ref 70–108)
HCT VFR BLD AUTO: 40.6 % (ref 37–47)
HGB BLD-MCNC: 13.1 GM/DL (ref 12–16)
LUTEINIZING HORMONE: 1.8 MIU/ML (ref 7.7–58.5)
MAGNESIUM SERPL-MCNC: 2.4 MG/DL (ref 1.6–2.4)
MCH RBC QN AUTO: 27.5 PG (ref 26–33)
MCHC RBC AUTO-ENTMCNC: 32.3 GM/DL (ref 32.2–35.5)
MCV RBC AUTO: 85.1 FL (ref 81–99)
PLATELET # BLD AUTO: 203 THOU/MM3 (ref 130–400)
PMV BLD AUTO: 9.5 FL (ref 9.4–12.4)
POTASSIUM SERPL-SCNC: 4.3 MEQ/L (ref 3.5–5.2)
PROLACTIN SERPL-MCNC: 30.6 NG/ML
RBC # BLD AUTO: 4.77 MILL/MM3 (ref 4.2–5.4)
SODIUM SERPL-SCNC: 133 MEQ/L (ref 135–145)
T4 FREE SERPL-MCNC: 0.68 NG/DL (ref 0.93–1.76)
TSH SERPL DL<=0.005 MIU/L-ACNC: 2.5 UIU/ML (ref 0.4–4.2)
WBC # BLD AUTO: 6.6 THOU/MM3 (ref 4.8–10.8)

## 2023-07-10 PROCEDURE — 83735 ASSAY OF MAGNESIUM: CPT

## 2023-07-10 PROCEDURE — 6370000000 HC RX 637 (ALT 250 FOR IP)

## 2023-07-10 PROCEDURE — 97110 THERAPEUTIC EXERCISES: CPT

## 2023-07-10 PROCEDURE — 84146 ASSAY OF PROLACTIN: CPT

## 2023-07-10 PROCEDURE — 99233 SBSQ HOSP IP/OBS HIGH 50: CPT | Performed by: STUDENT IN AN ORGANIZED HEALTH CARE EDUCATION/TRAINING PROGRAM

## 2023-07-10 PROCEDURE — 84439 ASSAY OF FREE THYROXINE: CPT

## 2023-07-10 PROCEDURE — 1200000003 HC TELEMETRY R&B

## 2023-07-10 PROCEDURE — 2580000003 HC RX 258: Performed by: PHYSICIAN ASSISTANT

## 2023-07-10 PROCEDURE — 84244 ASSAY OF RENIN: CPT

## 2023-07-10 PROCEDURE — 85027 COMPLETE CBC AUTOMATED: CPT

## 2023-07-10 PROCEDURE — 6360000004 HC RX CONTRAST MEDICATION: Performed by: STUDENT IN AN ORGANIZED HEALTH CARE EDUCATION/TRAINING PROGRAM

## 2023-07-10 PROCEDURE — A9579 GAD-BASE MR CONTRAST NOS,1ML: HCPCS | Performed by: STUDENT IN AN ORGANIZED HEALTH CARE EDUCATION/TRAINING PROGRAM

## 2023-07-10 PROCEDURE — 82088 ASSAY OF ALDOSTERONE: CPT

## 2023-07-10 PROCEDURE — 6370000000 HC RX 637 (ALT 250 FOR IP): Performed by: INTERNAL MEDICINE

## 2023-07-10 PROCEDURE — 97112 NEUROMUSCULAR REEDUCATION: CPT

## 2023-07-10 PROCEDURE — 80048 BASIC METABOLIC PNL TOTAL CA: CPT

## 2023-07-10 PROCEDURE — 83001 ASSAY OF GONADOTROPIN (FSH): CPT

## 2023-07-10 PROCEDURE — 84443 ASSAY THYROID STIM HORMONE: CPT

## 2023-07-10 PROCEDURE — 70553 MRI BRAIN STEM W/O & W/DYE: CPT

## 2023-07-10 PROCEDURE — 6370000000 HC RX 637 (ALT 250 FOR IP): Performed by: STUDENT IN AN ORGANIZED HEALTH CARE EDUCATION/TRAINING PROGRAM

## 2023-07-10 PROCEDURE — 99223 1ST HOSP IP/OBS HIGH 75: CPT | Performed by: PHYSICIAN ASSISTANT

## 2023-07-10 PROCEDURE — 83002 ASSAY OF GONADOTROPIN (LH): CPT

## 2023-07-10 PROCEDURE — 99223 1ST HOSP IP/OBS HIGH 75: CPT | Performed by: INTERNAL MEDICINE

## 2023-07-10 PROCEDURE — 6370000000 HC RX 637 (ALT 250 FOR IP): Performed by: PHYSICIAN ASSISTANT

## 2023-07-10 PROCEDURE — 36415 COLL VENOUS BLD VENIPUNCTURE: CPT

## 2023-07-10 PROCEDURE — 82397 CHEMILUMINESCENT ASSAY: CPT

## 2023-07-10 RX ORDER — HYDROCORTISONE 10 MG/1
10 TABLET ORAL DAILY
Status: DISCONTINUED | OUTPATIENT
Start: 2023-07-11 | End: 2023-07-13 | Stop reason: HOSPADM

## 2023-07-10 RX ORDER — HYDROCORTISONE 5 MG/1
5 TABLET ORAL 3 TIMES DAILY
Status: COMPLETED | OUTPATIENT
Start: 2023-07-10 | End: 2023-07-10

## 2023-07-10 RX ORDER — SODIUM CHLORIDE 1 G/1
1 TABLET ORAL
Status: DISCONTINUED | OUTPATIENT
Start: 2023-07-10 | End: 2023-07-11

## 2023-07-10 RX ORDER — LEVOTHYROXINE SODIUM 0.03 MG/1
25 TABLET ORAL DAILY
Status: DISCONTINUED | OUTPATIENT
Start: 2023-07-11 | End: 2023-07-12

## 2023-07-10 RX ORDER — HYDROCORTISONE 5 MG/1
5 TABLET ORAL NIGHTLY
Status: DISCONTINUED | OUTPATIENT
Start: 2023-07-11 | End: 2023-07-13 | Stop reason: HOSPADM

## 2023-07-10 RX ADMIN — HYDROCORTISONE 5 MG: 5 TABLET ORAL at 08:48

## 2023-07-10 RX ADMIN — SODIUM CHLORIDE, PRESERVATIVE FREE 10 ML: 5 INJECTION INTRAVENOUS at 08:48

## 2023-07-10 RX ADMIN — Medication 15 G: at 08:47

## 2023-07-10 RX ADMIN — POLYETHYLENE GLYCOL 3350 17 G: 17 POWDER, FOR SOLUTION ORAL at 07:03

## 2023-07-10 RX ADMIN — METOPROLOL TARTRATE 12.5 MG: 25 TABLET, FILM COATED ORAL at 08:48

## 2023-07-10 RX ADMIN — METOPROLOL TARTRATE 12.5 MG: 25 TABLET, FILM COATED ORAL at 21:41

## 2023-07-10 RX ADMIN — SODIUM CHLORIDE 2 G: 1 TABLET ORAL at 11:32

## 2023-07-10 RX ADMIN — SODIUM CHLORIDE, PRESERVATIVE FREE 10 ML: 5 INJECTION INTRAVENOUS at 21:40

## 2023-07-10 RX ADMIN — DILTIAZEM HYDROCHLORIDE 30 MG: 30 TABLET, FILM COATED ORAL at 08:48

## 2023-07-10 RX ADMIN — HYDROCORTISONE 5 MG: 5 TABLET ORAL at 17:30

## 2023-07-10 RX ADMIN — GADOTERIDOL 15 ML: 279.3 INJECTION, SOLUTION INTRAVENOUS at 21:19

## 2023-07-10 RX ADMIN — PANTOPRAZOLE SODIUM 40 MG: 40 TABLET, DELAYED RELEASE ORAL at 06:57

## 2023-07-10 RX ADMIN — DILTIAZEM HYDROCHLORIDE 30 MG: 30 TABLET, FILM COATED ORAL at 21:41

## 2023-07-10 RX ADMIN — SODIUM CHLORIDE 1 G: 1 TABLET ORAL at 17:30

## 2023-07-10 RX ADMIN — RIVAROXABAN 15 MG: 15 TABLET, FILM COATED ORAL at 08:47

## 2023-07-10 RX ADMIN — SODIUM CHLORIDE 2 G: 1 TABLET ORAL at 08:47

## 2023-07-10 RX ADMIN — HYDROCORTISONE 5 MG: 5 TABLET ORAL at 21:40

## 2023-07-10 ASSESSMENT — PAIN SCALES - GENERAL
PAINLEVEL_OUTOF10: 0

## 2023-07-10 ASSESSMENT — ENCOUNTER SYMPTOMS
VOMITING: 1
COUGH: 0
SHORTNESS OF BREATH: 0
RHINORRHEA: 0
NAUSEA: 1
SORE THROAT: 0
ABDOMINAL PAIN: 0

## 2023-07-10 NOTE — CONSULTS
NEUROSURGERY        Chief Complaint   Patient presents with    Fatigue         History of Present Illness:   Sobia Dc is an 80 y.o. female with a past medical history of Basal cell carcinoma s/p Moh's surgery with skin graft, hyponatremia, macular degeneration, atrial fibrillation on Xarelto, Lopressor, and Cardizem, HTN, and GERD who we are asked to see/evaluate by Maximilian Newsome MD for medical management of adrenal insufficiency and pituitary adenoma. Patient was admitted on 7/6/23 for fatigue and generalized weakness. Of note, she was recently hospitalized from 6/20/23 to 6/26/23 for intractable nausea and vomiting. Patient had recent Moh's surgery on Brenda 15, 2023 and after that she started feeling sick and had abdominal pain. She was also found to have a solidum level of 122 (POA) during that admit. She was diagnosed with SIADH and nephrology was following. She was discharged with salt tabs and urea. Patient reports taking the 2g salt tabs BID instead of as prescribed TID. She returned to the hospital this time for similar reasons. She lives at home with her . She reports being active and able to do her daily activities before the Moh's surgery. She has had headaches, dizziness when standing, and generalized weakness. Initial random cortisol was 1.13. She underwent a cosyntropin stimulation test that showed adrenal insufficiency with cortisol level being 1.56 at 8 am and 12.17 at 9 am. ACTH was 6.5. Patient was started on Cortef 5 mg oral TID. MRI Brain wo contrast was ordered and showed abnormal pituitary gland measuring 12 x 12 x 8 mm in size. This extends into the suprasellar space but does not clearly compress the optic chiasm. Small cystic lesion in the region of the fossa of Rosenmuller on left side. Neurology was consulted who then consulted Neurosurgery. Cortef dose was adjusted to 10 mg oral daily in AM and 5 mg oral nightly.      Past Medical History:   Diagnosis Date    Actinic
Neurology Consult Note    Date:7/10/2023       SOZT:[id number]  Patient Name:Jina Kang     YOB: 1934     Age:88 y.o. Requesting Physician: Dinesh Padron MD     Reason for Consult:  Evaluate for normal pituitary on MRI      Chief Complaint:   Chief Complaint   Patient presents with    Fatigue       Subjective     Magi Dowling is a 80 y.o. female with a history of chronic hyponatremia, paroxysmal atrial fibrillation, chronic nausea and vomiting, and hypertension who presented to the hospital initially for generalized weakness and fatigue. The patient has had 2 admissions recently and was found to be hyponatremic both times. A work-up was performed during this admission and the patient was found to have findings consistent with adrenal insufficiency. As such, an MRI of the brain was performed and revealed a 12 x 12 x 8 mm abnormal pituitary gland extending into the suprasellar space. Neurology was consulted for further recommendations. The patient denies any history of any neurologic conditions other than occasional ocular migraines. She also does have a history of macular degeneration. Otherwise, the patient has never seen a neurologist in the past.  She denies any excessive headaches or recent vision changes. She has no history of visual field deficits. She has not had a stroke or a seizure previously. Her daughter is present at bedside and does note that her mother seems to be mildly confused when she is admitted to the hospital, but upon being discharged, her current level of cognition goes back to normal.  On exam right now, the patient has no neurologic complaints. She does have some difficulty with remote memory, but otherwise her cognition is intact. Review of Systems   Review of Systems   Constitutional:  Positive for fatigue. Negative for chills and fever. HENT:  Negative for rhinorrhea and sore throat. Eyes:  Negative for visual disturbance.    Respiratory:  Negative
hyponatremia, nausea/vomiting, and light-headedness. Suboptimal response on Cosyntropin stimulation test and low ACTH level. Currently on Cortef 5 mg oral TID. Plan to switch to Cortef 10 mg oral in AM and 5 mg in PM. Will titrate up as needed. Will order Free T4 and Prolactin with dilution. Other hormones pending. 2) Pituitary adenoma: Noted on brain MRI, abnormal pituitary gland measuring 12 x 12 x 8 mm in size. This extends into the suprasellar space but does not clearly compress the optic chiasm. Neurology was consulted. They consulted Neurosurgery and signed off.     3) Hypotonic, euvolemic Hyponatremia: Na level is 133 today improved from 126 yesterday. Nephrology is following. She is on salt tablets 1g PO TID. 4) Generalized fatigue and weakness: Poor oral intake secondary to nausea/vomiting. Patient able to tolerate food now. Dietician, PT, and OT following. 5) Paroxsymal atrial fibrillation: [id number] score 4. On home Xarelto, Lopressor, and Cardizem. Thank you for the consultation.     Electronically signed by Arnold Lindsay DO on 7/10/2023 at 4:39 PM

## 2023-07-10 NOTE — CARE COORDINATION
7/10/23, 2:30 PM EDT    DISCHARGE ON GOING EVALUATION    [facility] day: 1  Location: 6K-27/027-A Reason for admit: Hyponatremia [E87.1]  Generalized weakness [R53.1]  Adrenal insufficiency (Raffaele's disease) ([address]) [E27.1]   Procedure: 7/9 MRI brain-  Abnormal pituitary gland which measures 12 x 12 x 8 mm in size  Barriers to Discharge: Na+133. Salt tabs in use. Neurology consulted for MRI brain finding, have signed off. Neurosurgery consult pending. Working with PT/OT. PCP: Anette Gaona MD  Readmission Risk Score: 16.6%  Patient Goals/Plan/Treatment Preferences: referral pending to [facility]. Requires precert.

## 2023-07-10 NOTE — CARE COORDINATION
Lawrence has been changed to IP status. IMM explained to patient and her daughter in room. Daughter states Lawrence should not be signing anything given her mental condition. Informed daughter that she can sign as a representative, she refuses and states \"it's just common sense that I would be able to appeal any decision, I don't need to sign for it. \" IMM placed on chart.  Electronically signed by Claudy Hernandez RN on 7/10/2023 at 10:22 AM

## 2023-07-10 NOTE — CARE COORDINATION
DISCHARGE PLANNING EVALUATION  7/10/23, 2:01 PM EDT    Reason for Referral: PCP office sent referral to [facility] on 7/3, not sure if services have started yet  Mental Status: Patient is alert and oriented  Decision Making: Patient is making own decisions. Family/Social/Home Environment: Assessment completed with Patient and daughter. Patient is from home with spouse where they are mostly independent but currently weak. Patient prefers a rehab stay at discharge. Current Services including food security, transportation and housekeeping: assisted by some friends close by if needed  Current Equipment: none  Payment Source: [employer]  Concerns or Barriers to Discharge: not at this time   Post-acute [facility]) provider list was provided to patient. Patient was informed of their freedom to choose [facility] provider. Discussed and offered to show the patient the relevant [facility] Providers quality and resource use measures on Medicare Compare web site via computer based on patient's goals of care and treatment preferences. Questions regarding selection process were answered. Teach Back Method used with Patient  regarding care plan and discharge plan. Patient and daughter verbalized understanding of the plan of care and contribute to goal setting. Patient goals, treatment preferences and discharge plan: Referral to [facility], pre-cert needed.     Electronically signed by Sheryle Proud, MSW, MIKE on 7/10/2023 at 2:01 PM

## 2023-07-11 LAB
ANION GAP SERPL CALC-SCNC: 11 MEQ/L (ref 8–16)
BUN SERPL-MCNC: 26 MG/DL (ref 7–22)
CALCIUM SERPL-MCNC: 8.9 MG/DL (ref 8.5–10.5)
CHLORIDE SERPL-SCNC: 103 MEQ/L (ref 98–111)
CO2 SERPL-SCNC: 25 MEQ/L (ref 23–33)
CREAT SERPL-MCNC: 1 MG/DL (ref 0.4–1.2)
FOLLICLE STIMULATING HORMONE: 4.2 MIU/ML (ref 25.8–134.8)
GFR SERPL CREATININE-BSD FRML MDRD: 54 ML/MIN/1.73M2
GLUCOSE SERPL-MCNC: 99 MG/DL (ref 70–108)
LUTEINIZING HORMONE: 1.5 MIU/ML (ref 7.7–58.5)
MAGNESIUM SERPL-MCNC: 2.4 MG/DL (ref 1.6–2.4)
POTASSIUM SERPL-SCNC: 4.3 MEQ/L (ref 3.5–5.2)
SODIUM SERPL-SCNC: 139 MEQ/L (ref 135–145)

## 2023-07-11 PROCEDURE — 97110 THERAPEUTIC EXERCISES: CPT

## 2023-07-11 PROCEDURE — 6370000000 HC RX 637 (ALT 250 FOR IP): Performed by: INTERNAL MEDICINE

## 2023-07-11 PROCEDURE — 99233 SBSQ HOSP IP/OBS HIGH 50: CPT | Performed by: STUDENT IN AN ORGANIZED HEALTH CARE EDUCATION/TRAINING PROGRAM

## 2023-07-11 PROCEDURE — 2580000003 HC RX 258: Performed by: PHYSICIAN ASSISTANT

## 2023-07-11 PROCEDURE — 97116 GAIT TRAINING THERAPY: CPT

## 2023-07-11 PROCEDURE — 83735 ASSAY OF MAGNESIUM: CPT

## 2023-07-11 PROCEDURE — 80048 BASIC METABOLIC PNL TOTAL CA: CPT

## 2023-07-11 PROCEDURE — 6370000000 HC RX 637 (ALT 250 FOR IP)

## 2023-07-11 PROCEDURE — 6370000000 HC RX 637 (ALT 250 FOR IP): Performed by: PHYSICIAN ASSISTANT

## 2023-07-11 PROCEDURE — 83001 ASSAY OF GONADOTROPIN (FSH): CPT

## 2023-07-11 PROCEDURE — 36415 COLL VENOUS BLD VENIPUNCTURE: CPT

## 2023-07-11 PROCEDURE — 97535 SELF CARE MNGMENT TRAINING: CPT

## 2023-07-11 PROCEDURE — 83002 ASSAY OF GONADOTROPIN (LH): CPT

## 2023-07-11 PROCEDURE — 1200000003 HC TELEMETRY R&B

## 2023-07-11 PROCEDURE — 83003 ASSAY GROWTH HORMONE (HGH): CPT

## 2023-07-11 RX ORDER — SODIUM CHLORIDE 1 G/1
1 TABLET ORAL DAILY
Status: DISCONTINUED | OUTPATIENT
Start: 2023-07-12 | End: 2023-07-12

## 2023-07-11 RX ADMIN — PANTOPRAZOLE SODIUM 40 MG: 40 TABLET, DELAYED RELEASE ORAL at 06:47

## 2023-07-11 RX ADMIN — HYDROCORTISONE 10 MG: 10 TABLET ORAL at 09:17

## 2023-07-11 RX ADMIN — SODIUM CHLORIDE, PRESERVATIVE FREE 10 ML: 5 INJECTION INTRAVENOUS at 09:17

## 2023-07-11 RX ADMIN — DILTIAZEM HYDROCHLORIDE 30 MG: 30 TABLET, FILM COATED ORAL at 09:17

## 2023-07-11 RX ADMIN — RIVAROXABAN 15 MG: 15 TABLET, FILM COATED ORAL at 09:17

## 2023-07-11 RX ADMIN — LEVOTHYROXINE SODIUM 25 MCG: 0.03 TABLET ORAL at 06:47

## 2023-07-11 RX ADMIN — METOPROLOL TARTRATE 12.5 MG: 25 TABLET, FILM COATED ORAL at 09:17

## 2023-07-11 RX ADMIN — SODIUM CHLORIDE 1 G: 1 TABLET ORAL at 09:17

## 2023-07-11 RX ADMIN — METOPROLOL TARTRATE 12.5 MG: 25 TABLET, FILM COATED ORAL at 20:21

## 2023-07-11 RX ADMIN — SODIUM CHLORIDE, PRESERVATIVE FREE 10 ML: 5 INJECTION INTRAVENOUS at 20:22

## 2023-07-11 RX ADMIN — HYDROCORTISONE 5 MG: 5 TABLET ORAL at 20:21

## 2023-07-11 RX ADMIN — DILTIAZEM HYDROCHLORIDE 30 MG: 30 TABLET, FILM COATED ORAL at 20:21

## 2023-07-11 NOTE — CARE COORDINATION
7/11/23, 12:12 PM EDT    DISCHARGE PLANNING EVALUATION       SW spoke with Celsa Seen from [address] and they need updated OT note. Call to [facility] from OT and they will see today.

## 2023-07-11 NOTE — PLAN OF CARE
Problem: Discharge Planning  Goal: Discharge to home or other facility with appropriate resources  Outcome: Progressing     Problem: Safety - Adult  Goal: Free from fall injury  Outcome: Progressing     Problem: ABCDS Injury Assessment  Goal: Absence of physical injury  Outcome: Progressing     Problem: Skin/Tissue Integrity  Goal: Absence of new skin breakdown  Description: 1. Monitor for areas of redness and/or skin breakdown  2. Assess vascular access sites hourly  3. Every 4-6 hours minimum:  Change oxygen saturation probe site  4. Every 4-6 hours:  If on nasal continuous positive airway pressure, respiratory therapy assess nares and determine need for appliance change or resting period.   Outcome: Progressing     Problem: Pain  Goal: Verbalizes/displays adequate comfort level or baseline comfort level  Outcome: Progressing     Problem: Metabolic/Fluid and Electrolytes - Adult  Goal: Electrolytes maintained within normal limits  Outcome: Progressing  Goal: Hemodynamic stability and optimal renal function maintained  Outcome: Progressing     Problem: Respiratory - Adult  Goal: Achieves optimal ventilation and oxygenation  Outcome: Progressing     Problem: Cardiovascular - Adult  Goal: Maintains optimal cardiac output and hemodynamic stability  Outcome: Progressing  Goal: Absence of cardiac dysrhythmias or at baseline  Outcome: Progressing     Problem: Skin/Tissue Integrity - Adult  Goal: Skin integrity remains intact  Outcome: Progressing     Problem: Musculoskeletal - Adult  Goal: Return mobility to safest level of function  Outcome: Progressing     Problem: Genitourinary - Adult  Goal: Absence of urinary retention  Outcome: Progressing     Problem: Infection - Adult  Goal: Absence of infection at discharge  Outcome: Progressing  Goal: Absence of infection during hospitalization  Outcome: Progressing     Problem: Hematologic - Adult  Goal: Maintains hematologic stability  Outcome: Progressing     Problem:

## 2023-07-11 NOTE — CARE COORDINATION
7/11/23, 8:42 AM EDT    DISCHARGE ON GOING EVALUATION    [facility] day: 2  Location: 6K-27/027-A Reason for admit: Hyponatremia [E87.1]  Generalized weakness [R53.1]  Adrenal insufficiency (Raffaele's disease) ([address]) [E27.1]   Procedure:   7/9 MRI Brain WO Contrast 1. Abnormal pituitary gland which measures 12 x 12 x 8 mm in size. Please correlate with hormone levels. This extends up into the suprasellar space but does not clearly compress the optic chiasm. The cavernous sinuses are unremarkable. 2. Mild atrophy and probable ischemic changes in the white matter. No evidence for an acute infarct. 3. Small cystic lesion in the region of the fossa of Rosenmuller on the left side. 4. Mild inflammatory changes in ethmoid air cells bilaterally. 7/11 MRI Brain W WO Contrast 1. Enlarged pituitary gland measuring 11 x 11 x 8 mm in size. 2. Cystic or proteinaceous area in the suprasellar region measuring 8.2 x 7.9 x 10 mm in size. 3. No definite compression upon the optic chiasm. The infundibulum is in the midline. There is no extension into the cavernous sinuses. 4. 12 x 7 x 8 mm cystic area in the region of the fossa of Rosenmuller on the left side. Barriers to Discharge: Neurosurgery consult, PT/OT, Palliative Care consult, Neurology has signed off, Nephrology following,  Endocrinology consulted. Cardizem, Cortef, Synthroid, Lopressor, Protonix, Phenergan, Xarelto, Scopolamine patch. PCP: Nydia Pepper MD  Readmission Risk Score: 16.3%  Patient Goals/Plan/Treatment Preferences: referral pending to [facility].  Requires precert

## 2023-07-12 PROBLEM — D35.2 PITUITARY MACROADENOMA (HCC): Status: ACTIVE | Noted: 2023-07-12

## 2023-07-12 PROBLEM — E03.9 ACQUIRED HYPOTHYROIDISM: Status: ACTIVE | Noted: 2023-07-12

## 2023-07-12 PROBLEM — E22.1 HYPERPROLACTINEMIA (HCC): Status: ACTIVE | Noted: 2023-07-12

## 2023-07-12 LAB
ANION GAP SERPL CALC-SCNC: 11 MEQ/L (ref 8–16)
BUN SERPL-MCNC: 22 MG/DL (ref 7–22)
CALCIUM SERPL-MCNC: 9.1 MG/DL (ref 8.5–10.5)
CHLORIDE SERPL-SCNC: 102 MEQ/L (ref 98–111)
CO2 SERPL-SCNC: 24 MEQ/L (ref 23–33)
CREAT SERPL-MCNC: 1 MG/DL (ref 0.4–1.2)
DEPRECATED RDW RBC AUTO: 46.5 FL (ref 35–45)
ERYTHROCYTE [DISTWIDTH] IN BLOOD BY AUTOMATED COUNT: 14.9 % (ref 11.5–14.5)
GFR SERPL CREATININE-BSD FRML MDRD: 54 ML/MIN/1.73M2
GLUCOSE SERPL-MCNC: 88 MG/DL (ref 70–108)
HCT VFR BLD AUTO: 42.2 % (ref 37–47)
HGB BLD-MCNC: 13.4 GM/DL (ref 12–16)
IGF BP3 SERPL-MCNC: 2380 NG/ML (ref 2462–5274)
MAGNESIUM SERPL-MCNC: 2.2 MG/DL (ref 1.6–2.4)
MCH RBC QN AUTO: 27.1 PG (ref 26–33)
MCHC RBC AUTO-ENTMCNC: 31.8 GM/DL (ref 32.2–35.5)
MCV RBC AUTO: 85.3 FL (ref 81–99)
PLATELET # BLD AUTO: 193 THOU/MM3 (ref 130–400)
PMV BLD AUTO: 9 FL (ref 9.4–12.4)
POTASSIUM SERPL-SCNC: 4.3 MEQ/L (ref 3.5–5.2)
RBC # BLD AUTO: 4.95 MILL/MM3 (ref 4.2–5.4)
SODIUM SERPL-SCNC: 137 MEQ/L (ref 135–145)
WBC # BLD AUTO: 6.8 THOU/MM3 (ref 4.8–10.8)

## 2023-07-12 PROCEDURE — 6370000000 HC RX 637 (ALT 250 FOR IP)

## 2023-07-12 PROCEDURE — 6370000000 HC RX 637 (ALT 250 FOR IP): Performed by: PHYSICIAN ASSISTANT

## 2023-07-12 PROCEDURE — 97530 THERAPEUTIC ACTIVITIES: CPT

## 2023-07-12 PROCEDURE — 80048 BASIC METABOLIC PNL TOTAL CA: CPT

## 2023-07-12 PROCEDURE — 6370000000 HC RX 637 (ALT 250 FOR IP): Performed by: INTERNAL MEDICINE

## 2023-07-12 PROCEDURE — 97535 SELF CARE MNGMENT TRAINING: CPT

## 2023-07-12 PROCEDURE — 83735 ASSAY OF MAGNESIUM: CPT

## 2023-07-12 PROCEDURE — 1200000003 HC TELEMETRY R&B

## 2023-07-12 PROCEDURE — 36415 COLL VENOUS BLD VENIPUNCTURE: CPT

## 2023-07-12 PROCEDURE — 85027 COMPLETE CBC AUTOMATED: CPT

## 2023-07-12 PROCEDURE — 99232 SBSQ HOSP IP/OBS MODERATE 35: CPT | Performed by: INTERNAL MEDICINE

## 2023-07-12 PROCEDURE — 2580000003 HC RX 258: Performed by: PHYSICIAN ASSISTANT

## 2023-07-12 RX ORDER — LEVOTHYROXINE SODIUM 0.05 MG/1
50 TABLET ORAL DAILY
Status: DISCONTINUED | OUTPATIENT
Start: 2023-07-13 | End: 2023-07-13 | Stop reason: HOSPADM

## 2023-07-12 RX ADMIN — METOPROLOL TARTRATE 12.5 MG: 25 TABLET, FILM COATED ORAL at 20:52

## 2023-07-12 RX ADMIN — METOPROLOL TARTRATE 12.5 MG: 25 TABLET, FILM COATED ORAL at 09:27

## 2023-07-12 RX ADMIN — LEVOTHYROXINE SODIUM 25 MCG: 0.03 TABLET ORAL at 05:48

## 2023-07-12 RX ADMIN — SODIUM CHLORIDE, PRESERVATIVE FREE 10 ML: 5 INJECTION INTRAVENOUS at 09:28

## 2023-07-12 RX ADMIN — DILTIAZEM HYDROCHLORIDE 30 MG: 30 TABLET, FILM COATED ORAL at 09:27

## 2023-07-12 RX ADMIN — DILTIAZEM HYDROCHLORIDE 30 MG: 30 TABLET, FILM COATED ORAL at 20:52

## 2023-07-12 RX ADMIN — PANTOPRAZOLE SODIUM 40 MG: 40 TABLET, DELAYED RELEASE ORAL at 05:48

## 2023-07-12 RX ADMIN — SODIUM CHLORIDE, PRESERVATIVE FREE 10 ML: 5 INJECTION INTRAVENOUS at 20:53

## 2023-07-12 RX ADMIN — RIVAROXABAN 15 MG: 15 TABLET, FILM COATED ORAL at 09:27

## 2023-07-12 RX ADMIN — HYDROCORTISONE 5 MG: 5 TABLET ORAL at 20:52

## 2023-07-12 RX ADMIN — SODIUM CHLORIDE 1 G: 1 TABLET ORAL at 09:28

## 2023-07-12 RX ADMIN — HYDROCORTISONE 10 MG: 10 TABLET ORAL at 09:27

## 2023-07-12 ASSESSMENT — PAIN SCALES - GENERAL
PAINLEVEL_OUTOF10: 0
PAINLEVEL_OUTOF10: 0

## 2023-07-12 NOTE — DISCHARGE INSTR - COC
[facility]    Dialysis Facility (if applicable)   Name:  Address:  Dialysis Schedule:  Phone:  Fax:    / signature: Electronically signed by MIKE Nieves on 7/12/23 at 7:45 AM EDT    PHYSICIAN SECTION    Prognosis: Good    Condition at Discharge: Stable    Rehab Potential (if transferring to Rehab): Good    Recommended Labs or Other Treatments After Discharge:   Patient to take Levothyroxine 50mcg daily, Cortef 10mg in the morning and 5mg at before bedtime. She will need a BMP in approximately 1 week. She needs to call Dr. Ivy Barrientos office to schedule an appointment with him within approximately 2 weeks if she does not already have on scheduled. Patient needs to follow up with Neurosurgery OP for monitoring of the Pituitary Adenoma. She needs to follow up with Dr. Edgard Lindsay in 2-3 weeks for evaluation of her grafts sites. Physician Certification: I certify the above information and transfer of Magi Dowling  is necessary for the continuing treatment of the diagnosis listed and that she requires [address] for greater 30 days.      Update Admission H&P: No change in H&P    PHYSICIAN SIGNATURE:  Electronically signed by Cristy Avila DO on 7/13/23 at 9:19 AM EDT

## 2023-07-12 NOTE — PLAN OF CARE
Problem: Discharge Planning  Goal: Discharge to home or other facility with appropriate resources  7/12/2023 1004 by Fernanda Clifton RN  Outcome: Progressing  Flowsheets (Taken 7/12/2023 0827)  Discharge to home or other facility with appropriate resources: Identify barriers to discharge with patient and caregiver     Problem: Safety - Adult  Goal: Free from fall injury  7/12/2023 1004 by Fernanda Clifton RN  Outcome: Progressing  All fall precautions in place. Bed in low position, alarm activated and appropriate use of call light. Problem: Skin/Tissue Integrity  Goal: Absence of new skin breakdown  Description: 1. Monitor for areas of redness and/or skin breakdown  2. Assess vascular access sites hourly  3. Every 4-6 hours minimum:  Change oxygen saturation probe site  4. Every 4-6 hours:  If on nasal continuous positive airway pressure, respiratory therapy assess nares and determine need for appliance change or resting period. 7/12/2023 1004 by Fernanda Clifton RN  Outcome: Progressing  No skin breakdown this shift. Patient being assisted with turning. Patients states understanding of repositioning every two hours.      Problem: Pain  Goal: Verbalizes/displays adequate comfort level or baseline comfort level  7/12/2023 1004 by Fernanda Clifton RN  Outcome: Progressing  Flowsheets (Taken 7/12/2023 0827)  Verbalizes/displays adequate comfort level or baseline comfort level:   Encourage patient to monitor pain and request assistance   Assess pain using appropriate pain scale   Implement non-pharmacological measures as appropriate and evaluate response     Problem: Metabolic/Fluid and Electrolytes - Adult  Goal: Electrolytes maintained within normal limits  7/12/2023 1004 by Fernanda Clifton RN  Outcome: Progressing  Flowsheets (Taken 7/12/2023 0827)  Electrolytes maintained within normal limits: Monitor labs and assess patient for signs and symptoms of electrolyte imbalances     Problem: Metabolic/Fluid and Electrolytes

## 2023-07-12 NOTE — PALLIATIVE CARE
Initial Evaluation        Patient:   Griselda Perez  YOB: 1934  Age:  80 y.o. Room:  [facility]  MRN:  [id number]   Acct: [de-identified]    Date of Admission:  7/6/2023 10:33 AM  Date of Service:  7/12/2023  Completed By:  Juanis Velazquez RN                 Reason for Palliative Care Evaluation:-               [x] Code Status Discussion              [x] Goals of Care              [] Pain/Symptom Management               [] Emotional Support              [] Other:                    Current Issues:-   []  Pain  [x]  Fatigue  []  Nausea  []  Anxiety  []  Depression  []  Shortness of Breath  []  Constipation  []  Appetite  []  [id number]              Advance Directives:-  None on file, states has at home  [] [place] DNR Form  [] Living Will  [] Medical POA              Current Code Status:-     [x] Full Resuscitation  [] DNR-Comfort Care-Arrest  [] DNR-Comfort Care       [] Limited Resuscitation             [] No CPR            [] No shock            [] No ET intubation/reintubation            [] No resuscitative medications            [] Other limitation:               Palliative Performance Status:-      [] 100%  Full ambulation; normal activity and work; no evidence of disease; able to do own self care; normal intake; fully conscious     [] 90%   Full ambulation; normal activity and work; some evidence of disease; able to do own self care; normal intake; fully conscious    [] 80%   Full ambulation; normal activity with effort; some evidence of disease; able to do own self care; normal or reduced intake; fully conscious    [] 70%  Ambulation reduced; unable to perform normal job/work; significant  disease; able to do own self care; normal or reduced intake; fully conscious    [] 60%  Ambulation reduced; Significant disease;Can't do hobbies/housework; intake normal or reduced; occasional assist; LOC full/confusion    [x] 50%  Mainly sit/lie;  Extensive disease; Can't do any work; Considerable

## 2023-07-12 NOTE — CARE COORDINATION
7/12/23, 8:47 AM EDT    DISCHARGE PLANNING EVALUATION    SW left message with Faith Alexander with [facility] to initiate pre-cert today. 10:08 AM update:  SW received call from Faith Alexander with [facility], insurance has approved admission for 72 hrs from today. [facility] is requesting that on the day of discharge pt be admitted in the ECF in the morning as they will need to have PT eval pt same day. WESTON Tobias updated.

## 2023-07-12 NOTE — CARE COORDINATION
7/12/23, 7:34 AM EDT    DISCHARGE ON GOING EVALUATION    [facility] day: 3  Location: -27/027-A Reason for admit: Hyponatremia [E87.1]  Generalized weakness [R53.1]  Adrenal insufficiency (Raffaele's disease) ([address]) [E27.1]   Procedure: 7/9 MRI brain-  Abnormal pituitary gland which measures 12 x 12 x 8 mm in size  Barriers to Discharge: Neurosurgery following, plan pending further work-up. Precert required for ECF. PCP: Nydia Pepper MD  Readmission Risk Score: 16.3%  Patient Goals/Plan/Treatment Preferences: [facility].

## 2023-07-12 NOTE — PLAN OF CARE
Problem: Discharge Planning  Goal: Discharge to home or other facility with appropriate resources  Outcome: Progressing  Flowsheets (Taken 7/12/2023 0136)  Discharge to home or other facility with appropriate resources: Identify barriers to discharge with patient and caregiver     Problem: Safety - Adult  Goal: Free from fall injury  Outcome: Progressing     Problem: ABCDS Injury Assessment  Goal: Absence of physical injury  Outcome: Progressing  Flowsheets (Taken 7/12/2023 0136)  Absence of Physical Injury: Implement safety measures based on patient assessment     Problem: Skin/Tissue Integrity  Goal: Absence of new skin breakdown  Description: 1. Monitor for areas of redness and/or skin breakdown  2. Assess vascular access sites hourly  3. Every 4-6 hours minimum:  Change oxygen saturation probe site  4. Every 4-6 hours:  If on nasal continuous positive airway pressure, respiratory therapy assess nares and determine need for appliance change or resting period.   Outcome: Progressing     Problem: Pain  Goal: Verbalizes/displays adequate comfort level or baseline comfort level  Outcome: Progressing     Problem: Metabolic/Fluid and Electrolytes - Adult  Goal: Electrolytes maintained within normal limits  Outcome: Progressing     Problem: Metabolic/Fluid and Electrolytes - Adult  Goal: Hemodynamic stability and optimal renal function maintained  Outcome: Progressing     Problem: Skin/Tissue Integrity - Adult  Goal: Skin integrity remains intact  Outcome: Progressing  Flowsheets  Taken 7/12/2023 0136 by Noni Araujo RN  Skin Integrity Remains Intact:   Monitor for areas of redness and/or skin breakdown   Assess vascular access sites hourly

## 2023-07-13 VITALS
SYSTOLIC BLOOD PRESSURE: 106 MMHG | DIASTOLIC BLOOD PRESSURE: 68 MMHG | WEIGHT: 152 LBS | BODY MASS INDEX: 25.33 KG/M2 | TEMPERATURE: 97.5 F | OXYGEN SATURATION: 97 % | HEIGHT: 65 IN | HEART RATE: 96 BPM | RESPIRATION RATE: 16 BRPM

## 2023-07-13 LAB
ALDOST SERPL-MCNC: 10 NG/DL
ANION GAP SERPL CALC-SCNC: 9 MEQ/L (ref 8–16)
BUN SERPL-MCNC: 28 MG/DL (ref 7–22)
CALCIUM SERPL-MCNC: 8.8 MG/DL (ref 8.5–10.5)
CHLORIDE SERPL-SCNC: 99 MEQ/L (ref 98–111)
CO2 SERPL-SCNC: 26 MEQ/L (ref 23–33)
CREAT SERPL-MCNC: 1.1 MG/DL (ref 0.4–1.2)
GFR SERPL CREATININE-BSD FRML MDRD: 48 ML/MIN/1.73M2
GH SERPL-MCNC: 0.18 NG/ML (ref 0.05–8)
GLUCOSE SERPL-MCNC: 87 MG/DL (ref 70–108)
MAGNESIUM SERPL-MCNC: 2.5 MG/DL (ref 1.6–2.4)
POTASSIUM SERPL-SCNC: 4.2 MEQ/L (ref 3.5–5.2)
SODIUM SERPL-SCNC: 134 MEQ/L (ref 135–145)

## 2023-07-13 PROCEDURE — 97530 THERAPEUTIC ACTIVITIES: CPT

## 2023-07-13 PROCEDURE — 6370000000 HC RX 637 (ALT 250 FOR IP): Performed by: STUDENT IN AN ORGANIZED HEALTH CARE EDUCATION/TRAINING PROGRAM

## 2023-07-13 PROCEDURE — 6370000000 HC RX 637 (ALT 250 FOR IP)

## 2023-07-13 PROCEDURE — 80048 BASIC METABOLIC PNL TOTAL CA: CPT

## 2023-07-13 PROCEDURE — 83735 ASSAY OF MAGNESIUM: CPT

## 2023-07-13 PROCEDURE — 36415 COLL VENOUS BLD VENIPUNCTURE: CPT

## 2023-07-13 PROCEDURE — 6370000000 HC RX 637 (ALT 250 FOR IP): Performed by: PHYSICIAN ASSISTANT

## 2023-07-13 PROCEDURE — 2580000003 HC RX 258: Performed by: PHYSICIAN ASSISTANT

## 2023-07-13 RX ORDER — LEVOTHYROXINE SODIUM 0.05 MG/1
50 TABLET ORAL DAILY
Qty: 30 TABLET | Refills: 3 | DISCHARGE
Start: 2023-07-14

## 2023-07-13 RX ORDER — HYDROCORTISONE 10 MG/1
10 TABLET ORAL DAILY
DISCHARGE
Start: 2023-07-13

## 2023-07-13 RX ORDER — HYDROCORTISONE 5 MG/1
5 TABLET ORAL NIGHTLY
DISCHARGE
Start: 2023-07-13

## 2023-07-13 RX ADMIN — LEVOTHYROXINE SODIUM 50 MCG: 0.05 TABLET ORAL at 06:10

## 2023-07-13 RX ADMIN — METOPROLOL TARTRATE 12.5 MG: 25 TABLET, FILM COATED ORAL at 09:03

## 2023-07-13 RX ADMIN — PANTOPRAZOLE SODIUM 40 MG: 40 TABLET, DELAYED RELEASE ORAL at 06:09

## 2023-07-13 RX ADMIN — HYDROCORTISONE 10 MG: 10 TABLET ORAL at 09:02

## 2023-07-13 RX ADMIN — SODIUM CHLORIDE, PRESERVATIVE FREE 10 ML: 5 INJECTION INTRAVENOUS at 09:02

## 2023-07-13 RX ADMIN — RIVAROXABAN 15 MG: 15 TABLET, FILM COATED ORAL at 09:03

## 2023-07-13 RX ADMIN — DILTIAZEM HYDROCHLORIDE 30 MG: 30 TABLET, FILM COATED ORAL at 09:03

## 2023-07-13 NOTE — PLAN OF CARE
Problem: Discharge Planning  Goal: Discharge to home or other facility with appropriate resources  Outcome: Progressing  Flowsheets (Taken 7/13/2023 0120)  Discharge to home or other facility with appropriate resources: Identify barriers to discharge with patient and caregiver     Problem: Safety - Adult  Goal: Free from fall injury  Outcome: Progressing  Flowsheets (Taken 7/13/2023 0120)  Free From Fall Injury:   Instruct family/caregiver on patient safety   Based on caregiver fall risk screen, instruct family/caregiver to ask for assistance with transferring infant if caregiver noted to have fall risk factors     Problem: ABCDS Injury Assessment  Goal: Absence of physical injury  Outcome: Progressing  Flowsheets (Taken 7/13/2023 0120)  Absence of Physical Injury: Implement safety measures based on patient assessment     Problem: Skin/Tissue Integrity  Goal: Absence of new skin breakdown  Description: 1. Monitor for areas of redness and/or skin breakdown  2. Assess vascular access sites hourly  3. Every 4-6 hours minimum:  Change oxygen saturation probe site  4. Every 4-6 hours:  If on nasal continuous positive airway pressure, respiratory therapy assess nares and determine need for appliance change or resting period.   Outcome: Progressing     Problem: Pain  Goal: Verbalizes/displays adequate comfort level or baseline comfort level  Outcome: Progressing     Problem: Metabolic/Fluid and Electrolytes - Adult  Goal: Electrolytes maintained within normal limits  Outcome: Progressing     Problem: Metabolic/Fluid and Electrolytes - Adult  Goal: Electrolytes maintained within normal limits  Outcome: Progressing     Problem: Metabolic/Fluid and Electrolytes - Adult  Goal: Hemodynamic stability and optimal renal function maintained  Outcome: Progressing     Problem: Respiratory - Adult  Goal: Achieves optimal ventilation and oxygenation  Outcome: Progressing     Problem: Cardiovascular - Adult  Goal: Maintains optimal

## 2023-07-13 NOTE — DISCHARGE INSTRUCTIONS
Patient to take Levothyroxine 50mcg daily, Cortef 10mg in the morning and 5mg at before bedtime. She will need a BMP in approximately 1 week. She needs to call Dr. Lolly Collins office to schedule an appointment with him within approximately 2 weeks. Lopressor dosing has been changed form 25mg BID to 12.5mg BID. Patient needs to follow up with Neurosurgery OP for monitoring of the Pituitary Adenoma. She needs to follow up with Dr. Alondra Galeas in 2-3 weeks for evaluation of her grafts sites.

## 2023-07-13 NOTE — DISCHARGE SUMMARY
rhonchi. Cardiovascular: Normal rate, regular rhythm with normal S1/S2 without murmurs. No lower extremity edema. Abdomen: Soft, non-tender, non-distended with normal bowel sounds. Musculoskeletal: No joint swelling or tenderness. Normal tone. No abnormal movements. Skin: Warm and dry. No rashes . Right ant chest wound site; sutures removed. BLE skin changes; likely age related. Neurologic:  No focal sensory/motor deficits in the upper or lower extremities. Cranial nerves:  grossly non-focal 2-12. Psychiatric: Alert and oriented, normal insight and thought content. Capillary Refill: Brisk,< 3 seconds. Peripheral Pulses: +2 palpable, equal bilaterally. Labs: For convenience the most recent labs are provided:  CBC:    Lab Results   Component Value Date/Time    WBC 6.8 07/12/2023 05:19 AM    HGB 13.4 07/12/2023 05:19 AM    HCT 42.2 07/12/2023 05:19 AM     07/12/2023 05:19 AM     Renal:    Lab Results   Component Value Date/Time     07/13/2023 05:28 AM    K 4.2 07/13/2023 05:28 AM    K 4.1 07/07/2023 04:27 AM    CL 99 07/13/2023 05:28 AM    CO2 26 07/13/2023 05:28 AM    BUN 28 07/13/2023 05:28 AM    CREATININE 1.1 07/13/2023 05:28 AM    CALCIUM 8.8 07/13/2023 05:28 AM    PHOS 2.9 06/23/2023 05:46 AM     Liver:   Lab Results   Component Value Date/Time    AST 24 07/06/2023 10:50 AM    ALT 14 07/06/2023 10:50 AM         Significant Diagnostic Studies    Radiology:   MRI BRAIN W WO CONTRAST   Final Result      1. Enlarged pituitary gland measuring 11 x 11 x 8 mm in size. 2. Cystic or proteinaceous area in the suprasellar region measuring 8.2 x 7.9 x 10 mm in size. 3. No definite compression upon the optic chiasm. The infundibulum is in the midline. There is no extension into the cavernous sinuses. 4. 12 x 7 x 8 mm cystic area in the region of the fossa of Rosenmuller on the left side. **This report has been created using voice recognition software.  It may contain minor errors

## 2023-07-13 NOTE — FLOWSHEET NOTE
07/13/23 2205   Safe Environment   Safety Measures Standard Safety Measures; Family at bedside  (Virtual nurse safety round completed.)     Patient sitting in chair. Family in the room. Physician came in to visit patient before VN could ask if her side of the camera was on. Did not interrupt physician.

## 2023-07-13 NOTE — PLAN OF CARE
Problem: Discharge Planning  Goal: Discharge to home or other facility with appropriate resources  7/13/2023 0956 by Eliel Espinal RN  Outcome: Adequate for Discharge  7/13/2023 0120 by Phoebe Boswell RN  Outcome: Progressing  Flowsheets (Taken 7/13/2023 0120)  Discharge to home or other facility with appropriate resources: Identify barriers to discharge with patient and caregiver     Problem: Safety - Adult  Goal: Free from fall injury  7/13/2023 0956 by Eliel Espinal RN  Outcome: Adequate for Discharge  7/13/2023 0120 by Phoebe Boswell RN  Outcome: Progressing  Flowsheets (Taken 7/13/2023 0120)  Free From Fall Injury:   Instruct family/caregiver on patient safety   Based on caregiver fall risk screen, instruct family/caregiver to ask for assistance with transferring infant if caregiver noted to have fall risk factors     Problem: ABCDS Injury Assessment  Goal: Absence of physical injury  7/13/2023 0956 by Eliel Espinal RN  Outcome: Adequate for Discharge  7/13/2023 0120 by Phoebe Boswell RN  Outcome: Progressing  Flowsheets (Taken 7/13/2023 0120)  Absence of Physical Injury: Implement safety measures based on patient assessment     Problem: Skin/Tissue Integrity  Goal: Absence of new skin breakdown  Description: 1. Monitor for areas of redness and/or skin breakdown  2. Assess vascular access sites hourly  3. Every 4-6 hours minimum:  Change oxygen saturation probe site  4. Every 4-6 hours:  If on nasal continuous positive airway pressure, respiratory therapy assess nares and determine need for appliance change or resting period.   7/13/2023 0956 by Eliel Espinal RN  Outcome: Adequate for Discharge  7/13/2023 0120 by Phoebe Boswell RN  Outcome: Progressing     Problem: Pain  Goal: Verbalizes/displays adequate comfort level or baseline comfort level  7/13/2023 0956 by Eliel Espinal RN  Outcome: Adequate for Discharge  7/13/2023 0120 by Phoebe Boswell RN  Outcome: Progressing

## 2023-07-13 NOTE — CARE COORDINATION
7/13/23, 10:03 AM EDT    Patient goals/plan/ treatment preferences discussed by  and . Patient goals/plan/ treatment preferences reviewed with patient/ family. Patient/ family verbalize understanding of discharge plan and are in agreement with goal/plan/treatment preferences. Understanding was demonstrated using the teach back method. AVS provided by RN at time of discharge, which includes all necessary medical information pertaining to the patients current course of illness, treatment, post-discharge goals of care, and treatment preferences. Services At/After Discharge: [facility] (SNF), Aide services, Nursing service, OT, and PT       IMM Letter  IMM Letter given to Patient/Family/Significant other/Guardian/POA/by[de-identified] Fran Bae CM  IMM Letter date given[de-identified] 07/13/23  IMM Letter time given[de-identified] 6519  Observation Status Letter date given[de-identified] 07/06/23  Observation Status Letter time given[de-identified] 3016  Observation Status Letter given to Patient/Family/Significant other/Guardian/POA/by[de-identified] staff     Pt is being discharged today to [facility]. Pre-cert has been approved. WILLIAMS notified Kelli House at [facility]. Pt and daughter are aware. Daughter to transport pt. WILLIAMS faxed AVS.  RN Felicia Recinos is aware.

## 2023-07-14 ENCOUNTER — CLINICAL DOCUMENTATION ONLY (OUTPATIENT)
Facility: CLINIC | Age: 88
End: 2023-07-14

## 2023-07-20 ENCOUNTER — TELEPHONE (OUTPATIENT)
Dept: FAMILY MEDICINE CLINIC | Age: 88
End: 2023-07-20

## 2023-07-20 NOTE — TELEPHONE ENCOUNTER
[facility] calling from [address] and patient is being discharged 7/21/23. Scheduled with ZACHARY Arevalo NP 8/1/23

## 2023-07-24 ENCOUNTER — CLINICAL DOCUMENTATION ONLY (OUTPATIENT)
Facility: CLINIC | Age: 88
End: 2023-07-24

## 2023-07-24 NOTE — TELEPHONE ENCOUNTER
Care Transitions Initial Follow Up Call    Outreach made within 2 business days of discharge: Yes    Patient: Anthony Michael Patient : 1934   MRN: [id number]  Reason for Admission: Hyponatremia  Discharge Date: 23       Spoke with: pt    Discharge department/facility: [facility] Patient Contact:  Was patient able to fill all prescriptions: Yes  Was patient instructed to bring all medications to the follow-up visit: Yes  Is patient taking all medications as directed in the discharge summary?  Yes  Does patient understand their discharge instructions: Yes  Does patient have questions or concerns that need addressed prior to 7-14 day follow up office visit: no    Scheduled appointment with PCP within 7-14 days    Follow Up  Future Appointments   Date Time Provider [address] [place] [address]   2023 11:45 AM Angela Bartlett MD [facility]   2023  1:00 PM MD SAMUEL Bang [facility]   2023  2:20 PM VASU Ahuja - CNP [facility]   10/4/2023  1:00 PM DO ARRON Werner [facility]   2023 10:20 AM MD [facility]UEL [facility] Shelley Voss LPN

## 2023-07-27 ENCOUNTER — HOSPITAL ENCOUNTER (OUTPATIENT)
Age: 88
Discharge: HOME | End: 2023-07-27
Payer: MEDICARE

## 2023-07-27 DIAGNOSIS — E03.9 ACQUIRED HYPOTHYROIDISM: ICD-10-CM

## 2023-07-27 DIAGNOSIS — D49.7 PITUITARY TUMOR: ICD-10-CM

## 2023-07-27 DIAGNOSIS — E27.40 ADRENAL INSUFFICIENCY (HCC): ICD-10-CM

## 2023-07-27 LAB
ANION GAP SERPL CALC-SCNC: 11 MEQ/L (ref 8–16)
BUN SERPL-MCNC: 32 MG/DL (ref 7–22)
CALCIUM SERPL-MCNC: 9.5 MG/DL (ref 8.5–10.5)
CHLORIDE SERPL-SCNC: 100 MEQ/L (ref 98–111)
CO2 SERPL-SCNC: 26 MEQ/L (ref 23–33)
CREAT SERPL-MCNC: 1.1 MG/DL (ref 0.4–1.2)
GFR SERPL CREATININE-BSD FRML MDRD: 48 ML/MIN/1.73M2
GLUCOSE SERPL-MCNC: 103 MG/DL (ref 70–108)
POTASSIUM SERPL-SCNC: 4.5 MEQ/L (ref 3.5–5.2)
PROLACTIN SERPL-MCNC: 27.5 NG/ML
RENIN PLAS-CCNC: 0.3 NG/ML/HR
SODIUM SERPL-SCNC: 137 MEQ/L (ref 135–145)
T4 FREE SERPL-MCNC: 1.47 NG/DL (ref 0.93–1.76)
TSH SERPL DL<=0.005 MIU/L-ACNC: 0.75 UIU/ML (ref 0.4–4.2)

## 2023-07-27 PROCEDURE — 84305 ASSAY OF SOMATOMEDIN: CPT

## 2023-07-27 PROCEDURE — 36415 COLL VENOUS BLD VENIPUNCTURE: CPT

## 2023-07-27 PROCEDURE — 84443 ASSAY THYROID STIM HORMONE: CPT

## 2023-07-27 PROCEDURE — 80048 BASIC METABOLIC PNL TOTAL CA: CPT

## 2023-07-27 PROCEDURE — 84146 ASSAY OF PROLACTIN: CPT

## 2023-07-27 PROCEDURE — 84439 ASSAY OF FREE THYROXINE: CPT

## 2023-07-30 LAB
IGF-I SERPL-MCNC: 132 NG/ML
IGF-I Z-SCORE SERPL: NORMAL

## 2023-07-31 ENCOUNTER — TELEPHONE (OUTPATIENT)
Dept: NEPHROLOGY | Age: 88
End: 2023-07-31

## 2023-07-31 NOTE — TELEPHONE ENCOUNTER
Patients  called and stated he knows she was to have an appt today however there are tooooo many appts set up and it is hard to get her to them.   states she had blood work last week and is wondering if you could just look over them for now???

## 2023-08-01 ENCOUNTER — OFFICE VISIT (OUTPATIENT)
Dept: FAMILY MEDICINE CLINIC | Age: 88
End: 2023-08-01

## 2023-08-01 VITALS
DIASTOLIC BLOOD PRESSURE: 80 MMHG | WEIGHT: 154 LBS | HEIGHT: 65 IN | BODY MASS INDEX: 25.66 KG/M2 | SYSTOLIC BLOOD PRESSURE: 114 MMHG | OXYGEN SATURATION: 97 % | HEART RATE: 67 BPM

## 2023-08-01 DIAGNOSIS — E27.40 ADRENAL INSUFFICIENCY (HCC): ICD-10-CM

## 2023-08-01 DIAGNOSIS — I48.19 PERSISTENT ATRIAL FIBRILLATION (HCC): Primary | ICD-10-CM

## 2023-08-01 DIAGNOSIS — D49.7 PITUITARY TUMOR: ICD-10-CM

## 2023-08-01 SDOH — ECONOMIC STABILITY: FOOD INSECURITY: WITHIN THE PAST 12 MONTHS, THE FOOD YOU BOUGHT JUST DIDN'T LAST AND YOU DIDN'T HAVE MONEY TO GET MORE.: NEVER TRUE

## 2023-08-01 SDOH — ECONOMIC STABILITY: INCOME INSECURITY: HOW HARD IS IT FOR YOU TO PAY FOR THE VERY BASICS LIKE FOOD, HOUSING, MEDICAL CARE, AND HEATING?: NOT HARD AT ALL

## 2023-08-01 SDOH — ECONOMIC STABILITY: FOOD INSECURITY: WITHIN THE PAST 12 MONTHS, YOU WORRIED THAT YOUR FOOD WOULD RUN OUT BEFORE YOU GOT MONEY TO BUY MORE.: NEVER TRUE

## 2023-08-02 ENCOUNTER — TELEPHONE (OUTPATIENT)
Dept: FAMILY MEDICINE CLINIC | Age: 88
End: 2023-08-02

## 2023-08-02 NOTE — TELEPHONE ENCOUNTER
----- Message from Roxane Melisa sent at 8/2/2023  1:58 PM EDT -----  Subject: Message to Provider    QUESTIONS  Information for Provider? Pt Anastasia Cabrales was concerned about why at her   last hospital stay that they lower her XARELTO. Nurse from [facility] called. Thanks  ---------------------------------------------------------------------------  --------------  Jaycee August INFO  [phone]; OK to leave message on voicemail  ---------------------------------------------------------------------------  --------------  SCRIPT ANSWERS  Relationship to Patient? Covered Entity  Covered Entity Type? Home Health Care? Representative Name?  Renita Medley

## 2023-08-03 ASSESSMENT — ENCOUNTER SYMPTOMS
DIARRHEA: 0
CONSTIPATION: 0
BLOOD IN STOOL: 0
SHORTNESS OF BREATH: 0
ABDOMINAL PAIN: 0

## 2023-08-06 ENCOUNTER — CLINICAL DOCUMENTATION (OUTPATIENT)
Dept: INTERNAL MEDICINE CLINIC | Age: 88
End: 2023-08-06

## 2023-08-06 DIAGNOSIS — D35.2 PITUITARY MACROADENOMA (HCC): Primary | ICD-10-CM

## 2023-08-09 ENCOUNTER — TELEPHONE (OUTPATIENT)
Dept: FAMILY MEDICINE CLINIC | Age: 88
End: 2023-08-09
Payer: MEDICARE

## 2023-08-09 DIAGNOSIS — I10 ESSENTIAL HYPERTENSION: ICD-10-CM

## 2023-08-09 DIAGNOSIS — S22.010D: Primary | ICD-10-CM

## 2023-08-09 DIAGNOSIS — I25.10 ATHEROSCLEROSIS OF NATIVE CORONARY ARTERY OF NATIVE HEART, UNSPECIFIED WHETHER ANGINA PRESENT: ICD-10-CM

## 2023-08-09 DIAGNOSIS — I48.19 PERSISTENT ATRIAL FIBRILLATION (HCC): ICD-10-CM

## 2023-08-09 DIAGNOSIS — E27.40 UNSPECIFIED ADRENOCORTICAL INSUFFICIENCY (HCC): ICD-10-CM

## 2023-08-09 PROCEDURE — G0179 MD RECERTIFICATION HHA PT: HCPCS | Performed by: FAMILY MEDICINE

## 2023-08-09 NOTE — TELEPHONE ENCOUNTER
[facility] reviewed and certification completed for patient Ko Coyne  service dates 7/24/2023-9/21/2023. Verified current medications. Physicians time spent on activities to coordinate service, documenting, medical decision making, review of reports/treatment plans/test results is 15 minutes.

## 2023-08-15 RX ORDER — LEVOTHYROXINE SODIUM 0.05 MG/1
50 TABLET ORAL DAILY
Qty: 30 TABLET | Refills: 3 | Status: SHIPPED | OUTPATIENT
Start: 2023-08-15

## 2023-08-15 NOTE — TELEPHONE ENCOUNTER
----- Message from Lew Birch sent at 8/14/2023  4:12 PM EDT -----  Subject: Refill Request    QUESTIONS  Name of Medication? levothyroxine (SYNTHROID) 50 MCG tablet  Patient-reported dosage and instructions? once a day  How many days do you have left? 6  Preferred Pharmacy? [facility]  Pharmacy phone number (if available)? [phone]  ---------------------------------------------------------------------------  --------------,  Name of Medication? metoprolol tartrate (LOPRESSOR) 25 MG tablet  Patient-reported dosage and instructions? half a tab twice a day  How many days do you have left? 6  Preferred Pharmacy? [facility] phone number (if available)? [phone]  ---------------------------------------------------------------------------  --------------,  Name of Medication? rivaroxaban (XARELTO) 15 MG TABS tablet  Patient-reported dosage and instructions? once a day  How many days do you have left? 6  Preferred Pharmacy? [facility] phone number (if available)? [phone]  ---------------------------------------------------------------------------  --------------  CALL BACK INFO  What is the best way for the office to contact you? OK to leave message on   voicemail  Preferred Call Back Phone Number? [phone]  ---------------------------------------------------------------------------  --------------  SCRIPT ANSWERS  Relationship to Patient? Other/Third Party  Representative Name? Tawana López  Is the representative on the Communication Release of Information (ELIJAH)   form in Epic?  Yes

## 2023-08-15 NOTE — TELEPHONE ENCOUNTER
Rosie Cadet called requesting a refill of the below medication which has been pended for you:     Requested Prescriptions     Pending Prescriptions Disp Refills    levothyroxine (SYNTHROID) 50 MCG tablet 30 tablet 3     Sig: Take 1 tablet by mouth Daily    rivaroxaban (XARELTO) 15 MG TABS tablet 30 tablet 3     Sig: Take 1 tablet by mouth daily    metoprolol tartrate (LOPRESSOR) 25 MG tablet 60 tablet 3     Sig: Take 0.5 tablets by mouth 2 times daily       Last Appointment Date: 5/23/2023  Next Appointment Date: 12/19/2023    Allergies   Allergen Reactions    Statins Other (See Comments)     Causes severe leg cramps     Tape [Adhesive Tape] Other (See Comments)     \"sticks to skin\" causes redness    Tizanidine Hcl Other (See Comments)     Having issues with her liver- elevated her enzyme level    Tramadol Other (See Comments)     \"makes me Goofy\"    Augmentin [Amoxicillin-Pot Clavulanate] Diarrhea, Nausea And Vomiting and Other (See Comments)     abd pain    Nystatin Nausea And Vomiting     Pt states \"swish and swallow\"

## 2023-09-15 NOTE — PROGRESS NOTES
[facility]   Nephrology progress note  10/17/2022, 1:18 PM      Pt Name:    Victor Hugo Martinez  MRN:     [id number]     YOB: 1934  Admit Date:    10/15/2022 11:41 PM    Chief Complaint: Nephrology following for hyponatremia. Subjective:  Patient seen and examined  No chest pain or shortness of breath  Feels okay eating and drinking better    Objective:  24HR INTAKE/OUTPUT:  No intake or output data in the 24 hours ending 10/17/22 1318   Admission weight: 150 lb (68 kg)  Wt Readings from Last 3 Encounters:   10/15/22 150 lb (68 kg)   10/12/22 166 lb 14 oz (75.7 kg)   10/04/22 156 lb 9.6 oz (71 kg)        Vitals :   Vitals:    10/16/22 2045 10/17/22 0055 10/17/22 0353 10/17/22 0845   BP: (!) 141/65 (!) 142/92 (!) 145/84 (!) 142/86   Pulse: 62 90 97 74   Resp: 18 16 18 10   Temp: 98.5 °F (36.9 °C) 98.6 °F (37 °C) 98.4 °F (36.9 °C) 97.9 °F (36.6 °C)   TempSrc: Oral Oral Oral Oral   SpO2: 91% 96% 94% 94%   Weight:       Height:           Physical examination  General Appearance:  Well developed.  No distress  Mouth/Throat:  Oral mucosa moist  Neck:  Supple, no JVD  Lungs:  Breath sounds: clear  Heart[de-identified]  S1,S2 heard  Abdomen:  Soft, non - tender  Musculoskeletal:  Edema -no edema noted    Medications:  Infusion:    sodium chloride 100 mL/hr at 10/16/22 0100    sodium chloride      sodium chloride 100 mL/hr at 10/16/22 2053     Meds:    metoprolol tartrate  25 mg Oral BID    propafenone  150 mg Oral BID    rivaroxaban  20 mg Oral Daily with breakfast    sodium chloride flush  10 mL IntraVENous 2 times per day    pantoprazole  40 mg IntraVENous Daily    cefTRIAXone (ROCEPHIN) IV  1,000 mg IntraVENous Q24H       Lab Data :  CBC:   Recent Labs     10/16/22  0003 10/17/22  0056   WBC 3.9* 5.0   HGB 14.2 13.6   HCT 41.2 41.0    187     CMP:  Recent Labs     10/16/22  0003 10/16/22  0515 10/16/22  1326 10/16/22  2049 10/17/22  0056 10/17/22  0739   * 125*   < > 126* 126* 128*   K 3.4* 3.5  --   --  3.9  --    CL 87* 88*  --   --   --   --    CO2 26 27  --   --   --   --    BUN 14 12  --   --   --   --    CREATININE 0.7 0.8  --   --   --   --    GLUCOSE 116* 90  --   --   --   --    CALCIUM 8.6 8.4*  --   --   --   --    MG 1.8 1.8  --   --   --   --     < > = values in this interval not displayed. Hepatic:   Recent Labs     10/16/22  0003   LABALBU 3.0*   AST 30   ALT 20   BILITOT 0.6   ALKPHOS 96     Assessment and Plan:  Renal -renal function appears to be stable at baseline  Hyponatremia most likely due to volume depletion from vomiting and diarrhea currently on normal saline at 100 mL/h and sodium rising steadily  Will continue for now also continue 1500 mL fluid restriction  BMP in the morning  Will give one-time dose of salt tablet 2 g    Electrolytes -hypokalemia better we will add a BMP to the labs today  Diarrhea  Possible urinary tract infection on antibiotics  COVID-19 positive  Hx of paroxysmal atrial fibrillation  Meds reviewed and discussed with patient    Estuardo Moya MD  [facility]    This report has been created using voice recognition software.  It may contain minor errors which are inherent in voice recognition technology room air

## 2023-09-28 ENCOUNTER — PROCEDURE VISIT (OUTPATIENT)
Dept: OBGYN | Age: 88
End: 2023-09-28
Payer: MEDICARE

## 2023-09-28 VITALS
DIASTOLIC BLOOD PRESSURE: 74 MMHG | SYSTOLIC BLOOD PRESSURE: 114 MMHG | BODY MASS INDEX: 25.92 KG/M2 | HEIGHT: 65 IN | WEIGHT: 155.6 LBS | HEART RATE: 83 BPM | OXYGEN SATURATION: 96 %

## 2023-09-28 DIAGNOSIS — N81.4 PELVIC RELAXATION DUE TO UTEROVAGINAL PROLAPSE: ICD-10-CM

## 2023-09-28 DIAGNOSIS — Z96.0 PRESENCE OF PESSARY: ICD-10-CM

## 2023-09-28 DIAGNOSIS — Z46.89 ENCOUNTER FOR PESSARY MAINTENANCE: Primary | ICD-10-CM

## 2023-09-28 PROCEDURE — 1123F ACP DISCUSS/DSCN MKR DOCD: CPT | Performed by: OBSTETRICS & GYNECOLOGY

## 2023-09-28 PROCEDURE — 99213 OFFICE O/P EST LOW 20 MIN: CPT

## 2023-09-28 PROCEDURE — 99213 OFFICE O/P EST LOW 20 MIN: CPT | Performed by: OBSTETRICS & GYNECOLOGY

## 2023-09-28 NOTE — PROGRESS NOTES
Patient was given a handout regarding Dorothy Andrade / Caryle Curia. Patient was informed that as of 10/1/2023 [facility] will no longer be accepting her current insurance. Patient stated that she was aware of this. Patient stated that she will try to switch her insurance and would like to continue receiving services at this facility.

## 2023-09-28 NOTE — PROGRESS NOTES
Kaya Rocha  2023  12:03 PM          Kaya Rocha is a 80 y.o. female       The patient was seen. She has no chief complaints today. She has been fitted for a # 4; ring with support type pessary. She states all of her symptoms that she had prior to the pessary have been relieved with its use. She denies any vaginal bleeding. She denies to any vaginal discharge or odor. Her bowels are regular and her voiding pattern is normal.     Blood pressure 114/74, pulse 83, height 5' 5\" (1.651 m), weight 155 lb 9.6 oz (70.6 kg), SpO2 96 %, not currently breastfeeding. Chaperone for Intimate Exam  Chaperone was offered and accepted as part of the rooming process. Chaperone: ELAINE        Abdomen: Soft and non-tender; good bowel sounds; no guarding, rebound or rigidity; no CVA tenderness bilaterally. Extremities: No calf tenderness bilaterally. DTR 2/4 bilaterally. No edema. Perineum/Speculum: There is not any signs of infection; The vaginal vault is without any signs of erythema or erosion. There is no vaginal discharge or odor appreciated. The pessary was cleansed and replaced without any problems and the patient tolerated the procedure well. T.O.S. Ointment was placed with the pessary to decrease discharge/odor. Assessment:   Diagnosis Orders   1. Encounter for pessary maintenance        2. Pelvic relaxation due to uterovaginal prolapse        3. Presence of pessary #4 ring with support          Chief Complaint   Patient presents with    Other     Pessary maintenance.      Patient Active Problem List    Diagnosis Date Noted    Nausea and vomiting 2023     Priority: High    Persistent atrial fibrillation ([address]) 2023     Priority: High    Atrial fibrillation with slow ventricular response ([address]) 2023     Priority: High    Dizziness 2023     Priority: High    Presence of pessary #4 ring with support 10/13/2021     Priority: High    Lower abdominal pain 2021

## 2023-10-06 RX ORDER — LEVOTHYROXINE SODIUM 0.05 MG/1
50 TABLET ORAL DAILY
Qty: 90 TABLET | Refills: 1 | Status: SHIPPED | OUTPATIENT
Start: 2023-10-06

## 2023-10-06 NOTE — TELEPHONE ENCOUNTER
José Miguel Lopez called requesting a refill of the below medication which has been pended for you:     Requested Prescriptions     Pending Prescriptions Disp Refills    levothyroxine (SYNTHROID) 50 MCG tablet 90 tablet 1     Sig: Take 1 tablet by mouth Daily       Last Appointment Date: 5/23/2023  Next Appointment Date: 12/19/2023    Allergies   Allergen Reactions    Statins Other (See Comments)     Causes severe leg cramps     Tape [Adhesive Tape] Other (See Comments)     \"sticks to skin\" causes redness    Tizanidine Hcl Other (See Comments)     Having issues with her liver- elevated her enzyme level    Tramadol Other (See Comments)     \"makes me Goofy\"    Augmentin [Amoxicillin-Pot Clavulanate] Diarrhea, Nausea And Vomiting and Other (See Comments)     abd pain    Nystatin Nausea And Vomiting     Pt states \"swish and swallow\"

## 2023-10-06 NOTE — TELEPHONE ENCOUNTER
----- Message from Marin Meckel sent at 10/6/2023 11:17 AM EDT -----  Subject: Refill Request    QUESTIONS  Name of Medication? levothyroxine (SYNTHROID) 50 MCG tablet  Patient-reported dosage and instructions? AS PRESCRIBED  How many days do you have left? 10  Preferred Pharmacy?  [address] phone number (if available)? [phone]  Additional Information for Provider? 90 day supply with 3 refills per     ---------------------------------------------------------------------------  --------------  CALL BACK INFO  What is the best way for the office to contact you? OK to leave message on   voicemail  Preferred Call Back Phone Number? [phone]  ---------------------------------------------------------------------------  --------------  SCRIPT ANSWERS  Relationship to Patient? Spouse/Partner  Representative Name? Lc Torres  Is the representative on the Communication Release of Information (ELIJAH)   form in Epic?  Yes

## 2023-10-30 ENCOUNTER — HOSPITAL ENCOUNTER (OUTPATIENT)
Age: 88
Discharge: HOME | End: 2023-10-30
Payer: MEDICARE

## 2023-10-30 DIAGNOSIS — E03.9 ACQUIRED HYPOTHYROIDISM: ICD-10-CM

## 2023-10-30 DIAGNOSIS — E87.1 HYPONATREMIA: ICD-10-CM

## 2023-10-30 DIAGNOSIS — E27.40 ADRENAL INSUFFICIENCY (HCC): ICD-10-CM

## 2023-10-30 DIAGNOSIS — D35.2 PITUITARY MACROADENOMA (HCC): ICD-10-CM

## 2023-10-30 LAB
ANION GAP SERPL CALCULATED.3IONS-SCNC: 9 MMOL/L (ref 9–17)
BUN SERPL-MCNC: 29 MG/DL (ref 8–23)
BUN/CREAT SERPL: 29 (ref 9–20)
CALCIUM SERPL-MCNC: 9.4 MG/DL (ref 8.6–10.4)
CHLORIDE SERPL-SCNC: 103 MMOL/L (ref 98–107)
CO2 SERPL-SCNC: 29 MMOL/L (ref 20–31)
CREAT SERPL-MCNC: 1 MG/DL (ref 0.5–0.9)
GFR SERPL CREATININE-BSD FRML MDRD: 54 ML/MIN/1.73M2
GLUCOSE SERPL-MCNC: 97 MG/DL (ref 70–99)
POTASSIUM SERPL-SCNC: 4.5 MMOL/L (ref 3.7–5.3)
SODIUM SERPL-SCNC: 141 MMOL/L (ref 135–144)
T4 FREE SERPL-MCNC: 1.3 NG/DL (ref 0.9–1.7)
TSH SERPL DL<=0.05 MIU/L-ACNC: 1.01 UIU/ML (ref 0.3–5)

## 2023-10-30 PROCEDURE — 36415 COLL VENOUS BLD VENIPUNCTURE: CPT

## 2023-10-30 PROCEDURE — 80048 BASIC METABOLIC PNL TOTAL CA: CPT

## 2023-10-30 PROCEDURE — 84439 ASSAY OF FREE THYROXINE: CPT

## 2023-10-30 PROCEDURE — 84443 ASSAY THYROID STIM HORMONE: CPT

## 2023-11-06 ENCOUNTER — TELEPHONE (OUTPATIENT)
Dept: FAMILY MEDICINE CLINIC | Age: 88
End: 2023-11-06

## 2023-11-06 DIAGNOSIS — M25.552 LEFT HIP PAIN: Primary | ICD-10-CM

## 2023-11-06 NOTE — TELEPHONE ENCOUNTER
Patient having pain and trouble walking on left hip. Had surgery 2017. Patient wondering if she can have new referral to Ortho here at the clinic.

## 2023-11-09 ENCOUNTER — OFFICE VISIT (OUTPATIENT)
Dept: NEPHROLOGY | Age: 88
End: 2023-11-09
Payer: MEDICARE

## 2023-11-09 VITALS
OXYGEN SATURATION: 93 % | SYSTOLIC BLOOD PRESSURE: 121 MMHG | DIASTOLIC BLOOD PRESSURE: 64 MMHG | HEART RATE: 55 BPM | WEIGHT: 161.2 LBS | BODY MASS INDEX: 26.83 KG/M2

## 2023-11-09 DIAGNOSIS — N18.31 STAGE 3A CHRONIC KIDNEY DISEASE (HCC): ICD-10-CM

## 2023-11-09 DIAGNOSIS — E87.1 HYPONATREMIA: Primary | ICD-10-CM

## 2023-11-09 PROCEDURE — 99213 OFFICE O/P EST LOW 20 MIN: CPT | Performed by: INTERNAL MEDICINE

## 2023-11-09 PROCEDURE — 1123F ACP DISCUSS/DSCN MKR DOCD: CPT | Performed by: INTERNAL MEDICINE

## 2023-11-09 NOTE — PROGRESS NOTES
Patients daughter lives in [facility]. She is on speaker phone for this visit.   Pt was recently diagnosed with a pituitary tumor

## 2023-11-09 NOTE — PROGRESS NOTES
[facility]        Outpatient Follow-Up note         11/9/2023 11:02 AM    Patient Name:   Ray Dunn  YOB: 1934  [facility] Phys[facility]  [facility]  [address] [place] [address]  Dept: [phone]  Loc: [phone]     Chief Complaint / Reason for follow-up : Follow Up of Hyponatremia and post hospital discharge      Interval History :  Patient seen and examined by me. Feels well. No cp or SOB. Eating and drinking well.      Past History :  Past Medical History:   Diagnosis Date    Actinic keratosis     history of    Arthritis     Atrial fibrillation ([address])     Cancer ([address])     skin    Cervical prolapse     history of    Diverticulosis     Endocervical polyp     history of    Heel spur     Hip pain     Hypertension     Nasal septal deviation     Osteoporosis     Pituitary tumor     Stress incontinence     history of    Syncope     Vaginal prolapse     history of     Past Surgical History:   Procedure Laterality Date    APPENDECTOMY  26's    BLADDER REPAIR  2011    [address]  13/[id number]    [facility]    COLONOSCOPY  01/26/2000    ENDOSCOPIC ULTRASOUND (LOWER) Left 07/20/2017    ENDOSCOPIC ULTRASOUND performed by Kaylee Esteban MD at [facility] cataract    FACIAL SURGERY N/A 01/27/2021    DIVISION AND INSET/CHEEK performed by Tessie Mosqueda MD at [address] [address] ([address] [place] [address])      JOINT REPLACEMENT  2017    left total hip relpament    MOHS SURGERY N/A 01/08/2021    MOHS REPAIR BCC NASAL TIP WITH FLAP AND GRAFT performed by Tessie Mosqueda MD at [address] [place] [address] Left 08/06/2021    MOHS DEFECT REPAIR SCC DORSAL NOSE AND LEFT LOWER FOREHEAD WITH SKIN GRAFT FROM RIGHT CHEEK/EAR (PLACED ON NOSE) performed by Tessie Mosqueda MD at [facility]

## 2023-11-13 DIAGNOSIS — M25.552 LEFT HIP PAIN: Primary | ICD-10-CM

## 2023-11-21 ENCOUNTER — HOSPITAL ENCOUNTER (OUTPATIENT)
Dept: GENERAL RADIOLOGY | Age: 88
Discharge: HOME | End: 2023-11-23
Attending: ORTHOPAEDIC SURGERY
Payer: MEDICARE

## 2023-11-21 ENCOUNTER — OFFICE VISIT (OUTPATIENT)
Dept: ORTHOPEDIC SURGERY | Age: 88
End: 2023-11-21
Payer: MEDICARE

## 2023-11-21 VITALS
HEART RATE: 58 BPM | SYSTOLIC BLOOD PRESSURE: 120 MMHG | DIASTOLIC BLOOD PRESSURE: 60 MMHG | HEIGHT: 65 IN | OXYGEN SATURATION: 98 % | WEIGHT: 161 LBS | RESPIRATION RATE: 16 BRPM | BODY MASS INDEX: 26.82 KG/M2

## 2023-11-21 DIAGNOSIS — M70.62 GREATER TROCHANTERIC BURSITIS OF LEFT HIP: Primary | ICD-10-CM

## 2023-11-21 DIAGNOSIS — M70.72 ISCHIAL BURSITIS OF LEFT SIDE: ICD-10-CM

## 2023-11-21 DIAGNOSIS — M25.552 LEFT HIP PAIN: ICD-10-CM

## 2023-11-21 PROCEDURE — 20610 DRAIN/INJ JOINT/BURSA W/O US: CPT | Performed by: PHYSICIAN ASSISTANT

## 2023-11-21 PROCEDURE — 99213 OFFICE O/P EST LOW 20 MIN: CPT | Performed by: PHYSICIAN ASSISTANT

## 2023-11-21 PROCEDURE — 73502 X-RAY EXAM HIP UNI 2-3 VIEWS: CPT

## 2023-11-21 RX ORDER — TRIAMCINOLONE ACETONIDE 40 MG/ML
40 INJECTION, SUSPENSION INTRA-ARTICULAR; INTRAMUSCULAR ONCE
Status: COMPLETED | OUTPATIENT
Start: 2023-11-21 | End: 2023-11-21

## 2023-11-21 RX ORDER — BUPIVACAINE HYDROCHLORIDE 5 MG/ML
3 INJECTION, SOLUTION PERINEURAL ONCE
Status: COMPLETED | OUTPATIENT
Start: 2023-11-21 | End: 2023-11-21

## 2023-11-21 RX ADMIN — TRIAMCINOLONE ACETONIDE 40 MG: 40 INJECTION, SUSPENSION INTRA-ARTICULAR; INTRAMUSCULAR at 11:19

## 2023-11-21 RX ADMIN — BUPIVACAINE HYDROCHLORIDE 15 MG: 5 INJECTION, SOLUTION PERINEURAL at 11:18

## 2023-11-21 NOTE — PROGRESS NOTES
Orthopaedic Progress Note      CHIEF COMPLAINT: Left hip and left buttocks pain    HISTORY OF PRESENT ILLNESS:       Ms. Martha Robison  is a 80 y.o. female  who presents with 8-month history of left hip and left buttocks pain. We seen this patient in the past.  She underwent left total hip replacement in 2017 and has done fairly well with that. Previous to that she had multiple left hip trochanteric bursa injections for bursitis. Now here recently she apparently had a fall that was reported to me by her daughter over the phone here today. Now she localizes pain directly in the posterior aspect of her left buttocks as well as the lateral aspect of her left hip. She is here today for reevaluation.       Past Medical History:    Past Medical History:   Diagnosis Date    Actinic keratosis     history of    Arthritis     Atrial fibrillation ([address])     Cancer ([address])     skin    Cervical prolapse     history of    Diverticulosis     Endocervical polyp     history of    Heel spur     Hip pain     Hypertension     Nasal septal deviation     Osteoporosis     Pituitary tumor     Stress incontinence     history of    Syncope     Vaginal prolapse     history of       Past Surgical History:    Past Surgical History:   Procedure Laterality Date    APPENDECTOMY  26's    BLADDER REPAIR  2011    [address]  16/0520    [facility]    COLONOSCOPY  01/26/2000    ENDOSCOPIC ULTRASOUND (LOWER) Left 07/20/2017    ENDOSCOPIC ULTRASOUND performed by Renuka Padilla MD at [address] [facility] cataract    FACIAL SURGERY N/A 01/27/2021    DIVISION AND INSET/CHEEK performed by Jonathan Valerio MD at [address] [facility] ([address])      JOINT REPLACEMENT  2017    left total hip relpament    MOHS SURGERY N/A 01/08/2021    MOHS REPAIR BCC NASAL TIP WITH FLAP AND GRAFT performed by Jonathan Valerio MD at [address] [facility] Left 08/06/2021

## 2024-01-11 ENCOUNTER — OFFICE VISIT (OUTPATIENT)
Dept: OBGYN | Age: 89
End: 2024-01-11
Payer: MEDICARE

## 2024-01-11 VITALS
BODY MASS INDEX: 27.26 KG/M2 | WEIGHT: 163.6 LBS | HEIGHT: 65 IN | SYSTOLIC BLOOD PRESSURE: 128 MMHG | DIASTOLIC BLOOD PRESSURE: 64 MMHG | HEART RATE: 51 BPM

## 2024-01-11 DIAGNOSIS — N81.4 PELVIC RELAXATION DUE TO UTEROVAGINAL PROLAPSE: ICD-10-CM

## 2024-01-11 DIAGNOSIS — Z46.89 ENCOUNTER FOR PESSARY MAINTENANCE: Primary | ICD-10-CM

## 2024-01-11 DIAGNOSIS — Z96.0 PRESENCE OF PESSARY: ICD-10-CM

## 2024-01-11 PROCEDURE — 99213 OFFICE O/P EST LOW 20 MIN: CPT | Performed by: OBSTETRICS & GYNECOLOGY

## 2024-01-11 ASSESSMENT — PATIENT HEALTH QUESTIONNAIRE - PHQ9
SUM OF ALL RESPONSES TO PHQ QUESTIONS 1-9: 0
1. LITTLE INTEREST OR PLEASURE IN DOING THINGS: 0
SUM OF ALL RESPONSES TO PHQ QUESTIONS 1-9: 0
SUM OF ALL RESPONSES TO PHQ QUESTIONS 1-9: 0
2. FEELING DOWN, DEPRESSED OR HOPELESS: 0
SUM OF ALL RESPONSES TO PHQ9 QUESTIONS 1 & 2: 0
SUM OF ALL RESPONSES TO PHQ QUESTIONS 1-9: 0

## 2024-01-11 NOTE — PROGRESS NOTES
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[facility]) 07/12/2023    Acquired hypothyroidism 07/12/2023    Hyperprolactinemia ([facility]) 07/12/2023    Adrenal insufficiency (Weld's disease) ([facility]) 07/09/2023    General weakness 07/06/2023    SIADH (syndrome of inappropriate ADH production) ([facility]) 07/06/2023    Essential hypertension 06/22/2023    Chronic renal disease, stage III ([facility]) [647513] 05/23/2023    Secondary hypercoagulable state ([facility]) 05/23/2023    Unspecified adrenocortical insufficiency ([facility]) 04/06/2023    Atrial flutter ([facility]) 09/05/2021    Hyponatremia 09/04/2021    Esophageal candidiasis ([facility]) 08/31/2021    Vaginal erosion secondary to pessary use ([facility]) 01/21/2021    Postmenopausal bleeding 01/21/2021    History of hysterectomy 01/21/2021    Arthritis of right hand 01/13/2020    Venous stasis dermatitis of both lower extremities 12/08/2017    Paroxysmal atrial fibrillation with rapid ventricular response ([facility]) 09/21/2017    Lumbar radicular pain 08/03/2017    Lumbar spine pain 08/03/2017    Left hip pain 08/03/2017    Arthritis of left hip 08/03/2017    Esophageal stricture     Gastritis without bleeding     Bradycardia     Drug-induced constipation 06/15/2017    Trochanteric bursitis of left hip 05/22/2017         Plan:  1. Return to the office 10-12 weeks  2. Report any vaginal bleeding or discharge  3. Abstinence  4. Annual Follow-up reviewed with patient. They will schedule appointment    The patient, Jina Calabrese is a 89 y.o. female, was seen with a total time spent of 20 minutes for the visit on this date of service by the E/M provider. The time component had both face to face and non face to face time spent in determining the total time

## 2024-01-22 ENCOUNTER — OFFICE VISIT (OUTPATIENT)
Age: 89
End: 2024-01-22
Payer: MEDICARE

## 2024-01-22 VITALS
BODY MASS INDEX: 27.72 KG/M2 | RESPIRATION RATE: 18 BRPM | SYSTOLIC BLOOD PRESSURE: 118 MMHG | WEIGHT: 166.4 LBS | HEIGHT: 65 IN | HEART RATE: 52 BPM | DIASTOLIC BLOOD PRESSURE: 64 MMHG

## 2024-01-22 DIAGNOSIS — E03.9 ACQUIRED HYPOTHYROIDISM: ICD-10-CM

## 2024-01-22 DIAGNOSIS — E27.40 ADRENAL INSUFFICIENCY (HCC): ICD-10-CM

## 2024-01-22 DIAGNOSIS — D49.7 PITUITARY TUMOR: Primary | ICD-10-CM

## 2024-01-22 PROCEDURE — 1123F ACP DISCUSS/DSCN MKR DOCD: CPT | Performed by: INTERNAL MEDICINE

## 2024-01-22 PROCEDURE — 99214 OFFICE O/P EST MOD 30 MIN: CPT | Performed by: INTERNAL MEDICINE

## 2024-01-22 RX ORDER — LEVOTHYROXINE SODIUM 0.05 MG/1
50 TABLET ORAL DAILY
Qty: 90 TABLET | Refills: 1 | Status: SHIPPED | OUTPATIENT
Start: 2024-01-22

## 2024-01-22 RX ORDER — HYDROCORTISONE 5 MG/1
5 TABLET ORAL NIGHTLY
Qty: 90 TABLET | Refills: 1 | Status: SHIPPED | OUTPATIENT
Start: 2024-01-22

## 2024-01-22 RX ORDER — HYDROCORTISONE 10 MG/1
10 TABLET ORAL DAILY
Qty: 90 TABLET | Refills: 1 | Status: SHIPPED | OUTPATIENT
Start: 2024-01-22

## 2024-01-22 NOTE — PROGRESS NOTES
normal, conjunctivae and sclerae normal, corneas clear and pupils equal, round, reactive to light and accomodation  Neck:no adenopathy, no carotid bruit, no JVD and supple, symmetrical, trachea midline  Thyroid: There is no goiter or thyroid tenderness.  Lung:clear to auscultation bilaterally  Heart: regular rate and rhythm, S1, S2 normal, no murmur, click, rub or gallop and normal apical impulse  Abdomen:soft, non-tender; bowel sounds normal; no masses,  no organomegaly  Extremities:extremities normal, atraumatic, no cyanosis or edema and Homans sign is negative, no sign of DVT  Pulses:2+ and symmetric  Skin:warm and dry, no hyperpigmentation, vitiligo, or suspicious lesions  Neuro:normal without focal findings, mental status, speech normal, alert and oriented x3, DERECK, cranial nerves 2-12 intact, muscle tone and strength normal and symmetric.  Lymph nodes: There is no cervical, supraclavicular or submental adenopathy.  Musculoskeletal:  No joint swelling or deformity.  Psychiatry: Alert and oriented ×3.  Making good eye contact.  Affect is normal.        Assessment/Plan:   Diagnosis Orders   1. Pituitary tumor  Clinically stable.  She is not due for repeat MRI.  Will obtain BMP and then obtain MRI.  Will also repeat labs to evaluate functional status.                      2. Adrenal insufficiency (HCC)  Clinically doing well on current regimen.  Check electrolytes.          3. Acquired hypothyroidism  On treatment.  Check levels.          Orders Placed This Encounter   Procedures    Prolactin     Standing Status:   Future     Standing Expiration Date:   4/22/2024    Insulin-Like Growth Factor     Standing Status:   Future     Standing Expiration Date:   4/22/2024    TSH     Standing Status:   Future     Standing Expiration Date:   4/22/2024    T4, Free     Standing Status:   Future     Standing Expiration Date:   4/22/2024    Basic Metabolic Panel     Standing Status:   Future     Standing Expiration Date:

## 2024-01-29 ENCOUNTER — TELEPHONE (OUTPATIENT)
Age: 89
End: 2024-01-29

## 2024-01-29 DIAGNOSIS — E03.9 ACQUIRED HYPOTHYROIDISM: ICD-10-CM

## 2024-01-29 DIAGNOSIS — E27.40 ADRENAL INSUFFICIENCY (HCC): ICD-10-CM

## 2024-01-29 RX ORDER — HYDROCORTISONE 5 MG/1
5 TABLET ORAL NIGHTLY
Qty: 90 TABLET | Refills: 1 | Status: SHIPPED | OUTPATIENT
Start: 2024-01-29

## 2024-01-29 RX ORDER — LEVOTHYROXINE SODIUM 0.05 MG/1
50 TABLET ORAL DAILY
Qty: 90 TABLET | Refills: 1 | Status: SHIPPED | OUTPATIENT
Start: 2024-01-29 | End: 2024-01-31 | Stop reason: SDUPTHER

## 2024-01-29 RX ORDER — HYDROCORTISONE 10 MG/1
10 TABLET ORAL DAILY
Qty: 90 TABLET | Refills: 1 | Status: SHIPPED | OUTPATIENT
Start: 2024-01-29

## 2024-01-29 NOTE — TELEPHONE ENCOUNTER
Patient called in stating that the medications ordered on 01/22/24 were printed, patient needs these mail order. Can you send these prescriptions to [facility] mail order please

## 2024-01-31 ENCOUNTER — HOSPITAL ENCOUNTER (OUTPATIENT)
Age: 89
Discharge: HOME | End: 2024-01-31
Payer: MEDICARE

## 2024-01-31 DIAGNOSIS — D49.7 PITUITARY TUMOR: ICD-10-CM

## 2024-01-31 DIAGNOSIS — E03.9 ACQUIRED HYPOTHYROIDISM: ICD-10-CM

## 2024-01-31 LAB
ANION GAP SERPL CALCULATED.3IONS-SCNC: 11 MMOL/L (ref 9–17)
BUN SERPL-MCNC: 27 MG/DL (ref 8–23)
BUN/CREAT SERPL: 27 (ref 9–20)
CALCIUM SERPL-MCNC: 9.4 MG/DL (ref 8.6–10.4)
CHLORIDE SERPL-SCNC: 99 MMOL/L (ref 98–107)
CO2 SERPL-SCNC: 28 MMOL/L (ref 20–31)
CREAT SERPL-MCNC: 1 MG/DL (ref 0.5–0.9)
GFR SERPL CREATININE-BSD FRML MDRD: 54 ML/MIN/1.73M2
GLUCOSE SERPL-MCNC: 97 MG/DL (ref 70–99)
POTASSIUM SERPL-SCNC: 3.7 MMOL/L (ref 3.7–5.3)
SODIUM SERPL-SCNC: 138 MMOL/L (ref 135–144)
T4 FREE SERPL-MCNC: 1.4 NG/DL (ref 0.9–1.7)
TSH SERPL DL<=0.05 MIU/L-ACNC: 1.12 UIU/ML (ref 0.3–5)

## 2024-01-31 PROCEDURE — 36415 COLL VENOUS BLD VENIPUNCTURE: CPT

## 2024-01-31 PROCEDURE — 80048 BASIC METABOLIC PNL TOTAL CA: CPT

## 2024-01-31 PROCEDURE — 84439 ASSAY OF FREE THYROXINE: CPT

## 2024-01-31 PROCEDURE — 84146 ASSAY OF PROLACTIN: CPT

## 2024-01-31 PROCEDURE — 84443 ASSAY THYROID STIM HORMONE: CPT

## 2024-01-31 PROCEDURE — 84305 ASSAY OF SOMATOMEDIN: CPT

## 2024-01-31 RX ORDER — LEVOTHYROXINE SODIUM 0.05 MG/1
50 TABLET ORAL DAILY
Qty: 30 TABLET | Refills: 0 | Status: SHIPPED | OUTPATIENT
Start: 2024-01-31

## 2024-01-31 NOTE — TELEPHONE ENCOUNTER
LK pt, BE on notes. Pt is out and needs today.    Jina called requesting a refill of the below medication which has been pended for you: Requesting 30 d supply to local pharmacy, coming in for labs today    Requested Prescriptions     Pending Prescriptions Disp Refills    levothyroxine (SYNTHROID) 50 MCG tablet 30 tablet 0     Sig: Take 1 tablet by mouth Daily       Last Appointment Date: 12/19/2023  Next Appointment Date: 6/20/2024    Allergies   Allergen Reactions    Statins Other (See Comments)     Causes severe leg cramps     Tape [Adhesive Tape] Other (See Comments)     \"sticks to skin\" causes redness    Tizanidine Hcl Other (See Comments)     Having issues with her liver- elevated her enzyme level    Tramadol Other (See Comments)     \"makes me Goofy\"    Augmentin [Amoxicillin-Pot Clavulanate] Diarrhea, Nausea And Vomiting and Other (See Comments)     abd pain    Nystatin Nausea And Vomiting     Pt states \"swish and swallow\"

## 2024-01-31 NOTE — TELEPHONE ENCOUNTER
[facility] Clinical Staff  Subject: Refill Request    QUESTIONS  Name of Medication? levothyroxine (SYNTHROID) 50 MCG tablet  Patient-reported dosage and instructions? 50 MCG 1 x a day  How many days do you have left? 0  Preferred Pharmacy? [facility]  Pharmacy phone number (if available)? [phone]  Additional Information for Provider? Just need a 30 day supply for now as  she is totally out  ---------------------------------------------------------------------------  --------------  CALL BACK INFO  What is the best way for the office to contact you? OK to leave message on  voicemail  Preferred Call Back Phone Number? [phone]  ---------------------------------------------------------------------------  --------------  SCRIPT ANSWERS  Relationship to Patient? Spouse/Partner  Representative Name? Nick  Is the representative on the Communication Release of Information (ELIJAH)  form in Epic? Yes

## 2024-02-01 LAB — PROLACTIN SERPL-MCNC: 29.1 NG/ML (ref 4.79–23.3)

## 2024-02-02 LAB
IGF-1 COLLECTION INFO: NORMAL
SOMATOMEDIN C: 61.4 NG/ML (ref 55.1–166)

## 2024-02-06 ENCOUNTER — OFFICE VISIT (OUTPATIENT)
Dept: ORTHOPEDIC SURGERY | Age: 89
End: 2024-02-06
Payer: MEDICARE

## 2024-02-06 VITALS
HEART RATE: 51 BPM | WEIGHT: 166 LBS | SYSTOLIC BLOOD PRESSURE: 130 MMHG | HEIGHT: 65 IN | BODY MASS INDEX: 27.66 KG/M2 | DIASTOLIC BLOOD PRESSURE: 80 MMHG

## 2024-02-06 DIAGNOSIS — M70.62 GREATER TROCHANTERIC BURSITIS OF LEFT HIP: Primary | ICD-10-CM

## 2024-02-06 PROCEDURE — 99213 OFFICE O/P EST LOW 20 MIN: CPT | Performed by: NURSE PRACTITIONER

## 2024-02-06 PROCEDURE — 99212 OFFICE O/P EST SF 10 MIN: CPT | Performed by: NURSE PRACTITIONER

## 2024-02-06 PROCEDURE — 20610 DRAIN/INJ JOINT/BURSA W/O US: CPT | Performed by: NURSE PRACTITIONER

## 2024-02-06 PROCEDURE — 1123F ACP DISCUSS/DSCN MKR DOCD: CPT | Performed by: NURSE PRACTITIONER

## 2024-02-06 PROCEDURE — PBSHW PBB SHADOW CHARGE: Performed by: NURSE PRACTITIONER

## 2024-02-06 RX ORDER — TRIAMCINOLONE ACETONIDE 40 MG/ML
40 INJECTION, SUSPENSION INTRA-ARTICULAR; INTRAMUSCULAR ONCE
Status: COMPLETED | OUTPATIENT
Start: 2024-02-06 | End: 2024-02-06

## 2024-02-06 RX ORDER — BUPIVACAINE HYDROCHLORIDE 5 MG/ML
3 INJECTION, SOLUTION PERINEURAL ONCE
Status: COMPLETED | OUTPATIENT
Start: 2024-02-06 | End: 2024-02-06

## 2024-02-06 RX ADMIN — TRIAMCINOLONE ACETONIDE 40 MG: 200 INJECTION, SUSPENSION INTRA-ARTICULAR; INTRAMUSCULAR at 12:23

## 2024-02-06 RX ADMIN — BUPIVACAINE HYDROCHLORIDE 15 MG: 5 INJECTION, SOLUTION PERINEURAL at 12:23

## 2024-02-06 NOTE — PROGRESS NOTES
injection 15 mg    triamcinolone acetonide (KENALOG-40) injection 40 mg    [facility] [place]            PLAN:  Plan at this time patient would like to proceed with a left hip corticosteroid injection into the left hip greater trochanteric bursa.  Ice to the hip as needed.  Will also order physical therapy to the left hip including ultrasound treatment for her bursitis.  Instructed patient to continue stretches at home.  Will have her follow-up with us with increased pain.        Procedures    SD ARTHROCENTESIS ASPIR&/INJ MAJOR JT/BURSA W/O US        Procedure: The left hip was prepped in sterile fashion with alcohol.  A 22-gauge needle was introduced into the left hip greater trochanteric bursa with 3 mL of half percent Marcaine and 40 mg of Kenalog were injected.  Hemostasis achieved.  Dry sterile bandage applied.  Patient tolerated procedure well.    Electronically signed by VASU Zarco CNP on 2/6/2024 at 12:20 PM

## 2024-02-12 ENCOUNTER — HOSPITAL ENCOUNTER (OUTPATIENT)
Dept: PHYSICAL THERAPY | Age: 89
Setting detail: THERAPIES SERIES
Discharge: HOME | End: 2024-02-12
Payer: MEDICARE

## 2024-02-12 DIAGNOSIS — M70.62 GREATER TROCHANTERIC BURSITIS OF LEFT HIP: Primary | ICD-10-CM

## 2024-02-12 PROCEDURE — 97162 PT EVAL MOD COMPLEX 30 MIN: CPT

## 2024-02-12 PROCEDURE — 97161 PT EVAL LOW COMPLEX 20 MIN: CPT

## 2024-02-12 NOTE — PLAN OF CARE
[facility]  Rehabilitation and Sports Medicine    [x] [place]  Phone: [phone]  Fax: [phone]      [] [place]  Phone: [phone]  Fax: [phone]        To:  VASU Zarco - CNP        Patient: Jina Calabrese  : 1934   MRN: [id number]  Evaluation Date: 2024      Diagnosis Information:  Diagnosis: M70.62 (ICD-10-CM) - Greater trochanteric bursitis of left hip         Physical Therapy Certification    Dear Dr. Osiris Duckworth, APRN - CNP    The following patient has been evaluated for physical therapy services and for therapy to continue, Medicare requires monthly physician review of the treatment plan. Please review the attached evaluation and/or summary of the patient's plan of care, and verify that you agree therapy should continue by signing the attached document and sending it back to our office.    Plan of Care/Treatment to date:  [x] Therapeutic Exercise    [x] Modalities:  [x] Therapeutic Activity     [x] Ultrasound  [x] Electrical Stimulation  [x] Gait Training      [] Cervical Traction [] Lumbar Traction  [x] Neuromuscular Re-education    [x] Cold/hotpack [] Iontophoresis   [x] Instruction in HEP     Other:  [x] Manual Therapy      []             [] Aquatic Therapy      []                 Goals:  Short Term Goals  Time Frame for Short Term Goals: 1 week  Short Term Goal 1: Initiate HEP    Long Term Goals  Time Frame for Long Term Goals : 5 weeks  Long Term Goal 1: Patient will report pain reduced to 5/10 to allow patient to get a better nights sleep.  Long Term Goal 2: Patient will score > or = to 33/80 on the LEFs to allow her to complete her daily ADLs with greater ease.  Long Term Goal 3: Patient will complete 6 sit to stands in 30 seconds modified by use of linnea UE to allow patient to complete car transfers with greater ease.  Long Term Goal 4: Patient will complete TUG in < or = to 40s with RW to decrease risk of falls when ambulating.  Long

## 2024-02-12 NOTE — FLOWSHEET NOTE
Reviewed/Progressed HEP activities related to improving balance, coordination, kinesthetic sense, posture, motor skill, proprioception.  (17442)    Manual Treatments:    [] Provided manual therapy to mobilize soft tissue/joints for the purpose of modulating pain, promoting relaxation,  increasing ROM, reducing/eliminating soft tissue swelling/inflammation/restriction, improving soft tissue extensibility. (45477)    Service Based Modalities:  30    Timed Code Treatment Minutes:       Total Treatment Minutes:   30    Treatment/Activity Tolerance:  [] Patient tolerated treatment well [] Patient limited by fatique  [] Patient limited by pain  [] Patient limited by other medical complications  [] Other:     Prognosis: [] Good [x] Fair  [] Poor    Patient Requires Follow-up: [x] Yes  [] No      Goals:  Short Term Goals  Time Frame for Short Term Goals: 1 week  Short Term Goal 1: Initiate HEP    Long Term Goals  Time Frame for Long Term Goals : 5 weeks  Long Term Goal 1: Patient will report pain reduced to 5/10 to allow patient to get a better nights sleep.  Long Term Goal 2: Patient will score > or = to 33/80 on the LEFs to allow her to complete her daily ADLs with greater ease.  Long Term Goal 3: Patient will complete 6 sit to stands in 30 seconds modified by use of linnea UE to allow patient to complete car transfers with greater ease.  Long Term Goal 4: Patient will complete TUG in < or = to 40s with RW to decrease risk of falls when ambulating.  Long Term Goal 5: Patient will demonstrate 4/5 left hip strength to allow patient to navigate stairs with greater ease.  Additional Goals?: Yes  Long term goal 6: IND with HEP    Plan:   [] Continue per plan of care [] Alter current plan (see comments)  [x] Plan of care initiated [] Hold pending MD visit [] Discharge    Plan for Next Session:      Electronically signed by:  Melida Rodriguez, PT

## 2024-02-12 NOTE — PROGRESS NOTES
weeks  Current Treatment Recommendations: Strengthening, ROM, Balance training, Functional mobility training, ADL/Self-care training, Transfer training, IADL training, Endurance training, Gait training, Neuromuscular re-education, Manual, Pain management, Home exercise program, Safety education & training, Stair training, Patient/Caregiver education & training, Equipment evaluation, education, & procurement, Modalities    Balance and Gait:  Timed Up and Go (TUG)  Current Score: 68 (RW with CGA) Seconds (Date: 2024)    Interpretation of Score: This tests the patient’s mobility and fall risk. Less than 10 seconds = freely mobile; <20 seconds = mostly independent; 20-29 seconds = variable mobility; > 20 seconds = impaired mobility; > 30 seconds = very high fall risk. Additional scorin.1 seconds in vestibular patients = increased fall risk; > 13.5 seconds in elderly = Increased fall risk)    OutComes Score:  LEFS Total Score: 23 (24 6199)    Goals:  Short Term Goals  Time Frame for Short Term Goals: 1 week  Short Term Goal 1: Initiate HEP  Long Term Goals  Time Frame for Long Term Goals : 5 weeks  Long Term Goal 1: Patient will report pain reduced to 5/10 to allow patient to get a better nights sleep.  Long Term Goal 2: Patient will score > or = to 33/80 on the LEFs to allow her to complete her daily ADLs with greater ease.  Long Term Goal 3: Patient will complete 6 sit to stands in 30 seconds modified by use of linnea UE to allow patient to complete car transfers with greater ease.  Long Term Goal 4: Patient will complete TUG in < or = to 40s with RW to decrease risk of falls when ambulating.  Long Term Goal 5: Patient will demonstrate 4/5 left hip strength to allow patient to navigate stairs with greater ease.  Additional Goals?: Yes  Long term goal 6: IND with HEP     Therapy Time:   Individual Concurrent Group Co-treatment   Time In 0130         Time Out 0200         Minutes 30            Melida Rodriguez

## 2024-02-13 NOTE — TELEPHONE ENCOUNTER
Jina called requesting a refill of the below medication which has been pended for you:   [facility] [place] instead of [place]     Requested Prescriptions     Pending Prescriptions Disp Refills    rivaroxaban (XARELTO) 15 MG TABS tablet 90 tablet 3     Sig: Take 1 tablet by mouth daily       Last Appointment Date: 12/19/2023  Next Appointment Date: 6/20/2024    Allergies   Allergen Reactions    Statins Other (See Comments)     Causes severe leg cramps     Tape [Adhesive Tape] Other (See Comments)     \"sticks to skin\" causes redness    Tizanidine Hcl Other (See Comments)     Having issues with her liver- elevated her enzyme level    Tramadol Other (See Comments)     \"makes me Goofy\"    Augmentin [Amoxicillin-Pot Clavulanate] Diarrhea, Nausea And Vomiting and Other (See Comments)     abd pain    Nystatin Nausea And Vomiting     Pt states \"swish and swallow\"

## 2024-02-15 ENCOUNTER — HOSPITAL ENCOUNTER (OUTPATIENT)
Dept: PHYSICAL THERAPY | Age: 89
Setting detail: THERAPIES SERIES
Discharge: HOME | End: 2024-02-15
Payer: MEDICARE

## 2024-02-15 PROCEDURE — 97035 APP MDLTY 1+ULTRASOUND EA 15: CPT

## 2024-02-15 PROCEDURE — 97110 THERAPEUTIC EXERCISES: CPT

## 2024-02-15 NOTE — FLOWSHEET NOTE
Physical Therapy Daily Treatment Note    Date:  2/15/2024    Patient Name:  Jina Calabrese    :  1934  MRN: [id number]  Restrictions/Precautions:   FALL RISK  Medical/Treatment Diagnosis Information:   Diagnosis: M70.62 (ICD-10-CM) - Greater trochanteric bursitis of left hip  Insurance/Certification information:  PT Insurance Information: [facility] MEDICARE  Physician Information:  VASU Zarco - CNP    Plan of care signed (Y/N):  N  Visit # POC: 2/10     PA Approval #: [id number]    PA Coverage Dates: 2/15/24 - 24  Visit # PA:   Visits / Units Approved: 5 visits       Pain level: 4/10       Time In: 130   Time Out: 218    Progress Note: []  Yes  [x]  No  Next due by: Visit #10 or by 3/18/24     Subjective:   Patient reporting 4/10 pain in the hip upon arrival. Noting that she does feel more unsteady this date.     Objective: ROB completed per flowsheet to improve left hip mobility and stability to allow patient to complete her daily ADLs and ambulate with greater ease. Initiated ultrasound this date for pain relief. Fair tolerance to exercises completed this date. Will advance as tolerated. Reporting pain continued at 4/10 pain upon arrival.     Observation:   Test measurements:      Exercises: FALL RISK  Exercise/Equipment Resistance/Repetitions Other comments   Nustep 5' L1         Manual stretching 4x30\" ea IT band, hamstring   Quad set 10x5\"    SLR 10x manual assist    Supine hip abd 10x manual assist    SAQ 10x 5\"    bridges 10x - only able to get 10% of motion    Heel slides 10x    SL piriformis release     Clamshell      supine piriformis stretch  3x30\"         Counter ex      Step ups     Mini squats     STS     Ultrasound 15'    [x] Provided verbal/tactile cueing for activities related to strengthening, flexibility, endurance, ROM. (53913)  [] Provided verbal/tactile cueing for activities related to improving balance, coordination, kinesthetic sense, posture, motor skill,

## 2024-02-20 ENCOUNTER — HOSPITAL ENCOUNTER (OUTPATIENT)
Dept: PHYSICAL THERAPY | Age: 89
Setting detail: THERAPIES SERIES
Discharge: HOME | End: 2024-02-20
Payer: MEDICARE

## 2024-02-20 PROCEDURE — 97110 THERAPEUTIC EXERCISES: CPT

## 2024-02-20 PROCEDURE — 97035 APP MDLTY 1+ULTRASOUND EA 15: CPT

## 2024-02-20 NOTE — FLOWSHEET NOTE
UE to allow patient to complete car transfers with greater ease.  Long Term Goal 4: Patient will complete TUG in < or = to 40s with RW to decrease risk of falls when ambulating.  Long Term Goal 5: Patient will demonstrate 4/5 left hip strength to allow patient to navigate stairs with greater ease.  Additional Goals?: Yes  Long term goal 6: IND with HEP    Plan:   [x] Continue per plan of care [] Alter current plan (see comments)  [] Plan of care initiated [] Hold pending MD visit [] Discharge    Plan for Next Session:      Electronically signed by:  Arlet Flores PTA

## 2024-02-22 ENCOUNTER — HOSPITAL ENCOUNTER (OUTPATIENT)
Dept: PHYSICAL THERAPY | Age: 89
Setting detail: THERAPIES SERIES
Discharge: HOME | End: 2024-02-22
Payer: MEDICARE

## 2024-02-22 PROCEDURE — 97110 THERAPEUTIC EXERCISES: CPT | Performed by: PHYSICAL THERAPY ASSISTANT

## 2024-02-22 NOTE — FLOWSHEET NOTE
Physical Therapy Daily Treatment Note    Date:  2024    Patient Name:  Jina Calabrese    :  1934  MRN: [id number]  Restrictions/Precautions:   FALL RISK  Medical/Treatment Diagnosis Information:   Diagnosis: M70.62 (ICD-10-CM) - Greater trochanteric bursitis of left hip  Insurance/Certification information:  PT Insurance Information: [facility] MEDICARE  Physician Information:  VASU Zarco - CNP    Plan of care signed (Y/N):  N  Visit # POC: 3/10     PA Approval #: [id number]    PA Coverage Dates: 2/15/24 - 24  Visit # PA: 3/5  Visits / Units Approved: 6 visits       Pain level: 4/10       Time In: 1133  Time Out: 1220    Progress Note: []  Yes  [x]  No  Next due by: Visit #10 or by 3/18/24     Subjective:  Notes pain this date rated 4/10 through left lateral hip Verbalizes understanding and compliance with current HEP.     Objective: ROB completed per flowsheet to improve left hip mobility and stability to allow patient to complete her daily ADLs and ambulate with greater ease. US 1.3 W/cm, 1MHz, 100% to lateral left hip. Added and advanced several exercises to improve motion, strength and stability. Fatigue note, minimal soreness.     Observation:   Test measurements:      Exercises: FALL RISK  Exercise/Equipment Resistance/Repetitions Other comments   Nustep 6' L1         Manual stretching 4x30\" ea IT band to neutral, hamstring   Quad set     SLR     Supine hip abd     SAQ     bridges     Heel slides 15x    SL piriformis release     Clamshell      supine piriformis stretch  3x30\"         Counter ex  15x    Step ups  F/L   Mini squats 10x 3-way no UE assist   STS 10x 4''     Ultrasound 8'    [x] Provided verbal/tactile cueing for activities related to strengthening, flexibility, endurance, ROM. (00942)  [] Provided verbal/tactile cueing for activities related to improving balance, coordination, kinesthetic sense, posture, motor skill, proprioception. (02538)    Therapeutic Activities:

## 2024-02-26 ENCOUNTER — HOSPITAL ENCOUNTER (OUTPATIENT)
Dept: PHYSICAL THERAPY | Age: 89
Setting detail: THERAPIES SERIES
Discharge: HOME | End: 2024-02-26
Payer: MEDICARE

## 2024-02-26 PROCEDURE — 97035 APP MDLTY 1+ULTRASOUND EA 15: CPT

## 2024-02-26 PROCEDURE — 97110 THERAPEUTIC EXERCISES: CPT

## 2024-02-29 ENCOUNTER — HOSPITAL ENCOUNTER (OUTPATIENT)
Dept: PHYSICAL THERAPY | Age: 89
Setting detail: THERAPIES SERIES
Discharge: HOME | End: 2024-02-29
Payer: MEDICARE

## 2024-02-29 PROCEDURE — 97110 THERAPEUTIC EXERCISES: CPT

## 2024-02-29 NOTE — FLOWSHEET NOTE
Physical Therapy Daily Treatment Note    Date:  2024    Patient Name:  Jina Calabrese    :  1934  MRN: [id number]  Restrictions/Precautions:   FALL RISK  Medical/Treatment Diagnosis Information:   Diagnosis: M70.62 (ICD-10-CM) - Greater trochanteric bursitis of left hip  Insurance/Certification information:  PT Insurance Information: [facility] MEDICARE  Physician Information:  VASU Zarco - CNP    Plan of care signed (Y/N):  Y  Visit # POC: 6/10     PA Approval #: [id number]    PA Coverage Dates: 2/15/24 - 24  Visit # PA: 5/  Visits / Units Approved: 5 visits       Pain level: 1/10       Time In: 1100  Time Out: 1150    Progress Note: []  Yes  [x]  No  Next due by: Visit #10 or by 3/18/24     Subjective: Patient reporting 1/10 pain upon arrival. Noting 5/10 pain at worst in the last couple of days. Feels as if therapy has been helping with the pain. Patient feels as if she is 80% better. Not having the pain as often to the point of where it limits her ability to ambulate.     Objective: ROB completed per flowsheet to improve left hip mobility and stability to allow patient to complete her daily ADLs and ambulate with greater ease.  Added and advanced several exercises to improve motion, strength and stability. Fatigue note, minimal soreness. Holding ultrasound this date. Will continue to monitor tolerance without ultrasound. Will add back into sessions if needed.      Observation:   Test measurements:      Completed 7x  sit to stands in 30 seconds with linnea UE support    Completed TUG in 24  seconds with rollator with SBA    Scored 47/80 on the LEFs this date    L hip flexion - 4-/5  L hip abduction - 4/5  L hip extension - 4/5    Exercises: FALL RISK  Exercise/Equipment Resistance/Repetitions Other comments   Nustep 6' L1         Manual stretching 6x10\" IT band to neutral, hamstring   Quad set     SLR 10x    clamshell 10x    SAQ     bridges 10x    Heel slides 15x    SL piriformis release 4'

## 2024-03-06 ENCOUNTER — HOSPITAL ENCOUNTER (OUTPATIENT)
Dept: PHYSICAL THERAPY | Age: 89
Setting detail: THERAPIES SERIES
Discharge: HOME | End: 2024-03-06
Payer: MEDICARE

## 2024-03-06 PROCEDURE — 97110 THERAPEUTIC EXERCISES: CPT

## 2024-03-06 NOTE — FLOWSHEET NOTE
skill, proprioception. (80675)    Therapeutic Activities:     [] Therapeutic activities, direct (one-on-one) patient contact (use of dynamic activities to improve functional performance). (58798)    Gait:   [] Provided training and instruction to the patient for ambulation re-education. (52724)    Self-Care/ADL's  [] Self-care/home management training and compensatory training, meal preparation, safety procedures, and instructions in use of assistive technology devices/adaptive equipment, direct one-on-one contact. (75783)    Home Exercise Program:     [] Reviewed/Progressed HEP activities related to strengthening, flexibility, endurance, ROM. (54062)  [] Reviewed/Progressed HEP activities related to improving balance, coordination, kinesthetic sense, posture, motor skill, proprioception.  (05038)    Manual Treatments:    [] Provided manual therapy to mobilize soft tissue/joints for the purpose of modulating pain, promoting relaxation,  increasing ROM, reducing/eliminating soft tissue swelling/inflammation/restriction, improving soft tissue extensibility. (13437)    Service Based Modalities:      Timed Code Treatment Minutes:   34' ROB,   Total Treatment Minutes:   34'    Treatment/Activity Tolerance:  [x] Patient tolerated treatment well [] Patient limited by fatique  [] Patient limited by pain  [] Patient limited by other medical complications  [] Other:     Prognosis: [] Good [x] Fair  [] Poor    Patient Requires Follow-up: [x] Yes  [] No      Goals:  Short Term Goals  Time Frame for Short Term Goals: 1 week  Short Term Goal 1: Initiate HEP Initiated    Long Term Goals  Time Frame for Long Term Goals : 5 weeks  Long Term Goal 1: Patient will report pain reduced to 5/10 to allow patient to get a better nights sleep. Met (1/10 now, <5/10 at worst)  Long Term Goal 2: Patient will score > or = to 33/80 on the LEFs to allow her to complete her daily ADLs with greater ease. Met (See above)  Long Term Goal 3: Patient

## 2024-03-11 ENCOUNTER — HOSPITAL ENCOUNTER (OUTPATIENT)
Dept: PHYSICAL THERAPY | Age: 89
Setting detail: THERAPIES SERIES
Discharge: HOME | End: 2024-03-11
Payer: MEDICARE

## 2024-03-11 PROCEDURE — 97035 APP MDLTY 1+ULTRASOUND EA 15: CPT

## 2024-03-11 PROCEDURE — 97110 THERAPEUTIC EXERCISES: CPT

## 2024-03-11 NOTE — FLOWSHEET NOTE
Physical Therapy Daily Treatment Note    Date:  3/11/2024    Patient Name:  Jina Calabrese    :  1934  MRN: [id number]  Restrictions/Precautions:   FALL RISK  Medical/Treatment Diagnosis Information:   Diagnosis: M70.62 (ICD-10-CM) - Greater trochanteric bursitis of left hip  Insurance/Certification information:  PT Insurance Information: BCBS MEDICARE  Physician Information:  VASU Zarco - CNP    Plan of care signed (Y/N):  Y  Visit # POC: 7/10     PA Approval #: [id number]    PA Coverage Dates: 24 - 24  Visit # PA: 2/  Visits / Units Approved: 5 visits       Pain level: 10/10 hip        Time In: 2:04  Time Out: 2:32     Progress Note: []  Yes  [x]  No  Next due by: Visit #10 or by 3/18/24     Subjective: Pt arrives reporting excruciating, constant L hip/greater trochanter pain rated 10/10. Pain began over the weekend after carrying [employer] decorations and snowmen downstairs to basement. Pt denies any falls.        Objective: ROB completed per flowsheet to improve left hip mobility and stability to allow patient to complete her daily ADLs and ambulate with greater ease. Completed only seated exercises this date per pt tolerance. Re added US 1.3 W/cm, 1 MHz, 100% to lateral left hip. Slight improvement noted by pt after US. Plan to monitor and re add exercises as able.     Observation:   Test measurements:        Exercises: FALL RISK  Exercise/Equipment Resistance/Repetitions Other comments   Nustep Held as pt unable this date        Manual stretching 6x10\" IT band to neutral, hamstring   Quad set     SLR    clamshell    SAQ    bridges    Heel slides    SL piriformis release     Clamshell      supine piriformis stretch          Counter ex     Step ups F/L   Mini squats    STS    Seated hip abd/add 10x red    Seated marches     Seated HS curls 10x red    Ultrasound 8'    [x] Provided verbal/tactile cueing for activities related to strengthening, flexibility, endurance, ROM.

## 2024-03-14 ENCOUNTER — HOSPITAL ENCOUNTER (OUTPATIENT)
Dept: PHYSICAL THERAPY | Age: 89
Setting detail: THERAPIES SERIES
Discharge: HOME | End: 2024-03-14
Payer: MEDICARE

## 2024-03-14 PROCEDURE — 97110 THERAPEUTIC EXERCISES: CPT | Performed by: PHYSICAL THERAPY ASSISTANT

## 2024-03-14 NOTE — FLOWSHEET NOTE
Physical Therapy Daily Treatment Note    Date:  3/14/2024    Patient Name:  Jina Calabrese    :  1934  MRN: [id number]  Restrictions/Precautions:   FALL RISK  Medical/Treatment Diagnosis Information:   Diagnosis: M70.62 (ICD-10-CM) - Greater trochanteric bursitis of left hip  Insurance/Certification information:  PT Insurance Information: [facility] MEDICARE  Physician Information:  VASU Zarco - CNP    Plan of care signed (Y/N):  Y  Visit # POC: 9/10     PA Approval #: [id number]    PA Coverage Dates: 24 - 24  Visit # PA: 3/  Visits / Units Approved: 5 visits       Pain level: 10 hip, 5/10 low back        Time In: 1100  Time Out: 1142    Progress Note: []  Yes  [x]  No  Next due by: Visit #10 or by 3/18/24     Subjective: Pt. States overall improvement in pain this date. Right low back pain rated 5/10, lateral hip 4/10.        Objective: ROB completed per flowsheet to improve left hip mobility and stability to allow patient to complete her daily ADLs and ambulate with greater ease. Completed only seated exercises this date per pt tolerance. Re added US 1.3 W/cm, 1 MHz, 100% to lateral left hip. Re-started several exercises to improve motion and strength. Discussed altering HEP for next several days to increased pain earlier in week.     Observation:   Test measurements:        Exercises: FALL RISK  Exercise/Equipment Resistance/Repetitions Other comments   Nustep Held as pt unable this date        Manual stretching  IT band to neutral, hamstring   Quad set     SLR    clamshell    SAQ    bridges    Heel slides    SL piriformis release     Clamshell      supine piriformis stretch          Counter ex  15x    Step ups F/L   Mini squats 10x    STS 10x    Seated hip abd/add 10x red    Seated marches  10x    Seated HS curls 10x red    Ultrasound 8'    [x] Provided verbal/tactile cueing for activities related to strengthening, flexibility, endurance, ROM. (30329)  [] Provided verbal/tactile

## 2024-03-14 NOTE — FLOWSHEET NOTE
Outpatient Physical Therapy    [x] [place]  Phone: [phone]  Fax: [phone]      [] [place]  Phone: [phone]  Fax: [phone]    Physical Therapy  Cancellation/No-show Note  Patient Name:  Jina Calabrese  :  1934   Date:  3/14/2024  Cancelled visits to date: 1  No-shows to date: 0    For today's appointment patient:  [x]  Cancelled  []  Rescheduled appointment  []  No-show     Reason given by patient:  []  Patient ill  []  Conflicting appointment  []  No transportation    []  Conflict with work  []  No reason given  [x]  Other:     Comments:  Patient arrived 20 minutes late to appointment thinking her appointment was at 11 AM.     Electronically signed by: Cecily Castellanos PTA

## 2024-03-18 ENCOUNTER — HOSPITAL ENCOUNTER (OUTPATIENT)
Dept: PHYSICAL THERAPY | Age: 89
Setting detail: THERAPIES SERIES
Discharge: HOME | End: 2024-03-18
Payer: MEDICARE

## 2024-03-18 PROCEDURE — 97110 THERAPEUTIC EXERCISES: CPT

## 2024-03-18 PROCEDURE — 97035 APP MDLTY 1+ULTRASOUND EA 15: CPT

## 2024-03-18 NOTE — PLAN OF CARE
[facility]  Rehabilitation and Sports Medicine    [x] [place]  Phone: [phone]  Fax: [phone]      [] [place]  Phone: [phone]  Fax: [phone]    Physical Therapy Progress Note  Date: 3/18/2024        Patient Name:  Jina Calabrese    :  1934  MRN: [id number]  Restrictions/Precautions:   FALL RISK  Medical/Treatment Diagnosis Information:   Diagnosis: M70.62 (ICD-10-CM) - Greater trochanteric bursitis of left hip  Insurance/Certification information:  PT Insurance Information: [facility] MEDICARE  Physician Information:  VASU Zarco - CNP    Plan of care signed (Y/N):  Y  Visit # POC: 9/10     PA Approval #: [id number]    PA Coverage Dates: 24 - 24  Visit # PA: 4/  Visits / Units Approved: 5 visits       Pain level: 10 hip and low back    Time Period for Report: 24 - 3/18/24   Cancels/No-shows to date:      Plan of Care/Treatment to date:  [x] Therapeutic Exercise    [x] Modalities:  [] Therapeutic Activity     [x] Ultrasound  [] Electrical Stimulation  [] Gait Training      [] Cervical Traction    [] Lumbar Traction  [] Neuromuscular Re-education  [] Cold/hotpack [] Iontophoresis  [x] Instruction in HEP      Other:  [] Manual Therapy       []    [] Aquatic Therapy       []                           Subjective: Pt reports she is feeling weak. Usually feels this way in the morning however gets better later in the day however has not yet.  Feels therapy has helped with ambulation. Wanting to continue PT to work on transfers and endurance to be able to complete chores around the house. At times still feels unstable as well.         Objective: ROB completed per flowsheet to improve left hip mobility and stability to allow patient to complete her daily ADLs and ambulate with greater ease. Completed only seated exercises this date per pt tolerance. Continued US 1.3 W/cm, 1 MHz, 100% to lateral left hip.     Observation:   Test measurements:  5 STS in

## 2024-03-18 NOTE — FLOWSHEET NOTE
report pain reduced to 5/10 to allow patient to get a better nights sleep. Met (5/10)  Long Term Goal 2: Patient will score > or = to 33/80 on the LEFs to allow her to complete her daily ADLs with greater ease. Met   Long Term Goal 3: Patient will complete 6 sit to stands in 30 seconds modified by use of linnea UE to allow patient to complete car transfers with greater ease. (See above)   Updated 2/29/24: Long Term Goal 4: Patient will complete TUG in < or = to 15s with RW to decrease risk of falls when ambulating. See above)  Long Term Goal 5: Patient will demonstrate 4/5 left hip strength to allow patient to navigate stairs with greater ease.  (See above)  Long term goal 6: IND with HEP (verbalized compliance)    Plan:   [x] Continue per plan of care [] Alter current plan (see comments)  [] Plan of care initiated [] Hold pending MD visit [] Discharge    Plan for Next Session:  Monitor tolerance and advance as able.     Electronically signed by:  Chantel Guerra PTA

## 2024-03-21 ENCOUNTER — HOSPITAL ENCOUNTER (OUTPATIENT)
Dept: PHYSICAL THERAPY | Age: 89
Setting detail: THERAPIES SERIES
Discharge: HOME | End: 2024-03-21
Payer: MEDICARE

## 2024-03-21 PROCEDURE — 97110 THERAPEUTIC EXERCISES: CPT

## 2024-03-21 NOTE — FLOWSHEET NOTE
Physical Therapy Daily Treatment Note    Date:  3/21/2024    Patient Name:  Jina Calabrese    :  1934  MRN: [id number]  Restrictions/Precautions:   FALL RISK  Medical/Treatment Diagnosis Information:   Diagnosis: M70.62 (ICD-10-CM) - Greater trochanteric bursitis of left hip  Insurance/Certification information:  PT Insurance Information: [facility] MEDICARE  Physician Information:  VASU Zarco - CNP    Plan of care signed (Y/N):  Y  Visit # POC: 9/10     PA Approval #: [id number]    PA Coverage Dates: 24 - 24  Visit # PA: /  Visits / Units Approved: 5 visits       Pain level: 4/10 low back      Time In: 12:31 Time Out: 1:04    Progress Note: [x]  Yes  []  No  Next due by: Visit #10 or by 3/18/24     Subjective: Pt reports leg weakness and achy back pain upon arrival. Pt's hip feels really good today. Back pain increases when walking around at home. Pt feels most limited by endurance especially when completing chores.     Objective: ROB completed per flowsheet to improve left hip mobility and stability to allow patient to complete her daily ADLs and ambulate with greater ease. Re-added steps this date and progressed reps for seated exercises. Added gait training with cone pick ups for improving pt's endurance. Held US this date per pt request as pt's hip pain has subsided.     Observation:   Test measurements:  5 STS in 30\" with UE support    TUG with RW: 20 seconds     L Hip flex: 4/5           Abd: 3+/5      Exercises: FALL RISK  Exercise/Equipment Resistance/Repetitions Other comments   Nustep 6' L2         Manual stretching  IT band to neutral, hamstring   Quad set     SLR    clamshell    SAQ    bridges    Heel slides    SL piriformis release     Clamshell      supine piriformis stretch          Counter ex  15x    Step ups 10x 4\" F only this date    Squats  5x 3 position    STS 10x    Seated hip abd/add 15x red    Seated marches  15x    Seated HS curls 15x red    Ultrasound    Gait with

## 2024-03-25 ENCOUNTER — HOSPITAL ENCOUNTER (OUTPATIENT)
Dept: PHYSICAL THERAPY | Age: 89
Setting detail: THERAPIES SERIES
Discharge: HOME | End: 2024-03-25
Payer: MEDICARE

## 2024-03-25 PROCEDURE — 97110 THERAPEUTIC EXERCISES: CPT

## 2024-03-25 NOTE — FLOWSHEET NOTE
Physical Therapy Daily Treatment Note    Date:  3/25/2024    Patient Name:  Jina Calabrese    :  1934  MRN: [id number]  Restrictions/Precautions:   FALL RISK  Medical/Treatment Diagnosis Information:   Diagnosis: M70.62 (ICD-10-CM) - Greater trochanteric bursitis of left hip  Insurance/Certification information:  PT Insurance Information: [employer] MEDICARE  Physician Information:  VASU Zarco - CNP    Plan of care signed (Y/N):  N  Visit # POC: 2/10 2nd POC 11 total visits     PA Approval #:  [id number]   PA Coverage Dates: 3/25/24 - 24  Visit # PA:   Visits / Units Approved: 4 visits       Pain level: 3/10 lateral L hip pain      Time In: 1230  Time Out: 103    Progress Note: []  Yes  [x]  No  Next due by: Visit #10 or by 24     Subjective:  Reporting slight L lateral hip pain at 3/10 upon arrival. Reporting in the past week pain hasn't achieved greater than 3/10. Reporting feels 70% better overall. Feels as if she is back to a good spot. Feels as if she can manage with her exercises at home. Feels as if she would be okay with being placed on hold at this time.     Objective: Goals addressed this date. Patient meeting most of the goals set for pt and the goals that were originally updated. Pt agreeable to being placed on hold at this time. Educated to call to be rescheduled if needed between this date and . Reviewed exercises to be completed at home. Provided written HEP ex of seated ex with red TB this date.     Observation:   Test measurements:      8 STS in 30\" with bilateral UE support    TUG with RW: 15 seconds  TUG without RW: 23 seconds    L Hip flex: 4/5           Abd: 3+/5      Exercises: FALL RISK  Exercise/Equipment Resistance/Repetitions Other comments   Nustep 6' L2         Manual stretching  IT band to neutral, hamstring   Quad set     SLR    clamshell    SAQ    bridges    Heel slides    SL piriformis release     Clamshell      supine piriformis stretch

## 2024-03-28 ENCOUNTER — APPOINTMENT (OUTPATIENT)
Dept: PHYSICAL THERAPY | Age: 89
End: 2024-03-28
Payer: MEDICARE

## 2024-04-24 ENCOUNTER — OFFICE VISIT (OUTPATIENT)
Age: 89
End: 2024-04-24
Payer: MEDICARE

## 2024-04-24 ENCOUNTER — OFFICE VISIT (OUTPATIENT)
Dept: NEUROSURGERY | Age: 89
End: 2024-04-24
Payer: MEDICARE

## 2024-04-24 VITALS
BODY MASS INDEX: 27.46 KG/M2 | HEART RATE: 93 BPM | DIASTOLIC BLOOD PRESSURE: 84 MMHG | HEIGHT: 65 IN | SYSTOLIC BLOOD PRESSURE: 137 MMHG | WEIGHT: 164.8 LBS

## 2024-04-24 VITALS
WEIGHT: 164.8 LBS | DIASTOLIC BLOOD PRESSURE: 60 MMHG | HEART RATE: 81 BPM | HEIGHT: 65 IN | BODY MASS INDEX: 27.46 KG/M2 | SYSTOLIC BLOOD PRESSURE: 106 MMHG

## 2024-04-24 DIAGNOSIS — D35.2 PITUITARY MACROADENOMA (HCC): Primary | ICD-10-CM

## 2024-04-24 DIAGNOSIS — E27.40 ADRENAL INSUFFICIENCY (HCC): ICD-10-CM

## 2024-04-24 DIAGNOSIS — D49.7 PITUITARY TUMOR: Primary | ICD-10-CM

## 2024-04-24 DIAGNOSIS — E03.9 ACQUIRED HYPOTHYROIDISM: ICD-10-CM

## 2024-04-24 PROCEDURE — 1123F ACP DISCUSS/DSCN MKR DOCD: CPT | Performed by: INTERNAL MEDICINE

## 2024-04-24 PROCEDURE — 99204 OFFICE O/P NEW MOD 45 MIN: CPT | Performed by: PHYSICIAN ASSISTANT

## 2024-04-24 PROCEDURE — 1123F ACP DISCUSS/DSCN MKR DOCD: CPT | Performed by: PHYSICIAN ASSISTANT

## 2024-04-24 PROCEDURE — 99214 OFFICE O/P EST MOD 30 MIN: CPT | Performed by: INTERNAL MEDICINE

## 2024-04-24 RX ORDER — HYDROCORTISONE 10 MG/1
10 TABLET ORAL DAILY
Qty: 90 TABLET | Refills: 1 | Status: SHIPPED | OUTPATIENT
Start: 2024-04-24

## 2024-04-24 RX ORDER — LEVOTHYROXINE SODIUM 0.05 MG/1
50 TABLET ORAL DAILY
Qty: 90 TABLET | Refills: 1 | Status: SHIPPED | OUTPATIENT
Start: 2024-04-24

## 2024-04-24 RX ORDER — DEXAMETHASONE SODIUM PHOSPHATE 4 MG/ML
4 INJECTION, SOLUTION INTRA-ARTICULAR; INTRALESIONAL; INTRAMUSCULAR; INTRAVENOUS; SOFT TISSUE PRN
COMMUNITY

## 2024-04-24 RX ORDER — HYDROCORTISONE 5 MG/1
5 TABLET ORAL NIGHTLY
Qty: 90 TABLET | Refills: 1 | Status: SHIPPED | OUTPATIENT
Start: 2024-04-24

## 2024-04-24 ASSESSMENT — ENCOUNTER SYMPTOMS
APNEA: 0
CHEST TIGHTNESS: 0
SHORTNESS OF BREATH: 0

## 2024-04-24 NOTE — PROGRESS NOTES
[facility] PHYSICIANS [place] [facility]  [facility] ENDOCRINOLOGY  [address]  [facility] [address]  Dept: [phone]  Loc: [phone]     Visit Date: 4/24/2024    Jina Calabrese is a 89 y.o. female who presents today for:  Chief Complaint   Patient presents with    Pituitary tumor    Hypothyroidism            Subjective:      HPI     Jina Calabrese is a 89 y.o. , female who comes for for follow-up for pituitary tumor and adrenal insufficiency.  Shari Valdez MD  Jina Calabrese was diagnosed with pituitary tumor last year. She had MRI of the brain on 7/11/2023 which showed an 11 x 11 x 8 mm pituitary gland.  There was a cystic or proteinaceous area in the suprasellar region measuring 8.2 x 7.9 x 10 mm.  There was no definite compression of the optic chiasm.  There was no extension into the cavernous sinus.  In addition there was a 12 x 7 x 8 mm cystic area in the region of the fossa of Rosenmuller on the left side.  At that time the patient was also diagnosed with adrenal insufficiency and she is currently being treated with hydrocortisone 10/5.  She is doing very well on this regiment with no headaches, lightheadedness or dizziness.  She denies salt craving and any GI symptoms.  Weight has been stable and her energy has been good.  The patient was also diagnosed with hypothyroidism during the same admission and she was placed on 50 mcg of Synthroid daily.  She is doing well with no cold intolerance, constipation or depression.  This patient has not had any major issues since the last visit.  Past Medical History:   Diagnosis Date    Actinic keratosis     history of    Arthritis     Atrial fibrillation (HCC)     Cancer (HCC)     skin    Cervical prolapse     history of    Diverticulosis     Endocervical polyp     history of    Heel spur     Hip pain     Hypertension     Nasal septal deviation     Osteoporosis     Pituitary tumor     Stress incontinence     history of    Syncope

## 2024-04-24 NOTE — PROGRESS NOTES
[facility] NEUROSURGERY DEPARTMENT  [address] [place] [address]  [address]  [place] [id number]  Dept: [phone]  Dept Fax: [phone]      Patient Name:  Jina Calabrese  Visit Date:  4/24/2024    HPI:     Ms. Calabrese is a 89 y.o. female that presents today at [facility] Neurosurgery for evaluation of the following: Patient arrives as a referral from endocrinology for evaluation of findings consistent with pituitary macroadenoma.    Chief Complaint   Patient presents with    New Patient     Pituitary Macroadenoma         HPI   Mrs. Calabrese is a pleasant, active 89-year-old female, he never user of tobacco a nonuser of smokeless tobacco or vaping products he denies alcohol use and has a medical history significant for atrial fibrillation, prior skin cancer, diverticulosis, hypertension, osteoporosis, pituitary tumor, and surgical history that includes nose surgery to address skin cancer dating to 2021 but is absent brain or spine surgery.  This patient arrives to our service as a referral from endocrinology for evaluation of findings consistent with pituitary microadenoma.  Today's appointment includes an MRI of the brain imaging with and without contrast on 7/11/2023 which reveals a 12 x 7 x 8 mm cystic area in the region of the fossa consistent with pituitary adenoma with the infundibulum at midline and no compression on the optic chiasm.    She presents today accompanied by her  with her daughter available on speaker phone.  She is comfortable and intact neurologically and she participated in discussions involving her care appropriately with clear appropriate speech.  They do relate hospitalizations to address sodium deficiencies with treatment provided by endocrinology for thyroid stimulating hormone as well as cortisol.  Today's image is dated to July 2023 and was reviewed with the patient directly as well as a review of the report associated with the image.  They do

## 2024-04-26 RX ORDER — PANTOPRAZOLE SODIUM 40 MG/1
TABLET, DELAYED RELEASE ORAL
Qty: 90 TABLET | Refills: 3 | Status: SHIPPED | OUTPATIENT
Start: 2024-04-26

## 2024-04-26 NOTE — TELEPHONE ENCOUNTER
Jina called requesting a refill of the below medication which has been pended for you:     Requested Prescriptions     Pending Prescriptions Disp Refills    pantoprazole (PROTONIX) 40 MG tablet 90 tablet 3     Sig: Take 1 tablet by mouth once daily     Refused Prescriptions Disp Refills    dilTIAZem (CARDIZEM) 30 MG tablet 180 tablet 3     Sig: Take 1 tablet by mouth in the morning and at bedtime       Last Appointment Date: 12/19/2023  Next Appointment Date: 6/20/2024    Allergies   Allergen Reactions    Statins Other (See Comments)     Causes severe leg cramps     Tape [Adhesive Tape] Other (See Comments)     \"sticks to skin\" causes redness    Tizanidine Hcl Other (See Comments)     Having issues with her liver- elevated her enzyme level    Tramadol Other (See Comments)     \"makes me Goofy\"    Augmentin [Amoxicillin-Pot Clavulanate] Diarrhea, Nausea And Vomiting and Other (See Comments)     abd pain    Nystatin Nausea And Vomiting     Pt states \"swish and swallow\"

## 2024-05-01 ENCOUNTER — OFFICE VISIT (OUTPATIENT)
Dept: CARDIOLOGY | Age: 89
End: 2024-05-01
Payer: MEDICARE

## 2024-05-01 ENCOUNTER — HOSPITAL ENCOUNTER (OUTPATIENT)
Age: 89
Discharge: HOME | End: 2024-05-01
Payer: MEDICARE

## 2024-05-01 VITALS
DIASTOLIC BLOOD PRESSURE: 60 MMHG | WEIGHT: 163 LBS | OXYGEN SATURATION: 96 % | RESPIRATION RATE: 17 BRPM | HEIGHT: 65 IN | HEART RATE: 105 BPM | SYSTOLIC BLOOD PRESSURE: 123 MMHG | BODY MASS INDEX: 27.16 KG/M2

## 2024-05-01 DIAGNOSIS — R00.1 BRADYCARDIA: ICD-10-CM

## 2024-05-01 DIAGNOSIS — I48.92 ATRIAL FLUTTER, UNSPECIFIED TYPE (HCC): ICD-10-CM

## 2024-05-01 DIAGNOSIS — I48.0 PAROXYSMAL ATRIAL FIBRILLATION WITH RAPID VENTRICULAR RESPONSE (HCC): Primary | ICD-10-CM

## 2024-05-01 DIAGNOSIS — D49.7 PITUITARY TUMOR: ICD-10-CM

## 2024-05-01 LAB
ANION GAP SERPL CALCULATED.3IONS-SCNC: 11 MMOL/L (ref 9–17)
BUN SERPL-MCNC: 29 MG/DL (ref 8–23)
BUN/CREAT SERPL: 24 (ref 9–20)
CALCIUM SERPL-MCNC: 9.3 MG/DL (ref 8.6–10.4)
CHLORIDE SERPL-SCNC: 102 MMOL/L (ref 98–107)
CO2 SERPL-SCNC: 28 MMOL/L (ref 20–31)
CREAT SERPL-MCNC: 1.2 MG/DL (ref 0.5–0.9)
GFR, ESTIMATED: 43 ML/MIN/1.73M2
GLUCOSE SERPL-MCNC: 107 MG/DL (ref 70–99)
POTASSIUM SERPL-SCNC: 4.2 MMOL/L (ref 3.7–5.3)
SODIUM SERPL-SCNC: 141 MMOL/L (ref 135–144)
T4 FREE SERPL-MCNC: 1.2 NG/DL (ref 0.92–1.68)
TSH SERPL DL<=0.05 MIU/L-ACNC: 0.43 UIU/ML (ref 0.3–5)

## 2024-05-01 PROCEDURE — 84305 ASSAY OF SOMATOMEDIN: CPT

## 2024-05-01 PROCEDURE — 93010 ELECTROCARDIOGRAM REPORT: CPT | Performed by: INTERNAL MEDICINE

## 2024-05-01 PROCEDURE — 99214 OFFICE O/P EST MOD 30 MIN: CPT | Performed by: INTERNAL MEDICINE

## 2024-05-01 PROCEDURE — 84146 ASSAY OF PROLACTIN: CPT

## 2024-05-01 PROCEDURE — 93005 ELECTROCARDIOGRAM TRACING: CPT | Performed by: INTERNAL MEDICINE

## 2024-05-01 PROCEDURE — 99213 OFFICE O/P EST LOW 20 MIN: CPT | Performed by: INTERNAL MEDICINE

## 2024-05-01 PROCEDURE — 84439 ASSAY OF FREE THYROXINE: CPT

## 2024-05-01 PROCEDURE — 36415 COLL VENOUS BLD VENIPUNCTURE: CPT

## 2024-05-01 PROCEDURE — 1123F ACP DISCUSS/DSCN MKR DOCD: CPT | Performed by: INTERNAL MEDICINE

## 2024-05-01 PROCEDURE — 80048 BASIC METABOLIC PNL TOTAL CA: CPT

## 2024-05-01 PROCEDURE — 84443 ASSAY THYROID STIM HORMONE: CPT

## 2024-05-01 NOTE — PROGRESS NOTES
Today's Date: 5/1/2024  Patient's Name: Jina Calabrese  Patient's age: 89 y.o., 9/1/1934    Subjective:  Jina Calabrese is being seen in clinic today regarding Atrial flutter/fibrillation    she is here for follow up.  is present. Daughter joined via telephone.  She reports occasional dyspnea on exertion. She denies any chest pain. No syncope or pre-syncope, no orthopnea. She denies any bleeding. She denies any fall.         Past Medical History:   has a past medical history of Actinic keratosis, Arthritis, Atrial fibrillation (HCC), Cancer (HCC), Cervical prolapse, Diverticulosis, Endocervical polyp, Heel spur, Hip pain, Hypertension, Nasal septal deviation, Osteoporosis, Pituitary tumor, Stress incontinence, Syncope, and Vaginal prolapse.    Past Surgical History:   has a past surgical history that includes Colonoscopy (01/26/2000); Cholecystectomy (11/1976); Upper gastrointestinal endoscopy (07/16/2017); pr egd transoral biopsy single/multiple (Left, 07/16/2017); Upper gastrointestinal endoscopy (Left, 07/16/2017); Endoscopic ultrasonography, GI (Left, 07/20/2017); eye surgery; Total hip arthroplasty (Left, 09/19/2017); bladder repair (2011); Partial hysterectomy (1960's); Appendectomy (1960's); skin biopsy; pr office/outpt visit,procedure only (Right, 06/01/2018); joint replacement (2017); Mohs surgery (N/A, 01/08/2021); Facial Surgery (N/A, 01/27/2021); US BREAST BIOPSY W LOC DEVICE 1ST LESION LEFT (Left, 04/21/2021); US PLACE BREAST LOC DEVICE EACH ADDL LEFT (Left, 04/21/2021); US BREAST BIOPSY W LOC DEVICE 1ST LESION RIGHT (Right, 04/21/2021); Hysterectomy; Mohs surgery (Left, 08/06/2021); Upper gastrointestinal endoscopy (Left, 08/28/2021); Upper gastrointestinal endoscopy (N/A, 08/30/2021); Upper gastrointestinal endoscopy (N/A, 04/28/2022); Upper gastrointestinal endoscopy (04/28/2022); Upper gastrointestinal endoscopy (N/A, 08/22/2022); and Mohs surgery (Right, 6/16/2023).    Home

## 2024-05-02 LAB
IGF-1 COLLECTION INFO: NORMAL
PROLACTIN SERPL-MCNC: 25.1 NG/ML (ref 4.79–23.3)
SOMATOMEDIN C: 60 NG/ML (ref 55.1–166)

## 2024-05-15 ENCOUNTER — HOSPITAL ENCOUNTER (OUTPATIENT)
Dept: MRI IMAGING | Age: 89
Discharge: HOME | End: 2024-05-15
Payer: MEDICARE

## 2024-05-15 DIAGNOSIS — D35.2 PITUITARY MACROADENOMA (HCC): ICD-10-CM

## 2024-05-15 PROCEDURE — 70553 MRI BRAIN STEM W/O & W/DYE: CPT

## 2024-05-15 PROCEDURE — 6360000004 HC RX CONTRAST MEDICATION: Performed by: PHYSICIAN ASSISTANT

## 2024-05-15 PROCEDURE — A9579 GAD-BASE MR CONTRAST NOS,1ML: HCPCS | Performed by: PHYSICIAN ASSISTANT

## 2024-05-15 RX ADMIN — GADOTERIDOL 15 ML: 279.3 INJECTION, SOLUTION INTRAVENOUS at 15:17

## 2024-05-15 NOTE — TELEPHONE ENCOUNTER
Verbal order given to [facility].   Diltiazem 30 mg TID   #30 RF 0         Recommendations:  -  Increase cardiazem to 30mg po TID. Continue metoprolol 12.5mg po TBID. Monitor BP and HR  -  continue xarelto  -  RTC 4 weeks     Matty Colbert MD  [facility]  [phone]

## 2024-05-16 ENCOUNTER — OFFICE VISIT (OUTPATIENT)
Dept: OBGYN | Age: 89
End: 2024-05-16
Payer: MEDICARE

## 2024-05-16 VITALS
BODY MASS INDEX: 27.55 KG/M2 | SYSTOLIC BLOOD PRESSURE: 138 MMHG | DIASTOLIC BLOOD PRESSURE: 68 MMHG | HEART RATE: 92 BPM | HEIGHT: 64 IN | OXYGEN SATURATION: 97 % | WEIGHT: 161.4 LBS

## 2024-05-16 DIAGNOSIS — Z96.0 PRESENCE OF PESSARY: ICD-10-CM

## 2024-05-16 DIAGNOSIS — Z46.89 ENCOUNTER FOR PESSARY MAINTENANCE: Primary | ICD-10-CM

## 2024-05-16 PROCEDURE — 99213 OFFICE O/P EST LOW 20 MIN: CPT

## 2024-05-16 NOTE — PROGRESS NOTES
Jina Calabrese  2024  11:33 AM          Jina Calabrese is a 89 y.o. female       The patient was seen. She has no chief complaints today. She has been fitted for a # 4; ring with support type pessary.  She states all of her symptoms that she had prior to the pessary have been relieved with its use.  She denies any vaginal bleeding.  She denies to any vaginal discharge or odor. Her bowels are regular and her voiding pattern is normal.     Blood pressure 138/68, pulse 92, height 1.626 m (5' 4\"), weight 73.2 kg (161 lb 6.4 oz), SpO2 97 %, not currently breastfeeding.    Chaperone for Intimate Exam  Chaperone was offered and accepted as part of the rooming process.  Chaperone: ELAINE        Abdomen: Soft and non-tender; good bowel sounds; no guarding, rebound or rigidity; no CVA tenderness bilaterally.    Extremities: No calf tenderness bilaterally. DTR 2/4 bilaterally. No edema.    Perineum/Speculum: There is not any signs of infection; The vaginal vault is without any signs of erythema or erosion. There is no vaginal discharge or odor appreciated.    The pessary was cleansed and replaced without any problems and the patient tolerated the procedure well. T.O.S. Ointment was placed with the pessary to decrease discharge/odor.    Assessment:   Diagnosis Orders   1. Encounter for pessary maintenance        2. Presence of pessary #4 ring with support          Chief Complaint   Patient presents with    Other     Pessary maintenance     Patient Active Problem List    Diagnosis Date Noted    Nausea and vomiting 2023     Priority: High    Persistent atrial fibrillation (HCC) 2023     Priority: High    Atrial fibrillation with slow ventricular response (HCC) 2023     Priority: High    Dizziness 2023     Priority: High    Presence of pessary #4 ring with support 10/13/2021     Priority: High    Lower abdominal pain 2021     Priority: High    Pelvic relaxation due to uterovaginal prolapse

## 2024-05-29 ENCOUNTER — OFFICE VISIT (OUTPATIENT)
Dept: CARDIOLOGY | Age: 89
End: 2024-05-29
Payer: MEDICARE

## 2024-05-29 VITALS
DIASTOLIC BLOOD PRESSURE: 68 MMHG | BODY MASS INDEX: 27.64 KG/M2 | WEIGHT: 161 LBS | HEART RATE: 84 BPM | SYSTOLIC BLOOD PRESSURE: 120 MMHG

## 2024-05-29 DIAGNOSIS — I48.0 PAROXYSMAL ATRIAL FIBRILLATION WITH RAPID VENTRICULAR RESPONSE (HCC): Primary | ICD-10-CM

## 2024-05-29 PROCEDURE — 93010 ELECTROCARDIOGRAM REPORT: CPT | Performed by: NURSE PRACTITIONER

## 2024-05-29 PROCEDURE — 99214 OFFICE O/P EST MOD 30 MIN: CPT | Performed by: NURSE PRACTITIONER

## 2024-05-29 PROCEDURE — 99212 OFFICE O/P EST SF 10 MIN: CPT | Performed by: NURSE PRACTITIONER

## 2024-05-29 PROCEDURE — 1123F ACP DISCUSS/DSCN MKR DOCD: CPT | Performed by: NURSE PRACTITIONER

## 2024-05-29 PROCEDURE — 93005 ELECTROCARDIOGRAM TRACING: CPT | Performed by: NURSE PRACTITIONER

## 2024-05-29 NOTE — PROGRESS NOTES
Today's Date: 5/29/2024  Patient Name: Jina Calabrese  Patient's age: 89 y.o., 9/1/1934    CC: follow up for Pafox AF/AFL    HPI:   The patient is a 89 y.o.  female is in the office for follow up.    Pt denies any CP or sob.  Pt states that she feels good.  She is has a hard time remembering the 3rd dose of Cardizem. She has been keep BP/HR log and very stable.      Past Medical History:   has a past medical history of Actinic keratosis, Arthritis, Atrial fibrillation (HCC), Cancer (HCC), Cervical prolapse, Diverticulosis, Endocervical polyp, Heel spur, Hip pain, Hypertension, Nasal septal deviation, Osteoporosis, Pituitary tumor, Stress incontinence, Syncope, and Vaginal prolapse.    Past Surgical History:   has a past surgical history that includes Colonoscopy (01/26/2000); Cholecystectomy (11/1976); Upper gastrointestinal endoscopy (07/16/2017); pr egd transoral biopsy single/multiple (Left, 07/16/2017); Upper gastrointestinal endoscopy (Left, 07/16/2017); Endoscopic ultrasonography, GI (Left, 07/20/2017); eye surgery; Total hip arthroplasty (Left, 09/19/2017); bladder repair (2011); Partial hysterectomy (1960's); Appendectomy (1960's); skin biopsy; pr office/outpt visit,procedure only (Right, 06/01/2018); joint replacement (2017); Mohs surgery (N/A, 01/08/2021); Facial Surgery (N/A, 01/27/2021); US BREAST BIOPSY W LOC DEVICE 1ST LESION LEFT (Left, 04/21/2021); US PLACE BREAST LOC DEVICE EACH ADDL LEFT (Left, 04/21/2021); US BREAST BIOPSY W LOC DEVICE 1ST LESION RIGHT (Right, 04/21/2021); Hysterectomy; Mohs surgery (Left, 08/06/2021); Upper gastrointestinal endoscopy (Left, 08/28/2021); Upper gastrointestinal endoscopy (N/A, 08/30/2021); Upper gastrointestinal endoscopy (N/A, 04/28/2022); Upper gastrointestinal endoscopy (04/28/2022); Upper gastrointestinal endoscopy (N/A, 08/22/2022); and Mohs surgery (Right, 6/16/2023).     Home Medications:    Prior to Admission medications

## 2024-06-12 ENCOUNTER — OFFICE VISIT (OUTPATIENT)
Dept: NEUROSURGERY | Age: 89
End: 2024-06-12
Payer: MEDICARE

## 2024-06-12 VITALS
HEIGHT: 64 IN | WEIGHT: 161 LBS | SYSTOLIC BLOOD PRESSURE: 127 MMHG | HEART RATE: 99 BPM | BODY MASS INDEX: 27.49 KG/M2 | DIASTOLIC BLOOD PRESSURE: 70 MMHG

## 2024-06-12 DIAGNOSIS — D35.2 PITUITARY MACROADENOMA (HCC): Primary | ICD-10-CM

## 2024-06-12 PROCEDURE — 99214 OFFICE O/P EST MOD 30 MIN: CPT | Performed by: PHYSICIAN ASSISTANT

## 2024-06-12 PROCEDURE — 1123F ACP DISCUSS/DSCN MKR DOCD: CPT | Performed by: PHYSICIAN ASSISTANT

## 2024-06-12 NOTE — PROGRESS NOTES
pituitary microadenoma  Doing very well overall  Encouraged gradual increase in physical and mental activity.  Fall precaution and home safety education provided to patient.  Follow-up: Annual follow-ups with MRI brain with and without contrast utilizing sella protocol will be ordered to rule out any evolution of findings with encouragement for her to reach out to our service with any additional questions or concerns and to keep and maintain appointments with other subspecialties.      Electronically signed by Manuel Wilson PA-C on 6/12/24 at 1:08 PM EDT

## 2024-06-20 ENCOUNTER — OFFICE VISIT (OUTPATIENT)
Dept: FAMILY MEDICINE CLINIC | Age: 89
End: 2024-06-20

## 2024-06-20 ENCOUNTER — NURSE ONLY (OUTPATIENT)
Dept: LAB | Age: 89
End: 2024-06-20
Payer: MEDICARE

## 2024-06-20 VITALS
HEIGHT: 64 IN | OXYGEN SATURATION: 96 % | DIASTOLIC BLOOD PRESSURE: 64 MMHG | SYSTOLIC BLOOD PRESSURE: 114 MMHG | WEIGHT: 163.7 LBS | HEART RATE: 68 BPM | BODY MASS INDEX: 27.95 KG/M2

## 2024-06-20 DIAGNOSIS — S81.802A WOUND OF LEFT LOWER EXTREMITY, INITIAL ENCOUNTER: ICD-10-CM

## 2024-06-20 DIAGNOSIS — Z23 NEED FOR TDAP VACCINATION: Primary | ICD-10-CM

## 2024-06-20 DIAGNOSIS — I10 ESSENTIAL HYPERTENSION: Primary | ICD-10-CM

## 2024-06-20 DIAGNOSIS — D68.69 SECONDARY HYPERCOAGULABLE STATE (HCC): ICD-10-CM

## 2024-06-20 DIAGNOSIS — N18.30 STAGE 3 CHRONIC KIDNEY DISEASE, UNSPECIFIED WHETHER STAGE 3A OR 3B CKD (HCC): ICD-10-CM

## 2024-06-20 DIAGNOSIS — E27.1 PRIMARY ADRENOCORTICAL INSUFFICIENCY (HCC): ICD-10-CM

## 2024-06-20 PROBLEM — J96.01 ACUTE RESPIRATORY FAILURE WITH HYPOXIA (HCC): Status: ACTIVE | Noted: 2024-06-20

## 2024-06-20 PROBLEM — E44.0 MODERATE MALNUTRITION (HCC): Chronic | Status: RESOLVED | Noted: 2022-04-29 | Resolved: 2024-06-20

## 2024-06-20 PROBLEM — J96.01 ACUTE RESPIRATORY FAILURE WITH HYPOXIA (HCC): Status: RESOLVED | Noted: 2024-06-20 | Resolved: 2024-06-20

## 2024-06-20 PROCEDURE — 90715 TDAP VACCINE 7 YRS/> IM: CPT

## 2024-06-20 ASSESSMENT — ENCOUNTER SYMPTOMS
WHEEZING: 0
CHEST TIGHTNESS: 0
COUGH: 0
SHORTNESS OF BREATH: 0
COLOR CHANGE: 0

## 2024-06-20 NOTE — PROGRESS NOTES
[facility]PARTMENT OF [facility]  [address]  [facility] [address]  Dept: [phone]  Dept Fax: [phone]  Loc: [phone]    Jina Calabrese is a 89 y.o. female who presents today for her medical conditions/complaints as notedbelow.  Jina Calabrese is c/o of   Chief Complaint   Patient presents with    Hypertension     6 months        HPI:     HPI Here today for a follow up of her HTN    HTN: stable; she has not had any issues with new vision changes other than from her macular degeneration. She gets a little lightheaded if she stands up too quickly. No leg swelling. No issues with headaches. No issues with chest pain. She gets somewhat short of breath with strenuous activities. She typically doesn't have her activities changed. She recovers after a few minutes rest. She has some occasional palpitations.     Past Medical History:   Diagnosis Date    Actinic keratosis     history of    Arthritis     Atrial fibrillation (HCC)     Cancer (HCC)     skin    Cervical prolapse     history of    Diverticulosis     Endocervical polyp     history of    Heel spur     Hip pain     Hypertension     Nasal septal deviation     Osteoporosis     Pituitary tumor     Stress incontinence     history of    Syncope     Vaginal prolapse     history of          Social History     Tobacco Use    Smoking status: Never    Smokeless tobacco: Never   Substance Use Topics    Alcohol use: No     Current Outpatient Medications   Medication Sig Dispense Refill    dilTIAZem (CARDIZEM) 30 MG tablet Take 1 tablet by mouth 3 times daily 90 tablet 0    pantoprazole (PROTONIX) 40 MG tablet Take 1 tablet by mouth once daily 90 tablet 3    Multiple Vitamins-Minerals (PRESERVISION AREDS 2 PO) Take by mouth      levothyroxine (SYNTHROID) 50 MCG tablet Take 1 tablet by mouth Daily 90 tablet 1    hydrocortisone (CORTEF) 5 MG tablet Take 1 tablet by mouth at bedtime 90 tablet 1

## 2024-09-06 DIAGNOSIS — E03.9 ACQUIRED HYPOTHYROIDISM: ICD-10-CM

## 2024-09-10 RX ORDER — LEVOTHYROXINE SODIUM 50 MCG
50 TABLET ORAL DAILY
Qty: 90 TABLET | Refills: 1 | Status: SHIPPED | OUTPATIENT
Start: 2024-09-10

## 2024-09-13 DIAGNOSIS — E27.40 ADRENAL INSUFFICIENCY (HCC): ICD-10-CM

## 2024-09-16 RX ORDER — HYDROCORTISONE 5 MG/1
TABLET ORAL
Qty: 90 TABLET | Refills: 1 | Status: SHIPPED | OUTPATIENT
Start: 2024-09-16

## 2024-09-17 ENCOUNTER — OFFICE VISIT (OUTPATIENT)
Dept: OBGYN | Age: 89
End: 2024-09-17
Payer: MEDICARE

## 2024-09-17 VITALS
SYSTOLIC BLOOD PRESSURE: 136 MMHG | WEIGHT: 163 LBS | HEIGHT: 64 IN | DIASTOLIC BLOOD PRESSURE: 72 MMHG | BODY MASS INDEX: 27.83 KG/M2

## 2024-09-17 DIAGNOSIS — I48.91 ATRIAL FIBRILLATION WITH SLOW VENTRICULAR RESPONSE (HCC): ICD-10-CM

## 2024-09-17 DIAGNOSIS — Z96.0 PRESENCE OF PESSARY: ICD-10-CM

## 2024-09-17 DIAGNOSIS — N81.4 PELVIC RELAXATION DUE TO UTEROVAGINAL PROLAPSE: ICD-10-CM

## 2024-09-17 DIAGNOSIS — Z46.89 ENCOUNTER FOR PESSARY MAINTENANCE: Primary | ICD-10-CM

## 2024-09-17 PROCEDURE — 99213 OFFICE O/P EST LOW 20 MIN: CPT | Performed by: OBSTETRICS & GYNECOLOGY

## 2024-09-17 PROCEDURE — 1123F ACP DISCUSS/DSCN MKR DOCD: CPT | Performed by: OBSTETRICS & GYNECOLOGY

## 2024-10-11 DIAGNOSIS — E27.40 ADRENAL INSUFFICIENCY (HCC): ICD-10-CM

## 2024-10-11 RX ORDER — HYDROCORTISONE 10 MG/1
10 TABLET ORAL DAILY
Qty: 90 TABLET | Refills: 1 | Status: SHIPPED | OUTPATIENT
Start: 2024-10-11

## 2024-12-11 ENCOUNTER — OFFICE VISIT (OUTPATIENT)
Dept: CARDIOLOGY | Age: 88
End: 2024-12-11
Payer: MEDICARE

## 2024-12-11 VITALS
SYSTOLIC BLOOD PRESSURE: 130 MMHG | DIASTOLIC BLOOD PRESSURE: 60 MMHG | HEART RATE: 93 BPM | HEIGHT: 64 IN | BODY MASS INDEX: 28.19 KG/M2 | WEIGHT: 165.13 LBS

## 2024-12-11 DIAGNOSIS — I48.11 LONGSTANDING PERSISTENT ATRIAL FIBRILLATION (HCC): Primary | ICD-10-CM

## 2024-12-11 DIAGNOSIS — I34.0 NONRHEUMATIC MITRAL VALVE REGURGITATION: ICD-10-CM

## 2024-12-11 PROCEDURE — 93005 ELECTROCARDIOGRAM TRACING: CPT | Performed by: INTERNAL MEDICINE

## 2024-12-11 PROCEDURE — 1123F ACP DISCUSS/DSCN MKR DOCD: CPT | Performed by: INTERNAL MEDICINE

## 2024-12-11 PROCEDURE — 99214 OFFICE O/P EST MOD 30 MIN: CPT | Performed by: INTERNAL MEDICINE

## 2024-12-11 PROCEDURE — 93010 ELECTROCARDIOGRAM REPORT: CPT | Performed by: INTERNAL MEDICINE

## 2024-12-11 PROCEDURE — 99212 OFFICE O/P EST SF 10 MIN: CPT | Performed by: INTERNAL MEDICINE

## 2024-12-11 PROCEDURE — 1159F MED LIST DOCD IN RCRD: CPT | Performed by: INTERNAL MEDICINE

## 2024-12-11 NOTE — PROGRESS NOTES
pain, appetite loss, blood in stools.   Genitourinary: No dysuria, trouble voiding, or hematuria.  Musculoskeletal:  No gait disturbance, No weakness or joint complaints.  Integumentary: No rash or pruritis.  Neurological: No headache or diplopia. No tingling  Psychiatric: No anxiety, or depression.  Endocrine: No temperature intolerance.   Hematologic/Lymphatic: No abnormal bruising or bleeding, blood clots or swollen lymph nodes.  Allergic/Immunologic: No nasal congestion or hives.      PHYSICAL EXAM:      Vitals:    12/11/24 1142   BP: 130/60   Pulse: 93       Constitutional and General Appearance: alert, cooperative, no distress and appears stated age  HEENT: PERRL, no cervical lymphadenopathy. No masses palpable. Normal oral mucosa  Respiratory:  Normal excursion and expansion without use of accessory muscles  Resp Auscultation: Good respiratory effort. No for increased work of breathing. On auscultation: clear to auscultation bilaterally  Cardiovascular:  Heart tones are crisp and normal. irregular S1 and S2.  Jugular venous pulsation Normal  The carotid upstroke is normal in amplitude and contour without delay or bruit   Abdomen:   soft  Bowel sounds present  Extremities:   No edema  Neurological:  Alert and oriented.    Cardiac Data:  EKG: Atrial fibrillation , PVC, RBBB    Labs:     CBC: No results for input(s): \"WBC\", \"HGB\", \"HCT\", \"PLT\" in the last 72 hours.  BMP: No results for input(s): \"NA\", \"K\", \"CO2\", \"BUN\", \"CREATININE\", \"LABGLOM\", \"GLUCOSE\" in the last 72 hours.  PT/INR: No results for input(s): \"PROTIME\", \"INR\" in the last 72 hours.  FASTING LIPID PANEL:  Lab Results   Component Value Date/Time    HDL 33 10/09/2022 05:51 AM    TRIG 98 10/09/2022 05:51 AM     LIVER PROFILE:No results for input(s): \"AST\", \"ALT\", \"LABALBU\" in the last 72 hours.    Problem List:  Patient Active Problem List   Diagnosis    Trochanteric bursitis of left hip    Drug-induced constipation    Bradycardia    Esophageal

## 2024-12-26 NOTE — TELEPHONE ENCOUNTER
She stopped at window. She only has 5 pills left and she does not have enough for tomorrow. Please send to [facility]

## 2024-12-27 RX ORDER — RIVAROXABAN 15 MG/1
15 TABLET, FILM COATED ORAL DAILY
Qty: 90 TABLET | Refills: 3 | Status: SHIPPED | OUTPATIENT
Start: 2024-12-27

## 2024-12-27 RX ORDER — DILTIAZEM HYDROCHLORIDE 30 MG/1
30 TABLET, FILM COATED ORAL 3 TIMES DAILY
Qty: 90 TABLET | Refills: 0 | Status: CANCELLED | OUTPATIENT
Start: 2024-12-27

## 2024-12-27 RX ORDER — DILTIAZEM HYDROCHLORIDE 30 MG/1
30 TABLET, FILM COATED ORAL 3 TIMES DAILY
Qty: 90 TABLET | Refills: 0 | Status: SHIPPED | OUTPATIENT
Start: 2024-12-27

## 2024-12-27 NOTE — TELEPHONE ENCOUNTER
Jina called requesting a refill of the below medication which has been pended for you:     Requested Prescriptions     Pending Prescriptions Disp Refills    XARELTO 15 MG TABS tablet [Pharmacy Med Name: XARELTO 15MG TABS] 90 tablet 3     Sig: TAKE ONE TABLET BY MOUTH EVERY DAY       Last Appointment Date: 6/20/2024  Next Appointment Date: 1/23/2025    Allergies   Allergen Reactions    Statins Other (See Comments)     Causes severe leg cramps     Tape [Adhesive Tape] Other (See Comments)     \"sticks to skin\" causes redness    Tizanidine Hcl Other (See Comments)     Having issues with her liver- elevated her enzyme level    Tramadol Other (See Comments)     \"makes me Goofy\"    Augmentin [Amoxicillin-Pot Clavulanate] Diarrhea, Nausea And Vomiting and Other (See Comments)     abd pain    Nystatin Nausea And Vomiting     Pt states \"swish and swallow\"

## 2024-12-27 NOTE — TELEPHONE ENCOUNTER
Jina called requesting a refill of the below medication which has been pended for you:     Requested Prescriptions     Pending Prescriptions Disp Refills    dilTIAZem (CARDIZEM) 30 MG tablet 90 tablet 0     Sig: Take 1 tablet by mouth 3 times daily       Last Appointment Date: 6/20/2024  Next Appointment Date: 1/23/2025    Allergies   Allergen Reactions    Statins Other (See Comments)     Causes severe leg cramps     Tape [Adhesive Tape] Other (See Comments)     \"sticks to skin\" causes redness    Tizanidine Hcl Other (See Comments)     Having issues with her liver- elevated her enzyme level    Tramadol Other (See Comments)     \"makes me Goofy\"    Augmentin [Amoxicillin-Pot Clavulanate] Diarrhea, Nausea And Vomiting and Other (See Comments)     abd pain    Nystatin Nausea And Vomiting     Pt states \"swish and swallow\"

## 2025-01-23 ENCOUNTER — NURSE ONLY (OUTPATIENT)
Dept: LAB | Age: 89
End: 2025-01-23
Payer: MEDICARE

## 2025-01-23 ENCOUNTER — OFFICE VISIT (OUTPATIENT)
Dept: FAMILY MEDICINE CLINIC | Age: 89
End: 2025-01-23

## 2025-01-23 VITALS
OXYGEN SATURATION: 97 % | WEIGHT: 163.8 LBS | HEIGHT: 64 IN | SYSTOLIC BLOOD PRESSURE: 114 MMHG | HEART RATE: 86 BPM | DIASTOLIC BLOOD PRESSURE: 60 MMHG | BODY MASS INDEX: 27.96 KG/M2

## 2025-01-23 DIAGNOSIS — Z23 NEED FOR VACCINATION: ICD-10-CM

## 2025-01-23 DIAGNOSIS — I10 ESSENTIAL HYPERTENSION: ICD-10-CM

## 2025-01-23 DIAGNOSIS — K29.50 OTHER CHRONIC GASTRITIS WITHOUT HEMORRHAGE: ICD-10-CM

## 2025-01-23 DIAGNOSIS — Z23 NEED FOR VACCINATION: Primary | ICD-10-CM

## 2025-01-23 DIAGNOSIS — E03.9 ACQUIRED HYPOTHYROIDISM: ICD-10-CM

## 2025-01-23 DIAGNOSIS — D35.2 PITUITARY MACROADENOMA (HCC): ICD-10-CM

## 2025-01-23 DIAGNOSIS — N18.30 STAGE 3 CHRONIC KIDNEY DISEASE, UNSPECIFIED WHETHER STAGE 3A OR 3B CKD (HCC): ICD-10-CM

## 2025-01-23 DIAGNOSIS — Z00.00 MEDICARE ANNUAL WELLNESS VISIT, SUBSEQUENT: Primary | ICD-10-CM

## 2025-01-23 DIAGNOSIS — I48.11 LONGSTANDING PERSISTENT ATRIAL FIBRILLATION (HCC): ICD-10-CM

## 2025-01-23 DIAGNOSIS — E27.1 PRIMARY ADRENOCORTICAL INSUFFICIENCY (HCC): ICD-10-CM

## 2025-01-23 DIAGNOSIS — Z23 NEED FOR IMMUNIZATION AGAINST INFLUENZA: ICD-10-CM

## 2025-01-23 DIAGNOSIS — J40 BRONCHITIS: ICD-10-CM

## 2025-01-23 PROBLEM — U07.1 COVID: Status: RESOLVED | Noted: 2022-10-08 | Resolved: 2025-01-23

## 2025-01-23 PROBLEM — R11.2 NAUSEA AND VOMITING: Status: RESOLVED | Noted: 2023-06-23 | Resolved: 2025-01-23

## 2025-01-23 PROCEDURE — 90653 IIV ADJUVANT VACCINE IM: CPT | Performed by: FAMILY MEDICINE

## 2025-01-23 PROCEDURE — 91320 SARSCV2 VAC 30MCG TRS-SUC IM: CPT | Performed by: FAMILY MEDICINE

## 2025-01-23 PROCEDURE — PBSHW INFLUENZA, FLUAD TRIVALENT, (AGE 65 Y+), IM, PRESERVATIVE FREE, 0.5ML: Performed by: FAMILY MEDICINE

## 2025-01-23 PROCEDURE — PBSHW COVID-19, COMIRNATY, (AGE 12Y+), IM, PF, 30MCG/0.3ML: Performed by: FAMILY MEDICINE

## 2025-01-23 RX ORDER — ALBUTEROL SULFATE 90 UG/1
2 INHALANT RESPIRATORY (INHALATION) 4 TIMES DAILY PRN
Qty: 18 G | Refills: 0 | Status: SHIPPED | OUTPATIENT
Start: 2025-01-23

## 2025-01-23 SDOH — ECONOMIC STABILITY: FOOD INSECURITY: WITHIN THE PAST 12 MONTHS, THE FOOD YOU BOUGHT JUST DIDN'T LAST AND YOU DIDN'T HAVE MONEY TO GET MORE.: NEVER TRUE

## 2025-01-23 SDOH — ECONOMIC STABILITY: FOOD INSECURITY: WITHIN THE PAST 12 MONTHS, YOU WORRIED THAT YOUR FOOD WOULD RUN OUT BEFORE YOU GOT MONEY TO BUY MORE.: NEVER TRUE

## 2025-01-23 ASSESSMENT — PATIENT HEALTH QUESTIONNAIRE - PHQ9
SUM OF ALL RESPONSES TO PHQ QUESTIONS 1-9: 0
SUM OF ALL RESPONSES TO PHQ9 QUESTIONS 1 & 2: 0
SUM OF ALL RESPONSES TO PHQ QUESTIONS 1-9: 0
2. FEELING DOWN, DEPRESSED OR HOPELESS: NOT AT ALL
SUM OF ALL RESPONSES TO PHQ QUESTIONS 1-9: 0
SUM OF ALL RESPONSES TO PHQ QUESTIONS 1-9: 0
1. LITTLE INTEREST OR PLEASURE IN DOING THINGS: NOT AT ALL

## 2025-01-23 ASSESSMENT — ENCOUNTER SYMPTOMS
ABDOMINAL PAIN: 0
SHORTNESS OF BREATH: 0
NAUSEA: 0
DIARRHEA: 0
COUGH: 1
BLOOD IN STOOL: 0
CHOKING: 0
CHEST TIGHTNESS: 0
CONSTIPATION: 0
WHEEZING: 0
SORE THROAT: 0
SINUS PRESSURE: 0
TROUBLE SWALLOWING: 0

## 2025-01-23 NOTE — PATIENT INSTRUCTIONS
Personalized Preventive Plan for Jina Calabrese - 1/23/2025  Medicare offers a range of preventive health benefits. Some of the tests and screenings are paid in full while other may be subject to a deductible, co-insurance, and/or copay.  Some of these benefits include a comprehensive review of your medical history including lifestyle, illnesses that may run in your family, and various assessments and screenings as appropriate.  After reviewing your medical record and screening and assessments performed today your provider may have ordered immunizations, labs, imaging, and/or referrals for you.  A list of these orders (if applicable) as well as your Preventive Care list are included within your After Visit Summary for your review.       2021: denies BPH

## 2025-01-23 NOTE — PROGRESS NOTES
[facility] A DEPARTMENT OF [facility]  [address] [facility] [address]  Dept: [phone]  Dept Fax: [phone]  Loc: [phone]    Jina Calabrese  is a 89 y.o. female who presents today for her medical conditions/complaints as noted below.  Jina Calabrese is c/o of   Chief Complaint   Patient presents with    Medicare AW       HPI:     History of Present Illness  The patient is a 89-year-old female who presents today for her Medicare wellness visit and a follow-up of her hypertension. She is accompanied by her daughter on the phone.    She reports a persistent cold that started a week ago on Tuesday, accompanied by a mild cough without sputum production. She does not experience shortness of breath or fever. The initial symptom was a severe headache. She has been managing her symptoms with Vicks VapoRub, which has effectively alleviated her nasal congestion. She does not report any postnasal drip, sore throat, gastrointestinal symptoms such as diarrhea, nausea, vomiting, or any unusual rashes. She has not taken any over-the-counter medications for her symptoms. She has been using Robitussin for her cough and has not previously used an inhaler.    She reports feeling well overall, with no sleep disturbances. She experienced anxiety this morning due to a minor issue with her car key fob. She experiences lightheadedness and dizziness upon standing up too quickly but manages this by moving slowly. She is currently on diltiazem and metoprolol for blood pressure management, prescribed by her cardiologist. She reports no issues with acid reflux and has not noticed any blood in her stool. Her appetite remains good, and she has not experienced any weight loss. She occasionally experiences neck pain, which resolves after removing her bra and relaxing. She does not seek chiropractic treatment or massages. She has not received the influenza vaccine this

## 2025-01-23 NOTE — PROGRESS NOTES
Medicare Annual Wellness Visit    Jina Calabrese is here for Medicare AWV    Assessment & Plan   Medicare annual wellness visit, subsequent  Essential hypertension  Bronchitis  -     albuterol sulfate HFA (VENTOLIN HFA) 108 (90 Base) MCG/ACT inhaler; Inhale 2 puffs into the lungs 4 times daily as needed for Wheezing, Disp-18 g, R-0Normal  -     Spacer/Aero-Holding Chambers DANITZA; DAILY PRN Starting Thu 1/23/2025, Disp-1 each, R-0, Normal  Longstanding persistent atrial fibrillation (HCC)  Primary adrenocortical insufficiency (HCC)  Pituitary macroadenoma (HCC)  Stage 3 chronic kidney disease, unspecified whether stage 3a or 3b CKD (HCC)  Acquired hypothyroidism  Other chronic gastritis without hemorrhage  Need for vaccination  -     Influenza, FLUAD Trivalent, (age 65 y+), IM, Preservative Free, 0.5mL  -     COVID-19, COMIRNATY, (age 12y+), IM, PF, 30mcg/0.3mL       Return in about 6 months (around 7/23/2025) for HTN follow up.     Subjective   The following acute and/or chronic problems were also addressed today:  Htn, bronchitis, afib, ckd 3    Patient's complete Health Risk Assessment and screening values have been reviewed and are found in Flowsheets. The following problems were reviewed today and where indicated follow up appointments were made and/or referrals ordered.    No Positive Risk Factors identified today.                                Objective   Vitals:    01/23/25 1422   BP: 114/60   Site: Right Upper Arm   Position: Sitting   Cuff Size: Medium Adult   Pulse: 86   SpO2: 97%   Weight: 74.3 kg (163 lb 12.8 oz)   Height: 1.626 m (5' 4\")      Body mass index is 28.12 kg/m².                  Allergies   Allergen Reactions    Statins Other (See Comments)     Causes severe leg cramps     Tape [Adhesive Tape] Other (See Comments)     \"sticks to skin\" causes redness    Tizanidine Hcl Other (See Comments)     Having issues with her liver- elevated her enzyme level    Tramadol Other (See Comments)     \"makes

## 2025-01-30 ENCOUNTER — OFFICE VISIT (OUTPATIENT)
Dept: OBGYN | Age: 89
End: 2025-01-30
Payer: MEDICARE

## 2025-01-30 VITALS
SYSTOLIC BLOOD PRESSURE: 134 MMHG | HEIGHT: 64 IN | RESPIRATION RATE: 14 BRPM | BODY MASS INDEX: 27.83 KG/M2 | WEIGHT: 163 LBS | HEART RATE: 93 BPM | DIASTOLIC BLOOD PRESSURE: 80 MMHG | OXYGEN SATURATION: 95 %

## 2025-01-30 DIAGNOSIS — Z96.0 PRESENCE OF PESSARY: ICD-10-CM

## 2025-01-30 DIAGNOSIS — N81.4 PELVIC RELAXATION DUE TO UTEROVAGINAL PROLAPSE: ICD-10-CM

## 2025-01-30 DIAGNOSIS — Z46.89 ENCOUNTER FOR PESSARY MAINTENANCE: Primary | ICD-10-CM

## 2025-01-30 PROCEDURE — 99213 OFFICE O/P EST LOW 20 MIN: CPT | Performed by: OBSTETRICS & GYNECOLOGY

## 2025-01-30 PROCEDURE — 1123F ACP DISCUSS/DSCN MKR DOCD: CPT | Performed by: OBSTETRICS & GYNECOLOGY

## 2025-01-30 PROCEDURE — 1159F MED LIST DOCD IN RCRD: CPT | Performed by: OBSTETRICS & GYNECOLOGY

## 2025-01-30 NOTE — PROGRESS NOTES
Jina Calabrese  2025  12:29 PM          Jina Calabrese is a 89 y.o. female       The patient was seen. She has no chief complaints today. She has been fitted for a # 4; ring with support type pessary.  She states all of her symptoms that she had prior to the pessary have been relieved with its use.  She denies any vaginal bleeding.  She denies to any vaginal discharge or odor. Her bowels are regular and her voiding pattern is normal.     Blood pressure 134/80, pulse 93, resp. rate 14, height 1.626 m (5' 4\"), weight 73.9 kg (163 lb), SpO2 95%, not currently breastfeeding.    Chaperone for Intimate Exam  Chaperone was offered and accepted as part of the rooming process.  Chaperone: WHITNEY        Abdomen: Soft and non-tender; good bowel sounds; no guarding, rebound or rigidity; no CVA tenderness bilaterally.    Extremities: No calf tenderness bilaterally. DTR 2/4 bilaterally. No edema.    Perineum/Speculum: There is not any signs of infection; The vaginal vault is without any signs of erythema or erosion. There is no vaginal discharge or odor appreciated.    The pessary was cleansed and replaced without any problems and the patient tolerated the procedure well. T.O.S. Ointment was placed with the pessary to decrease discharge/odor.    Assessment:   Diagnosis Orders   1. Encounter for pessary maintenance        2. Pelvic relaxation due to uterovaginal prolapse        3. Presence of pessary #4 ring with support          Chief Complaint   Patient presents with    Other     Pessary cleaning     Patient Active Problem List    Diagnosis Date Noted    Persistent atrial fibrillation (HCC) 2023     Priority: High    Atrial fibrillation with slow ventricular response (HCC) 2023     Priority: High    Dizziness 2023     Priority: High    Presence of pessary #4 ring with support 10/13/2021     Priority: High    Lower abdominal pain 2021     Priority: High    Pelvic relaxation due to uterovaginal

## 2025-02-05 ENCOUNTER — OFFICE VISIT (OUTPATIENT)
Age: 89
End: 2025-02-05
Payer: MEDICARE

## 2025-02-05 VITALS
DIASTOLIC BLOOD PRESSURE: 68 MMHG | SYSTOLIC BLOOD PRESSURE: 116 MMHG | WEIGHT: 167 LBS | HEART RATE: 92 BPM | HEIGHT: 64 IN | BODY MASS INDEX: 28.51 KG/M2

## 2025-02-05 DIAGNOSIS — E27.40 ADRENAL INSUFFICIENCY (HCC): ICD-10-CM

## 2025-02-05 DIAGNOSIS — D49.7 PITUITARY TUMOR: Primary | ICD-10-CM

## 2025-02-05 DIAGNOSIS — E03.9 ACQUIRED HYPOTHYROIDISM: ICD-10-CM

## 2025-02-05 PROCEDURE — 1160F RVW MEDS BY RX/DR IN RCRD: CPT | Performed by: INTERNAL MEDICINE

## 2025-02-05 PROCEDURE — 1159F MED LIST DOCD IN RCRD: CPT | Performed by: INTERNAL MEDICINE

## 2025-02-05 PROCEDURE — 1123F ACP DISCUSS/DSCN MKR DOCD: CPT | Performed by: INTERNAL MEDICINE

## 2025-02-05 PROCEDURE — 99214 OFFICE O/P EST MOD 30 MIN: CPT | Performed by: INTERNAL MEDICINE

## 2025-02-05 RX ORDER — HYDROCORTISONE 10 MG/1
10 TABLET ORAL DAILY
Qty: 90 TABLET | Refills: 1 | Status: SHIPPED | OUTPATIENT
Start: 2025-02-05

## 2025-02-05 RX ORDER — LEVOTHYROXINE SODIUM 50 UG/1
50 TABLET ORAL DAILY
Qty: 90 TABLET | Refills: 1 | Status: SHIPPED | OUTPATIENT
Start: 2025-02-05

## 2025-02-05 RX ORDER — DEXAMETHASONE SODIUM PHOSPHATE 4 MG/ML
4 INJECTION, SOLUTION INTRA-ARTICULAR; INTRALESIONAL; INTRAMUSCULAR; INTRAVENOUS; SOFT TISSUE ONCE
Qty: 1 ML | Refills: 0
Start: 2025-02-05 | End: 2025-02-05

## 2025-02-05 RX ORDER — HYDROCORTISONE 5 MG/1
TABLET ORAL
Qty: 90 TABLET | Refills: 1 | Status: SHIPPED | OUTPATIENT
Start: 2025-02-05

## 2025-02-05 NOTE — PROGRESS NOTES
[facility] [place] [facility]  [facility] ENDOCRINOLOGY  [address]  [facility] [address]  Dept: [phone]  Loc: [phone]     Visit Date: 2/5/2025    Jina Calabrese is a 89 y.o. female who presents today for:  Chief Complaint   Patient presents with    Pituitary tumor            Subjective:      HPI     Jina Calabrese is a 89 y.o. , female who comes for for follow-up for pituitary tumor and adrenal insufficiency.  Shari Valdez MD  Jina Calabrese was diagnosed with pituitary tumor 2 years ago.  She had a follow-up MRI on 5/15/2024 that showed interval improvement in comparison to the 7/10/2023 study.  In particular, the previously noted area of abnormal signal intensity in the suprasellar space is no longer seen.  There was a 5.4 x 3.6 mm microadenoma posteriorly.  The infundibulum was midline and the cavernous sinuses were unremarkable.  She is being followed by ophthalmology and neurosurgery.  The patient is being treated with hydrocortisone 10 mg in the morning and 5 mg in the evening for adrenal insufficiency.  The patient is also being treated for hypothyroidism.  She takes 50 mcg of Synthroid.  In terms of symptoms she reports cold intolerance, dry skin, fatigue and memory problems.  Past Medical History:   Diagnosis Date    Actinic keratosis     history of    Arthritis     Atrial fibrillation (HCC)     Cancer (HCC)     skin    Cervical prolapse     history of    Diverticulosis     Endocervical polyp     history of    Heel spur     Hip pain     Hypertension     Nasal septal deviation     Osteoporosis     Pituitary tumor     Stress incontinence     history of    Syncope     Vaginal prolapse     history of      Past Surgical History:   Procedure Laterality Date    APPENDECTOMY  1960's    BLADDER REPAIR  2011    [facility]    CHOLECYSTECTOMY  11/1976    [facility]    COLONOSCOPY  01/26/2000    ENDOSCOPIC ULTRASOUND (LOWER) Left 07/20/2017    ENDOSCOPIC

## 2025-02-10 RX ORDER — METOPROLOL TARTRATE 25 MG/1
TABLET, FILM COATED ORAL
Qty: 90 TABLET | Refills: 2 | Status: SHIPPED | OUTPATIENT
Start: 2025-02-10

## 2025-02-10 NOTE — TELEPHONE ENCOUNTER
Jina called requesting a refill of the below medication which has been pended for you:     Requested Prescriptions     Pending Prescriptions Disp Refills    metoprolol tartrate (LOPRESSOR) 25 MG tablet [Pharmacy Med Name: METOPROLOL TARTRATE 25MG TABS] 90 tablet 2     Sig: TAKE 1/2 TABLET TWICE A DAY       Last Appointment Date: 1/23/2025  Next Appointment Date: 7/24/2025    Allergies   Allergen Reactions    Statins Other (See Comments)     Causes severe leg cramps     Tape [Adhesive Tape] Other (See Comments)     \"sticks to skin\" causes redness    Tizanidine Hcl Other (See Comments)     Having issues with her liver- elevated her enzyme level    Tramadol Other (See Comments)     \"makes me Goofy\"    Augmentin [Amoxicillin-Pot Clavulanate] Diarrhea, Nausea And Vomiting and Other (See Comments)     abd pain    Nystatin Nausea And Vomiting     Pt states \"swish and swallow\"

## 2025-02-11 RX ORDER — DILTIAZEM HYDROCHLORIDE 30 MG/1
30 TABLET, FILM COATED ORAL 3 TIMES DAILY
Qty: 270 TABLET | Refills: 3 | Status: SHIPPED | OUTPATIENT
Start: 2025-02-11

## 2025-02-12 ENCOUNTER — HOSPITAL ENCOUNTER (OUTPATIENT)
Age: 89
Discharge: HOME | End: 2025-02-12
Payer: MEDICARE

## 2025-02-12 DIAGNOSIS — E27.40 ADRENAL INSUFFICIENCY (HCC): ICD-10-CM

## 2025-02-12 DIAGNOSIS — E03.9 ACQUIRED HYPOTHYROIDISM: ICD-10-CM

## 2025-02-12 LAB
ANION GAP SERPL CALCULATED.3IONS-SCNC: 13 MMOL/L (ref 9–17)
BUN SERPL-MCNC: 24 MG/DL (ref 8–23)
BUN/CREAT SERPL: 22 (ref 9–20)
CALCIUM SERPL-MCNC: 9.6 MG/DL (ref 8.6–10.4)
CHLORIDE SERPL-SCNC: 103 MMOL/L (ref 98–107)
CO2 SERPL-SCNC: 28 MMOL/L (ref 20–31)
CREAT SERPL-MCNC: 1.1 MG/DL (ref 0.5–0.9)
GFR, ESTIMATED: 48 ML/MIN/1.73M2
GLUCOSE SERPL-MCNC: 99 MG/DL (ref 70–99)
POTASSIUM SERPL-SCNC: 3.8 MMOL/L (ref 3.7–5.3)
SODIUM SERPL-SCNC: 144 MMOL/L (ref 135–144)
T4 FREE SERPL-MCNC: 1.1 NG/DL (ref 0.92–1.68)
TSH SERPL DL<=0.05 MIU/L-ACNC: 1.86 UIU/ML (ref 0.3–5)

## 2025-02-12 PROCEDURE — 84439 ASSAY OF FREE THYROXINE: CPT

## 2025-02-12 PROCEDURE — 84443 ASSAY THYROID STIM HORMONE: CPT

## 2025-02-12 PROCEDURE — 82024 ASSAY OF ACTH: CPT

## 2025-02-12 PROCEDURE — 36415 COLL VENOUS BLD VENIPUNCTURE: CPT

## 2025-02-12 PROCEDURE — 80048 BASIC METABOLIC PNL TOTAL CA: CPT

## 2025-02-13 LAB — ACTH PLAS-MCNC: 26 PG/ML (ref 7–63)

## 2025-02-18 ENCOUNTER — TELEPHONE (OUTPATIENT)
Age: 89
End: 2025-02-18

## 2025-02-18 NOTE — TELEPHONE ENCOUNTER
Patient requesting refill for dexAMETHasone (DECADRON) 4 MG/ML injection       [facility]

## 2025-02-21 DIAGNOSIS — E27.40 ADRENAL INSUFFICIENCY: Primary | ICD-10-CM

## 2025-02-21 RX ORDER — DEXAMETHASONE SODIUM PHOSPHATE 4 MG/ML
INJECTION, SOLUTION INTRA-ARTICULAR; INTRALESIONAL; INTRAMUSCULAR; INTRAVENOUS; SOFT TISSUE
Qty: 1 ML | Refills: 1 | Status: SHIPPED | OUTPATIENT
Start: 2025-02-21

## 2025-02-25 DIAGNOSIS — E27.40 ADRENAL INSUFFICIENCY: ICD-10-CM

## 2025-02-25 RX ORDER — PANTOPRAZOLE SODIUM 40 MG/1
TABLET, DELAYED RELEASE ORAL
Qty: 90 TABLET | Refills: 3 | Status: SHIPPED | OUTPATIENT
Start: 2025-02-25

## 2025-02-25 RX ORDER — DEXAMETHASONE SODIUM PHOSPHATE 4 MG/ML
INJECTION, SOLUTION INTRA-ARTICULAR; INTRALESIONAL; INTRAMUSCULAR; INTRAVENOUS; SOFT TISSUE
Qty: 1 ML | Refills: 1 | Status: SHIPPED | OUTPATIENT
Start: 2025-02-25

## 2025-02-25 NOTE — TELEPHONE ENCOUNTER
Jina called requesting a refill of the below medication which has been pended for you:     Requested Prescriptions     Pending Prescriptions Disp Refills    pantoprazole (PROTONIX) 40 MG tablet [Pharmacy Med Name: PANTOPRAZOLE SODIUM 40MG TBEC] 90 tablet 3     Sig: TAKE ONE TABLET BY MOUTH EVERY DAY       Last Appointment Date: 1/23/2025  Next Appointment Date: 7/24/2025    Allergies   Allergen Reactions    Statins Other (See Comments)     Causes severe leg cramps     Tape [Adhesive Tape] Other (See Comments)     \"sticks to skin\" causes redness    Tizanidine Hcl Other (See Comments)     Having issues with her liver- elevated her enzyme level    Tramadol Other (See Comments)     \"makes me Goofy\"    Augmentin [Amoxicillin-Pot Clavulanate] Diarrhea, Nausea And Vomiting and Other (See Comments)     abd pain    Nystatin Nausea And Vomiting     Pt states \"swish and swallow\"

## 2025-02-26 ENCOUNTER — TELEPHONE (OUTPATIENT)
Age: 89
End: 2025-02-26

## 2025-04-24 DIAGNOSIS — E03.9 ACQUIRED HYPOTHYROIDISM: ICD-10-CM

## 2025-04-26 RX ORDER — LEVOTHYROXINE SODIUM 50 UG/1
50 TABLET ORAL DAILY
Qty: 90 TABLET | Refills: 1 | Status: SHIPPED | OUTPATIENT
Start: 2025-04-26

## 2025-05-06 DIAGNOSIS — E27.40 ADRENAL INSUFFICIENCY: ICD-10-CM

## 2025-05-06 RX ORDER — HYDROCORTISONE 10 MG/1
10 TABLET ORAL DAILY
Qty: 90 TABLET | Refills: 1 | Status: SHIPPED | OUTPATIENT
Start: 2025-05-06

## 2025-05-06 RX ORDER — HYDROCORTISONE 5 MG/1
TABLET ORAL
Qty: 90 TABLET | Refills: 1 | Status: SHIPPED | OUTPATIENT
Start: 2025-05-06

## 2025-06-04 ENCOUNTER — OFFICE VISIT (OUTPATIENT)
Dept: OBGYN | Age: 89
End: 2025-06-04
Payer: MEDICARE

## 2025-06-04 VITALS
DIASTOLIC BLOOD PRESSURE: 84 MMHG | BODY MASS INDEX: 28 KG/M2 | HEIGHT: 64 IN | SYSTOLIC BLOOD PRESSURE: 138 MMHG | HEART RATE: 72 BPM | RESPIRATION RATE: 16 BRPM | WEIGHT: 164 LBS

## 2025-06-04 DIAGNOSIS — N81.4 PELVIC RELAXATION DUE TO UTEROVAGINAL PROLAPSE: ICD-10-CM

## 2025-06-04 DIAGNOSIS — Z96.0 PRESENCE OF PESSARY: ICD-10-CM

## 2025-06-04 DIAGNOSIS — Z46.89 ENCOUNTER FOR PESSARY MAINTENANCE: Primary | ICD-10-CM

## 2025-06-04 PROCEDURE — 1123F ACP DISCUSS/DSCN MKR DOCD: CPT | Performed by: OBSTETRICS & GYNECOLOGY

## 2025-06-04 PROCEDURE — 99213 OFFICE O/P EST LOW 20 MIN: CPT | Performed by: OBSTETRICS & GYNECOLOGY

## 2025-06-04 PROCEDURE — 1159F MED LIST DOCD IN RCRD: CPT | Performed by: OBSTETRICS & GYNECOLOGY

## 2025-06-04 PROCEDURE — 99459 PELVIC EXAMINATION: CPT | Performed by: OBSTETRICS & GYNECOLOGY

## 2025-06-04 NOTE — PROGRESS NOTES
Priority: High    Persistent headaches 10/16/2022     Priority: Medium    Epigastric pain 04/23/2022     Priority: Medium    Pituitary macroadenoma (HCC) 07/12/2023    Acquired hypothyroidism 07/12/2023    Hyperprolactinemia 07/12/2023    Adrenal insufficiency (Barney's disease) (HCC) 07/09/2023    General weakness 07/06/2023    SIADH (syndrome of inappropriate ADH production) 07/06/2023    Essential hypertension 06/22/2023    Chronic renal disease, stage III ([facility]) [890541] 05/23/2023    Secondary hypercoagulable state 05/23/2023    Unspecified adrenocortical insufficiency 04/06/2023    Atrial flutter ([facility]) 09/05/2021    Hyponatremia 09/04/2021    Esophageal candidiasis ([facility]) 08/31/2021    Vaginal erosion secondary to pessary use 01/21/2021    Postmenopausal bleeding 01/21/2021    History of hysterectomy 01/21/2021    Arthritis of right hand 01/13/2020    Venous stasis dermatitis of both lower extremities 12/08/2017    Paroxysmal atrial fibrillation with rapid ventricular response (HCC) 09/21/2017    Lumbar radicular pain 08/03/2017    Lumbar spine pain 08/03/2017    Left hip pain 08/03/2017    Arthritis of left hip 08/03/2017    Esophageal stricture     Gastritis without bleeding     Bradycardia     Drug-induced constipation 06/15/2017    Trochanteric bursitis of left hip 05/22/2017         Plan:  1. Return to the office 10-12 weeks  2. Report any vaginal bleeding or discharge  3. Abstinence  4. Annual Follow-up reviewed with patient. They will schedule appointment  5. Preventative health follow up through PCP reviewed-pt to schedule    The patient, Jina Calabrese is a 89 y.o. female, was seen with a total time spent of 20 minutes for the visit on this date of service by the E/M provider. The time component had both face to face and non face to face time spent in determining the total time component.  Counseling and education regarding her diagnosis listed below and her options regarding those diagnoses were

## 2025-06-09 ENCOUNTER — HOSPITAL ENCOUNTER (OUTPATIENT)
Dept: MRI IMAGING | Age: 89
Discharge: HOME | End: 2025-06-09
Payer: MEDICARE

## 2025-06-09 ENCOUNTER — OFFICE VISIT (OUTPATIENT)
Age: 89
End: 2025-06-09
Payer: MEDICARE

## 2025-06-09 VITALS
BODY MASS INDEX: 28.31 KG/M2 | SYSTOLIC BLOOD PRESSURE: 128 MMHG | WEIGHT: 165.8 LBS | HEIGHT: 64 IN | DIASTOLIC BLOOD PRESSURE: 72 MMHG | HEART RATE: 68 BPM

## 2025-06-09 DIAGNOSIS — E03.9 ACQUIRED HYPOTHYROIDISM: ICD-10-CM

## 2025-06-09 DIAGNOSIS — E27.40 ADRENAL INSUFFICIENCY: ICD-10-CM

## 2025-06-09 DIAGNOSIS — D49.7 PITUITARY TUMOR: Primary | ICD-10-CM

## 2025-06-09 DIAGNOSIS — D35.2 PITUITARY MACROADENOMA (HCC): ICD-10-CM

## 2025-06-09 LAB — POC CREATININE WHOLE BLOOD: 1.3 MG/DL (ref 0.5–1.2)

## 2025-06-09 PROCEDURE — 82565 ASSAY OF CREATININE: CPT

## 2025-06-09 PROCEDURE — 1159F MED LIST DOCD IN RCRD: CPT | Performed by: INTERNAL MEDICINE

## 2025-06-09 PROCEDURE — 1123F ACP DISCUSS/DSCN MKR DOCD: CPT | Performed by: INTERNAL MEDICINE

## 2025-06-09 PROCEDURE — 99214 OFFICE O/P EST MOD 30 MIN: CPT | Performed by: INTERNAL MEDICINE

## 2025-06-09 PROCEDURE — 1160F RVW MEDS BY RX/DR IN RCRD: CPT | Performed by: INTERNAL MEDICINE

## 2025-06-09 PROCEDURE — 70553 MRI BRAIN STEM W/O & W/DYE: CPT

## 2025-06-09 PROCEDURE — A9579 GAD-BASE MR CONTRAST NOS,1ML: HCPCS | Performed by: PHYSICIAN ASSISTANT

## 2025-06-09 PROCEDURE — 6360000004 HC RX CONTRAST MEDICATION: Performed by: PHYSICIAN ASSISTANT

## 2025-06-09 RX ORDER — GADOTERIDOL 279.3 MG/ML
15 INJECTION INTRAVENOUS
Status: COMPLETED | OUTPATIENT
Start: 2025-06-09 | End: 2025-06-09

## 2025-06-09 RX ORDER — HYDROCORTISONE 5 MG/1
TABLET ORAL
Qty: 90 TABLET | Refills: 1 | Status: SHIPPED | OUTPATIENT
Start: 2025-06-09

## 2025-06-09 RX ORDER — HYDROCORTISONE 10 MG/1
10 TABLET ORAL DAILY
Qty: 90 TABLET | Refills: 1 | Status: SHIPPED | OUTPATIENT
Start: 2025-06-09

## 2025-06-09 RX ORDER — LEVOTHYROXINE SODIUM 50 UG/1
50 TABLET ORAL DAILY
Qty: 90 TABLET | Refills: 1 | Status: SHIPPED | OUTPATIENT
Start: 2025-06-09

## 2025-06-09 RX ADMIN — GADOTERIDOL 15 ML: 279.3 INJECTION, SOLUTION INTRAVENOUS at 13:17

## 2025-06-09 NOTE — ASSESSMENT & PLAN NOTE
Chronic, at goal (stable), continue current plan pending work up below and medication adherence emphasized    Orders:    TSH; Future    T4, Free; Future    Basic Metabolic Panel; Future    levothyroxine (SYNTHROID) 50 MCG tablet; Take 1 tablet by mouth daily

## 2025-06-09 NOTE — PROGRESS NOTES
(CORTEF) 5 MG tablet; 1 tablet daily in the evening    Acquired hypothyroidism   Chronic, at goal (stable), continue current plan pending work up below and medication adherence emphasized    Orders:    TSH; Future    T4, Free; Future    Basic Metabolic Panel; Future    levothyroxine (SYNTHROID) 50 MCG tablet; Take 1 tablet by mouth daily    Pituitary tumor   Chronic, at goal (stable), patient is scheduled for follow-up MRI today.  She also has appointment with neurosurgery today.  She is not having any headaches or visual changes.  She will follow-up with ophthalmology.             Orders Placed This Encounter   Procedures    TSH     Standing Status:   Future     Expected Date:   6/23/2025     Expiration Date:   9/9/2025    T4, Free     Standing Status:   Future     Expected Date:   6/23/2025     Expiration Date:   9/9/2025    Basic Metabolic Panel     Standing Status:   Future     Expected Date:   6/23/2025     Expiration Date:   9/9/2025           The risks and benefits of my recommendations, as well as other treatment options were discussed with the patient today. Questions were answered.  Follow up: 6 months and as needed.         Electronically signed by Wilfrid Davila MD 6/9/2025 10:46 AM     **This report has been created using voice recognition software. It may   contain minor errors which are inherent in voice recognition technology.**

## 2025-06-10 ENCOUNTER — HOSPITAL ENCOUNTER (OUTPATIENT)
Age: 89
Discharge: HOME | End: 2025-06-10
Payer: MEDICARE

## 2025-06-10 DIAGNOSIS — E03.9 ACQUIRED HYPOTHYROIDISM: ICD-10-CM

## 2025-06-10 LAB
ANION GAP SERPL CALCULATED.3IONS-SCNC: 12 MMOL/L (ref 9–16)
BUN SERPL-MCNC: 27 MG/DL (ref 8–23)
BUN/CREAT SERPL: 23 (ref 9–20)
CALCIUM SERPL-MCNC: 9.6 MG/DL (ref 8.6–10.4)
CHLORIDE SERPL-SCNC: 102 MMOL/L (ref 98–107)
CO2 SERPL-SCNC: 27 MMOL/L (ref 20–31)
CREAT SERPL-MCNC: 1.2 MG/DL (ref 0.6–0.9)
GFR, ESTIMATED: 43 ML/MIN/1.73M2
GLUCOSE SERPL-MCNC: 106 MG/DL (ref 74–99)
POTASSIUM SERPL-SCNC: 4.4 MMOL/L (ref 3.7–5.3)
SODIUM SERPL-SCNC: 141 MMOL/L (ref 136–145)
T4 FREE SERPL-MCNC: 1 NG/DL (ref 0.92–1.68)
TSH SERPL DL<=0.05 MIU/L-ACNC: 0.92 UIU/ML (ref 0.27–4.2)

## 2025-06-10 PROCEDURE — 36415 COLL VENOUS BLD VENIPUNCTURE: CPT

## 2025-06-10 PROCEDURE — 84443 ASSAY THYROID STIM HORMONE: CPT

## 2025-06-10 PROCEDURE — 84439 ASSAY OF FREE THYROXINE: CPT

## 2025-06-10 PROCEDURE — 80048 BASIC METABOLIC PNL TOTAL CA: CPT

## 2025-06-12 ENCOUNTER — OFFICE VISIT (OUTPATIENT)
Dept: NEUROSURGERY | Age: 89
End: 2025-06-12
Payer: MEDICARE

## 2025-06-12 VITALS
BODY MASS INDEX: 27.49 KG/M2 | HEART RATE: 88 BPM | WEIGHT: 165 LBS | SYSTOLIC BLOOD PRESSURE: 133 MMHG | HEIGHT: 65 IN | DIASTOLIC BLOOD PRESSURE: 83 MMHG

## 2025-06-12 DIAGNOSIS — D35.2 PITUITARY MACROADENOMA (HCC): Primary | ICD-10-CM

## 2025-06-12 PROCEDURE — 99213 OFFICE O/P EST LOW 20 MIN: CPT | Performed by: PHYSICIAN ASSISTANT

## 2025-06-12 PROCEDURE — 1159F MED LIST DOCD IN RCRD: CPT | Performed by: PHYSICIAN ASSISTANT

## 2025-06-12 PROCEDURE — 1123F ACP DISCUSS/DSCN MKR DOCD: CPT | Performed by: PHYSICIAN ASSISTANT

## 2025-06-12 NOTE — PROGRESS NOTES
[facility] [place] [facility]  [facility] NEUROSURGERY  [address]  [facility] [address]  Dept: [phone]  Dept Fax: [phone]  Loc: [phone]    Follow-up visit  Visit Date: 6/12/2025      Jina  is a 89 y.o. 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    She presents having undergone an updated MRI of the brain imaged on 6/9/2025 with stable pituitary abnormality noted, unchanged from prior imaging.        At today's visit she arrives accompanied by her  ambulating with a cane absent any significant changes since her prior visit with our service.  We reviewed the above referenced imaging and attached report and based on her desire not to undergo any invasive intervention, even if indicated, have agreed to follow her as needed.  She is encouraged to keep and maintain appointments with any and all other subspecialties including Endocrinology and reach out to our service then additional questions or concerns.  She and her  are happy not to have to undergo any more MRIs and remain happy with our service and with their evaluation and treatment and with today's appointment having questions and concerns addressed and answered.     Patient was evaluated today and is doing very well overall.  No new complaints were voiced.  Patient  lives with their spouse  Wound: none  Follow-up Studies: No orders of the defined types were placed in this encounter.       Assessment/Plan:  Status Post pituitary microadenoma and small meningioma  Doing very well overall  Encouraged gradual increase in physical and mental activity.  Fall precaution and home safety education provided to patient.  Follow-up: Future further follow-ups will be scheduled as needed      Electronically

## 2025-06-22 ENCOUNTER — RESULTS FOLLOW-UP (OUTPATIENT)
Age: 89
End: 2025-06-22

## 2025-06-22 ENCOUNTER — CLINICAL DOCUMENTATION (OUTPATIENT)
Age: 89
End: 2025-06-22

## 2025-06-22 DIAGNOSIS — N18.32 STAGE 3B CHRONIC KIDNEY DISEASE (HCC): Primary | ICD-10-CM

## 2025-06-22 NOTE — RESULT ENCOUNTER NOTE
Jina Calabrese  lab test(s) were abnormal.  Labs show chronic kidney disease.  I have given patient referral to nephrology.  Please contact patient and document response.

## 2025-06-23 NOTE — TELEPHONE ENCOUNTER
Pt informed and verbalized understanding with no further questions at this time.        ----- Message from Dr. Wilfrid Davila MD sent at 6/22/2025  3:36 PM EDT -----  Jina Calabrese  lab test(s) were abnormal.  Labs show chronic kidney disease.  I have given patient referral to nephrology.  Please contact patient and document response.

## 2025-06-25 ENCOUNTER — OFFICE VISIT (OUTPATIENT)
Dept: CARDIOLOGY | Age: 89
End: 2025-06-25
Payer: MEDICARE

## 2025-06-25 VITALS
SYSTOLIC BLOOD PRESSURE: 118 MMHG | WEIGHT: 165 LBS | HEIGHT: 65 IN | BODY MASS INDEX: 27.49 KG/M2 | HEART RATE: 85 BPM | DIASTOLIC BLOOD PRESSURE: 62 MMHG

## 2025-06-25 DIAGNOSIS — I34.0 NONRHEUMATIC MITRAL VALVE REGURGITATION: ICD-10-CM

## 2025-06-25 DIAGNOSIS — I48.21 PERMANENT ATRIAL FIBRILLATION (HCC): Primary | ICD-10-CM

## 2025-06-25 DIAGNOSIS — I48.92 ATRIAL FLUTTER, UNSPECIFIED TYPE (HCC): ICD-10-CM

## 2025-06-25 PROCEDURE — 1159F MED LIST DOCD IN RCRD: CPT | Performed by: INTERNAL MEDICINE

## 2025-06-25 PROCEDURE — 93010 ELECTROCARDIOGRAM REPORT: CPT | Performed by: INTERNAL MEDICINE

## 2025-06-25 PROCEDURE — 93005 ELECTROCARDIOGRAM TRACING: CPT | Performed by: INTERNAL MEDICINE

## 2025-06-25 PROCEDURE — 1123F ACP DISCUSS/DSCN MKR DOCD: CPT | Performed by: INTERNAL MEDICINE

## 2025-06-25 PROCEDURE — 1126F AMNT PAIN NOTED NONE PRSNT: CPT | Performed by: INTERNAL MEDICINE

## 2025-06-25 PROCEDURE — 99214 OFFICE O/P EST MOD 30 MIN: CPT | Performed by: INTERNAL MEDICINE

## 2025-06-25 NOTE — PROGRESS NOTES
Mohs surgery (Right, 6/16/2023).    Home Medications:  Prior to Admission medications    Medication Sig Start Date End Date Taking? Authorizing Provider   hydrocortisone (CORTEF) 10 MG tablet Take 1 tablet by mouth daily 6/9/25  Yes Wilfrid Davila MD   hydrocortisone (CORTEF) 5 MG tablet 1 tablet daily in the evening 6/9/25  Yes Wilfrid Davila MD   levothyroxine (SYNTHROID) 50 MCG tablet Take 1 tablet by mouth daily 6/9/25  Yes Wilfrid Davila MD   pantoprazole (PROTONIX) 40 MG tablet TAKE ONE TABLET BY MOUTH EVERY DAY 2/25/25  Yes Shari Valdez MD   dilTIAZem (CARDIZEM) 30 MG immediate release tablet TAKE 1 TABLET BY MOUTH THREE TIMES DAILY  Patient taking differently: Take 1 tablet by mouth in the morning and at bedtime 2/11/25  Yes Nixon Rivera APRN - NP   metoprolol tartrate (LOPRESSOR) 25 MG tablet TAKE 1/2 TABLET TWICE A DAY 2/10/25  Yes Shari Valdez MD   XARELTO 15 MG TABS tablet TAKE ONE TABLET BY MOUTH EVERY DAY 12/27/24  Yes Shari Valdez MD   Multiple Vitamins-Minerals (PRESERVISION AREDS 2 PO) Take 1 tablet by mouth in the morning and at bedtime   Yes Provider, MD Clau   dexAMETHasone (DECADRON) 4 MG/ML injection 4 mg IM as needed for adrenal crysis  Patient not taking: Reported on 6/25/2025 2/25/25   Wilfrid Davila MD   albuterol sulfate HFA (VENTOLIN HFA) 108 (90 Base) MCG/ACT inhaler Inhale 2 puffs into the lungs 4 times daily as needed for Wheezing  Patient not taking: Reported on 6/9/2025 1/23/25   Shari Valdez MD   Spacer/Aero-Holding Chambers DANITZA 1 Device by Does not apply route daily as needed (use with inhaler) 1/23/25   Shari Valdez MD   Medical ID Bracelet MISC Adrenal insufficiency 7/27/23   Wilfrid Davila MD       Allergies:  Statins, Tape [adhesive tape], Tizanidine hcl, Tramadol, Augmentin [amoxicillin-pot clavulanate], and Nystatin    Social History:   reports that she has never smoked. She has never been exposed to tobacco smoke. She has never used

## 2025-07-15 ENCOUNTER — OFFICE VISIT (OUTPATIENT)
Dept: NEPHROLOGY | Age: 89
End: 2025-07-15
Payer: MEDICARE

## 2025-07-15 VITALS
OXYGEN SATURATION: 93 % | DIASTOLIC BLOOD PRESSURE: 82 MMHG | BODY MASS INDEX: 27.29 KG/M2 | WEIGHT: 164 LBS | HEART RATE: 97 BPM | SYSTOLIC BLOOD PRESSURE: 144 MMHG

## 2025-07-15 DIAGNOSIS — E87.1 HYPONATREMIA: Primary | ICD-10-CM

## 2025-07-15 DIAGNOSIS — N18.31 STAGE 3A CHRONIC KIDNEY DISEASE (HCC): ICD-10-CM

## 2025-07-15 PROCEDURE — 1123F ACP DISCUSS/DSCN MKR DOCD: CPT | Performed by: INTERNAL MEDICINE

## 2025-07-15 PROCEDURE — 1159F MED LIST DOCD IN RCRD: CPT | Performed by: INTERNAL MEDICINE

## 2025-07-15 PROCEDURE — 99213 OFFICE O/P EST LOW 20 MIN: CPT | Performed by: INTERNAL MEDICINE

## 2025-07-15 PROCEDURE — G2211 COMPLEX E/M VISIT ADD ON: HCPCS | Performed by: INTERNAL MEDICINE

## 2025-07-15 RX ORDER — DOXYCYCLINE 100 MG/1
100 CAPSULE ORAL 2 TIMES DAILY
COMMUNITY
Start: 2025-07-07

## 2025-07-15 NOTE — PROGRESS NOTES
[facility]        Outpatient Follow-Up note         7/15/2025 11:03 AM    Patient Name:   Jina Calabrese  YOB: 1934  Primary Care Physician:  Shari Valdez MD   [facility] PHYSICIANS [facility] KIDNEY AND [facility]ERTENSION  [address]  [address]  [place] [address]  Dept: [phone]  Loc: [phone]     Chief Complaint / Reason for follow-up : Follow Up of Hyponatremia and post hospital discharge      Interval History :  Patient seen and examined by me.   Feels well. No cp or SOB.  Eating and drinking well.  Was on abx 2 weeks ago     Past History :  Past Medical History:   Diagnosis Date    Actinic keratosis     history of    Arthritis     Atrial fibrillation (HCC)     Cancer (HCC)     skin    Cervical prolapse     history of    Diverticulosis     Endocervical polyp     history of    Heel spur     Hip pain     Hypertension     Nasal septal deviation     Osteoporosis     Pituitary tumor     Stress incontinence     history of    Syncope     Vaginal prolapse     history of     Past Surgical History:   Procedure Laterality Date    APPENDECTOMY  1960's    BLADDER REPAIR  2011    [facility]    CHOLECYSTECTOMY  11/1976    [facility]    COLONOSCOPY  01/26/2000    ENDOSCOPIC ULTRASOUND (LOWER) Left 07/20/2017    ENDOSCOPIC ULTRASOUND performed by Wilfredo Dallas MD at [facility] Endoscopy    EYE SURGERY      bilat cataract    FACIAL SURGERY N/A 01/27/2021    DIVISION AND INSET/CHEEK performed by Brad Sherwood MD at [facility] OR    HYSTERECTOMY (CERVIX STATUS UNKNOWN)      JOINT REPLACEMENT  2017    left total hip relpament    MOHS SURGERY N/A 01/08/2021    MOHS REPAIR BCC NASAL TIP WITH FLAP AND GRAFT performed by Brad Sherwood MD at [facility] OR    MOHS SURGERY Left 08/06/2021    MOHS DEFECT REPAIR SCC DORSAL NOSE AND LEFT LOWER FOREHEAD WITH SKIN GRAFT FROM RIGHT CHEEK/EAR (PLACED ON NOSE) performed by Brad

## 2025-07-29 ENCOUNTER — OFFICE VISIT (OUTPATIENT)
Dept: FAMILY MEDICINE CLINIC | Age: 89
End: 2025-07-29
Payer: MEDICARE

## 2025-07-29 VITALS
BODY MASS INDEX: 27.21 KG/M2 | SYSTOLIC BLOOD PRESSURE: 122 MMHG | DIASTOLIC BLOOD PRESSURE: 66 MMHG | OXYGEN SATURATION: 95 % | HEIGHT: 65 IN | HEART RATE: 68 BPM | WEIGHT: 163.3 LBS

## 2025-07-29 DIAGNOSIS — B37.81 ESOPHAGEAL CANDIDIASIS (HCC): ICD-10-CM

## 2025-07-29 DIAGNOSIS — I10 ESSENTIAL HYPERTENSION: Primary | ICD-10-CM

## 2025-07-29 DIAGNOSIS — J02.9 SORE THROAT: ICD-10-CM

## 2025-07-29 DIAGNOSIS — C44.719 BASAL CELL CARCINOMA (BCC) OF SKIN OF LEFT LOWER EXTREMITY INCLUDING HIP: ICD-10-CM

## 2025-07-29 PROCEDURE — 1123F ACP DISCUSS/DSCN MKR DOCD: CPT | Performed by: FAMILY MEDICINE

## 2025-07-29 PROCEDURE — G2211 COMPLEX E/M VISIT ADD ON: HCPCS | Performed by: FAMILY MEDICINE

## 2025-07-29 PROCEDURE — 99214 OFFICE O/P EST MOD 30 MIN: CPT | Performed by: FAMILY MEDICINE

## 2025-07-29 PROCEDURE — 1160F RVW MEDS BY RX/DR IN RCRD: CPT | Performed by: FAMILY MEDICINE

## 2025-07-29 PROCEDURE — 1159F MED LIST DOCD IN RCRD: CPT | Performed by: FAMILY MEDICINE

## 2025-07-29 RX ORDER — FLUCONAZOLE 200 MG/1
200 TABLET ORAL DAILY
Qty: 15 TABLET | Refills: 0 | Status: SHIPPED | OUTPATIENT
Start: 2025-07-29 | End: 2025-08-12

## 2025-07-29 ASSESSMENT — ENCOUNTER SYMPTOMS
COUGH: 0
SORE THROAT: 1
CHEST TIGHTNESS: 0
DIARRHEA: 0
BLOOD IN STOOL: 0
WHEEZING: 0
CONSTIPATION: 0
ABDOMINAL PAIN: 0
SHORTNESS OF BREATH: 0

## 2025-07-29 NOTE — PROGRESS NOTES
[facility]  [address] [address]  Dept: [phone]  Dept Fax: [phone]  Loc: [phone]    Jina Calabrese is a 89 y.o. female who presents today for her medical conditions/complaints as noted below.  Jina Calabrese is c/o of   Chief Complaint   Patient presents with    Hypertension     6 months       HPI:     History of Present Illness  The patient is a 89-year-old female here today for a follow-up of her hypertension and hip pain. She is accompanied by her , with her daughter joining via phone.    She reports no chest pain or shortness of breath. Her atrial fibrillation remains stable, but she experiences fatigue. She sleeps well at night and does not feel depressed or anxious. She also reports no headaches, lightheadedness, dizziness, syncope, or falls.    She has an upcoming Mohs surgery scheduled for Monday to remove a basal cell carcinoma from the tip of her nose. She has been advised to apply Vaseline and tape to the area post-surgery. She was prescribed doxycycline following a biopsy and has completed two courses of antibiotics.    She recently had blood work done, which indicated that her kidneys are not functioning optimally. She is scheduled for an ultrasound on 08/22/2025. She expresses concern about her ability to hold her urine during the procedure as she currently wears a pad for urinary incontinence.    She has persistent joint pain in her knuckles and hands, which she manages without medication.    She has a sore throat every morning, which sometimes persists throughout the day. Her dentist recommended further evaluation due to irritation. She does not experience heartburn, choking, or difficulty swallowing. She uses sugar-free cough drops for relief. She does not have postnasal drip. She recently completed a course of doxycycline for a skin condition and has been on antibiotics

## 2025-08-18 ENCOUNTER — HOSPITAL ENCOUNTER (OUTPATIENT)
Dept: LAB | Age: 89
Discharge: HOME | End: 2025-08-18
Payer: MEDICARE

## 2025-08-18 DIAGNOSIS — E87.1 HYPONATREMIA: ICD-10-CM

## 2025-08-18 DIAGNOSIS — N18.31 STAGE 3A CHRONIC KIDNEY DISEASE (HCC): ICD-10-CM

## 2025-08-18 LAB
BUN SERPL-MCNC: 29 MG/DL (ref 8–23)
CREAT SERPL-MCNC: 1.2 MG/DL (ref 0.6–0.9)
GFR, ESTIMATED: 43 ML/MIN/1.73M2
POTASSIUM SERPL-SCNC: 4.1 MMOL/L (ref 3.7–5.3)

## 2025-08-18 PROCEDURE — 82565 ASSAY OF CREATININE: CPT

## 2025-08-18 PROCEDURE — 84520 ASSAY OF UREA NITROGEN: CPT

## 2025-08-18 PROCEDURE — 36415 COLL VENOUS BLD VENIPUNCTURE: CPT

## 2025-08-18 PROCEDURE — 84132 ASSAY OF SERUM POTASSIUM: CPT

## 2025-08-22 ENCOUNTER — HOSPITAL ENCOUNTER (OUTPATIENT)
Dept: ULTRASOUND IMAGING | Age: 89
Discharge: HOME | End: 2025-08-22
Attending: INTERNAL MEDICINE
Payer: MEDICARE

## 2025-08-22 ENCOUNTER — RESULTS FOLLOW-UP (OUTPATIENT)
Dept: NEPHROLOGY | Age: 89
End: 2025-08-22

## 2025-08-22 DIAGNOSIS — N18.31 STAGE 3A CHRONIC KIDNEY DISEASE (HCC): ICD-10-CM

## 2025-08-22 DIAGNOSIS — E87.1 HYPONATREMIA: ICD-10-CM

## 2025-08-22 PROCEDURE — 76770 US EXAM ABDO BACK WALL COMP: CPT

## 2025-09-02 RX ORDER — METOPROLOL TARTRATE 25 MG/1
12.5 TABLET, FILM COATED ORAL 2 TIMES DAILY
Qty: 90 TABLET | Refills: 1 | Status: SHIPPED | OUTPATIENT
Start: 2025-09-02

## (undated) DEVICE — Device

## (undated) DEVICE — 60 ML SYRINGE REGULAR TIP: Brand: MONOJECT

## (undated) DEVICE — SOLUTION IV 1000ML 0.9% SOD CHL PH 5 INJ USP VIAFLX PLAS

## (undated) DEVICE — BIT DRL L40MM DIA3.2MM DISP FOR G7 2 MOBILITY CONSTRUCT

## (undated) DEVICE — BIOGUARD A/W CLEANING ADAPTER

## (undated) DEVICE — GOWN,SIRUS,NON REINFRCD,LARGE,SET IN SL: Brand: MEDLINE

## (undated) DEVICE — GAUZE,SPONGE,4"X4",12PLY,STERILE,LF,2'S: Brand: MEDLINE

## (undated) DEVICE — ADAPTER CLEANING PORPOISE CLEANING

## (undated) DEVICE — STERILE COTTON BALLS LARGE 5/P: Brand: MEDLINE

## (undated) DEVICE — SUTURE MCRYL SZ 4-0 L18IN ABSRB UD P-3 L13MM 3/8 CIR PRIM Y494G

## (undated) DEVICE — GLOVE SURG SZ 9 L12IN THK91MIL BRN LTX FREE POLYCHLOROPRENE

## (undated) DEVICE — SCRUB DRY SURG EZ SCRUB BRUSH PREOPERATIVE GRN

## (undated) DEVICE — SUTURE PLN GUT SZ 5-0 L18IN ABSRB YELLOWISH TAN L13MM PC-1 1915G

## (undated) DEVICE — GLOVE ORTHO 8   MSG9480

## (undated) DEVICE — ADHESIVE SKIN CLOSURE FLX 0.5 CC FOR TISS SEAL HISTOACRYL

## (undated) DEVICE — 3M™ COBAN™ NL STERILE NON-LATEX SELF-ADHERENT WRAP, 2084S, 4 IN X 5 YD (10 CM X 4,5 M), 18 ROLLS/CASE: Brand: 3M™ COBAN™

## (undated) DEVICE — GLOVE SURG SZ 8 L11.77IN FNGR THK9.8MIL STRW LTX POLYMER

## (undated) DEVICE — INTENDED FOR TISSUE SEPARATION, AND OTHER PROCEDURES THAT REQUIRE A SHARP SURGICAL BLADE TO PUNCTURE OR CUT.: Brand: BARD-PARKER ® CARBON RIB-BACK BLADES

## (undated) DEVICE — COTTON BALL ST

## (undated) DEVICE — GLOVE ORANGE PI 7   MSG9070

## (undated) DEVICE — SPONGE GZ W4XL4IN COT 12 PLY TYP VII WVN C FLD DSGN

## (undated) DEVICE — PACK PROCEDURE SURG PLAS SC MIN SRHP LF

## (undated) DEVICE — SUTURE VCRL SZ 1 L36IN ABSRB UD L48MM CTXB 1/2 CIR BLNT PNT JB977H

## (undated) DEVICE — GAUZE,SPONGE,4"X4",16PLY,XRAY,STRL,LF: Brand: MEDLINE

## (undated) DEVICE — GAUZE,SPONGE,8"X4",12PLY,XRAY,STRL,LF: Brand: MEDLINE

## (undated) DEVICE — 3000CC GUARDIAN II: Brand: GUARDIAN

## (undated) DEVICE — DRESSING,GAUZE,XEROFORM,CURAD,5"X9",ST: Brand: CURAD

## (undated) DEVICE — 3M™ STERI-STRIP™ REINFORCED ADHESIVE SKIN CLOSURES, R1547, 1/2 IN X 4 IN (12 MM X 100 MM), 6 STRIPS/ENVELOPE: Brand: 3M™ STERI-STRIP™

## (undated) DEVICE — SKIN AFFIX SURG ADHESIVE 72/CS 0.55ML: Brand: MEDLINE

## (undated) DEVICE — SUTURE ETHBND EXCEL SZ 5 L30IN NONABSORBABLE GRN L48MM CCS MB47G

## (undated) DEVICE — LINER GLOVEXL RED CUT RESIST STRL REPEL LT

## (undated) DEVICE — SUTURE MCRYL SZ 3-0 L27IN ABSRB UD L24MM PS-1 3/8 CIR PRIM Y936H

## (undated) DEVICE — SUTURE VCRL SZ 2-0 L27IN ABSRB UD L36MM CP-1 1/2 CIR REV J266H

## (undated) DEVICE — SUTURE STRATAFIX SPRL SZ 1 L14IN ABSRB VLT L48CM CTX 1/2 SXPD2B405

## (undated) DEVICE — DISCONTINUED USE 394504 SYRINGE LUER LOCK TIP 12 ML

## (undated) DEVICE — AGENT HEMSTAT W4XL8IN OXIDIZED REGENERATED CELOS ABSRB

## (undated) DEVICE — SUTURE STRATAFIX SPRL SZ 2-0 L9IN ABSRB VLT MH L36MM 1/2 SXPD2B408

## (undated) DEVICE — BLADE ES L6IN ELASTOMERIC COAT EXT DURABLE BEND UPTO 90DEG

## (undated) DEVICE — SUTURE MCRYL SZ 5-0 L18IN ABSRB UD L11MM P-1 3/8 CIR PRIM Y490G

## (undated) DEVICE — BANDAGE,GAUZE,4.5"X4.1YD,STERILE,LF: Brand: MEDLINE

## (undated) DEVICE — GOWN,AURORA,NON-REINFORCED,2XL: Brand: MEDLINE

## (undated) DEVICE — 3M™ TEGADERM™ TRANSPARENT FILM DRESSING FRAME STYLE, 1627, 4 IN X 10 IN (10 CM X 25 CM), 20/CT 4CT/CASE: Brand: 3M™ TEGADERM™

## (undated) DEVICE — NON COATED ELECTROSURGICAL NEEDLE ELECTRODE, 2.75 INCH (7 CM): Brand: MEGADYNE

## (undated) DEVICE — PACK SURG PROC REMINDER N WVN DISPOSABLE BEAC TIME OUT

## (undated) DEVICE — DRAPE,U/ SHT,SPLIT,PLAS,STERIL: Brand: MEDLINE

## (undated) DEVICE — STRYKER PERFORMANCE SERIES SAGITTAL BLADE: Brand: STRYKER PERFORMANCE SERIES

## (undated) DEVICE — SUTURE NONABSORBABLE BRAIDED 4-0 SH 30 IN BLK PERMA HND K831H

## (undated) DEVICE — TUBING, SUCTION, 1/4" X 12', STRAIGHT: Brand: MEDLINE

## (undated) DEVICE — 9165 UNIVERSAL PATIENT PLATE: Brand: 3M™

## (undated) DEVICE — GLOVE SURG SZ 85 L12IN FNGR THK13MIL WHT ISOLEX POLYISOPRENE

## (undated) DEVICE — CYSTO/BLADDER IRRIGATION SET, REGULATING CLAMP

## (undated) DEVICE — GAMMEX® NON-LATEX SIZE 7.5, STERILE NEOPRENE POWDER-FREE SURGICAL GLOVE: Brand: GAMMEX

## (undated) DEVICE — CONVERTED USE 248063 TOWELS OR BL ST

## (undated) DEVICE — NEEDLE SPNL L3.5IN PNK HUB S STL REG WALL FIT STYL W/ QNCKE

## (undated) DEVICE — CHLORAPREP 26ML ORANGE

## (undated) DEVICE — COVER LT HNDL BLU PLAS

## (undated) DEVICE — DRAPE,ORTHOMAX ,HIP,W/POUCHES: Brand: MEDLINE